# Patient Record
Sex: FEMALE | Race: WHITE | Employment: PART TIME | URBAN - METROPOLITAN AREA
[De-identification: names, ages, dates, MRNs, and addresses within clinical notes are randomized per-mention and may not be internally consistent; named-entity substitution may affect disease eponyms.]

---
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
## 2019-01-07 ENCOUNTER — APPOINTMENT (OUTPATIENT)
Dept: GENERAL RADIOLOGY | Age: 19
End: 2019-01-07
Attending: STUDENT IN AN ORGANIZED HEALTH CARE EDUCATION/TRAINING PROGRAM
Payer: MEDICAID

## 2019-01-07 ENCOUNTER — HOSPITAL ENCOUNTER (EMERGENCY)
Age: 19
Discharge: HOME | End: 2019-01-08
Attending: PEDIATRICS
Payer: MEDICAID

## 2019-01-07 VITALS
RESPIRATION RATE: 18 BRPM | TEMPERATURE: 99.7 F | SYSTOLIC BLOOD PRESSURE: 134 MMHG | DIASTOLIC BLOOD PRESSURE: 79 MMHG | OXYGEN SATURATION: 98 % | WEIGHT: 222.66 LBS | BODY MASS INDEX: 36.87 KG/M2 | HEART RATE: 104 BPM

## 2019-01-07 DIAGNOSIS — J06.9 ACUTE UPPER RESPIRATORY INFECTION: Primary | ICD-10-CM

## 2019-01-07 PROCEDURE — 94640 AIRWAY INHALATION TREATMENT: CPT

## 2019-01-07 PROCEDURE — 74011250637 HC RX REV CODE- 250/637: Performed by: PEDIATRICS

## 2019-01-07 PROCEDURE — 99283 EMERGENCY DEPT VISIT LOW MDM: CPT

## 2019-01-07 PROCEDURE — 71046 X-RAY EXAM CHEST 2 VIEWS: CPT

## 2019-01-07 PROCEDURE — 74011000250 HC RX REV CODE- 250: Performed by: STUDENT IN AN ORGANIZED HEALTH CARE EDUCATION/TRAINING PROGRAM

## 2019-01-07 RX ORDER — IBUPROFEN 400 MG/1
800 TABLET ORAL
Status: COMPLETED | OUTPATIENT
Start: 2019-01-08 | End: 2019-01-07

## 2019-01-07 RX ADMIN — ALBUTEROL SULFATE 1 DOSE: 2.5 SOLUTION RESPIRATORY (INHALATION) at 23:31

## 2019-01-07 RX ADMIN — IBUPROFEN 800 MG: 400 TABLET, FILM COATED ORAL at 23:59

## 2019-01-08 NOTE — ED PROVIDER NOTES
Gopi Doty  22 y.o. Patient presents with:  Cough    This patient is a 21-year-old female who presents with her mother for a cough. The cough has been present for multiple days, previously evaluated by the pediatrician. The pediatrician told them this is likely a virus, however dosed albuterol to help with the cough. The patient states albuterol was dosed to help prevent pneumonia. It is noted the patient has multiple episodes of pneumonia, which starts as a cough, and works its way down into the chest. The pediatrician did not start any antibiotics, as the patient did not require any at the time of the recent evaluation. Considering the patient continued to cough, they reported to the emergency department for evaluation. The cough is nonproductive, however causes some chest discomfort when she has coughing fits. NyQuil makes the cough better, and nothing seems to make it worse as reported. It has been relapsing and remitting over the past couple of days.              Past Medical History:   Diagnosis Date    Abdominal migraine     ADHD (attention deficit hyperactivity disorder)     Autism     Developmental delay     Headache     Irritable bowel syndrome with diarrhea 4/13/2017    Migraine     Obesity, morbid ([address] 12/12/2017    Psychiatric disorder     anxiety       Past Surgical History:   Procedure Laterality Date    COLONOSCOPY N/A 12/20/2016    COLONOSCOPY performed by Suni Nevarez MD at [address] HX TONSIL AND ADENOIDECTOMY      HX WISDOM TEETH EXTRACTION      UPPER GI ENDOSCOPY,BIOPSY  12/20/2016              Family History:   Problem Relation Age of Onset    Endometriosis Mother     Headache Mother         d/t accident - neck and brain injury    Delayed Awakening Mother     Post-op Nausea/Vomiting Mother     Headache Maternal Grandfather     No Known Problems Father     No Known Problems Maternal Grandmother        Social History     Socioeconomic History    Marital status: SINGLE     Spouse name: Not on file    Number of children: Not on file    Years of education: Not on file    Highest education level: Not on file   Social Needs    Financial resource strain: Not on file    Food insecurity - worry: Not on file    Food insecurity - inability: Not on file    Transportation needs - medical: Not on file   [employer] needs - non-medical: Not on file   Occupational History    Not on file   Tobacco Use    Smoking status: Never Smoker    Smokeless tobacco: Never Used    Tobacco comment: no smoke exposure in the home   Substance and Sexual Activity    Alcohol use: No    Drug use: No    Sexual activity: No   Other Topics Concern    Not on file   Social History Narrative    Not on file         ALLERGIES: Yeast, dried    Review of Systems   HENT: Positive for congestion and voice change. Respiratory: Positive for cough and chest tightness. Gastrointestinal: Negative for abdominal pain. Musculoskeletal: Negative for back pain. Skin: Negative for rash. All other systems reviewed and are negative. Vitals:    01/07/19 2218   BP: 134/79   Pulse: (!) 104   Resp: 18   Temp: 99.7 °F (37.6 °C)   SpO2: 99%   Weight: 101 kg (222 lb 10.6 oz)            Physical Exam       PE:  GEN:  WDWN female alert non toxic in NAD  SK: Acyanotic, Warm extremities. Few lesions throughout skin with slight erythema, without discharge. HEENT: H: Atraumatic/Normocephalic. E: EOMI, E: TM clear  N/T: Clear oropharynx  NECK: Supple, Full range of motion. No Masses  Chest/Pulmonary: Clear to auscultation. No rales, rhonchi, wheezes or distress at rest. Mild wheezing during coughing. No Retraction. No nasal flaring. Chest Wall: No tenderness on palpation, Equal chest rise  CV: Regular rate and rhythm. Normal S1 S2 . No murmur, gallops or thrills. ABD: Soft non tender, no Hepatosplenomegaly, no guarding, no masses,     MS: FROM all extremities, no long bone tenderness.  Normal Muscle tone. No swelling, cyanosis, no edema. NEURO: Alert. No focality. Cranial nerves 2-12 grossly intact. GCS 15          MDM         This patient presents with cough, given the history of multiple episodes of pneumonia, a chest x-ray was ordered in addition to a DuoNeb. After receiving the DuoNeb, the patient feels her cough slightly improved. Chest x-ray returned showing no acute infectious process. Respiratory therapy was called to help the patient better understand her current inhaler, with spacer.  Return precautions were established, and the patient was discharged and asked to follow-up with her pediatrician    Procedures

## 2019-01-08 NOTE — DISCHARGE INSTRUCTIONS
Patient Education      Thank you for allowing us to care for you in the Emergency Department. Please return if the condition does not improve, or gets worse. Please follow-up with your regular doctor as soon as possible. Please use your inhaler and spacer as discussed. Please call your pediatrician for follow-up. Upper Respiratory Infection (Cold): Care Instructions  Your Care Instructions    An upper respiratory infection, or URI, is an infection of the nose, sinuses, or throat. URIs are spread by coughs, sneezes, and direct contact. The common cold is the most frequent kind of URI. The flu and sinus infections are other kinds of URIs. Almost all URIs are caused by viruses. Antibiotics won't cure them. But you can treat most infections with home care. This may include drinking lots of fluids and taking over-the-counter pain medicine. You will probably feel better in 4 to 10 days. The doctor has checked you carefully, but problems can develop later. If you notice any problems or new symptoms, get medical treatment right away. Follow-up care is a key part of your treatment and safety. Be sure to make and go to all appointments, and call your doctor if you are having problems. It's also a good idea to know your test results and keep a list of the medicines you take. How can you care for yourself at home? · To prevent dehydration, drink plenty of fluids, enough so that your urine is light yellow or clear like water. Choose water and other caffeine-free clear liquids until you feel better. If you have kidney, heart, or liver disease and have to limit fluids, talk with your doctor before you increase the amount of fluids you drink. · Take an over-the-counter pain medicine, such as acetaminophen (Tylenol), ibuprofen (Advil, Motrin), or naproxen (Aleve). Read and follow all instructions on the label. · Before you use cough and cold medicines, check the label.  These medicines may not be safe for young children or for people with certain health problems. · Be careful when taking over-the-counter cold or flu medicines and Tylenol at the same time. Many of these medicines have acetaminophen, which is Tylenol. Read the labels to make sure that you are not taking more than the recommended dose. Too much acetaminophen (Tylenol) can be harmful. · Get plenty of rest.  · Do not smoke or allow others to smoke around you. If you need help quitting, talk to your doctor about stop-smoking programs and medicines. These can increase your chances of quitting for good. When should you call for help? Call 911 anytime you think you may need emergency care. For example, call if:    · You have severe trouble breathing.    Call your doctor now or seek immediate medical care if:    · You seem to be getting much sicker.     · You have new or worse trouble breathing.     · You have a new or higher fever.     · You have a new rash.    Watch closely for changes in your health, and be sure to contact your doctor if:    · You have a new symptom, such as a sore throat, an earache, or sinus pain.     · You cough more deeply or more often, especially if you notice more mucus or a change in the color of your mucus.     · You do not get better as expected. Where can you learn more? Go to http://harjeet-mina.info/. Enter R863 in the search box to learn more about \"Upper Respiratory Infection (Cold): Care Instructions. \"  Current as of: December 6, 2017  Content Version: 11.8  © 2914-6919 [employer]. Care instructions adapted under license by [employer] (which disclaims liability or warranty for this information). If you have questions about a medical condition or this instruction, always ask your healthcare professional. [place] [address] any warranty or liability for your use of this information.

## 2019-01-10 ENCOUNTER — OFFICE VISIT (OUTPATIENT)
Dept: PEDIATRIC GASTROENTEROLOGY | Age: 19
End: 2019-01-10

## 2019-01-10 VITALS
OXYGEN SATURATION: 97 % | RESPIRATION RATE: 17 BRPM | WEIGHT: 243.2 LBS | HEIGHT: 65 IN | BODY MASS INDEX: 40.52 KG/M2 | HEART RATE: 109 BPM | TEMPERATURE: 98.8 F

## 2019-01-10 DIAGNOSIS — E88.81 INSULIN RESISTANCE: ICD-10-CM

## 2019-01-10 DIAGNOSIS — E73.9 LACTOSE INTOLERANCE: ICD-10-CM

## 2019-01-10 DIAGNOSIS — F32.89 ATYPICAL DEPRESSION: ICD-10-CM

## 2019-01-10 DIAGNOSIS — E55.9 VITAMIN D DEFICIENCY: ICD-10-CM

## 2019-01-10 DIAGNOSIS — Z91.09 ALLERGY TO YEAST: ICD-10-CM

## 2019-01-10 DIAGNOSIS — F84.0 AUTISM SPECTRUM DISORDER: ICD-10-CM

## 2019-01-10 DIAGNOSIS — K58.0 IRRITABLE BOWEL SYNDROME WITH DIARRHEA: ICD-10-CM

## 2019-01-10 DIAGNOSIS — K20.0 EOSINOPHILIC ESOPHAGITIS: ICD-10-CM

## 2019-01-10 DIAGNOSIS — G44.52 NEW DAILY PERSISTENT HEADACHE: ICD-10-CM

## 2019-01-10 DIAGNOSIS — K59.04 CHRONIC IDIOPATHIC CONSTIPATION: ICD-10-CM

## 2019-01-10 DIAGNOSIS — K31.84 GASTROPARESIS: Primary | ICD-10-CM

## 2019-01-10 DIAGNOSIS — E66.01 OBESITY, MORBID (HCC): ICD-10-CM

## 2019-01-10 DIAGNOSIS — K20.90 ESOPHAGITIS DETERMINED BY BIOPSY: ICD-10-CM

## 2019-01-10 DIAGNOSIS — F41.9 ANXIETY: ICD-10-CM

## 2019-01-10 DIAGNOSIS — K21.9 GASTROESOPHAGEAL REFLUX DISEASE WITHOUT ESOPHAGITIS: ICD-10-CM

## 2019-01-10 DIAGNOSIS — R10.9 FUNCTIONAL ABDOMINAL PAIN SYNDROME: ICD-10-CM

## 2019-01-10 RX ORDER — ONDANSETRON 8 MG/1
8 TABLET, ORALLY DISINTEGRATING ORAL
Qty: 15 TAB | Refills: 3 | Status: SHIPPED | OUTPATIENT
Start: 2019-01-10 | End: 2019-12-06

## 2019-01-10 RX ORDER — DOCUSATE SODIUM 250 MG
250 CAPSULE ORAL
Qty: 30 CAP | Refills: 3 | Status: SHIPPED | OUTPATIENT
Start: 2019-01-10 | End: 2021-04-02

## 2019-01-10 RX ORDER — FLUTICASONE PROPIONATE 220 UG/1
AEROSOL, METERED RESPIRATORY (INHALATION)
Qty: 2 INHALER | Refills: 11 | Status: SHIPPED | OUTPATIENT
Start: 2019-01-10 | End: 2021-04-02

## 2019-01-10 RX ORDER — DICYCLOMINE HYDROCHLORIDE 20 MG/1
20 TABLET ORAL
Qty: 28 TAB | Refills: 2 | Status: SHIPPED | OUTPATIENT
Start: 2019-01-10 | End: 2019-08-20

## 2019-01-10 RX ORDER — OMEPRAZOLE 20 MG/1
20 CAPSULE, DELAYED RELEASE ORAL DAILY
Qty: 30 CAP | Refills: 11 | Status: SHIPPED | OUTPATIENT
Start: 2019-01-10 | End: 2019-01-12 | Stop reason: SDUPTHER

## 2019-01-10 NOTE — PATIENT INSTRUCTIONS
1.  Continue omeprazole, swallowed Flovent, stool softener as needed, Zofran as needed, Bentyl as needed  2. Immunology referral to Dr. David Murphy at [facility] for frequent viral infections  3.   Return to clinic in 1 year or sooner as needed

## 2019-01-11 ENCOUNTER — TELEPHONE (OUTPATIENT)
Dept: PEDIATRIC GASTROENTEROLOGY | Age: 19
End: 2019-01-11

## 2019-01-11 NOTE — TELEPHONE ENCOUNTER
----- Message from Andrade An sent at 1/11/2019  2:48 PM EST -----  Regarding: Dr Hernandez Cull: [phone]  Patient is calling because she was referred to a immunologist and they need her medical records. This is a different that Dr Martina Smith referred to her. Dr Tiffanie Bradley  Fax:  [phone]    Please advise.     [phone]

## 2019-01-12 DIAGNOSIS — K21.9 GASTROESOPHAGEAL REFLUX DISEASE WITHOUT ESOPHAGITIS: ICD-10-CM

## 2019-01-12 DIAGNOSIS — K59.04 CHRONIC IDIOPATHIC CONSTIPATION: ICD-10-CM

## 2019-01-12 DIAGNOSIS — R10.9 FUNCTIONAL ABDOMINAL PAIN SYNDROME: ICD-10-CM

## 2019-01-12 DIAGNOSIS — E88.81 INSULIN RESISTANCE: ICD-10-CM

## 2019-01-12 DIAGNOSIS — K58.0 IRRITABLE BOWEL SYNDROME WITH DIARRHEA: ICD-10-CM

## 2019-01-13 RX ORDER — OMEPRAZOLE 20 MG/1
CAPSULE, DELAYED RELEASE ORAL
Qty: 90 CAP | Refills: 11 | Status: SHIPPED | OUTPATIENT
Start: 2019-01-13 | End: 2019-08-20

## 2019-01-15 NOTE — PROGRESS NOTES
Date: January 10, 2019    Dear Aditi Singer MD:    Jacqueline Orourke returns to clinic today accompanied by her mother for ongoing care of GERD, constipation, and unexplained lower abdominal pain. Jacqueline Orourke is pleased to tell me that the swallowed Flovent has continued to be completely effective. There has been no further esophageal dysphagia or other GERD symptoms. Additionally, the constipation has been much easier to manage. Jacqueline Orourke is using Colace as needed only, however would like it prescribed for refill just in case. Mother tells me that there have been no complaints of abdominal pain ever since starting swallowed Flovent. I reviewed with mother that Erica's clinical presentation is consistent with eosinophilic esophagitis, however the endoscopy biopsy proof is lacking. Nonetheless, Jacqueline Orourke has not had a period of absent symptoms this long during my entire involvement with her and I am pleased she has found relief. Mother tells me that Jacqueline Orourke is afraid to go out in public or to school as she always seems to acquire intercurrent viral infections whenever she leaves the home. The family requests an immunology evaluation and I suggested Dr. Uvaldo Kuhn at [facility]. I cautioned the family that Dr. Uvaldo Kuhn may not think the clinical history concerning for an immunodeficiency but that I would trust her consultation in the matter. The ulcerated skin lesions appear as if excoriated bug bites or acne lesions, however mother claims that they were absent by the time they made it to the dermatology visit. They will return with photographic evidence of the ulcerated skin lesions for further consideration. Medications:   Current Outpatient Medications   Medication Sig    fluticasone (FLOVENT HFA) 220 mcg/actuation inhaler 2 puffs swallowed twice daily, npo x 30 min after dose    dicyclomine (BENTYL) 20 mg tablet Take 1 Tab by mouth two (2) times daily as needed for up to 14 doses.     docusate sodium (STOOL SOFTENER) 250 mg capsule Take 1 Cap by mouth two (2) times daily as needed for Constipation for up to 30 doses.  ondansetron (ZOFRAN ODT) 8 mg disintegrating tablet Take 1 Tab by mouth every eight (8) hours as needed for Nausea for up to 15 doses.  omeprazole (PRILOSEC) 20 mg capsule TAKE ONE CAPSULE BY MOUTH EVERY DAY    cetirizine (ZYRTEC) 10 mg tablet TK 1 T PO D PRF ALLERGY    STOOL SOFTENER 250 mg capsule TK ONE C PO  QD    metFORMIN ER (GLUCOPHAGE XR) 750 mg tablet Take 1 Tab by mouth two (2) times a day. Indications: PREVENTION OF TYPE 2 DIABETES MELLITUS    cholecalciferol, vitamin d3, (VITAMIN D3) 400 unit cap Take  by mouth.  loratadine (CLARITIN) 10 mg tablet Take 10 mg by mouth daily.  fluticasone propionate (FLONASE ALLERGY RELIEF NA) by Nasal route.  topiramate (TOPAMAX) 25 mg tablet 25 mg nightly.  VYVANSE 30 mg capsule TK ONE C PO  QAM    norethindrone-ethinyl estradiol (JUNEL 1/20, 21,) 1-20 mg-mcg tab Take ACTIVE PILLS ONLY    SUMAtriptan succinate (ZEMBRACE SYMTOUCH) 3 mg/0.5 mL pnij by SubCUTAneous route. Indications: MIGRAINE    hydrOXYzine HCl (ATARAX) 25 mg tablet TK 1 T PO Q 6 HOURS PRF ITCHING    mupirocin (BACTROBAN) 2 % ointment APPLY TO AFFECTED AREA OF SKIN TID UNTIL RESOLVED    triamcinolone acetonide (KENALOG) 0.1 % topical cream APPLY TO INSECT BITES BID PRN DO NOT APPLY TO FACE    sertraline (ZOLOFT) 50 mg tablet Take one tablet by mouth daily    butalbital-acetaminophen-caff (FIORICET) -40 mg per capsule Take 1-2 capsules by mouth every 8-12 hours prn    diphenhydrAMINE (BENADRYL) 25 mg capsule Take by mouth as needed (migraine).  zonisamide (ZONEGRAN) 100 mg capsule Take 100 mg by mouth daily.  ketorolac (TORADOL) 10 mg tablet Take 1 Tab by mouth every six (6) hours as needed for Pain.  Indications: SEVERE PAIN    butalbital-acetaminophen-caffeine (FIORICET, ESGIC) -40 mg per tablet Take 1 Tab by mouth every six (6) hours as needed for Pain or Headache. Max Daily Amount: 4 Tabs. Indications: MIGRAINE     No current facility-administered medications for this visit. Allergies: Allergies   Allergen Reactions    Yeast, Dried Other (comments)     Upset stomach        ROS: A 12 point review of systems was obtained and was as per HPI     OBJECTIVE:  Vitals:   Vitals:    01/10/19 1429   Pulse: (!) 109   Resp: 17   Temp: 98.8 °F (37.1 °C)   TempSrc: Oral   SpO2: 97%   Weight: 243 lb 3.2 oz (110.3 kg)   Height: 5' 5.16\" (1.655 m)       PHYSICAL EXAM:  General  no distress, well developed, well nourished, she is obese  HEENT:  Anicteric sclera, no oral lesions, moist mucous membranes  Eyes: PERRL and Conjunctivae Clear Bilaterally  Neck:  supple, no lymphadenopathy   Pulmonary:  Clear Breath Sounds Bilaterally, No Increased Effort and Good Air Movement Bilaterally  CV:  RRR and S1S2  Abd:  soft, non tender, non distended and bowel sounds present in all 4 quadrants, no hepatosplenomegaly  : deferred  Skin  Few ulcerated skin lesions over the lower leg  Musc/Skel: no swelling or tenderness  Neuro: AAO and sensation intact  Psych: appropriate affect and interactions    Studies: Previous abdominal films demonstrative of rectosigmoid stool burden consistent with constipation    Impression: Dagoberto Gonzales is a 67-year-old girl with gastroesophageal reflux disease, constipation, and chronic abdominal pain. I am struck by her improvement on swallowed Flovent, which suggests eosinophilic esophagitis. It seems harmless at this time to continue the swallowed Flovent. I will refer Dagoberto Gonzales along to Dr. Tegan [facility] of [facility] for consideration of recurrent intercurrent viral infections. Plan:   1. Continue omeprazole, swallowed Flovent, stool softener as needed, Zofran as needed, Bentyl as needed  2. Immunology referral to Dr. Tegan To at [address] [place] [address] for frequent viral infections  3.   Return to clinic in 1 year or sooner as needed

## 2019-03-19 ENCOUNTER — OFFICE VISIT (OUTPATIENT)
Dept: PEDIATRIC ENDOCRINOLOGY | Age: 19
End: 2019-03-19

## 2019-03-19 VITALS
SYSTOLIC BLOOD PRESSURE: 115 MMHG | DIASTOLIC BLOOD PRESSURE: 82 MMHG | OXYGEN SATURATION: 100 % | HEART RATE: 105 BPM | WEIGHT: 230.8 LBS | TEMPERATURE: 97.7 F | HEIGHT: 66 IN | RESPIRATION RATE: 19 BRPM | BODY MASS INDEX: 37.09 KG/M2

## 2019-03-19 DIAGNOSIS — E66.9 OBESITY (BMI 35.0-39.9 WITHOUT COMORBIDITY): Primary | ICD-10-CM

## 2019-03-19 DIAGNOSIS — R53.83 OTHER FATIGUE: ICD-10-CM

## 2019-03-19 DIAGNOSIS — R10.9 ABDOMINAL PAIN, UNSPECIFIED ABDOMINAL LOCATION: ICD-10-CM

## 2019-03-19 PROBLEM — E66.01 OBESITY, MORBID (HCC): Status: RESOLVED | Noted: 2017-12-12 | Resolved: 2019-03-19

## 2019-03-19 NOTE — LETTER
3/19/2019 2:23 PM 
 
Patient:  Reynaldo Isabel YOB: 2000 Date of Visit: 3/19/2019 Dear Atri Kimble MD 
[id number] [facility] [id number] VIA Facsimile: [phone] 
 : Thank you for referring Ms. Corina Hernandez to me for evaluation/treatment. Below are the relevant portions of my assessment and plan of care. Chief Complaint Patient presents with  Weight Management Subjective:  
 
Reason for visit: Reynaldo Isabel is a 22 y.o. female here for follow up of - Morbid obesity - Severe insulin resistance based on OGTT, not clinical.  - On Metformin 750 mg BiD - Irregular menstrual cycles - on continued OCP as menses cause debilitating migraines - Abnormal lipid profile She was last seen 4 months ago. She is here today with her mother. History of present illness: As per mother, patients history is significant for - Autism - Migraines severe on multiple medications - See below Morbid Obesity - Has struggled with obesity for many years. Lost 32 lb in 4 months with Diet and activity changes. Has lost a total of 60 lbs in 8 months. BMI has decreased from 43 to 37 kg/m2. Able to do more exercises as no more migraines. Has migraines 2-3x/month versus 2-3x/week. 7 Denies polyphagia, + polydipsia - 16 oz x 5 times/day, Nocturia once at night. Denies symptoms of hypothyroidism such as cold tolerance, dry hair, dry skin, constipation. No snoring at night except when really tired. No hip or joint pains Improved exercise intolerance, No SOB, No chest pain, palpitations Activity -  
Does yoga twice a week, Does chores around the home. Going to gym 2-3x/week - using elliptical and weights. Has seen Nutritionist in the past.  
Breakfast - None as she feels nauseous on waking up Lunch -  Smoothies - Strawberry and Mangoes, greek Yoghurt/ Skim milk Dinner - Meat and vegetables with sweet potatoes - does portion control Drinks - [employer] Insulin resistance -  OGTT done in 11/2017 revealed insulin resistance INSULIN Result Value Ref Range Insulin 2 hour  277 (H) <37 Insulin - 1 hour  286 (H) <51 Insulin Fasting  37.4 (H) <9 uIU/mL Started on Metformin  mg 12/2017. Current dose 750 mg Bid - takes both tablets together at bedtime. Improved fasting insulin Hypertension - In the previous visit - due to hypertension and striae noted, ACTH (low)and random cortisol (9.0) were drawn and they were not high, however they are not excellent screening tests to assess hypercortisolism. Patient had 24 hour urinary cortisol done with  (Ph# [phone]) at [facility] due to similar concerns of [place] which was normal - 7 (Normal 0-50). BP is normal today Abnormal lipid profile - 9/2016 - Lipids - High TG - 116, High Total cholesterol -209, ldl - 134, hdl - 52.  
 
 7/3/2018 Triglyceride 173 (H) Cholesterol 195 (H) HDL  44  (H) - Recommended OTC Fish oil. Pt concerned of GI side effects. Recommended plant sterols such as mikel seeds and flax seeds. Vitamin D deficiency - History - On Vitamin D 400 international units , Most recent Vitamin D 79 - 7/2018 Menstrual history - attained menarche at age 5 years, started on OCPs 12/2016  for severe menstrual migraines. Followed by Gynecology. She has not had a cycle for more than a year as she is continued OCP's to prevent migraine. Has baseline migraines requiring sumatriptan injections 2-3x a week. Used to be on Depo but associated with weight gain  
fu apt with Gynecology yearly. Plan is to continue continued OCP for a total of 5 years. MIgraines - Improved on Topamax - Now only once a week. Triggers - anxiety, noise , certain aura Autism and anxiety - Receives weekly counseling. Changes - Is going to come off Zoloft. Ritalin was switched to Adderall. Stopped using Adderall, planning to start Vyvanse. Seeing a chiropractor. Birth History - Weighed 6 lbs, Term, NVD Surgeries: NONE Hospitalizations: NONE Family history: No family history of thyroid disease, heart disease, hypertension or high cholesterol or DM. Fathers side unknown. He has Gout. Father is obese. Mother - Endometriosis MGF, MGM, Mother - Hypertension MGreat grandparents - Heartdisease at later age Social History: 
Home schooled for past 1 year as anxiety at school seemed to worsen headaches Finished 12th grade Doing well. ROS: 
Constitutional: decreased energy - Mother reports excess fatigue, has had Flu, Bronchitis and viral infection sin past 3 months. Recently saw Immunology at [facility] - got pneumo shot as titers were low. ENT: normal hearing, no sorethroat Eye: normal vision, denied double vision, blurred vision Respiratory system: no wheezing, no respiratory discomfort CVS: no palpitations, + lower extremity edema - improving - Followed by Cardiology - Significant improvement noted GI: normal bowel movements, abdominal pain followed by GI - Improved with Flovent Recent Upper abdominal pain Allergy: no skin rash or angioedema, significant stria on abdomen and inner thighs and arms, habit of skin pricking Neuorlogical: headache, no focal weakness. No burning Behavioural: normal behavior, normal mood. Medications - See scanned Prior to Admission medications Medication Sig Start Date End Date Taking? Authorizing Provider  
omeprazole (PRILOSEC) 20 mg capsule TAKE ONE CAPSULE BY MOUTH EVERY DAY 1/13/19  Yes Mya Soria MD  
fluticasone (FLOVENT HFA) 220 mcg/actuation inhaler 2 puffs swallowed twice daily, npo x 30 min after dose 1/10/19  Yes Mya Soria MD  
dicyclomine (BENTYL) 20 mg tablet Take 1 Tab by mouth two (2) times daily as needed for up to 14 doses.  1/10/19  Yes Mya Soria MD  
docusate sodium (STOOL SOFTENER) 250 mg capsule Take 1 Cap by mouth two (2) times daily as needed for Constipation for up to 30 doses. 1/10/19  Yes Vanda Mcnally MD  
ondansetron (ZOFRAN ODT) 8 mg disintegrating tablet Take 1 Tab by mouth every eight (8) hours as needed for Nausea for up to 15 doses. 1/10/19  Yes Vanda Mcnally MD  
cetirizine (ZYRTEC) 10 mg tablet TK 1 T PO D PRF ALLERGY 9/10/18  Yes Provider, Historical  
STOOL SOFTENER 250 mg capsule TK ONE C PO  QD 10/1/18  Yes Provider, Historical  
metFORMIN ER (GLUCOPHAGE XR) 750 mg tablet Take 1 Tab by mouth two (2) times a day. Indications: PREVENTION OF TYPE 2 DIABETES MELLITUS 9/20/18  Yes Edin Arriaza MD  
cholecalciferol, vitamin d3, (VITAMIN D3) 400 unit cap Take  by mouth. Yes Provider, Historical  
loratadine (CLARITIN) 10 mg tablet Take 10 mg by mouth daily. Yes Provider, Historical  
fluticasone propionate (FLONASE ALLERGY RELIEF NA) by Nasal route. Yes Provider, Historical  
topiramate (TOPAMAX) 25 mg tablet 25 mg nightly. 8/7/18  Yes Provider, Historical  
VYVANSE 30 mg capsule TK ONE C PO  QAM 8/9/18  Yes Provider, Historical  
norethindrone-ethinyl estradiol (JUNEL 1/20, 21,) 1-20 mg-mcg tab Take ACTIVE PILLS ONLY 8/14/18  Yes Riddhi RAMACHANDRAN MD  
SUMAtriptan succinate (ZEMBRACE SYMTOUCH) 3 mg/0.5 mL pnij by SubCUTAneous route.  Indications: MIGRAINE   Yes Provider, Historical  
hydrOXYzine HCl (ATARAX) 25 mg tablet TK 1 T PO Q 6 HOURS PRF ITCHING 6/6/17  Yes Provider, Historical  
mupirocin (BACTROBAN) 2 % ointment APPLY TO AFFECTED AREA OF SKIN TID UNTIL RESOLVED 6/6/17  Yes Provider, Historical  
triamcinolone acetonide (KENALOG) 0.1 % topical cream APPLY TO INSECT BITES BID PRN DO NOT APPLY TO FACE 6/6/17  Yes Provider, Historical  
sertraline (ZOLOFT) 50 mg tablet Take one tablet by mouth daily 4/13/17  Yes Provider, Historical  
butalbital-acetaminophen-caff (FIORICET) -40 mg per capsule Take 1-2 capsules by mouth every 8-12 hours prn 4/4/17  Yes Provider, Historical  
 diphenhydrAMINE (BENADRYL) 25 mg capsule Take by mouth as needed (migraine). Yes Provider, Historical  
zonisamide (ZONEGRAN) 100 mg capsule Take 100 mg by mouth daily. Yes Provider, Historical  
ketorolac (TORADOL) 10 mg tablet Take 1 Tab by mouth every six (6) hours as needed for Pain. Indications: SEVERE PAIN 10/15/15  Yes Tracey Mares MD  
butalbital-acetaminophen-caffeine (FIORICET, ESGIC) -40 mg per tablet Take 1 Tab by mouth every six (6) hours as needed for Pain or Headache. Max Daily Amount: 4 Tabs. Indications: MIGRAINE 9/24/15  Yes Tracey Mares MD  
 
 
Allergies: Allergies Allergen Reactions  Yeast, Dried Other (comments) Upset stomach Objective:  
 
 
Visit Vitals /82 (BP 1 Location: Left arm, BP Patient Position: Sitting) Pulse (!) 105 Temp 97.7 °F (36.5 °C) (Oral) Resp 19 Ht 5' 5.75\" (1.67 m) Wt 230 lb 12.8 oz (104.7 kg) SpO2 100% BMI 37.54 kg/m² Wt Readings from Last 3 Encounters:  
03/19/19 230 lb 12.8 oz (104.7 kg) (99 %, Z= 2.32)*  
01/10/19 243 lb 3.2 oz (110.3 kg) (>99 %, Z= 2.43)*  
01/07/19 222 lb 10.6 oz (101 kg) (99 %, Z= 2.23)* * Growth percentiles are based on CDC (Girls, 2-20 Years) data. Ht Readings from Last 3 Encounters:  
03/19/19 5' 5.75\" (1.67 m) (72 %, Z= 0.57)*  
01/10/19 5' 5.16\" (1.655 m) (63 %, Z= 0.34)*  
11/15/18 5' 5.16\" (1.655 m) (63 %, Z= 0.34)* * Growth percentiles are based on CDC (Girls, 2-20 Years) data. Height: 72 %ile (Z= 0.57) based on CDC (Girls, 2-20 Years) Stature-for-age data based on Stature recorded on 3/19/2019. Weight: 99 %ile (Z= 2.32) based on CDC (Girls, 2-20 Years) weight-for-age data using vitals from 3/19/2019. BMI: Body mass index is 37.54 kg/m². Percentile: [unfilled] Alert, Cooperative, obese - Face appears less obese including trunk. Improved lower abdomen distension and lower extremity edema - non pitting. HEENT: No thyromegaly, EOM intact, No tonsillar hypertrophy S1 S2 heard: Normal rhythm Bilateral air entry. No rhonchi or crepitation Abdomen is soft, non tender, No organomegaly MSK - Normal ROM Skin - No rashes or birth marks, striae on abdomen, upper arms and inner thighs - improved compared to previous. Few are wide, No acanthosis Laboratory data: 
Results for orders placed or performed in visit on 07/11/18 CORTISOL, URINE FREE 24 HR Result Value Ref Range Cortisol free, ug/L 14 Undefined ug/L Cortisol free, ug/24 hr 22 0 - 50 ug/24 hr  
 
9/2016 - Lipids - High TG, High Total cholesterol 4/2017 - TFT - WNL 
9/2016 - A1C - 5.5 
4/2017 - Vitamin D - 51 Radiology -  
4/2017 - Pelvic US - wnl Assessment/ Plan :  
 
 
Reynaldo Isabel is a 22 y.o. female presenting - Morbid obesity - Lost 60 lbs in 8 months - Severe insulin resistance based on OGTT, not clinical.  - On Metformin 750 mg x 2.  
- Irregular menstrual cycles - on continued OCP to prevent migraines - Abnormal lipid profile - Vitamin D deficiency - Most recent level 79. New symptom of excess fatigue, no other features of hypothyroidism Plan - As above Diagnosis, etiology, pathophysiology, risk/ benefits of rx, proposed eval, and expected follow up discussed with family and all questions answered Orders Placed This Encounter  LIPID PANEL  
 METABOLIC PANEL, COMPREHENSIVE  T4, FREE  
 TSH 3RD GENERATION  
 HEMOGLOBIN A1C WITH EAG  
 GGT  VITAMIN D, 25 HYDROXY  ANDREI BARR VIRUS AB PANEL Continue Metformin 750 mg BiD Diet and exercise recommendations provided. Labs yearly unless change in clinical symptoms or signs Follow up in 4 month Total time with patient 40 minutes Time spent counseling patient more than 50% If you have questions, please do not hesitate to call me. I look forward to following Ms. Ryan Aria along with you.  
 
 
 
Sincerely, 
 
 
 César Suarez MD

## 2019-03-19 NOTE — PROGRESS NOTES
Subjective:     Reason for visit: Nancy Mckinney is a 22 y.o. female here for follow up of   - Morbid obesity  - Severe insulin resistance based on OGTT, not clinical.  - On Metformin 750 mg BiD  - Irregular menstrual cycles - on continued OCP as menses cause debilitating migraines  - Abnormal lipid profile    She was last seen 4 months ago. She is here today with her mother. History of present illness: As per mother, patients history is significant for   - Autism  - Migraines severe on multiple medications - See below    Morbid Obesity - Has struggled with obesity for many years. Lost 32 lb in 4 months with Diet and activity changes. Has lost a total of 60 lbs in 8 months. BMI has decreased from 43 to 37 kg/m2. Able to do more exercises as no more migraines. Has migraines 2-3x/month versus 2-3x/week. 7    Denies polyphagia, + polydipsia - 16 oz x 5 times/day, Nocturia once at night. Denies symptoms of hypothyroidism such as cold tolerance, dry hair, dry skin, constipation. No snoring at night except when really tired. No hip or joint pains  Improved exercise intolerance, No SOB, No chest pain, palpitations    Activity -   Does yoga twice a week, Does chores around the home. Going to gym 2-3x/week - using elliptical and weights. Has seen Nutritionist in the past.   Breakfast - None as she feels nauseous on waking up  Lunch -  Smoothies - Strawberry and Mangoes, greek Yoghurt/ Skim milk  Dinner - Meat and vegetables with sweet potatoes - does portion control   Drinks - Water    Insulin resistance -  OGTT done in 11/2017 revealed insulin resistance  INSULIN   Result Value Ref Range    Insulin 2 hour  277 (H) <37    Insulin - 1 hour  286 (H) <51    Insulin Fasting  37.4 (H) <9 uIU/mL   Started on Metformin  mg 12/2017. Current dose 750 mg Bid - takes both tablets together at bedtime.     Improved fasting insulin     Hypertension - In the previous visit - due to hypertension and striae noted, ACTH (low)and random cortisol (9.0) were drawn and they were not high, however they are not excellent screening tests to assess hypercortisolism. Patient had 24 hour urinary cortisol done with  (Ph# [phone]) at [facility] due to similar concerns of [place] which was normal - 7 (Normal 0-50). BP is normal today       Abnormal lipid profile - 9/2016 - Lipids - High TG - 116, High Total cholesterol -209, ldl - 134, hdl - 52.      7/3/2018   Triglyceride 173 (H)   Cholesterol 195 (H)   HDL  44    (H)     - Recommended OTC Fish oil. Pt concerned of GI side effects. Recommended plant sterols such as mikel seeds and flax seeds. Vitamin D deficiency - History - On Vitamin D 400 international units , Most recent Vitamin D 79 - 7/2018    Menstrual history - attained menarche at age 5 years, started on OCPs 12/2016  for severe menstrual migraines. Followed by Gynecology. She has not had a cycle for more than a year as she is continued OCP's to prevent migraine. Has baseline migraines requiring sumatriptan injections 2-3x a week. Used to be on Depo but associated with weight gain   fu apt with Gynecology yearly. Plan is to continue continued OCP for a total of 5 years. MIgraines - Improved on Topamax - Now only once a week. Triggers - anxiety, noise , certain aura    Autism and anxiety - Receives weekly counseling. Changes - Is going to come off Zoloft. Ritalin was switched to Adderall. Stopped using Adderall, planning to start Vyvanse. Seeing a chiropractor. Birth History - Weighed 6 lbs, Term, NVD    Surgeries: NONE    Hospitalizations: NONE    Family history: No family history of thyroid disease, heart disease, hypertension or high cholesterol or DM. Fathers side unknown. He has Gout. Father is obese.    Mother - Endometriosis  MGF, MGM, Mother - Hypertension  MGreat grandparents - Heartdisease at later age     Social History:  Home schooled for past 1 year as anxiety at school seemed to worsen headaches  Finished 12th grade   Doing well. ROS:  Constitutional: decreased energy - Mother reports excess fatigue, has had Flu, Bronchitis and viral infection sin past 3 months. Recently saw Immunology at [facility] - got pneumo shot as titers were low. ENT: normal hearing, no sorethroat   Eye: normal vision, denied double vision, blurred vision  Respiratory system: no wheezing, no respiratory discomfort  CVS: no palpitations, + lower extremity edema - improving - Followed by Cardiology - Significant improvement noted  GI: normal bowel movements, abdominal pain followed by GI - Improved with Flovent Recent Upper abdominal pain   Allergy: no skin rash or angioedema, significant stria on abdomen and inner thighs and arms, habit of skin pricking  Neuorlogical: headache, no focal weakness. No burning  Behavioural: normal behavior, normal mood. Medications - See scanned   Prior to Admission medications    Medication Sig Start Date End Date Taking? Authorizing Provider   omeprazole (PRILOSEC) 20 mg capsule TAKE ONE CAPSULE BY MOUTH EVERY DAY 1/13/19  Yes Tiana Cruz MD   fluticasone (FLOVENT HFA) 220 mcg/actuation inhaler 2 puffs swallowed twice daily, npo x 30 min after dose 1/10/19  Yes Tiana Cruz MD   dicyclomine (BENTYL) 20 mg tablet Take 1 Tab by mouth two (2) times daily as needed for up to 14 doses. 1/10/19  Yes Tiana Cruz MD   docusate sodium (STOOL SOFTENER) 250 mg capsule Take 1 Cap by mouth two (2) times daily as needed for Constipation for up to 30 doses. 1/10/19  Yes Tiana Cruz MD   ondansetron (ZOFRAN ODT) 8 mg disintegrating tablet Take 1 Tab by mouth every eight (8) hours as needed for Nausea for up to 15 doses.  1/10/19  Yes Tiana Cruz MD   cetirizine (ZYRTEC) 10 mg tablet TK 1 T PO D PRF ALLERGY 9/10/18  Yes Provider, Historical   STOOL SOFTENER 250 mg capsule TK ONE C PO  QD 10/1/18  Yes Provider, Historical   metFORMIN ER (GLUCOPHAGE XR) 750 mg tablet Take 1 Tab by mouth two (2) times a day. Indications: PREVENTION OF TYPE 2 DIABETES MELLITUS 9/20/18  Yes Antwan Arriaza MD   cholecalciferol, vitamin d3, (VITAMIN D3) 400 unit cap Take  by mouth. Yes Provider, Historical   loratadine (CLARITIN) 10 mg tablet Take 10 mg by mouth daily. Yes Provider, Historical   fluticasone propionate (FLONASE ALLERGY RELIEF NA) by Nasal route. Yes Provider, Historical   topiramate (TOPAMAX) 25 mg tablet 25 mg nightly. 8/7/18  Yes Provider, Historical   VYVANSE 30 mg capsule TK ONE C PO  QAM 8/9/18  Yes Provider, Historical   norethindrone-ethinyl estradiol (JUNEL 1/20, 21,) 1-20 mg-mcg tab Take ACTIVE PILLS ONLY 8/14/18  Yes Lilliam RAMACHANDRAN MD   SUMAtriptan succinate (ZEMBRACE SYMTOUCH) 3 mg/0.5 mL pnij by SubCUTAneous route. Indications: MIGRAINE   Yes Provider, Historical   hydrOXYzine HCl (ATARAX) 25 mg tablet TK 1 T PO Q 6 HOURS PRF ITCHING 6/6/17  Yes Provider, Historical   mupirocin (BACTROBAN) 2 % ointment APPLY TO AFFECTED AREA OF SKIN TID UNTIL RESOLVED 6/6/17  Yes Provider, Historical   triamcinolone acetonide (KENALOG) 0.1 % topical cream APPLY TO INSECT BITES BID PRN DO NOT APPLY TO FACE 6/6/17  Yes Provider, Historical   sertraline (ZOLOFT) 50 mg tablet Take one tablet by mouth daily 4/13/17  Yes Provider, Historical   butalbital-acetaminophen-caff (FIORICET) -40 mg per capsule Take 1-2 capsules by mouth every 8-12 hours prn 4/4/17  Yes Provider, Historical   diphenhydrAMINE (BENADRYL) 25 mg capsule Take by mouth as needed (migraine). Yes Provider, Historical   zonisamide (ZONEGRAN) 100 mg capsule Take 100 mg by mouth daily. Yes Provider, Historical   ketorolac (TORADOL) 10 mg tablet Take 1 Tab by mouth every six (6) hours as needed for Pain.  Indications: SEVERE PAIN 10/15/15  Yes Malcolm Yan MD   butalbital-acetaminophen-caffeine (FIORICET, ESGIC) -40 mg per tablet Take 1 Tab by mouth every six (6) hours as needed for Pain or Headache. Max Daily Amount: 4 Tabs. Indications: MIGRAINE 9/24/15  Yes Tejinder Jimenez MD       Allergies: Allergies   Allergen Reactions    Yeast, Dried Other (comments)     Upset stomach            Objective:       Visit Vitals  /82 (BP 1 Location: Left arm, BP Patient Position: Sitting)   Pulse (!) 105   Temp 97.7 °F (36.5 °C) (Oral)   Resp 19   Ht 5' 5.75\" (1.67 m)   Wt 230 lb 12.8 oz (104.7 kg)   SpO2 100%   BMI 37.54 kg/m²     Wt Readings from Last 3 Encounters:   03/19/19 230 lb 12.8 oz (104.7 kg) (99 %, Z= 2.32)*   01/10/19 243 lb 3.2 oz (110.3 kg) (>99 %, Z= 2.43)*   01/07/19 222 lb 10.6 oz (101 kg) (99 %, Z= 2.23)*     * Growth percentiles are based on CDC (Girls, 2-20 Years) data. Ht Readings from Last 3 Encounters:   03/19/19 5' 5.75\" (1.67 m) (72 %, Z= 0.57)*   01/10/19 5' 5.16\" (1.655 m) (63 %, Z= 0.34)*   11/15/18 5' 5.16\" (1.655 m) (63 %, Z= 0.34)*     * Growth percentiles are based on CDC (Girls, 2-20 Years) data. Height: 72 %ile (Z= 0.57) based on CDC (Girls, 2-20 Years) Stature-for-age data based on Stature recorded on 3/19/2019. Weight: 99 %ile (Z= 2.32) based on CDC (Girls, 2-20 Years) weight-for-age data using vitals from 3/19/2019. BMI: Body mass index is 37.54 kg/m². Percentile: [unfilled]    Alert, Cooperative, obese - Face appears less obese including trunk. Improved lower abdomen distension and lower extremity edema - non pitting. HEENT: No thyromegaly, EOM intact, No tonsillar hypertrophy   S1 S2 heard: Normal rhythm  Bilateral air entry. No rhonchi or crepitation    Abdomen is soft, non tender, No organomegaly   MSK - Normal ROM  Skin - No rashes or birth marks, striae on abdomen, upper arms and inner thighs - improved compared to previous.  Few are wide, No acanthosis      Laboratory data:  Results for orders placed or performed in visit on 07/11/18   CORTISOL, URINE FREE 24 HR   Result Value Ref Range    Cortisol free, ug/L 14 Undefined ug/L    Cortisol free, ug/24 hr 22 0 - 50 ug/24 hr     9/2016 - Lipids - High TG, High Total cholesterol  4/2017 - TFT - WNL  9/2016 - A1C - 5.5  4/2017 - Vitamin D - 51    Radiology -   4/2017 - Pelvic US - wnl        Assessment/ Plan :       Cisco Mae is a 22 y.o. female presenting     - Morbid obesity - Lost 60 lbs in 8 months  - Severe insulin resistance based on OGTT, not clinical.  - On Metformin 750 mg x 2.   - Irregular menstrual cycles - on continued OCP to prevent migraines   - Abnormal lipid profile   - Vitamin D deficiency - Most recent level 70. New symptom of excess fatigue, no other features of hypothyroidism    Plan -     As above    Diagnosis, etiology, pathophysiology, risk/ benefits of rx, proposed eval, and expected follow up discussed with family and all questions answered      Orders Placed This Encounter    LIPID PANEL    METABOLIC PANEL, COMPREHENSIVE    T4, FREE    TSH 3RD GENERATION    HEMOGLOBIN A1C WITH EAG    GGT    VITAMIN D, 25 HYDROXY    ANDREI BARR VIRUS AB PANEL     Continue Metformin 750 mg BiD  Diet and exercise recommendations provided.   Labs yearly unless change in clinical symptoms or signs  Follow up in 4 month    Total time with patient 40 minutes  Time spent counseling patient more than 50%

## 2019-03-21 LAB
25(OH)D3+25(OH)D2 SERPL-MCNC: 64.2 NG/ML (ref 30–100)
ALBUMIN SERPL-MCNC: 4.5 G/DL (ref 3.5–5.5)
ALBUMIN/GLOB SERPL: 1.9 {RATIO} (ref 1.2–2.2)
ALP SERPL-CCNC: 53 IU/L (ref 39–117)
ALT SERPL-CCNC: 15 IU/L (ref 0–32)
AST SERPL-CCNC: 9 IU/L (ref 0–40)
BILIRUB SERPL-MCNC: 0.3 MG/DL (ref 0–1.2)
BUN SERPL-MCNC: 9 MG/DL (ref 6–20)
BUN/CREAT SERPL: 11 (ref 9–23)
CALCIUM SERPL-MCNC: 9.5 MG/DL (ref 8.7–10.2)
CHLORIDE SERPL-SCNC: 107 MMOL/L (ref 96–106)
CHOLEST SERPL-MCNC: 211 MG/DL (ref 100–169)
CO2 SERPL-SCNC: 19 MMOL/L (ref 20–29)
CREAT SERPL-MCNC: 0.79 MG/DL (ref 0.57–1)
EBV EA IGG SER-ACNC: <9 U/ML (ref 0–8.9)
EBV NA IGG SER IA-ACNC: >600 U/ML (ref 0–17.9)
EBV VCA IGG SER IA-ACNC: 475 U/ML (ref 0–17.9)
EBV VCA IGM SER IA-ACNC: <36 U/ML (ref 0–35.9)
EST. AVERAGE GLUCOSE BLD GHB EST-MCNC: 100 MG/DL
GGT SERPL-CCNC: 19 IU/L (ref 0–60)
GLOBULIN SER CALC-MCNC: 2.4 G/DL (ref 1.5–4.5)
GLUCOSE SERPL-MCNC: 88 MG/DL (ref 65–99)
HBA1C MFR BLD: 5.1 % (ref 4.8–5.6)
HDLC SERPL-MCNC: 41 MG/DL
INTERPRETATION, 910389: NORMAL
LDLC SERPL CALC-MCNC: 146 MG/DL (ref 0–109)
POTASSIUM SERPL-SCNC: 3.9 MMOL/L (ref 3.5–5.2)
PROT SERPL-MCNC: 6.9 G/DL (ref 6–8.5)
SERVICE CMNT-IMP: ABNORMAL
SODIUM SERPL-SCNC: 142 MMOL/L (ref 134–144)
T4 FREE SERPL-MCNC: 1.26 NG/DL (ref 0.93–1.6)
TRIGL SERPL-MCNC: 118 MG/DL (ref 0–89)
TSH SERPL DL<=0.005 MIU/L-ACNC: 1.2 UIU/ML (ref 0.45–4.5)
VLDLC SERPL CALC-MCNC: 24 MG/DL (ref 5–40)

## 2019-04-23 ENCOUNTER — OFFICE VISIT (OUTPATIENT)
Dept: OBGYN CLINIC | Age: 19
End: 2019-04-23

## 2019-04-23 VITALS
BODY MASS INDEX: 33.95 KG/M2 | HEIGHT: 68 IN | DIASTOLIC BLOOD PRESSURE: 91 MMHG | WEIGHT: 224 LBS | HEART RATE: 95 BPM | SYSTOLIC BLOOD PRESSURE: 139 MMHG

## 2019-04-23 DIAGNOSIS — Z30.41 ENCOUNTER FOR SURVEILLANCE OF CONTRACEPTIVE PILLS: Primary | ICD-10-CM

## 2019-04-23 DIAGNOSIS — Z84.2 FAMILY HISTORY OF ENDOMETRIOSIS IN FIRST DEGREE RELATIVE: ICD-10-CM

## 2019-04-23 DIAGNOSIS — R10.31 RLQ ABDOMINAL PAIN: ICD-10-CM

## 2019-04-23 RX ORDER — NORETHINDRONE ACETATE AND ETHINYL ESTRADIOL 1.5-30(21)
1 KIT ORAL DAILY
Qty: 3 PACKAGE | Refills: 2 | Status: SHIPPED | OUTPATIENT
Start: 2019-04-23 | End: 2019-08-20

## 2019-04-23 NOTE — PATIENT INSTRUCTIONS
Normal Menstrual Cycle: Care Instructions  Your Care Instructions    The menstrual cycle is the series of changes a woman's body goes through to prepare for a possible pregnancy. About once a month, the uterus grows a new, thick lining. This lining is then ready to receive a fertilized egg. The egg becomes fertilized if it joins with a man's sperm and implants in the lining of the uterus. This is how pregnancy begins. When there is no fertilized egg, the uterus sheds its lining. This is the monthly menstrual bleeding, or period. Women have periods from their early teen years until menopause, around age 48. A normal cycle lasts from 21 to 35 days. Count from the first day of one menstrual period until the first day of your next period to find the number of days in your cycle. Some women have no discomfort during their menstrual cycles. But others have mild to severe symptoms. If you have problems, ask your doctor about over-the-counter medicine. It may help relieve pain and bleeding. Follow-up care is a key part of your treatment and safety. Be sure to make and go to all appointments, and call your doctor if you are having problems. It's also a good idea to know your test results and keep a list of the medicines you take. How can you care for yourself at home? · Write down the day you start your menstrual period each month. Also note how long your period lasts. If your cycle is regular, it can help you predict when you will have your next period. · To help with symptoms, get regular exercise and eat healthy foods. Also try to limit caffeine and reduce stress. To relieve menstrual cramps  · Put a warm water bottle or a warm cloth on your belly. Or use a heating pad set on low. Heat improves blood flow and may relieve pain. · To relieve back pressure, lie down and put a pillow under your knees. Or lie on your side and bring your knees up to your chest.  · Get regular exercise.  It improves blood flow, which may decrease pain. · Use pads instead of tampons if you have pain in your vagina. · Take anti-inflammatory medicines to reduce pain. These include ibuprofen (Advil, Motrin) and naproxen (Aleve). Be safe with medicines. Read and follow all instructions on the label. Start taking the recommended dose when symptoms begin or one day before your menstrual period starts. If you are trying to become pregnant, talk to your doctor before you use any medicine. To manage menstrual bleeding  · Tampons range from small to large, for light to heavy flow. You can place a tampon in your vagina by using the slender tube packaged with the tampon. Or you can tuck it in with a finger. Change the tampon at least every 4 to 8 hours. This helps prevent leakage and infection. · Pads range from thin and light to thick and super absorbent. They protect your clothing, with or without using a tampon. · Menstrual cups are inserted in the vagina to collect menstrual flow. You remove the menstrual cup to empty it. Some are disposable and some can be washed and used again. · Whatever you use, be sure to change it regularly. Tampons are ideal for activities that pads are not practical for, such as swimming. When should you call for help? Watch closely for changes in your health, and be sure to contact your doctor if you have any problems. Where can you learn more? Go to http://harjeet-mina.info/. Enter I380 in the search box to learn more about \"Normal Menstrual Cycle: Care Instructions. \"  Current as of: May 14, 2018  Content Version: 11.9  © 7241-8226 [employer]. Care instructions adapted under license by [employer] (which disclaims liability or warranty for this information). If you have questions about a medical condition or this instruction, always ask your healthcare professional. [place] [address] any warranty or liability for your use of this information.     [employer] Help Desk: [phone]

## 2019-04-23 NOTE — PROGRESS NOTES
Abnormal bleeding F/U note      Jeanine Robertson is a 22 y.o. female who complains of vaginal bleeding problems and RLQ pain. Her current method of family planning is OCP (estrogen/progesterone). Has been taking Junel 1/20 continuously. Seen for initial visit 8/2018. Was on continuous OCPs at that time. Also reported chronic abdominal pain at that visit, primarily GI related. Has been seeing GI,. Has been dx'd with number of food allergies. Now takes inhaler after eating that has helped significantly. Has also cut out gluten. Developed RLQ/right flank pain last month. Had CT and US done. Brings in reports, reviewed:  - US wnl. Shows nl uterus, nl ovaries bilaterally with nl flow, no evidence of torsion, no signf free fluid. CT: nl, no kidney stone     Had spotting in January on continuous OCPs. Stopped taking for a week, then resumed. Has continued to have problems with spotting. Thinks RLQ pain may be related to spotting. Very concerned about possibility of endometriosis. Pt's mother had significant e'osis.           Her relevant past medical history:   Past Medical History:   Diagnosis Date    Abdominal migraine     ADHD (attention deficit hyperactivity disorder)     Autism     Developmental delay     Headache     Irritable bowel syndrome with diarrhea 4/13/2017    Migraine     Obesity, morbid ([address] 12/12/2017    Psychiatric disorder     anxiety        Past Surgical History:   Procedure Laterality Date    COLONOSCOPY N/A 12/20/2016    COLONOSCOPY performed by Phoebe Welch MD at [address] [place] [address] HX TONSIL AND ADENOIDECTOMY      HX WISDOM TEETH EXTRACTION      UPPER GI ENDOSCOPY,BIOPSY  12/20/2016          Social History     Occupational History    Not on file   Tobacco Use    Smoking status: Never Smoker    Smokeless tobacco: Never Used    Tobacco comment: no smoke exposure in the home   Substance and Sexual Activity    Alcohol use: No    Drug use: No    Sexual activity: Never     Family History   Problem Relation Age of Onset    Endometriosis Mother     Headache Mother         d/t accident - neck and brain injury    Delayed Awakening Mother     Post-op Nausea/Vomiting Mother     Headache Maternal Grandfather     No Known Problems Father     No Known Problems Maternal Grandmother        Allergies   Allergen Reactions    Yeast, Dried Other (comments)     Upset stomach      Prior to Admission medications    Medication Sig Start Date End Date Taking? Authorizing Provider   omeprazole (PRILOSEC) 20 mg capsule TAKE ONE CAPSULE BY MOUTH EVERY DAY 1/13/19   Sanna Yu MD   fluticasone (FLOVENT HFA) 220 mcg/actuation inhaler 2 puffs swallowed twice daily, npo x 30 min after dose 1/10/19   Sanna Yu MD   dicyclomine (BENTYL) 20 mg tablet Take 1 Tab by mouth two (2) times daily as needed for up to 14 doses. 1/10/19   Sanna Yu MD   docusate sodium (STOOL SOFTENER) 250 mg capsule Take 1 Cap by mouth two (2) times daily as needed for Constipation for up to 30 doses. 1/10/19   Sanna Yu MD   ondansetron (ZOFRAN ODT) 8 mg disintegrating tablet Take 1 Tab by mouth every eight (8) hours as needed for Nausea for up to 15 doses. 1/10/19   Sanna Yu MD   cetirizine (ZYRTEC) 10 mg tablet TK 1 T PO D PRF ALLERGY 9/10/18   Provider, Historical   STOOL SOFTENER 250 mg capsule TK ONE C PO  QD 10/1/18   Provider, Historical   metFORMIN ER (GLUCOPHAGE XR) 750 mg tablet Take 1 Tab by mouth two (2) times a day. Indications: PREVENTION OF TYPE 2 DIABETES MELLITUS 9/20/18   Tyron Dixon MD   cholecalciferol, vitamin d3, (VITAMIN D3) 400 unit cap Take  by mouth. Provider, Historical   loratadine (CLARITIN) 10 mg tablet Take 10 mg by mouth daily. Provider, Historical   fluticasone propionate (FLONASE ALLERGY RELIEF NA) by Nasal route. Provider, Historical   topiramate (TOPAMAX) 25 mg tablet 25 mg nightly.  8/7/18   Provider, Historical   VYVANSE 30 mg capsule TK ONE C PO  QAM 8/9/18   Provider, Historical   norethindrone-ethinyl estradiol (JUNEL 1/20, 21,) 1-20 mg-mcg tab Take ACTIVE PILLS ONLY 8/14/18   Jefferson Torres MD   SUMAtriptan succinate [facility]) 3 mg/0.5 mL pnij by SubCUTAneous route. Indications: MIGRAINE    Provider, Historical   hydrOXYzine HCl (ATARAX) 25 mg tablet TK 1 T PO Q 6 HOURS PRF ITCHING 6/6/17   Provider, Historical   mupirocin (BACTROBAN) 2 % ointment APPLY TO AFFECTED AREA OF SKIN TID UNTIL RESOLVED 6/6/17   Provider, Historical   triamcinolone acetonide (KENALOG) 0.1 % topical cream APPLY TO INSECT BITES BID PRN DO NOT APPLY TO FACE 6/6/17   Provider, Historical   sertraline (ZOLOFT) 50 mg tablet Take one tablet by mouth daily 4/13/17   Provider, Historical   butalbital-acetaminophen-caff (FIORICET) -40 mg per capsule Take 1-2 capsules by mouth every 8-12 hours prn 4/4/17   Provider, Historical   diphenhydrAMINE (BENADRYL) 25 mg capsule Take by mouth as needed (migraine). Provider, Historical   zonisamide (ZONEGRAN) 100 mg capsule Take 100 mg by mouth daily. Provider, Historical   ketorolac (TORADOL) 10 mg tablet Take 1 Tab by mouth every six (6) hours as needed for Pain. Indications: SEVERE PAIN 10/15/15   Ruiz Dunbar MD   butalbital-acetaminophen-caffeine (FIORICET, ESGIC) -40 mg per tablet Take 1 Tab by mouth every six (6) hours as needed for Pain or Headache. Max Daily Amount: 4 Tabs.  Indications: MIGRAINE 9/24/15   Ruiz Dunbar MD        Review of Systems - History obtained from the patient  Constitutional: negative for weight loss, fever, night sweats  HEENT: negative for hearing loss, earache, congestion, snoring, sorethroat  CV: negative for chest pain, palpitations, edema  Resp: negative for cough, shortness of breath, wheezing  Breast: negative for breast lumps, nipple discharge, galactorrhea  GI: negative for change in bowel habits, abdominal pain, black or bloody stools  : negative for frequency, dysuria, hematuria  MSK: negative for back pain, joint pain, muscle pain  Skin: negative for itching, rash, hives  Neuro: negative for dizziness, headache, confusion, weakness  Psych: negative for anxiety, depression, change in mood  Heme/lymph: negative for bleeding, bruising, pallor      Objective: There were no vitals taken for this visit. PHYSICAL EXAMINATION    Constitutional  · Appearance: well-nourished, well developed, alert, in no acute distress    HENT  · Head and Face: appears normal    Chest  · Respiratory Effort: breathing unlabored  · Auscultation: normal breath sounds    Cardiovascular  · Heart:  · Auscultation: regular rate and rhythm without murmur  Back  · No CVA tenderness; mild right flank tenderness    Gastrointestinal  · Abdominal Examination: abdomen soft, RLQ mildly tender to palpation, no guarding or rebound, normal bowel sounds, no masses present  · Liver and spleen: no hepatomegaly present, spleen not palpable  · Hernias: no hernias identified    Genitourinary  [deferred - not yet sexually active]    Skin  · General Inspection: no rash, no lesions identified    Neurologic/Psychiatric  · Mental Status:  · Orientation: grossly oriented to person, place and time  · Mood and Affect: mood normal, affect appropriate    Assessment:   BTB on continuous OCPs (Junel 1/20)  RLQ pain  FHx endometriosis    Plan:   Discussed options for bleeding including switching to slightly higher dose pill or Seasonique. Discussed Crystal Suero for possible endometriosis. She would like to try changing pill strength. eRX LE 1.5/30   Pain/menstrual calendar      Orders Placed This Encounter    norethindrone-ethinyl estradiol-iron (LOESTRIN FE 1.5/30, 28-DAY,) 1.5 mg-30 mcg (21)/75 mg (7) tab     Sig: Take 1 Tab by mouth daily. Continuous pills only.      Dispense:  3 Package     Refill:  2

## 2019-05-16 ENCOUNTER — OFFICE VISIT (OUTPATIENT)
Dept: PEDIATRIC GASTROENTEROLOGY | Age: 19
End: 2019-05-16

## 2019-05-16 VITALS
TEMPERATURE: 98.1 F | RESPIRATION RATE: 18 BRPM | OXYGEN SATURATION: 100 % | HEART RATE: 89 BPM | HEIGHT: 66 IN | DIASTOLIC BLOOD PRESSURE: 89 MMHG | SYSTOLIC BLOOD PRESSURE: 135 MMHG | BODY MASS INDEX: 35.65 KG/M2 | WEIGHT: 221.8 LBS

## 2019-05-16 DIAGNOSIS — K21.9 GASTROESOPHAGEAL REFLUX DISEASE WITHOUT ESOPHAGITIS: ICD-10-CM

## 2019-05-16 DIAGNOSIS — R10.9 RIGHT FLANK PAIN: ICD-10-CM

## 2019-05-16 DIAGNOSIS — K31.84 GASTROPARESIS: ICD-10-CM

## 2019-05-16 DIAGNOSIS — R53.83 OTHER FATIGUE: ICD-10-CM

## 2019-05-16 DIAGNOSIS — E66.01 SEVERE OBESITY (HCC): Primary | ICD-10-CM

## 2019-05-16 DIAGNOSIS — R53.82 CHRONIC FATIGUE: ICD-10-CM

## 2019-05-16 DIAGNOSIS — R11.0 CHRONIC NAUSEA: ICD-10-CM

## 2019-05-16 DIAGNOSIS — E66.9 OBESITY (BMI 35.0-39.9 WITHOUT COMORBIDITY): ICD-10-CM

## 2019-05-16 DIAGNOSIS — E88.81 INSULIN RESISTANCE: ICD-10-CM

## 2019-05-16 DIAGNOSIS — K20.0 EOSINOPHILIC ESOPHAGITIS: ICD-10-CM

## 2019-05-16 DIAGNOSIS — F84.0 AUTISM SPECTRUM DISORDER: ICD-10-CM

## 2019-05-16 RX ORDER — CITALOPRAM 10 MG/1
TABLET ORAL
Refills: 0 | COMMUNITY
Start: 2019-05-03 | End: 2020-10-30 | Stop reason: ALTCHOICE

## 2019-05-16 RX ORDER — LISDEXAMFETAMINE DIMESYLATE 40 MG/1
60 CAPSULE ORAL DAILY
Refills: 0 | COMMUNITY
Start: 2019-05-07 | End: 2020-10-30 | Stop reason: DRUGHIGH

## 2019-05-16 RX ORDER — KETOROLAC TROMETHAMINE 10 MG/1
TABLET, FILM COATED ORAL
COMMUNITY
End: 2020-10-30 | Stop reason: ALTCHOICE

## 2019-05-16 RX ORDER — TOPIRAMATE 100 MG/1
CAPSULE, EXTENDED RELEASE ORAL
Refills: 6 | COMMUNITY
Start: 2019-04-23 | End: 2020-10-30 | Stop reason: ALTCHOICE

## 2019-05-16 NOTE — PROGRESS NOTES
Chief Complaint   Patient presents with    Abdominal Pain     follow up, right sided abdominal pain, referred by PCP     Rona Mcallister is a 22 y.o. female that is here today for a follow up for abdominal pain, patients states that \"I have lost 6 lb's in a week and my pediatrician does not like that\".

## 2019-05-16 NOTE — H&P (VIEW-ONLY)
Date: May 16, 2019 Dear Cindy Blake MD: 
 
Dagoberto Gonzales returns to clinic today accompanied by her mother for urgent evaluation of new onset right upper quadrant and right flank pain. Dagoberto Gonzales describes that she was doing perfectly well with increased energy and no symptoms since starting swallowed Flovent therapy. Starting 8 weeks ago, she began with nausea and right upper quadrant/flank abdominal pain. The symptoms were present upon waking in the morning, and gradually progress throughout the day. It seems that her appetite is much diminished. Eating worsens the pain, however the symptoms are progressive regardless. There has been no vomiting or regurgitation. There have been no altered bowel habits or rectal bleeding. There has been no fever, however she is completely drained and exhausted. Her weight loss has been precipitous, unfortunately, as a result of the diminished food intake. There have been no more headaches on top of her usual migraine syndrome, which has been controlled well with Topamax recently. The pain does not radiate, however it does get better with heating pad. The pain is worse when lying down and prevents sleep. Ibuprofen at 400 mg dosing diminishes the pain to a great extent, however she is not taking this often. You advised her to try to avoid ibuprofen use in favor of Tylenol due to your concern for peptic ulcer disease. Due to concerns of renal stone, you sent Erica to [facility] ER to obtain urgent urology consult. A CT scan was normal and urinalysis was negative, excluding renal calculus. The CT scan also demonstrated a normal appendix and gallbladder. Lab evaluation included urinalysis, basic metabolic panel, and complete, revealing only for mildly elevated white count of 10,000. Hepatic function panel was not assessed. Ultrasound abdomen was also obtained and was normal. 
 
Medications:  
Current Outpatient Medications Medication Sig  
  VYVANSE 40 mg capsule TAKE ONE CAPSULE BY MOUTH EVERY MORNING  
 citalopram (CELEXA) 10 mg tablet TAKE 1/2 TABLET BY MOUTH EVERY DAY FOR 1ST WEEK, THEN INCREASE TO 1 TABLET DAILY  TROKENDI  mg capsule TAKE 1 CAPSULE BY MOUTH EVERY DAY  ketorolac (TORADOL) 10 mg tablet Take  by mouth.  norethindrone-ethinyl estradiol-iron (LOESTRIN FE 1.5/30, 28-DAY,) 1.5 mg-30 mcg (21)/75 mg (7) tab Take 1 Tab by mouth daily. Continuous pills only.  omeprazole (PRILOSEC) 20 mg capsule TAKE ONE CAPSULE BY MOUTH EVERY DAY  fluticasone (FLOVENT HFA) 220 mcg/actuation inhaler 2 puffs swallowed twice daily, npo x 30 min after dose  dicyclomine (BENTYL) 20 mg tablet Take 1 Tab by mouth two (2) times daily as needed for up to 14 doses.  docusate sodium (STOOL SOFTENER) 250 mg capsule Take 1 Cap by mouth two (2) times daily as needed for Constipation for up to 30 doses.  ondansetron (ZOFRAN ODT) 8 mg disintegrating tablet Take 1 Tab by mouth every eight (8) hours as needed for Nausea for up to 15 doses.  cetirizine (ZYRTEC) 10 mg tablet TK 1 T PO D PRF ALLERGY  STOOL SOFTENER 250 mg capsule TK ONE C PO  QD  
 metFORMIN ER (GLUCOPHAGE XR) 750 mg tablet Take 1 Tab by mouth two (2) times a day. Indications: PREVENTION OF TYPE 2 DIABETES MELLITUS  cholecalciferol, vitamin d3, (VITAMIN D3) 400 unit cap Take  by mouth.  fluticasone propionate (FLONASE ALLERGY RELIEF NA) by Nasal route.  SUMAtriptan succinate (ZEMBRACE SYMTOUCH) 3 mg/0.5 mL pnij by SubCUTAneous route. Indications: MIGRAINE  hydrOXYzine HCl (ATARAX) 25 mg tablet TK 1 T PO Q 6 HOURS PRF ITCHING  
 mupirocin (BACTROBAN) 2 % ointment APPLY TO AFFECTED AREA OF SKIN TID UNTIL RESOLVED  triamcinolone acetonide (KENALOG) 0.1 % topical cream APPLY TO INSECT BITES BID PRN DO NOT APPLY TO FACE  
 diphenhydrAMINE (BENADRYL) 25 mg capsule Take by mouth as needed (migraine).  zonisamide (ZONEGRAN) 100 mg capsule Take 100 mg by mouth daily. No current facility-administered medications for this visit. Allergies: Allergies Allergen Reactions  Yeast, Dried Other (comments) Upset stomach ROS: A 12 point review of systems was obtained and was as per HPI OBJECTIVE: 
Vitals:  
Vitals:  
 05/16/19 1312 BP: 135/89 Pulse: 89 Resp: 18 Temp: 98.1 °F (36.7 °C) TempSrc: Oral  
SpO2: 100% Weight: 221 lb 12.8 oz (100.6 kg) Height: 5' 6.46\" (1.688 m) PHYSICAL EXAM: 
General  no distress, well developed, well nourished, she is obese HEENT:  Anicteric sclera, no oral lesions, moist mucous membranes Eyes: PERRL and Conjunctivae Clear Bilaterally Neck:  supple, no lymphadenopathy Pulmonary:  Clear Breath Sounds Bilaterally, No Increased Effort and Good Air Movement Bilaterally CV:  RRR and S1S2 Abd:  soft, moderately tender in the right flank and RUQ, tender also over the right lower rib cage on lateral and anterior palpation however no radiation and no back pain. The tenderness is worse with RUQ palpation versus over the rib bones. Pressing on the right lower ribs agitates pain in the abdomen, non distended and bowel sounds present in all 4 quadrants, no hepatosplenomegaly : deferred Skin  Few ulcerated skin lesions over the lower leg, which she tells me are related to folliculitis. Skin lesions have been biopsied at the dermatology office, non tender Musc/Skel: no swelling or tenderness Neuro: AAO and sensation intact Psych: appropriate affect and interactions Studies: CT abdomen was normal and identified a normal appendix and gallbladder, ultrasound abdomen was normal, BMP, CBC, and urinalysis revealing only for mildly elevated white count of 10,000. These labs were done in recent weeks at [address] [place] [address].  
 
Impression: Jannie Palomo is a 21-year-old girl with gastroesophageal reflux disease, constipation, and chronic abdominal pain. I am struck by her improvement on swallowed Flovent, which suggests eosinophilic esophagitis. It seems harmless at this time to continue the swallowed Flovent. The new onset right flank/right upper quadrant pain is concerning for biliary pathology. As the renal evaluation was normal, I will advance the evaluation with HIDA scan promptly and repeat lab testing for hepatic function panel and mononucleosis testing. It is certainly interesting that ibuprofen to manage the pain and at only 400 mg dosing. I advised to trial the 800 mg dosing, as the issue could be related to muscle pull or costochondritis. If the HIDA scan is normal, a physical therapy evaluation with Jennifer Kong of [facility] could be helpful. Another possibility is neuropathic pain, however the migraine headaches have been well-controlled with Topamax recently. Plan: 1. Lab evaluation today 2. Schedule HIDA scan 3. Consider PT consult with Jennifer Kong of [facility]: 
 
[facility] 
[facility].at. [facility], [address] 
phone [phone], fax [phone] 4. Trial ibuprofen 800 mg every 6 hours as needed for rib pain

## 2019-05-16 NOTE — LETTER
5/16/2019 1:06 PM 
 
Ms. David Torres [address] [facility] [address] Dear Damien Puri MD, 
 
I had the opportunity to see your patient, David Torres, 2000, in the [facility] clinic. Please find my impression and suggestions attached. Feel free to call our office with any questions, [phone]. Sincerely, Oumou Johnson MD

## 2019-05-16 NOTE — LETTER
5/16/2019 1:06 PM 
 
Ms. Rona Mcallister [address] [facility] [address] Dear Silver Lobato MD, 
 
I had the opportunity to see your patient, Rona Mcallister, 2000, in the [facility] clinic. Please find my impression and suggestions attached. Feel free to call our office with any questions, [phone]. Sincerely, Victoria Braun MD

## 2019-05-16 NOTE — PATIENT INSTRUCTIONS
1.  Lab evaluation today    2. Schedule HIDA scan  3. Consider PT consult with Kathrine Rubio of [facility]:    [facility]  [facility]at. [facility], [address]  [address]  phone [phone], fax [phone]    4.   Trial ibuprofen 800 mg every 6 hours as needed for rib pain

## 2019-05-16 NOTE — PROGRESS NOTES
Date: May 16, 2019    Dear Prince Phillip MD:    Rach Barnett returns to clinic today accompanied by her mother for urgent evaluation of new onset right upper quadrant and right flank pain. Rach Barnett describes that she was doing perfectly well with increased energy and no symptoms since starting swallowed Flovent therapy. Starting 8 weeks ago, she began with nausea and right upper quadrant/flank abdominal pain. The symptoms were present upon waking in the morning, and gradually progress throughout the day. It seems that her appetite is much diminished. Eating worsens the pain, however the symptoms are progressive regardless. There has been no vomiting or regurgitation. There have been no altered bowel habits or rectal bleeding. There has been no fever, however she is completely drained and exhausted. Her weight loss has been precipitous, unfortunately, as a result of the diminished food intake. There have been no more headaches on top of her usual migraine syndrome, which has been controlled well with Topamax recently. The pain does not radiate, however it does get better with heating pad. The pain is worse when lying down and prevents sleep. Ibuprofen at 400 mg dosing diminishes the pain to a great extent, however she is not taking this often. You advised her to try to avoid ibuprofen use in favor of Tylenol due to your concern for peptic ulcer disease. Due to concerns of renal stone, you sent Erica to [facility] ER to obtain urgent urology consult. A CT scan was normal and urinalysis was negative, excluding renal calculus. The CT scan also demonstrated a normal appendix and gallbladder. Lab evaluation included urinalysis, basic metabolic panel, and complete, revealing only for mildly elevated white count of 10,000. Hepatic function panel was not assessed.   Ultrasound abdomen was also obtained and was normal.    Medications:   Current Outpatient Medications   Medication Sig    VYVANSE 40 mg capsule TAKE ONE CAPSULE BY MOUTH EVERY MORNING    citalopram (CELEXA) 10 mg tablet TAKE 1/2 TABLET BY MOUTH EVERY DAY FOR 1ST WEEK, THEN INCREASE TO 1 TABLET DAILY    TROKENDI  mg capsule TAKE 1 CAPSULE BY MOUTH EVERY DAY    ketorolac (TORADOL) 10 mg tablet Take  by mouth.  norethindrone-ethinyl estradiol-iron (LOESTRIN FE 1.5/30, 28-DAY,) 1.5 mg-30 mcg (21)/75 mg (7) tab Take 1 Tab by mouth daily. Continuous pills only.  omeprazole (PRILOSEC) 20 mg capsule TAKE ONE CAPSULE BY MOUTH EVERY DAY    fluticasone (FLOVENT HFA) 220 mcg/actuation inhaler 2 puffs swallowed twice daily, npo x 30 min after dose    dicyclomine (BENTYL) 20 mg tablet Take 1 Tab by mouth two (2) times daily as needed for up to 14 doses.  docusate sodium (STOOL SOFTENER) 250 mg capsule Take 1 Cap by mouth two (2) times daily as needed for Constipation for up to 30 doses.  ondansetron (ZOFRAN ODT) 8 mg disintegrating tablet Take 1 Tab by mouth every eight (8) hours as needed for Nausea for up to 15 doses.  cetirizine (ZYRTEC) 10 mg tablet TK 1 T PO D PRF ALLERGY    STOOL SOFTENER 250 mg capsule TK ONE C PO  QD    metFORMIN ER (GLUCOPHAGE XR) 750 mg tablet Take 1 Tab by mouth two (2) times a day. Indications: PREVENTION OF TYPE 2 DIABETES MELLITUS    cholecalciferol, vitamin d3, (VITAMIN D3) 400 unit cap Take  by mouth.  fluticasone propionate (FLONASE ALLERGY RELIEF NA) by Nasal route.  SUMAtriptan succinate (ZEMBRACE SYMTOUCH) 3 mg/0.5 mL pnij by SubCUTAneous route. Indications: MIGRAINE    hydrOXYzine HCl (ATARAX) 25 mg tablet TK 1 T PO Q 6 HOURS PRF ITCHING    mupirocin (BACTROBAN) 2 % ointment APPLY TO AFFECTED AREA OF SKIN TID UNTIL RESOLVED    triamcinolone acetonide (KENALOG) 0.1 % topical cream APPLY TO INSECT BITES BID PRN DO NOT APPLY TO FACE    diphenhydrAMINE (BENADRYL) 25 mg capsule Take by mouth as needed (migraine).  zonisamide (ZONEGRAN) 100 mg capsule Take 100 mg by mouth daily.      No current facility-administered medications for this visit. Allergies: Allergies   Allergen Reactions    Yeast, Dried Other (comments)     Upset stomach        ROS: A 12 point review of systems was obtained and was as per HPI     OBJECTIVE:  Vitals:   Vitals:    05/16/19 1312   BP: 135/89   Pulse: 89   Resp: 18   Temp: 98.1 °F (36.7 °C)   TempSrc: Oral   SpO2: 100%   Weight: 221 lb 12.8 oz (100.6 kg)   Height: 5' 6.46\" (1.688 m)       PHYSICAL EXAM:  General  no distress, well developed, well nourished, she is obese  HEENT:  Anicteric sclera, no oral lesions, moist mucous membranes  Eyes: PERRL and Conjunctivae Clear Bilaterally  Neck:  supple, no lymphadenopathy   Pulmonary:  Clear Breath Sounds Bilaterally, No Increased Effort and Good Air Movement Bilaterally  CV:  RRR and S1S2  Abd:  soft, moderately tender in the right flank and RUQ, tender also over the right lower rib cage on lateral and anterior palpation however no radiation and no back pain. The tenderness is worse with RUQ palpation versus over the rib bones. Pressing on the right lower ribs agitates pain in the abdomen, non distended and bowel sounds present in all 4 quadrants, no hepatosplenomegaly  : deferred  Skin  Few ulcerated skin lesions over the lower leg, which she tells me are related to folliculitis. Skin lesions have been biopsied at the dermatology office, non tender   Musc/Skel: no swelling or tenderness  Neuro: AAO and sensation intact  Psych: appropriate affect and interactions    Studies: CT abdomen was normal and identified a normal appendix and gallbladder, ultrasound abdomen was normal, BMP, CBC, and urinalysis revealing only for mildly elevated white count of 10,000. These labs were done in recent weeks at [address] [place] [address]. Impression: Srinath forte is a 27-year-old girl with gastroesophageal reflux disease, constipation, and chronic abdominal pain.   I am struck by her improvement on swallowed Flovent, which suggests eosinophilic esophagitis. It seems harmless at this time to continue the swallowed Flovent. The new onset right flank/right upper quadrant pain is concerning for biliary pathology. As the renal evaluation was normal, I will advance the evaluation with HIDA scan promptly and repeat lab testing for hepatic function panel and mononucleosis testing. It is certainly interesting that ibuprofen to manage the pain and at only 400 mg dosing. I advised to trial the 800 mg dosing, as the issue could be related to muscle pull or costochondritis. If the HIDA scan is normal, a physical therapy evaluation with [facility] could be helpful. Another possibility is neuropathic pain, however the migraine headaches have been well-controlled with Topamax recently. Plan:   1. Lab evaluation today    2. Schedule HIDA scan  3. Consider PT consult with [facility]:    [facility]  [facility].at. [employer]  [facility], [address]  [place]   phone [phone], fax [phone]    4.   Trial ibuprofen 800 mg every 6 hours as needed for rib pain

## 2019-05-17 ENCOUNTER — TELEPHONE (OUTPATIENT)
Dept: PEDIATRIC GASTROENTEROLOGY | Age: 19
End: 2019-05-17

## 2019-05-17 NOTE — TELEPHONE ENCOUNTER
----- Message from Cuco sent at 5/17/2019  4:17 PM EDT -----  Regarding: Neva Mondragon: [phone]  Imaging center didn't get the order for she is not sure what it is for but she would like a call back      Please Advise   [phone]

## 2019-05-19 LAB
ALBUMIN SERPL-MCNC: 4.6 G/DL (ref 3.5–5.5)
ALBUMIN/GLOB SERPL: 1.8 {RATIO} (ref 1.2–2.2)
ALP SERPL-CCNC: 53 IU/L (ref 39–117)
ALT SERPL-CCNC: 11 IU/L (ref 0–32)
AST SERPL-CCNC: 9 IU/L (ref 0–40)
BASOPHILS # BLD AUTO: 0.1 X10E3/UL (ref 0–0.2)
BASOPHILS NFR BLD AUTO: 1 %
BILIRUB SERPL-MCNC: 0.5 MG/DL (ref 0–1.2)
BUN SERPL-MCNC: 10 MG/DL (ref 6–20)
BUN/CREAT SERPL: 13 (ref 9–23)
CALCIUM SERPL-MCNC: 9.5 MG/DL (ref 8.7–10.2)
CHLORIDE SERPL-SCNC: 106 MMOL/L (ref 96–106)
CK SERPL-CCNC: 39 U/L (ref 24–173)
CO2 SERPL-SCNC: 20 MMOL/L (ref 20–29)
CREAT SERPL-MCNC: 0.76 MG/DL (ref 0.57–1)
CRP SERPL-MCNC: 11.6 MG/L (ref 0–4.9)
EBV EA IGG SER-ACNC: <9 U/ML (ref 0–8.9)
EBV NA IGG SER IA-ACNC: >600 U/ML (ref 0–17.9)
EBV VCA IGG SER IA-ACNC: >600 U/ML (ref 0–17.9)
EBV VCA IGM SER IA-ACNC: <36 U/ML (ref 0–35.9)
EOSINOPHIL # BLD AUTO: 0.4 X10E3/UL (ref 0–0.4)
EOSINOPHIL NFR BLD AUTO: 4 %
ERYTHROCYTE [DISTWIDTH] IN BLOOD BY AUTOMATED COUNT: 13.2 % (ref 12.3–15.4)
ERYTHROCYTE [SEDIMENTATION RATE] IN BLOOD BY WESTERGREN METHOD: 24 MM/HR (ref 0–32)
GGT SERPL-CCNC: 14 IU/L (ref 0–60)
GLOBULIN SER CALC-MCNC: 2.5 G/DL (ref 1.5–4.5)
GLUCOSE SERPL-MCNC: 81 MG/DL (ref 65–99)
HCT VFR BLD AUTO: 47.2 % (ref 34–46.6)
HETEROPH AB SER QL LA: NEGATIVE
HGB BLD-MCNC: 15.5 G/DL (ref 11.1–15.9)
IGA SERPL-MCNC: 102 MG/DL (ref 87–352)
IMM GRANULOCYTES # BLD AUTO: 0 X10E3/UL (ref 0–0.1)
IMM GRANULOCYTES NFR BLD AUTO: 0 %
LIPASE SERPL-CCNC: 36 U/L (ref 14–72)
LYMPHOCYTES # BLD AUTO: 2.8 X10E3/UL (ref 0.7–3.1)
LYMPHOCYTES NFR BLD AUTO: 27 %
MCH RBC QN AUTO: 30.8 PG (ref 26.6–33)
MCHC RBC AUTO-ENTMCNC: 32.8 G/DL (ref 31.5–35.7)
MCV RBC AUTO: 94 FL (ref 79–97)
MONOCYTES # BLD AUTO: 0.7 X10E3/UL (ref 0.1–0.9)
MONOCYTES NFR BLD AUTO: 7 %
NEUTROPHILS # BLD AUTO: 6.4 X10E3/UL (ref 1.4–7)
NEUTROPHILS NFR BLD AUTO: 61 %
PLATELET # BLD AUTO: 406 X10E3/UL (ref 150–379)
POTASSIUM SERPL-SCNC: 3.9 MMOL/L (ref 3.5–5.2)
PROT SERPL-MCNC: 7.1 G/DL (ref 6–8.5)
RBC # BLD AUTO: 5.04 X10E6/UL (ref 3.77–5.28)
SERVICE CMNT-IMP: ABNORMAL
SODIUM SERPL-SCNC: 140 MMOL/L (ref 134–144)
TTG IGA SER-ACNC: <2 U/ML (ref 0–3)
WBC # BLD AUTO: 10.4 X10E3/UL (ref 3.4–10.8)

## 2019-05-19 NOTE — PROGRESS NOTES
Nj,    Please call the family and inform them that I have reviewed the lab work and it is normal.  No evidence of Mono infection. Let's see what the HIDA gallbladder scan shows. Ask if she did better with increased dose 800 mg ibuprofen for possible costochondritis pain.         Thanks, Briseyda Black

## 2019-05-20 NOTE — PROGRESS NOTES
Mother informed of lab results. Provided mother with number for coordination of care to schedule HIDA scan, mother will call now to schedule. Mother does not believe that she is doing much better on the 800mg ibuprofen, still having pain at night.

## 2019-05-31 ENCOUNTER — TELEPHONE (OUTPATIENT)
Dept: PEDIATRIC GASTROENTEROLOGY | Age: 19
End: 2019-05-31

## 2019-05-31 NOTE — TELEPHONE ENCOUNTER
Initiated PA on cover my meds:      [place]: [facility] [address] [place] - [place] Case ID: [id number]   Need help? Call us at [phone]  Outcome: Approved today  Questionnaire submitted. PA Case [id number] Status: Approved. Authorization and Notifications Completed.   Drug: Omeprazole 20MG dr capsules  Form: [employer] Electronic PA Form

## 2019-05-31 NOTE — TELEPHONE ENCOUNTER
Called mother back, she said they cannot afford the omeprazole otc and pharmacy said a pa was needed. Called pharmacy and they will fax a form over to us.

## 2019-05-31 NOTE — TELEPHONE ENCOUNTER
----- Message from Cuco sent at 5/31/2019 10:37 AM EDT -----  Regarding: Rosenda Sleight: [phone]  Patient would like a call back in regards to prior auth on the prescription below.     omeprazole (PRILOSEC) 20 mg capsule [[id number]]    Order Details        [id number] [place], [address] [facility] [place]    Please Advise   [phone]

## 2019-06-04 ENCOUNTER — TELEPHONE (OUTPATIENT)
Dept: PEDIATRIC GASTROENTEROLOGY | Age: 19
End: 2019-06-04

## 2019-06-04 ENCOUNTER — HOSPITAL ENCOUNTER (OUTPATIENT)
Dept: NUCLEAR MEDICINE | Age: 19
Discharge: HOME | End: 2019-06-04
Attending: PEDIATRICS
Payer: MEDICAID

## 2019-06-04 DIAGNOSIS — F84.0 AUTISM SPECTRUM DISORDER: ICD-10-CM

## 2019-06-04 DIAGNOSIS — K21.9 GASTROESOPHAGEAL REFLUX DISEASE WITHOUT ESOPHAGITIS: ICD-10-CM

## 2019-06-04 DIAGNOSIS — K81.1 CHRONIC CHOLECYSTITIS: Primary | ICD-10-CM

## 2019-06-04 DIAGNOSIS — E66.9 OBESITY (BMI 35.0-39.9 WITHOUT COMORBIDITY): ICD-10-CM

## 2019-06-04 DIAGNOSIS — E66.01 SEVERE OBESITY (HCC): ICD-10-CM

## 2019-06-04 DIAGNOSIS — R53.82 CHRONIC FATIGUE: ICD-10-CM

## 2019-06-04 DIAGNOSIS — K82.8 BILIARY DYSKINESIA: ICD-10-CM

## 2019-06-04 DIAGNOSIS — E88.81 INSULIN RESISTANCE: ICD-10-CM

## 2019-06-04 DIAGNOSIS — K20.0 EOSINOPHILIC ESOPHAGITIS: ICD-10-CM

## 2019-06-04 DIAGNOSIS — R11.0 CHRONIC NAUSEA: ICD-10-CM

## 2019-06-04 DIAGNOSIS — R53.83 OTHER FATIGUE: ICD-10-CM

## 2019-06-04 DIAGNOSIS — K31.84 GASTROPARESIS: ICD-10-CM

## 2019-06-04 DIAGNOSIS — R10.9 RIGHT FLANK PAIN: ICD-10-CM

## 2019-06-04 PROCEDURE — 78226 HEPATOBILIARY SYSTEM IMAGING: CPT

## 2019-06-04 NOTE — TELEPHONE ENCOUNTER
Anna Paulson,    I spoke with mother about the abnormal HIDA scan. I spoke with Dr. Justin Allison before calling mother, and Dr. Anderson Neighbor will see Raquel Quinn in her Friday clinic. I explained that Raquel Quinn was 21 yo but has autism and I think needs a pediatric oriented surgeon. Referral placed. Could you let the surgery clinic know so they can Robley Rack in where they have space?       Thanks,  Jenise Allen

## 2019-06-05 NOTE — TELEPHONE ENCOUNTER
Dr. Luis Angel Stevens,   I let surgery  know that you discussed Dr. Betty Campbell seeing this patient on Friday. I provided office with HIDA scan results and last OV notes.

## 2019-06-11 ENCOUNTER — TELEPHONE (OUTPATIENT)
Dept: PEDIATRIC GASTROENTEROLOGY | Age: 19
End: 2019-06-11

## 2019-06-11 DIAGNOSIS — K21.9 GASTROESOPHAGEAL REFLUX DISEASE WITHOUT ESOPHAGITIS: ICD-10-CM

## 2019-06-11 DIAGNOSIS — R10.9 ABDOMINAL PAIN, UNSPECIFIED ABDOMINAL LOCATION: ICD-10-CM

## 2019-06-11 DIAGNOSIS — R11.0 CHRONIC NAUSEA: Primary | ICD-10-CM

## 2019-06-11 NOTE — PROGRESS NOTES
Nj,    I spoke with mother regarding EGD before the cholecystectomy with Dr. Maki Hahn this Friday. Mother agreed, however I don't think she would be compelled to post-pone the cholecystectomy so that we can also do EGD. Dr. Maki Hahn let me know I could go first on the case. If this is too difficult to arrange, I don't think it is absolutely necessary. Mother was more inclined to believe that anxiety was a bigger factor than any GI pathology we may have missed on the initial EGD in 2016. Let me know how this is sorting out for Friday. ..   Thanks,  Thais Jolley

## 2019-06-12 NOTE — PERIOP NOTES
Preop phone call completed. Preop instructions and preop medications reveiwed with MOTHER. MOTHER instructed to Purchase 2  4 oz bottles of CHG soap and instructed in use. MOTHER STATES SHE JUST BOUGHT DIAL SOAP FOR DAUGHTER AND WILL HAVE HER BATHE WITH THAT PRODUCT.

## 2019-06-13 ENCOUNTER — ANESTHESIA EVENT (OUTPATIENT)
Dept: SURGERY | Age: 19
End: 2019-06-13
Payer: MEDICAID

## 2019-06-14 ENCOUNTER — HOSPITAL ENCOUNTER (OUTPATIENT)
Age: 19
Setting detail: OBSERVATION
Discharge: HOME | End: 2019-06-14
Attending: SURGERY | Admitting: SURGERY
Payer: MEDICAID

## 2019-06-14 ENCOUNTER — ANESTHESIA (OUTPATIENT)
Dept: SURGERY | Age: 19
End: 2019-06-14
Payer: MEDICAID

## 2019-06-14 VITALS
OXYGEN SATURATION: 98 % | BODY MASS INDEX: 35.82 KG/M2 | RESPIRATION RATE: 20 BRPM | HEART RATE: 90 BPM | TEMPERATURE: 98.3 F | HEIGHT: 65 IN | WEIGHT: 215 LBS | SYSTOLIC BLOOD PRESSURE: 132 MMHG | DIASTOLIC BLOOD PRESSURE: 80 MMHG

## 2019-06-14 DIAGNOSIS — R10.9 ABDOMINAL PAIN, UNSPECIFIED ABDOMINAL LOCATION: ICD-10-CM

## 2019-06-14 DIAGNOSIS — K82.8 BILIARY DYSKINESIA: Primary | ICD-10-CM

## 2019-06-14 LAB
GLUCOSE BLD STRIP.AUTO-MCNC: 112 MG/DL (ref 65–100)
HCG UR QL: NEGATIVE
SERVICE CMNT-IMP: ABNORMAL

## 2019-06-14 PROCEDURE — 74011250636 HC RX REV CODE- 250/636

## 2019-06-14 PROCEDURE — 77030011640 HC PAD GRND REM COVD -A: Performed by: SURGERY

## 2019-06-14 PROCEDURE — 74011250637 HC RX REV CODE- 250/637: Performed by: SURGERY

## 2019-06-14 PROCEDURE — 74011250636 HC RX REV CODE- 250/636: Performed by: SURGERY

## 2019-06-14 PROCEDURE — 77030012022 HC APPL CLP ENDOSC COVD -C: Performed by: SURGERY

## 2019-06-14 PROCEDURE — 82962 GLUCOSE BLOOD TEST: CPT

## 2019-06-14 PROCEDURE — 76060000034 HC ANESTHESIA 1.5 TO 2 HR: Performed by: SURGERY

## 2019-06-14 PROCEDURE — 77030020747 HC TU INSUF ENDOSC TELE -A: Performed by: SURGERY

## 2019-06-14 PROCEDURE — 77030010507 HC ADH SKN DERMBND J&J -B: Performed by: SURGERY

## 2019-06-14 PROCEDURE — 76210000016 HC OR PH I REC 1 TO 1.5 HR: Performed by: SURGERY

## 2019-06-14 PROCEDURE — 99218 HC RM OBSERVATION: CPT

## 2019-06-14 PROCEDURE — 77030035051: Performed by: SURGERY

## 2019-06-14 PROCEDURE — 81025 URINE PREGNANCY TEST: CPT

## 2019-06-14 PROCEDURE — 77030009403 HC ELECTRD ENDO MEGA -B: Performed by: SURGERY

## 2019-06-14 PROCEDURE — 77030010031 HC SCIS ENDOSC MPLR J&J -C: Performed by: SURGERY

## 2019-06-14 PROCEDURE — 74011250636 HC RX REV CODE- 250/636: Performed by: ANESTHESIOLOGY

## 2019-06-14 PROCEDURE — 77030031139 HC SUT VCRL2 J&J -A: Performed by: SURGERY

## 2019-06-14 PROCEDURE — 77030008684 HC TU ET CUF COVD -B: Performed by: ANESTHESIOLOGY

## 2019-06-14 PROCEDURE — 77030018862 HC TRCR ENDOSC COVD -B: Performed by: SURGERY

## 2019-06-14 PROCEDURE — 77030003581 HC NDL INSUF VERES COVD -B: Performed by: SURGERY

## 2019-06-14 PROCEDURE — 88305 TISSUE EXAM BY PATHOLOGIST: CPT

## 2019-06-14 PROCEDURE — 74011000250 HC RX REV CODE- 250

## 2019-06-14 PROCEDURE — 74011000250 HC RX REV CODE- 250: Performed by: SURGERY

## 2019-06-14 PROCEDURE — 77030020782 HC GWN BAIR PAWS FLX 3M -B

## 2019-06-14 PROCEDURE — 88304 TISSUE EXAM BY PATHOLOGIST: CPT

## 2019-06-14 PROCEDURE — 76010000162 HC OR TIME 1.5 TO 2 HR INTENSV-TIER 1: Performed by: SURGERY

## 2019-06-14 PROCEDURE — 77030019988 HC FCPS ENDOSC DISP BSC -B: Performed by: SURGERY

## 2019-06-14 PROCEDURE — 77030002933 HC SUT MCRYL J&J -A: Performed by: SURGERY

## 2019-06-14 RX ORDER — GLYCOPYRROLATE 0.2 MG/ML
INJECTION INTRAMUSCULAR; INTRAVENOUS AS NEEDED
Status: DISCONTINUED | OUTPATIENT
Start: 2019-06-14 | End: 2019-06-14 | Stop reason: HOSPADM

## 2019-06-14 RX ORDER — OXYCODONE HYDROCHLORIDE 5 MG/1
5 TABLET ORAL
Qty: 12 TAB | Refills: 0 | Status: SHIPPED | OUTPATIENT
Start: 2019-06-14 | End: 2019-06-17

## 2019-06-14 RX ORDER — ONDANSETRON 2 MG/ML
4 INJECTION INTRAMUSCULAR; INTRAVENOUS AS NEEDED
Status: DISCONTINUED | OUTPATIENT
Start: 2019-06-14 | End: 2019-06-14 | Stop reason: HOSPADM

## 2019-06-14 RX ORDER — DEXTROSE, SODIUM CHLORIDE, SODIUM LACTATE, POTASSIUM CHLORIDE, AND CALCIUM CHLORIDE 5; .6; .31; .03; .02 G/100ML; G/100ML; G/100ML; G/100ML; G/100ML
125 INJECTION, SOLUTION INTRAVENOUS CONTINUOUS
Status: DISCONTINUED | OUTPATIENT
Start: 2019-06-14 | End: 2019-06-14 | Stop reason: HOSPADM

## 2019-06-14 RX ORDER — ROCURONIUM BROMIDE 10 MG/ML
INJECTION, SOLUTION INTRAVENOUS AS NEEDED
Status: DISCONTINUED | OUTPATIENT
Start: 2019-06-14 | End: 2019-06-14 | Stop reason: HOSPADM

## 2019-06-14 RX ORDER — DEXTROSE, SODIUM CHLORIDE, AND POTASSIUM CHLORIDE 5; .45; .15 G/100ML; G/100ML; G/100ML
125 INJECTION INTRAVENOUS CONTINUOUS
Status: DISCONTINUED | OUTPATIENT
Start: 2019-06-14 | End: 2019-06-14 | Stop reason: HOSPADM

## 2019-06-14 RX ORDER — MIDAZOLAM HYDROCHLORIDE 1 MG/ML
1 INJECTION, SOLUTION INTRAMUSCULAR; INTRAVENOUS
Status: DISCONTINUED | OUTPATIENT
Start: 2019-06-14 | End: 2019-06-14 | Stop reason: HOSPADM

## 2019-06-14 RX ORDER — SODIUM CHLORIDE 0.9 % (FLUSH) 0.9 %
5-40 SYRINGE (ML) INJECTION AS NEEDED
Status: DISCONTINUED | OUTPATIENT
Start: 2019-06-14 | End: 2019-06-14 | Stop reason: HOSPADM

## 2019-06-14 RX ORDER — DIPHENHYDRAMINE HYDROCHLORIDE 50 MG/ML
12.5 INJECTION, SOLUTION INTRAMUSCULAR; INTRAVENOUS AS NEEDED
Status: DISCONTINUED | OUTPATIENT
Start: 2019-06-14 | End: 2019-06-14 | Stop reason: HOSPADM

## 2019-06-14 RX ORDER — MIDAZOLAM HYDROCHLORIDE 1 MG/ML
1 INJECTION, SOLUTION INTRAMUSCULAR; INTRAVENOUS AS NEEDED
Status: DISCONTINUED | OUTPATIENT
Start: 2019-06-14 | End: 2019-06-14 | Stop reason: HOSPADM

## 2019-06-14 RX ORDER — ACETAMINOPHEN 325 MG/1
650 TABLET ORAL ONCE
Status: DISCONTINUED | OUTPATIENT
Start: 2019-06-14 | End: 2019-06-14 | Stop reason: HOSPADM

## 2019-06-14 RX ORDER — KETOROLAC TROMETHAMINE 30 MG/ML
INJECTION, SOLUTION INTRAMUSCULAR; INTRAVENOUS AS NEEDED
Status: DISCONTINUED | OUTPATIENT
Start: 2019-06-14 | End: 2019-06-14 | Stop reason: HOSPADM

## 2019-06-14 RX ORDER — SODIUM CHLORIDE 0.9 % (FLUSH) 0.9 %
5-40 SYRINGE (ML) INJECTION EVERY 8 HOURS
Status: DISCONTINUED | OUTPATIENT
Start: 2019-06-14 | End: 2019-06-14 | Stop reason: HOSPADM

## 2019-06-14 RX ORDER — LIDOCAINE HYDROCHLORIDE 20 MG/ML
INJECTION, SOLUTION EPIDURAL; INFILTRATION; INTRACAUDAL; PERINEURAL AS NEEDED
Status: DISCONTINUED | OUTPATIENT
Start: 2019-06-14 | End: 2019-06-14 | Stop reason: HOSPADM

## 2019-06-14 RX ORDER — CEFAZOLIN SODIUM 1 G/3ML
INJECTION, POWDER, FOR SOLUTION INTRAMUSCULAR; INTRAVENOUS AS NEEDED
Status: DISCONTINUED | OUTPATIENT
Start: 2019-06-14 | End: 2019-06-14 | Stop reason: HOSPADM

## 2019-06-14 RX ORDER — SODIUM CHLORIDE, SODIUM LACTATE, POTASSIUM CHLORIDE, CALCIUM CHLORIDE 600; 310; 30; 20 MG/100ML; MG/100ML; MG/100ML; MG/100ML
125 INJECTION, SOLUTION INTRAVENOUS CONTINUOUS
Status: DISCONTINUED | OUTPATIENT
Start: 2019-06-14 | End: 2019-06-14 | Stop reason: HOSPADM

## 2019-06-14 RX ORDER — FENTANYL CITRATE 50 UG/ML
INJECTION, SOLUTION INTRAMUSCULAR; INTRAVENOUS AS NEEDED
Status: DISCONTINUED | OUTPATIENT
Start: 2019-06-14 | End: 2019-06-14 | Stop reason: HOSPADM

## 2019-06-14 RX ORDER — NEOSTIGMINE METHYLSULFATE 1 MG/ML
INJECTION INTRAVENOUS AS NEEDED
Status: DISCONTINUED | OUTPATIENT
Start: 2019-06-14 | End: 2019-06-14 | Stop reason: HOSPADM

## 2019-06-14 RX ORDER — MIDAZOLAM HYDROCHLORIDE 1 MG/ML
INJECTION, SOLUTION INTRAMUSCULAR; INTRAVENOUS AS NEEDED
Status: DISCONTINUED | OUTPATIENT
Start: 2019-06-14 | End: 2019-06-14 | Stop reason: HOSPADM

## 2019-06-14 RX ORDER — ONDANSETRON 2 MG/ML
4 INJECTION INTRAMUSCULAR; INTRAVENOUS
Status: DISCONTINUED | OUTPATIENT
Start: 2019-06-14 | End: 2019-06-14 | Stop reason: HOSPADM

## 2019-06-14 RX ORDER — ACETAMINOPHEN 325 MG/1
650 TABLET ORAL
Status: DISCONTINUED | OUTPATIENT
Start: 2019-06-14 | End: 2019-06-14 | Stop reason: HOSPADM

## 2019-06-14 RX ORDER — OXYCODONE AND ACETAMINOPHEN 5; 325 MG/1; MG/1
1 TABLET ORAL
Status: DISCONTINUED | OUTPATIENT
Start: 2019-06-14 | End: 2019-06-14 | Stop reason: HOSPADM

## 2019-06-14 RX ORDER — KETOROLAC TROMETHAMINE 30 MG/ML
30 INJECTION, SOLUTION INTRAMUSCULAR; INTRAVENOUS
Status: DISCONTINUED | OUTPATIENT
Start: 2019-06-14 | End: 2019-06-14 | Stop reason: HOSPADM

## 2019-06-14 RX ORDER — MORPHINE SULFATE 10 MG/ML
2 INJECTION, SOLUTION INTRAMUSCULAR; INTRAVENOUS
Status: DISCONTINUED | OUTPATIENT
Start: 2019-06-14 | End: 2019-06-14 | Stop reason: HOSPADM

## 2019-06-14 RX ORDER — OXYCODONE HYDROCHLORIDE 5 MG/1
5 TABLET ORAL AS NEEDED
Status: DISCONTINUED | OUTPATIENT
Start: 2019-06-14 | End: 2019-06-14 | Stop reason: HOSPADM

## 2019-06-14 RX ORDER — SODIUM CHLORIDE, SODIUM LACTATE, POTASSIUM CHLORIDE, CALCIUM CHLORIDE 600; 310; 30; 20 MG/100ML; MG/100ML; MG/100ML; MG/100ML
INJECTION, SOLUTION INTRAVENOUS
Status: DISCONTINUED | OUTPATIENT
Start: 2019-06-14 | End: 2019-06-14 | Stop reason: HOSPADM

## 2019-06-14 RX ORDER — ONDANSETRON 2 MG/ML
INJECTION INTRAMUSCULAR; INTRAVENOUS AS NEEDED
Status: DISCONTINUED | OUTPATIENT
Start: 2019-06-14 | End: 2019-06-14 | Stop reason: HOSPADM

## 2019-06-14 RX ORDER — DEXAMETHASONE SODIUM PHOSPHATE 4 MG/ML
INJECTION, SOLUTION INTRA-ARTICULAR; INTRALESIONAL; INTRAMUSCULAR; INTRAVENOUS; SOFT TISSUE AS NEEDED
Status: DISCONTINUED | OUTPATIENT
Start: 2019-06-14 | End: 2019-06-14 | Stop reason: HOSPADM

## 2019-06-14 RX ORDER — FENTANYL CITRATE 50 UG/ML
25 INJECTION, SOLUTION INTRAMUSCULAR; INTRAVENOUS
Status: DISCONTINUED | OUTPATIENT
Start: 2019-06-14 | End: 2019-06-14 | Stop reason: HOSPADM

## 2019-06-14 RX ORDER — BUPIVACAINE HYDROCHLORIDE 2.5 MG/ML
INJECTION, SOLUTION EPIDURAL; INFILTRATION; INTRACAUDAL AS NEEDED
Status: DISCONTINUED | OUTPATIENT
Start: 2019-06-14 | End: 2019-06-14 | Stop reason: HOSPADM

## 2019-06-14 RX ORDER — PROPOFOL 10 MG/ML
INJECTION, EMULSION INTRAVENOUS AS NEEDED
Status: DISCONTINUED | OUTPATIENT
Start: 2019-06-14 | End: 2019-06-14 | Stop reason: HOSPADM

## 2019-06-14 RX ORDER — LIDOCAINE HYDROCHLORIDE 10 MG/ML
0.5 INJECTION, SOLUTION EPIDURAL; INFILTRATION; INTRACAUDAL; PERINEURAL AS NEEDED
Status: DISCONTINUED | OUTPATIENT
Start: 2019-06-14 | End: 2019-06-14 | Stop reason: HOSPADM

## 2019-06-14 RX ORDER — FENTANYL CITRATE 50 UG/ML
50 INJECTION, SOLUTION INTRAMUSCULAR; INTRAVENOUS AS NEEDED
Status: DISCONTINUED | OUTPATIENT
Start: 2019-06-14 | End: 2019-06-14 | Stop reason: HOSPADM

## 2019-06-14 RX ADMIN — CEFAZOLIN SODIUM 2 G: 1 INJECTION, POWDER, FOR SOLUTION INTRAMUSCULAR; INTRAVENOUS at 08:07

## 2019-06-14 RX ADMIN — KETOROLAC TROMETHAMINE 30 MG: 30 INJECTION, SOLUTION INTRAMUSCULAR at 16:44

## 2019-06-14 RX ADMIN — ROCURONIUM BROMIDE 10 MG: 10 INJECTION, SOLUTION INTRAVENOUS at 08:35

## 2019-06-14 RX ADMIN — NEOSTIGMINE METHYLSULFATE 3 MG: 1 INJECTION INTRAVENOUS at 09:28

## 2019-06-14 RX ADMIN — PROPOFOL 200 MG: 10 INJECTION, EMULSION INTRAVENOUS at 08:04

## 2019-06-14 RX ADMIN — ONDANSETRON 4 MG: 2 INJECTION INTRAMUSCULAR; INTRAVENOUS at 08:12

## 2019-06-14 RX ADMIN — MIDAZOLAM HYDROCHLORIDE 2 MG: 1 INJECTION, SOLUTION INTRAMUSCULAR; INTRAVENOUS at 07:53

## 2019-06-14 RX ADMIN — FENTANYL CITRATE 25 MCG: 50 INJECTION INTRAMUSCULAR; INTRAVENOUS at 10:22

## 2019-06-14 RX ADMIN — OXYCODONE HYDROCHLORIDE AND ACETAMINOPHEN 1 TABLET: 5; 325 TABLET ORAL at 14:19

## 2019-06-14 RX ADMIN — FENTANYL CITRATE 50 MCG: 50 INJECTION, SOLUTION INTRAMUSCULAR; INTRAVENOUS at 09:49

## 2019-06-14 RX ADMIN — FENTANYL CITRATE 50 MCG: 50 INJECTION, SOLUTION INTRAMUSCULAR; INTRAVENOUS at 08:04

## 2019-06-14 RX ADMIN — ROCURONIUM BROMIDE 40 MG: 10 INJECTION, SOLUTION INTRAVENOUS at 08:04

## 2019-06-14 RX ADMIN — FENTANYL CITRATE 25 MCG: 50 INJECTION, SOLUTION INTRAMUSCULAR; INTRAVENOUS at 09:38

## 2019-06-14 RX ADMIN — FENTANYL CITRATE 50 MCG: 50 INJECTION, SOLUTION INTRAMUSCULAR; INTRAVENOUS at 08:35

## 2019-06-14 RX ADMIN — SODIUM CHLORIDE, SODIUM LACTATE, POTASSIUM CHLORIDE, CALCIUM CHLORIDE: 600; 310; 30; 20 INJECTION, SOLUTION INTRAVENOUS at 07:58

## 2019-06-14 RX ADMIN — FENTANYL CITRATE 25 MCG: 50 INJECTION, SOLUTION INTRAMUSCULAR; INTRAVENOUS at 09:35

## 2019-06-14 RX ADMIN — KETOROLAC TROMETHAMINE 30 MG: 30 INJECTION, SOLUTION INTRAMUSCULAR; INTRAVENOUS at 09:30

## 2019-06-14 RX ADMIN — LIDOCAINE HYDROCHLORIDE 80 MG: 20 INJECTION, SOLUTION EPIDURAL; INFILTRATION; INTRACAUDAL; PERINEURAL at 08:04

## 2019-06-14 RX ADMIN — GLYCOPYRROLATE 0.4 MG: 0.2 INJECTION INTRAMUSCULAR; INTRAVENOUS at 09:28

## 2019-06-14 RX ADMIN — DEXAMETHASONE SODIUM PHOSPHATE 4 MG: 4 INJECTION, SOLUTION INTRA-ARTICULAR; INTRALESIONAL; INTRAMUSCULAR; INTRAVENOUS; SOFT TISSUE at 08:08

## 2019-06-14 RX ADMIN — DEXTROSE MONOHYDRATE, SODIUM CHLORIDE, AND POTASSIUM CHLORIDE 125 ML/HR: 50; 4.5; 1.49 INJECTION, SOLUTION INTRAVENOUS at 10:49

## 2019-06-14 RX ADMIN — FENTANYL CITRATE 25 MCG: 50 INJECTION INTRAMUSCULAR; INTRAVENOUS at 10:12

## 2019-06-14 NOTE — PROCEDURES
[address] [facility].  [address] [facility] [address] [facility], [address]  [phone]      Endoscopic Esophagogastroduodenoscopy Procedure Note      Procedure: Endoscopic Gastroduodenoscopy with biopsy    Pre-operative Diagnosis: chronic abdominal pain, chronic nausea    Post-operative Diagnosis: hayesEGDdx: Gastritis    : Cari Hdz MD    Referring Provider:  Ileana Glynn MD    Anesthesia/Sedation: Sedation provided by the Anesthesia team.     Pre-Procedural Exam:  Heart: RRR, well-perfused  Lungs: clear bilaterally without wheezes, crackles, or rhonchi  Abdomen: soft, nontender, nondistended, bowel sounds present  Mental Status: awake, alert      Procedure Details   After satisfactory titration of sedation, an endoscope was inserted through the oropharynx into the upper esophagus. The endoscope was then passed through the lower esophagus and then into the stomach to the level of the pylorus and then retroflexed and the gastroesophageal junction was inspected. Endoscope was advanced through the pylorus into the second to third portion of the duodenum and then retracted back into the gastric lumen. The stomach was decompressed and the endoscope was retracted into the distal esophagus. The endoscope was retracted to the mid and upper esophagus. The stomach was decompressed and the endoscope was retracted fully. Findings:   Esophagus: normal  Stomach: gastritis characterized by nodular gastropathy  Duodenum: normal                  Therapies:  Biopsies obtained with cold forceps for histology in the esophagus, stomach, and duodenum    Specimens:   · Antrum/Body - 4  · Duodenum - 2  · Duodenal bulb - 4  · Distal esophagus - 2  · Mid esophagus - 2           Estimated Blood Loss:  minimal    Complications:   None; patient tolerated the procedure well. Impression:  Gastritis      Recommendations:  -Await pathology. Cari Hdz MD

## 2019-06-14 NOTE — DISCHARGE SUMMARY
Physician Discharge Summary     Patient ID:  Diamante Taylor  [id number]  30 y.o.  2000    Allergies: Yeast, dried    Admit Date: 6/14/2019    Discharge Date: 6/14/2019    * Admission Diagnoses: Biliary dyskinesia [K82.8]    * Discharge Diagnoses:    Hospital Problems as of 6/14/2019 Date Reviewed: 5/16/2019          Codes Class Noted - Resolved POA    Biliary dyskinesia ICD-10-CM: K82.8  ICD-9-CM: 575.8  6/4/2019 - Present Unknown               Admission Condition: Fair    * Discharge Condition: good and improved    * Procedures: Procedure(s):  LAPAROSCOPIC CHOLECYSTECTOMY  ESOPHAGOGASTRODUODENOSCOPY (EGD) with    * Hospital Course:   Kate Rice is a 22year old autistic female brought to the hospital for EGD and cholecystectomy today. Following surgery, she was tolerating a diet, pain was well controlled and she was ambulating. Consults: None    Discharge Medications:   Current Discharge Medication List      START taking these medications    Details   oxyCODONE IR (ROXICODONE) 5 mg immediate release tablet Take 1 Tab by mouth every four (4) hours as needed for Pain for up to 3 days. Max Daily Amount: 30 mg.  Qty: 12 Tab, Refills: 0    Associated Diagnoses: Biliary dyskinesia         CONTINUE these medications which have NOT CHANGED    Details   VYVANSE 40 mg capsule TAKE ONE CAPSULE BY MOUTH EVERY MORNING  Refills: 0      citalopram (CELEXA) 10 mg tablet TAKE 1/2 TABLET BY MOUTH EVERY DAY FOR 1ST WEEK, THEN INCREASE TO 1 TABLET EVERY NIGHT  Refills: 0      TROKENDI  mg capsule TAKE 1 CAPSULE BY MOUTH EVERY NIGHT  Refills: 6      ketorolac (TORADOL) 10 mg tablet Take  by mouth. norethindrone-ethinyl estradiol-iron (LOESTRIN FE 1.5/30, 28-DAY,) 1.5 mg-30 mcg (21)/75 mg (7) tab Take 1 Tab by mouth daily. Continuous pills only.   Qty: 3 Package, Refills: 2      omeprazole (PRILOSEC) 20 mg capsule TAKE ONE CAPSULE BY MOUTH EVERY DAY  Qty: 90 Cap, Refills: 11    Comments: **Patient requests 90 days supply**  Associated Diagnoses: Gastroesophageal reflux disease without esophagitis; Irritable bowel syndrome with diarrhea; Functional abdominal pain syndrome; Insulin resistance; Chronic idiopathic constipation      fluticasone (FLOVENT HFA) 220 mcg/actuation inhaler 2 puffs swallowed twice daily, npo x 30 min after dose  Qty: 2 Inhaler, Refills: 11    Associated Diagnoses: Esophagitis determined by biopsy      dicyclomine (BENTYL) 20 mg tablet Take 1 Tab by mouth two (2) times daily as needed for up to 14 doses. Qty: 28 Tab, Refills: 2    Associated Diagnoses: Gastroesophageal reflux disease without esophagitis; Irritable bowel syndrome with diarrhea; Functional abdominal pain syndrome; Insulin resistance; Chronic idiopathic constipation      ondansetron (ZOFRAN ODT) 8 mg disintegrating tablet Take 1 Tab by mouth every eight (8) hours as needed for Nausea for up to 15 doses. Qty: 15 Tab, Refills: 3    Associated Diagnoses: New daily persistent headache      cetirizine (ZYRTEC) 10 mg tablet TK 1 T PO D PRF ALLERGY  Refills: 3      metFORMIN ER (GLUCOPHAGE XR) 750 mg tablet Take 1 Tab by mouth two (2) times a day. Indications: PREVENTION OF TYPE 2 DIABETES MELLITUS  Qty: 180 Tab, Refills: 3    Comments: **Patient requests 90 days supply**  Associated Diagnoses: Insulin resistance      cholecalciferol, vitamin d3, (VITAMIN D3) 400 unit cap Take  by mouth. fluticasone propionate (FLONASE ALLERGY RELIEF NA) by Nasal route. hydrOXYzine HCl (ATARAX) 25 mg tablet TK 1 T PO Q 6 HOURS PRF ITCHING  Refills: 2      triamcinolone acetonide (KENALOG) 0.1 % topical cream APPLY TO INSECT BITES BID PRN DO NOT APPLY TO FACE  Refills: 2      docusate sodium (STOOL SOFTENER) 250 mg capsule Take 1 Cap by mouth two (2) times daily as needed for Constipation for up to 30 doses. Qty: 30 Cap, Refills: 3    Associated Diagnoses: Obesity, morbid ([address]  Gastroesophageal reflux disease without esophagitis; Vitamin D deficiency; Allergy to yeast; Irritable bowel syndrome with diarrhea; Atypical depression; Lactose intolerance; Gastroparesis; Autism spectrum disorder      SUMAtriptan succinate (ZEMBRACE SYMTOUCH) 3 mg/0.5 mL pnij by SubCUTAneous route. Indications: MIGRAINE      diphenhydrAMINE (BENADRYL) 25 mg capsule Take by mouth as needed (migraine). * Follow-up Care/Patient Instructions:   Activity: Activity as tolerated and no heavy lifting or vigorous exercise for 2 weeks  Diet: Regular Diet  Wound Care: Yuri Kirk to shower tomorrow; remove dressing to umbilicus tomorrow    Follow-up Information     Follow up With Specialties Details Why [address] Rashi Disla MD Pediatrics   [address] [place] [address] [address]  [phone]      Ernie Reeder MD Pediatric Surgery Schedule an appointment as soon as possible for a visit in 3 weeks  [address] [facility] [address] [phone]            Signed:  Corey French MD  6/14/2019  4:37 PM

## 2019-06-14 NOTE — ROUTINE PROCESS
Dear Parents and Families,      Welcome to the [facility] Pediatric Unit. During your stay here, our goal is to provide excellent care to your child. We would like to take this opportunity to review the unit. [address] uses electronic medical records. During your stay, the nurses and physicians will document on the work station on [facility]) located in your childs room. These computers are reserved for the medical team only.  Nurses will deliver change of shift report at the bedside. This is a time where the nurses will update each other regarding the care of your child and introduce the oncoming nurse. As a part of the family centered care model we encourage you to participate in this handoff.  To promote privacy when you or a family member calls to check on your child an information code is needed.   o Your childs patient information code: [id number]  o Pediatric nurses station phone number: [phone]  o Your room phone number: [phone] In order to ensure the safety of your child the pediatric unit has several security measures in place. o The pediatric unit is a locked unit; all visitors must identify themselves prior to entering.    o Security tags are placed on all patients under the age of 10 years. Please do not attempt to loosen or remove the tag.   o All staff members should wear proper identification. This includes an \" bear Logo\" in the top corner of their pink hospital badge.   o If you are leaving your child, please notify a member of the care team before you leave.  Tips for Preventing Pediatric Falls:  o Ensure at least 2 side rails are raised in cribs and beds. Beds should always be in the lowest position. o Raise crib side rails completely when leaving your child in their crib, even if stepping away for just a moment.   o Always make sure crib rails are securely locked in place.  o Keep the area on both sides of the bed free of clutter.  o Your child should wear shoes or non-skid slippers when walking. Ask your nurse for a pair non-skid socks.   o Your child is not permitted to sleep with you in the sleeper chair. If you feel sleepy, place your child in the crib/bed.  o Your child is not permitted to stand or climb on furniture, window amalia, the wagon, or IV poles. o Before allowing the child out of bed for the first time, call your nurse to the room. o Use caution with cords, wires, and IV lines. Call your nurse before allowing your child to get out of bed.  o Ask your nurse about any medication side effects that could make your child dizzy or unsteady on their feet.  o If you must leave your child, ensure side rails are raised and inform a staff member about your departure.  Infection control is an important part of your childs hospitalization. We are asking for your cooperation in keeping your child, other patients, and the community safe from the spread of illness by doing the following.  o The soap and hand  in patient rooms are for everyone - wash (for at least 15 seconds) or sanitize your hands when entering and leaving the room of your child to avoid bringing in and carrying out germs. Ask that healthcare providers do the same before caring for your child. Clean your hands after sneezing, coughing, touching your eyes, nose, or mouth, after using the restroom and before and after eating and drinking. o If your child is placed on isolation precautions upon admission or at any time during their hospitalization, we may ask that you and or any visitors wear any protective clothing, gloves and or masks that maybe needed. o We welcome healthy family and friends to visit.      Overview of the unit:   Patient ID band   Staff ID samreen   TV   Call bell   Emergency call Karen Mondragon Parent communication note   Equipment alarms   Kitchen   Rapid Response Team   Child Life   Bed controls   Movies   Phone  [employer] program   Saving diapers/urine   Semi-private rooms   Quiet time  [employer] hours 6:30a-7:00p   Guest tray    Patients cannot leave the floor    We appreciate your cooperation in helping us provide excellent and family centered care. If you have any questions or concerns please contact your nurse or ask to speak to the nurse manager at [phone].      Thank you,   Pediatric Team    I have reviewed the above information with the caregiver and provided a printed copy

## 2019-06-14 NOTE — OP NOTES
Operative Report    Patient: Wiliam Sandhoff MRN: [id number]  SSN: [id number]    YOB: 2000  Age: 22 y.o. Sex: female       Indications: The patient is a 22year old autistic child with abdominal pain and nausea that has lasted for 3 months. A HIDA scan revealed low ejection fraction. Date of Surgery: 6/14/2019     Preoperative Diagnosis: BILIARY DYSKINESIA    Postoperative Diagnosis:  BILIARY DYSKINESIA    Surgeon(s) and Role:  Panel 1:     * Anny Rutledge MD - Primary  Panel 2:     * Lillian Carolina MD - Primary     Anesthesia:  General    Procedure:  Procedure(s):  LAPAROSCOPIC CHOLECYSTECTOMY  ESOPHAGOGASTRODUODENOSCOPY (EGD) with       Procedure in Detail:  After informed consent was obtained, the patient was brought to the operating room and placed supine. After induction of anesthesia, the abdomen was prepped and draped in standard fashion. The umbilicus was inverted and incised vertically. The fascial opening was entered. A 5 mm Step port was placed. The abdomen was insufflated. General laparoscopy was carried out. Three other trocars were then placed under direct vision. These were positioned four fingerbreadths below the right costal margin in the anterior axillary line and mid clavicular line and just to the right of the falciform ligament in the epigastrium. All ports were Step ports placed after infiltration with 0.25% Marcaine. The gallbladder was grasped. Omental adhesions were taken down. A large cystic vein was coursing over the anterior side of the gallbladder. This was hemoclipped and divided. The cystic duct was dissected free and then doubly hemoclipped and divided. The cystic artery was small and controlled with electrocautery. The gallbladder was elevated from below and removed from its bed with the electrocautery achieving hemostasis as we went.  When hemostasis was secured the gallbladder was then extracted through the umbilical trocar after being placed in an endocatch bag. The abdomen was then desufflated. The umbilical fascia was closed with ) Vicryl in a figure of eight fashion. The trocar sites were closed with the subcuticular Monocryl dermabond. The patient was taken to the recovery room in good condition, having tolerated the procedure well. Instrument, sponge, and needle counts were correct prior to abdominal closure and at the conclusion of the case.      Findings: gallbladder non thickened, no inflammation    Estimated Blood Loss:  <5 ml      Specimens:   ID Type Source Tests Collected by Time Destination   1 : DUODENUM Fresh Duodenum  Nidhi Perez MD 6/14/2019 0041 Pathology   2 : STOMACH Fresh Stomach  Nidhi Perez MD 6/14/2019 4198 Pathology   3 : DISTAL ESOPHAGUS Fresh Esophagus, Distal  Nidhi Perez MD 6/14/2019 3401 Pathology   4 : MID ESOPHAGUS Fresh Esophagus, Mid  Nidhi Perez MD 6/14/2019 0818 Pathology   5 : GALLBLADDER Fresh Gallbladder  Nidhi Perez MD 6/14/2019 8462 Pathology               Implants: * No implants in log *

## 2019-06-14 NOTE — INTERVAL H&P NOTE
H&P Update:  Rona Mcallister was seen and examined. History and physical has been reviewed. The patient has been examined.  There have been no significant clinical changes since the completion of the originally dated History and Physical.

## 2019-06-14 NOTE — BRIEF OP NOTE
BRIEF OPERATIVE NOTE    Date of Procedure: 6/14/2019   Preoperative Diagnosis: BILIARY DYSKINESIA  Postoperative Diagnosis: BILIARY DYSKINESIA    Procedure(s):  LAPAROSCOPIC CHOLECYSTECTOMY  ESOPHAGOGASTRODUODENOSCOPY (EGD) with  Surgeon(s) and Role:  Panel 1:     * Fernando Mary MD - Primary  Panel 2:     * Cabrera Vazquez MD - Primary         Surgical Assistant: Surg Asst-1: Terra Wesley    Surgical Staff:  Circ-1: Kavita Kuhn RN  Scrub RN-1: Tej Hooks    Event Time In Time Out   Incision Start 0813    Incision Close       Anesthesia: General   Estimated Blood Loss: minimal  Specimens:   ID Type Source Tests Collected by Time Destination   1 : DOUDENUM Fresh Duodenum  Catarina Perez MD 6/14/2019 2007 Pathology   2 : STOMACH Fresh Stomach  Catarina Perez MD 6/14/2019 3657 Pathology   3 : DISTAL ESOPHAGUS Fresh Esophagus, Distal  Catarina Perez MD 6/14/2019 1587 Pathology   4 : MID ESOPHAGUS Fresh Esophagus, Mid  Catarina Perez MD 6/14/2019 0818 Pathology   5 : GALLBLADDER Fresh Gallbladder  Catarina Perez MD 6/14/2019 8555 Pathology      Findings: gastritis per EGD; gallbladder noninflammed  Complications: none  Implants: * No implants in log *

## 2019-06-14 NOTE — PERIOP NOTES
TRANSFER - OUT REPORT:    Verbal report given to Tawny(name) on Cheryl Means  being transferred to [phone] (unit) for routine post - op       Report consisted of patients Situation, Background, Assessment and   Recommendations(SBAR). Time Pre op antibiotic given:0807  Anesthesia Stop time: 4329  Rahman Present on Transfer to floor:n  Order for Rahman on Chart:n  Discharge Prescriptions with Chart:n    Information from the following report(s) SBAR, OR Summary, Intake/Output, MAR and Accordion was reviewed with the receiving nurse. Opportunity for questions and clarification was provided. Is the patient on 02? NO       L/Min        Other     Is the patient on a monitor? NO    Is the nurse transporting with the patient? YES    Surgical Waiting Area notified of patient's transfer from PACU?  YES      The following personal items collected during your admission accompanied patient upon transfer:   Dental Appliance: Dental Appliances: None  Vision: Visual Aid: Glasses  Hearing Aid:    Jewelry:    Clothing:    Other Valuables:    Valuables sent to safe:      Clothes and glasses to floor with pt

## 2019-06-14 NOTE — ANESTHESIA POSTPROCEDURE EVALUATION
Procedure(s):  LAPAROSCOPIC CHOLECYSTECTOMY  ESOPHAGOGASTRODUODENOSCOPY (EGD) with. MAC    Anesthesia Post Evaluation        Patient location during evaluation: PACU  Patient participation: complete - patient participated  Level of consciousness: awake  Pain management: adequate  Airway patency: patent  Anesthetic complications: no  Cardiovascular status: hemodynamically stable  Respiratory status: acceptable  Hydration status: acceptable  Comments: I have seen and evaluated the patient. The patient is ready for PACU discharge. [address], DO                         Vitals Value Taken Time   /61 6/14/2019 10:30 AM   Temp 36.5 °C (97.7 °F) 6/14/2019 10:20 AM   Pulse 95 6/14/2019 10:40 AM   Resp 21 6/14/2019 10:40 AM   SpO2 98 % 6/14/2019 10:40 AM   Vitals shown include unvalidated device data.

## 2019-06-14 NOTE — DISCHARGE INSTRUCTIONS
Instructions Following Ambulatory Surgery    Activity  · As tolerated and directed by your doctor - avoid heavy lifting or vigorous exercise for 2 weeks  · OK to shower tomorrow  · Do not submerge for 4-5 days    Diet  · Advance diet as tolerated to pre-operative diet  · If nausea and vomiting continues, call your doctor    Pain  · Take pain medication as directed by your doctor  ·  Call your doctor if pain is NOT relieved by medication  · You may take the oxycodone as needed for severe pain  · Take tylenol or motrin alternating every 3 hours (for example Tylenol at 12, Motrin at 3, Tylenol at 6, Motrin at 9)    Dressing Care: may remove umbilical dressing tomorrow    Follow-Up Phone Calls  · If you have any problems, call your doctor as needed    Call your doctor if  · Excessive bleeding that does not stop after holding mild pressure over the area  · Temperature of 101 degrees F or above  · Redness,excessive swelling or bruising, and/or green or yellow, smelly discharge from incision    After Anesthesia  · Be aware of dizziness following anesthesia and while taking pain medication    Medication changes have been made by your doctor; see Universal Medication form  Continue home medications as previously prescribed with the addition of:     Other Instructions:     Appointment date/time: Call for appointment in 2-4 weeks    Your doctor's phone number: [phone]    [address] [place] [address] Ave.  [address] [place] [address]  [id number]  2000    EGD DISCHARGE INSTRUCTIONS  Discomfort:  Sore throat- throat lozenges or warm salt water gargle  Redness at IV site- apply warm compress to area; if redness or soreness persist- contact your physician  Gaseous discomfort- walking, belching will help relieve any discomfort    DIET Resume regular diet    MEDICATIONS:  Resume home medications    ACTIVITY   Spend the remainder of the day resting -  avoid any strenuous activity. May resume normal activities tomorrow. CALL [facility] ANY SIGN of:  Increasing pain, nausea, vomiting  Abdominal distension (swelling)  Fever or chills  Pain in chest area      Follow-up Instructions:  Call [facility] for any questions or problems.  Telephone # [phone]

## 2019-06-14 NOTE — ROUTINE PROCESS
Patient: Bonifacio Platt MRN: [id number]  SSN: [id number]   YOB: 2000  Age: 22 y.o. Sex: female     Patient is status post Procedure(s):  LAPAROSCOPIC CHOLECYSTECTOMY  ESOPHAGOGASTRODUODENOSCOPY (EGD) with.     Surgeon(s) and Role:  Panel 1:     * Rigo Perez MD - Primary  Panel 2:     * Nancy Dey MD - Primary    Local/Dose/Irrigation:  Marcaine 0.25% plain x  7 ml                          Airway - Endotracheal Tube 06/14/19 Oral (Active)                   Dressing/Packing:       Splint/Cast:  ]    Other:

## 2019-06-14 NOTE — ROUTINE PROCESS
TRANSFER - IN REPORT:    Verbal report received from Anderson Osborn [facility] (name) on Negrita Mendoza  being received from PACU (unit) for routine post - op      Report consisted of patients Situation, Background, Assessment and   Recommendations(SBAR). Information from the following report(s) SBAR, Procedure Summary and MAR was reviewed with the receiving nurse. Opportunity for questions and clarification was provided. Assessment completed upon patients arrival to unit and care assumed.

## 2019-06-14 NOTE — ANESTHESIA PREPROCEDURE EVALUATION
Anesthetic History   No history of anesthetic complications            Review of Systems / Medical History  Patient summary reviewed, nursing notes reviewed and pertinent labs reviewed    Pulmonary  Within defined limits                 Neuro/Psych   Within defined limits      Psychiatric history     Cardiovascular  Within defined limits                     GI/Hepatic/Renal  Within defined limits              Endo/Other  Within defined limits      Morbid obesity     Other Findings              Physical Exam    Airway  Mallampati: II  TM Distance: > 6 cm  Neck ROM: normal range of motion   Mouth opening: Normal     Cardiovascular  Regular rate and rhythm,  S1 and S2 normal,  no murmur, click, rub, or gallop             Dental  No notable dental hx       Pulmonary  Breath sounds clear to auscultation               Abdominal  GI exam deferred       Other Findings            Anesthetic Plan    ASA: 2  Anesthesia type: MAC          Induction: Intravenous  Anesthetic plan and risks discussed with:  Mother

## 2019-06-18 ENCOUNTER — TELEPHONE (OUTPATIENT)
Dept: PEDIATRIC GASTROENTEROLOGY | Age: 19
End: 2019-06-18

## 2019-06-18 NOTE — TELEPHONE ENCOUNTER
Called pharmacy and let them know pa was previously approved for omeprazole on 5/31/19 and we had informed them. They said it was an automated request from the mother and it was a refill too soon sice it was picked up on 5/31/19. No action needed.

## 2019-06-18 NOTE — TELEPHONE ENCOUNTER
----- Message from [facility] sent at 6/18/2019 10:20 AM EDT -----  Regarding: Bijan Bucio: [phone]  [address] called to advise that the prescription below needs prior auth       omeprazole (PRILOSEC) 20 mg capsule [[id number]]    Order Details        Please Advise   [phone]

## 2019-06-20 ENCOUNTER — TELEPHONE (OUTPATIENT)
Dept: PEDIATRIC GASTROENTEROLOGY | Age: 19
End: 2019-06-20

## 2019-06-20 DIAGNOSIS — K31.84 GASTROPARESIS: ICD-10-CM

## 2019-06-20 DIAGNOSIS — K82.8 BILIARY DYSKINESIA: Primary | ICD-10-CM

## 2019-06-20 DIAGNOSIS — K21.9 GASTROESOPHAGEAL REFLUX DISEASE WITHOUT ESOPHAGITIS: ICD-10-CM

## 2019-06-20 DIAGNOSIS — K29.50 CHRONIC GASTRITIS WITHOUT BLEEDING, UNSPECIFIED GASTRITIS TYPE: ICD-10-CM

## 2019-06-20 NOTE — TELEPHONE ENCOUNTER
I spoke with mother on the biopsy results of mild chronic gastritis. Probably normal range, however possibly partially treated H. pylori as can often occur after chronic PPI use. We will see Srinath forte back in 4 to 6 weeks.

## 2019-07-30 DIAGNOSIS — E88.81 INSULIN RESISTANCE: ICD-10-CM

## 2019-07-30 RX ORDER — METFORMIN HYDROCHLORIDE 750 MG/1
TABLET, EXTENDED RELEASE ORAL
Qty: 60 TAB | Refills: 7 | Status: SHIPPED | OUTPATIENT
Start: 2019-07-30 | End: 2020-03-26

## 2019-08-02 ENCOUNTER — OFFICE VISIT (OUTPATIENT)
Dept: PEDIATRIC GASTROENTEROLOGY | Age: 19
End: 2019-08-02

## 2019-08-02 ENCOUNTER — TELEPHONE (OUTPATIENT)
Dept: PEDIATRIC GASTROENTEROLOGY | Age: 19
End: 2019-08-02

## 2019-08-02 ENCOUNTER — OFFICE VISIT (OUTPATIENT)
Dept: PEDIATRIC ENDOCRINOLOGY | Age: 19
End: 2019-08-02

## 2019-08-02 VITALS
HEIGHT: 66 IN | DIASTOLIC BLOOD PRESSURE: 72 MMHG | SYSTOLIC BLOOD PRESSURE: 109 MMHG | RESPIRATION RATE: 24 BRPM | TEMPERATURE: 97.5 F | HEART RATE: 95 BPM | WEIGHT: 223.4 LBS | BODY MASS INDEX: 35.9 KG/M2 | OXYGEN SATURATION: 100 %

## 2019-08-02 VITALS
RESPIRATION RATE: 16 BRPM | SYSTOLIC BLOOD PRESSURE: 111 MMHG | OXYGEN SATURATION: 98 % | WEIGHT: 223 LBS | HEART RATE: 90 BPM | HEIGHT: 67 IN | TEMPERATURE: 97.6 F | BODY MASS INDEX: 35 KG/M2 | DIASTOLIC BLOOD PRESSURE: 78 MMHG

## 2019-08-02 DIAGNOSIS — A04.8 H. PYLORI INFECTION: Primary | ICD-10-CM

## 2019-08-02 DIAGNOSIS — E88.81 INSULIN RESISTANCE: ICD-10-CM

## 2019-08-02 DIAGNOSIS — R60.9 EDEMA, PERIPHERAL: ICD-10-CM

## 2019-08-02 DIAGNOSIS — R53.83 OTHER FATIGUE: ICD-10-CM

## 2019-08-02 DIAGNOSIS — E78.89 LIPIDS ABNORMAL: ICD-10-CM

## 2019-08-02 DIAGNOSIS — K29.50 CHRONIC GASTRITIS WITHOUT BLEEDING, UNSPECIFIED GASTRITIS TYPE: Primary | ICD-10-CM

## 2019-08-02 DIAGNOSIS — E66.01 SEVERE OBESITY (HCC): ICD-10-CM

## 2019-08-02 DIAGNOSIS — E66.9 OBESITY (BMI 35.0-39.9 WITHOUT COMORBIDITY): Primary | ICD-10-CM

## 2019-08-02 PROBLEM — R10.9 RIGHT FLANK PAIN: Status: RESOLVED | Noted: 2019-05-16 | Resolved: 2019-08-02

## 2019-08-02 PROBLEM — R53.82 CHRONIC FATIGUE: Status: RESOLVED | Noted: 2019-05-16 | Resolved: 2019-08-02

## 2019-08-02 PROBLEM — E55.9 VITAMIN D DEFICIENCY: Status: RESOLVED | Noted: 2018-02-20 | Resolved: 2019-08-02

## 2019-08-02 RX ORDER — AMOXICILLIN 500 MG/1
1000 CAPSULE ORAL 2 TIMES DAILY
Qty: 40 CAP | Refills: 0 | Status: SHIPPED | OUTPATIENT
Start: 2019-08-02 | End: 2019-08-02 | Stop reason: SDUPTHER

## 2019-08-02 RX ORDER — AMOXICILLIN 500 MG/1
1000 CAPSULE ORAL 2 TIMES DAILY
Qty: 56 CAP | Refills: 0 | Status: SHIPPED | OUTPATIENT
Start: 2019-08-02 | End: 2019-08-16

## 2019-08-02 RX ORDER — OMEPRAZOLE 40 MG/1
40 CAPSULE, DELAYED RELEASE ORAL DAILY
Qty: 30 CAP | Refills: 2 | Status: SHIPPED | OUTPATIENT
Start: 2019-08-02 | End: 2019-09-01

## 2019-08-02 RX ORDER — CLARITHROMYCIN 500 MG/1
500 TABLET, FILM COATED ORAL 2 TIMES DAILY
Qty: 20 TAB | Refills: 0 | Status: SHIPPED | OUTPATIENT
Start: 2019-08-02 | End: 2019-08-02

## 2019-08-02 RX ORDER — NORETHINDRONE ACETATE AND ETHINYL ESTRADIOL 1MG-20(21)
KIT ORAL
COMMUNITY
Start: 2019-03-25 | End: 2019-08-20

## 2019-08-02 NOTE — PROGRESS NOTES
Visit Vitals  /78 (BP 1 Location: Left arm, BP Patient Position: Sitting)   Pulse 90   Temp 97.6 °F (36.4 °C) (Oral)   Resp 16   Ht 5' 6.89\" (1.699 m)   Wt 223 lb (101.2 kg)   SpO2 98%   BMI 35.04 kg/m²       Auther Ankit is a 22 y.o. female      Chief Complaint   Patient presents with    Obesity     Pt is here for a f/u for obesity. Health Maintenance Due   Topic Date Due    DTaP/Tdap/Td series (1 - Tdap) 09/21/2006    HPV Age 9Y-34Y (3 - Female 3-dose series) 09/21/2014    Influenza Age 5 to Adult  08/01/2019       1. Have you been to the ER, urgent care clinic since your last visit? Hospitalized since your last visit? NO    2. Have you seen or consulted any other health care providers outside of the [address] [place] [address] since your last visit? Include any pap smears or colon screening.   NO

## 2019-08-02 NOTE — PROGRESS NOTES
Date: 8/2/2019    Dear Ada Tracey MD:    Ashley Nava is 22 y.o. young lady with recent findings of chronic gastritis despite chronic PPI use. I suspect that her facial rash and pustular-like lesions represent rosacea. Her gastrointestinal symptoms along with her dermatologic and migraine history could be consistent with H. pylori infection. We decided to treat for this and follow closely in the coming months. Plan:   1. Start amoxicillin and clarithromycin 10-day antibiotic course for H. Pylori infection of the stomach (gastritis), prescribed to your pharmacy    2. Omeprazole 40 mg daily, sent to your pharmacy. Start along with antibiotics and continue for an additional month after antibiotic course is done  3. Call after 2-3 weeks and notify Dr. Cecy Baker of progress  4. Return to clinic in 2-3 months            HPI: Ashley Nava presents today with recent resurgence of upper abdominal pain and dyspepsia. Ashley Nava experienced complete symptom relief after cholecystectomy up until 2 weeks ago. Ashley Nava describes upper abdominal pain after eating that has occurred on a few instances since the cholecystectomy. Cholecystectomy nearly 2 months ago largely resolved Erica's chronic nausea and postprandial abdominal pain. She has noted curiously that she has instances of pain occurring about 1 hour after eating her fruit smoothies, which were always well-tolerated before the cholecystectomy. They had attributed her symptoms to postoperative and anatomy changes. We discussed other aspects of Erica's care. She has been followed by Dr. Felix Ulloa for metabolic syndrome. Ashley Nava has also struggled with what seems to me to be pustular rosacea and more classic rosacea over her face. She has been to the dermatologist and was told that this was a possibility. I was surprised to discover nodular gastritis just prior to the cholecystectomy.   I had presumed that Erica's improvement with swallowed Flovent was related to treatment of esophagitis, namely eosinophilic esophagitis given her dramatic response. Curiously, the biopsies revealed chronic gastritis despite now years of PPI use. On the initial endoscopy over 2 years ago had normal stomach biopsies. Medications:   Current Outpatient Medications   Medication Sig    norethindrone-ethinyl estradiol (JUNEL FE 1/20, 28,) 1 mg-20 mcg (21)/75 mg (7) tab Take  by mouth.  omeprazole (PRILOSEC) 40 mg capsule Take 1 Cap by mouth daily for 30 days.  amoxicillin (AMOXIL) 500 mg capsule Take 2 Caps by mouth two (2) times a day for 10 days.  clarithromycin (BIAXIN) 500 mg tablet Take 1 Tab by mouth two (2) times a day for 10 days.  VYVANSE 40 mg capsule TAKE ONE CAPSULE BY MOUTH EVERY MORNING    citalopram (CELEXA) 10 mg tablet TAKE 1/2 TABLET BY MOUTH EVERY DAY FOR 1ST WEEK, THEN INCREASE TO 1 TABLET EVERY NIGHT    TROKENDI  mg capsule TAKE 1 CAPSULE BY MOUTH EVERY NIGHT    omeprazole (PRILOSEC) 20 mg capsule TAKE ONE CAPSULE BY MOUTH EVERY DAY    fluticasone (FLOVENT HFA) 220 mcg/actuation inhaler 2 puffs swallowed twice daily, npo x 30 min after dose    dicyclomine (BENTYL) 20 mg tablet Take 1 Tab by mouth two (2) times daily as needed for up to 14 doses.  docusate sodium (STOOL SOFTENER) 250 mg capsule Take 1 Cap by mouth two (2) times daily as needed for Constipation for up to 30 doses.  cetirizine (ZYRTEC) 10 mg tablet TK 1 T PO D PRF ALLERGY    cholecalciferol, vitamin d3, (VITAMIN D3) 400 unit cap Take  by mouth.  hydrOXYzine HCl (ATARAX) 25 mg tablet TK 1 T PO Q 6 HOURS PRF ITCHING    metFORMIN ER (GLUCOPHAGE XR) 750 mg tablet TAKE 1 TABLET BY MOUTH TWICE A DAY    ketorolac (TORADOL) 10 mg tablet Take  by mouth.  norethindrone-ethinyl estradiol-iron (LOESTRIN FE 1.5/30, 28-DAY,) 1.5 mg-30 mcg (21)/75 mg (7) tab Take 1 Tab by mouth daily. Continuous pills only. (Patient taking differently: Take 1 Tab by mouth nightly.  Continuous pills only.)    ondansetron (ZOFRAN ODT) 8 mg disintegrating tablet Take 1 Tab by mouth every eight (8) hours as needed for Nausea for up to 15 doses.  fluticasone propionate (FLONASE ALLERGY RELIEF NA) by Nasal route.  SUMAtriptan succinate (ZEMBRACE SYMTOUCH) 3 mg/0.5 mL pnij by SubCUTAneous route. Indications: MIGRAINE    triamcinolone acetonide (KENALOG) 0.1 % topical cream APPLY TO INSECT BITES BID PRN DO NOT APPLY TO FACE    diphenhydrAMINE (BENADRYL) 25 mg capsule Take by mouth as needed (migraine). No current facility-administered medications for this visit. Allergies: Allergies   Allergen Reactions    Yeast, Dried Other (comments)     Upset stomach        ROS: A 12 point review of systems was obtained and was as per HPI, otherwise negative.     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    Obesity (BMI 35.0-39.9 without comorbidity) C91.3    Eosinophilic esophagitis W83.6    Other fatigue R53.83    Severe obesity (HCC) E66.01    Right flank pain R10.9    Chronic nausea R11.0    Chronic fatigue R53.82    Chronic cholecystitis K81.1    Biliary dyskinesia K82.8    Chronic gastritis without bleeding K29.50       PMHx:   Past Medical History:   Diagnosis Date    Abdominal migraine     ADHD (attention deficit hyperactivity disorder)     Autism     Developmental delay     GERD (gastroesophageal reflux disease)     Headache     Irritable bowel syndrome with diarrhea 4/13/2017    Migraine     Obesity, morbid ([facility] [address] 12/12/2017    Psychiatric disorder     anxiety, ADHD, OCD, HIGH FUNTIONING AUTISM    Rosacea and pustular rosacea    Family History:   Family History   Problem Relation Age of Onset    Endometriosis Mother     Headache Mother         d/t accident - neck and brain injury    Delayed Awakening Mother     Post-op Nausea/Vomiting Mother     Headache Maternal Grandfather     No Known Problems Father     No Known Problems Maternal Grandmother         Social History:   Social History     Tobacco Use    Smoking status: Never Smoker    Smokeless tobacco: Never Used    Tobacco comment: no smoke exposure in the home   Substance Use Topics    Alcohol use: No    Drug use: No    Presents today with mother, she goes to [facility] to exercise and is getting back into the swing of things since her surgery    OBJECTIVE:  Vitals:  height is 5' 6.89\" (1.699 m) and weight is 223 lb (101.2 kg). Her oral temperature is 97.6 °F (36.4 °C). Her blood pressure is 111/78 and her pulse is 90. Her respiration is 16 and oxygen saturation is 98%.      Last 3 Recorded Weights in this Encounter    08/02/19 1420   Weight: 223 lb (101.2 kg)       PHYSICAL EXAM:    General: healthy, alert, well developed, well nourished and erythematous rash over the cheeks and sun exposed arms  ENT: anicteric sclera, moist oral mucosa, no oral lesions  Abdomen: soft, mildly tender in the upper abdomen, nondistended  Perianal/Rectal exam: deferred      Cardiovascular: RRR, well-perfused, no murmur  Skin:  exanthem noted: Erythematous rash over the cheeks as well as sun exposed arms, scabbed over nodular lesions over her lower legs consistent with pustular rosacea     Neuro: alert, reactive, normal muscle tone  Psych: appropriate affect and interactions  Pulmonary:  Clear Breath Sounds Bilaterally, No Increased Effort   Musc/Skel: no swelling or tenderness    Studies: Chronic gastritis discovered on recent upper endoscopy with nodular visual appearance      Admission on 06/14/2019, Discharged on 06/14/2019   Component Date Value Ref Range Status    Pregnancy test,urine (POC) 06/14/2019 NEGATIVE   NEG   Final    Glucose (POC) 06/14/2019 112* 65 - 100 mg/dL Final    Comment: (NOTE)  The Accu-Chek Inform II glucometer is not FDA cleared for critically   ill patient use. A study was performed validating the equivalence of   glucometer and clinical laboratory results on this patient   population. Despite the study, use of glucometers with capillary   specimens from critically ill patients, regardless of their location,   makes the test high complexity and requires the performing individual   to comply with CLIA requirements more stringent than those for waived   testing in the hospital setting. Critical thinking skills are   necessary to determine a potentially critically ill patients status   prior to using a glucometer.       Performed by 06/14/2019 [place]   Final   Office Visit on 05/16/2019   Component Date Value Ref Range Status    EBV Ab VCA, IgM 05/16/2019 <36.0  0.0 - 35.9 U/mL Final    Comment:                                  Negative        <36.0                                   Equivocal 36.0 - 43.9                                   Positive        >43.9      EBV Early Antigen Ab, IgG 05/16/2019 <9.0  0.0 - 8.9 U/mL Final    Comment:                                  Negative        < 9.0                                   Equivocal  9.0 - 10.9                                   Positive        >10.9      EBV Ab VCA, IgG 05/16/2019 >600.0* 0.0 - 17.9 U/mL Final    Comment:                                  Negative        <18.0                                   Equivocal 18.0 - 21.9                                   Positive        >21.9      EBV Nuclear Antigen Ab, IgG 05/16/2019 >600.0* 0.0 - 17.9 U/mL Final    Comment:                                  Negative        <18.0 Equivocal 18.0 - 21.9                                   Positive        >21.9      Interpretation 05/16/2019 Comment   Final    Comment:                EBV Interpretation Chart  Interpretation   EBV-IgM  EA(D)-IgG  VCA-IgG  EBNA-IgG  EBV Seronegative    -        -         -          -  Early Phase         +        -         -          -  Acute Primary       +       +or-       +          -  Infection  Convalescence/Past  -       +or-       +          +  Infection  Reactivated        +or-     +or-       +          +  Infection         + Antibody Present      - Antibody Absent      Mononucleosis Test, Ql. 05/16/2019 Negative  Negative Final    Comment: The sensitivity of Heterophile antibody testing is 80-90%. Breann Karuna IgM testing offers higher sensitivity.  WBC 05/16/2019 10.4  3.4 - 10.8 x10E3/uL Final    RBC 05/16/2019 5.04  3.77 - 5.28 x10E6/uL Final    HGB 05/16/2019 15.5  11.1 - 15.9 g/dL Final    HCT 05/16/2019 47.2* 34.0 - 46.6 % Final    MCV 05/16/2019 94  79 - 97 fL Final    MCH 05/16/2019 30.8  26.6 - 33.0 pg Final    MCHC 05/16/2019 32.8  31.5 - 35.7 g/dL Final    RDW 05/16/2019 13.2  12.3 - 15.4 % Final    PLATELET 38/86/7403 698* 150 - 379 x10E3/uL Final    NEUTROPHILS 05/16/2019 61  Not Estab. % Final    Lymphocytes 05/16/2019 27  Not Estab. % Final    MONOCYTES 05/16/2019 7  Not Estab. % Final    EOSINOPHILS 05/16/2019 4  Not Estab. % Final    BASOPHILS 05/16/2019 1  Not Estab. % Final    ABS. NEUTROPHILS 05/16/2019 6.4  1.4 - 7.0 x10E3/uL Final    Abs Lymphocytes 05/16/2019 2.8  0.7 - 3.1 x10E3/uL Final    ABS. MONOCYTES 05/16/2019 0.7  0.1 - 0.9 x10E3/uL Final    ABS. EOSINOPHILS 05/16/2019 0.4  0.0 - 0.4 x10E3/uL Final    ABS. BASOPHILS 05/16/2019 0.1  0.0 - 0.2 x10E3/uL Final    IMMATURE GRANULOCYTES 05/16/2019 0  Not Estab. % Final    ABS. IMM.  GRANS. 05/16/2019 0.0  0.0 - 0.1 x10E3/uL Final    Creatine Kinase,Total 05/16/2019 39  24 - 173 U/L Final    Glucose 05/16/2019 81  65 - 99 mg/dL Final    BUN 05/16/2019 10  6 - 20 mg/dL Final    Creatinine 05/16/2019 0.76  0.57 - 1.00 mg/dL Final    GFR est non-AA 05/16/2019 114  >59 mL/min/1.73 Final    GFR est AA 05/16/2019 132  >59 mL/min/1.73 Final    BUN/Creatinine ratio 05/16/2019 13  9 - 23 Final    Sodium 05/16/2019 140  134 - 144 mmol/L Final    Potassium 05/16/2019 3.9  3.5 - 5.2 mmol/L Final    Chloride 05/16/2019 106  96 - 106 mmol/L Final    CO2 05/16/2019 20  20 - 29 mmol/L Final    Calcium 05/16/2019 9.5  8.7 - 10.2 mg/dL Final    Protein, total 05/16/2019 7.1  6.0 - 8.5 g/dL Final    Albumin 05/16/2019 4.6  3.5 - 5.5 g/dL Final    GLOBULIN, TOTAL 05/16/2019 2.5  1.5 - 4.5 g/dL Final    A-G Ratio 05/16/2019 1.8  1.2 - 2.2 Final    Bilirubin, total 05/16/2019 0.5  0.0 - 1.2 mg/dL Final    Alk. phosphatase 05/16/2019 53  39 - 117 IU/L Final    AST (SGOT) 05/16/2019 9  0 - 40 IU/L Final    ALT (SGPT) 05/16/2019 11  0 - 32 IU/L Final    C-Reactive Protein, Qt 05/16/2019 11.6* 0.0 - 4.9 mg/L Final    GGT 05/16/2019 14  0 - 60 IU/L Final    Immunoglobulin A, Qt. 05/16/2019 102  87 - 352 mg/dL Final    Sed rate (ESR) 05/16/2019 24  0 - 32 mm/hr Final    t-Transglutaminase, IgA 05/16/2019 <2  0 - 3 U/mL Final    Comment:                               Negative        0 -  3                                Weak Positive   4 - 10                                Positive           >10   Tissue Transglutaminase (tTG) has been identified   as the endomysial antigen. Studies have demonstr-   ated that endomysial IgA antibodies have over 99%   specificity for gluten sensitive enteropathy.  Lipase 05/16/2019 36  14 - 72 U/L Final           Thank you for referring [address]  to our clinic, we appreciate participating in their care. All patient and caregiver questions and concerns were addressed during the visit. Major risks, benefits, and side-effects of therapy were discussed.

## 2019-08-02 NOTE — TELEPHONE ENCOUNTER
----- Message from Cuco sent at 8/2/2019  3:20 PM EDT -----  Regarding: Yovanny Sees: [phone]  Mom called and stated that the omeprazole (PRILOSEC) 40 mg capsule [[id number]]    Order Details   Is not being covered by insurance and wanted to know if something similar could be called in or if an prior auth could  done if possible       Please Advise   [phone]

## 2019-08-02 NOTE — PATIENT INSTRUCTIONS
1.  Start amoxicillin and clarithromycin 10-day antibiotic course for H. Pylori infection of the stomach (gastritis), prescribed to your pharmacy    2. Omeprazole 40 mg daily, sent to your pharmacy. Start along with antibiotics and continue for an additional month after antibiotic course is done  3. Call after 2-3 weeks and notify Dr. Melani Phelps of progress  4.   Return to clinic in 2-3 months

## 2019-08-02 NOTE — TELEPHONE ENCOUNTER
Maranda Mendes discussed with Garrett Wong and sent to the pharmacy the following regimen: amox and bismuth x 14 day course. This should be enough.       Thanks,  Donell Simpson

## 2019-08-02 NOTE — PROGRESS NOTES
Subjective:     Reason for visit: Laura Montes De Oca is a 22 y.o. female here for follow up of   - Morbid obesity  - Severe insulin resistance based on OGTT, not clinical.  - On Metformin 750 mg BiD  - Irregular menstrual cycles - on continued OCP as menses cause debilitating migraines  - Abnormal lipid profile    She was last seen 5 months ago. She is here today with her mother. History of present illness: As per mother, patients history is significant for   - Autism  - Migraines severe on multiple medications - See below    Morbid Obesity - Has struggled with obesity for many years. Lost 7 lb in 5 months with Diet and activity changes. Has lost a total of 66 lbs in 1 year  BMI has decreased from 47 to 36 kg/m2 in 1 year. Able to do more exercises as no more migraines. Has migraines 2-3x/month versus 2-3x/week. Denies polyphagia, + polydipsia - 16 oz x 5 times/day, Nocturia once at night. Denies symptoms of hypothyroidism such as cold tolerance, dry hair, dry skin, constipation. No snoring at night except when really tired. No hip or joint pains  Improved exercise intolerance, No SOB, No chest pain, palpitations    Activity - Decreased due to recent GB surgery   Does yoga twice a week, Does chores around the home. Going to gym 2-3x/week - using elliptical and weights. Has seen Nutritionist in the past.   Eats healthy always. Occasional treats    Insulin resistance -  OGTT done in 11/2017 revealed insulin resistance  INSULIN   Result Value Ref Range    Insulin 2 hour  277 (H) <37    Insulin - 1 hour  286 (H) <51    Insulin Fasting  37.4 (H) <9 uIU/mL   Started on Metformin  mg 12/2017. Current dose 750 mg Bid - takes both tablets together at bedtime.     Improved fasting insulin     History of Hypertension - In the previous visit - due to hypertension and striae noted, ACTH (low)and random cortisol (9.0) were drawn and they were not high, however they are not excellent screening tests to assess hypercortisolism. Patient had 24 hour urinary cortisol done with  (Ph# [phone]) at [facility] due to similar concerns of [place] which was normal - 7 (Normal 0-50). BP is normal today       Abnormal lipid profile -   Last lipids drawn 3/2019 - WNL except for elevated LDL   oN OTC Fish oil. Pt concerned of GI side effects. Recommended plant sterols such as mikel seeds and flax seeds. Vitamin D deficiency - History - On Vitamin D 400 international units , Most recent Vitamin D 79 - 3/2019 - wnl     Menstrual history - attained menarche at age 5 years, started on OCPs 12/2016  for severe menstrual migraines. Followed by Gynecology. She has not had a cycle for more than a year as she is continued OCP's to prevent migraine. Has baseline migraines requiring sumatriptan injections 2-3x a week. Used to be on Depo but associated with weight gain   fu apt with Gynecology yearly. Plan is to continue continued OCP for a total of 5 years. Started on higher dose Estrogen Junel Fe 1.5 - 30 mcg Estradiol  As she was spotting- taking continuously    MIgraines - Improved on Topamax - Now only once a week. Triggers - anxiety, noise , certain aura    Autism and anxiety - Receives weekly counseling. Changes - Is going to come off Zoloft. Ritalin was switched to Adderall. Stopped using Adderall, planning to start Vyvanse. Seeing a chiropractor. Significant abdominal pain 6 weeks ago. Abnormal gallbladder emptying study with an EF of only 21%. S/p Cholecystectomy. No gall stones.  Pts symptoms have resolved     Birth History - Weighed 6 lbs, Term, NVD    Past Surgical History:   Procedure Laterality Date    COLONOSCOPY N/A 12/20/2016    COLONOSCOPY performed by Nicky Buitrago MD at [facility] ENDOSCOPY    HX HEENT      WISDOM TEETH    HX TONSIL AND ADENOIDECTOMY      HX TONSILLECTOMY      AGE 1    HX WISDOM TEETH EXTRACTION      TN EGD TRANSORAL BIOPSY SINGLE/MULTIPLE  6/14/2019         UPPER GI ENDOSCOPY,BIOPSY  12/20/2016            Family history: No family history of thyroid disease, heart disease, hypertension or high cholesterol or DM. Fathers side unknown. He has Gout. Father is obese. Mother - Endometriosis  MGF, MGM, Mother - Hypertension  MGreat grandparents - Heartdisease at later age     Social History:  Home schooled for past 1 year as anxiety at school seemed to worsen headaches  Finished 12th grade   Doing well. ROS:  Constitutional: decreased energy - Mother reports excess fatigue. saw Immunology at [facility] - got pneumo shot as titers were low. ENT: normal hearing, no sorethroat   Eye: normal vision, denied double vision, blurred vision  Respiratory system: no wheezing, no respiratory discomfort  CVS: no palpitations, + lower extremity edema - improving - Followed by Cardiology - Significant improvement noted  GI: normal bowel movements, followed by GI   Allergy: no skin rash or angioedema, significant stria on abdomen and inner thighs and arms, habit of skin picking due to anxiety  Neuorlogical: headache, no focal weakness. No burning  Behavioural: normal behavior, normal mood. Medications - See scanned   Prior to Admission medications    Medication Sig Start Date End Date Taking? Authorizing Provider   metFORMIN ER (GLUCOPHAGE XR) 750 mg tablet TAKE 1 TABLET BY MOUTH TWICE A DAY 7/30/19  Yes Mary Jo Walker MD   VYVANSE 40 mg capsule TAKE ONE CAPSULE BY MOUTH EVERY MORNING 5/7/19  Yes Provider, Historical   citalopram (CELEXA) 10 mg tablet TAKE 1/2 TABLET BY MOUTH EVERY DAY FOR 1ST WEEK, THEN INCREASE TO 1 TABLET EVERY NIGHT 5/3/19  Yes Provider, Historical   TROKENDI  mg capsule TAKE 1 CAPSULE BY MOUTH EVERY NIGHT 4/23/19  Yes Provider, Historical   ketorolac (TORADOL) 10 mg tablet Take  by mouth. Yes Provider, Historical   norethindrone-ethinyl estradiol-iron (LOESTRIN FE 1.5/30, 28-DAY,) 1.5 mg-30 mcg (21)/75 mg (7) tab Take 1 Tab by mouth daily.  Continuous pills only.  Patient taking differently: Take 1 Tab by mouth nightly. Continuous pills only. 4/23/19  Yes Heidy Gunter MD   omeprazole (PRILOSEC) 20 mg capsule TAKE ONE CAPSULE BY MOUTH EVERY DAY 1/13/19  Yes Saskia Nguyen MD   fluticasone (FLOVENT HFA) 220 mcg/actuation inhaler 2 puffs swallowed twice daily, npo x 30 min after dose 1/10/19  Yes Saskia Nguyen MD   dicyclomine (BENTYL) 20 mg tablet Take 1 Tab by mouth two (2) times daily as needed for up to 14 doses. 1/10/19  Yes Saskia Nguyen MD   docusate sodium (STOOL SOFTENER) 250 mg capsule Take 1 Cap by mouth two (2) times daily as needed for Constipation for up to 30 doses. 1/10/19  Yes Saskia Nguyen MD   ondansetron (ZOFRAN ODT) 8 mg disintegrating tablet Take 1 Tab by mouth every eight (8) hours as needed for Nausea for up to 15 doses. 1/10/19  Yes Saskia Nguyen MD   cetirizine (ZYRTEC) 10 mg tablet TK 1 T PO D PRF ALLERGY 9/10/18  Yes Provider, Historical   cholecalciferol, vitamin d3, (VITAMIN D3) 400 unit cap Take  by mouth. Yes Provider, Historical   fluticasone propionate (FLONASE ALLERGY RELIEF NA) by Nasal route. Yes Provider, Historical   SUMAtriptan succinate (ZEMBRACE SYMTOUCH) 3 mg/0.5 mL pnij by SubCUTAneous route. Indications: MIGRAINE   Yes Provider, Historical   hydrOXYzine HCl (ATARAX) 25 mg tablet TK 1 T PO Q 6 HOURS PRF ITCHING 6/6/17  Yes Provider, Historical   triamcinolone acetonide (KENALOG) 0.1 % topical cream APPLY TO INSECT BITES BID PRN DO NOT APPLY TO FACE 6/6/17  Yes Provider, Historical   diphenhydrAMINE (BENADRYL) 25 mg capsule Take by mouth as needed (migraine). Yes Provider, Historical       Allergies:   Allergies   Allergen Reactions    Yeast, Dried Other (comments)     Upset stomach            Objective:       Visit Vitals  /72 (BP 1 Location: Left arm, BP Patient Position: Sitting)   Pulse 95   Temp 97.5 °F (36.4 °C) (Oral)   Resp 24   Ht 5' 5.95\" (1.675 m)   Wt 223 lb 6.4 oz (101.3 kg)   SpO2 100%   BMI 36.12 kg/m²     Wt Readings from Last 3 Encounters:   08/02/19 223 lb 6.4 oz (101.3 kg) (99 %, Z= 2.25)*   06/14/19 215 lb (97.5 kg) (98 %, Z= 2.15)*   05/16/19 221 lb 12.8 oz (100.6 kg) (99 %, Z= 2.23)*     * Growth percentiles are based on CDC (Girls, 2-20 Years) data. Ht Readings from Last 3 Encounters:   08/02/19 5' 5.95\" (1.675 m) (74 %, Z= 0.65)*   06/14/19 5' 5\" (1.651 m) (61 %, Z= 0.28)*   05/16/19 5' 6.46\" (1.688 m) (80 %, Z= 0.85)*     * Growth percentiles are based on CDC (Girls, 2-20 Years) data. Height: 74 %ile (Z= 0.65) based on CDC (Girls, 2-20 Years) Stature-for-age data based on Stature recorded on 8/2/2019. Weight: 99 %ile (Z= 2.25) based on CDC (Girls, 2-20 Years) weight-for-age data using vitals from 8/2/2019. BMI: Body mass index is 36.12 kg/m². Percentile: [unfilled]    Alert, Cooperative, obese - Face appears less obese including trunk. Improved lower abdomen distension and lower extremity edema - non pitting. HEENT: No thyromegaly, EOM intact, No tonsillar hypertrophy   S1 S2 heard: Normal rhythm  Bilateral air entry. No rhonchi or crepitation    Abdomen is soft, non tender, No organomegaly   MSK - Normal ROM  Skin - No rashes or birth marks, striae on abdomen, upper arms and inner thighs - improved compared to previous. Few are wide, No acanthosis      Laboratory data:  Results for orders placed or performed during the hospital encounter of 06/14/19   HCG URINE, QL. - POC   Result Value Ref Range    Pregnancy test,urine (POC) NEGATIVE  NEG     GLUCOSE, POC   Result Value Ref Range    Glucose (POC) 112 (H) 65 - 100 mg/dL    Performed by Shawanda Cherry      9/2016 - Lipids - High TG, High Total cholesterol  4/2017 - TFT - WNL  9/2016 - A1C - 5.5  4/2017 - Vitamin D - 51    Radiology -   4/2017 - Pelvic US - wnl        Assessment/ Plan :       León Johnston is a 22 y.o. female presenting     - Obesity - Lost 66 lbs in past 1 year.  BMI is <40 kg/m2  - Insulin resistance based on OGTT, not clinical.  - On Metformin 750 mg x 2.   - Irregular menstrual cycles - on continued OCP to prevent migraines   - Abnormal lipid profile     Plan -     As above    Diagnosis, etiology, pathophysiology, risk/ benefits of rx, proposed eval, and expected follow up discussed with family and all questions answered      No orders of the defined types were placed in this encounter. Continue Metformin 750 mg BiD - Discussed with mother that I would like to slowly decrease Metformin. May consider repeating OGTT in the future  Exercise as tolerated  Diet and exercise recommendations provided.   Labs yearly unless change in clinical symptoms or signs  Follow up in 4 month    Total time with patient 40 minutes  Time spent counseling patient more than 50%

## 2019-08-02 NOTE — LETTER
8/2/19 Patient: Fadumo Valerio YOB: 2000 Date of Visit: 8/2/2019 Maria M Alston MD 
[address] [place] [id number] VIA Facsimile: [phone] Dear Maria M Alston MD, Thank you for referring Ms. Rob Montemayor to [facility] for evaluation. My notes for this consultation are attached. Chief Complaint Patient presents with  
 Other  
  weight f/u Subjective:  
 
Reason for visit: Fadumo Valerio is a 22 y.o. female here for follow up of - Morbid obesity - Severe insulin resistance based on OGTT, not clinical.  - On Metformin 750 mg BiD - Irregular menstrual cycles - on continued OCP as menses cause debilitating migraines - Abnormal lipid profile She was last seen 5 months ago. She is here today with her mother. History of present illness: As per mother, patients history is significant for - Autism - Migraines severe on multiple medications - See below Morbid Obesity - Has struggled with obesity for many years. Lost 7 lb in 5 months with Diet and activity changes. Has lost a total of 66 lbs in 1 year BMI has decreased from 47 to 36 kg/m2 in 1 year. Able to do more exercises as no more migraines. Has migraines 2-3x/month versus 2-3x/week. Denies polyphagia, + polydipsia - 16 oz x 5 times/day, Nocturia once at night. Denies symptoms of hypothyroidism such as cold tolerance, dry hair, dry skin, constipation. No snoring at night except when really tired. No hip or joint pains Improved exercise intolerance, No SOB, No chest pain, palpitations Activity - Decreased due to recent GB surgery Does yoga twice a week, Does chores around the home. Going to gym 2-3x/week - using elliptical and weights. Has seen Nutritionist in the past.  
Eats healthy always. Occasional treats Insulin resistance -  OGTT done in 11/2017 revealed insulin resistance INSULIN  
 Result Value Ref Range Insulin 2 hour  277 (H) <37 Insulin - 1 hour  286 (H) <51 Insulin Fasting  37.4 (H) <9 uIU/mL Started on Metformin  mg 12/2017. Current dose 750 mg Bid - takes both tablets together at bedtime. Improved fasting insulin History of Hypertension - In the previous visit - due to hypertension and striae noted, ACTH (low)and random cortisol (9.0) were drawn and they were not high, however they are not excellent screening tests to assess hypercortisolism. Patient had 24 hour urinary cortisol done with  (Ph# [phone]) at [facility] due to similar concerns of [place] which was normal - 7 (Normal 0-50). BP is normal today Abnormal lipid profile - Last lipids drawn 3/2019 - WNL except for elevated LDL  
oN OTC Fish oil. Pt concerned of GI side effects. Recommended plant sterols such as mikel seeds and flax seeds. Vitamin D deficiency - History - On Vitamin D 400 international units , Most recent Vitamin D 79 - 3/2019 - wnl Menstrual history - attained menarche at age 5 years, started on OCPs 12/2016  for severe menstrual migraines. Followed by Gynecology. She has not had a cycle for more than a year as she is continued OCP's to prevent migraine. Has baseline migraines requiring sumatriptan injections 2-3x a week. Used to be on Depo but associated with weight gain  
fu apt with Gynecology yearly. Plan is to continue continued OCP for a total of 5 years. Started on higher dose Estrogen Junel Fe 1.5 - 30 mcg Estradiol  As she was spotting- taking continuously MIgraines - Improved on Topamax - Now only once a week. Triggers - anxiety, noise , certain aura Autism and anxiety - Receives weekly counseling. Changes - Is going to come off Zoloft. Ritalin was switched to Adderall. Stopped using Adderall, planning to start Vyvanse. Seeing a chiropractor. Significant abdominal pain 6 weeks ago.  Abnormal gallbladder emptying study with an EF of only 21%. S/p Cholecystectomy. No gall stones. Pts symptoms have resolved Birth History - Weighed 6 lbs, Term, NVD Past Surgical History:  
Procedure Laterality Date  COLONOSCOPY N/A 12/20/2016 COLONOSCOPY performed by Moni Chance MD at [address] HX HEENT    
 WISDOM TEETH  
 HX TONSIL AND ADENOIDECTOMY  HX TONSILLECTOMY    
 AGE 1  
 HX WISDOM TEETH EXTRACTION    
 MD EGD TRANSORAL BIOPSY SINGLE/MULTIPLE  6/14/2019  UPPER GI ENDOSCOPY,BIOPSY  12/20/2016 Family history: No family history of thyroid disease, heart disease, hypertension or high cholesterol or DM. Fathers side unknown. He has Gout. Father is obese. Mother - Endometriosis MGF, MGM, Mother - Hypertension MGreat grandparents - Heartdisease at later age Social History: 
Home schooled for past 1 year as anxiety at school seemed to worsen headaches Finished 12th grade Doing well. ROS: 
Constitutional: decreased energy - Mother reports excess fatigue. saw Immunology at [facility] - got pneumo shot as titers were low. ENT: normal hearing, no sorethroat Eye: normal vision, denied double vision, blurred vision Respiratory system: no wheezing, no respiratory discomfort CVS: no palpitations, + lower extremity edema - improving - Followed by Cardiology - Significant improvement noted GI: normal bowel movements, followed by GI Allergy: no skin rash or angioedema, significant stria on abdomen and inner thighs and arms, habit of skin picking due to anxiety Neuorlogical: headache, no focal weakness. No burning Behavioural: normal behavior, normal mood. Medications - See scanned Prior to Admission medications Medication Sig Start Date End Date Taking?  Authorizing Provider  
metFORMIN ER (GLUCOPHAGE XR) 750 mg tablet TAKE 1 TABLET BY MOUTH TWICE A DAY 7/30/19  Yes Mary Jo Walker MD  
VYVANSE 40 mg capsule TAKE ONE CAPSULE BY MOUTH EVERY MORNING 5/7/19  Yes Provider, Historical  
citalopram (CELEXA) 10 mg tablet TAKE 1/2 TABLET BY MOUTH EVERY DAY FOR 1ST WEEK, THEN INCREASE TO 1 TABLET EVERY NIGHT 5/3/19  Yes Provider, Historical  
TROKENDI  mg capsule TAKE 1 CAPSULE BY MOUTH EVERY NIGHT 4/23/19  Yes Provider, Historical  
ketorolac (TORADOL) 10 mg tablet Take  by mouth. Yes Provider, Historical  
norethindrone-ethinyl estradiol-iron (LOESTRIN FE 1.5/30, 28-DAY,) 1.5 mg-30 mcg (21)/75 mg (7) tab Take 1 Tab by mouth daily. Continuous pills only. Patient taking differently: Take 1 Tab by mouth nightly. Continuous pills only. 4/23/19  Yes Chon Suarez MD  
omeprazole (PRILOSEC) 20 mg capsule TAKE ONE CAPSULE BY MOUTH EVERY DAY 1/13/19  Yes Padmini Yin MD  
fluticasone (FLOVENT HFA) 220 mcg/actuation inhaler 2 puffs swallowed twice daily, npo x 30 min after dose 1/10/19  Yes Padmini Yin MD  
dicyclomine (BENTYL) 20 mg tablet Take 1 Tab by mouth two (2) times daily as needed for up to 14 doses. 1/10/19  Yes Padmini Yin MD  
docusate sodium (STOOL SOFTENER) 250 mg capsule Take 1 Cap by mouth two (2) times daily as needed for Constipation for up to 30 doses. 1/10/19  Yes Padmini Yin MD  
ondansetron (ZOFRAN ODT) 8 mg disintegrating tablet Take 1 Tab by mouth every eight (8) hours as needed for Nausea for up to 15 doses. 1/10/19  Yes Padmini Yin MD  
cetirizine (ZYRTEC) 10 mg tablet TK 1 T PO D PRF ALLERGY 9/10/18  Yes Provider, Historical  
cholecalciferol, vitamin d3, (VITAMIN D3) 400 unit cap Take  by mouth. Yes Provider, Historical  
fluticasone propionate (FLONASE ALLERGY RELIEF NA) by Nasal route. Yes Provider, Historical  
SUMAtriptan succinate (ZEMBRACE SYMTOUCH) 3 mg/0.5 mL pnij by SubCUTAneous route.  Indications: MIGRAINE   Yes Provider, Historical  
hydrOXYzine HCl (ATARAX) 25 mg tablet TK 1 T PO Q 6 HOURS PRF ITCHING 6/6/17  Yes Provider, Historical  
 triamcinolone acetonide (KENALOG) 0.1 % topical cream APPLY TO INSECT BITES BID PRN DO NOT APPLY TO FACE 6/6/17  Yes Provider, Historical  
diphenhydrAMINE (BENADRYL) 25 mg capsule Take by mouth as needed (migraine). Yes Provider, Historical  
 
 
Allergies: Allergies Allergen Reactions  Yeast, Dried Other (comments) Upset stomach Objective:  
 
 
Visit Vitals /72 (BP 1 Location: Left arm, BP Patient Position: Sitting) Pulse 95 Temp 97.5 °F (36.4 °C) (Oral) Resp 24 Ht 5' 5.95\" (1.675 m) Wt 223 lb 6.4 oz (101.3 kg) SpO2 100% BMI 36.12 kg/m² Wt Readings from Last 3 Encounters:  
08/02/19 223 lb 6.4 oz (101.3 kg) (99 %, Z= 2.25)*  
06/14/19 215 lb (97.5 kg) (98 %, Z= 2.15)*  
05/16/19 221 lb 12.8 oz (100.6 kg) (99 %, Z= 2.23)* * Growth percentiles are based on CDC (Girls, 2-20 Years) data. Ht Readings from Last 3 Encounters:  
08/02/19 5' 5.95\" (1.675 m) (74 %, Z= 0.65)*  
06/14/19 5' 5\" (1.651 m) (61 %, Z= 0.28)*  
05/16/19 5' 6.46\" (1.688 m) (80 %, Z= 0.85)* * Growth percentiles are based on CDC (Girls, 2-20 Years) data. Height: 74 %ile (Z= 0.65) based on CDC (Girls, 2-20 Years) Stature-for-age data based on Stature recorded on 8/2/2019. Weight: 99 %ile (Z= 2.25) based on CDC (Girls, 2-20 Years) weight-for-age data using vitals from 8/2/2019. BMI: Body mass index is 36.12 kg/m². Percentile: [unfilled] Alert, Cooperative, obese - Face appears less obese including trunk. Improved lower abdomen distension and lower extremity edema - non pitting. HEENT: No thyromegaly, EOM intact, No tonsillar hypertrophy S1 S2 heard: Normal rhythm Bilateral air entry. No rhonchi or crepitation Abdomen is soft, non tender, No organomegaly MSK - Normal ROM Skin - No rashes or birth marks, striae on abdomen, upper arms and inner thighs - improved compared to previous. Few are wide, No acanthosis Laboratory data: Results for orders placed or performed during the hospital encounter of 06/14/19 HCG URINE, QL. - POC Result Value Ref Range Pregnancy test,urine (POC) NEGATIVE  NEG    
GLUCOSE, POC Result Value Ref Range Glucose (POC) 112 (H) 65 - 100 mg/dL Performed by Randi Rod 9/2016 - Lipids - High TG, High Total cholesterol 4/2017 - TFT - WNL 
9/2016 - A1C - 5.5 
4/2017 - Vitamin D - 51 Radiology -  
4/2017 - Pelvic US - wnl Assessment/ Plan :  
 
 
Mynor Pham is a 22 y.o. female presenting - Obesity - Lost 66 lbs in past 1 year. BMI is <40 kg/m2 
- Insulin resistance based on OGTT, not clinical.  - On Metformin 750 mg x 2.  
- Irregular menstrual cycles - on continued OCP to prevent migraines - Abnormal lipid profile Plan - As above Diagnosis, etiology, pathophysiology, risk/ benefits of rx, proposed eval, and expected follow up discussed with family and all questions answered No orders of the defined types were placed in this encounter. Continue Metformin 750 mg BiD - Discussed with mother that I would like to slowly decrease Metformin. May consider repeating OGTT in the future Exercise as tolerated Diet and exercise recommendations provided. Labs yearly unless change in clinical symptoms or signs Follow up in 4 month Total time with patient 40 minutes Time spent counseling patient more than 50% If you have questions, please do not hesitate to call me. I look forward to following your patient along with you. Sincerely, Terra Zafar MD

## 2019-08-05 ENCOUNTER — TELEPHONE (OUTPATIENT)
Dept: PEDIATRIC GASTROENTEROLOGY | Age: 19
End: 2019-08-05

## 2019-08-05 NOTE — TELEPHONE ENCOUNTER
Called mother back, she said they picked up three medications from the pharmacy and she thought there was only supposed to be two. They picked up amoxicillin, pepto bismol, and clarithromycin. Told mother the note from Dr. Betty Miner only mentioned the pepto and amox, but I would confirm with him    Please advise [phone].

## 2019-08-05 NOTE — TELEPHONE ENCOUNTER
----- Message from Mono Levine sent at 8/5/2019  9:11 AM EDT -----  Regarding: Vera Ramos: [phone]  Mom called she and patient need clarification on how to administer medication. Please advise [phone].

## 2019-08-05 NOTE — TELEPHONE ENCOUNTER
Akhil Green [facility]   Phone Number: [phone]             Patient called and would like a call back .        Please Advise   [phone]

## 2019-08-06 ENCOUNTER — TELEPHONE (OUTPATIENT)
Dept: PEDIATRIC GASTROENTEROLOGY | Age: 19
End: 2019-08-06

## 2019-08-06 NOTE — TELEPHONE ENCOUNTER
Pharmacy states that patient is allowed to fill for the full 30 day supply as of 8/19/19 and if she were to fill today she would only get 26 as that is the allotted amounted for the time period from when she last filled the RX, mother confirmed her understanding and will call pharmacy.

## 2019-08-06 NOTE — TELEPHONE ENCOUNTER
----- Message from Jordan Aldana sent at 8/6/2019 11:20 AM EDT -----  Regarding: Jeanene Dakins: [phone]  Patient called says another PA is needed for omeprazole it needs to be for 30 days supply she says that is what Dr. Elver Wesley requested, the other PA is only for 26 days.  Please advise [phone]  Best time to call is at 1PM

## 2019-08-08 ENCOUNTER — TELEPHONE (OUTPATIENT)
Dept: PEDIATRIC GASTROENTEROLOGY | Age: 19
End: 2019-08-08

## 2019-08-08 NOTE — TELEPHONE ENCOUNTER
Mother states that since patient has started taking amoxicillin she has been c/o nausea, diarrhea, and vomiting, mother wants to know if patient should continue medication or what next step will be, please advise.

## 2019-08-08 NOTE — TELEPHONE ENCOUNTER
----- Message from Jamaal Co sent at 8/8/2019 10:27 AM EDT -----  Regarding: Dr Marylen Lesches: [phone]  Patient has a new medication, antibiotic, and since patient is on it she has been pretty sick.  Please advise    [phone]

## 2019-08-15 NOTE — PROGRESS NOTES
Annual exam ages 20-44    Ctra. Rafa Gerard is a ,  22 y.o. female [facility] No LMP recorded. (Menstrual status: Chemically Induced). , who presents for her annual checkup. LV=18 for RLQ pain, BTB on continuous OCPs, FHx e'osis. Opted to change OCPs from Junel 1/20 to LE 1.. Also discussed Orilissa. Subsequently dx'd with gall bladder problems and H.pylori. Had L/S michelle in  for H.pylori -- sx resolved. Taking LE . continuously, no BTB. This is patient's first GYN Exam.   Not yet sexually active. ADDITIONAL CONCERNS:  She is having no significant problems. With regard to the Gardasil vaccine, she has not received it yet. Menstrual status:    Her periods are absent in flow. She is using essentially none pads or tampons per day, very light to nonexistent on contraceptive hormones. She denies dysmenorrhea. She denies premenstrual symptoms. Contraception:    The current method of family planning is abstinence. Sexual history:     She  reports that she does not engage in sexual activity. .        Pap and Mammogram History:    Has never had a pap smear.      The patient has never had a mammogram.    Breast Cancer History/Substance Abuse: Negative     Past Medical History:   Diagnosis Date    Abdominal migraine     ADHD (attention deficit hyperactivity disorder)     Autism     Developmental delay     GERD (gastroesophageal reflux disease)     Headache     Irritable bowel syndrome with diarrhea 2017    Migraine     Obesity, morbid ([address] 2017    Psychiatric disorder     anxiety, ADHD, OCD, HIGH FUNTIONING AUTISM     Past Surgical History:   Procedure Laterality Date    COLONOSCOPY N/A 2016    COLONOSCOPY performed by Ed Laguerre MD at [address] HX CHOLECYSTECTOMY  2019    HX HEENT      WISDOM TEETH    HX TONSIL AND ADENOIDECTOMY      HX TONSILLECTOMY      AGE 1    HX WISDOM TEETH EXTRACTION      MA EGD TRANSORAL BIOPSY SINGLE/MULTIPLE  2019         UPPER GI ENDOSCOPY,BIOPSY  2016          OB History    Para Term  AB Living   0 0 0 0 0 0   SAB TAB Ectopic Molar Multiple Live Births   0 0 0   0     Obstetric Comments   Menarche at age 5 - on 2010       Current Outpatient Medications   Medication Sig Dispense Refill    norethindrone-ethinyl estradiol-iron (LOESTRIN FE 1.5/30, 28-DAY,) 1.5 mg-30 mcg (21)/75 mg (7) tab Take 1 Tab by mouth daily. Continuous pills only. 3 Package 5    omeprazole (PRILOSEC) 40 mg capsule Take 1 Cap by mouth daily for 30 days. 30 Cap 2    metFORMIN ER (GLUCOPHAGE XR) 750 mg tablet TAKE 1 TABLET BY MOUTH TWICE A DAY 60 Tab 7    VYVANSE 40 mg capsule TAKE ONE CAPSULE BY MOUTH EVERY MORNING  0    citalopram (CELEXA) 10 mg tablet TAKE 1/2 TABLET BY MOUTH EVERY DAY FOR 1ST WEEK, THEN INCREASE TO 1 TABLET EVERY NIGHT  0    TROKENDI  mg capsule TAKE 1 CAPSULE BY MOUTH EVERY NIGHT  6    ketorolac (TORADOL) 10 mg tablet Take  by mouth.  fluticasone (FLOVENT HFA) 220 mcg/actuation inhaler 2 puffs swallowed twice daily, npo x 30 min after dose 2 Inhaler 11    docusate sodium (STOOL SOFTENER) 250 mg capsule Take 1 Cap by mouth two (2) times daily as needed for Constipation for up to 30 doses. 30 Cap 3    ondansetron (ZOFRAN ODT) 8 mg disintegrating tablet Take 1 Tab by mouth every eight (8) hours as needed for Nausea for up to 15 doses. 15 Tab 3    cetirizine (ZYRTEC) 10 mg tablet TK 1 T PO D PRF ALLERGY  3    cholecalciferol, vitamin d3, (VITAMIN D3) 400 unit cap Take  by mouth.  SUMAtriptan succinate (ZEMBRACE SYMTOUCH) 3 mg/0.5 mL pnij by SubCUTAneous route.  Indications: MIGRAINE      hydrOXYzine HCl (ATARAX) 25 mg tablet TK 1 T PO Q 6 HOURS PRF ITCHING  2    triamcinolone acetonide (KENALOG) 0.1 % topical cream APPLY TO INSECT BITES BID PRN DO NOT APPLY TO FACE  2    diphenhydrAMINE (BENADRYL) 25 mg capsule Take by mouth as needed (migraine). Allergies: Yeast, dried   Social History     Socioeconomic History    Marital status: SINGLE     Spouse name: Not on file    Number of children: Not on file    Years of education: Not on file    Highest education level: Not on file   Occupational History    Not on file   Social Needs    Financial resource strain: Not on file    Food insecurity:     Worry: Not on file     Inability: Not on file    Transportation needs:     Medical: Not on file     Non-medical: Not on file   Tobacco Use    Smoking status: Never Smoker    Smokeless tobacco: Never Used    Tobacco comment: no smoke exposure in the home   Substance and Sexual Activity    Alcohol use: No    Drug use: No    Sexual activity: Never   Lifestyle    Physical activity:     Days per week: Not on file     Minutes per session: Not on file    Stress: Not on file   Relationships    Social connections:     Talks on phone: Not on file     Gets together: Not on file     Attends Jew service: Not on file     Active member of club or organization: Not on file     Attends meetings of clubs or organizations: Not on file     Relationship status: Not on file    Intimate partner violence:     Fear of current or ex partner: Not on file     Emotionally abused: Not on file     Physically abused: Not on file     Forced sexual activity: Not on file   Other Topics Concern    Not on file   Social History Narrative    Not on file     Tobacco History:  reports that she has never smoked. She has never used smokeless tobacco.  Alcohol Abuse:  reports that she does not drink alcohol. Drug Abuse:  reports that she does not use drugs.     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 pylori infection A04.8     Family History   Problem Relation Age of Onset    Endometriosis Mother     Headache Mother         d/t accident - neck and brain injury    Delayed Awakening Mother     Post-op Nausea/Vomiting Mother     Headache Maternal Grandfather     No Known Problems Father     No Known Problems Maternal Grandmother        Review of Systems - History obtained from the patient  Constitutional: negative for weight loss, fever, night sweats  HEENT: negative for hearing loss, earache, congestion, snoring, sorethroat  CV: negative for chest pain, palpitations, edema  Resp: negative for cough, shortness of breath, wheezing  GI: negative for change in bowel habits, abdominal pain, black or bloody stools  : negative for frequency, dysuria, hematuria, vaginal discharge  MSK: negative for back pain, joint pain, muscle pain  Breast: negative for breast lumps, nipple discharge, galactorrhea  Skin :negative for itching, rash, hives  Neuro: negative for dizziness, headache, confusion, weakness  Psych: negative for anxiety, depression, change in mood  Heme/lymph: negative for bleeding, bruising, pallor    Physical Exam    Visit Vitals  /89 (BP 1 Location: Right arm, BP Patient Position: Sitting)   Pulse (!) 108   Ht 5' 4\" (1.626 m)   Wt 219 lb (99.3 kg)   BMI 37.59 kg/m²       Constitutional  · Appearance: well-nourished, well developed, alert, in no acute distress    HENT  · Head and Face: appears normal    Neck  · Inspection/Palpation: normal appearance, no masses or tenderness  · Lymph Nodes: no lymphadenopathy present  · Thyroid: gland size normal, nontender, no nodules or masses present on palpation    Chest  · Respiratory Effort: breathing unlabored  · Auscultation: normal breath sounds    Cardiovascular  · Heart:  · Auscultation: regular rate and rhythm without murmur    Breasts  · Inspection of Breasts: breasts symmetrical, no skin changes, no discharge present, nipple appearance normal, no skin retraction present  · Palpation of Breasts and Axillae: no masses present on palpation, no breast tenderness  · Axillary Lymph Nodes: no lymphadenopathy present    Gastrointestinal  · Abdominal Examination: abdomen non-tender to palpation, normal bowel sounds, no masses present  · Liver and spleen: no hepatomegaly present, spleen not palpable  · Hernias: no hernias identified    Genitourinary  · External Genitalia: normal appearance for age, no discharge present, no tenderness present, no inflammatory lesions present, no masses present, no atrophy present  [deferred - not yet sexually active]    Skin  · General Inspection: no rash, no lesions identified    Neurologic/Psychiatric  · Mental Status:  · Orientation: grossly oriented to person, place and time  · Mood and Affect: mood normal, affect appropriate            Assessment & Plan:  · Routine gynecologic examination. Pap at 19yo. · NuSwab screening <24yo - deferred, not yet sexually active. · H/o abdominal pain, FHx e'osis. Taking OCPs continuously. Switched from LE 1/20 to LE 1.5/30 in March for BTB. Doing well, wishes to continue. eRX LE 1.5/30 #3 RFx5  · Counseled re: diet, exercise, healthy lifestyle  · Return for yearly wellness visits  · Patient verbalized understanding      Orders Placed This Encounter    norethindrone-ethinyl estradiol-iron (LOESTRIN FE 1.5/30, 28-DAY,) 1.5 mg-30 mcg (21)/75 mg (7) tab     Sig: Take 1 Tab by mouth daily. Continuous pills only.      Dispense:  3 Package     Refill:  5

## 2019-08-20 ENCOUNTER — OFFICE VISIT (OUTPATIENT)
Dept: OBGYN CLINIC | Age: 19
End: 2019-08-20

## 2019-08-20 VITALS
BODY MASS INDEX: 37.39 KG/M2 | SYSTOLIC BLOOD PRESSURE: 134 MMHG | HEART RATE: 108 BPM | DIASTOLIC BLOOD PRESSURE: 89 MMHG | HEIGHT: 64 IN | WEIGHT: 219 LBS

## 2019-08-20 DIAGNOSIS — Z01.419 ENCOUNTER FOR GYNECOLOGICAL EXAMINATION: Primary | ICD-10-CM

## 2019-08-20 DIAGNOSIS — Z30.41 ENCOUNTER FOR SURVEILLANCE OF CONTRACEPTIVE PILLS: ICD-10-CM

## 2019-08-20 DIAGNOSIS — N94.89 MENSTRUAL SUPPRESSION: ICD-10-CM

## 2019-08-20 DIAGNOSIS — Z84.2 FAMILY HISTORY OF ENDOMETRIOSIS IN FIRST DEGREE RELATIVE: ICD-10-CM

## 2019-08-20 RX ORDER — NORETHINDRONE ACETATE AND ETHINYL ESTRADIOL 1.5-30(21)
1 KIT ORAL DAILY
Qty: 3 PACKAGE | Refills: 5 | Status: SHIPPED | OUTPATIENT
Start: 2019-08-20 | End: 2020-11-06

## 2019-08-20 NOTE — PATIENT INSTRUCTIONS
Well Care - Tips for Teens: Care Instructions  Your Care Instructions  Being a teen can be exciting and tough. You are finding your place in the world. And you may have a lot on your mind these days too--school, friends, sports, parents, and maybe even how you look. Some teens begin to feel the effects of stress, such as headaches, neck or back pain, or an upset stomach. To feel your best, it is important to start good health habits now. Follow-up care is a key part of your treatment and safety. Be sure to make and go to all appointments, and call your doctor if you are having problems. It's also a good idea to know your test results and keep a list of the medicines you take. How can you care for yourself at home? Staying healthy can help you cope with stress or depression. Here are some tips to keep you healthy. · Get at least 30 minutes of exercise on most days of the week. Walking is a good choice. You also may want to do other activities, such as running, swimming, cycling, or playing tennis or team sports. · Try cutting back on time spent on TV or video games each day. · Munch at least 5 helpings of fruits and veggies. A helping is a piece of fruit or ½ cup of vegetables. · Cut back to 1 can or small cup of soda or juice drink a day. Try water and milk instead. · Cheese, yogurt, milk--have at least 3 cups a day to get the calcium you need. · The decision to have sex is a serious one that only you can make. Not having sex is the best way to prevent HIV, STIs (sexually transmitted infections), and pregnancy. · If you do choose to have sex, condoms and birth control can increase your chances of protection against STIs and pregnancy. · Talk to an adult you feel comfortable with. Confide in this person and ask for his or her advice. This can be a parent, a teacher, a , or someone else you trust.  Healthy ways to deal with stress  · Get 9 to 10 hours of sleep every night.   · Eat healthy meals.  · Go for a long walk. · Dance. Shoot hoops. Go for a bike ride. Get some exercise. · Talk with someone you trust.  · Laugh, cry, sing, or write in a journal.  When should you call for help? Call 911 anytime you think you may need emergency care. For example, call if:    · You feel life is meaningless or think about killing yourself.   Shashank Goodell to a counselor or doctor if any of the following problems lasts for 2 or more weeks.    · You feel sad a lot or cry all the time.     · You have trouble sleeping or sleep too much.     · You find it hard to concentrate, make decisions, or remember things.     · You change how you normally eat.     · You feel guilty for no reason. Where can you learn more? Go to http://harjeet-mina.info/. Enter L954 in the search box to learn more about \"Well Care - Tips for Teens: Care Instructions. \"  Current as of: December 12, 2018  Content Version: 12.1  © 8203-2154 Healthwise, Incorporated. Care instructions adapted under license by [employer] (which disclaims liability or warranty for this information). If you have questions about a medical condition or this instruction, always ask your healthcare professional. [address] any warranty or liability for your use of this information.   [employer] Desk: [phone]

## 2019-08-22 ENCOUNTER — TELEPHONE (OUTPATIENT)
Dept: PEDIATRIC GASTROENTEROLOGY | Age: 19
End: 2019-08-22

## 2019-08-22 NOTE — TELEPHONE ENCOUNTER
----- Message from Abdiaziz Ramirez sent at 8/22/2019  4:25 PM EDT -----  Regarding: DR Nathan Leonard: [phone]  Patient is calling to request the doctor to write a letter for the school explaining her medical condition. Please advise.     [phone]

## 2019-08-22 NOTE — TELEPHONE ENCOUNTER
Mother states that patient is leaving for [employer] in 2 weeks and needs a note for use of a heating pad in the dorm room for her abdominal cramping/pain, will have Dr. Freddie Renee sign and mother requested to mail to home address   [address] [place] [id number]

## 2019-09-17 ENCOUNTER — TELEPHONE (OUTPATIENT)
Dept: PEDIATRIC GASTROENTEROLOGY | Age: 19
End: 2019-09-17

## 2019-09-17 NOTE — TELEPHONE ENCOUNTER
Mother reports that patient completed antibiotic course 2 weeks ago for h pylori, no c/o stomach aches over the last 2 weeks, patient still taking flovent and doing well, mother would like to know next step in care, please advise. Verbally filled RX for flovent at [facility] on [address] per med rec, flovent 220 mcg, 2 puffs twice daily, 2 inhalers with 5 refills.

## 2019-09-17 NOTE — TELEPHONE ENCOUNTER
----- Message from Ciara Toscano, RN sent at 9/16/2019  5:14 PM EDT -----  Regarding: Tommy LaurentGanesh Blue: [phone]      ----- Message -----  From: Danilo Dominguez  Sent: 9/16/2019   4:54 PM EDT  To: [facility]  Subject: Sherin aMrley                                            Patient called says a PA is needed for fluticasone (FLOVENT HFA) 220 mcg/actuation inhaler [[id number]] going [id number] [id number] [facility], [address] [facility]    Per Dr. Sherin Marley they are also calling in to provide an update after finishing the antibiotics.  Please advise [phone]

## 2019-10-07 DIAGNOSIS — K20.90 ESOPHAGITIS DETERMINED BY BIOPSY: ICD-10-CM

## 2019-10-07 RX ORDER — FLUTICASONE PROPIONATE 220 UG/1
AEROSOL, METERED RESPIRATORY (INHALATION)
Qty: 2 INHALER | Refills: 11 | OUTPATIENT
Start: 2019-10-07

## 2019-10-21 ENCOUNTER — TELEPHONE (OUTPATIENT)
Dept: PEDIATRIC GASTROENTEROLOGY | Age: 19
End: 2019-10-21

## 2019-10-21 NOTE — TELEPHONE ENCOUNTER
Called Erica back, she states she finished the amoxicillin and then she tried the smoothies again. She said she could not tolerate the smoothies, she tried it both with and without the protein powder. She said she got a terrible stomach ache and felt awful. However, she said she is tolerating the tylenol as needed. She will take it after the gym if her stomach muscles cramp up. She is just calling with an update and to see if she should do anything differently.     Please Advise, [phone]

## 2019-10-21 NOTE — TELEPHONE ENCOUNTER
----- Message from Clinton Holt sent at 10/21/2019 12:43 PM EDT -----  Patient Calling to let provider know that still cant drink smoothies but can take tylenol.          Please Advise   [phone]

## 2019-11-01 RX ORDER — OMEPRAZOLE 40 MG/1
40 CAPSULE, DELAYED RELEASE ORAL DAILY
Qty: 30 CAP | Refills: 5 | Status: SHIPPED | OUTPATIENT
Start: 2019-11-01 | End: 2019-12-01

## 2019-12-06 ENCOUNTER — OFFICE VISIT (OUTPATIENT)
Dept: PEDIATRIC GASTROENTEROLOGY | Age: 19
End: 2019-12-06

## 2019-12-06 ENCOUNTER — OFFICE VISIT (OUTPATIENT)
Dept: PEDIATRIC ENDOCRINOLOGY | Age: 19
End: 2019-12-06

## 2019-12-06 VITALS
TEMPERATURE: 98.3 F | OXYGEN SATURATION: 100 % | SYSTOLIC BLOOD PRESSURE: 122 MMHG | RESPIRATION RATE: 18 BRPM | DIASTOLIC BLOOD PRESSURE: 83 MMHG | BODY MASS INDEX: 36.42 KG/M2 | HEIGHT: 65 IN | WEIGHT: 218.6 LBS | HEART RATE: 100 BPM

## 2019-12-06 VITALS
WEIGHT: 218.7 LBS | HEART RATE: 100 BPM | HEIGHT: 65 IN | SYSTOLIC BLOOD PRESSURE: 122 MMHG | RESPIRATION RATE: 19 BRPM | BODY MASS INDEX: 36.44 KG/M2 | TEMPERATURE: 98.3 F | OXYGEN SATURATION: 100 % | DIASTOLIC BLOOD PRESSURE: 83 MMHG

## 2019-12-06 DIAGNOSIS — A04.8 H. PYLORI INFECTION: ICD-10-CM

## 2019-12-06 DIAGNOSIS — E66.9 OBESITY (BMI 35.0-39.9 WITHOUT COMORBIDITY): Primary | ICD-10-CM

## 2019-12-06 DIAGNOSIS — E88.81 INSULIN RESISTANCE: Primary | ICD-10-CM

## 2019-12-06 DIAGNOSIS — E66.9 OBESITY (BMI 35.0-39.9 WITHOUT COMORBIDITY): ICD-10-CM

## 2019-12-06 DIAGNOSIS — E78.89 LIPIDS ABNORMAL: ICD-10-CM

## 2019-12-06 DIAGNOSIS — K20.0 EOSINOPHILIC ESOPHAGITIS: ICD-10-CM

## 2019-12-06 DIAGNOSIS — S39.011A STRAIN OF ABDOMINAL WALL, INITIAL ENCOUNTER: ICD-10-CM

## 2019-12-06 DIAGNOSIS — K29.50 CHRONIC GASTRITIS WITHOUT BLEEDING, UNSPECIFIED GASTRITIS TYPE: ICD-10-CM

## 2019-12-06 DIAGNOSIS — R11.0 CHRONIC NAUSEA: ICD-10-CM

## 2019-12-06 PROBLEM — R53.83 OTHER FATIGUE: Status: RESOLVED | Noted: 2019-03-19 | Resolved: 2019-12-06

## 2019-12-06 PROBLEM — E66.01 SEVERE OBESITY (HCC): Status: RESOLVED | Noted: 2019-08-02 | Resolved: 2019-12-06

## 2019-12-06 LAB — HBA1C MFR BLD HPLC: 4.9 %

## 2019-12-06 RX ORDER — FAMOTIDINE 20 MG/1
20 TABLET, FILM COATED ORAL 2 TIMES DAILY
Qty: 60 TAB | Refills: 11 | Status: SHIPPED | OUTPATIENT
Start: 2019-12-06 | End: 2020-01-05

## 2019-12-06 NOTE — PROGRESS NOTES
Room 3    Identified pt with two pt identifiers(name and ). Reviewed record in preparation for visit and have obtained necessary documentation. All patient medications has been reviewed. Chief Complaint   Patient presents with    Pre-diabetes     4 month f/u    Weight Management       Health Maintenance Due   Topic    DTaP/Tdap/Td series (1 - Tdap)    HPV Age 9Y-34Y (1 - Female 2-dose series)    Influenza Age 5 to Adult     MEDICARE YEARLY EXAM        Vitals:    19 1357   BP: 122/83   Pulse: 100   Resp: 18   Temp: 98.3 °F (36.8 °C)   TempSrc: Oral   SpO2: 100%   Weight: 218 lb 9.6 oz (99.2 kg)   Height: 5' 5.35\" (1.66 m)   PainSc:   0 - No pain       Wt Readings from Last 3 Encounters:   19 218 lb 9.6 oz (99.2 kg)   19 219 lb (99.3 kg) (99 %, Z= 2.20)*   19 223 lb (101.2 kg) (99 %, Z= 2.25)*     * Growth percentiles are based on CDC (Girls, 2-20 Years) data. Temp Readings from Last 3 Encounters:   19 98.3 °F (36.8 °C) (Oral)   19 97.6 °F (36.4 °C) (Oral)   19 97.5 °F (36.4 °C) (Oral)     BP Readings from Last 3 Encounters:   19 122/83   19 134/89   19 111/78     Pulse Readings from Last 3 Encounters:   19 100   19 (!) 108   19 90       Lab Results   Component Value Date/Time    Hemoglobin A1c 5.1 2019 02:21 PM       Coordination of Care Questionnaire:   1) Have you been to an emergency room, urgent care, or hospitalized since your last visit? YES     11/15/19 Urgent Care  10/28/19 [facility]  2. Have seen or consulted any other health care provider since your last visit? YES  19 OBGYN  19 Peds Gastro  3) Do you have an Advanced Directive/ Living Will in place? NO  If yes, do we have a copy on file NO  If no, would you like information NO    Patient is accompanied by mother I have received verbal consent from Sudhakar Handler to discuss any/all medical information while they are present in the room.

## 2019-12-06 NOTE — PROGRESS NOTES
Chief Complaint   Patient presents with    Follow-up    Gastroparesis    Irritable Bowel Syndrome     No new concerns this visit.

## 2019-12-06 NOTE — PROGRESS NOTES
Date: 12/6/2019    Dear No primary care provider on file.:    Mauri Huff is 20 y.o. young lady with chronic GERD requiring high dose omeprazole. We will trial Pepcid in place of omeprazole, as insurance is not covering it reliably. She is feeling better after antibiotic course for gastritis, however continues to have difficulties tolerating smoothies. She can tolerate the individual component foods, however not together in smoothie mix or at smoothie bars. I wonder about increased air mixed in, exacerbating the reflux. We do know there are no food allergies or lactose intolerance. Smooth Wang regarding the umbilicus site soreness. I referred Erica to [facility] for rehabilitation of her lower abdominal muscles. This will allow Erica to continue her exercise and weight loss efforts. Mauri Huff has lost over 80 pounds since I first encountered her, and is a very determined and healthy girl. She feels much better and no longer has daily abdominal pain. Plan:   1. Referral to Physical Therapy  [facility]    [facility]  [facility].at. [facility], [address]  [address]  phone [phone], fax [phone]    2. Will discuss with Dr. Joaquina Wang umbilicus, surgical site pain  3. Start famotidine/Pepcid 20 mg 2 times daily and take for 3 days before trying to stop omeprazole  4. Return to clinic in 6 months                HPI: Mauri Huff presents today for management of chronic GERD and abdominal pain. Mauri Huff is without gastrointestinal distress as long as she takes her omeprazole. Unfortunately, her insurance is giving her difficulties with her chronic PPI prescription. She asks if Pepcid could be tried as an alternative. There is some soreness at the umbilicus laparoscopy site, making her workout routine difficult.   There is also lower abdominal tenderness after workouts that seems excessive and is limiting Erica's exercise routine. She asks for referral to physical therapy. [facility] mentions that she has an \"innie\" to the degree that Dr. Fatoumata Miller had difficulty introducing the laparoscope through that site. She had a mild post-op wound infection that Dr. Fatoumata Miller treated. Mother describes Alfreda Gold picking at the site. Medications:   Current Outpatient Medications   Medication Sig    famotidine (PEPCID) 20 mg tablet Take 1 Tab by mouth two (2) times a day for 30 days.  norethindrone-ethinyl estradiol-iron (LOESTRIN FE 1.5/30, 28-DAY,) 1.5 mg-30 mcg (21)/75 mg (7) tab Take 1 Tab by mouth daily. Continuous pills only.  metFORMIN ER (GLUCOPHAGE XR) 750 mg tablet TAKE 1 TABLET BY MOUTH TWICE A DAY    VYVANSE 40 mg capsule TAKE ONE CAPSULE BY MOUTH EVERY MORNING    citalopram (CELEXA) 10 mg tablet TAKE 1/2 TABLET BY MOUTH EVERY DAY FOR 1ST WEEK, THEN INCREASE TO 1 TABLET EVERY NIGHT    TROKENDI  mg capsule TAKE 1 CAPSULE BY MOUTH EVERY NIGHT    ketorolac (TORADOL) 10 mg tablet Take  by mouth.  fluticasone (FLOVENT HFA) 220 mcg/actuation inhaler 2 puffs swallowed twice daily, npo x 30 min after dose    docusate sodium (STOOL SOFTENER) 250 mg capsule Take 1 Cap by mouth two (2) times daily as needed for Constipation for up to 30 doses.  cetirizine (ZYRTEC) 10 mg tablet TK 1 T PO D PRF ALLERGY    cholecalciferol, vitamin d3, (VITAMIN D3) 400 unit cap Take  by mouth.  SUMAtriptan succinate (ZEMBRACE SYMTOUCH) 3 mg/0.5 mL pnij by SubCUTAneous route. Indications: MIGRAINE    hydrOXYzine HCl (ATARAX) 25 mg tablet TK 1 T PO Q 6 HOURS PRF ITCHING    triamcinolone acetonide (KENALOG) 0.1 % topical cream APPLY TO INSECT BITES BID PRN DO NOT APPLY TO FACE    diphenhydrAMINE (BENADRYL) 25 mg capsule Take by mouth as needed (migraine). No current facility-administered medications for this visit. Allergies:    Allergies   Allergen Reactions    Yeast, Dried Other (comments)     Upset stomach        ROS: A 12 point review of systems was obtained and was as per HPI, otherwise negative.     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 pylori infection A04.8       PMHx:   Past Medical History:   Diagnosis Date    Abdominal migraine     ADHD (attention deficit hyperactivity disorder)     Autism     Developmental delay     GERD (gastroesophageal reflux disease)     Headache     Irritable bowel syndrome with diarrhea 4/13/2017    Migraine     Obesity, morbid ([address] 12/12/2017    Psychiatric disorder     anxiety, ADHD, OCD, HIGH FUNTIONING AUTISM    Rosacea and pustular rosacea    Family History:   Family History   Problem Relation Age of Onset    Endometriosis Mother     Headache Mother         d/t accident - neck and brain injury    Delayed Awakening Mother     Post-op Nausea/Vomiting Mother     Headache Maternal Grandfather     No Known Problems Father     No Known Problems Maternal Grandmother         Social History:   Social History     Tobacco Use    Smoking status: Never Smoker    Smokeless tobacco: Never Used    Tobacco comment: no smoke exposure in the home   Substance Use Topics    Alcohol use: No    Drug use: No    Presents today with mother, she goes to [employer] to exercise and is getting back into the swing of things since her surgery    OBJECTIVE:  Vitals:  height is 5' 5.35\" (1.66 m) and weight is 218 lb 11.1 oz (99.2 kg). Her oral temperature is 98.3 °F (36.8 °C). Her blood pressure is 122/83 and her pulse is 100. Her respiration is 19 and oxygen saturation is 100%. Last 3 Recorded Weights in this Encounter    12/06/19 1456   Weight: 218 lb 11.1 oz (99.2 kg)       PHYSICAL EXAM:    General: healthy, alert, well developed, well nourished and erythematous rash over upper chest  ENT: anicteric sclera, moist oral mucosa, no oral lesions  Abdomen: soft, mildly tender at the umbilicus, non distended, has an \"innie\" that I could not visualize the base  Perianal/Rectal exam: deferred      Cardiovascular: RRR, well-perfused, no murmur  Skin:  erythema over the chest and cheeks     Neuro: alert, reactive, normal muscle tone  Psych: appropriate affect and interactions  Pulmonary:  Clear Breath Sounds Bilaterally, No Increased Effort   Musc/Skel: no swelling or tenderness    Studies: Chronic gastritis discovered on recent upper endoscopy with nodular visual appearance              Thank you for referring Maryjo Handley to our clinic, we appreciate participating in their care. All patient and caregiver questions and concerns were addressed during the visit. Major risks, benefits, and side-effects of therapy were discussed.

## 2019-12-06 NOTE — PATIENT INSTRUCTIONS
1.  Referral to Physical Therapy  [facility]    [facility]  [email or web address]  [facility], [address]  [place] [address]  phone [phone], fax [phone]    2. Will discuss with Dr. Dewain Lesches umbilicus, surgical site pain  3. Start famotidine/Pepcid 20 mg 2 times daily and take for 3 days before trying to stop omeprazole  4.   Return to clinic in 6 months

## 2019-12-06 NOTE — LETTER
12/6/2019 3:31 PM 
 
Ms. Nikita Baires [address] [place] [id number] Dear Giovanna Fowler MD, 
 
I had the opportunity to see your patient, Nikita Baires, 2000, in the [facility] clinic. Please find my impression and suggestions attached. Feel free to call our office with any questions, [phone]. Sincerely, Wellington Patten MD

## 2019-12-06 NOTE — LETTER
19 Patient: Garret Herrera YOB: 2000 Date of Visit: 2019 Frances Isabel MD 
[address] [place] [address] [phone] VIA Facsimile: [phone] Dear Frances Isabel MD, Thank you for referring Ms. Nohelia Cristina to PEDIATRIC ENDOCRINOLOGY AND DIAB[facility] for evaluation. My notes for this consultation are attached. Room 3 Identified pt with two pt identifiers(name and ). Reviewed record in preparation for visit and have obtained necessary documentation. All patient medications has been reviewed. Chief Complaint Patient presents with  Pre-diabetes 4 month f/u  Weight Management Health Maintenance Due Topic  DTaP/Tdap/Td series (1 - Tdap)  HPV Age 9Y-34Y (1 - Female 2-dose series)  Influenza Age 5 to Adult  MEDICARE YEARLY EXAM   
 
 
Vitals:  
 19 1357 BP: 122/83 Pulse: 100 Resp: 18 Temp: 98.3 °F (36.8 °C) TempSrc: Oral  
SpO2: 100% Weight: 218 lb 9.6 oz (99.2 kg) Height: 5' 5.35\" (1.66 m) PainSc:   0 - No pain Wt Readings from Last 3 Encounters:  
19 218 lb 9.6 oz (99.2 kg) 19 219 lb (99.3 kg) (99 %, Z= 2.20)*  
19 223 lb (101.2 kg) (99 %, Z= 2.25)* * Growth percentiles are based on CDC (Girls, 2-20 Years) data. Temp Readings from Last 3 Encounters:  
19 98.3 °F (36.8 °C) (Oral) 19 97.6 °F (36.4 °C) (Oral) 19 97.5 °F (36.4 °C) (Oral) BP Readings from Last 3 Encounters:  
19 122/83  
19 134/89  
19 111/78 Pulse Readings from Last 3 Encounters:  
19 100  
19 (!) 108  
19 90 Lab Results Component Value Date/Time Hemoglobin A1c 5.1 2019 02:21 PM  
 
 
Coordination of Care Questionnaire:  
1) Have you been to an emergency room, urgent care, or hospitalized since your last visit?    YES    
11/15/19 Urgent Care 
10/28/19 [facility] 
 2. Have seen or consulted any other health care provider since your last visit? YES 
8/20/19 OBGYN 
08/02/19 Peds Gastro 3) Do you have an Advanced Directive/ Living Will in place? NO If yes, do we have a copy on file NO If no, would you like information NO Patient is accompanied by mother I have received verbal consent from Isaias Gomez to discuss any/all medical information while they are present in the room. YET TO FIND A ADULT PROVIDER AS PMD 
 
Subjective:  
 
Reason for visit: Isaias Gomez is a 20 y.o. female here for follow up of - Obesity - Severe insulin resistance based on OGTT, not clinical.  - On Metformin 750 mg BiD - Irregular menstrual cycles - on continued OCP as menses cause debilitating migraines - Abnormal lipid profile She was last seen 4 months ago. She is here today with her mother. History of present illness: As per mother, patients history is significant for Obesity - Has struggled with obesity for many years. Lost 5 lb in 4 months with Diet and activity changes. Has lost a total of 44 lbs in 1 year since 12/2018 BMI has decreased from 43.4 to 35 kg/m2 in 1 year. Able to do more exercises as no more migraines. Has migraines once in 3 months versus 2-3x/month. Denies polyphagia, + polydipsia - 16 oz x 5 times/day, Nocturia once at night. Denies symptoms of hypothyroidism such as cold tolerance, dry hair, dry skin, constipation. No snoring at night except when really tired. No hip or joint pains Improved exercise intolerance, No SOB, No chest pain, palpitations Activity - Decreased since GB surgery. Unable to have smoothies like she used to. Does yoga twice a week, Does chores around the home. Activity - Elliptical 3 days a week, Lifting weight - 3 days a week. Has seen Nutritionist in the past.  
Eats healthy always. Occasional treats Darrol Needs with water L - Banana Strawberry yoghurt, Water D - Meat and Brocolli Cut back on carbs Insulin resistance -  OGTT done in 11/2017 revealed insulin resistance INSULIN Result Value Ref Range Insulin 2 hour  277 (H) <37 Insulin - 1 hour  286 (H) <51 Insulin Fasting  37.4 (H) <9 uIU/mL Started on Metformin  mg 12/2017. Current dose 750 mg Bid - takes both tablets together at bedtime. Improved fasting insulin A1C is 4.9 today (last assessed 3/2019 - 5.1) History of Hypertension - In the previous visit - due to hypertension and striae noted, ACTH (low)and random cortisol (9.0) were drawn and they were not high, however they are not excellent screening tests to assess hypercortisolism. Patient had 24 hour urinary cortisol done with  (Ph# [phone]) at [facility] due to similar concerns of [place] which was normal - 7 (Normal 0-50). BP is normal today Abnormal lipid profile - Last lipids drawn 3/2019 - WNL except for elevated LDL  
oN OTC Fish oil. Vitamin D deficiency - History - On Vitamin D 400 international units , Most recent Vitamin D 79 - 3/2019 - wnl Menstrual history - attained menarche at age 5 years, started on OCPs 12/2016  for severe menstrual migraines. Followed by Gynecology. She has not had a cycle for more than a year as she is continued OCP's to prevent migraine. Has baseline migraines requiring sumatriptan injections 2-3x a week. Used to be on Depo but associated with weight gain  
fu apt with Gynecology yearly. Plan is to continue continued OCP for a total of 5 years. Started on higher dose Estrogen Junel Fe 1.5 - 30 mcg Estradiol  As she was spotting- taking continuously. Recommended to discuss if Gyne would like to decrease Estradiol dose to 20 mcg. MIgraines - Improved on Topamax - Now only once a week. Triggers - anxiety, noise , certain aura Autism and anxiety - Receives counseling. Abnormal gallbladder emptying study with an EF of only 21%.  S/p Cholecystectomy. No gall stones. Pts symptoms have resolved. Rxed for H.pyloriI 8/2019 Birth History - Weighed 6 lbs, Term, NVD Past Surgical History:  
Procedure Laterality Date  COLONOSCOPY N/A 12/20/2016 COLONOSCOPY performed by Regan Salazar MD at [address]  06/2019  HX HEENT    
 WISDOM TEETH  
 HX TONSIL AND ADENOIDECTOMY  HX TONSILLECTOMY    
 AGE 1  
 HX WISDOM TEETH EXTRACTION    
 SD EGD TRANSORAL BIOPSY SINGLE/MULTIPLE  6/14/2019  UPPER GI ENDOSCOPY,BIOPSY  12/20/2016 Family history: No family history of thyroid disease, heart disease, hypertension or high cholesterol or DM. Fathers side unknown. He has Gout. Father is obese. Mother - Endometriosis MGF, MGM, Mother - Hypertension MGreat grandparents - Heartdisease at later age Social History: Will start [employer] next year Doing well. ROS: 
Constitutional: decreased energy - now resolved ENT: normal hearing, no sorethroat Eye: normal vision, denied double vision, blurred vision Respiratory system: no wheezing, no respiratory discomfort CVS: no palpitations, + lower extremity edema - improving - Followed by Cardiology - Significant improvement noted GI: normal bowel movements, followed by GI Allergy: no skin rash or angioedema, significant stria on abdomen and inner thighs and arms, habit of skin picking due to anxiety Neuorlogical: no focal weakness. No burning Behavioural: normal behavior, normal mood. Medications - See scanned Prior to Admission medications Medication Sig Start Date End Date Taking? Authorizing Provider  
norethindrone-ethinyl estradiol-iron (LOESTRIN FE 1.5/30, 28-DAY,) 1.5 mg-30 mcg (21)/75 mg (7) tab Take 1 Tab by mouth daily. Continuous pills only.  8/20/19  Yes Alaina Tao MD  
metFORMIN ER (GLUCOPHAGE XR) 750 mg tablet TAKE 1 TABLET BY MOUTH TWICE A DAY 7/30/19  Yes Erin Reyna MD  
 VYVANSE 40 mg capsule TAKE ONE CAPSULE BY MOUTH EVERY MORNING 5/7/19  Yes Provider, Historical  
citalopram (CELEXA) 10 mg tablet TAKE 1/2 TABLET BY MOUTH EVERY DAY FOR 1ST WEEK, THEN INCREASE TO 1 TABLET EVERY NIGHT 5/3/19  Yes Provider, Historical  
TROKENDI  mg capsule TAKE 1 CAPSULE BY MOUTH EVERY NIGHT 4/23/19  Yes Provider, Historical  
ketorolac (TORADOL) 10 mg tablet Take  by mouth. Yes Provider, Historical  
fluticasone (FLOVENT HFA) 220 mcg/actuation inhaler 2 puffs swallowed twice daily, npo x 30 min after dose 1/10/19  Yes Vilma Mooney MD  
docusate sodium (STOOL SOFTENER) 250 mg capsule Take 1 Cap by mouth two (2) times daily as needed for Constipation for up to 30 doses. 1/10/19  Yes Vilma Mooney MD  
ondansetron (ZOFRAN ODT) 8 mg disintegrating tablet Take 1 Tab by mouth every eight (8) hours as needed for Nausea for up to 15 doses. 1/10/19  Yes Vilma Mooney MD  
cetirizine (ZYRTEC) 10 mg tablet TK 1 T PO D PRF ALLERGY 9/10/18  Yes Provider, Historical  
cholecalciferol, vitamin d3, (VITAMIN D3) 400 unit cap Take  by mouth. Yes Provider, Historical  
SUMAtriptan succinate (ZEMBRACE SYMTOUCH) 3 mg/0.5 mL pnij by SubCUTAneous route. Indications: MIGRAINE   Yes Provider, Historical  
hydrOXYzine HCl (ATARAX) 25 mg tablet TK 1 T PO Q 6 HOURS PRF ITCHING 6/6/17  Yes Provider, Historical  
triamcinolone acetonide (KENALOG) 0.1 % topical cream APPLY TO INSECT BITES BID PRN DO NOT APPLY TO FACE 6/6/17  Yes Provider, Historical  
diphenhydrAMINE (BENADRYL) 25 mg capsule Take by mouth as needed (migraine). Yes Provider, Historical  
 
 
Allergies: Allergies Allergen Reactions  Yeast, Dried Other (comments) Upset stomach Objective:  
 
 
Visit Vitals /83 (BP 1 Location: Left arm, BP Patient Position: Sitting) Pulse 100 Temp 98.3 °F (36.8 °C) (Oral) Resp 18 Ht 5' 5.35\" (1.66 m) Wt 218 lb 9.6 oz (99.2 kg) SpO2 100% BMI 35.98 kg/m² Wt Readings from Last 3 Encounters:  
12/06/19 218 lb 11.1 oz (99.2 kg) 12/06/19 218 lb 9.6 oz (99.2 kg) 08/20/19 219 lb (99.3 kg) (99 %, Z= 2.20)* * Growth percentiles are based on CDC (Girls, 2-20 Years) data. Ht Readings from Last 3 Encounters:  
12/06/19 5' 5.35\" (1.66 m)  
12/06/19 5' 5.35\" (1.66 m)  
08/20/19 5' 4\" (1.626 m) (45 %, Z= -0.12)* * Growth percentiles are based on CDC (Girls, 2-20 Years) data. Height: Facility age limit for growth percentiles is 20 years. Weight: Facility age limit for growth percentiles is 20 years. BMI: Body mass index is 35.98 kg/m². Percentile: [unfilled] Alert, Cooperative HEENT: No thyromegaly, EOM intact, No tonsillar hypertrophy S1 S2 heard: Normal rhythm Bilateral air entry. No rhonchi or crepitation Abdomen is soft, non tender, No organomegaly MSK - Normal ROM Skin - No rashes or birth marks, striae on abdomen, upper arms and inner thighs - improved compared to previous. Few are wide, No acanthosis Improved lower extremity edema - non pitting. Laboratory data: 
See above Radiology -  
4/2017 - Pelvic US - wnl Assessment/ Plan :  
 
 
Tejal Chawla is a 20 y.o. female presenting - Obesity - Significant improvement - Insulin resistance based on OGTT, not clinical.  - On Metformin 750 mg x 2.  
- Irregular menstrual cycles - on continued OCP to prevent migraines - Abnormal lipid profile Plan -  
Diagnosis, etiology, pathophysiology, risk/ benefits of rx, proposed eval, and expected follow up discussed with family and all questions answered Orders Placed This Encounter  GLUCOSE/INSULIN RESP (3 SPEC) Please do Fasting glucose and insulin. Give 75 grams of Glucola and recheck glucose and insulin levels at 1 hour and 2 hour after Glucola. Total of 6 specimens.  LIPID PANEL  
 AMB POC HEMOGLOBIN A1C If improved insulin levels - will cut back on Metformin to OD. Diet and exercise recommendations provided. Labs yearly unless change in clinical symptoms or signs Follow up in 4 month Total time with patient 40 minutes Time spent counseling patient more than 50% If you have questions, please do not hesitate to call me. I look forward to following your patient along with you. Sincerely, Joanna Arrington MD

## 2019-12-06 NOTE — PROGRESS NOTES
YET TO FIND A ADULT PROVIDER AS PMD    Subjective:     Reason for visit: Alfredo Yang is a 20 y.o. female here for follow up of   - Obesity  - Severe insulin resistance based on OGTT, not clinical.  - On Metformin 750 mg BiD  - Irregular menstrual cycles - on continued OCP as menses cause debilitating migraines  - Abnormal lipid profile    She was last seen 4 months ago. She is here today with her mother. History of present illness: As per mother, patients history is significant for     Obesity - Has struggled with obesity for many years. Lost 5 lb in 4 months with Diet and activity changes. Has lost a total of 44 lbs in 1 year since 12/2018  BMI has decreased from 43.4 to 35 kg/m2 in 1 year. Able to do more exercises as no more migraines. Has migraines once in 3 months versus 2-3x/month. Denies polyphagia, + polydipsia - 16 oz x 5 times/day, Nocturia once at night. Denies symptoms of hypothyroidism such as cold tolerance, dry hair, dry skin, constipation. No snoring at night except when really tired. No hip or joint pains  Improved exercise intolerance, No SOB, No chest pain, palpitations    Activity - Decreased since GB surgery. Unable to have smoothies like she used to. Does yoga twice a week, Does chores around the home. Activity - Elliptical 3 days a week, Lifting weight - 3 days a week. Has seen Nutritionist in the past.   Eats healthy always. Occasional treats  B - Belvita with water   L - Banana Strawberry yoghurt, Water  D - Meat and Brocolli  Cut back on carbs    Insulin resistance -  OGTT done in 11/2017 revealed insulin resistance  INSULIN   Result Value Ref Range    Insulin 2 hour  277 (H) <37    Insulin - 1 hour  286 (H) <51    Insulin Fasting  37.4 (H) <9 uIU/mL   Started on Metformin  mg 12/2017. Current dose 750 mg Bid - takes both tablets together at bedtime.     Improved fasting insulin     A1C is 4.9 today (last assessed 3/2019 - 5.1)    History of Hypertension - In the previous visit - due to hypertension and striae noted, ACTH (low)and random cortisol (9.0) were drawn and they were not high, however they are not excellent screening tests to assess hypercortisolism. Patient had 24 hour urinary cortisol done with  (Ph# [phone]) at [facility] due to similar concerns of [place] which was normal - 7 (Normal 0-50). BP is normal today       Abnormal lipid profile -   Last lipids drawn 3/2019 - WNL except for elevated LDL   oN OTC Fish oil. Vitamin D deficiency - History - On Vitamin D 400 international units , Most recent Vitamin D 79 - 3/2019 - wnl     Menstrual history - attained menarche at age 5 years, started on OCPs 12/2016  for severe menstrual migraines. Followed by Gynecology. She has not had a cycle for more than a year as she is continued OCP's to prevent migraine. Has baseline migraines requiring sumatriptan injections 2-3x a week. Used to be on Depo but associated with weight gain   fu apt with Gynecology yearly. Plan is to continue continued OCP for a total of 5 years. Started on higher dose Estrogen Junel Fe 1.5 - 30 mcg Estradiol  As she was spotting- taking continuously. Recommended to discuss if Gyne would like to decrease Estradiol dose to 20 mcg. MIgraines - Improved on Topamax - Now only once a week. Triggers - anxiety, noise , certain aura    Autism and anxiety - Receives counseling. Abnormal gallbladder emptying study with an EF of only 21%. S/p Cholecystectomy. No gall stones. Pts symptoms have resolved.  Rxed for H.pyloriI 8/2019     Birth History - Weighed 6 lbs, Term, NVD    Past Surgical History:   Procedure Laterality Date    COLONOSCOPY N/A 12/20/2016    COLONOSCOPY performed by Gilford Merino, MD at [address] HX CHOLECYSTECTOMY  06/2019    HX HEENT      WISDOM TEETH    HX TONSIL AND ADENOIDECTOMY      HX TONSILLECTOMY      AGE 1    HX WISDOM TEETH EXTRACTION      SD EGD TRANSORAL BIOPSY SINGLE/MULTIPLE  6/14/2019         UPPER GI ENDOSCOPY,BIOPSY  12/20/2016            Family history: No family history of thyroid disease, heart disease, hypertension or high cholesterol or DM. Fathers side unknown. He has Gout. Father is obese. Mother - Endometriosis  MGF, MGM, Mother - Hypertension  MGreat grandparents - Heartdisease at later age     Social History: Will start [employer] next year   Doing well. ROS:  Constitutional: decreased energy - now resolved    ENT: normal hearing, no sorethroat   Eye: normal vision, denied double vision, blurred vision  Respiratory system: no wheezing, no respiratory discomfort  CVS: no palpitations, + lower extremity edema - improving - Followed by Cardiology - Significant improvement noted  GI: normal bowel movements, followed by GI   Allergy: no skin rash or angioedema, significant stria on abdomen and inner thighs and arms, habit of skin picking due to anxiety  Neuorlogical: no focal weakness. No burning  Behavioural: normal behavior, normal mood. Medications - See scanned   Prior to Admission medications    Medication Sig Start Date End Date Taking? Authorizing Provider   norethindrone-ethinyl estradiol-iron (LOESTRIN FE 1.5/30, 28-DAY,) 1.5 mg-30 mcg (21)/75 mg (7) tab Take 1 Tab by mouth daily. Continuous pills only. 8/20/19  Yes Corrine Hodge MD   metFORMIN ER (GLUCOPHAGE XR) 750 mg tablet TAKE 1 TABLET BY MOUTH TWICE A DAY 7/30/19  Yes Mary Jo Walker MD   VYVANSE 40 mg capsule TAKE ONE CAPSULE BY MOUTH EVERY MORNING 5/7/19  Yes Provider, Historical   citalopram (CELEXA) 10 mg tablet TAKE 1/2 TABLET BY MOUTH EVERY DAY FOR 1ST WEEK, THEN INCREASE TO 1 TABLET EVERY NIGHT 5/3/19  Yes Provider, Historical   TROKENDI  mg capsule TAKE 1 CAPSULE BY MOUTH EVERY NIGHT 4/23/19  Yes Provider, Historical   ketorolac (TORADOL) 10 mg tablet Take  by mouth.    Yes Provider, Historical   fluticasone (FLOVENT HFA) 220 mcg/actuation inhaler 2 puffs swallowed twice daily, npo x 30 min after dose 1/10/19  Yes Emiliano Alatorre MD   docusate sodium (STOOL SOFTENER) 250 mg capsule Take 1 Cap by mouth two (2) times daily as needed for Constipation for up to 30 doses. 1/10/19  Yes Emiliano Alatorre MD   ondansetron (ZOFRAN ODT) 8 mg disintegrating tablet Take 1 Tab by mouth every eight (8) hours as needed for Nausea for up to 15 doses. 1/10/19  Yes Emiliano Alatorre MD   cetirizine (ZYRTEC) 10 mg tablet TK 1 T PO D PRF ALLERGY 9/10/18  Yes Provider, Historical   cholecalciferol, vitamin d3, (VITAMIN D3) 400 unit cap Take  by mouth. Yes Provider, Historical   SUMAtriptan succinate (ZEMBRACE SYMTOUCH) 3 mg/0.5 mL pnij by SubCUTAneous route. Indications: MIGRAINE   Yes Provider, Historical   hydrOXYzine HCl (ATARAX) 25 mg tablet TK 1 T PO Q 6 HOURS PRF ITCHING 6/6/17  Yes Provider, Historical   triamcinolone acetonide (KENALOG) 0.1 % topical cream APPLY TO INSECT BITES BID PRN DO NOT APPLY TO FACE 6/6/17  Yes Provider, Historical   diphenhydrAMINE (BENADRYL) 25 mg capsule Take by mouth as needed (migraine). Yes Provider, Historical       Allergies: Allergies   Allergen Reactions    Yeast, Dried Other (comments)     Upset stomach            Objective:       Visit Vitals  /83 (BP 1 Location: Left arm, BP Patient Position: Sitting)   Pulse 100   Temp 98.3 °F (36.8 °C) (Oral)   Resp 18   Ht 5' 5.35\" (1.66 m)   Wt 218 lb 9.6 oz (99.2 kg)   SpO2 100%   BMI 35.98 kg/m²     Wt Readings from Last 3 Encounters:   12/06/19 218 lb 11.1 oz (99.2 kg)   12/06/19 218 lb 9.6 oz (99.2 kg)   08/20/19 219 lb (99.3 kg) (99 %, Z= 2.20)*     * Growth percentiles are based on CDC (Girls, 2-20 Years) data. Ht Readings from Last 3 Encounters:   12/06/19 5' 5.35\" (1.66 m)   12/06/19 5' 5.35\" (1.66 m)   08/20/19 5' 4\" (1.626 m) (45 %, Z= -0.12)*     * Growth percentiles are based on CDC (Girls, 2-20 Years) data.      Height: Facility age limit for growth percentiles is 20 years.  Weight: Facility age limit for growth percentiles is 20 years. BMI: Body mass index is 35.98 kg/m². Percentile: [unfilled]    Alert, Cooperative     HEENT: No thyromegaly, EOM intact, No tonsillar hypertrophy   S1 S2 heard: Normal rhythm  Bilateral air entry. No rhonchi or crepitation    Abdomen is soft, non tender, No organomegaly   MSK - Normal ROM  Skin - No rashes or birth marks, striae on abdomen, upper arms and inner thighs - improved compared to previous. Few are wide, No acanthosis  Improved lower extremity edema - non pitting. Laboratory data:  See above    Radiology -   4/2017 - Pelvic US - wnl        Assessment/ Plan :       Enma Ornelas is a 20 y.o. female presenting     - Obesity - Significant improvement  - Insulin resistance based on OGTT, not clinical.  - On Metformin 750 mg x 2.   - Irregular menstrual cycles - on continued OCP to prevent migraines   - Abnormal lipid profile     Plan -   Diagnosis, etiology, pathophysiology, risk/ benefits of rx, proposed eval, and expected follow up discussed with family and all questions answered      Orders Placed This Encounter    GLUCOSE/INSULIN RESP (3 SPEC)     Please do Fasting glucose and insulin. Give 75 grams of Glucola and recheck glucose and insulin levels at 1 hour and 2 hour after Glucola. Total of 6 specimens.  LIPID PANEL    AMB POC HEMOGLOBIN A1C     If improved insulin levels - will cut back on Metformin to OD. Diet and exercise recommendations provided.   Labs yearly unless change in clinical symptoms or signs  Follow up in 4 month    Total time with patient 40 minutes  Time spent counseling patient more than 50%

## 2020-01-08 LAB
CHOLEST SERPL-MCNC: 208 MG/DL (ref 100–199)
GLUCOSE 1H P 75 G GLC PO SERPL-MCNC: 107 MG/DL (ref 65–199)
GLUCOSE 2H P 75 G GLC PO SERPL-MCNC: 115 MG/DL (ref 65–139)
GLUCOSE P FAST SERPL-MCNC: 80 MG/DL (ref 65–99)
HDLC SERPL-MCNC: 44 MG/DL
INSULIN 1H P 75 G GLC PO SERPL-ACNC: 71.1 UIU/ML (ref 0–163.5)
INSULIN 2H P 75 G GLC PO SERPL-ACNC: 76.7 UIU/ML (ref 0–145.4)
INSULIN SERPL-ACNC: 13.1 UIU/ML (ref 2.6–24.9)
INTERPRETATION, 910389: NORMAL
LDLC SERPL CALC-MCNC: 145 MG/DL (ref 0–99)
TRIGL SERPL-MCNC: 96 MG/DL (ref 0–149)
VLDLC SERPL CALC-MCNC: 19 MG/DL (ref 5–40)

## 2020-03-11 ENCOUNTER — TELEPHONE (OUTPATIENT)
Dept: PEDIATRIC GASTROENTEROLOGY | Age: 20
End: 2020-03-11

## 2020-03-11 DIAGNOSIS — K21.9 GASTROESOPHAGEAL REFLUX DISEASE WITHOUT ESOPHAGITIS: Primary | ICD-10-CM

## 2020-03-11 RX ORDER — PANTOPRAZOLE SODIUM 40 MG/1
40 TABLET, DELAYED RELEASE ORAL DAILY
Qty: 30 TAB | Refills: 11 | Status: SHIPPED | OUTPATIENT
Start: 2020-03-11 | End: 2020-04-10

## 2020-03-11 NOTE — TELEPHONE ENCOUNTER
Jelena Reyna,    I spoke with the family on getting a sucrose tolerance breath test accomplished. She already had a lactose breath test that was normal.    Could you put aside a kit and let the family know to come in for instructions? Pepcid was not effective.   As omeprazole 40 mg daily was only somewhat effective, I will switch her to pantoprazole and the intractable reflux is the reason for the sucrose breath test .  Thanks,  Ruth Beckwith

## 2020-03-12 NOTE — TELEPHONE ENCOUNTER
Mother informed of kit being left at , referred to [facility] video for instructions.

## 2020-03-20 ENCOUNTER — DOCUMENTATION ONLY (OUTPATIENT)
Dept: PEDIATRIC GASTROENTEROLOGY | Age: 20
End: 2020-03-20

## 2020-03-20 NOTE — PROGRESS NOTES
Chaz Chung MD 47 minutes ago (10:48 AM)         Nj,  The sucrose breath test was normal/negative. Could you let [facility] know? Thanks,  Parul Washington with mother, she confirmed her understanding.

## 2020-03-20 NOTE — PROGRESS NOTES
Nj,  The sucrose breath test was normal/negative. Could you let [address] know?   Thanks,  Alexander Frost

## 2020-03-26 DIAGNOSIS — E88.81 INSULIN RESISTANCE: ICD-10-CM

## 2020-03-26 RX ORDER — METFORMIN HYDROCHLORIDE 750 MG/1
TABLET, EXTENDED RELEASE ORAL
Qty: 180 TAB | Refills: 2 | Status: SHIPPED | OUTPATIENT
Start: 2020-03-26 | End: 2021-02-25

## 2020-04-19 NOTE — TELEPHONE ENCOUNTER
Notified mother of results per Dr. Garret Macario and she verbalized her understanding. Patient seen and examined with house-staff during bedside rounds  Resident note read, including vitals, physical findings, laboratory data, and radiological reports.   Revisions included below.  Case discussed with House staff  Direct personal management at bedside  and extensive interpretation of data. Decision making of high complexity.

## 2020-06-08 ENCOUNTER — OFFICE VISIT (OUTPATIENT)
Dept: PEDIATRIC ENDOCRINOLOGY | Age: 20
End: 2020-06-08

## 2020-06-08 VITALS
RESPIRATION RATE: 19 BRPM | BODY MASS INDEX: 38.41 KG/M2 | WEIGHT: 239 LBS | OXYGEN SATURATION: 100 % | SYSTOLIC BLOOD PRESSURE: 160 MMHG | TEMPERATURE: 98.3 F | HEART RATE: 90 BPM | DIASTOLIC BLOOD PRESSURE: 74 MMHG | HEIGHT: 66 IN

## 2020-06-08 DIAGNOSIS — E78.89 LIPIDS ABNORMAL: ICD-10-CM

## 2020-06-08 DIAGNOSIS — E88.81 INSULIN RESISTANCE: ICD-10-CM

## 2020-06-08 DIAGNOSIS — I10 HYPERTENSION, UNSPECIFIED TYPE: Primary | ICD-10-CM

## 2020-06-08 DIAGNOSIS — E66.9 OBESITY (BMI 35.0-39.9 WITHOUT COMORBIDITY): ICD-10-CM

## 2020-06-08 PROBLEM — E66.01 SEVERE OBESITY (HCC): Status: ACTIVE | Noted: 2020-06-08

## 2020-06-08 NOTE — PROGRESS NOTES
Chief Complaint   Patient presents with    Follow-up     insulin resistence    Weight Management     No new concerns this visit.

## 2020-06-08 NOTE — PROGRESS NOTES
YET TO FIND A ADULT PROVIDER AS PMD    Subjective:     Reason for visit: Helena Cuenca is a 20 y.o. female here for follow up of   - Obesity  - Severe insulin resistance based on OGTT, not clinical.  - On Metformin 750 mg OD  - Irregular menstrual cycles - on continued OCP as menses cause debilitating migraines  - High LDL     She was last seen 6 months ago. She is here on her own. Mother was on speaker phone    History of present illness: As per mother, patients history is significant for     Obesity - Has struggled with obesity for many years. Gained 20 lbs in 6 months  Previously had lost a total of 44 lbs in 1 year since 12/2018 with Diet and activity changes  BMI has increased from 36 to 38 kg/m2  Able to do more exercises as no more migraines. Has migraines once in 3 months versus 2-3x/month.     + polyphagia, + polydipsia - 16 oz x 5 times/day, Nocturia once at night. Denies symptoms of hypothyroidism such as cold tolerance, dry hair, dry skin, constipation. No snoring at night except when really tired. No hip or joint pains  Improved exercise intolerance, No SOB, No chest pain, palpitations    Activity - Decreased since GB surgery. Unable to have smoothies like she used to. Does yoga twice a week, Does chores around the home. Activity - Elliptical 3 days a week, Lifting weight - 3 days a week. Has seen Nutritionist in the past.   Eats healthy always. Occasional treats  B - Belvita with water   L - Banana Strawberry yoghurt, Water  D - Meat and Brocolli  Cut back on carbs    Insulin resistance -  OGTT done in 11/2017 revealed insulin resistance  INSULIN   Result Value Ref Range    Insulin 2 hour  277 (H) <37    Insulin - 1 hour  286 (H) <51    Insulin Fasting  37.4 (H) <9 uIU/mL   Started on Metformin  mg 12/2017. Dose decreased to OD 12/2019 as OGTT 1/20 - wnl. taken at bedtime.     Improved fasting insulin     A1C is 4.9 12/2019 - (last assessed 3/2019 - 5.1)    1/7/20 -   Component Value Ref Range    Glucose 80  65 - 99 mg/dL    Glucose, 1 hour 107  65 - 199 mg/dL    Glucose, 2 hour 115  65 - 139 mg/dL    INSULIN, FASTING 13.1  2.6 - 24.9 uIU/mL    INSULIN, 60 MINUTES 71.1  0.0 - 163.5 uIU/mL    INSULIN, 120 MINUTES 76.7  0.0 - 145.4 uIU/mL       Cholesterol, total 208* 100 - 199 mg/dL    Triglyceride 96  0 - 149 mg/dL    HDL Cholesterol 44  >39 mg/dL    VLDL, calculated 19  5 - 40 mg/dL    LDL, calculated 145* 0 - 99 mg/dL    INTERPRETATION Note           Symptoms of low blood sugars intermittently     History of Hypertension - In a previous visit - due to hypertension and striae noted, ACTH (low)and random cortisol (9.0) were drawn and they were not high, however they are not excellent screening tests to assess hypercortisolism. Patient had 24 hour urinary cortisol done with  (Ph# [phone]) at [facility] due to similar concerns of [place] which was normal - 7 (Normal 0-50). Abnormal lipid profile -   Last lipids drawn 1/20 - WNL except for elevated LDL   oN OTC Fish oil. History of Vitamin D deficiency - History - On Vitamin D 400 international units , Most recent Vitamin D 79 - 3/2019 - wnl     Menstrual history - attained menarche at age 5 years, started on OCPs 12/2016  for severe menstrual migraines. Followed by Gynecology. She has not had a cycle for more than a year as she is continued OCP's to prevent migraine. Has baseline migraines requiring sumatriptan injections 2-3x a week. Used to be on Depo but associated with weight gain   fu apt with Gynecology yearly. Plan is to continue continued OCP for a total of 5 years. Started on higher dose Estrogen Junel Fe 1.5 - 30 mcg Estradiol  As she was spotting- taking continuously. Recommended to discuss if Gyne would like to decrease Estradiol dose to 20 mcg. Migraines - Improved on Topamax - Now only once a week. Triggers - anxiety, noise , certain aura    Autism and anxiety - Receives counseling. Abnormal gallbladder emptying study with an EF of only 21%. S/p Cholecystectomy. No gall stones. Pts symptoms have resolved. Rxed for H.pyloriI 8/2019     Birth History - Weighed 6 lbs, Term, NVD    Past Surgical History:   Procedure Laterality Date    COLONOSCOPY N/A 12/20/2016    COLONOSCOPY performed by Ria Dakins, MD at [address] HX CHOLECYSTECTOMY  06/2019    HX HEENT      WISDOM TEETH    HX TONSIL AND ADENOIDECTOMY      HX TONSILLECTOMY      AGE 1    HX WISDOM TEETH EXTRACTION      ME EGD TRANSORAL BIOPSY SINGLE/MULTIPLE  6/14/2019         UPPER GI ENDOSCOPY,BIOPSY  12/20/2016            Family history: No family history of thyroid disease, heart disease, hypertension or high cholesterol or DM. Fathers side unknown. He has Gout. Father is obese. Mother - Endometriosis  MGF, MGM, Mother - Hypertension  MGreat grandparents - Heartdisease at later age     Social History: Will start [employer] next year   Doing well. ROS:  Constitutional: decreased energy - now resolved    ENT: normal hearing, no sorethroat   Eye: normal vision, denied double vision, blurred vision  Respiratory system: no wheezing, no respiratory discomfort  CVS: no palpitations, + lower extremity edema - improving - Followed by Cardiology - Significant improvement noted  GI: normal bowel movements, followed by GI   Allergy: no skin rash or angioedema, significant stria on abdomen and inner thighs and arms, habit of skin picking due to anxiety  Neuorlogical: no focal weakness. No burning  Behavioural: normal behavior, normal mood. Medications - See scanned   Prior to Admission medications    Medication Sig Start Date End Date Taking? Authorizing Provider   metFORMIN ER (GLUCOPHAGE XR) 750 mg tablet TAKE 1 TABLET BY MOUTH TWICE A DAY 3/26/20  Yes Fadumo Valverde MD   norethindrone-ethinyl estradiol-iron (LOESTRIN FE 1.5/30, 28-DAY,) 1.5 mg-30 mcg (21)/75 mg (7) tab Take 1 Tab by mouth daily.  Continuous pills only. 8/20/19  Yes Erin Hernandez MD   VYVANSE 40 mg capsule TAKE ONE CAPSULE BY MOUTH EVERY MORNING 5/7/19  Yes Provider, Historical   citalopram (CELEXA) 10 mg tablet TAKE 1/2 TABLET BY MOUTH EVERY DAY FOR 1ST WEEK, THEN INCREASE TO 1 TABLET EVERY NIGHT 5/3/19  Yes Provider, Historical   TROKENDI  mg capsule TAKE 1 CAPSULE BY MOUTH EVERY NIGHT 4/23/19  Yes Provider, Historical   ketorolac (TORADOL) 10 mg tablet Take  by mouth. Yes Provider, Historical   fluticasone (FLOVENT HFA) 220 mcg/actuation inhaler 2 puffs swallowed twice daily, npo x 30 min after dose 1/10/19  Yes Hilaria Turner MD   docusate sodium (STOOL SOFTENER) 250 mg capsule Take 1 Cap by mouth two (2) times daily as needed for Constipation for up to 30 doses. 1/10/19  Yes Hilaria Turner MD   cetirizine (ZYRTEC) 10 mg tablet TK 1 T PO D PRF ALLERGY 9/10/18  Yes Provider, Historical   cholecalciferol, vitamin d3, (VITAMIN D3) 400 unit cap Take  by mouth. Yes Provider, Historical   SUMAtriptan succinate (ZEMBRACE SYMTOUCH) 3 mg/0.5 mL pnij by SubCUTAneous route. Indications: MIGRAINE   Yes Provider, Historical   hydrOXYzine HCl (ATARAX) 25 mg tablet TK 1 T PO Q 6 HOURS PRF ITCHING 6/6/17  Yes Provider, Historical   triamcinolone acetonide (KENALOG) 0.1 % topical cream APPLY TO INSECT BITES BID PRN DO NOT APPLY TO FACE 6/6/17  Yes Provider, Historical   diphenhydrAMINE (BENADRYL) 25 mg capsule Take by mouth as needed (migraine). Yes Provider, Historical       Allergies:   Allergies   Allergen Reactions    Yeast, Dried Other (comments)     Upset stomach            Objective:       Visit Vitals  /74 (BP 1 Location: Left arm, BP Patient Position: Sitting)   Pulse 90   Temp 98.3 °F (36.8 °C) (Oral)   Resp 19   Ht 5' 5.95\" (1.675 m)   Wt 239 lb (108.4 kg)   SpO2 100%   BMI 38.64 kg/m²     Wt Readings from Last 3 Encounters:   06/08/20 239 lb (108.4 kg)   12/06/19 218 lb 11.1 oz (99.2 kg)   12/06/19 218 lb 9.6 oz (99.2 kg) Ht Readings from Last 3 Encounters:   06/08/20 5' 5.95\" (1.675 m)   12/06/19 5' 5.35\" (1.66 m)   12/06/19 5' 5.35\" (1.66 m)     Height: Facility age limit for growth percentiles is 20 years. Weight: Facility age limit for growth percentiles is 20 years. BMI: Body mass index is 38.64 kg/m². Percentile: [unfilled]    Alert, Cooperative     HEENT: No thyromegaly, EOM intact, No tonsillar hypertrophy   S1 S2 heard: Normal rhythm  Bilateral air entry. No rhonchi or crepitation    Abdomen is soft, non tender, No organomegaly   MSK - Normal ROM  Skin - No rashes or birth marks, striae on abdomen, upper arms and inner thighs - improved compared to previous. Few are wide, No acanthosis  Improved lower extremity edema - non pitting. Laboratory data:  See above    Radiology -   4/2017 - Pelvic US - wnl        Assessment/ Plan :       Nga Luu is a 20 y.o. female presenting     - Obesity   - Insulin resistance - On Metformin . Increased polyphagia  - Symptoms of low blood sugars intermittently   - Irregular menstrual cycles - on continued OCP to prevent migraines   - High LDL   - Hypertension      Plan -   Diagnosis, etiology, pathophysiology, risk/ benefits of rx, proposed eval, and expected follow up discussed with family and all questions answered    No orders of the defined types were placed in this encounter. Will check blood sugars at time of symptoms of hypoglycemia. Advised to call office with numbers in 1 week   Glucometer sample provided today   Advised to check blood pressures at home  Increase Metformin back to Bid  Diet and exercise recommendations provided.   Labs yearly unless change in clinical symptoms or signs - Next due 1/2021  Follow up in 4 month    Total time with patient 40 minutes  Time spent counseling patient more than 50%

## 2020-06-09 DIAGNOSIS — E88.81 INSULIN RESISTANCE: Primary | ICD-10-CM

## 2020-06-09 RX ORDER — BLOOD-GLUCOSE METER
EACH MISCELLANEOUS
Qty: 1 EACH | Refills: 0 | Status: SHIPPED | COMMUNITY
Start: 2020-06-09 | End: 2021-10-31

## 2020-06-17 ENCOUNTER — TELEPHONE (OUTPATIENT)
Dept: PEDIATRIC ENDOCRINOLOGY | Age: 20
End: 2020-06-17

## 2020-06-17 NOTE — TELEPHONE ENCOUNTER
MD Dino Danielson LPN   Caller: Unspecified (Today, 10:39 AM)             I just called mom - she does not know which Metformin is available in the pharmacy. Can you find out which ones available in the XR /ER version? [facility] reported the manufacture who made the Metfromin that was dispensed to [facility] [place] was NOT recalled. Erica aware of being able to continue current medication. Forwarding to Christine Cervantes for her knowledged.

## 2020-06-17 NOTE — TELEPHONE ENCOUNTER
----- Message from Greg Aguilar sent at 6/17/2020 10:14 AM EDT -----  Regarding: DR Teresita Ngo: [phone]  Patient received a letter from the pharmacy saying her metFORMIN has a recall and needed to contact her doctor, when calling back if mom answers the office can talk to her. Please advise.

## 2020-06-29 ENCOUNTER — HOSPITAL ENCOUNTER (OUTPATIENT)
Dept: MRI IMAGING | Age: 20
Discharge: HOME | End: 2020-06-29
Payer: MEDICARE

## 2020-06-29 DIAGNOSIS — G43.909 MIGRAINE WITHOUT STATUS MIGRAINOSUS, NOT INTRACTABLE, UNSPECIFIED MIGRAINE TYPE: ICD-10-CM

## 2020-06-29 PROCEDURE — 74011250636 HC RX REV CODE- 250/636

## 2020-06-29 PROCEDURE — 70553 MRI BRAIN STEM W/O & W/DYE: CPT

## 2020-06-29 PROCEDURE — A9575 INJ GADOTERATE MEGLUMI 0.1ML: HCPCS

## 2020-06-29 RX ORDER — GADOTERATE MEGLUMINE 376.9 MG/ML
20 INJECTION INTRAVENOUS
Status: COMPLETED | OUTPATIENT
Start: 2020-06-29 | End: 2020-06-29

## 2020-06-29 RX ADMIN — GADOTERATE MEGLUMINE 20 ML: 376.9 INJECTION INTRAVENOUS at 12:09

## 2020-07-29 DIAGNOSIS — E88.81 INSULIN RESISTANCE: ICD-10-CM

## 2020-07-29 RX ORDER — BLOOD SUGAR DIAGNOSTIC
STRIP MISCELLANEOUS
Qty: 60 STRIP | Refills: 4 | Status: SHIPPED | OUTPATIENT
Start: 2020-07-29 | End: 2021-03-31

## 2020-07-29 RX ORDER — LANCETS 33 GAUGE
EACH MISCELLANEOUS
Qty: 60 LANCET | Refills: 4 | Status: SHIPPED | OUTPATIENT
Start: 2020-07-29 | End: 2021-10-31

## 2020-08-17 ENCOUNTER — OFFICE VISIT (OUTPATIENT)
Dept: PEDIATRIC ENDOCRINOLOGY | Age: 20
End: 2020-08-17
Payer: MEDICARE

## 2020-08-17 VITALS
BODY MASS INDEX: 38.73 KG/M2 | RESPIRATION RATE: 19 BRPM | OXYGEN SATURATION: 99 % | WEIGHT: 241 LBS | TEMPERATURE: 95.2 F | DIASTOLIC BLOOD PRESSURE: 93 MMHG | HEART RATE: 117 BPM | SYSTOLIC BLOOD PRESSURE: 145 MMHG | HEIGHT: 66 IN

## 2020-08-17 DIAGNOSIS — E66.01 SEVERE OBESITY (HCC): ICD-10-CM

## 2020-08-17 DIAGNOSIS — I15.9 SECONDARY HYPERTENSION: Primary | ICD-10-CM

## 2020-08-17 DIAGNOSIS — E88.81 INSULIN RESISTANCE: ICD-10-CM

## 2020-08-17 DIAGNOSIS — E66.9 OBESITY (BMI 35.0-39.9 WITHOUT COMORBIDITY): ICD-10-CM

## 2020-08-17 PROCEDURE — G8755 DIAS BP > OR = 90: HCPCS | Performed by: PEDIATRICS

## 2020-08-17 PROCEDURE — G9717 DOC PT DX DEP/BP F/U NT REQ: HCPCS | Performed by: PEDIATRICS

## 2020-08-17 PROCEDURE — G8753 SYS BP > OR = 140: HCPCS | Performed by: PEDIATRICS

## 2020-08-17 PROCEDURE — G8417 CALC BMI ABV UP PARAM F/U: HCPCS | Performed by: PEDIATRICS

## 2020-08-17 PROCEDURE — G8427 DOCREV CUR MEDS BY ELIG CLIN: HCPCS | Performed by: PEDIATRICS

## 2020-08-17 PROCEDURE — 99215 OFFICE O/P EST HI 40 MIN: CPT | Performed by: PEDIATRICS

## 2020-08-17 NOTE — PROGRESS NOTES
YET TO FIND A ADULT PROVIDER AS PMD    Subjective:     Reason for visit: Dalila Moran is a 20 y.o. female here for follow up of   - Obesity  - Severe insulin resistance based on OGTT, not clinical.  - On Metformin 750 mg twice daily-increased 2 months ago  -Concerns of hypoglycemia  - Irregular menstrual cycles - on continued OCP as menses cause debilitating migraines  - High LDL     She was last seen 2 months ago. She is here with her mother today    History of present illness:     Obesity - Has struggled with obesity for many years. Gained 2 lbs in 2 months - previously gained 20 lbs in 6 months. Metformin dose was increased to twice daily 2 months ago    Previously had lost a total of 44 lbs in 1 year since 12/2018 with Diet and activity changes  Exercises used to make migraines worse. Improved exercise intolerance, No SOB, No chest pain, palpitations    + polyphagia, + polydipsia, Nocturia once at night. Denies symptoms of hypothyroidism such as cold tolerance, dry hair, dry skin, constipation. No snoring at night except when really tired. No hip or joint pains    Activity - . Does yoga daily, Does chores around the home. Not going to the gym as she is concerned about COVID transmission. She was doing the elliptical 3 days a week and lifting weights 3 days a weekk. Has seen Nutritionist in the past.   Eats healthy always. Occasional treats  B - Belvita with water   L - Banana Strawberry yoghurt, Water  D - Meat and Brocolli    They have been doing a carb free diet for the last 6 weeks    Insulin resistance -  OGTT done in 11/2017 revealed insulin resistance  INSULIN   Result Value Ref Range    Insulin 2 hour  277 (H) <37    Insulin - 1 hour  286 (H) <51    Insulin Fasting  37.4 (H) <9 uIU/mL   Started on Metformin  mg 12/2017. Dose decreased to OD 12/2019 as OGTT 1/20 - wnl. taken at bedtime.     Improved fasting insulin     A1C is 4.9 12/2019 - (last assessed 3/2019 - 5.1)    1/7/20 -   Component Value Ref Range    Glucose 80  65 - 99 mg/dL    Glucose, 1 hour 107  65 - 199 mg/dL    Glucose, 2 hour 115  65 - 139 mg/dL    INSULIN, FASTING 13.1  2.6 - 24.9 uIU/mL    INSULIN, 60 MINUTES 71.1  0.0 - 163.5 uIU/mL    INSULIN, 120 MINUTES 76.7  0.0 - 145.4 uIU/mL         Increased to Metformin 750 mg Bid 6/2020  Symptoms of low blood sugars intermittently -blood sugars have been assessed by patient when patient reports shakiness. Patient reports that these episodes usually occur 1 hour after dinner. Blood sugars are usually between 77-85. Patient feels shaky when the blood sugars are between 70 and 75. Patient does eat a lot of protein in her diet. Is not a lot of activity prior to dinnertime. Reviewed blood sugars in the office today. Patient had one episode of shakiness in July and 2 episodes in August, both occurred after dinner. Patient felt better after drinking Sprite. History of Hypertension - In a previous visit - due to hypertension and striae noted, ACTH (low)and random cortisol (9.0) were drawn and they were not high, however they are not excellent screening tests to assess hypercortisolism. Patient had 24 hour urinary cortisol done with  (Ph# [phone]) at [facility] due to similar concerns of [place] which was normal - 7 (Normal 0-50). Abnormal lipid profile -   Last lipids drawn 1/20 - WNL except for elevated LDL - 145   On OTC Fish oil. History of Vitamin D deficiency - History - On Vitamin D 400 international units , Most recent Vitamin D 79 - 3/2019 - wnl     Menstrual history - attained menarche at age 5 years, started on OCPs 12/2016  for severe menstrual migraines. Followed by Gynecology. She has not had a cycle for more than a year as she is continued OCP's to prevent migraine. Has baseline migraines requiring sumatriptan injections 2-3x a week. Used to be on Depo but associated with weight gain   fu apt with Gynecology yearly.  Plan is to continue continued OCP for a total of 5 years. Started on higher dose Estrogen Junel Fe 1.5 - 30 mcg Estradiol  As she was spotting- taking continuously. Recommended to discuss if Gyne would like to decrease Estradiol dose to 20 mcg. Migraines - Improved on Topamax - Now only once a week. Triggers - anxiety, noise , certain aura    Autism and anxiety - Receives counseling. Abnormal gallbladder emptying study with an EF of only 21%. S/p Cholecystectomy. No gall stones. Pts symptoms have resolved. Rxed for H.pyloriI 8/2019     Birth History - Weighed 6 lbs, Term, NVD    Past Surgical History:   Procedure Laterality Date    COLONOSCOPY N/A 12/20/2016    COLONOSCOPY performed by Melinda Zazueta MD at [address] HX CHOLECYSTECTOMY  06/2019    HX HEENT      WISDOM TEETH    HX TONSIL AND ADENOIDECTOMY      HX TONSILLECTOMY      AGE 1    HX WISDOM TEETH EXTRACTION      LA EGD TRANSORAL BIOPSY SINGLE/MULTIPLE  6/14/2019         UPPER GI ENDOSCOPY,BIOPSY  12/20/2016            Family history: No family history of thyroid disease, heart disease, hypertension or high cholesterol or DM. Fathers side unknown. He has Gout. Father is obese. Mother - Endometriosis  MGF, MGM, Mother - Hypertension  MGreat grandparents - Heartdisease at later age     Social History:  Kiana Barakat   Doing well.      ROS:  Constitutional: decreased energy - now resolved    ENT: normal hearing, no sorethroat   Eye: normal vision, denied double vision, blurred vision, she has been having a lot of ocular migraines and is followed by headache specialist  Respiratory system: no wheezing, no respiratory discomfort  CVS: no palpitations, + lower extremity edema - improving - Followed by Cardiology - Significant improvement noted  GI: normal bowel movements, followed by GI   Allergy: no skin rash or angioedema, significant stria on abdomen and inner thighs and arms, habit of skin picking due to anxiety  Neuorlogical: no focal weakness. No burning  Behavioural: normal behavior, normal mood. She had a recent spider bite that required double antibiotics    Medications - See scanned   Prior to Admission medications    Medication Sig Start Date End Date Taking? Authorizing Provider   glucose blood VI test strips (OneTouch Verio test strips) strip Use to test blood sugar up to 2 times daily. 7/29/20  Yes Chong Fish MD   lancets (One Touch Delica) 33 gauge misc Use to test blood sugar 2 times daily. 7/29/20  Yes Chong Fish MD   Blood-Glucose Meter (OneTouch Verio Flex meter) misc Use as directed 6/9/20  Yes Chong Fish MD   metFORMIN ER (GLUCOPHAGE XR) 750 mg tablet TAKE 1 TABLET BY MOUTH TWICE A DAY 3/26/20  Yes Chong Fish MD   norethindrone-ethinyl estradiol-iron (LOESTRIN FE 1.5/30, 28-DAY,) 1.5 mg-30 mcg (21)/75 mg (7) tab Take 1 Tab by mouth daily. Continuous pills only. 8/20/19  Yes Chelsie Walls MD   VYVANSE 40 mg capsule TAKE ONE CAPSULE BY MOUTH EVERY MORNING 5/7/19  Yes Provider, Historical   citalopram (CELEXA) 10 mg tablet TAKE 1/2 TABLET BY MOUTH EVERY DAY FOR 1ST WEEK, THEN INCREASE TO 1 TABLET EVERY NIGHT 5/3/19  Yes Provider, Historical   TROKENDI  mg capsule TAKE 1 CAPSULE BY MOUTH EVERY NIGHT 4/23/19  Yes Provider, Historical   ketorolac (TORADOL) 10 mg tablet Take  by mouth. Yes Provider, Historical   fluticasone (FLOVENT HFA) 220 mcg/actuation inhaler 2 puffs swallowed twice daily, npo x 30 min after dose 1/10/19  Yes Britt Marquez MD   docusate sodium (STOOL SOFTENER) 250 mg capsule Take 1 Cap by mouth two (2) times daily as needed for Constipation for up to 30 doses. 1/10/19  Yes Britt Marquez MD   cetirizine (ZYRTEC) 10 mg tablet TK 1 T PO D PRF ALLERGY 9/10/18  Yes Provider, Historical   cholecalciferol, vitamin d3, (VITAMIN D3) 400 unit cap Take  by mouth.    Yes Provider, Historical   SUMAtriptan succinate (ZEMBRACE SYMTOUCH) 3 mg/0.5 mL pnij by SubCUTAneous route. Indications: MIGRAINE   Yes Provider, Historical   hydrOXYzine HCl (ATARAX) 25 mg tablet TK 1 T PO Q 6 HOURS PRF ITCHING 6/6/17  Yes Provider, Historical   triamcinolone acetonide (KENALOG) 0.1 % topical cream APPLY TO INSECT BITES BID PRN DO NOT APPLY TO FACE 6/6/17  Yes Provider, Historical   diphenhydrAMINE (BENADRYL) 25 mg capsule Take by mouth as needed (migraine). Yes Provider, Historical     Allergies: Allergies   Allergen Reactions    Yeast, Dried Other (comments)     Upset stomach      Objective:       Visit Vitals  BP (!) 145/93 (BP 1 Location: Left arm, BP Patient Position: Sitting)   Pulse (!) 117   Temp (!) 95.2 °F (35.1 °C) (Temporal)   Resp 19   Ht 5' 6.14\" (1.68 m)   Wt 241 lb (109.3 kg)   SpO2 99%   BMI 38.73 kg/m²   Blood pressure was started repeated twice-patient was very anxious. Blood pressure was assessed 2 weeks ago-as per mother it was within normal limits. Wt Readings from Last 3 Encounters:   08/17/20 241 lb (109.3 kg)   06/08/20 239 lb (108.4 kg)   12/06/19 218 lb 11.1 oz (99.2 kg)     Ht Readings from Last 3 Encounters:   08/17/20 5' 6.14\" (1.68 m)   06/08/20 5' 5.95\" (1.675 m)   12/06/19 5' 5.35\" (1.66 m)     Height: Facility age limit for growth percentiles is 20 years. Weight: Facility age limit for growth percentiles is 20 years. BMI: Body mass index is 38.73 kg/m². Percentil    Alert, Cooperative     HEENT: No thyromegaly, EOM intact, No tonsillar hypertrophy   S1 S2 heard: Normal rhythm  Bilateral air entry. No rhonchi or crepitation    Abdomen is soft, non tender, No organomegaly   MSK - Normal ROM  Skin - No rashes or birth marks, striae on abdomen, upper arms and inner thighs - improved compared to previous. Few are wide, No acanthosis  Improved lower extremity edema - non pitting.     Laboratory data:  See above    Radiology -   4/2017 - Pelvic US - wnl        Assessment/ Plan :       Bakari Vu is a 20 y.o. female presenting     - Obesity -patient reports frustration in not being able to lose weight despite doing keto diet  - Insulin resistance - On Metformin . Increased polyphagia  - Symptoms of low blood sugars intermittently   - Irregular menstrual cycles - on continued OCP to prevent migraines   - High LDL   - Hypertension      Plan -   Diagnosis, etiology, pathophysiology, risk/ benefits of rx, proposed eval, and expected follow up discussed with family and all questions answered    -The family has a blood pressure machine at home, advised to check blood pressure at home. If blood pressure is high advised to call us back, may need referral to cardiology.    -Continue morning with monitoring blood sugars when symptomatic. Will check blood sugars at time of symptoms of hypoglycemia. Advised to increase protein in the diet    Fasting blood sugars before next visit  Orders Placed This Encounter    INSULIN    LIPID PANEL    VITAMIN D, 25 HYDROXY    TSH 3RD GENERATION    METABOLIC PANEL, COMPREHENSIVE     Continue Metformin 750 mg bid  Diet and exercise recommendations provided.   Follow up in 4 month    Total time with patient 40 minutes  Time spent counseling patient more than 50%

## 2020-10-30 ENCOUNTER — OFFICE VISIT (OUTPATIENT)
Dept: PEDIATRIC GASTROENTEROLOGY | Age: 20
End: 2020-10-30
Payer: MEDICARE

## 2020-10-30 VITALS
HEIGHT: 66 IN | BODY MASS INDEX: 40.66 KG/M2 | OXYGEN SATURATION: 98 % | DIASTOLIC BLOOD PRESSURE: 109 MMHG | TEMPERATURE: 97.2 F | WEIGHT: 253 LBS | RESPIRATION RATE: 28 BRPM | SYSTOLIC BLOOD PRESSURE: 155 MMHG | HEART RATE: 103 BPM

## 2020-10-30 DIAGNOSIS — K58.0 IRRITABLE BOWEL SYNDROME WITH DIARRHEA: ICD-10-CM

## 2020-10-30 DIAGNOSIS — E73.9 LACTOSE INTOLERANCE: ICD-10-CM

## 2020-10-30 DIAGNOSIS — Z91.09 ALLERGY TO YEAST: ICD-10-CM

## 2020-10-30 DIAGNOSIS — K31.84 GASTROPARESIS: ICD-10-CM

## 2020-10-30 DIAGNOSIS — K21.9 GASTROESOPHAGEAL REFLUX DISEASE WITHOUT ESOPHAGITIS: Primary | ICD-10-CM

## 2020-10-30 DIAGNOSIS — E88.81 INSULIN RESISTANCE: ICD-10-CM

## 2020-10-30 DIAGNOSIS — Z91.018 ALLERGY TO CHOCOLATE: ICD-10-CM

## 2020-10-30 DIAGNOSIS — R11.0 CHRONIC NAUSEA: ICD-10-CM

## 2020-10-30 PROCEDURE — G8755 DIAS BP > OR = 90: HCPCS | Performed by: PEDIATRICS

## 2020-10-30 PROCEDURE — G8753 SYS BP > OR = 140: HCPCS | Performed by: PEDIATRICS

## 2020-10-30 PROCEDURE — G8427 DOCREV CUR MEDS BY ELIG CLIN: HCPCS | Performed by: PEDIATRICS

## 2020-10-30 PROCEDURE — G9717 DOC PT DX DEP/BP F/U NT REQ: HCPCS | Performed by: PEDIATRICS

## 2020-10-30 PROCEDURE — G8417 CALC BMI ABV UP PARAM F/U: HCPCS | Performed by: PEDIATRICS

## 2020-10-30 PROCEDURE — 99214 OFFICE O/P EST MOD 30 MIN: CPT | Performed by: PEDIATRICS

## 2020-10-30 RX ORDER — ONDANSETRON 8 MG/1
TABLET, ORALLY DISINTEGRATING ORAL
COMMUNITY
Start: 2020-08-30

## 2020-10-30 RX ORDER — PANTOPRAZOLE SODIUM 40 MG/1
40 TABLET, DELAYED RELEASE ORAL 2 TIMES DAILY
Qty: 60 TAB | Refills: 11 | Status: SHIPPED | OUTPATIENT
Start: 2020-10-30 | End: 2021-10-30

## 2020-10-30 RX ORDER — MIDAZOLAM HCL 2 MG/ML
20 SYRUP ORAL
Qty: 10 ML | Refills: 0 | Status: SHIPPED | OUTPATIENT
Start: 2020-10-30 | End: 2021-04-02

## 2020-10-30 RX ORDER — ERENUMAB-AOOE 140 MG/ML
INJECTION, SOLUTION SUBCUTANEOUS
COMMUNITY
Start: 2020-09-29 | End: 2022-06-02 | Stop reason: SDUPTHER

## 2020-10-30 RX ORDER — PANTOPRAZOLE SODIUM 40 MG/1
TABLET, DELAYED RELEASE ORAL
COMMUNITY
Start: 2020-09-23 | End: 2020-10-30 | Stop reason: SDUPTHER

## 2020-10-30 NOTE — Clinical Note
Laura Velasquez,    Could you please add Bravo clip reflux study to the upper endoscopy booking? Isidra Bishop left in a hurry, so couldn't schedule the EGD. Could you reach out and schedule the EGD with Bravo clip? I wanted to better characterize the reflux severity to help Erica. If she wishes to discuss further with me, that is fine. I would also like to place the allergy referral for repeat food and environmental allergy testing. I would like her to see Dr. Hi Salazar of [address] [place] [address] [facility]. He is quite adept at understanding the chronic GI consequences of allergy.       Thanks,  Chana Azevedo

## 2020-10-30 NOTE — LETTER
11/1/2020 10:04 AM 
 
Ms. Jelly Interiano [address]andy[address] [address] Dear Soy Myles DO, 
 
I had the opportunity to see your patient, Jelly Interiano, 2000, in the [facility] clinic. Please find my impression and suggestions attached. Feel free to call our office with any questions, [phone]. Sincerely, Sandi Treadwell MD

## 2020-10-30 NOTE — PATIENT INSTRUCTIONS
1.  Start pantoprazole 40 mg 2 times daily, prescribed to your pharmacy    2. Schedule Upper Endoscopy with biopsy and disaccharidase levels, add Bravo reflux study to this booking  3. Pre-procedure anxiolysis with oral liquid Versed, prescribed to your pharmacy. Please take during car ride to hospital or prior to car ride to hospital  4. Repeat allergy evaluation for food sensitivity/allergy testing with Dr. Hi Salazar. Referral order placed  5.   Return to clinic in 3 months

## 2020-11-01 ENCOUNTER — DOCUMENTATION ONLY (OUTPATIENT)
Dept: PEDIATRIC GASTROENTEROLOGY | Age: 20
End: 2020-11-01

## 2020-11-01 NOTE — PROGRESS NOTES
Date: 11/1/2020    Dear Lizy May, DO:    Dagoberto Gonzales is 23 y.o. young lady with chronic GERD only partially treated with high dose PPI therapy. It is disappointing that the cholecystectomy for biliary dyskinesia was not completely palliative. Dagoberto Gonzales wishes to follow up on the chronic gastritis seen on upper endoscopy, and I support this course. Any remaining gastropathy and reflux activity could be related to ingested food or aeroallergen, and it would be wise to repeat the food and environmental allergy testing. She has allergic rhinitis and eczema due to environmental allergies. I seen in Dr. 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 The persistent uncharacterized erythematous rash may represent an environmental allergen that could be the key to alleviating her chronic gastrointestinal syndrome. I will also send duodenal biopsies for disaccharidase levels. Plan:   1. Start pantoprazole 40 mg 2 times daily, prescribed to your pharmacy    2. Schedule Upper Endoscopy with biopsy and disaccharidase levels  3. Pre-procedure anxiolysis with oral liquid Versed, prescribed to your pharmacy. Please take during car ride to hospital or prior to car ride to hospital  4. Consider repeat allergy evaluation for food sensitivity/allergy testing   5. Return to clinic in 3 months      Addendum: I would like to obtain Bravo clip reflux study with the upcoming endoscopy. We will add this to the schedule booking. Would also refer Dagoberto Gonzales to Dr. Violetta Thompson of [facility] for repeat food and environmental allergy evaluation. HPI: Dagoberto Gonzales presents today for management of chronic GERD and abdominal pain. Control of Erica's reflux and abdominal pain on various PPI medication regimens had been transient despite excellent adherence.   The last upper endoscopy revealed mild chronic gastritis, with no convincing response to empiric H. Pylori therapy. Laparoscopic cholecystectomy did relieve some of the postprandial distress, however overall Olga Reed continues with intractable GERD, nausea, feeding intolerance, and left upper quadrant abdominal pain. This seems consistent with the GERD activity and mild chronic gastritis seen on endoscopy, despite interval acid suppression therapy. Olga Reed asks if a follow up evaluation of the gastritis is indicated. She is not able to tolerate exercise and appropriate eating strategies to facilitate weight loss due to her feeding intolerance from GERD and gastritis. In particular, she cannot tolerate smoothies, which she has tried to use to as a meal substitute. Olga Reed is practicing yoga at home, however her mother doesn't want Olga Reed to start going back to the gym to lift weights due to [place]. Medications:   Current Outpatient Medications   Medication Sig    ondansetron (ZOFRAN ODT) 8 mg disintegrating tablet PLACE 1 TABLET ON TONGUE & ALLOW TO DISSOLVE EVERY 8 HOURS AS NEEDED FOR NAUSEA WITH HEADACHE    Aimovig Autoinjector 140 mg/mL injection INJECT 1 SYRINGE VIA SUBCUTANEOUS ROUTE ONCE A MONTH, AS DIRECTED    pantoprazole (PROTONIX) 40 mg tablet Take 1 Tab by mouth two (2) times a day.  midazolam (VERSED) 2 mg/mL syrup Take 10 mL by mouth daily as needed (pre-procedure anxiolysis) for up to 1 dose.  glucose blood VI test strips (OneTouch Verio test strips) strip Use to test blood sugar up to 2 times daily.  lancets (One Touch Delica) 33 gauge misc Use to test blood sugar 2 times daily.  Blood-Glucose Meter (OneTouch Verio Flex meter) misc Use as directed    metFORMIN ER (GLUCOPHAGE XR) 750 mg tablet TAKE 1 TABLET BY MOUTH TWICE A DAY    norethindrone-ethinyl estradiol-iron (LOESTRIN FE 1.5/30, 28-DAY,) 1.5 mg-30 mcg (21)/75 mg (7) tab Take 1 Tab by mouth daily. Continuous pills only.     fluticasone (FLOVENT HFA) 220 mcg/actuation inhaler 2 puffs swallowed twice daily, npo x 30 min after dose    docusate sodium (STOOL SOFTENER) 250 mg capsule Take 1 Cap by mouth two (2) times daily as needed for Constipation for up to 30 doses.  cholecalciferol, vitamin d3, (VITAMIN D3) 400 unit cap Take  by mouth.  SUMAtriptan succinate (ZEMBRACE SYMTOUCH) 3 mg/0.5 mL pnij by SubCUTAneous route. Indications: MIGRAINE    diphenhydrAMINE (BENADRYL) 25 mg capsule Take by mouth as needed (migraine).  cetirizine (ZYRTEC) 10 mg tablet TK 1 T PO D PRF ALLERGY     No current facility-administered medications for this visit. Allergies: Allergies   Allergen Reactions    Yeast, Dried Other (comments)     Upset stomach        ROS: A 12 point review of systems was obtained and was as per HPI, otherwise negative. 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    Obesity (BMI 35.0-39.9 without comorbidity) E66.9    Chronic nausea R11.0    Chronic cholecystitis K81.1    Biliary dyskinesia K82.8    Chronic gastritis without bleeding K29.50       PMHx:   Past Medical History:   Diagnosis Date    Abdominal migraine     ADHD (attention deficit hyperactivity disorder)     Autism     Developmental delay     GERD (gastroesophageal reflux disease)     Headache     Irritable bowel syndrome with diarrhea 4/13/2017    Migraine     Obesity, morbid ([facility] [address] 12/12/2017    Psychiatric disorder     anxiety, ADHD, OCD, HIGH FUNTIONING AUTISM    Rosacea and pustular rosacea. Lactose tolerance test in clinic in 2018 was normal.  Sucrose breath test in 2020 was normal.      Family History:   Family History   Problem Relation Age of Onset    Endometriosis Mother     Headache Mother         d/t accident - neck and brain injury    Delayed Awakening Mother     Post-op Nausea/Vomiting Mother     Headache Maternal Grandfather     No Known Problems Father     No Known Problems Maternal Grandmother         Social History:   Social History     Tobacco Use    Smoking status: Never Smoker    Smokeless tobacco: Never Used    Tobacco comment: no smoke exposure in the home   Substance Use Topics    Alcohol use: No    Drug use: No    Presents today by herself. Does yoga at home    OBJECTIVE:  Vitals:  height is 5' 6.14\" (1.68 m) and weight is 253 lb (114.8 kg). Her oral temperature is 97.2 °F (36.2 °C). Her blood pressure is 155/109 (abnormal) and her pulse is 103 (abnormal). Her respiration is 28 and oxygen saturation is 98%. Last 3 Recorded Weights in this Encounter    10/30/20 1119   Weight: 253 lb (114.8 kg)       PHYSICAL EXAM:    General: healthy, alert, well developed, well nourished and erythematous rash over upper chest at prior visit is noted today over the upper arms  ENT: anicteric sclera, moist oral mucosa, no oral lesions  Abdomen: soft, mildly tender in the LUQ, mild gaseous distention  Perianal/Rectal exam: deferred      Cardiovascular: RRR, well-perfused, no murmur  Skin:  erythema over the cheeks and upper arms    Neuro: alert, reactive, normal muscle tone  Psych: appropriate affect and interactions  Pulmonary:  Clear Breath Sounds Bilaterally, No Increased Effort   Musc/Skel: no swelling or tenderness    Studies: Chronic gastritis discovered on recent upper endoscopy with nodular visual appearance              Thank you for referring Mary Pandya to our clinic, we appreciate participating in their care.         All patient and caregiver questions and concerns were addressed during the visit. Major risks, benefits, and side-effects of therapy were discussed.

## 2020-11-01 NOTE — PROGRESS NOTES
Solitario Hill,    Could you please add Bravo clip reflux study to the upper endoscopy booking? Kayelen Rivero left in a hurry, so couldn't schedule the EGD. Could you reach out and schedule the EGD with Bravo clip? I wanted to better characterize the reflux severity to help Erica. If she wishes to discuss further with me, that is fine. I would also like to place the allergy referral for repeat food and environmental allergy testing. I would like her to see Dr. Amanuel García of [facility]. He is quite adept at understanding the chronic GI consequences of allergy.       Thanks,  Samantha Dick

## 2020-11-02 NOTE — PROGRESS NOTES
Scheduled 11/30/20, reviewed prep and COVID testing requirements, mailed COVID testing form to home address.

## 2020-11-06 ENCOUNTER — TELEPHONE (OUTPATIENT)
Dept: PEDIATRIC GASTROENTEROLOGY | Age: 20
End: 2020-11-06

## 2020-11-06 ENCOUNTER — TELEPHONE (OUTPATIENT)
Dept: OBGYN CLINIC | Age: 20
End: 2020-11-06

## 2020-11-06 RX ORDER — NORETHINDRONE ACETATE AND ETHINYL ESTRADIOL 1.5-30(21)
1 KIT ORAL DAILY
Qty: 1 PACKAGE | Refills: 1 | Status: SHIPPED | OUTPATIENT
Start: 2020-11-06 | End: 2020-11-27

## 2020-11-06 NOTE — TELEPHONE ENCOUNTER
Patient of DM    Calling to say that she is low on her OCP and has no refills.     Last AE 8/20/19    Set appt for 12/14/20 1 pm    Loestrin FE  Takes continuous      [address]

## 2020-11-06 NOTE — TELEPHONE ENCOUNTER
[address] [place] [address] said the versed is not covered by her insurance. Told her they could give it to her prior to the procedure her at the hospital if it was necessary.

## 2020-11-06 NOTE — TELEPHONE ENCOUNTER
rx has been sent to get her through until next AE-  Advised patient no additional refills until AE done.

## 2020-11-06 NOTE — TELEPHONE ENCOUNTER
----- Message from Tulio Trinidad sent at 11/6/2020  9:51 AM EST -----  Regarding: Dr Deepti Pandey: [phone]  Patient is calling to check on the PA because the pharmacy told her about it and mom is not sure if the office is aware about it. Please advise.

## 2020-11-20 ENCOUNTER — TELEPHONE (OUTPATIENT)
Dept: PEDIATRIC GASTROENTEROLOGY | Age: 20
End: 2020-11-20

## 2020-11-20 NOTE — TELEPHONE ENCOUNTER
Raymon Bruce, she said Dr Zahida Samuel told her that her procedure could be first thing in the morning since she gets headaches and jittery. Told Vee Raymond that the procedures are organized according to age and that a 2year old couldn't go as long without eating as a 23year old. She said \"Dr Zahida Samuel specifically told me it would be first thing in the morning. \"    Told her I would send a message to Dr Zahida Samuel and check if he wanted to do the scope on a different day where he doesn't have cases and she could be first or he wanted to adjust her prep.

## 2020-11-20 NOTE — TELEPHONE ENCOUNTER
----- Message from Rosa Penny sent at 11/20/2020 11:58 AM EST -----  Regarding: Lavelle Peers: [phone]  Pt called to discuss procedure scheduling time.  Please advise [phone]

## 2020-11-23 NOTE — TELEPHONE ENCOUNTER
Aniceto Bundy called back to clinic, informed her of new time for procedure. She confirmed understanding and thanked me.

## 2020-11-25 ENCOUNTER — HOSPITAL ENCOUNTER (OUTPATIENT)
Dept: PREADMISSION TESTING | Age: 20
Discharge: HOME | End: 2020-11-25
Payer: MEDICARE

## 2020-11-25 ENCOUNTER — TRANSCRIBE ORDER (OUTPATIENT)
Dept: REGISTRATION | Age: 20
End: 2020-11-25

## 2020-11-25 DIAGNOSIS — Z01.812 PRE-PROCEDURE LAB EXAM: Primary | ICD-10-CM

## 2020-11-25 DIAGNOSIS — Z01.812 PRE-PROCEDURE LAB EXAM: ICD-10-CM

## 2020-11-25 PROCEDURE — 87635 SARS-COV-2 COVID-19 AMP PRB: CPT

## 2020-11-27 LAB — SARS-COV-2, COV2NT: NOT DETECTED

## 2020-11-27 RX ORDER — NORETHINDRONE ACETATE AND ETHINYL ESTRADIOL, AND FERROUS FUMARATE 1.5-30(21)
KIT ORAL
Qty: 28 TAB | Refills: 0 | Status: SHIPPED | OUTPATIENT
Start: 2020-11-27 | End: 2020-12-14 | Stop reason: SDUPTHER

## 2020-11-27 NOTE — TELEPHONE ENCOUNTER
23year old patient last seen in the office on 8/20/2019 and has next appointment on 12/14/2020    Prescription sent as per MD order to get patient to her scheduled appointment

## 2020-11-30 ENCOUNTER — ANESTHESIA (OUTPATIENT)
Dept: ENDOSCOPY | Age: 20
End: 2020-11-30
Payer: MEDICARE

## 2020-11-30 ENCOUNTER — HOSPITAL ENCOUNTER (OUTPATIENT)
Age: 20
Setting detail: OUTPATIENT SURGERY
Discharge: HOME | End: 2020-11-30
Attending: PEDIATRICS | Admitting: PEDIATRICS
Payer: MEDICARE

## 2020-11-30 ENCOUNTER — ANESTHESIA EVENT (OUTPATIENT)
Dept: ENDOSCOPY | Age: 20
End: 2020-11-30
Payer: MEDICARE

## 2020-11-30 ENCOUNTER — TELEPHONE (OUTPATIENT)
Dept: PEDIATRIC GASTROENTEROLOGY | Age: 20
End: 2020-11-30

## 2020-11-30 VITALS
DIASTOLIC BLOOD PRESSURE: 82 MMHG | TEMPERATURE: 97.5 F | SYSTOLIC BLOOD PRESSURE: 133 MMHG | BODY MASS INDEX: 41.1 KG/M2 | OXYGEN SATURATION: 98 % | HEART RATE: 96 BPM | RESPIRATION RATE: 19 BRPM | WEIGHT: 255.73 LBS

## 2020-11-30 DIAGNOSIS — K21.9 GASTROESOPHAGEAL REFLUX DISEASE WITHOUT ESOPHAGITIS: ICD-10-CM

## 2020-11-30 LAB — HCG UR QL: NEGATIVE

## 2020-11-30 PROCEDURE — 74011000250 HC RX REV CODE- 250: Performed by: NURSE ANESTHETIST, CERTIFIED REGISTERED

## 2020-11-30 PROCEDURE — 2709999900 HC NON-CHARGEABLE SUPPLY: Performed by: PEDIATRICS

## 2020-11-30 PROCEDURE — 91035 G-ESOPH REFLX TST W/ELECTROD: CPT | Performed by: PEDIATRICS

## 2020-11-30 PROCEDURE — 77030034675 HC CAP BRAVO PH W DEL SYS GVIM -C: Performed by: PEDIATRICS

## 2020-11-30 PROCEDURE — 82657 ENZYME CELL ACTIVITY: CPT

## 2020-11-30 PROCEDURE — 77030021593 HC FCPS BIOP ENDOSC BSC -A: Performed by: PEDIATRICS

## 2020-11-30 PROCEDURE — 88342 IMHCHEM/IMCYTCHM 1ST ANTB: CPT

## 2020-11-30 PROCEDURE — 74011250637 HC RX REV CODE- 250/637: Performed by: PEDIATRICS

## 2020-11-30 PROCEDURE — 76040000007: Performed by: PEDIATRICS

## 2020-11-30 PROCEDURE — 43239 EGD BIOPSY SINGLE/MULTIPLE: CPT | Performed by: PEDIATRICS

## 2020-11-30 PROCEDURE — 74011250636 HC RX REV CODE- 250/636: Performed by: NURSE ANESTHETIST, CERTIFIED REGISTERED

## 2020-11-30 PROCEDURE — 76060000032 HC ANESTHESIA 0.5 TO 1 HR: Performed by: PEDIATRICS

## 2020-11-30 PROCEDURE — 81025 URINE PREGNANCY TEST: CPT

## 2020-11-30 PROCEDURE — 88305 TISSUE EXAM BY PATHOLOGIST: CPT

## 2020-11-30 RX ORDER — SODIUM CHLORIDE 9 MG/ML
INJECTION, SOLUTION INTRAVENOUS
Status: DISCONTINUED | OUTPATIENT
Start: 2020-11-30 | End: 2020-11-30 | Stop reason: HOSPADM

## 2020-11-30 RX ORDER — LIDOCAINE HYDROCHLORIDE 20 MG/ML
INJECTION, SOLUTION EPIDURAL; INFILTRATION; INTRACAUDAL; PERINEURAL AS NEEDED
Status: DISCONTINUED | OUTPATIENT
Start: 2020-11-30 | End: 2020-11-30 | Stop reason: HOSPADM

## 2020-11-30 RX ORDER — PROPOFOL 10 MG/ML
INJECTION, EMULSION INTRAVENOUS AS NEEDED
Status: DISCONTINUED | OUTPATIENT
Start: 2020-11-30 | End: 2020-11-30 | Stop reason: HOSPADM

## 2020-11-30 RX ORDER — MIDAZOLAM HCL 2 MG/ML
20 SYRUP ORAL
Status: COMPLETED | OUTPATIENT
Start: 2020-11-30 | End: 2020-11-30

## 2020-11-30 RX ADMIN — SODIUM CHLORIDE: 900 INJECTION, SOLUTION INTRAVENOUS at 07:48

## 2020-11-30 RX ADMIN — PROPOFOL 100 MG: 10 INJECTION, EMULSION INTRAVENOUS at 07:53

## 2020-11-30 RX ADMIN — PROPOFOL 50 MG: 10 INJECTION, EMULSION INTRAVENOUS at 08:05

## 2020-11-30 RX ADMIN — PROPOFOL 100 MG: 10 INJECTION, EMULSION INTRAVENOUS at 07:55

## 2020-11-30 RX ADMIN — PROPOFOL 50 MG: 10 INJECTION, EMULSION INTRAVENOUS at 08:00

## 2020-11-30 RX ADMIN — PROPOFOL 100 MG: 10 INJECTION, EMULSION INTRAVENOUS at 07:51

## 2020-11-30 RX ADMIN — PROPOFOL 50 MG: 10 INJECTION, EMULSION INTRAVENOUS at 08:03

## 2020-11-30 RX ADMIN — PROPOFOL 50 MG: 10 INJECTION, EMULSION INTRAVENOUS at 07:58

## 2020-11-30 RX ADMIN — LIDOCAINE HYDROCHLORIDE 60 MG: 20 INJECTION, SOLUTION EPIDURAL; INFILTRATION; INTRACAUDAL; PERINEURAL at 07:51

## 2020-11-30 RX ADMIN — MIDAZOLAM HYDROCHLORIDE 20 MG: 2 SYRUP ORAL at 07:24

## 2020-11-30 NOTE — DISCHARGE INSTRUCTIONS
118 [facility].  [address] [facility], [address]  [address]   [id number]  2000    EGD DISCHARGE INSTRUCTIONS  Discomfort:  Sore throat- throat lozenges or warm salt water gargle  Redness at IV site- apply warm compress to area; if redness or soreness persist- contact your physician  Gaseous discomfort- walking, belching will help relieve any discomfort    DIET Resume regular diet    MEDICATIONS:  Resume home medications    ACTIVITY   Spend the remainder of the day resting -  avoid any strenuous activity. May resume normal activities tomorrow. CALL M.D. ANY SIGN of:  Increasing pain, nausea, vomiting  Abdominal distension (swelling)  Fever or chills  Pain in chest area      Follow-up Instructions:    **Call to make a telephone follow up visit for Tuesday afternoon at least one week from today. We will review the endoscopy results and plan of care in detail at that time. Call [facility] for any questions or problems.  Telephone # [phone]

## 2020-11-30 NOTE — ROUTINE PROCESS
Caroline Carolina  2000  [id number]    Situation:  Verbal report received from: Vonnie  Procedure: Procedure(s):  . EGD WITH BRAVO  BRAVO CLIP PLACEMENT    :-  ESOPHAGOGASTRODUODENAL (EGD) BIOPSY    Background:    Preoperative diagnosis: GERD  GASTROPARESIS  Postoperative diagnosis: esophagitis gastritis    :  Dr. Zahida Samuel  Assistant(s): Endoscopy Technician-1: Tejas Benjamin  Endoscopy RN-1: Chloe Salinas RN    Specimens:   ID Type Source Tests Collected by Time Destination   1 : duodenum bx Preservative   Dakota Soto MD 11/30/2020 8924 Pathology   2 : stomach bx Preservative   Dakota Soto MD 11/30/2020 0800 Pathology   3 : distal esophagus bx Preservative   Dakota Soto MD 11/30/2020 0805 Pathology   4 : proximal esophagus bx Preservative   Dakota Soto MD 11/30/2020 0815 Pathology     H. Pylori     Assessment:  Intra-procedure medications     Anesthesia gave intra-procedure sedation and medications, see anesthesia flow sheet yes    Intravenous fluids: NS@ KVO     Vital signs stable     Abdominal assessment: round and soft     Recommendation:  Discharge patient per MD order  Return to floor  Family or Friend   Permission to share finding with family or friend yes

## 2020-11-30 NOTE — ANESTHESIA POSTPROCEDURE EVALUATION
Post-Anesthesia Evaluation and Assessment    Patient: Lennox Acevedo MRN: [id number]  SSN: [id number]    YOB: 2000  Age: 23 y.o. Sex: female      I have evaluated the patient and they are stable and ready for discharge from the PACU. Cardiovascular Function/Vital Signs  Visit Vitals  /80   Pulse 81   Temp 36.4 °C (97.5 °F)   Resp 20   Wt 116 kg (255 lb 11.7 oz)   SpO2 98%   BMI 41.10 kg/m²       Patient is status post General anesthesia for Procedure(s):  . EGD WITH BRAVO  BRAVO CLIP PLACEMENT    :-  ESOPHAGOGASTRODUODENAL (EGD) BIOPSY. Nausea/Vomiting: None    Postoperative hydration reviewed and adequate. Pain:  Pain Scale 1: Visual (11/30/20 0830)  Pain Intensity 1: 0 (11/30/20 0830)   Managed    Neurological Status: At baseline    Mental Status, Level of Consciousness: Alert and  oriented to person, place, and time    Pulmonary Status:   O2 Device: Room air (11/30/20 0830)   Adequate oxygenation and airway patent    Complications related to anesthesia: None    Post-anesthesia assessment completed. No concerns    Signed By: Jersey John MD     November 30, 2020              Procedure(s):  . EGD WITH BRAVO  BRAVO CLIP PLACEMENT    :-  ESOPHAGOGASTRODUODENAL (EGD) BIOPSY. MAC    <BSHSIANPOST>    INITIAL Post-op Vital signs:   Vitals Value Taken Time   /95 11/30/2020  8:35 AM   Temp 36.4 °C (97.5 °F) 11/30/2020  8:15 AM   Pulse 135 11/30/2020  8:36 AM   Resp 21 11/30/2020  8:36 AM   SpO2 98 % 11/30/2020  8:36 AM   Vitals shown include unvalidated device data.

## 2020-11-30 NOTE — PROCEDURES
118 [facility].  [address] [facility] [address] [facility], [address]  [phone]      Endoscopic Esophagogastroduodenoscopy Procedure Note      Procedure: Endoscopic Gastroduodenoscopy with biopsy    Pre-operative Diagnosis: Chronic GERD abdominal pain    Post-operative Diagnosis: Esophagitis, gastritis    : Taylor Temple MD    Surgical Assistant: None    Endoscopy Technician: Lizette Ferrell    Endoscopy Nurse: Rose Marie Sands    Referring Provider:  Samuel Poole DO    Anesthesia/Sedation: General anesthesia provided by the Anesthesia team.     Implants: None    Pre-Procedural Exam:  Heart: RRR, well-perfused  Lungs: clear bilaterally without wheezes, crackles, or rhonchi  Abdomen: soft, nontender, nondistended, bowel sounds present  Mental Status: awake, alert      Procedure Details   After satisfactory titration of sedation, an endoscope was inserted through the oropharynx into the upper esophagus. The endoscope was then passed through the lower esophagus and then into the stomach to the level of the pylorus and then retroflexed and the gastroesophageal junction was inspected. Endoscope was advanced through the pylorus into the second to third portion of the duodenum and then retracted back into the gastric lumen. The stomach was decompressed and the endoscope was retracted into the distal esophagus. The endoscope was retracted to the mid and upper esophagus. The stomach was decompressed and the endoscope was retracted fully.     Findings:   Esophagus: Linear furrowing and tissue congestion  GE junction at 38 cm from the mouth, Fernandez placed at 32 cm from the mouth  Stomach: Granular mucosa, erosion and adherent mucus  Duodenum: Granular mucosa in the duodenal bulb      Therapies:  Biopsies obtained with cold forceps for histology in the esophagus, stomach, and duodenum    Specimens:   · Antrum/Body - 4  · Duodenum - 6 (2 specimens send-out to [facility] for disaccharidase levels)  · Duodenal bulb - 2  · Distal esophagus - 2  · Proximal esophagus - 2           Estimated Blood Loss:  minimal    Complications:   None; patient tolerated the procedure well. Impression:  Esophagitis, gastritis. Bravo placed. Recommendations:  -Await pathology. Jett Ewing.  [facility], MD

## 2020-11-30 NOTE — ANESTHESIA PREPROCEDURE EVALUATION
Anesthetic History   No history of anesthetic complications            Review of Systems / Medical History  Patient summary reviewed, nursing notes reviewed and pertinent labs reviewed    Pulmonary  Within defined limits                 Neuro/Psych   Within defined limits      Psychiatric history     Cardiovascular  Within defined limits                Exercise tolerance: >4 METS     GI/Hepatic/Renal     GERD           Endo/Other  Within defined limits      Morbid obesity     Other Findings              Physical Exam    Airway  Mallampati: II  TM Distance: > 6 cm  Neck ROM: normal range of motion   Mouth opening: Normal     Cardiovascular  Regular rate and rhythm,  S1 and S2 normal,  no murmur, click, rub, or gallop             Dental  No notable dental hx       Pulmonary  Breath sounds clear to auscultation               Abdominal  GI exam deferred       Other Findings            Anesthetic Plan    ASA: 3  Anesthesia type: MAC          Induction: Intravenous  Anesthetic plan and risks discussed with:  Mother

## 2020-11-30 NOTE — H&P
Date: 11/1/2020    Dear Latrice Officer, DO:  Virgie Tai is 23 y.o. young lady with chronic GERD only partially treated with high dose PPI therapy. It is disappointing that the cholecystectomy for biliary dyskinesia was not completely palliative. Vee Raymond wishes to follow up on the chronic gastritis seen on upper endoscopy, and I support this course. Any remaining gastropathy and reflux activity could be related to ingested food or aeroallergen, and it would be wise to repeat the food and environmental allergy testing. She has allergic rhinitis and eczema due to environmental allergies. I seen in Dr. 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      The persistent uncharacterized erythematous rash may represent an environmental allergen that could be the key to alleviating her chronic gastrointestinal syndrome.     I will also send duodenal biopsies for disaccharidase levels. Plan:   1. Start pantoprazole 40 mg 2 times daily, prescribed to your pharmacy    2. Schedule Upper Endoscopy with biopsy and disaccharidase levels  3. Pre-procedure anxiolysis with oral liquid Versed, prescribed to your pharmacy. Please take during car ride to hospital or prior to car ride to hospital  4. Consider repeat allergy evaluation for food sensitivity/allergy testing   5. Return to clinic in 3 months        Addendum: I would like to obtain Bravo clip reflux study with the upcoming endoscopy. We will add this to the schedule booking. Would also refer Vee Raymond to Dr. Stephanie Benedict of [facility] for repeat food and environmental allergy evaluation.          HPI: Vee Raymond presents today for management of chronic GERD and abdominal pain. Control of Erica's reflux and abdominal pain on various PPI medication regimens had been transient despite excellent adherence.   The last upper endoscopy revealed mild chronic gastritis, with no convincing response to empiric H. Pylori therapy. Laparoscopic cholecystectomy did relieve some of the postprandial distress, however overall Srinath forte continues with intractable GERD, nausea, feeding intolerance, and left upper quadrant abdominal pain. This seems consistent with the GERD activity and mild chronic gastritis seen on endoscopy, despite interval acid suppression therapy.      Erica asks if a follow up evaluation of the gastritis is indicated. She is not able to tolerate exercise and appropriate eating strategies to facilitate weight loss due to her feeding intolerance from GERD and gastritis. In particular, she cannot tolerate smoothies, which she has tried to use to as a meal substitute. Srinath forte is practicing yoga at home, however her mother doesn't want [place] to start going back to the gym to lift weights due to COVID.      Medications:        Current Outpatient Medications   Medication Sig    ondansetron (ZOFRAN ODT) 8 mg disintegrating tablet PLACE 1 TABLET ON TONGUE & ALLOW TO DISSOLVE EVERY 8 HOURS AS NEEDED FOR NAUSEA WITH HEADACHE    Aimovig Autoinjector 140 mg/mL injection INJECT 1 SYRINGE VIA SUBCUTANEOUS ROUTE ONCE A MONTH, AS DIRECTED    pantoprazole (PROTONIX) 40 mg tablet Take 1 Tab by mouth two (2) times a day.  midazolam (VERSED) 2 mg/mL syrup Take 10 mL by mouth daily as needed (pre-procedure anxiolysis) for up to 1 dose.  glucose blood VI test strips (OneTouch Verio test strips) strip Use to test blood sugar up to 2 times daily.  lancets (One Touch Delica) 33 gauge misc Use to test blood sugar 2 times daily.  Blood-Glucose Meter (OneTouch Verio Flex meter) misc Use as directed    metFORMIN ER (GLUCOPHAGE XR) 750 mg tablet TAKE 1 TABLET BY MOUTH TWICE A DAY    norethindrone-ethinyl estradiol-iron (LOESTRIN FE 1.5/30, 28-DAY,) 1.5 mg-30 mcg (21)/75 mg (7) tab Take 1 Tab by mouth daily. Continuous pills only.     fluticasone (FLOVENT HFA) 220 mcg/actuation inhaler 2 puffs swallowed twice daily, npo x 30 min after dose    docusate sodium (STOOL SOFTENER) 250 mg capsule Take 1 Cap by mouth two (2) times daily as needed for Constipation for up to 30 doses.  cholecalciferol, vitamin d3, (VITAMIN D3) 400 unit cap Take  by mouth.  SUMAtriptan succinate (ZEMBRACE SYMTOUCH) 3 mg/0.5 mL pnij by SubCUTAneous route. Indications: MIGRAINE    diphenhydrAMINE (BENADRYL) 25 mg capsule Take by mouth as needed (migraine).  cetirizine (ZYRTEC) 10 mg tablet TK 1 T PO D PRF ALLERGY      No current facility-administered medications for this visit.         Allergies:          Allergies   Allergen Reactions    Yeast, Dried Other (comments)       Upset stomach         ROS: A 12 point review of systems was obtained and was as per HPI, otherwise negative.     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    Obesity (BMI 35.0-39.9 without comorbidity) E66.9    Chronic nausea R11.0    Chronic cholecystitis K81.1    Biliary dyskinesia K82.8    Chronic gastritis without bleeding K29.50        PMHx:        Past Medical History:   Diagnosis Date    Abdominal migraine      ADHD (attention deficit hyperactivity disorder)      Autism      Developmental delay      GERD (gastroesophageal reflux disease)      Headache      Irritable bowel syndrome with diarrhea 4/13/2017    Migraine      Obesity, morbid ([facility] [address] 12/12/2017    Psychiatric disorder       anxiety, ADHD, OCD, HIGH FUNTIONING AUTISM    Rosacea and pustular rosacea. Lactose tolerance test in clinic in 2018 was normal.  Sucrose breath test in 2020 was normal.       Family History:         Family History   Problem Relation Age of Onset    Endometriosis Mother      Headache Mother           d/t accident - neck and brain injury    Delayed Awakening Mother      Post-op Nausea/Vomiting Mother      Headache Maternal Grandfather      No Known Problems Father      No Known Problems Maternal Grandmother           Social History:   Social History           Tobacco Use    Smoking status: Never Smoker    Smokeless tobacco: Never Used    Tobacco comment: no smoke exposure in the home   Substance Use Topics    Alcohol use: No    Drug use: No    Presents today by herself. Does yoga at home     OBJECTIVE:  Vitals:  height is 5' 6.14\" (1.68 m) and weight is 253 lb (114.8 kg). Her oral temperature is 97.2 °F (36.2 °C). Her blood pressure is 155/109 (abnormal) and her pulse is 103 (abnormal).  Her respiration is 28 and oxygen saturation is 98%.          Last 3 Recorded Weights in this Encounter     10/30/20 1119   Weight: 253 lb (114.8 kg)        PHYSICAL EXAM:     General: healthy, alert, well developed, well nourished and erythematous rash over upper chest at prior visit is noted today over the upper arms  ENT: anicteric sclera, moist oral mucosa, no oral lesions  Abdomen: soft, mildly tender in the LUQ, mild gaseous distention  Perianal/Rectal exam: deferred       Cardiovascular: RRR, well-perfused, no murmur  Skin:  erythema over the cheeks and upper arms    Neuro: alert, reactive, normal muscle tone  Psych: appropriate affect and interactions  Pulmonary:  Clear Breath Sounds Bilaterally, No Increased Effort   Musc/Skel: no swelling or tenderness    Studies: Chronic gastritis discovered on recent upper endoscopy with nodular visual appearance                  Thank you for referring Kayleen Rivero to our clinic, we appreciate participating in their care.           All patient and caregiver questions and concerns were addressed during the visit. Major risks, benefits, and side-effects of therapy were discussed.        Electronically signed by Liam Merritt MD at 11/01/20 1024   Note Details     Author  Liam Merritt MD  File Time  11/01/20 1024    Author Type  Physician  Status  Signed    Last   Liam eMrritt MD  Specialty  Pediatric [facility]ology        Office Visit on 10/30/2020          Detailed Report         Note shared with patient

## 2020-11-30 NOTE — TELEPHONE ENCOUNTER
----- Message from Raphael Gil sent at 11/30/2020  3:40 PM EST -----  Regarding: Angelo Chano: [phone]  Patient had a procedure this morning and Mom called to advise that the patient is miserable. Mom would like a call back from Dr. Lisy Olson. Please return call to Mom at [phone].

## 2020-11-30 NOTE — TELEPHONE ENCOUNTER
Mother reports that patient does not want to eat or drink anything after procedure this morning, she said her chest hurts, not constant pain, no shortness of breath or difficulty breathing, advised mother to give dose of tylenol/ibuprofen and increase fluids to prevent dehydration, advised to call if pain worsens, she confirmed her understanding.

## 2020-11-30 NOTE — INTERVAL H&P NOTE
Update History & Physical    The Patient's History and Physical of November 1, 2020 was reviewed with the patient and I examined the patient. There was no change. The surgical site was confirmed by the patient and me. Plan:  The risk, benefits, expected outcome, and alternative to the recommended procedure have been discussed with the patient. Patient understands and wants to proceed with the procedure.     Electronically signed by Jose Smith MD on 11/30/2020 at 7:39 AM

## 2020-12-01 ENCOUNTER — TELEPHONE (OUTPATIENT)
Dept: PEDIATRIC GASTROENTEROLOGY | Age: 20
End: 2020-12-01

## 2020-12-01 DIAGNOSIS — K29.50 CHRONIC GASTRITIS WITHOUT BLEEDING, UNSPECIFIED GASTRITIS TYPE: Primary | ICD-10-CM

## 2020-12-01 DIAGNOSIS — K21.9 GASTROESOPHAGEAL REFLUX DISEASE WITHOUT ESOPHAGITIS: ICD-10-CM

## 2020-12-01 RX ORDER — SUCRALFATE 1 G/10ML
1 SUSPENSION ORAL 4 TIMES DAILY
Qty: 120 ML | Refills: 0 | Status: SHIPPED | OUTPATIENT
Start: 2020-12-01 | End: 2020-12-04

## 2020-12-01 NOTE — PROGRESS NOTES
Nj,    I spoke with mother around 5 pm.  Having tightness in upper esophagus, trouble swallowing but is going to try milk shake at mother's suggestion. I advised this was within range of normal on day of endoscopy and to give it until tomorrow. The Bravo recorder is beeping every 10 minutes. I spoke with Pearl Blevins at endoscopy, who called the company representative and was told that the recorder does this when it is too far from the bean clip. Maico Yuen communicated this to mother via phone and let me know.       Thanks,  Yue Mustafa

## 2020-12-01 NOTE — PROGRESS NOTES
Richard Marley MD 23 minutes ago (2:58 PM)          Nj,     Could you please let mother know I have called in Carafate suspension to their pharmacy for a 3-day course? This should help soothe her esophageal pain.     Thanks, Romayne Precise        Reviewed with patient, she confirmed her understanding.

## 2020-12-01 NOTE — PROGRESS NOTES
Nj,    Could you please let mother know I have called in Carafate suspension to their pharmacy for a 3-day course? This should help soothe her esophageal pain.     Thanks, Ender Abarca

## 2020-12-01 NOTE — TELEPHONE ENCOUNTER
----- Message from Paul Zendejas sent at 12/1/2020 10:46 AM EST -----  Regarding: Magda [place]: [phone]  Patient called to speak to Dr. Magen Hernández because she is still not feeling well today. She states she is miserable and not able to eat anything. Please return call to patient at [phone].

## 2020-12-02 ENCOUNTER — TELEPHONE (OUTPATIENT)
Dept: PEDIATRIC GASTROENTEROLOGY | Age: 20
End: 2020-12-02

## 2020-12-02 NOTE — TELEPHONE ENCOUNTER
Spoke with mom, she states carafate did not help at all and now she cant even swallow her medications

## 2020-12-02 NOTE — PROGRESS NOTES
Nj,    I spoke with mother about the biopsy results showing reflux and H. Pylori negative gastritis. She had not gone back on her reflux medicines since the procedure, and I advised that she should do so. The sensation of something caught in her chest would resolve, as it is likely due to reflux. The Bravo study showed that the reflux was normal range. I advised that I would be in touch once the digestive enzyme studies came back.     Thanks,  Little Bodily

## 2020-12-02 NOTE — TELEPHONE ENCOUNTER
----- Message from Mehrdad Ham sent at 12/2/2020 12:37 PM EST -----  Regarding: Dr Lynch Gain: [phone]  Patient is complaining that when she swallows it hurst in her chest area. Please advise.

## 2020-12-03 NOTE — PROCEDURES
Name of procedure: Bravo ambulatory pH monitoring    Indications: Gastroesophageal reflux disease    Description of procedure: A bravo ambulatory pH monitoring sensor was placed during today's upper endoscopy. As the GE junction was measured at 38 cm from the mouth, the Bravo was placed at 32 cm. Negative pressure was applied for a period of 30 seconds prior to engaging the clip and withdrawing the forcep. The position of the Bravo device was confirmed at 32 cm from the mouth via endoscopic view. Aniceto Bundy was discharged home with teaching on use of the external recorder. During the 48 hour study period, Erica utilized the recorder to assist in monitoring reflux activity with respect to body position, mealtime, and symptom. Findings: A total of 27 minutes were spent in reflux. 18 reflux events were detected, with the longest reflux event lasting 6.6 minutes. While upright, 27 minutes were spent in reflux with 16 regurgitation events noted. While supine, 1 minute was spent in reflux with 2 regurgitation events noted. The DeMeester score was 4.6. The symptom analysis revealed: 3 instances of heartburn (Symptom Association Probability/SAP of 0), 2 instances of chest pain (Symptom Association Probability/SAP of 0), and 3 instances of regurgitation (Symptom Association Probability/SAP of 91.5). No significant relationship between symptoms and reflux activity. Impression:  Negative study. Normal reflux activity. *Criteria for abnormal reflux: DeMeester score greater than 14.72; pH less than 4.0 more than 5.5% of the total time, more than 8.3% of the time upright or more than 3% of the time in the supine position; and pH less than 4.0 for more than 1.6% of the total time. Comments:  Aniceto Bundy seems quite sensitive to the reflux activity detected. I advised her to go back on her acid suppression medications as we await the disaccharidase enzyme activity results.     Osito Marroquin MD

## 2020-12-04 LAB — DIGESTIVE ENZYMES, XDEAT: NORMAL

## 2020-12-07 NOTE — PROGRESS NOTES
Lauren Alanis spoke with the family. I think this explains everything. Could you prescribe sucraid?    Thank you, karyna

## 2020-12-08 ENCOUNTER — DOCUMENTATION ONLY (OUTPATIENT)
Dept: PEDIATRIC GASTROENTEROLOGY | Age: 20
End: 2020-12-08

## 2020-12-08 NOTE — PROGRESS NOTES
Sucraid prescription completed, documentation compiled and faxed to [address] [place]; confirmation received. Letter send to home address along with additional resources.

## 2020-12-08 NOTE — LETTER
12/8/2020 To the parents of Petra Bucio: 
 
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 Sucrase is an enzyme that is responsible for the digestion of sucrose and starch. The primary treatment for CSID is to stop eating all foods that contain sugar in any form and limiting starchy foods. Following a sugar-restricted diet in not always easy. Fortunately, there is a prescription medication that helps to replace the missing sucrase enzyme. This enzyme replacement therapy is SUCRAID, and currently is the only available enzyme replacement therapy for CSID. SUCRAID cannot be purchased over the counter, and it is not available at your local retail pharmacy. Ken Forada is only available from one specialty mail-order pharmacy; [facility]. We have completed the prescription for SUCRAID and have faxed it to [facility]. [facility] will determine if your insurance will cover the medication, and once they receive the insurance determination, they will call you with the appropriate information. Please allow 2-3 weeks for the insurance investigation process. [facility] contact information is [phone]. If your insurance does cover the Annie Grayey will call to initiate the shipment of your childs first refill. [facility] will only ship the Ken Leaf once they have spoken with you directly to verify address, so please be mindful to answer any unknown numbers to ensure you do not miss their call. If your insurance does not cover Annie Vasquez will discuss financial assistance options that may be available.   
 
Enclosed is a packet of information regarding CSID and SUCRAID, including handouts explaining CSID symptoms, how to take SUCRAID, and sample menus for a low-sucrose, low-starch diet to follow while awaiting SUCRAID. Please refer to the following resources for additional information regarding CSID and diet;  
 
[email or web address] 
[email or web address] Once you receive the SUCRAID, please call our office to set up a phone consultation with the registered dietitian to ensure that you are storing and administrating the [address] appropriately and to further discuss any diet related questions you may have.   
 
Sincerely, 
 
Benigno Messer, [address]

## 2020-12-11 NOTE — PROGRESS NOTES
Annual exam ages 21-44    Ctra. Rafa Gerard is a [de-identified] P5,  23 y.o. female [facility] No LMP recorded. (Menstrual status: Chemically Induced). , who presents for her annual checkup. LV=8/20/19    Since her last annual GYN exam about one year ago, she has had the following changes in her health history:   - had upper endoscopy, dx'd with sucrose intolerance    H/o RLQ pain, BTB on continuous OCPs, FHx e'osis. Opted to change OCPs from Junel 1/20 to LE 1.5/30. Also discussed Orilissa. Subsequently dx'd with gall bladder problems and H.pylori. Had L/S michelle, tx for H.pylori -- sx resolved. Had recurrence of abd pain. Had upper endoscopy, dx'd with sucrose intolerance. Taking LE 1.5/30 continuously, no BTB. Wishes to contionue. ADDITIONAL CONCERNS:  She is having no significant problems. With regard to the Gardasil vaccine, she has received all 3 injections. Menstrual status:    Her periods are nonexistent. Contraception:    The current method of family planning is abstinence. Sexual history:     She  reports never being sexually active. .        Pap and Mammogram History:    She has never had a pap smear.     The patient has never had a mammogram.    Breast Cancer History/Substance Abuse:    Past Medical History:   Diagnosis Date    Abdominal migraine     ADHD (attention deficit hyperactivity disorder)     Autism     Developmental delay     GERD (gastroesophageal reflux disease)     Headache     Irritable bowel syndrome with diarrhea 4/13/2017    Migraine     Obesity, morbid ([address] 12/12/2017    Psychiatric disorder     anxiety, ADHD, OCD, HIGH FUNTIONING AUTISM    Sucrose intolerance      Past Surgical History:   Procedure Laterality Date    COLONOSCOPY N/A 12/20/2016    COLONOSCOPY performed by Cyn Mace MD at [address] GERD TST W/ MUCOS PH ELECTROD 50 HR (BRAVO)  12/3/2020         HC CLP BRAVO  11/30/2020    HX CHOLECYSTECTOMY  06/2019    HX ENDOSCOPY 2020    dx'd with sucrose intolerance    HX HEENT      WISDOM TEETH    HX TONSIL AND ADENOIDECTOMY      HX TONSILLECTOMY      AGE 1    HX WISDOM TEETH EXTRACTION      CT EGD TRANSORAL BIOPSY SINGLE/MULTIPLE  2019         CT EGD TRANSORAL BIOPSY SINGLE/MULTIPLE  2020    UPPER GI ENDOSCOPY,BIOPSY  2016          OB History    Para Term  AB Living   0 0 0 0 0 0   SAB TAB Ectopic Molar Multiple Live Births   0 0 0   0     Obstetric Comments   Menarche at age 5 - on 2010       Current Outpatient Medications   Medication Sig Dispense Refill    norethindrone-ethinyl estradiol-iron (Junel FE 1.5/30, ,) 1.5 mg-30 mcg (21)/75 mg (7) tab TAKE 1 TAB BY MOUTH DAILY. CONTINUOUS PILLS ONLY. 3 Package 5    ondansetron (ZOFRAN ODT) 8 mg disintegrating tablet PLACE 1 TABLET ON TONGUE & ALLOW TO DISSOLVE EVERY 8 HOURS AS NEEDED FOR NAUSEA WITH HEADACHE      Aimovig Autoinjector 140 mg/mL injection INJECT 1 SYRINGE VIA SUBCUTANEOUS ROUTE ONCE A MONTH, AS DIRECTED      pantoprazole (PROTONIX) 40 mg tablet Take 1 Tab by mouth two (2) times a day. 60 Tab 11    midazolam (VERSED) 2 mg/mL syrup Take 10 mL by mouth daily as needed (pre-procedure anxiolysis) for up to 1 dose. 10 mL 0    glucose blood VI test strips (OneTouch Verio test strips) strip Use to test blood sugar up to 2 times daily. 60 Strip 4    lancets (One Touch Delica) 33 gauge misc Use to test blood sugar 2 times daily. 60 Lancet 4    Blood-Glucose Meter (OneTouch Verio Flex meter) misc Use as directed 1 Each 0    metFORMIN ER (GLUCOPHAGE XR) 750 mg tablet TAKE 1 TABLET BY MOUTH TWICE A  Tab 2    fluticasone (FLOVENT HFA) 220 mcg/actuation inhaler 2 puffs swallowed twice daily, npo x 30 min after dose 2 Inhaler 11    docusate sodium (STOOL SOFTENER) 250 mg capsule Take 1 Cap by mouth two (2) times daily as needed for Constipation for up to 30 doses.  30 Cap 3    cetirizine (ZYRTEC) 10 mg tablet TK 1 T PO D PRF ALLERGY  3    cholecalciferol, vitamin d3, (VITAMIN D3) 400 unit cap Take  by mouth.  SUMAtriptan succinate (ZEMBRACE SYMTOUCH) 3 mg/0.5 mL pnij by SubCUTAneous route. Indications: MIGRAINE      diphenhydrAMINE (BENADRYL) 25 mg capsule Take by mouth as needed (migraine). Allergies: Yeast, dried   Social History     Socioeconomic History    Marital status: SINGLE     Spouse name: Not on file    Number of children: Not on file    Years of education: Not on file    Highest education level: Not on file   Occupational History    Not on file   Social Needs    Financial resource strain: Not on file    Food insecurity     Worry: Not on file     Inability: Not on file    Transportation needs     Medical: Not on file     Non-medical: Not on file   Tobacco Use    Smoking status: Never Smoker    Smokeless tobacco: Never Used    Tobacco comment: no smoke exposure in the home   Substance and Sexual Activity    Alcohol use: No    Drug use: No    Sexual activity: Never   Lifestyle    Physical activity     Days per week: Not on file     Minutes per session: Not on file    Stress: Not on file   Relationships    Social connections     Talks on phone: Not on file     Gets together: Not on file     Attends Hoahaoism service: Not on file     Active member of club or organization: Not on file     Attends meetings of clubs or organizations: Not on file     Relationship status: Not on file    Intimate partner violence     Fear of current or ex partner: Not on file     Emotionally abused: Not on file     Physically abused: Not on file     Forced sexual activity: Not on file   Other Topics Concern    Not on file   Social History Narrative    Not on file     Tobacco History:  reports that she has never smoked. She has never used smokeless tobacco.  Alcohol Abuse:  reports no history of alcohol use. Drug Abuse:  reports no history of drug use.     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    Obesity (BMI 35.0-39.9 without comorbidity) E66.9    Chronic nausea R11.0    Chronic cholecystitis K81.1    Biliary dyskinesia K82.8    Chronic gastritis without bleeding K29.50    Obesity, morbid (HCC) E66.01     Family History   Problem Relation Age of Onset    Endometriosis Mother     Headache Mother         d/t accident - neck and brain injury    Delayed Awakening Mother     Post-op Nausea/Vomiting Mother     Headache Maternal Grandfather     No Known Problems Father     No Known Problems Maternal Grandmother        Review of Systems - History obtained from the patient  Constitutional: negative for weight loss, fever, night sweats  HEENT: negative for hearing loss, earache, congestion, snoring, sorethroat  CV: negative for chest pain, palpitations, edema  Resp: negative for cough, shortness of breath, wheezing  GI: negative for change in bowel habits, abdominal pain, black or bloody stools  : negative for frequency, dysuria, hematuria, vaginal discharge  MSK: negative for back pain, joint pain, muscle pain  Breast: negative for breast lumps, nipple discharge, galactorrhea  Skin :negative for itching, rash, hives  Neuro: negative for dizziness, headache, confusion, weakness  Psych: negative for anxiety, depression, change in mood  Heme/lymph: negative for bleeding, bruising, pallor    Physical Exam    Visit Vitals  /86   Wt 253 lb (114.8 kg)   BMI 40.66 kg/m²     Per pt, runs low at her PCP    Constitutional  · Appearance: well-nourished, well developed, alert, in no acute distress    HENT  · Head and Face: appears normal    Neck  · Inspection/Palpation: normal appearance, no masses or tenderness  · Lymph Nodes: no lymphadenopathy present  · Thyroid: gland size normal, nontender, no nodules or masses present on palpation    Chest  · Respiratory Effort: breathing unlabored  · Auscultation: normal breath sounds    Cardiovascular  · Heart:  · Auscultation: regular rate and rhythm without murmur    Breasts  · Inspection of Breasts: breasts symmetrical, no skin changes, no discharge present, nipple appearance normal, no skin retraction present  · Palpation of Breasts and Axillae: no masses present on palpation, no breast tenderness  · Axillary Lymph Nodes: no lymphadenopathy present    Gastrointestinal  · Abdominal Examination: abdomen non-tender to palpation, normal bowel sounds, no masses present  · Liver and spleen: no hepatomegaly present, spleen not palpable  · Hernias: no hernias identified    Genitourinary  -    Skin  · General Inspection: no rash, no lesions identified    Neurologic/Psychiatric  · Mental Status:  · Orientation: grossly oriented to person, place and time  · Mood and Affect: mood normal, affect appropriate            Assessment & Plan:  · Routine gynecologic examination. Reviewed pap smear screening guidelines (baseline pap at 20yo even if not sexually active). Declines pelvic exam/pap today. May consider for next year. ACOG handouts given. · STI screening <26yo - deferred, not yet sexually active  · H/o abdominal pain, FHx e'osis. Taking OCPs continuously. Switched from LE 1/20 to LE 1.5/30 previously for BTB. Doing well, wishes to continue.   eRX LE 1.5/30 #3 RFx5  · Counseled re: diet, exercise, healthy lifestyle  · Return for yearly wellness visits  · Gardisil completed  · Patient verbalized understanding      Orders Placed This Encounter    norethindrone-ethinyl estradiol-iron (Junel FE 1.5/30, 28,) 1.5 mg-30 mcg (21)/75 mg (7) tab Sig: TAKE 1 TAB BY MOUTH DAILY. CONTINUOUS PILLS ONLY.      Dispense:  3 Package     Refill:  5

## 2020-12-12 LAB
25(OH)D3+25(OH)D2 SERPL-MCNC: 78.8 NG/ML (ref 30–100)
ALBUMIN SERPL-MCNC: 4.1 G/DL (ref 3.9–5)
ALBUMIN/GLOB SERPL: 1.4 {RATIO} (ref 1.2–2.2)
ALP SERPL-CCNC: 62 IU/L (ref 39–117)
ALT SERPL-CCNC: 15 IU/L (ref 0–32)
AST SERPL-CCNC: 12 IU/L (ref 0–40)
BILIRUB SERPL-MCNC: 0.4 MG/DL (ref 0–1.2)
BUN SERPL-MCNC: 8 MG/DL (ref 6–20)
BUN/CREAT SERPL: 11 (ref 9–23)
CALCIUM SERPL-MCNC: 9.3 MG/DL (ref 8.7–10.2)
CHLORIDE SERPL-SCNC: 102 MMOL/L (ref 96–106)
CHOLEST SERPL-MCNC: 194 MG/DL (ref 100–199)
CO2 SERPL-SCNC: 23 MMOL/L (ref 20–29)
CREAT SERPL-MCNC: 0.74 MG/DL (ref 0.57–1)
GLOBULIN SER CALC-MCNC: 3 G/DL (ref 1.5–4.5)
GLUCOSE SERPL-MCNC: 83 MG/DL (ref 65–99)
HDLC SERPL-MCNC: 49 MG/DL
INSULIN SERPL-ACNC: 13.7 UIU/ML (ref 2.6–24.9)
INTERPRETATION, 910389: NORMAL
LDLC SERPL CALC-MCNC: 131 MG/DL (ref 0–99)
POTASSIUM SERPL-SCNC: 4 MMOL/L (ref 3.5–5.2)
PROT SERPL-MCNC: 7.1 G/DL (ref 6–8.5)
SODIUM SERPL-SCNC: 139 MMOL/L (ref 134–144)
TRIGL SERPL-MCNC: 78 MG/DL (ref 0–149)
TSH SERPL DL<=0.005 MIU/L-ACNC: 0.89 UIU/ML (ref 0.45–4.5)
VLDLC SERPL CALC-MCNC: 14 MG/DL (ref 5–40)

## 2020-12-14 ENCOUNTER — OFFICE VISIT (OUTPATIENT)
Dept: OBGYN CLINIC | Age: 20
End: 2020-12-14
Payer: MEDICARE

## 2020-12-14 VITALS — SYSTOLIC BLOOD PRESSURE: 132 MMHG | BODY MASS INDEX: 40.66 KG/M2 | DIASTOLIC BLOOD PRESSURE: 86 MMHG | WEIGHT: 253 LBS

## 2020-12-14 DIAGNOSIS — Z11.3 SCREEN FOR STD (SEXUALLY TRANSMITTED DISEASE): ICD-10-CM

## 2020-12-14 DIAGNOSIS — Z12.4 PAP SMEAR FOR CERVICAL CANCER SCREENING: ICD-10-CM

## 2020-12-14 DIAGNOSIS — Z00.00 WELL FEMALE EXAM WITHOUT GYNECOLOGICAL EXAM: Primary | ICD-10-CM

## 2020-12-14 PROBLEM — E66.01 OBESITY, MORBID (HCC): Status: ACTIVE | Noted: 2020-12-14

## 2020-12-14 PROCEDURE — G9717 DOC PT DX DEP/BP F/U NT REQ: HCPCS | Performed by: OBSTETRICS & GYNECOLOGY

## 2020-12-14 PROCEDURE — G8417 CALC BMI ABV UP PARAM F/U: HCPCS | Performed by: OBSTETRICS & GYNECOLOGY

## 2020-12-14 PROCEDURE — G8754 DIAS BP LESS 90: HCPCS | Performed by: OBSTETRICS & GYNECOLOGY

## 2020-12-14 PROCEDURE — G8752 SYS BP LESS 140: HCPCS | Performed by: OBSTETRICS & GYNECOLOGY

## 2020-12-14 PROCEDURE — G0101 CA SCREEN;PELVIC/BREAST EXAM: HCPCS | Performed by: OBSTETRICS & GYNECOLOGY

## 2020-12-14 RX ORDER — NORETHINDRONE ACETATE AND ETHINYL ESTRADIOL 1.5-30(21)
KIT ORAL
Qty: 3 PACKAGE | Refills: 5 | Status: SHIPPED | OUTPATIENT
Start: 2020-12-14 | End: 2022-08-05 | Stop reason: SDUPTHER

## 2020-12-18 ENCOUNTER — OFFICE VISIT (OUTPATIENT)
Dept: PEDIATRIC ENDOCRINOLOGY | Age: 20
End: 2020-12-18
Payer: MEDICARE

## 2020-12-18 VITALS
BODY MASS INDEX: 40.85 KG/M2 | HEIGHT: 66 IN | RESPIRATION RATE: 24 BRPM | HEART RATE: 122 BPM | OXYGEN SATURATION: 100 % | WEIGHT: 254.2 LBS | TEMPERATURE: 97.2 F

## 2020-12-18 DIAGNOSIS — E88.81 INSULIN RESISTANCE: Primary | ICD-10-CM

## 2020-12-18 DIAGNOSIS — E78.89 LIPIDS ABNORMAL: ICD-10-CM

## 2020-12-18 DIAGNOSIS — E66.9 OBESITY (BMI 35.0-39.9 WITHOUT COMORBIDITY): ICD-10-CM

## 2020-12-18 PROCEDURE — G8427 DOCREV CUR MEDS BY ELIG CLIN: HCPCS | Performed by: PEDIATRICS

## 2020-12-18 PROCEDURE — G9717 DOC PT DX DEP/BP F/U NT REQ: HCPCS | Performed by: PEDIATRICS

## 2020-12-18 PROCEDURE — G8417 CALC BMI ABV UP PARAM F/U: HCPCS | Performed by: PEDIATRICS

## 2020-12-18 PROCEDURE — 99215 OFFICE O/P EST HI 40 MIN: CPT | Performed by: PEDIATRICS

## 2020-12-18 PROCEDURE — G8756 NO BP MEASURE DOC: HCPCS | Performed by: PEDIATRICS

## 2020-12-18 NOTE — PROGRESS NOTES
Subjective:     Reason for visit: Sean Morfin is a 23 y.o. female here for follow up of   - Obesity  - Severe insulin resistance based on OGTT, not clinical.  - On Metformin 750 mg twice daily  - Irregular menstrual cycles - on continued OCP as menses cause debilitating migraines  - High LDL     She was last seen 4 months ago. She is here on her own    History of present illness:     Obesity -   + 4 lbs in 4 months  Exercises used to make migraines worse. Improved exercise intolerance, No SOB, No chest pain, palpitations    No polyphagia,   + polydipsia - not a new concern, Nocturia once at night. Denies symptoms of hypothyroidism such as cold tolerance, dry hair, dry skin, constipation. No snoring at night except when really tired. No hip or joint pains    Activity - . Was doing yoga - stopped after dog passed away  Does chores around the home. Not going to the gym as she is concerned about COVID transmission. She was doing the elliptical 3 days a week and lifting weights 3 days a week. Has seen Nutritionist in the past.   Eats healthy always. Occasional treats    Insulin resistance -  OGTT done in 11/2017 revealed insulin resistance  INSULIN   Result Value Ref Range    Insulin 2 hour  277 (H) <37    Insulin - 1 hour  286 (H) <51    Insulin Fasting  37.4 (H) <9 uIU/mL   Started on Metformin  mg 12/2017. Dose decreased to OD 12/2019 as OGTT 1/20 - wnl. taken at bedtime. Improved fasting insulin     A1C is 4.9 12/2019 - (last assessed 3/2019 - 5.1)    1/7/20 -   Component Value Ref Range    Glucose 80  65 - 99 mg/dL    Glucose, 1 hour 107  65 - 199 mg/dL    Glucose, 2 hour 115  65 - 139 mg/dL    INSULIN, FASTING 13.1  2.6 - 24.9 uIU/mL    INSULIN, 60 MINUTES 71.1  0.0 - 163.5 uIU/mL    INSULIN, 120 MINUTES 76.7  0.0 - 145.4 uIU/mL         Increased to Metformin 750 mg Bid 6/2020  No more hypoglycemic symptoms.   Insulin levels are now normal - see below    History of Hypertension - Patient had 24 hour urinary cortisol done with  (Ph# [phone]) at [facility] due to similar concerns of [place] which was normal - 7 (Normal 0-50). Abnormal lipid profile -   On OTC Fish oil. History of Vitamin D deficiency - History - On Vitamin D 400 international units    Menstrual history - attained menarche at age 5 years, started on OCPs 12/2016  for severe menstrual migraines. Followed by Gynecology. She has not had a cycle for more than a year as she is continued OCP's to prevent migraine. Has baseline migraines requiring sumatriptan injections 2-3x a week. Used to be on Depo but associated with weight gain   fu apt with Gynecology yearly - seen 12/14/20. Plan is to continue continued OCP for a total of 5 years. Started on higher dose Estrogen Junel Fe 1.5 - 30 mcg Estradiol     12/11/20 -   CMP - WNL   Component Value Ref Range    Insulin 13.7  2.6 - 24.9 uIU/mL    Cholesterol, total 194  100 - 199 mg/dL    Triglyceride 78  0 - 149 mg/dL    HDL Cholesterol 49  >39 mg/dL    VLDL Cholesterol Sebastian 14  5 - 40 mg/dL    LDL Chol Calc ([facility]) 131* 0 - 99 mg/dL    VITAMIN D, 25-HYDROXY 78.8  30.0 - 100.0 ng/mL    TSH 0.891  0.450 - 4.500 uIU/mL     Past Medical History:   Diagnosis Date    Abdominal migraine     ADHD (attention deficit hyperactivity disorder)     Autism     Developmental delay     GERD (gastroesophageal reflux disease)     Headache     Irritable bowel syndrome with diarrhea 4/13/2017    Migraine     Obesity, morbid ([address] 12/12/2017    Psychiatric disorder     anxiety, ADHD, OCD, HIGH FUNTIONING AUTISM    Sucrose intolerance    Migraines - Improved on Topamax - Now only once a week. Triggers - anxiety, noise , certain aura    Autism and anxiety - Receives counseling. Abnormal gallbladder emptying study with an EF of only 21%. S/p Cholecystectomy. No gall stones. Pts symptoms have resolved.  Rxed for H.pyloriI 8/2019    Sucrose intolerance - recently started Sucrose - Followed by     Past Surgical History:   Procedure Laterality Date    COLONOSCOPY N/A 12/20/2016    [facility]cdonald [facility]COPY    GERD TST W/ MUCOS [address] ELECTROD 50 HR (BRAVO)  12/3/2020         HC CLP BRAVO  11/30/2020    HX CHOLECYSTECTOMY  06/2019    HX ENDOSCOPY  11/2020    dx'd with sucrose intolerance    HX HEENT      WISDOM TEETH    HX TONSIL AND ADENOIDECTOMY      HX TONSILLECTOMY      AGE 1    HX WISDOM TEETH EXTRACTION      VT EGD TRANSORAL BIOPSY SINGLE/MULTIPLE  6/14/2019         VT EGD TRANSORAL BIOPSY SINGLE/MULTIPLE  11/30/2020    UPPER GI ENDOSCOPY,BIOPSY  12/20/2016            Family history: No family history of thyroid disease, heart disease, hypertension or high cholesterol or DM. Fathers side unknown. He has Gout. Father is obese. Mother - Endometriosis  MGF, MGM, Mother - Hypertension  MGreat grandparents - Heartdisease at later age     Social History:  [employer] - online   Doing well. Lost puppy few months ago     ROS:  Constitutional: decreased energy   ENT: normal hearing, no sorethroat   Eye: normal vision, denied double vision, blurred vision  Respiratory system: no wheezing, no respiratory discomfort  CVS: no palpitations, + lower extremity edema - improving - Followed by Cardiology - Significant improvement noted  GI: normal bowel movements, followed by GI   Allergy: no skin rash or angioedema, stria on abdomen and inner thighs and arms, habit of skin picking due to anxiety  Neuorlogical: no focal weakness. No burning  Behavioural: normal behavior, normal mood. Medications - See scanned   Prior to Admission medications    Medication Sig Start Date End Date Taking? Authorizing Provider   norethindrone-ethinyl estradiol-iron (Junel FE 1.5/30, 28,) 1.5 mg-30 mcg (21)/75 mg (7) tab TAKE 1 TAB BY MOUTH DAILY. CONTINUOUS PILLS ONLY.  12/14/20  Yes Tashi Katz MD   ondansetron (ZOFRAN ODT) 8 mg disintegrating tablet PLACE 1 TABLET ON TONGUE & ALLOW TO DISSOLVE EVERY 8 HOURS AS NEEDED FOR NAUSEA WITH HEADACHE 8/30/20  Yes Provider, Historical   Aimovig Autoinjector 140 mg/mL injection INJECT 1 SYRINGE VIA SUBCUTANEOUS ROUTE ONCE A MONTH, AS DIRECTED 9/29/20  Yes Provider, Historical   pantoprazole (PROTONIX) 40 mg tablet Take 1 Tab by mouth two (2) times a day. 10/30/20 10/30/21 Yes Deirdre Murry MD   midazolam (VERSED) 2 mg/mL syrup Take 10 mL by mouth daily as needed (pre-procedure anxiolysis) for up to 1 dose. 10/30/20  Yes Deirdre Murry MD   glucose blood VI test strips (OneTouch Verio test strips) strip Use to test blood sugar up to 2 times daily. 7/29/20  Yes Sergey Momin MD   lancets (One Touch Delica) 33 gauge misc Use to test blood sugar 2 times daily. 7/29/20  Yes Sergey Momin MD   Blood-Glucose Meter (OneTouch Verio Flex meter) misc Use as directed 6/9/20  Yes Sergey Momin MD   metFORMIN ER (GLUCOPHAGE XR) 750 mg tablet TAKE 1 TABLET BY MOUTH TWICE A DAY 3/26/20  Yes Sergey Momin MD   fluticasone (FLOVENT HFA) 220 mcg/actuation inhaler 2 puffs swallowed twice daily, npo x 30 min after dose 1/10/19  Yes Deirdre Murry MD   docusate sodium (STOOL SOFTENER) 250 mg capsule Take 1 Cap by mouth two (2) times daily as needed for Constipation for up to 30 doses. 1/10/19  Yes Deirdre Murry MD   cetirizine (ZYRTEC) 10 mg tablet TK 1 T PO D PRF ALLERGY 9/10/18  Yes Provider, Historical   cholecalciferol, vitamin d3, (VITAMIN D3) 400 unit cap Take  by mouth. Yes Provider, Historical   SUMAtriptan succinate (ZEMBRACE SYMTOUCH) 3 mg/0.5 mL pnij by SubCUTAneous route. Indications: MIGRAINE   Yes Provider, Historical   diphenhydrAMINE (BENADRYL) 25 mg capsule Take by mouth as needed (migraine). Yes Provider, Historical     Allergies:   Allergies   Allergen Reactions    Yeast, Dried Other (comments)     Upset stomach      Objective:       Visit Vitals  Pulse (!) 122   Temp 97.2 °F (36.2 °C) (Temporal)   Resp 24   Ht 5' 6.14\" (1.68 m)   Wt 254 lb 3.2 oz (115.3 kg)   SpO2 100%   BMI 40.85 kg/m²       Wt Readings from Last 3 Encounters:   12/18/20 254 lb 3.2 oz (115.3 kg)   12/14/20 253 lb (114.8 kg)   11/30/20 255 lb 11.7 oz (116 kg)     Ht Readings from Last 3 Encounters:   12/18/20 5' 6.14\" (1.68 m)   10/30/20 5' 6.14\" (1.68 m)   08/17/20 5' 6.14\" (1.68 m)     Height: Facility age limit for growth percentiles is 20 years. Weight: Facility age limit for growth percentiles is 20 years. BMI: Body mass index is 40.85 kg/m². Percentil    Alert, Cooperative     HEENT: No thyromegaly, EOM intact, No tonsillar hypertrophy   S1 S2 heard: Normal rhythm  Bilateral air entry. No rhonchi or crepitation    Abdomen is soft, non tender, No organomegaly   MSK - Normal ROM  Skin - No rashes or birth marks, striae on abdomen, upper arms and inner thighs - improved compared to previous. Few are wide, No acanthosis  Improved lower extremity edema - non pitting.     Laboratory data:  See above    Radiology -   4/2017 - Pelvic US - wnl        Assessment/ Plan :       Patricia Perez is a 23 y.o. female presenting     - Obesity   - Insulin resistance - On Metformin   - High LDL     Plan -   Diagnosis, etiology, pathophysiology, risk/ benefits of rx, proposed eval, and expected follow up discussed with family and all questions answered    Fasting labs prior to next visit   Orders Placed This Encounter    INSULIN     Standing Status:   Future     Standing Expiration Date:   6/18/2021    C-PEPTIDE     Standing Status:   Future     Standing Expiration Date:   12/18/2021    LIPID PANEL     Standing Status:   Future     Standing Expiration Date:   12/18/2021     Continue Metformin 750 mg bid  Follow up in 5 month - Will need to switch to Adult Endocrine    Total time with patient 40 minutes  Time spent counseling patient more than 50%

## 2020-12-18 NOTE — LETTER
12/18/2020 Patient: Mukul Mendez YOB: 2000 Date of Visit: 12/18/2020 Lexa Trevino DO 
[address] [address] Via Fax: [phone] Dear Lexa Trevino DO, Thank you for referring Ms. Mare Bettencourt to [facility] for evaluation. My notes for this consultation are attached. Chief Complaint Patient presents with  Follow-up  
  insulin levels Subjective:  
 
Reason for visit: Mukul Mendez is a 23 y.o. female here for follow up of - Obesity - Severe insulin resistance based on OGTT, not clinical.  - On Metformin 750 mg twice daily - Irregular menstrual cycles - on continued OCP as menses cause debilitating migraines 
- High LDL She was last seen 4 months ago. She is here on her own History of present illness:  
 
Obesity -  
+ 4 lbs in 4 months Exercises used to make migraines worse. Improved exercise intolerance, No SOB, No chest pain, palpitations No polyphagia,  
+ polydipsia - not a new concern, Nocturia once at night. Denies symptoms of hypothyroidism such as cold tolerance, dry hair, dry skin, constipation. No snoring at night except when really tired. No hip or joint pains Activity - . Was doing yoga - stopped after dog passed away Does chores around the home. Not going to the gym as she is concerned about COVID transmission. She was doing the elliptical 3 days a week and lifting weights 3 days a week. Has seen Nutritionist in the past.  
Eats healthy always. Occasional treats Insulin resistance -  OGTT done in 11/2017 revealed insulin resistance INSULIN Result Value Ref Range Insulin 2 hour  277 (H) <37 Insulin - 1 hour  286 (H) <51 Insulin Fasting  37.4 (H) <9 uIU/mL Started on Metformin  mg 12/2017. Dose decreased to OD 12/2019 as OGTT 1/20 - wnl. taken at bedtime. Improved fasting insulin A1C is 4.9 12/2019 - (last assessed 3/2019 - 5.1) 
 
1/7/20 - Component Value Ref Range  Glucose 80  65 - 99 mg/dL  Glucose, 1 hour 107  65 - 199 mg/dL  Glucose, 2 hour 115  65 - 139 mg/dL  INSULIN, FASTING 13.1  2.6 - 24.9 uIU/mL  INSULIN, 60 MINUTES 71.1  0.0 - 163.5 uIU/mL  INSULIN, 120 MINUTES 76.7  0.0 - 145.4 uIU/mL Increased to Metformin 750 mg Bid 6/2020 No more hypoglycemic symptoms. Insulin levels are now normal - see below History of Hypertension - Patient had 24 hour urinary cortisol done with  (Ph# [phone]) at [facility] due to similar concerns of [place] which was normal - 7 (Normal 0-50). Abnormal lipid profile - On OTC Fish oil. History of Vitamin D deficiency - History - On Vitamin D 400 international units Menstrual history - attained menarche at age 5 years, started on OCPs 12/2016  for severe menstrual migraines. Followed by Gynecology. She has not had a cycle for more than a year as she is continued OCP's to prevent migraine. Has baseline migraines requiring sumatriptan injections 2-3x a week. Used to be on Depo but associated with weight gain  
fu apt with Gynecology yearly - seen 12/14/20. Plan is to continue continued OCP for a total of 5 years. Started on higher dose Estrogen Junel Fe 1.5 - 30 mcg Estradiol 12/11/20 -  
CMP - WNL Component Value Ref Range  Insulin 13.7  2.6 - 24.9 uIU/mL  Cholesterol, total 194  100 - 199 mg/dL  Triglyceride 78  0 - 149 mg/dL  HDL Cholesterol 49  >39 mg/dL  VLDL Cholesterol Sebastian 14  5 - 40 mg/dL  LDL Chol Calc (NIH) 131* 0 - 99 mg/dL  VITAMIN D, 25-HYDROXY 78.8  30.0 - 100.0 ng/mL  TSH 0.891  0.450 - 4.500 uIU/mL Past Medical History:  
Diagnosis Date  Abdominal migraine  ADHD (attention deficit hyperactivity disorder)  Autism  Developmental delay  GERD (gastroesophageal reflux disease)  Headache  Irritable bowel syndrome with diarrhea 4/13/2017  Migraine  Obesity, morbid ([address] 12/12/2017  Psychiatric disorder   
 anxiety, ADHD, OCD, HIGH FUNTIONING AUTISM  Sucrose intolerance Migraines - Improved on Topamax - Now only once a week. Triggers - anxiety, noise , certain aura Autism and anxiety - Receives counseling. Abnormal gallbladder emptying study with an EF of only 21%. S/p Cholecystectomy. No gall stones. Pts symptoms have resolved. Rxed for H.pyloriI 8/2019 Sucrose intolerance - recently started Sucrose - Followed by Juliane Whitley Past Surgical History:  
Procedure Laterality Date  COLONOSCOPY N/A 12/20/2016 COLONOSCOPY performed by Jose Marvin MD at [facility] ENDOSCOPY  GERD TST W/ MUCOS PH ELECTROD 48 HR (BRAVO)  12/3/2020  HC CLP BRAVO  11/30/2020  HX CHOLECYSTECTOMY  06/2019  HX ENDOSCOPY  11/2020  
 dx'd with sucrose intolerance  HX HEENT    
 WISDOM TEETH  
 HX TONSIL AND ADENOIDECTOMY  HX TONSILLECTOMY    
 AGE 1  
 HX WISDOM TEETH EXTRACTION    
 TN EGD TRANSORAL BIOPSY SINGLE/MULTIPLE  6/14/2019  TN EGD TRANSORAL BIOPSY SINGLE/MULTIPLE  11/30/2020  UPPER GI ENDOSCOPY,BIOPSY  12/20/2016 Family history: No family history of thyroid disease, heart disease, hypertension or high cholesterol or DM. Fathers side unknown. He has Gout. Father is obese. Mother - Endometriosis MGF, MGM, Mother - Hypertension MGreat grandparents - Heartdisease at later age Social History: [employer] - online Doing well. Lost puppy few months ago ROS: 
Constitutional: decreased energy ENT: normal hearing, no sorethroat Eye: normal vision, denied double vision, blurred vision Respiratory system: no wheezing, no respiratory discomfort CVS: no palpitations, + lower extremity edema - improving - Followed by Cardiology - Significant improvement noted GI: normal bowel movements, followed by GI  
 Allergy: no skin rash or angioedema, stria on abdomen and inner thighs and arms, habit of skin picking due to anxiety Neuorlogical: no focal weakness. No burning Behavioural: normal behavior, normal mood. Medications - See scanned Prior to Admission medications Medication Sig Start Date End Date Taking? Authorizing Provider  
norethindrone-ethinyl estradiol-iron (Junel FE 1.5/30, 28,) 1.5 mg-30 mcg (21)/75 mg (7) tab TAKE 1 TAB BY MOUTH DAILY. CONTINUOUS PILLS ONLY. 12/14/20  Yes Alexandra Alamo MD  
ondansetron (ZOFRAN ODT) 8 mg disintegrating tablet PLACE 1 TABLET ON TONGUE & ALLOW TO DISSOLVE EVERY 8 HOURS AS NEEDED FOR NAUSEA WITH HEADACHE 8/30/20  Yes Provider, Historical  
Aimovig Autoinjector 140 mg/mL injection INJECT 1 SYRINGE VIA SUBCUTANEOUS ROUTE ONCE A MONTH, AS DIRECTED 9/29/20  Yes Provider, Historical  
pantoprazole (PROTONIX) 40 mg tablet Take 1 Tab by mouth two (2) times a day. 10/30/20 10/30/21 Yes Judy Ochoa MD  
midazolam (VERSED) 2 mg/mL syrup Take 10 mL by mouth daily as needed (pre-procedure anxiolysis) for up to 1 dose. 10/30/20  Yes Judy Ochoa MD  
glucose blood VI test strips (OneTouch Verio test strips) strip Use to test blood sugar up to 2 times daily. 7/29/20  Yes Otoniel Soto MD  
lancets (One Touch Delica) 33 gauge misc Use to test blood sugar 2 times daily. 7/29/20  Yes Otoniel Soto MD  
Blood-Glucose Meter (OneTouch Verio Flex meter) misc Use as directed 6/9/20  Yes Otoniel Soto MD  
metFORMIN ER (GLUCOPHAGE XR) 750 mg tablet TAKE 1 TABLET BY MOUTH TWICE A DAY 3/26/20  Yes Otoniel Soto MD  
fluticasone (FLOVENT HFA) 220 mcg/actuation inhaler 2 puffs swallowed twice daily, npo x 30 min after dose 1/10/19  Yes Judy Ochoa MD  
docusate sodium (STOOL SOFTENER) 250 mg capsule Take 1 Cap by mouth two (2) times daily as needed for Constipation for up to 30 doses.  1/10/19  Yes Judy Ochoa MD  
 cetirizine (ZYRTEC) 10 mg tablet TK 1 T PO D PRF ALLERGY 9/10/18  Yes Provider, Historical  
cholecalciferol, vitamin d3, (VITAMIN D3) 400 unit cap Take  by mouth. Yes Provider, Historical  
SUMAtriptan succinate (ZEMBRACE SYMTOUCH) 3 mg/0.5 mL pnij by SubCUTAneous route. Indications: MIGRAINE   Yes Provider, Historical  
diphenhydrAMINE (BENADRYL) 25 mg capsule Take by mouth as needed (migraine). Yes Provider, Historical  
 
Allergies: Allergies Allergen Reactions  Yeast, Dried Other (comments) Upset stomach Objective:  
 
 
Visit Vitals Pulse (!) 122 Temp 97.2 °F (36.2 °C) (Temporal) Resp 24 Ht 5' 6.14\" (1.68 m) Wt 254 lb 3.2 oz (115.3 kg) SpO2 100% BMI 40.85 kg/m² Wt Readings from Last 3 Encounters:  
12/18/20 254 lb 3.2 oz (115.3 kg) 12/14/20 253 lb (114.8 kg)  
11/30/20 255 lb 11.7 oz (116 kg) Ht Readings from Last 3 Encounters:  
12/18/20 5' 6.14\" (1.68 m) 10/30/20 5' 6.14\" (1.68 m) 08/17/20 5' 6.14\" (1.68 m) Height: Facility age limit for growth percentiles is 20 years. Weight: Facility age limit for growth percentiles is 20 years. BMI: Body mass index is 40.85 kg/m². Percentil Alert, Cooperative HEENT: No thyromegaly, EOM intact, No tonsillar hypertrophy S1 S2 heard: Normal rhythm Bilateral air entry. No rhonchi or crepitation Abdomen is soft, non tender, No organomegaly MSK - Normal ROM Skin - No rashes or birth marks, striae on abdomen, upper arms and inner thighs - improved compared to previous. Few are wide, No acanthosis Improved lower extremity edema - non pitting. Laboratory data: 
See above Radiology -  
4/2017 - Pelvic US - wnl Assessment/ Plan :  
 
 
Natalya Jarrell is a 23 y.o. female presenting - Obesity  
- Insulin resistance - On Metformin  
- High LDL Plan -  
 Diagnosis, etiology, pathophysiology, risk/ benefits of rx, proposed eval, and expected follow up discussed with family and all questions answered Fasting labs prior to next visit Orders Placed This Encounter  INSULIN Standing Status:   Future Standing Expiration Date:   6/18/2021  C-PEPTIDE Standing Status:   Future Standing Expiration Date:   12/18/2021  LIPID PANEL Standing Status:   Future Standing Expiration Date:   12/18/2021 Continue Metformin 750 mg bid Follow up in 5 month - Will need to switch to Adult Endocrine Total time with patient 40 minutes Time spent counseling patient more than 50% If you have questions, please do not hesitate to call me. I look forward to following your patient along with you. Sincerely, Debbie Abbott MD

## 2020-12-30 ENCOUNTER — TELEPHONE (OUTPATIENT)
Dept: PEDIATRIC GASTROENTEROLOGY | Age: 20
End: 2020-12-30

## 2020-12-30 DIAGNOSIS — K58.0 IRRITABLE BOWEL SYNDROME WITH DIARRHEA: Primary | ICD-10-CM

## 2020-12-30 DIAGNOSIS — K21.9 GASTROESOPHAGEAL REFLUX DISEASE WITHOUT ESOPHAGITIS: ICD-10-CM

## 2020-12-30 DIAGNOSIS — E74.31 SUCRASE-ISOMALTASE DEFICIENCY: ICD-10-CM

## 2020-12-30 PROBLEM — E73.9 LACTOSE INTOLERANCE: Status: RESOLVED | Noted: 2017-05-15 | Resolved: 2020-12-30

## 2020-12-30 NOTE — TELEPHONE ENCOUNTER
Beltran Campbell and Chrissy,    I was able to reach Bonita and we briefly discussed that nosebleeds are not a known side effect of Sucraid. I agree that the nosebleeds are coincidental, and could be from the cold, dry air. I advised barrier ointment to the nasal mucosa. Bonita is experiencing some benefit from Sucraid use, however is still tracking symptoms. We have an appointment in 2 weeks, where we will discuss further.     Thank you, Christopher Zafar

## 2021-01-13 ENCOUNTER — OFFICE VISIT (OUTPATIENT)
Dept: PEDIATRIC GASTROENTEROLOGY | Age: 21
End: 2021-01-13
Payer: MEDICARE

## 2021-01-13 VITALS
RESPIRATION RATE: 18 BRPM | BODY MASS INDEX: 42.01 KG/M2 | TEMPERATURE: 99.3 F | DIASTOLIC BLOOD PRESSURE: 88 MMHG | HEIGHT: 66 IN | SYSTOLIC BLOOD PRESSURE: 139 MMHG | WEIGHT: 261.4 LBS | HEART RATE: 112 BPM | OXYGEN SATURATION: 99 %

## 2021-01-13 DIAGNOSIS — K31.84 GASTROPARESIS: ICD-10-CM

## 2021-01-13 DIAGNOSIS — E74.31 SUCRASE-ISOMALTASE DEFICIENCY: ICD-10-CM

## 2021-01-13 DIAGNOSIS — K58.0 IRRITABLE BOWEL SYNDROME WITH DIARRHEA: ICD-10-CM

## 2021-01-13 DIAGNOSIS — K21.9 GASTROESOPHAGEAL REFLUX DISEASE WITHOUT ESOPHAGITIS: Primary | ICD-10-CM

## 2021-01-13 DIAGNOSIS — R11.0 CHRONIC NAUSEA: ICD-10-CM

## 2021-01-13 PROCEDURE — G8752 SYS BP LESS 140: HCPCS | Performed by: PEDIATRICS

## 2021-01-13 PROCEDURE — G8417 CALC BMI ABV UP PARAM F/U: HCPCS | Performed by: PEDIATRICS

## 2021-01-13 PROCEDURE — G9717 DOC PT DX DEP/BP F/U NT REQ: HCPCS | Performed by: PEDIATRICS

## 2021-01-13 PROCEDURE — G8427 DOCREV CUR MEDS BY ELIG CLIN: HCPCS | Performed by: PEDIATRICS

## 2021-01-13 PROCEDURE — 99214 OFFICE O/P EST MOD 30 MIN: CPT | Performed by: PEDIATRICS

## 2021-01-13 PROCEDURE — G8754 DIAS BP LESS 90: HCPCS | Performed by: PEDIATRICS

## 2021-01-13 RX ORDER — CITALOPRAM 40 MG/1
10 TABLET, FILM COATED ORAL
COMMUNITY
Start: 2020-11-02 | End: 2021-12-10

## 2021-01-13 RX ORDER — HYDROXYZINE 25 MG/1
TABLET, FILM COATED ORAL
COMMUNITY
Start: 2020-12-03 | End: 2021-09-08

## 2021-01-13 RX ORDER — HYDROXYZINE 25 MG/1
TABLET, FILM COATED ORAL
COMMUNITY
Start: 2020-12-03

## 2021-01-13 RX ORDER — DOXYCYCLINE 100 MG/1
CAPSULE ORAL
COMMUNITY
Start: 2020-12-16 | End: 2021-04-02

## 2021-01-13 NOTE — PATIENT INSTRUCTIONS
1.  Continue omeprazole for now    2. Re-introduce smoothies gradually  3. Continue Sucraid, no dietary restriction necessary! 4.   Return to clinic in 6 months

## 2021-01-13 NOTE — LETTER
1/16/2021 1:27 PM 
 
Ms. Kuldip Beaulieu [id number] [facility] [id number] [phone] Dear Glenn Jean DO, 
 
I had the opportunity to see your patient, Kuldip Beaulieu, 2000, in the [facility] clinic. Please find my impression and suggestions attached. Feel free to call our office with any questions, [phone]. Sincerely, Steven Mccord MD

## 2021-01-16 NOTE — PROGRESS NOTES
Date: 1/16/2021    Dear Heidi Norwood, DO:    Tasha Kamara is 23 y.o. young lady with chronic GERD and gaseous abdominal pain related to sucrose intolerance. She has done very well with Sucraid use and will likely in time be able to wean PPI therapy. I encouraged Erica to restart smoothie consumption in order to control appetite. I am hopeful in time that normalization of eating pattern and improved exercise tolerance will again put Erica back on track with treating her pre-diabetes. Plan:   1. Continue omeprazole for now    2. Re-introduce smoothies gradually  3. Continue Sucraid, no dietary restriction necessary! 4. Return to clinic in 6 months          HPI: Tasha Kamara returns to clinic today accompanied by her mother following the recent repeat upper endoscopy. The biopsies revealed reflux and sucrase deficiency. Tasha Kamara is pleased to report that all nausea and abdominal pain has resolved with Sucraid use. She is perfectly adherent to PPI therapy of GERD, and denies breakthrough reflux symptoms. Tasha Kamara notes improved exercise tolerance. It seems any valsalva pressure and calisthenic exercise would lead to reflux symptoms and pressure-like abdominal pain, and this has also completely resolved on Sucraid. Tasha Kamara notes her increasing weight, however is anxious to get back to the gym once she recovers from a fall and subsequent muscular neck injury. She has not yet restarted smoothie consumption, which had been successful at keeping her satiated and reducing other consumption of food. I encouraged Tasha Kamara that with Sucraid use, she should do well with smoothies.       Medications:   Current Outpatient Medications   Medication Sig    hydrOXYzine HCL (ATARAX) 25 mg tablet TAKE 1 TO 2 TABLETS BY MOUTH TWICE A DAY AS NEEDED FOR ANXIETY    doxycycline (MONODOX) 100 mg capsule TAKE 1 CAPSULE BY MOUTH EVERYDAY AT BEDTIME    hydrOXYzine HCL (ATARAX) 25 mg tablet TAKE 1 TO 2 TABLETS BY MOUTH TWICE A DAY AS NEEDED FOR ANXIETY    citalopram (CELEXA) 40 mg tablet TAKE 1 TABLET BY MOUTH EVERY DAY    norethindrone-ethinyl estradiol-iron (Junel FE 1.5/30, 28,) 1.5 mg-30 mcg (21)/75 mg (7) tab TAKE 1 TAB BY MOUTH DAILY. CONTINUOUS PILLS ONLY.  ondansetron (ZOFRAN ODT) 8 mg disintegrating tablet PLACE 1 TABLET ON TONGUE & ALLOW TO DISSOLVE EVERY 8 HOURS AS NEEDED FOR NAUSEA WITH HEADACHE    Aimovig Autoinjector 140 mg/mL injection INJECT 1 SYRINGE VIA SUBCUTANEOUS ROUTE ONCE A MONTH, AS DIRECTED    Blood-Glucose Meter (OneTouch Verio Flex meter) misc Use as directed    docusate sodium (STOOL SOFTENER) 250 mg capsule Take 1 Cap by mouth two (2) times daily as needed for Constipation for up to 30 doses.  cholecalciferol, vitamin d3, (VITAMIN D3) 400 unit cap Take  by mouth.  SUMAtriptan succinate (ZEMBRACE SYMTOUCH) 3 mg/0.5 mL pnij by SubCUTAneous route. Indications: MIGRAINE    pantoprazole (PROTONIX) 40 mg tablet Take 1 Tab by mouth two (2) times a day. (Patient taking differently: Take 40 mg by mouth daily. 1 Tab at night)    midazolam (VERSED) 2 mg/mL syrup Take 10 mL by mouth daily as needed (pre-procedure anxiolysis) for up to 1 dose.  glucose blood VI test strips (OneTouch Verio test strips) strip Use to test blood sugar up to 2 times daily.  lancets (One Touch Delica) 33 gauge misc Use to test blood sugar 2 times daily.  metFORMIN ER (GLUCOPHAGE XR) 750 mg tablet TAKE 1 TABLET BY MOUTH TWICE A DAY (Patient taking differently: daily. 2 tab at night)    fluticasone (FLOVENT HFA) 220 mcg/actuation inhaler 2 puffs swallowed twice daily, npo x 30 min after dose    cetirizine (ZYRTEC) 10 mg tablet TK 1 T PO D PRF ALLERGY    diphenhydrAMINE (BENADRYL) 25 mg capsule 2 tabs PO q8h PRN     No current facility-administered medications for this visit. Allergies:    Allergies   Allergen Reactions    Yeast, Dried Other (comments)     Upset stomach        ROS: A 12 point review of systems was obtained and was as per HPI, otherwise negative. 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 Her oral temperature is 99.3 °F (37.4 °C). Her blood pressure is 139/88 and her pulse is 112 (abnormal). Her respiration is 18 and oxygen saturation is 99%. Last 3 Recorded Weights in this Encounter    01/13/21 1106   Weight: 261 lb 6.4 oz (118.6 kg)       PHYSICAL EXAM:    General: healthy, alert, well developed, well nourished  ENT: anicteric sclera, moist oral mucosa, no oral lesions  Abdomen: soft, non tender, mild gaseous distention  Perianal/Rectal exam: deferred      Cardiovascular: RRR, well-perfused, no murmur  Skin:  erythema over the cheeks and upper arms    Neuro: alert, reactive, normal muscle tone  Psych: appropriate affect and interactions  Pulmonary:  Clear Breath Sounds Bilaterally, No Increased Effort   Musc/Skel: no swelling or tenderness    Studies: EGD revealing for mild chronic gastritis, H. Pylori negative. Bravo clip showed normal reflux activity.       Disaccharidase enzyme activity testing revealing for sucrase deficiency:                      Thank you for referring Ana Dorman to our clinic, we appreciate participating in their care. All patient and caregiver questions and concerns were addressed during the visit. Major risks, benefits, and side-effects of therapy were discussed.

## 2021-02-24 DIAGNOSIS — E88.81 INSULIN RESISTANCE: ICD-10-CM

## 2021-02-25 LAB
C PEPTIDE SERPL-MCNC: 4.1 NG/ML (ref 1.1–4.4)
CHOLEST SERPL-MCNC: 205 MG/DL (ref 100–199)
HDLC SERPL-MCNC: 53 MG/DL
INSULIN SERPL-ACNC: 18 UIU/ML (ref 2.6–24.9)
INTERPRETATION, 910389: NORMAL
LDLC SERPL CALC-MCNC: 128 MG/DL (ref 0–99)
TRIGL SERPL-MCNC: 133 MG/DL (ref 0–149)
VLDLC SERPL CALC-MCNC: 24 MG/DL (ref 5–40)

## 2021-02-25 RX ORDER — METFORMIN HYDROCHLORIDE 750 MG/1
TABLET, EXTENDED RELEASE ORAL
Qty: 180 TAB | Refills: 2 | Status: SHIPPED | OUTPATIENT
Start: 2021-02-25 | End: 2021-07-20 | Stop reason: SDUPTHER

## 2021-02-26 NOTE — PROGRESS NOTES
Reviewed results with patient. Patient will be seen in 3 weeks. Patient reports that she is gaining weight.

## 2021-03-08 ENCOUNTER — OFFICE VISIT (OUTPATIENT)
Dept: RHEUMATOLOGY | Age: 21
End: 2021-03-08
Payer: MEDICARE

## 2021-03-08 VITALS
OXYGEN SATURATION: 98 % | DIASTOLIC BLOOD PRESSURE: 95 MMHG | HEART RATE: 126 BPM | BODY MASS INDEX: 44.85 KG/M2 | TEMPERATURE: 97.1 F | WEIGHT: 269.2 LBS | HEIGHT: 65 IN | RESPIRATION RATE: 20 BRPM | SYSTOLIC BLOOD PRESSURE: 130 MMHG

## 2021-03-08 DIAGNOSIS — R21 RASH AND NONSPECIFIC SKIN ERUPTION: Primary | ICD-10-CM

## 2021-03-08 DIAGNOSIS — R06.09 DYSPNEA ON EXERTION: ICD-10-CM

## 2021-03-08 DIAGNOSIS — R76.8 ANA POSITIVE: ICD-10-CM

## 2021-03-08 PROCEDURE — 99205 OFFICE O/P NEW HI 60 MIN: CPT | Performed by: INTERNAL MEDICINE

## 2021-03-08 PROCEDURE — G8752 SYS BP LESS 140: HCPCS | Performed by: INTERNAL MEDICINE

## 2021-03-08 PROCEDURE — G9717 DOC PT DX DEP/BP F/U NT REQ: HCPCS | Performed by: INTERNAL MEDICINE

## 2021-03-08 PROCEDURE — G8417 CALC BMI ABV UP PARAM F/U: HCPCS | Performed by: INTERNAL MEDICINE

## 2021-03-08 PROCEDURE — G8427 DOCREV CUR MEDS BY ELIG CLIN: HCPCS | Performed by: INTERNAL MEDICINE

## 2021-03-08 PROCEDURE — G8755 DIAS BP > OR = 90: HCPCS | Performed by: INTERNAL MEDICINE

## 2021-03-08 RX ORDER — LISDEXAMFETAMINE DIMESYLATE 50 MG/1
CAPSULE ORAL
COMMUNITY
Start: 2021-01-07 | End: 2021-09-08 | Stop reason: ALTCHOICE

## 2021-03-08 NOTE — LETTER
3/15/2021 Patient: Radha Saini YOB: 2000 Date of Visit: 3/8/2021 Gabbie Mejia DO 
[address] [place] [id number] [address] Fax: [phone] Dear Gabbie Mejia DO, Thank you for referring Ms. Susanna Chopra to [address] [place] [address] for evaluation. My notes for this consultation are attached. If you have questions, please do not hesitate to call me. I look forward to following your patient along with you.  
 
 
Sincerely, 
 
Deb Lorenzana MD

## 2021-03-08 NOTE — PATIENT INSTRUCTIONS
1. You do have high-titer antinuclear antibodies, which can associate with autoimmune disease, but so far your workup for organ function has been reassuring against full-blown systemic autoimmune disease. I'd like to check more lab tests and a chest xray to better understand how active your immune system is. The preferred medication which may help things like the skin lesions and joint pains would be Plaquenil (hydroxychloroquine). 2. Please reach out to your prior dermatologists to get the results of any biopsies done. This would be very helpful in helping understand the causes of your skin rash. 3. Xrays, any [facility] location [facility], [facility], etc). 4. Labs from [facility]    5. Try Xylimelts for dry mouth/thirst prevention in the evenings so you're not up so much    6. Return in 4 weeks to review the results and determine next steps.

## 2021-03-08 NOTE — PROGRESS NOTES
REASON FOR VISIT:   Cristiane Deal is a 23 y.o. female with history of migraines and ADHD who is being referred to [facility] Rheumatology at the request of Dr. Samantha Whitley, regarding a diagnosis of +WENDY. HISTORY OF PRESENT ILLNESS    Since 4 or 4yo, has had recurrent nodules on the skin diffusely--face, arms, legs, abdomen. Can drain pus. Has been treated over the years with oral antibiotics and topical antibiotics which help; swabs have been MRSA-positive in the past. Has tried Hibiclens baths in the past repeatedly without improvement. Pruritic, seem to respond to topical Mupirocin. Has had shave biopsy with Derm at [facility] in the past, no results discussed in available notes; last seen in November when diagnosed with keratosis pilaris and rosacea. In February, PCP ordered labs including an WENDY screen which returned +1:320 speckled and 1:640 nuclear dot pattern, with mild elevations of ESR (26) and CRP (17mg/L). Turns \"red as a lobster\" in the sun even with sunscreen use, has always been the case. No chronic cough or progressive dyspnea on exertion. Nonrefreshing sleep and always fatigued in the evening. Runs to the bathroom repeatedly through the evening, doesn't feel she can reduce her fluid intake in the afternoon/evenings 2/2 chronic thirst and dry mouth. Sleep study ~3 years ago she says was normal.    Has more subjective eye dryness, not currently using AT's consistently or following with ophthalmology. Cold intolerant, feels hands and feet always feel like ice, feels worse over the last 2 years. Gaining weight again after losing nearly 100 pounds with metformin; has gained 15 pounds in the last 3 months. Feels digestive issues (possible gastroparesis in the past with postprandial pain, biliary dyskinesis s/p cholecystectomy) have resolved since starting a sucrose intolerance diet.  Stool softeners haven't helped in the past. On current diet she is having daily bowel movements without abdominal pain.    Knees ache. Denies joint swelling. Has sprained ankles in past and now feels wrists get more sore with use. REVIEW OF SYSTEMS  A comprehensive review of systems was negative except for that written in the HPI. A 10-point review of systems is per the new patient questionnaire, which has been reviewed extensively and scanned into the electronic medical record for future reference. Review of systems is as above and is otherwise negative. ALLERGIES  Yeast, dried    MEDICATIONS  Current Outpatient Medications   Medication Sig    Vyvanse 50 mg cap TAKE 1 CAPSULE BY MOUTH EVERY DAY IN THE MORNING    metFORMIN ER (GLUCOPHAGE XR) 750 mg tablet TAKE 1 TABLET BY MOUTH TWICE A DAY    hydrOXYzine HCL (ATARAX) 25 mg tablet TAKE 1 TO 2 TABLETS BY MOUTH TWICE A DAY AS NEEDED FOR ANXIETY    doxycycline (MONODOX) 100 mg capsule TAKE 1 CAPSULE BY MOUTH EVERYDAY AT BEDTIME    norethindrone-ethinyl estradiol-iron (Junel FE 1.5/30, 28,) 1.5 mg-30 mcg (21)/75 mg (7) tab TAKE 1 TAB BY MOUTH DAILY. CONTINUOUS PILLS ONLY.  ondansetron (ZOFRAN ODT) 8 mg disintegrating tablet PLACE 1 TABLET ON TONGUE & ALLOW TO DISSOLVE EVERY 8 HOURS AS NEEDED FOR NAUSEA WITH HEADACHE    Aimovig Autoinjector 140 mg/mL injection INJECT 1 SYRINGE VIA SUBCUTANEOUS ROUTE ONCE A MONTH, AS DIRECTED    pantoprazole (PROTONIX) 40 mg tablet Take 1 Tab by mouth two (2) times a day. (Patient taking differently: Take 40 mg by mouth daily. 1 Tab at night)    glucose blood VI test strips (OneTouch Verio test strips) strip Use to test blood sugar up to 2 times daily.  lancets (One Touch Delica) 33 gauge misc Use to test blood sugar 2 times daily.  docusate sodium (STOOL SOFTENER) 250 mg capsule Take 1 Cap by mouth two (2) times daily as needed for Constipation for up to 30 doses.  cholecalciferol, vitamin d3, (VITAMIN D3) 400 unit cap Take  by mouth.  SUMAtriptan succinate (ZEMBRACE SYMTOUCH) 3 mg/0.5 mL pnij by SubCUTAneous route. Indications: MIGRAINE    hydrOXYzine HCL (ATARAX) 25 mg tablet TAKE 1 TO 2 TABLETS BY MOUTH TWICE A DAY AS NEEDED FOR ANXIETY    citalopram (CELEXA) 40 mg tablet TAKE 1 TABLET BY MOUTH EVERY DAY    midazolam (VERSED) 2 mg/mL syrup Take 10 mL by mouth daily as needed (pre-procedure anxiolysis) for up to 1 dose.  Blood-Glucose Meter (OneTouch Verio Flex meter) misc Use as directed    fluticasone (FLOVENT HFA) 220 mcg/actuation inhaler 2 puffs swallowed twice daily, npo x 30 min after dose    cetirizine (ZYRTEC) 10 mg tablet TK 1 T PO D PRF ALLERGY    diphenhydrAMINE (BENADRYL) 25 mg capsule 2 tabs PO q8h PRN     No current facility-administered medications for this visit. PAST MEDICAL HISTORY  Past Medical History:   Diagnosis Date    Abdominal migraine     ADHD (attention deficit hyperactivity disorder)     Autism     Developmental delay     GERD (gastroesophageal reflux disease)     Headache     Irritable bowel syndrome with diarrhea 4/13/2017    Migraine     Obesity, morbid ([address] 12/12/2017    Psychiatric disorder     anxiety, ADHD, OCD, HIGH FUNTIONING AUTISM    Sucrose intolerance        FAMILY HISTORY  family history includes Delayed Awakening in her mother; Endometriosis in her mother; Headache in her maternal grandfather and mother; MS in her maternal aunt; No Known Problems in her father and maternal grandmother; Post-op Nausea/Vomiting in her mother. Mother diagnosed with sarcoidosis. Father has gout. Maternal aunt has MS.    SOCIAL HISTORY  She  reports that she has never smoked. She has never used smokeless tobacco. She reports that she does not drink alcohol or use drugs.   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 No flowsheet data found. General:  The patient is pleasant, obese, alert, and in no apparent distress. Eyes: Sclera are anicteric. No conjunctival injection. HEENT:  Oropharynx is clear. No oral ulcers. Adequate salivary pooling. No cervical or supraclavicular lymphadenopathy. Lungs:  Clear to auscultation bilaterally, without wheeze or stridor. Normal respiratory effort. Cor:  Regular rate and rhythm. No murmur rub or gallop. Abdomen: Soft, non-tender, without hepatomegaly or masses. Extremities: No calf tenderness or edema. Warm and well perfused. Skin:  Maxillary erythema and telangiectasias without edema. No heliotrope. Striae proximal to antecubital fossae. Violaceous discoloration over bilateral upper arms. Grossly normal nailfold capillaries. No sclerodacytly. Neuro: Nonfocal  Musculoskeletal:    A comprehensive musculoskeletal exam was performed for all joints of each upper and lower extremity and assessed for swelling, tenderness and range of motion. Results are documented as below:  No evidence of synovitis in the small joints of the hands, wrists, shoulders, elbows, hips, knees or ankles. Labs:  21: WBC 8.9, Hgb 15.6, Plt 472; ANCAs negative, PR3 and MPO negative; Cr 0.7, CMP WNL;  WENDY 1:320 speckled, 1:640 nuclear dot; C3 high, C4 WNL; negative dsDNA, Scl70, SSA, SSB, RNP, Sm, ribosomal P, chromatin, centromere B antibodies. RF <10, CCP negative; ESR 26, CRP 17mg/L. Hep B cAb neg, Hep B sAg neg, Hep C Ab neg      Imagin19 CXR (report only): Normal chest views. No change. ASSESSMENT AND PLAN  Ms. Sherin Gutierrez is a 23 y.o. female who presents for evaluation of resting tachycardia, pruritic nodules, livedo on exam, and +WENDY 1:320 speckled and 1:640 nuclear dot patterns.  Chronic GI discomfort seems to have resolved with sucrose-intolerance diet which is reassuring against gastroparesis which could associate with lupus or systemic sclerosis. Screening with myositis panel and other markers of lupus activity, though with normal organ function the primary concern would be undifferentiated connective tissue disease (UCTD). 1. Rash and nonspecific skin eruption  - MYOMARKER 3 PLUS PROFILE (RDL); Future  - CK; Future  - DIRECT NEO; Future  - C REACTIVE PROTEIN, QT; Future  - PROT+CREATU (RANDOM); Future  - URINALYSIS W/ REFLEX CULTURE; Future  - ANTIPHOSPHOLIPID SYNDROME PANEL; Future  - SED RATE (ESR); Future  - MYOMARKER 3 PLUS PROFILE (RDL)  - CK  - DIRECT NEO  - C REACTIVE PROTEIN, QT  - PROT+CREATU (RANDOM)  - URINALYSIS W/ REFLEX CULTURE  - ANTIPHOSPHOLIPID SYNDROME PANEL  - SED RATE (ESR)    2. WENDY positive  - MYOMARKER 3 PLUS PROFILE (RDL); Future  - CK; Future  - DIRECT NEO; Future  - C REACTIVE PROTEIN, QT; Future  - PROT+CREATU (RANDOM); Future  - URINALYSIS W/ REFLEX CULTURE; Future  - ANTIPHOSPHOLIPID SYNDROME PANEL; Future  - SED RATE (ESR); Future  - MYOMARKER 3 PLUS PROFILE (RDL)  - CK  - DIRECT NEO  - C REACTIVE PROTEIN, QT  - PROT+CREATU (RANDOM)  - URINALYSIS W/ REFLEX CULTURE  - ANTIPHOSPHOLIPID SYNDROME PANEL  - SED RATE (ESR)    3. Dyspnea on exertion  - XR CHEST PA LAT; Future    Patient Instructions   1. You do have high-titer antinuclear antibodies, which can associate with autoimmune disease, but so far your workup for organ function has been reassuring against full-blown systemic autoimmune disease. I'd like to check more lab tests and a chest xray to better understand how active your immune system is. The preferred medication which may help things like the skin lesions and joint pains would be Plaquenil (hydroxychloroquine). 2. Please reach out to your prior dermatologists to get the results of any biopsies done. This would be very helpful in helping understand the causes of your skin rash.     3. Xrays, any [employer] location [facility], [facility], etc). 4. Labs from [facility]    5. Try Xylimelts for dry mouth/thirst prevention in the evenings so you're not up so much    6. Return in 4 weeks to review the results and determine next steps. Orders Placed This Encounter    XR CHEST PA LAT    MYOMARKER 3 PLUS PROFILE (RDL)    CK    C REACTIVE PROTEIN, QT    PROT+CREATU (RANDOM)    URINALYSIS W/ REFLEX CULTURE    ANTIPHOSPHOLIPID SYNDROME PANEL    SED RATE (ESR)    DIRECT NEO    Vyvanse 50 mg cap       Medications: I am having Charity Schwarz maintain her diphenhydrAMINE, SUMAtriptan succinate, cholecalciferol (vitamin d3), cetirizine, fluticasone propionate, docusate sodium, Blood-Glucose Meter, OneTouch Verio test strips, lancets, ondansetron, Aimovig Autoinjector, pantoprazole, midazolam, norethindrone-ethinyl estradiol-iron, hydrOXYzine HCL, doxycycline, hydrOXYzine HCL, citalopram, metFORMIN ER, and Vyvanse. Follow up: Return in about 4 weeks (around 4/5/2021).      Face to face time: 60 minutes  Note preparation and records review day of service: 10 minutes  Total provider time day of service: 70 minutes      Chelsy Morales MD    Adult Rheumatology   [address]   Phone [phone]  Fax [phone]

## 2021-03-10 ENCOUNTER — HOSPITAL ENCOUNTER (OUTPATIENT)
Dept: GENERAL RADIOLOGY | Age: 21
Discharge: HOME | End: 2021-03-10
Attending: INTERNAL MEDICINE
Payer: MEDICARE

## 2021-03-10 DIAGNOSIS — R06.09 DYSPNEA ON EXERTION: ICD-10-CM

## 2021-03-10 PROCEDURE — 71046 X-RAY EXAM CHEST 2 VIEWS: CPT

## 2021-03-18 ENCOUNTER — OFFICE VISIT (OUTPATIENT)
Dept: PEDIATRIC ENDOCRINOLOGY | Age: 21
End: 2021-03-18
Payer: MEDICARE

## 2021-03-18 VITALS
BODY MASS INDEX: 43.71 KG/M2 | RESPIRATION RATE: 18 BRPM | HEART RATE: 120 BPM | WEIGHT: 272 LBS | HEIGHT: 66 IN | SYSTOLIC BLOOD PRESSURE: 142 MMHG | DIASTOLIC BLOOD PRESSURE: 79 MMHG | OXYGEN SATURATION: 100 %

## 2021-03-18 DIAGNOSIS — I15.9 SECONDARY HYPERTENSION: ICD-10-CM

## 2021-03-18 DIAGNOSIS — E88.81 INSULIN RESISTANCE: ICD-10-CM

## 2021-03-18 DIAGNOSIS — E66.01 OBESITY, MORBID (HCC): Primary | ICD-10-CM

## 2021-03-18 PROBLEM — E66.9 OBESITY (BMI 35.0-39.9 WITHOUT COMORBIDITY): Status: RESOLVED | Noted: 2018-07-11 | Resolved: 2021-03-18

## 2021-03-18 PROCEDURE — G8427 DOCREV CUR MEDS BY ELIG CLIN: HCPCS | Performed by: PEDIATRICS

## 2021-03-18 PROCEDURE — G8754 DIAS BP LESS 90: HCPCS | Performed by: PEDIATRICS

## 2021-03-18 PROCEDURE — G9717 DOC PT DX DEP/BP F/U NT REQ: HCPCS | Performed by: PEDIATRICS

## 2021-03-18 PROCEDURE — G8753 SYS BP > OR = 140: HCPCS | Performed by: PEDIATRICS

## 2021-03-18 PROCEDURE — 99215 OFFICE O/P EST HI 40 MIN: CPT | Performed by: PEDIATRICS

## 2021-03-18 PROCEDURE — G8417 CALC BMI ABV UP PARAM F/U: HCPCS | Performed by: PEDIATRICS

## 2021-03-18 NOTE — PROGRESS NOTES
Room 3    Identified pt with two pt identifiers(name and ). Reviewed record in preparation for visit and have obtained necessary documentation. All patient medications has been reviewed. No chief complaint on file. 3 most recent PHQ Screens 2018   Little interest or pleasure in doing things Not at all   Feeling down, depressed, irritable, or hopeless Not at all   Total Score PHQ 2 0     No flowsheet data found. Health Maintenance Due   Topic    Hepatitis C Screening     HPV Age 9Y-34Y (1 - 2-dose series)    COVID-19 Vaccine (1)    Medicare Yearly Exam     Flu Vaccine (1)    DTaP/Tdap/Td series (1 - Tdap)    PAP AKA CERVICAL CYTOLOGY      Health Maintenance Review: Patient reminded of \"due or due soon\" health maintenance. I have asked the patient to contact his/her primary care provider (PCP) for follow-up on his/her health maintenance. There were no vitals filed for this visit. Wt Readings from Last 3 Encounters:   21 269 lb 3.2 oz (122.1 kg)   21 261 lb 6.4 oz (118.6 kg)   20 254 lb 3.2 oz (115.3 kg)     Temp Readings from Last 3 Encounters:   21 97.1 °F (36.2 °C) (Oral)   21 99.3 °F (37.4 °C) (Oral)   20 97.2 °F (36.2 °C) (Temporal)     BP Readings from Last 3 Encounters:   21 (!) 130/95   21 139/88   20 132/86     Pulse Readings from Last 3 Encounters:   21 (!) 126   21 (!) 112   20 (!) 122       Coordination of Care Questionnaire:   1) Have you been to an emergency room, urgent care, or hospitalized since your last visit? No      2. Have seen or consulted any other health care provider since your last visit?  No

## 2021-03-18 NOTE — PROGRESS NOTES
Subjective:     Reason for visit: Pepe Harrell is a 23 y.o. female here for follow up of   - Obesity  - Severe insulin resistance based on OGTT, not clinical.  - On Metformin 750 mg twice daily  - Irregular menstrual cycles - on continued OCP as menses cause debilitating migraines  - High LDL     She was last seen 3 months ago. She is here with her mother today    History of present illness:     Obesity -   Patient has gained 18 pounds in 3 months. Her BMI has increased from 40.85 to 44.4 kg/m²  Previously she had + 4 lbs in 4 months-drinks 2 gallons of water daily    No polyphagia,   + polydipsia - not a new concern, Nocturia once at night. Denies symptoms of hypothyroidism such as cold tolerance, dry hair, dry skin, constipation. No snoring at night except when really tired. No hip or joint pains    Activity - . Does yoga daily  Does chores around the home. Not going to the gym as she is concerned about COVID transmission. She was doing the elliptical 3 days a week and lifting weights 3 days a week 1 year ago. Has seen Nutritionist in the past.   Eats healthy always. Occasional treats  Followed by GI-sucrose intolerance-on supplementation. Not yet retried smoothies again as she is concerned about significant abdominal pain    12/11/20 -   CMP - WNL   Component Value Ref Range    Insulin 13.7  2.6 - 24.9 uIU/mL    TSH 0.891  0.450 - 4.500 uIU/mL       Insulin resistance -  OGTT done in 11/2017 revealed insulin resistance  INSULIN   Result Value Ref Range    Insulin 2 hour  277 (H) <37    Insulin - 1 hour  286 (H) <51    Insulin Fasting  37.4 (H) <9 uIU/mL   Started on Metformin  mg 12/2017. Dose decreased to OD 12/2019 as OGTT 1/20 - wnl. taken at bedtime.     Improved fasting insulin     A1C is 4.9 12/2019 - (last assessed 3/2019 - 5.1)    1/7/20 -   Component Value Ref Range    Glucose 80  65 - 99 mg/dL    Glucose, 1 hour 107  65 - 199 mg/dL    Glucose, 2 hour 115  65 - 139 mg/dL    INSULIN, FASTING 13.1  2.6 - 24.9 uIU/mL    INSULIN, 60 MINUTES 71.1  0.0 - 163.5 uIU/mL    INSULIN, 120 MINUTES 76.7  0.0 - 145.4 uIU/mL         Increased to Metformin 750 mg Bid 6/2020  No more hypoglycemic symptoms. Insulin levels are now normal  2/21 - Insulin - 18.0, c peptide - 4.1     Cholesterol, total 02/24/2021 205* 100 - 199 mg/dL    Triglyceride 02/24/2021 133  0 - 149 mg/dL    HDL Cholesterol 02/24/2021 53  >39 mg/dL    VLDL, calculated 02/24/2021 24  5 - 40 mg/dL    LDL, calculated 02/24/2021 128* 0 - 99 mg/dL          History of Hypertension - Patient had 24 hour urinary cortisol done with  (Ph# [phone]) at [facility] due to similar concerns of [place] which was normal - 7 (Normal 0-50). Abnormal lipid profile -   On OTC Fish oil.    12/20 -    Cholesterol, total 194  100 - 199 mg/dL    Triglyceride 78  0 - 149 mg/dL    HDL Cholesterol 49  >39 mg/dL    VLDL Cholesterol Sebastian 14  5 - 40 mg/dL    LDL Chol Calc (NIH) 131* 0 - 99 mg/dL       History of Vitamin D deficiency - History - On Vitamin D 400 international units  12/20 -   VITAMIN D, 25-HYDROXY 78.8  30.0 - 100.0 ng/mL       Menstrual history - attained menarche at age 5 years, started on OCPs 12/2016  for severe menstrual migraines. Followed by Gynecology. She has not had a cycle for more than a year as she is continued OCP's to prevent migraine. Has baseline migraines requiring sumatriptan injections 2-3x a week. Used to be on Depo but associated with weight gain   fu apt with Gynecology yearly - seen 12/14/20. Plan is to continue continued OCP for a total of 5 years.  Started on higher dose Estrogen Junel Fe 1.5 - 30 mcg Estradiol   4/2017 - Pelvic US - wnl     Past Medical History:   Diagnosis Date    Abdominal migraine     ADHD (attention deficit hyperactivity disorder)     Autism     Developmental delay     GERD (gastroesophageal reflux disease)     Headache     Irritable bowel syndrome with diarrhea 4/13/2017    Migraine     Obesity, morbid ([facility] [address] 12/12/2017    Psychiatric disorder     anxiety, ADHD, OCD, HIGH FUNTIONING AUTISM    Sucrose intolerance    Migraines - Improved on Topamax - Now only once a week. Triggers - anxiety, noise , certain aura    Autism and anxiety - Receives counseling. Abnormal gallbladder emptying study with an EF of only 21%. S/p Cholecystectomy. No gall stones. Pts symptoms have resolved. Rxed for H.pyloriI 8/2019    Sucrose intolerance - On Sucrose - Followed by     She was seen by rheumatology recently-undergoing work-up for undifferentiated connective tissue disease-blood work results are still pending. Plan is to start Plaquenil. She has a follow-up April 9, 2021    Past Surgical History:   Procedure Laterality Date    COLONOSCOPY N/A 12/20/2016    COLONOSCOPY performed by Ria Dakins, MD at [facility] ENDOSCOPY    GERD TST W/ [facility] ELECTROD 50 HR (BRAVO)  12/3/2020         HC CLP BRAVO  11/30/2020    HX CHOLECYSTECTOMY  06/2019    HX ENDOSCOPY  11/2020    dx'd with sucrose intolerance    HX HEENT      WISDOM TEETH    HX TONSIL AND ADENOIDECTOMY      HX TONSILLECTOMY      AGE 1    HX WISDOM TEETH EXTRACTION      PA EGD TRANSORAL BIOPSY SINGLE/MULTIPLE  6/14/2019         PA EGD TRANSORAL BIOPSY SINGLE/MULTIPLE  11/30/2020    UPPER GI ENDOSCOPY,BIOPSY  12/20/2016          Family history: No family history of thyroid disease, heart disease, hypertension or high cholesterol or DM. Fathers side unknown. He has Gout. Father is obese. Mother - Endometriosis  MGF, MGM, Mother - Hypertension  MGreat grandparents - Heartdisease at later age     Social History:  [employer] - online   Doing well.      ROS:  Constitutional: decreased energy   ENT: normal hearing, no sorethroat   Eye: normal vision, denied double vision, blurred vision  Respiratory system: no wheezing, no respiratory discomfort  CVS: no palpitations, + lower extremity edema -increasing-seen by Cardiology in the past.  Her extremity edema had improved significantly with weight loss in the past.  GI: normal bowel movements, followed by GI   Allergy: no skin rash or angioedema, stria on abdomen and inner thighs and arms  Neuorlogical: no focal weakness. No burning  Behavioural: normal behavior, normal mood. Medications - See scanned   Prior to Admission medications    Medication Sig Start Date End Date Taking? Authorizing Provider   Vyvanse 50 mg cap TAKE 1 CAPSULE BY MOUTH EVERY DAY IN THE MORNING 1/7/21  Yes Provider, Historical   metFORMIN ER (GLUCOPHAGE XR) 750 mg tablet TAKE 1 TABLET BY MOUTH TWICE A DAY 2/25/21  Yes Fuentes Stuart MD   citalopram (CELEXA) 40 mg tablet TAKE 1 TABLET BY MOUTH EVERY DAY 11/2/20  Yes Provider, Historical   norethindrone-ethinyl estradiol-iron (Junel FE 1.5/30, 28,) 1.5 mg-30 mcg (21)/75 mg (7) tab TAKE 1 TAB BY MOUTH DAILY. CONTINUOUS PILLS ONLY. 12/14/20  Yes Maggi Murphy MD   Aimovig Autoinjector 140 mg/mL injection INJECT 1 SYRINGE VIA SUBCUTANEOUS ROUTE ONCE A MONTH, AS DIRECTED 9/29/20  Yes Provider, Historical   pantoprazole (PROTONIX) 40 mg tablet Take 1 Tab by mouth two (2) times a day. Patient taking differently: Take 40 mg by mouth daily. 1 Tab at night 10/30/20 10/30/21 Yes Jenny Butler MD   glucose blood VI test strips (OneTouch Verio test strips) strip Use to test blood sugar up to 2 times daily. 7/29/20  Yes Fuentes Stuart MD   lancets (One Touch Delica) 33 gauge misc Use to test blood sugar 2 times daily. 7/29/20  Yes uFentes Stuart MD   Blood-Glucose Meter (OneTouch Verio Flex meter) misc Use as directed 6/9/20  Yes Fuentes Stuart MD   cetirizine (ZYRTEC) 10 mg tablet TK 1 T PO D PRF ALLERGY 9/10/18  Yes Provider, Historical   cholecalciferol, vitamin d3, (VITAMIN D3) 400 unit cap Take  by mouth.    Yes Provider, Historical   hydrOXYzine HCL (ATARAX) 25 mg tablet TAKE 1 TO 2 TABLETS BY MOUTH TWICE A DAY AS NEEDED FOR ANXIETY 12/3/20   Provider, Historical   doxycycline (MONODOX) 100 mg capsule TAKE 1 CAPSULE BY MOUTH EVERYDAY AT BEDTIME 12/16/20   Provider, Historical   hydrOXYzine HCL (ATARAX) 25 mg tablet TAKE 1 TO 2 TABLETS BY MOUTH TWICE A DAY AS NEEDED FOR ANXIETY 12/3/20   Provider, Historical   ondansetron (ZOFRAN ODT) 8 mg disintegrating tablet PLACE 1 TABLET ON TONGUE & ALLOW TO DISSOLVE EVERY 8 HOURS AS NEEDED FOR NAUSEA WITH HEADACHE 8/30/20   Provider, Historical   midazolam (VERSED) 2 mg/mL syrup Take 10 mL by mouth daily as needed (pre-procedure anxiolysis) for up to 1 dose. 10/30/20   Lee Blevins MD   fluticasone (FLOVENT HFA) 220 mcg/actuation inhaler 2 puffs swallowed twice daily, npo x 30 min after dose 1/10/19   Lee Blevins MD   docusate sodium (STOOL SOFTENER) 250 mg capsule Take 1 Cap by mouth two (2) times daily as needed for Constipation for up to 30 doses. 1/10/19   Lee Blevins MD   SUMAtriptan succinate [facility]) 3 mg/0.5 mL pnij by SubCUTAneous route. Indications: MIGRAINE    Provider, Historical   diphenhydrAMINE (BENADRYL) 25 mg capsule 2 tabs PO q8h PRN    Provider, Historical     Allergies: Allergies   Allergen Reactions    Yeast, Dried Other (comments)     Upset stomach      Objective:       Visit Vitals  BP (!) 142/79 (BP 1 Location: Left upper arm, BP Patient Position: Sitting, BP Cuff Size: Large adult)   Pulse (!) 120   Resp 18   Ht 5' 5.63\" (1.667 m)   Wt 272 lb (123.4 kg)   SpO2 100%   BMI 44.40 kg/m²       Wt Readings from Last 3 Encounters:   03/18/21 272 lb (123.4 kg)   03/08/21 269 lb 3.2 oz (122.1 kg)   01/13/21 261 lb 6.4 oz (118.6 kg)     Ht Readings from Last 3 Encounters:   03/18/21 5' 5.63\" (1.667 m)   03/08/21 5' 5\" (1.651 m)   01/13/21 5' 5.75\" (1.67 m)     Height: Facility age limit for growth percentiles is 20 years. Weight: Facility age limit for growth percentiles is 20 years. BMI: Body mass index is 44.4 kg/m².  Percentil    Alert, Cooperative     HEENT: No thyromegaly, EOM intact, No tonsillar hypertrophy    Abdomen is soft, non tender, No organomegaly   MSK - Normal ROM  Skin - No rashes or birth marks, striae on abdomen, upper arms and inner thighs. Few are wide,   No acanthosis   lower extremity edema - non pitting. Laboratory data:  See above       Assessment/ Plan :       Rosa Lopez is a 23 y.o. female presenting     - Obesity   - Insulin resistance - On Metformin     On continuous hormonal pill for catamenial migraines. We will send a message to the gynecologist to consider decreasing the dose of estrogen as it may be contributing to the hypertension and fluid retention. Undergoing work-up for undifferentiated connective tissue disease by rheumatology    Plan -   Diagnosis, etiology, pathophysiology, risk/ benefits of rx, proposed eval, and expected follow up discussed with family and all questions answered    No orders of the defined types were placed in this encounter.     Continue Metformin 750 mg bid-long-term goal is to start decreasing Metformin dosing  discussed importance of increased activity-10,000 steps a day  Follow up in 4 month - Will need to switch to Adult Endocrine  Message sent to Russell Farley    Total time with patient 40 minutes  Time spent counseling patient more than 50%

## 2021-03-18 NOTE — LETTER
3/18/2021 Patient: Sarah Cohen YOB: 2000 Date of Visit: 3/18/2021 Jeanine Hayward DO 
[address] [facility] [address] Via Fax: [phone] Dear Jeanine Hayward DO, Thank you for referring Ms. Kerry Stauffer to [facility] (formerly [facility]) for evaluation. My notes for this consultation are attached. Subjective:  
 
Reason for visit: Sarah Cohen is a 23 y.o. female here for follow up of - Obesity - Severe insulin resistance based on OGTT, not clinical.  - On Metformin 750 mg twice daily - Irregular menstrual cycles - on continued OCP as menses cause debilitating migraines 
- High LDL She was last seen 3 months ago. She is here with her mother today History of present illness:  
 
Obesity - Patient has gained 18 pounds in 3 months. Her BMI has increased from 40.85 to 44.4 kg/m² Previously she had + 4 lbs in 4 months-drinks 2 gallons of water daily No polyphagia,  
+ polydipsia - not a new concern, Nocturia once at night. Denies symptoms of hypothyroidism such as cold tolerance, dry hair, dry skin, constipation. No snoring at night except when really tired. No hip or joint pains Activity - . Does yoga daily Does chores around the home. Not going to the gym as she is concerned about COVID transmission. She was doing the elliptical 3 days a week and lifting weights 3 days a week 1 year ago. Has seen Nutritionist in the past.  
Eats healthy always. Occasional treats Followed by GI-sucrose intolerance-on supplementation. Not yet retried smoothies again as she is concerned about significant abdominal pain 12/11/20 -  
CMP - WNL Component Value Ref Range  Insulin 13.7  2.6 - 24.9 uIU/mL  TSH 0.891  0.450 - 4.500 uIU/mL Insulin resistance -  OGTT done in 11/2017 revealed insulin resistance INSULIN Result Value Ref Range Insulin 2 hour  277 (H) <37  Insulin - 1 hour  286 (H) <51  
 Insulin Fasting  37.4 (H) <9 uIU/mL Started on Metformin  mg 12/2017. Dose decreased to OD 12/2019 as OGTT 1/20 - wnl. taken at bedtime. Improved fasting insulin A1C is 4.9 12/2019 - (last assessed 3/2019 - 5.1) 
 
1/7/20 - Component Value Ref Range  Glucose 80  65 - 99 mg/dL  Glucose, 1 hour 107  65 - 199 mg/dL  Glucose, 2 hour 115  65 - 139 mg/dL  INSULIN, FASTING 13.1  2.6 - 24.9 uIU/mL  INSULIN, 60 MINUTES 71.1  0.0 - 163.5 uIU/mL  INSULIN, 120 MINUTES 76.7  0.0 - 145.4 uIU/mL Increased to Metformin 750 mg Bid 6/2020 No more hypoglycemic symptoms. Insulin levels are now normal 
2/21 - Insulin - 18.0, c peptide - 4.1  Cholesterol, total 02/24/2021 205* 100 - 199 mg/dL  Triglyceride 02/24/2021 133  0 - 149 mg/dL  HDL Cholesterol 02/24/2021 53  >39 mg/dL  VLDL, calculated 02/24/2021 24  5 - 40 mg/dL  LDL, calculated 02/24/2021 128* 0 - 99 mg/dL History of Hypertension - Patient had 24 hour urinary cortisol done with  (Ph# [phone]) at [facility] due to similar concerns of [place] which was normal - 7 (Normal 0-50). Abnormal lipid profile - On OTC Fish oil.   
12/20 -  
 Cholesterol, total 194  100 - 199 mg/dL  Triglyceride 78  0 - 149 mg/dL  HDL Cholesterol 49  >39 mg/dL  VLDL Cholesterol Sebastian 14  5 - 40 mg/dL  LDL Chol Calc (NIH) 131* 0 - 99 mg/dL History of Vitamin D deficiency - History - On Vitamin D 400 international units 12/20 -  
VITAMIN D, 25-HYDROXY 78.8  30.0 - 100.0 ng/mL Menstrual history - attained menarche at age 5 years, started on OCPs 12/2016  for severe menstrual migraines. Followed by Gynecology. She has not had a cycle for more than a year as she is continued OCP's to prevent migraine. Has baseline migraines requiring sumatriptan injections 2-3x a week. Used to be on Depo but associated with weight gain  
fu apt with Gynecology yearly - seen 12/14/20.  Plan is to continue continued OCP for a total of 5 years. Started on higher dose Estrogen Junel Fe 1.5 - 30 mcg Estradiol 4/2017 - Pelvic US - wnl Past Medical History:  
Diagnosis Date  Abdominal migraine  ADHD (attention deficit hyperactivity disorder)  Autism  Developmental delay  GERD (gastroesophageal reflux disease)  Headache  Irritable bowel syndrome with diarrhea 4/13/2017  Migraine  Obesity, morbid ([address] 12/12/2017  Psychiatric disorder   
 anxiety, ADHD, OCD, HIGH FUNTIONING AUTISM  Sucrose intolerance Migraines - Improved on Topamax - Now only once a week. Triggers - anxiety, noise , certain aura Autism and anxiety - Receives counseling. Abnormal gallbladder emptying study with an EF of only 21%. S/p Cholecystectomy. No gall stones. Pts symptoms have resolved. Rxed for H.pyloriI 8/2019 Sucrose intolerance - On Sucrose - Followed by  She was seen by rheumatology recently-undergoing work-up for undifferentiated connective tissue disease-blood work results are still pending. Plan is to start Plaquenil. She has a follow-up April 9, 2021 Past Surgical History:  
Procedure Laterality Date  COLONOSCOPY N/A 12/20/2016 COLONOSCOPY performed by Racheal Mcc[facility] at [facility] ENDOSCOPY  GERD TST W/ MUCOS PH ELECTROD 48 HR (BRAVO)  12/3/2020  HC CLP BRAVO  11/30/2020  HX CHOLECYSTECTOMY  06/2019  HX ENDOSCOPY  11/2020  
 dx'd with sucrose intolerance  HX HEENT    
 WISDOM TEETH  
 HX TONSIL AND ADENOIDECTOMY  HX TONSILLECTOMY    
 AGE 1  
 HX WISDOM TEETH EXTRACTION    
 MT EGD TRANSORAL BIOPSY SINGLE/MULTIPLE  6/14/2019  MT EGD TRANSORAL BIOPSY SINGLE/MULTIPLE  11/30/2020  UPPER GI ENDOSCOPY,BIOPSY  12/20/2016 Family history: No family history of thyroid disease, heart disease, hypertension or high cholesterol or DM. Fathers side unknown. He has Gout. Father is obese. Mother - Endometriosis MGF, MGM, Mother - Hypertension MGreat grandparents - Heartdisease at later age Social History: [employer] - online Doing well. ROS: 
Constitutional: decreased energy ENT: normal hearing, no sorethroat Eye: normal vision, denied double vision, blurred vision Respiratory system: no wheezing, no respiratory discomfort CVS: no palpitations, + lower extremity edema -increasing-seen by Cardiology in the past.  Her extremity edema had improved significantly with weight loss in the past. 
GI: normal bowel movements, followed by GI Allergy: no skin rash or angioedema, stria on abdomen and inner thighs and arms Neuorlogical: no focal weakness. No burning Behavioural: normal behavior, normal mood. Medications - See scanned Prior to Admission medications Medication Sig Start Date End Date Taking? Authorizing Provider Vyvanse 50 mg cap TAKE 1 CAPSULE BY MOUTH EVERY DAY IN THE MORNING 1/7/21  Yes Provider, Historical  
metFORMIN ER (GLUCOPHAGE XR) 750 mg tablet TAKE 1 TABLET BY MOUTH TWICE A DAY 2/25/21  Yes Olga Recio MD  
citalopram (CELEXA) 40 mg tablet TAKE 1 TABLET BY MOUTH EVERY DAY 11/2/20  Yes Provider, Historical  
norethindrone-ethinyl estradiol-iron (Junel FE 1.5/30, 28,) 1.5 mg-30 mcg (21)/75 mg (7) tab TAKE 1 TAB BY MOUTH DAILY. CONTINUOUS PILLS ONLY. 12/14/20  Yes Mathew Moreno MD  
Aimovig Autoinjector 140 mg/mL injection INJECT 1 SYRINGE VIA SUBCUTANEOUS ROUTE ONCE A MONTH, AS DIRECTED 9/29/20  Yes Provider, Historical  
pantoprazole (PROTONIX) 40 mg tablet Take 1 Tab by mouth two (2) times a day. Patient taking differently: Take 40 mg by mouth daily. 1 Tab at night 10/30/20 10/30/21 Yes Gonzalo Aguilar MD  
glucose blood VI test strips (OneTouch Verio test strips) strip Use to test blood sugar up to 2 times daily. 7/29/20  Yes Olga Recio MD  
lancets (One Touch Delica) 33 gauge misc Use to test blood sugar 2 times daily.  7/29/20  Yes Olga Recio MD  
Blood-Glucose Meter (OneTouch Verio Flex meter) misc Use as directed 6/9/20  Yes Ree Cloud MD  
cetirizine (ZYRTEC) 10 mg tablet TK 1 T PO D PRF ALLERGY 9/10/18  Yes Provider, Historical  
cholecalciferol, vitamin d3, (VITAMIN D3) 400 unit cap Take  by mouth. Yes Provider, Historical  
hydrOXYzine HCL (ATARAX) 25 mg tablet TAKE 1 TO 2 TABLETS BY MOUTH TWICE A DAY AS NEEDED FOR ANXIETY 12/3/20   Provider, Historical  
doxycycline (MONODOX) 100 mg capsule TAKE 1 CAPSULE BY MOUTH EVERYDAY AT BEDTIME 12/16/20   Provider, Historical  
hydrOXYzine HCL (ATARAX) 25 mg tablet TAKE 1 TO 2 TABLETS BY MOUTH TWICE A DAY AS NEEDED FOR ANXIETY 12/3/20   Provider, Historical  
ondansetron (ZOFRAN ODT) 8 mg disintegrating tablet PLACE 1 TABLET ON TONGUE & ALLOW TO DISSOLVE EVERY 8 HOURS AS NEEDED FOR NAUSEA WITH HEADACHE 8/30/20   Provider, Historical  
midazolam (VERSED) 2 mg/mL syrup Take 10 mL by mouth daily as needed (pre-procedure anxiolysis) for up to 1 dose. 10/30/20   Zoraida Deras MD  
fluticasone (FLOVENT HFA) 220 mcg/actuation inhaler 2 puffs swallowed twice daily, npo x 30 min after dose 1/10/19   Zoraida Deras MD  
docusate sodium (STOOL SOFTENER) 250 mg capsule Take 1 Cap by mouth two (2) times daily as needed for Constipation for up to 30 doses. 1/10/19   Zoraida Deras MD  
SUMAtriptan succinate [facility]) 3 mg/0.5 mL pnij by SubCUTAneous route. Indications: MIGRAINE    Provider, Historical  
diphenhydrAMINE (BENADRYL) 25 mg capsule 2 tabs PO q8h PRN    Provider, Historical  
 
Allergies: Allergies Allergen Reactions  Yeast, Dried Other (comments) Upset stomach Objective:  
 
 
Visit Vitals BP (!) 142/79 (BP 1 Location: Left upper arm, BP Patient Position: Sitting, BP Cuff Size: Large adult) Pulse (!) 120 Resp 18 Ht 5' 5.63\" (1.667 m) Wt 272 lb (123.4 kg) SpO2 100% BMI 44.40 kg/m² Wt Readings from Last 3 Encounters:  
03/18/21 272 lb (123.4 kg) 03/08/21 269 lb 3.2 oz (122.1 kg) 21 261 lb 6.4 oz (118.6 kg) Ht Readings from Last 3 Encounters:  
21 5' 5.63\" (1.667 m)  
21 5' 5\" (1.651 m)  
21 5' 5.75\" (1.67 m) Height: Facility age limit for growth percentiles is 20 years. Weight: Facility age limit for growth percentiles is 20 years. BMI: Body mass index is 44.4 kg/m². Percentil Alert, Cooperative HEENT: No thyromegaly, EOM intact, No tonsillar hypertrophy Abdomen is soft, non tender, No organomegaly MSK - Normal ROM Skin - No rashes or birth marks, striae on abdomen, upper arms and inner thighs. Few are wide, No acanthosis 
 lower extremity edema - non pitting. Laboratory data: 
See above Assessment/ Plan :  
 
 
Salina Graham is a 23 y.o. female presenting - Obesity  
- Insulin resistance - On Metformin On continuous hormonal pill for catamenial migraines. We will send a message to the gynecologist to consider decreasing the dose of estrogen as it may be contributing to the hypertension and fluid retention. Undergoing work-up for undifferentiated connective tissue disease by rheumatology Plan -  
Diagnosis, etiology, pathophysiology, risk/ benefits of rx, proposed eval, and expected follow up discussed with family and all questions answered No orders of the defined types were placed in this encounter. Continue Metformin 750 mg bid-long-term goal is to start decreasing Metformin dosing 
discussed importance of increased activity-10,000 steps a day Follow up in 4 month - Will need to switch to Adult Endocrine Message sent to Seymour Flowers Total time with patient 40 minutes Time spent counseling patient more than 50% Room 3 Identified pt with two pt identifiers(name and ). Reviewed record in preparation for visit and have obtained necessary documentation. All patient medications has been reviewed. No chief complaint on file.  
 
 
3 most recent PHQ Screens 2/19/2018 Little interest or pleasure in doing things Not at all Feeling down, depressed, irritable, or hopeless Not at all Total Score PHQ 2 0 No flowsheet data found. Health Maintenance Due Topic  Hepatitis C Screening  HPV Age 9Y-34Y (1 - 2-dose series)  COVID-19 Vaccine (1)  Medicare Yearly Exam   
 Flu Vaccine (1)  DTaP/Tdap/Td series (1 - Tdap)  PAP AKA CERVICAL CYTOLOGY Health Maintenance Review: Patient reminded of \"due or due soon\" health maintenance. I have asked the patient to contact his/her primary care provider (PCP) for follow-up on his/her health maintenance. There were no vitals filed for this visit. Wt Readings from Last 3 Encounters:  
03/08/21 269 lb 3.2 oz (122.1 kg) 01/13/21 261 lb 6.4 oz (118.6 kg) 12/18/20 254 lb 3.2 oz (115.3 kg) Temp Readings from Last 3 Encounters:  
03/08/21 97.1 °F (36.2 °C) (Oral) 01/13/21 99.3 °F (37.4 °C) (Oral) 12/18/20 97.2 °F (36.2 °C) (Temporal) BP Readings from Last 3 Encounters:  
03/08/21 (!) 130/95  
01/13/21 139/88  
12/14/20 132/86 Pulse Readings from Last 3 Encounters:  
03/08/21 (!) 126  
01/13/21 (!) 112  
12/18/20 (!) 122 Coordination of Care Questionnaire:  
1) Have you been to an emergency room, urgent care, or hospitalized since your last visit? No   
 
2. Have seen or consulted any other health care provider since your last visit? No 
 
 
 
 
 
If you have questions, please do not hesitate to call me. I look forward to following your patient along with you. Sincerely, Nilsa Cummings MD

## 2021-03-19 NOTE — PROGRESS NOTES
Iris Joaquin is a 23 y.o. female who was evaluated by an audio-video encounter for concerns as above. Patient identification was verified prior to start of the visit. A caregiver was present when appropriate. Due to this being a TeleHealth encounter (During KRM-09 public health emergency), evaluation of the following organ systems was limited: Vitals/Constitutional/EENT/Resp/CV/GI//MS/Neuro/Skin/Heme-Lymph-Imm. Pursuant to the emergency declaration under the [facility], [facility] waiver authority and the [employer] Act, this Virtual Visit was conducted, with patient's (and/or legal guardian's) consent, to reduce the patient's risk of exposure to COVID-19 and provide necessary medical care. Services were provided through a synchronous discussion virtually to substitute for in-person clinic visit. I was in the office. The patient was at home. Consent: Iris Joaquin, who was seen by synchronous (real-time) audio-video technology, and/or her healthcare decision maker, is aware that this patient-initiated, Telehealth encounter on 3/22/2021 is a billable service, with coverage as determined by her insurance carrier. She is aware that she may receive a bill and has provided verbal consent to proceed: Yes. Chief Complaint   Follow-up      HPI  Iris Joaquin is a 23 y.o. female who presents for the evaluation of birth controls options. No LMP recorded. (Menstrual status: Chemically Induced). The patient complains of weight gain with water retention. she is not having periods. She complains that when she does have periods, she gets bad migraines. Her endocrinologist suggested she call and discuss change in birth control method. Pt seen 12/14/20 for AE. Taking OCPs continuously for menstrual HA and possible endometriosis (has FHx, has prior h/o abd pain, but also with GI issues).   Was on LE 1/20 but BTB  Switched to LE 1.5/30, taking continuously. Reports good control of her sx. However endocrinologist concerned because she has had wt gain/fluid retention as well as elevated BPs. Pt also states endo is concerned because she is not having period, concerned about bone protection, has encouraged Vitamin D. Review of CC shows Vit D=78.8 in December. Is also seeing rheumatology. +WENDY. Being evaluated for Lupus. Due to repeat labs (review of CC shows including antiphospholipid antibodies). Has f/u scheduled for 4/9. She reports nl BPs at recent visit to her PCP who allows her to sit for several minutes and take off her mask before checking BP.         Past Medical History:   Diagnosis Date    Abdominal migraine     ADHD (attention deficit hyperactivity disorder)     Autism     Developmental delay     GERD (gastroesophageal reflux disease)     Headache     Irritable bowel syndrome with diarrhea 4/13/2017    Migraine     Obesity, morbid ([address] 12/12/2017    Psychiatric disorder     anxiety, ADHD, OCD, HIGH FUNTIONING AUTISM    Sucrose intolerance      Past Surgical History:   Procedure Laterality Date    COLONOSCOPY N/A 12/20/2016    COLONOSCOPY performed by Angely Monahan MD at [address] GERD TST W/ MUCOS PH ELECTROD 50 HR (BRAVO)  12/3/2020         HC CLP BRAVO  11/30/2020    HX CHOLECYSTECTOMY  06/2019    HX ENDOSCOPY  11/2020    dx'd with sucrose intolerance    HX HEENT      WISDOM TEETH    HX TONSIL AND ADENOIDECTOMY      HX TONSILLECTOMY      AGE 1    HX WISDOM TEETH EXTRACTION      SC EGD TRANSORAL BIOPSY SINGLE/MULTIPLE  6/14/2019         SC EGD TRANSORAL BIOPSY SINGLE/MULTIPLE  11/30/2020    UPPER GI ENDOSCOPY,BIOPSY  12/20/2016          Social History     Occupational History    Not on file   Tobacco Use    Smoking status: Never Smoker    Smokeless tobacco: Never Used    Tobacco comment: no smoke exposure in the home   Substance and Sexual Activity    Alcohol use: No    Drug use: No    Sexual activity: Never     Family History   Problem Relation Age of Onset    Endometriosis Mother     Headache Mother         d/t accident - neck and brain injury    Delayed Awakening Mother     Post-op Nausea/Vomiting Mother     Headache Maternal Grandfather     No Known Problems Father     No Known Problems Maternal Grandmother     MS Maternal Aunt        Allergies   Allergen Reactions    Yeast, Dried Other (comments)     Upset stomach      Prior to Admission medications    Medication Sig Start Date End Date Taking? Authorizing Provider   Vyvanse 50 mg cap TAKE 1 CAPSULE BY MOUTH EVERY DAY IN THE MORNING 1/7/21  Yes Provider, Historical   metFORMIN ER (GLUCOPHAGE XR) 750 mg tablet TAKE 1 TABLET BY MOUTH TWICE A DAY 2/25/21  Yes Verónica Walker MD   hydrOXYzine HCL (ATARAX) 25 mg tablet TAKE 1 TO 2 TABLETS BY MOUTH TWICE A DAY AS NEEDED FOR ANXIETY 12/3/20  Yes Provider, Historical   hydrOXYzine HCL (ATARAX) 25 mg tablet TAKE 1 TO 2 TABLETS BY MOUTH TWICE A DAY AS NEEDED FOR ANXIETY 12/3/20  Yes Provider, Historical   citalopram (CELEXA) 40 mg tablet TAKE 1 TABLET BY MOUTH EVERY DAY 11/2/20  Yes Provider, Historical   norethindrone-ethinyl estradiol-iron (Junel FE 1.5/30, 28,) 1.5 mg-30 mcg (21)/75 mg (7) tab TAKE 1 TAB BY MOUTH DAILY. CONTINUOUS PILLS ONLY. 12/14/20  Yes Erin Hernandez MD   ondansetron (ZOFRAN ODT) 8 mg disintegrating tablet PLACE 1 TABLET ON TONGUE & ALLOW TO DISSOLVE EVERY 8 HOURS AS NEEDED FOR NAUSEA WITH HEADACHE 8/30/20  Yes Provider, Historical   pantoprazole (PROTONIX) 40 mg tablet Take 1 Tab by mouth two (2) times a day. Patient taking differently: Take 40 mg by mouth daily. 1 Tab at night 10/30/20 10/30/21 Yes Hilaria Turner MD   glucose blood VI test strips (OneTouch Verio test strips) strip Use to test blood sugar up to 2 times daily.  7/29/20  Yes Lindsay Hernandez MD   lancets (One Touch Delica) 33 gauge misc Use to test blood sugar 2 times daily. 7/29/20  Yes Jeremy Rodriguez MD   Blood-Glucose Meter (OneTouch Verio Flex meter) misc Use as directed 6/9/20  Yes Jeremy Rodriguez MD   cetirizine (ZYRTEC) 10 mg tablet TK 1 T PO D PRF ALLERGY 9/10/18  Yes Provider, Historical   cholecalciferol, vitamin d3, (VITAMIN D3) 400 unit cap Take  by mouth. Yes Provider, Historical   SUMAtriptan succinate (ZEMBRACE SYMTOUCH) 3 mg/0.5 mL pnij by SubCUTAneous route. Indications: MIGRAINE   Yes Provider, Historical   diphenhydrAMINE (BENADRYL) 25 mg capsule 2 tabs PO q8h PRN   Yes Provider, Historical   doxycycline (MONODOX) 100 mg capsule TAKE 1 CAPSULE BY MOUTH EVERYDAY AT BEDTIME 12/16/20   Provider, Historical   Aimovig Autoinjector 140 mg/mL injection INJECT 1 SYRINGE VIA SUBCUTANEOUS ROUTE ONCE A MONTH, AS DIRECTED 9/29/20   Provider, Historical   midazolam (VERSED) 2 mg/mL syrup Take 10 mL by mouth daily as needed (pre-procedure anxiolysis) for up to 1 dose. 10/30/20   Chirag Gomez MD   fluticasone (FLOVENT HFA) 220 mcg/actuation inhaler 2 puffs swallowed twice daily, npo x 30 min after dose 1/10/19   Chirag Gomez MD   docusate sodium (STOOL SOFTENER) 250 mg capsule Take 1 Cap by mouth two (2) times daily as needed for Constipation for up to 30 doses.  1/10/19   Chirag Gomez MD        Review of Systems: History obtained from the patient  Constitutional: negative for weight loss, fever, night sweats  HEENT: negative for hearing loss, earache, congestion, snoring, sorethroat  CV: negative for chest pain, palpitations, edema  Resp: negative for cough, shortness of breath, wheezing  Breast: negative for breast lumps, nipple discharge, galactorrhea  GI: negative for change in bowel habits, abdominal pain, black or bloody stools  : negative for frequency, dysuria, hematuria, vaginal discharge  MSK: negative for back pain, joint pain, muscle pain  Skin: negative for itching, rash, hives  Neuro: negative for dizziness, headache, confusion, weakness  Psych: negative for anxiety, depression, change in mood  Heme/lymph: negative for bleeding, bruising, pallor    Objective:  There were no vitals taken for this visit.    Physical Exam:   PHYSICAL EXAMINATION        Objective:     General: alert, cooperative, no distress   Mental  status: mental status: alert, oriented to person, place, and time, normal mood, behavior, speech, dress, motor activity, and thought processes   Resp: resp: normal effort and no respiratory distress   Neuro: neuro: no gross deficits   Skin: skin: no discoloration or lesions of concern on visible areas   Due to this being a TeleHealth evaluation, many elements of the physical examination are unable to be assessed.                   Assessment:   H/o menstrual migraines, well controlled on continuous OCPs  FHx endometriosis  Pt with h/o abd pain, but also with GI issues, not clear if she could have e'osis as well  +WENDY, being evaluated for Lupus per rheumatology  Elevated BP at recent visit with maurice, reports nl BP with her PCP    Plan:   Discussed possible concerns of remaining on combination options (OCPs, patch, ring).  Estrogen can cause HTN.  If she is dx'd with lupus, also possible that estrogen could be contraindicated.  Discussed possible need to switch to progestin-only option.  Slynd would probably be her best option for consistent cycle suppression to manage menstrual migraine as well as possible endometriosis.  However, coverage for this may be an issue.  Micronor will give less consistent results, and BTB is frequently an issue.    Will continue current OCPs for now  She will get BP readings from her PCP sent to me to review.  Keep appt with rheumatology as scheduled.  She will discuss at that visit and I will send message through [facility] as well.  Reassured her that there is no risk for bone health due to menstrual suppression on combination OCPs as she is currently taking.    [>50%  of this 23-minute visit spent face-to-face counseling, and/or coordination of care]

## 2021-03-22 ENCOUNTER — VIRTUAL VISIT (OUTPATIENT)
Dept: OBGYN CLINIC | Age: 21
End: 2021-03-22
Payer: MEDICARE

## 2021-03-22 DIAGNOSIS — G43.829 MENSTRUAL MIGRAINE WITHOUT STATUS MIGRAINOSUS, NOT INTRACTABLE: ICD-10-CM

## 2021-03-22 DIAGNOSIS — R03.0 ELEVATED BP WITHOUT DIAGNOSIS OF HYPERTENSION: ICD-10-CM

## 2021-03-22 DIAGNOSIS — Z30.41 ENCOUNTER FOR SURVEILLANCE OF CONTRACEPTIVE PILLS: Primary | ICD-10-CM

## 2021-03-22 DIAGNOSIS — R76.0 ABNORMAL ANTINUCLEAR ANTIBODY TITER: ICD-10-CM

## 2021-03-22 PROCEDURE — G8427 DOCREV CUR MEDS BY ELIG CLIN: HCPCS | Performed by: OBSTETRICS & GYNECOLOGY

## 2021-03-22 PROCEDURE — G8756 NO BP MEASURE DOC: HCPCS | Performed by: OBSTETRICS & GYNECOLOGY

## 2021-03-22 PROCEDURE — 99213 OFFICE O/P EST LOW 20 MIN: CPT | Performed by: OBSTETRICS & GYNECOLOGY

## 2021-03-22 PROCEDURE — G8417 CALC BMI ABV UP PARAM F/U: HCPCS | Performed by: OBSTETRICS & GYNECOLOGY

## 2021-03-22 PROCEDURE — G9717 DOC PT DX DEP/BP F/U NT REQ: HCPCS | Performed by: OBSTETRICS & GYNECOLOGY

## 2021-03-23 ENCOUNTER — DOCUMENTATION ONLY (OUTPATIENT)
Dept: OBGYN CLINIC | Age: 21
End: 2021-03-23

## 2021-03-23 NOTE — PROGRESS NOTES
Records reviewed and rec'd from PCP.   Has been normotensive on recent visits:  (2/10/21) 116/84  (2/23/21) 126/78  (3/15/21) 116/80

## 2021-03-29 ENCOUNTER — TELEPHONE (OUTPATIENT)
Dept: RHEUMATOLOGY | Age: 21
End: 2021-03-29

## 2021-03-29 NOTE — TELEPHONE ENCOUNTER
----- Message from Tasha Lozano RN sent at 3/29/2021 11:31 AM EDT -----  Regarding: FW: Dr. Gisell Vick    ----- Message -----  From: Jacque Denise: 3/29/2021  11:22 AM EDT  To: [facility] Nurse Pool  Subject: Dr. Aracely Castro Message/Vendor Calls    Caller's first and last name:  Patient      Reason for call:  Lab results      Callback required yes/no and why: Yes.  To advise      Best contact number(s):  [phone]      Details to clarify the request:Patient is calling tot confirm receipt of labs results  before 4/9/21  appointment      Maria Isabel Cruz

## 2021-03-31 ENCOUNTER — TELEPHONE (OUTPATIENT)
Dept: RHEUMATOLOGY | Age: 21
End: 2021-03-31

## 2021-03-31 ENCOUNTER — PATIENT MESSAGE (OUTPATIENT)
Dept: RHEUMATOLOGY | Age: 21
End: 2021-03-31

## 2021-03-31 DIAGNOSIS — E88.81 INSULIN RESISTANCE: ICD-10-CM

## 2021-03-31 RX ORDER — BLOOD SUGAR DIAGNOSTIC
STRIP MISCELLANEOUS
Qty: 100 STRIP | Refills: 4 | Status: SHIPPED | OUTPATIENT
Start: 2021-03-31 | End: 2021-10-31

## 2021-03-31 NOTE — TELEPHONE ENCOUNTER
----- Message from Reji Avalos RN sent at 3/31/2021  8:57 AM EDT -----  Regarding: FW: Dr. Arlet Bravo    ----- Message -----  From: Anny Hamilton: 3/31/2021   8:33 AM EDT  To: [facility] Nurse Pool  Subject: Dr. Leonel Luna Message/Vendor Calls    Caller's first and last name: Patient      Reason for call: Lab Results      Callback required yes/no and why: Yes.  To Provide if available      Best contact number(s): [phone]      Details to clarify the request:       Maria Isabel Cruz

## 2021-04-01 NOTE — TELEPHONE ENCOUNTER
From: Fuentes Tellez  To: Jim Lawler MD  Sent: 3/31/2021 4:25 PM EDT  Subject: Visit Follow-Up Question    Good evening! I was wondering if you got the blood work results yet? I did the blood work on March 10th two days after I saw you. I have an upcoming appointment to see you on Friday and wanted to make sure you got those results. I got everything from the dermatologist they would not send them to your office so I have it all and will bring it with me on Friday.   [place]

## 2021-04-02 ENCOUNTER — VIRTUAL VISIT (OUTPATIENT)
Dept: RHEUMATOLOGY | Age: 21
End: 2021-04-02
Payer: MEDICARE

## 2021-04-02 DIAGNOSIS — R21 RASH AND NONSPECIFIC SKIN ERUPTION: ICD-10-CM

## 2021-04-02 DIAGNOSIS — M35.9 UNDIFFERENTIATED CONNECTIVE TISSUE DISEASE (HCC): Primary | ICD-10-CM

## 2021-04-02 DIAGNOSIS — J01.90 ACUTE SINUSITIS, RECURRENCE NOT SPECIFIED, UNSPECIFIED LOCATION: ICD-10-CM

## 2021-04-02 PROCEDURE — 99215 OFFICE O/P EST HI 40 MIN: CPT | Performed by: INTERNAL MEDICINE

## 2021-04-02 PROCEDURE — G8427 DOCREV CUR MEDS BY ELIG CLIN: HCPCS | Performed by: INTERNAL MEDICINE

## 2021-04-02 PROCEDURE — G8756 NO BP MEASURE DOC: HCPCS | Performed by: INTERNAL MEDICINE

## 2021-04-02 PROCEDURE — G9717 DOC PT DX DEP/BP F/U NT REQ: HCPCS | Performed by: INTERNAL MEDICINE

## 2021-04-02 PROCEDURE — G8417 CALC BMI ABV UP PARAM F/U: HCPCS | Performed by: INTERNAL MEDICINE

## 2021-04-02 RX ORDER — HYDROXYCHLOROQUINE SULFATE 200 MG/1
200 TABLET, FILM COATED ORAL 2 TIMES DAILY
Qty: 60 TAB | Refills: 5 | Status: SHIPPED | OUTPATIENT
Start: 2021-04-02 | End: 2021-09-10

## 2021-04-02 RX ORDER — HYDROXYCHLOROQUINE SULFATE 200 MG/1
200 TABLET, FILM COATED ORAL DAILY
Qty: 60 TAB | Refills: 5 | Status: SHIPPED | OUTPATIENT
Start: 2021-04-02 | End: 2021-04-02 | Stop reason: SDUPTHER

## 2021-04-02 NOTE — PATIENT INSTRUCTIONS
1. Start Plaquenil (hydroxychloroquine) one pill twice a day with food. If no stomach upset, OK to consolidate to 2 pills once daily. 2. Reach out to your ophthalmologist to let them know you need a baseline Plaquenil (hydroxychloroquine) eye exam. [id number] Ar[address] Arias to start your Plaquenil (hydroxychloroquine) before you get that done--the rare eye toxicity associated with Plaquenil (hydroxychloroquine) doesn't happen quickly. 3. Labs again in 3 months from [facility] or [facility] [employer] [facility]. We'll send you lab orders in the mail (this typically takes a few weeks to get to you). 4. Take pictures of any rashes in the meantime. 5. Return in 3-4 months, preferably in-person to track progress. We'll call you to schedule.

## 2021-04-02 NOTE — PROGRESS NOTES
REASON FOR VISIT:   Corina Augustin is a 23 y.o. female with history of migraines and ADHD who is returning for followup of rash and +WNEDY    HISTORY OF PRESENT ILLNESS      Just finished a course of amoxicillin for sinus infection last Friday. . hasn't had pain or epistaxis. No current painful or draining nodules. Has seen 2 dermatologists who have suggested MRSA management; various diagnoses for multiple rashes in the past including keratosis pilaris, rosacea, and prurigo nodularis. No breathing problems. No change in degenerative-character knee and ankle pains. Since 4 or 6yo, has had recurrent nodules on the skin diffusely--face, arms, legs, abdomen. Can drain pus. Has been treated over the years with oral antibiotics and topical antibiotics which help; swabs have been MRSA-positive in the past. Has tried Hibiclens baths in the past repeatedly without improvement. Pruritic, seem to respond to topical Mupirocin. Has had shave biopsy with Derm at [facility] in the past, no results discussed in available notes; last seen in November when diagnosed with keratosis pilaris and rosacea. In February, PCP ordered labs including an WENDY screen which returned +1:320 speckled and 1:640 nuclear dot pattern, with mild elevations of ESR (26) and CRP (17mg/L). Turns \"red as a lobster\" in the sun even with sunscreen use, has always been the case. No chronic cough or progressive dyspnea on exertion. Nonrefreshing sleep and always fatigued in the evening. Runs to the bathroom repeatedly through the evening, doesn't feel she can reduce her fluid intake in the afternoon/evenings 2/2 chronic thirst and dry mouth. Sleep study ~3 years ago she says was normal.    Has more subjective eye dryness, not currently using AT's consistently or following with ophthalmology. Cold intolerant, feels hands and feet always feel like ice, feels worse over the last 2 years.  Gaining weight again after losing nearly 100 pounds with metformin; has gained 15 pounds in the last 3 months. Feels digestive issues (possible gastroparesis in the past with postprandial pain, biliary dyskinesis s/p cholecystectomy) have resolved since starting a sucrose intolerance diet. Stool softeners haven't helped in the past. On current diet she is having daily bowel movements without abdominal pain. Knees ache. Denies joint swelling. Has sprained ankles in past and now feels wrists get more sore with use. REVIEW OF SYSTEMS  A comprehensive review of systems was negative except for that written in the HPI. A 10-point review of systems is per the new patient questionnaire, which has been reviewed extensively and scanned into the electronic medical record for future reference. Review of systems is as above and is otherwise negative. ALLERGIES  Yeast, dried    MEDICATIONS  Current Outpatient Medications   Medication Sig    OneTouch Verio test strips strip USE TO TEST BLOOD SUGAR UP TO 2 TIMES DAILY.  Vyvanse 50 mg cap TAKE 1 CAPSULE BY MOUTH EVERY DAY IN THE MORNING    metFORMIN ER (GLUCOPHAGE XR) 750 mg tablet TAKE 1 TABLET BY MOUTH TWICE A DAY    hydrOXYzine HCL (ATARAX) 25 mg tablet TAKE 1 TO 2 TABLETS BY MOUTH TWICE A DAY AS NEEDED FOR ANXIETY    doxycycline (MONODOX) 100 mg capsule TAKE 1 CAPSULE BY MOUTH EVERYDAY AT BEDTIME    hydrOXYzine HCL (ATARAX) 25 mg tablet TAKE 1 TO 2 TABLETS BY MOUTH TWICE A DAY AS NEEDED FOR ANXIETY    citalopram (CELEXA) 40 mg tablet TAKE 1 TABLET BY MOUTH EVERY DAY    norethindrone-ethinyl estradiol-iron (Junel FE 1.5/30, 28,) 1.5 mg-30 mcg (21)/75 mg (7) tab TAKE 1 TAB BY MOUTH DAILY. CONTINUOUS PILLS ONLY.     ondansetron (ZOFRAN ODT) 8 mg disintegrating tablet PLACE 1 TABLET ON TONGUE & ALLOW TO DISSOLVE EVERY 8 HOURS AS NEEDED FOR NAUSEA WITH HEADACHE    Aimovig Autoinjector 140 mg/mL injection INJECT 1 SYRINGE VIA SUBCUTANEOUS ROUTE ONCE A MONTH, AS DIRECTED    pantoprazole (PROTONIX) 40 mg tablet Take 1 Tab by mouth two (2) times a day. (Patient taking differently: Take 40 mg by mouth daily. 1 Tab at night)    midazolam (VERSED) 2 mg/mL syrup Take 10 mL by mouth daily as needed (pre-procedure anxiolysis) for up to 1 dose.  lancets (One Touch Delica) 33 gauge misc Use to test blood sugar 2 times daily.  Blood-Glucose Meter (OneTouch Verio Flex meter) misc Use as directed    fluticasone (FLOVENT HFA) 220 mcg/actuation inhaler 2 puffs swallowed twice daily, npo x 30 min after dose    docusate sodium (STOOL SOFTENER) 250 mg capsule Take 1 Cap by mouth two (2) times daily as needed for Constipation for up to 30 doses.  cetirizine (ZYRTEC) 10 mg tablet TK 1 T PO D PRF ALLERGY    cholecalciferol, vitamin d3, (VITAMIN D3) 400 unit cap Take  by mouth.  SUMAtriptan succinate (ZEMBRACE SYMTOUCH) 3 mg/0.5 mL pnij by SubCUTAneous route. Indications: MIGRAINE    diphenhydrAMINE (BENADRYL) 25 mg capsule 2 tabs PO q8h PRN     No current facility-administered medications for this visit. PAST MEDICAL HISTORY  Past Medical History:   Diagnosis Date    Abdominal migraine     ADHD (attention deficit hyperactivity disorder)     Autism     Developmental delay     GERD (gastroesophageal reflux disease)     Headache     Irritable bowel syndrome with diarrhea 4/13/2017    Migraine     Obesity, morbid ([address] 12/12/2017    Psychiatric disorder     anxiety, ADHD, OCD, HIGH FUNTIONING AUTISM    Sucrose intolerance        FAMILY HISTORY  family history includes Delayed Awakening in her mother; Endometriosis in her mother; Headache in her maternal grandfather and mother; MS in her maternal aunt; No Known Problems in her father and maternal grandmother; Post-op Nausea/Vomiting in her mother. Mother diagnosed with sarcoidosis. Father has gout. Maternal aunt has MS.    SOCIAL HISTORY  She  reports that she has never smoked.  She has never used smokeless tobacco. She reports that she does not drink alcohol or use drugs. 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 There is no height or weight on file to calculate BMI. No flowsheet data found. General:  The patient is pleasant, obese, alert, and in no apparent distress. Eyes: Sclera are anicteric. No conjunctival injection. HEENT:  Oropharynx is clear. No oral ulcers. Adequate salivary pooling. No cervical or supraclavicular lymphadenopathy. Lungs:  Clear to auscultation bilaterally, without wheeze or stridor. Normal respiratory effort. Cor:  Regular rate and rhythm. No murmur rub or gallop. Abdomen: Soft, non-tender, without hepatomegaly or masses. Extremities: No calf tenderness or edema. Warm and well perfused. Skin:  Maxillary erythema and telangiectasias without edema. No heliotrope. Striae proximal to antecubital fossae. Violaceous discoloration over bilateral upper arms. Grossly normal nailfold capillaries. No sclerodacytly. Neuro: Nonfocal  Musculoskeletal:    A comprehensive musculoskeletal exam was performed for all joints of each upper and lower extremity and assessed for swelling, tenderness and range of motion. Results are documented as below:  No evidence of synovitis in the small joints of the hands, wrists, shoulders, elbows, hips, knees or ankles. Labs:  21: WBC 8.9, Hgb 15.6, Plt 472; ANCAs negative, PR3 and MPO negative; Cr 0.7, CMP WNL;  WENDY 1:320 speckled, 1:640 nuclear dot; C3 high, C4 WNL; negative dsDNA, Scl70, SSA, SSB, RNP, Sm, ribosomal P, chromatin, centromere B antibodies. RF <10, CCP negative; ESR 26, CRP 17mg/L. Hep B cAb neg, Hep B sAg neg, Hep C Ab neg      Imagin19 CXR (report only): Normal chest views. No change.     ASSESSMENT AND PLAN  Ms. Tanner Khan is a 23 y.o. female who presents for evaluation of resting tachycardia, pruritic nodules, livedo on exam, and +WENDY 1:320 speckled and 1:640 nuclear dot patterns. Reasonable to trial empiric Plaquenil (hydroxychloroquine) for undifferentiated connective tissue disease, though emphasized importance of avoiding picking at skin as wlell. If no subjective improvement within 6-12 months, would stop Plaquenil (hydroxychloroquine). 1. Undifferentiated connective tissue disease ([facility] [address]  - hydrOXYchloroQUINE (Plaquenil) 200 mg tablet; Take 1 Tab by mouth two (2) times a day. Dispense: 60 Tab; Refill: 5  - ANTI-NEUTROPHIL CYTOPLASMIC AB; Future  - SERINE PROTEASE 3, IGG; Future  - MYELOPEROXIDASE, AB; Future  - IMMUNOGLOBULINS, G/A/M, QT.; Future  - SED RATE (ESR); Future  - C REACTIVE PROTEIN, QT; Future  - COMPLEMENT, C3 & C4; Future  - METABOLIC PANEL, COMPREHENSIVE; Future  - CBC WITH AUTOMATED DIFF; Future  - URINALYSIS W/ REFLEX CULTURE; Future  - PROT+CREATU (RANDOM); Future  - ANTI-NEUTROPHIL CYTOPLASMIC AB  - SERINE PROTEASE 3, IGG  - MYELOPEROXIDASE, AB  - IMMUNOGLOBULINS, G/A/M, QT.  - SED RATE (ESR)  - C REACTIVE PROTEIN, QT  - COMPLEMENT, C3 & C4  - METABOLIC PANEL, COMPREHENSIVE  - CBC WITH AUTOMATED DIFF  - URINALYSIS W/ REFLEX CULTURE  - PROT+CREATU (RANDOM)    2. Rash and nonspecific skin eruption  - ANTI-NEUTROPHIL CYTOPLASMIC AB; Future  - SERINE PROTEASE 3, IGG; Future  - MYELOPEROXIDASE, AB; Future  - IMMUNOGLOBULINS, G/A/M, QT.; Future  - SED RATE (ESR); Future  - C REACTIVE PROTEIN, QT; Future  - COMPLEMENT, C3 & C4; Future  - METABOLIC PANEL, COMPREHENSIVE; Future  - CBC WITH AUTOMATED DIFF; Future  - URINALYSIS W/ REFLEX CULTURE; Future  - PROT+CREATU (RANDOM); Future  - ANTI-NEUTROPHIL CYTOPLASMIC AB    3. Acute sinusitis, recurrence not specified, unspecified location  - ANTI-NEUTROPHIL CYTOPLASMIC AB; Future    Patient Instructions   1. Start Plaquenil (hydroxychloroquine) one pill twice a day with food. If no stomach upset, OK to consolidate to 2 pills once daily.     2. Reach out to your ophthalmologist to let them know you need a baseline Plaquenil (hydroxychloroquine) eye exam. Sharon Monson to start your Plaquenil (hydroxychloroquine) before you get that done--the rare eye toxicity associated with Plaquenil (hydroxychloroquine) doesn't happen quickly. 3. Labs again in 3 months from [facility]. We'll send you lab orders in the mail (this typically takes a few weeks to get to you). 4. Take pictures of any rashes in the meantime. 5. Return in 3-4 months, preferably in-person to track progress. We'll call you to schedule. Orders Placed This Encounter    ANTI-NEUTROPHIL CYTOPLASMIC AB    SERINE PROTEASE 3, IGG    MYELOPEROXIDASE, AB    IMMUNOGLOBULINS, G/A/M, QT.    SED RATE (ESR)    C REACTIVE PROTEIN, QT    COMPLEMENT, C3 & C4    METABOLIC PANEL, COMPREHENSIVE    CBC WITH AUTOMATED DIFF    URINALYSIS W/ REFLEX CULTURE    PROT+CREATU (RANDOM)    DISCONTD: hydrOXYchloroQUINE (Plaquenil) 200 mg tablet    hydrOXYchloroQUINE (Plaquenil) 200 mg tablet       Medications: I have discontinued Erica Garcia's fluticasone propionate, docusate sodium, midazolam, and doxycycline. I have also changed her hydrOXYchloroQUINE. Additionally, I am having her maintain her diphenhydrAMINE, SUMAtriptan succinate, cholecalciferol (vitamin d3), cetirizine, Blood-Glucose Meter, lancets, ondansetron, Aimovig Autoinjector, pantoprazole, norethindrone-ethinyl estradiol-iron, hydrOXYzine HCL, hydrOXYzine HCL, citalopram, metFORMIN ER, Vyvanse, and OneTouch Verio test strips. Follow up: Return in about 3 months (around 7/2/2021).      Face to face time: 34 minutes  Note preparation and records review day of service: 10 minutes  Total provider time day of service: 40 minutes      Gege Tirado MD    Adult Rheumatology   [address], [address] [place] [address] Road   Phone [phone]  Fax [phone]

## 2021-04-05 LAB
ANTI-MDA-5 AB (CADM-140)(RDL): <20 UNITS
ANTI-NXP-2 (P140) AB (RDL): <20 UNITS
ANTI-SAE1 AB, IGG (RDL): <20 UNITS
ANTI-TIF-1GAMMA AB (RDL): <20 UNITS
APPEARANCE UR: CLEAR
APTT HEX PL PPP: 0 SEC
APTT IMM NP PPP: NORMAL SEC
APTT PPP 1:1 SALINE: NORMAL SEC
APTT PPP: 27 SEC
B2 GLYCOPROT1 IGA SER-ACNC: <10 SAU
B2 GLYCOPROT1 IGG SER-ACNC: <10 SGU
B2 GLYCOPROT1 IGM SER-ACNC: <10 SMU
BACTERIA #/AREA URNS HPF: ABNORMAL /[HPF]
BACTERIA UR CULT: NORMAL
BILIRUB UR QL STRIP: NEGATIVE
CARDIOLIPIN IGA SER IA-ACNC: <10 APL
CARDIOLIPIN IGG SER IA-ACNC: <10 GPL
CARDIOLIPIN IGM SER IA-ACNC: <10 MPL
CASTS URNS QL MICRO: ABNORMAL /LPF
CK SERPL-CCNC: 52 U/L (ref 32–182)
COLOR UR: YELLOW
CONFIRM DRVVT: NORMAL SEC
CREAT UR-MCNC: 72.2 MG/DL
CRP SERPL-MCNC: 21 MG/L (ref 0–10)
DAT POLY-SP REAG RBC QL: NEGATIVE
DRVVT SCREEN TO CONFIRM RATIO: NORMAL RATIO
EJ AB SER QL: NEGATIVE
ENA JO1 AB SER IA-ACNC: <20 UNITS
ENA PM/SCL AB SER-ACNC: <20 UNITS
ENA SS-A 52KD IGG SER IA-ACNC: <20 UNITS
EPI CELLS #/AREA URNS HPF: ABNORMAL /HPF (ref 0–10)
ERYTHROCYTE [SEDIMENTATION RATE] IN BLOOD BY WESTERGREN METHOD: 41 MM/HR (ref 0–32)
FIBRILLARIN AB SER QL: NEGATIVE
GLUCOSE UR QL: NEGATIVE
HGB UR QL STRIP: NEGATIVE
KETONES UR QL STRIP: NEGATIVE
KU AB SER QL: NEGATIVE
LAC INTERPRETATION, 502038: NORMAL
LEUKOCYTE ESTERASE UR QL STRIP: ABNORMAL
MI2 AB SER QL: NEGATIVE
MICRO URNS: ABNORMAL
NITRITE UR QL STRIP: NEGATIVE
OJ AB SER QL: NEGATIVE
PH UR STRIP: 6.5 [PH] (ref 5–7.5)
PL12 AB SER QL: NEGATIVE
PL7 AB SER QL: NEGATIVE
PLATELET NEUTRALIZATION 500049: 2.8 SEC
PROT UR QL STRIP: NEGATIVE
PROT UR-MCNC: 13.9 MG/DL
PROT/CREAT UR: 193 MG/G CREAT (ref 0–200)
PROTHROM IGG SERPL-ACNC: 6 G UNITS
PS IGG SER IA-ACNC: 3 GPS
PS IGM SER IA-ACNC: 0 MPS
RBC #/AREA URNS HPF: ABNORMAL /HPF (ref 0–2)
SCREEN DRVVT: 34.1 SEC
SP GR UR: 1.01 (ref 1–1.03)
SRP AB SERPL QL: NEGATIVE
U1 SNRNP AB SER IA-ACNC: <20 UNITS
U2 SNRNP AB SER QL: NEGATIVE
URINALYSIS REFLEX, 377202: ABNORMAL
UROBILINOGEN UR STRIP-MCNC: 0.2 MG/DL (ref 0.2–1)
WBC #/AREA URNS HPF: ABNORMAL /HPF (ref 0–5)

## 2021-04-15 ENCOUNTER — TELEPHONE (OUTPATIENT)
Dept: RHEUMATOLOGY | Age: 21
End: 2021-04-15

## 2021-04-15 NOTE — TELEPHONE ENCOUNTER
----- Message from Milena Qureshi RN sent at 4/15/2021  9:44 AM EDT -----  Regarding: FW: Dr. Blanco Child    ----- Message -----  From: Omar Lake  Sent: 4/15/2021   9:32 AM EDT  To: [facility] Nurse Pool  Subject: Dr. Kavitha Dewitt Message/Vendor Calls    Caller's first and last name:Lyubov([facility])      Reason for call:doctor call back regarding test ordered      Callback required yes/no and why:yes      Best contact number(s):[phone]      Details to clarify the request:Lyubov requested a call to advise what type of testing the doctor ordered pt to have from visit on 03/08/21, or at least what was recommended.       Siri Nolasco

## 2021-04-15 NOTE — TELEPHONE ENCOUNTER
Call [facility] to speak with Rusty Lucio. Was told that she is with a patient and they will have her to call office back.

## 2021-04-18 DIAGNOSIS — E88.81 INSULIN RESISTANCE: ICD-10-CM

## 2021-04-18 DIAGNOSIS — E66.9 OBESITY (BMI 35.0-39.9 WITHOUT COMORBIDITY): ICD-10-CM

## 2021-04-21 ENCOUNTER — TELEPHONE (OUTPATIENT)
Dept: RHEUMATOLOGY | Age: 21
End: 2021-04-21

## 2021-04-21 NOTE — TELEPHONE ENCOUNTER
Dr. Ekaterina Hernandez office is requesting this patient's last office note to be faxed to [phone]. Office phone: [phone].

## 2021-05-10 ENCOUNTER — TELEPHONE (OUTPATIENT)
Dept: RHEUMATOLOGY | Age: 21
End: 2021-05-10

## 2021-05-10 NOTE — TELEPHONE ENCOUNTER
----- Message from Ellen Manrique sent at 5/10/2021  3:02 PM EDT -----  Regarding: Dr. Monroe Rubio Message/Vendor Calls    Caller's first and last name: Pt      Reason for call: Requesting a call concerning if she need to fast for labs.        Callback required yes/no and why: Yes      Best contact number(s): [phone]      Details to clarify the request:      Ellen Manrique

## 2021-05-20 ENCOUNTER — OFFICE VISIT (OUTPATIENT)
Dept: RHEUMATOLOGY | Age: 21
End: 2021-05-20
Payer: MEDICARE

## 2021-05-20 ENCOUNTER — TELEPHONE (OUTPATIENT)
Dept: RHEUMATOLOGY | Age: 21
End: 2021-05-20

## 2021-05-20 VITALS
OXYGEN SATURATION: 97 % | SYSTOLIC BLOOD PRESSURE: 140 MMHG | WEIGHT: 269 LBS | DIASTOLIC BLOOD PRESSURE: 95 MMHG | HEIGHT: 60 IN | BODY MASS INDEX: 52.81 KG/M2 | TEMPERATURE: 98.1 F | HEART RATE: 120 BPM | RESPIRATION RATE: 20 BRPM

## 2021-05-20 DIAGNOSIS — I89.0 LYMPHEDEMA OF BOTH LOWER EXTREMITIES: Primary | ICD-10-CM

## 2021-05-20 DIAGNOSIS — R21 RASH AND NONSPECIFIC SKIN ERUPTION: ICD-10-CM

## 2021-05-20 DIAGNOSIS — I30.0 ACUTE IDIOPATHIC PERICARDITIS: ICD-10-CM

## 2021-05-20 DIAGNOSIS — R00.0 TACHYCARDIA: ICD-10-CM

## 2021-05-20 DIAGNOSIS — R00.2 PALPITATIONS: ICD-10-CM

## 2021-05-20 PROCEDURE — G8417 CALC BMI ABV UP PARAM F/U: HCPCS | Performed by: INTERNAL MEDICINE

## 2021-05-20 PROCEDURE — G8753 SYS BP > OR = 140: HCPCS | Performed by: INTERNAL MEDICINE

## 2021-05-20 PROCEDURE — 99215 OFFICE O/P EST HI 40 MIN: CPT | Performed by: INTERNAL MEDICINE

## 2021-05-20 PROCEDURE — G9717 DOC PT DX DEP/BP F/U NT REQ: HCPCS | Performed by: INTERNAL MEDICINE

## 2021-05-20 PROCEDURE — G8755 DIAS BP > OR = 90: HCPCS | Performed by: INTERNAL MEDICINE

## 2021-05-20 PROCEDURE — G8427 DOCREV CUR MEDS BY ELIG CLIN: HCPCS | Performed by: INTERNAL MEDICINE

## 2021-05-20 RX ORDER — COLCHICINE 0.6 MG/1
0.6 TABLET ORAL 2 TIMES DAILY
Qty: 60 TABLET | Refills: 2 | Status: SHIPPED | OUTPATIENT
Start: 2021-05-20 | End: 2021-07-06

## 2021-05-20 NOTE — PROGRESS NOTES
Chief Complaint   Patient presents with    Other     connective tissue disease     1. Have you been to the ER, urgent care clinic since your last visit? Hospitalized since your last visit? Yes Where: [address] [place] [address] ED for side pain    2. Have you seen or consulted any other health care providers outside of the [address] [place] [address] since your last visit? Include any pap smears or colon screening.  Yes Where: PCP

## 2021-05-20 NOTE — PATIENT INSTRUCTIONS
1. Let's start you on colchicine for the skin rash. Start with one pill daily with breakfast. If you tolerate this well, increase to one pill twice a day with food. Stop and call if you experience diarrhea. 2. For now, continue Plaquenil 2 pills daily. 3. I'm going to send you to a Dermatologist [facility]) for a 3rd opinion, they may at least be able to offer you something more effective for treatment. Call to schedule if your insurance is accepted. 4. I'm referring you to cardiology for possible echocardiogram and to weigh in on your episodes of lightedheadedness and sweatings (sometimes a Holter monitor is needed). 5. Call lymphedema clinic at [facility] to schedule an evaluation and possible treatment. In the meantime, try compression stockings up to the knees, [employer], pharmacies, and [employer] all have these. 6. Return as previously scheduled.

## 2021-05-20 NOTE — LETTER
5/31/2021 Patient: Alec Harrison YOB: 2000 Date of Visit: 5/20/2021 Alia Redmond DO 
[address] [facility] [address] Via Fax: [phone] [address] [address] Via Fax: [phone] Dear DO Shanita Sanchez MD, Thank you for referring Ms. Jasmine Armenta to [address] [place] [address] for evaluation. My notes for this consultation are attached. I am CC'ing Dr. Dev Cherry in Dermatology in the hopes her group will be able to provide some additional insight into the management of her mixed rash with livedoid, nodular, and eczematous features--the patient may be a good candidate for dupilumab. In the meantime we are treating her with Plaquenil (hydroxychloroquine) and colchicine for the rash and undifferentiated connective tissue disease based on her medium-high titer WENDY and photosensitivity. She is aware of the need to lose weight, and I took the liberty of referring her to cardiology in case she warrants beta blockade to prevent long-term cardiomyopathies. If you have questions, please do not hesitate to call or text me. I look forward to following your patient along with you. Sincerely, 
Matthew Kumari MD 
Cell: [phone]

## 2021-05-20 NOTE — PROGRESS NOTES
REASON FOR VISIT:   Alexsandra Arriaza is a 23 y.o. female with history of migraines and ADHD who is returning for followup of rash and +WENDY    HISTORY OF PRESENT ILLNESS    Feeling chronically exhausted now. Easily fatigued with light activity, eg folding clothes for 10 minutes. Had right flank pain worse with activity, April urine culture grew klebsiella, tried course of bactrim but rash worsened and developed abdominal pain so stopped, and flank pain gradually resolved    Now clammy. Not scheduled to follow up with another dermatologist now after 2 opinions. Disease History:  Since 4 or 6yo, has had recurrent nodules on the skin diffusely--face, arms, legs, abdomen. Can drain pus. Has been treated over the years with oral antibiotics and topical antibiotics which help; swabs have been MRSA-positive in the past. Has tried Hibiclens baths in the past repeatedly without improvement. Pruritic, seem to respond to topical Mupirocin. Has had shave biopsy with Derm at [facility] in the past,punch biopsy in May 2019 was interpreted as mixed dermal inflammation with features of an excoriated hypersensitivity reaction; last seen in November 2020 when diagnosed with keratosis pilaris, rosacea, and neurodermatitis. In February, PCP ordered labs including an WENDY screen which returned +1:320 speckled and 1:640 nuclear dot pattern, with mild elevations of ESR (26) and CRP (17mg/L). Turns \"red as a lobster\" in the sun even with sunscreen use, has always been the case. No chronic cough or progressive dyspnea on exertion. Nonrefreshing sleep and always fatigued in the evening. Runs to the bathroom repeatedly through the evening, doesn't feel she can reduce her fluid intake in the afternoon/evenings 2/2 chronic thirst and dry mouth. Sleep study ~3 years ago she says was normal.    Has more subjective eye dryness, not currently using AT's consistently or following with ophthalmology.     Cold intolerant, feels hands and feet always feel like ice, feels worse over the last 2 years. Gaining weight again after losing nearly 100 pounds with metformin; has gained 15 pounds in the last 3 months. Feels digestive issues (possible gastroparesis in the past with postprandial pain, biliary dyskinesis s/p cholecystectomy) have resolved since starting a sucrose intolerance diet. Stool softeners haven't helped in the past. On current diet she is having daily bowel movements without abdominal pain. Knees ache. Denies joint swelling. Has sprained ankles in past and now feels wrists get more sore with use. REVIEW OF SYSTEMS  A comprehensive review of systems was negative except for that written in the HPI. A 10-point review of systems is per the new patient questionnaire, which has been reviewed extensively and scanned into the electronic medical record for future reference. Review of systems is as above and is otherwise negative. ALLERGIES  Yeast, dried    MEDICATIONS  Current Outpatient Medications   Medication Sig    hydrOXYchloroQUINE (Plaquenil) 200 mg tablet Take 1 Tab by mouth two (2) times a day.  OneTouch Verio test strips strip USE TO TEST BLOOD SUGAR UP TO 2 TIMES DAILY.  metFORMIN ER (GLUCOPHAGE XR) 750 mg tablet TAKE 1 TABLET BY MOUTH TWICE A DAY    hydrOXYzine HCL (ATARAX) 25 mg tablet TAKE 1 TO 2 TABLETS BY MOUTH TWICE A DAY AS NEEDED FOR ANXIETY    hydrOXYzine HCL (ATARAX) 25 mg tablet TAKE 1 TO 2 TABLETS BY MOUTH TWICE A DAY AS NEEDED FOR ANXIETY    citalopram (CELEXA) 40 mg tablet TAKE 1 TABLET BY MOUTH EVERY DAY    norethindrone-ethinyl estradiol-iron (Junel FE 1.5/30, 28,) 1.5 mg-30 mcg (21)/75 mg (7) tab TAKE 1 TAB BY MOUTH DAILY. CONTINUOUS PILLS ONLY.  ondansetron (ZOFRAN ODT) 8 mg disintegrating tablet PLACE 1 TABLET ON TONGUE & ALLOW TO DISSOLVE EVERY 8 HOURS AS NEEDED FOR NAUSEA WITH HEADACHE    pantoprazole (PROTONIX) 40 mg tablet Take 1 Tab by mouth two (2) times a day.  (Patient taking differently: Take 40 mg by mouth daily. 1 Tab at night)    lancets (One Touch Delica) 33 gauge misc Use to test blood sugar 2 times daily.  Blood-Glucose Meter (OneTouch Verio Flex meter) misc Use as directed    cetirizine (ZYRTEC) 10 mg tablet TK 1 T PO D PRF ALLERGY    cholecalciferol, vitamin d3, (VITAMIN D3) 400 unit cap Take  by mouth.  diphenhydrAMINE (BENADRYL) 25 mg capsule 2 tabs PO q8h PRN    Vyvanse 50 mg cap TAKE 1 CAPSULE BY MOUTH EVERY DAY IN THE MORNING (Patient not taking: Reported on 5/20/2021)    Aimovig Autoinjector 140 mg/mL injection INJECT 1 SYRINGE VIA SUBCUTANEOUS ROUTE ONCE A MONTH, AS DIRECTED    SUMAtriptan succinate (ZEMBRACE SYMTOUCH) 3 mg/0.5 mL pnij by SubCUTAneous route. Indications: MIGRAINE (Patient not taking: Reported on 5/20/2021)     No current facility-administered medications for this visit. PAST MEDICAL HISTORY  Past Medical History:   Diagnosis Date    Abdominal migraine     ADHD (attention deficit hyperactivity disorder)     Autism     Developmental delay     GERD (gastroesophageal reflux disease)     Headache     Irritable bowel syndrome with diarrhea 4/13/2017    Migraine     Obesity, morbid ([address] 12/12/2017    Psychiatric disorder     anxiety, ADHD, OCD, HIGH FUNTIONING AUTISM    Sucrose intolerance        FAMILY HISTORY  family history includes Delayed Awakening in her mother; Endometriosis in her mother; Headache in her maternal grandfather and mother; MS in her maternal aunt; No Known Problems in her father and maternal grandmother; Post-op Nausea/Vomiting in her mother. Mother diagnosed with sarcoidosis. Father has gout. Maternal aunt has MS.    SOCIAL HISTORY  She  reports that she has never smoked. She has never used smokeless tobacco. She reports that she does not drink alcohol and does not use drugs.   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 No flowsheet data found. General:  The patient is pleasant, obese, alert, and in no apparent distress. Eyes: Sclera are anicteric. No conjunctival injection. HEENT:  Oropharynx is clear. No oral ulcers. Adequate salivary pooling. No cervical or supraclavicular lymphadenopathy. Lungs:  Clear to auscultation bilaterally, without wheeze or stridor. Normal respiratory effort. Cor:  Regular rate and rhythm. No murmur rub or gallop. Abdomen: Soft, non-tender, without hepatomegaly or masses. Extremities: No calf tenderness or edema. Warm and well perfused. Skin:  Maxillary erythema and telangiectasias without edema. No heliotrope. Striae proximal to antecubital fossae. Violaceous discoloration over bilateral upper arms, few excoriations on extensor forearms. Grossly normal nailfold capillaries. No sclerodacytly. Neuro: Nonfocal  Musculoskeletal:    A comprehensive musculoskeletal exam was performed for all joints of each upper and lower extremity and assessed for swelling, tenderness and range of motion. Results are documented as below:  No evidence of synovitis in the small joints of the hands, wrists, shoulders, elbows, hips, knees or ankles. Labs:  3/10/21: CRP 21mg/L, CPK 57, Marissa neg, RDL myositis panel neg,   2/24/21: WBC 8.9, Hgb 15.6, Plt 472; ANCAs negative, PR3 and MPO negative; Cr 0.7, CMP WNL;  WENDY 1:320 speckled, 1:640 nuclear dot; C3 high, C4 WNL; negative dsDNA, Scl70, SSA, SSB, RNP, Sm, ribosomal P, chromatin, centromere B antibodies. RF <10, CCP negative; ESR 26, CRP 17mg/L.  Hep B cAb neg, Hep B sAg neg, Hep C Ab neg    Pathology:  5/8/19: [facility] final report, Duane Maria MD:  Skin, right lower leg, punch biopsy:  -Ulcer and mixed dermal inflammation (see comment). Sections reveal a punch biopsy to the depth of the mid dermis. There is a zone of ulceration with fibrinous crust containing neutrophils. In the superficial to mid dermis, there is a mildly dense perivascular lymphohistiocytic infiltrate with scattered neutrophils and eosinophils. PAS stain is negative for fungal organisms. Taken together, the findings are those of ulceration and mixed dermal inflammation. The features are suggestive of an excoriated hypersensitivity reaction, such as that seen in arthropod bites. Diagnostic features of folliculitis are not seen. Imagin19 CXR (report only): Normal chest views. No change. ASSESSMENT AND PLAN  Ms. Akila Allison is a 23 y.o. female with high-functioning autism who presents for evaluation of resting tachycardia, pruritic nodules, polymorphic light eruption, livedo on exam, and +WENDY 1:320 speckled and 1:640 nuclear dot patterns. She is tolerating recent start of Plaquenil (hydroxychloroquine) well for undifferentiated connective tissue disease. She is frustrated with poor response to chronic antihistamines, emollients, and her best efforts to avoid traumatizing the skin. It seems likely her rashes are multifactorial, and if there is a significant eczematous component she may respond well to dupilumab. With significant preexisting sicca symptoms am avoiding TCAs and doxepin; with h/o MRSA infection would avoid methotrexate which can be used for refractory prurigo nodularis. Instead adding colchicine for a limited trial, as may be a relatively low risk/high reward way to assess component of underlying neutrophilic dermatosis. Do not have a record of her having tried gabapentin for her pruritis; will keep a low threshold to add this in the meantime for pruritis.     Tachycardia may be primarily deconditioning-related, but warrants cardiac workup and consideration for beta blockers for cardiomyopathy prevention if chronically 120's. 1. Lymphedema of both lower extremities  - REFERRAL TO LYMPHEDEMA CLINIC  - REFERRAL TO DERMATOLOGY    2. Rash and nonspecific skin eruption  - REFERRAL TO DERMATOLOGY  - Trial colchicine increased to BID if tolerated    3. Palpitations  - REFERRAL TO CARDIOLOGY    4. Tachycardia  - REFERRAL TO CARDIOLOGY    5. Undifferentiated connective tissue disease, possible pericarditis  - Cont Plaquenil (hydroxychloroquine)  - colchicine 0.6 mg tablet; Take 1 Tablet by mouth two (2) times a day. Start with 1 pill daily with breakfast, increase after 7 days if well-tolerated. Dispense: 60 Tablet; Refill: 2      Patient Instructions   1. Let's start you on colchicine for the skin rash. Start with one pill daily with breakfast. If you tolerate this well, increase to one pill twice a day with food. Stop and call if you experience diarrhea. 2. For now, continue Plaquenil 2 pills daily. 3. I'm going to send you to a Dermatologist [facility]) for a 3rd opinion, they may at least be able to offer you something more effective for treatment. Call to schedule if your insurance is accepted. 4. I'm referring you to cardiology for possible echocardiogram and to weigh in on your episodes of lightedheadedness and sweatings (sometimes a Holter monitor is needed). 5. Call lymphedema clinic at [facility] to schedule an evaluation and possible treatment. In the meantime, try compression stockings up to the knees, [employer], pharmacies, and [employer] all have these. 6. Return as previously scheduled. Orders Placed This Encounter    REFERRAL TO LYMPHEDEMA CLINIC    REFERRAL TO CARDIOLOGY    REFERRAL TO DERMATOLOGY    colchicine 0.6 mg tablet       Medications: I am having Leonie Mills start on colchicine.  I am also having her maintain her diphenhydrAMINE, SUMAtriptan succinate, cholecalciferol (vitamin d3), cetirizine, Blood-Glucose Meter, lancets, ondansetron, Aimovig Autoinjector, pantoprazole, norethindrone-ethinyl estradiol-iron, hydrOXYzine HCL, hydrOXYzine HCL, citalopram, metFORMIN ER, Vyvanse, OneTouch Verio test strips, and hydrOXYchloroQUINE.     Follow up: 1 month, as previously scheduled    Face to face time: 30 minutes  Note preparation and records review day of service: 20 minutes  Total provider time day of service: 50 minutes      Sree Terrell MD    Adult Rheumatology   [address] [place] [address] [place], [address] [place] [address]   Phone [phone]  Fax [phone]

## 2021-05-20 NOTE — TELEPHONE ENCOUNTER
----- Message from Keith Moon RN sent at 5/19/2021 10:39 AM EDT -----  Regarding: FW: Dr. Nathalie Nielsen    ----- Message -----  From: Maranda Deleon  Sent: 5/19/2021  10:34 AM EDT  To: [facility] Nurse Pool  Subject: Dr. Cyril Rod Message/Vendor Calls    Caller's first and last name:Marie([facility])      Reason for call:office note from 04/02/21      Callback required yes/no and why:no      Best contact number(s):[phone](fax)[phone]      Details to clarify the request:Marie called regarding status of office note from 04/02/21 requested on 04/20/21 and have not received it, and would like it faxed today, if poss.       Barrington Faith

## 2021-06-02 ENCOUNTER — HOSPITAL ENCOUNTER (OUTPATIENT)
Dept: PHYSICAL THERAPY | Age: 21
Discharge: HOME | End: 2021-06-02
Payer: MEDICARE

## 2021-06-02 ENCOUNTER — TELEPHONE (OUTPATIENT)
Dept: RHEUMATOLOGY | Age: 21
End: 2021-06-02

## 2021-06-02 VITALS — DIASTOLIC BLOOD PRESSURE: 96 MMHG | SYSTOLIC BLOOD PRESSURE: 139 MMHG | OXYGEN SATURATION: 98 % | HEART RATE: 109 BPM

## 2021-06-02 PROCEDURE — 97162 PT EVAL MOD COMPLEX 30 MIN: CPT

## 2021-06-02 PROCEDURE — 97535 SELF CARE MNGMENT TRAINING: CPT

## 2021-06-02 NOTE — TELEPHONE ENCOUNTER
----- Message from Alfred Pacheco RN sent at 6/2/2021 10:28 AM EDT -----  Regarding: FW: Dr. Brenda Paredes    ----- Message -----  From: Katelynn Herrera  Sent: 6/2/2021  10:02 AM EDT  To: [facility] Nurse Pool  Subject: Dr. Brianda Casey Message/Vendor Calls    Caller's first and last name:Edelmira([facility])      Reason for call:office notes      Callback required yes/no and why:yes, if any questions      Best contact number(s): [phone](fax)[phone]      Details to clarify the request:Edelmira called regarding status of office notes requested on 05/27/21 and have not received them. Pt has an appt for today at 2:00 p.m.       Vi Benz

## 2021-06-02 NOTE — PROGRESS NOTES
[address] [facility]  [address] [facility]  [facility]      INITIAL EVALUATION    NAME: Talita De Jesus AGE: 23 y.o. GENDER: female  DATE: 6/2/2021  REFERRING PHYSICIAN: Driss Becerra MD  HISTORY AND BACKGROUND: Patient referred to clinic for evaluation and treatment of LE lymphedema. Patient reports long history of LE nodules, itchy, resulting in open wounds, history of cellulitis. Further medical history as noted below:   Primary Diagnosis:  · BLE lymphedema, lipedema component (I89.0)  · Primary lymphedema (Q82.0)  Other Treatment Diagnoses:   Swelling not relieved by elevation (R60.9 edema)   Open wound RLE S81.801S   Cellulitis (R leg L03.116; L leg L02.116)  Morbid Obesity (E66.01)  Date of Onset: 6 years ago  Present Symptoms and Functional Limitations: Patient c/o LE pain, severe heaviness, increase in size, limited LE movement/strength. Patient reports moderate negative impact on body image, need to rely on others to manage lymphedema, lack of knowledge in management of lymphedema. Reports lymphedema limiting her ability to perform self care/leisure/ and return home activities, with difficulty finding shoes/clothing that fit appropriately. Does report lymphedema limits her sleep. Lymphedema Life Impact Scale: 37/68; 54% impairment.   Past Medical History:   Past Medical History:   Diagnosis Date    Abdominal migraine     ADHD (attention deficit hyperactivity disorder)     Autism     Developmental delay     GERD (gastroesophageal reflux disease)     Headache     Irritable bowel syndrome with diarrhea 4/13/2017    Migraine     Obesity, morbid ([address] 12/12/2017    Psychiatric disorder     anxiety, ADHD, OCD, HIGH FUNTIONING AUTISM    Sucrose intolerance      Past Surgical History:   Procedure Laterality Date    COLONOSCOPY N/A 12/20/2016    COLONOSCOPY performed by Celeste Koroma Lang Encarnacion MD at [facility] ENDOSCOPY    GERD TST W/ MUCOS PH ELECTROD 48 HR (BRAVO)  12/3/2020         HC CLP BRAVO  11/30/2020    HX CHOLECYSTECTOMY  06/2019    HX ENDOSCOPY  11/2020    dx'd with sucrose intolerance    HX HEENT      WISDOM TEETH    HX TONSIL AND ADENOIDECTOMY      HX TONSILLECTOMY      AGE 1    HX WISDOM TEETH EXTRACTION      IA EGD TRANSORAL BIOPSY SINGLE/MULTIPLE  6/14/2019         IA EGD TRANSORAL BIOPSY SINGLE/MULTIPLE  11/30/2020    UPPER GI ENDOSCOPY,BIOPSY  12/20/2016          Current Medications:    Current Outpatient Medications   Medication Sig    colchicine 0.6 mg tablet Take 1 Tablet by mouth two (2) times a day. Start with 1 pill daily with breakfast, increase after 7 days if well-tolerated.  hydrOXYchloroQUINE (Plaquenil) 200 mg tablet Take 1 Tab by mouth two (2) times a day.  OneTouch Verio test strips strip USE TO TEST BLOOD SUGAR UP TO 2 TIMES DAILY.  Vyvanse 50 mg cap TAKE 1 CAPSULE BY MOUTH EVERY DAY IN THE MORNING (Patient not taking: Reported on 5/20/2021)    metFORMIN ER (GLUCOPHAGE XR) 750 mg tablet TAKE 1 TABLET BY MOUTH TWICE A DAY    hydrOXYzine HCL (ATARAX) 25 mg tablet TAKE 1 TO 2 TABLETS BY MOUTH TWICE A DAY AS NEEDED FOR ANXIETY    hydrOXYzine HCL (ATARAX) 25 mg tablet TAKE 1 TO 2 TABLETS BY MOUTH TWICE A DAY AS NEEDED FOR ANXIETY    citalopram (CELEXA) 40 mg tablet TAKE 1 TABLET BY MOUTH EVERY DAY    norethindrone-ethinyl estradiol-iron (Junel FE 1.5/30, 28,) 1.5 mg-30 mcg (21)/75 mg (7) tab TAKE 1 TAB BY MOUTH DAILY. CONTINUOUS PILLS ONLY.  ondansetron (ZOFRAN ODT) 8 mg disintegrating tablet PLACE 1 TABLET ON TONGUE & ALLOW TO DISSOLVE EVERY 8 HOURS AS NEEDED FOR NAUSEA WITH HEADACHE    Aimovig Autoinjector 140 mg/mL injection INJECT 1 SYRINGE VIA SUBCUTANEOUS ROUTE ONCE A MONTH, AS DIRECTED    pantoprazole (PROTONIX) 40 mg tablet Take 1 Tab by mouth two (2) times a day. (Patient taking differently: Take 40 mg by mouth daily.  1 Tab at night)  lancets (One Touch Delica) 33 gauge misc Use to test blood sugar 2 times daily.  Blood-Glucose Meter (OneTouch Verio Flex meter) misc Use as directed    cetirizine (ZYRTEC) 10 mg tablet TK 1 T PO D PRF ALLERGY    cholecalciferol, vitamin d3, (VITAMIN D3) 400 unit cap Take  by mouth.  SUMAtriptan succinate (ZEMBRACE SYMTOUCH) 3 mg/0.5 mL pnij by SubCUTAneous route. Indications: MIGRAINE (Patient not taking: Reported on 5/20/2021)    diphenhydrAMINE (BENADRYL) 25 mg capsule 2 tabs PO q8h PRN     No current facility-administered medications for this encounter. Allergies: Allergies   Allergen Reactions    Yeast, Dried Other (comments)     Upset stomach       Prior Level of Function/Social/Work History/Personal factors and/or comorbidities impacting plan of care: Patient reports increase in LE size/swelling noted over the past 2 years, with foot edema worsening significantly. Live with mother, 2 dogs. Patient in school at [employer]. Patient presents with morbid obesity, reporting frequent cellulitis infections bilateral LE. Living Situation: private residence with mother and 2 dogs. Trainable Caregiver?: as needed. Self-care/ADLs: indep     Mobility: indep   Sleeping Arrangement:  bed   Adaptive Equipment Owned: na  Previous Therapy:  Treatment of cellulitis with oral antibiotic. Attempted wear of OTC compression stockings, poorly tolerated by patient, currently not wearing. Compression/Lymphedema Equipment: none    SUBJECTIVE:   \"My legs have been swelling over the past two years. \"  Patient reports chronic cellulitis infections bilateral LE, with \"bumps\" developing under the skin, itchy. Patient reports applying topical antibiotic to areas with some symptom relief. States she has not tolerating wearing compression stockings in the past.  Reports being a student, with classes currently online.   States recent diagnosis of undifferentiated connective tissue disease, being followed by Dr. Anneliese Valenzuela. Patients goals for therapy: Reduce LE swelling and learn how to manage legs. OBJECTIVE DATA SUMMARY:   EXAMINATION/PRESENTATION/DECISION MAKING:   Pain:  Pain Scale 1: Numeric (0 - 10)  Pain Intensity 1: 6  Pain Location 1: Leg    Skin and Tissue Assessment:  Dermal Status:  [x]   Intact: scabbed area noted R lower leg, anterior aspect, with remaining skin intact. [x]  Dry   []  Tenuous:  [] Flaky   []  Wound/lesion present [x]  Scars: multiple small scars noted bilateral lower legs, previously open area initiating as \"bumps\", itchy, with patient reporting scratching areas resulting in opening areas. []  Dermatitis Calf/thigh skin peau d'orange in appearance. Texture/Consistency:  [x]  Boggy: foot to groin bilateral LE []  Pitting Edema   []  Brawny []  Combination   [x]  Fibrotic/Woody: bilateral posterior distal legs, R>L. Calf/thigh tissue rubbery texture. Pigmentation/Color Change:  []  Normal []  Hemosiderin   []  Red []  Erythematous   [x]  Hyperpigmented: lower legs, R>L. []  Hyperlipodermatosclerosis   Anomalies:  []  Lymphorrhea []  Vesicles   []  Petechiae []  Warty Vercusis   []  Bullae []  Papilloma   Circulatory:  []  TEZ []  Varicosities:   [x]  Pulses: dorsal pedal pulses palpable bilateral  []  Vascular studies ruled out DVT in past   []  DVT History    Nails:  [x]   Normal  []   Fungus  Stemmers Sign: positive L, negative R  Photos:     Height:   5'4\"  Weight:   271 lbs   BMI:   46.5  (36 or greater: adversely affecting lymphedema)  Volumetric Measurements:   Right:  19,020.3 mL Left:  18,447. 37 mL   % Difference: 3.11%    (See scanned graph)  Range of Motion: bilateral LE grossly WNL  Strength: bilateral LE grossly 5/5 with MMT  Sensation:  LE sensation intact to touch  Mobility:  Bed/Chair Mobility:  Independent  Transfers:  Independent    Sitting Balance:  good Standing Balance:  good   Gait:  Independent  Wheelchair Mobility:  (Other) na   Endurance:  Intact for gait community distances. Stairs:  (Other) not assessed. Safety:  Patient is alert and oriented:  X 4   Safety awareness:  intact   Fall Risk?:  low   Patient given written fall prevention handout: Yes   Precautions:  Standard lymphedema precautions to include avoiding blood pressure readings, injections and IVs or other procedures/acts that could lead to broken skin on affected area, and avoiding excessive heat, resistive activity or altitude without compression garment    Functional Measure:   LLIS      Physical Therapy Evaluation Charge Determination   History Examination Presentation Decision-Making   MEDIUM  Complexity : 1-2 comorbidities / personal factors will impact the outcome/ POC  MEDIUM Complexity : 3 Standardized tests and measures addressing body structure, function, activity limitation and / or participation in recreation  MEDIUM Complexity : Evolving with changing characteristics  Other outcome measures LLIS  MEDIUM      Based on the above components, the patient evaluation is determined to be of the following complexity level: MEDIUM    Evaluation Time: 12:40-1:10 pm        30 minutes    TREATMENT PROVIDED:   1. Treatment description: The patient was educated regarding the role and function of the lymphatic system, pathophysiology of lymphedema/lipedema, and instructed in the lymphedema management protocol of complete decongestive therapy (CDT). This includes skin care to prevent breakdown or infection, lymphedema exercises, custom compression therapy options (bandaging, compression garments, compression pump, Francisco Aspen, [employer], [employer], etc. as needed), and decongestion with manual lymphatic drainage as indicated. We discussed the need for consistent compression system for lymphedema management. Skin care education was performed,applying low pH lotion to extremity using upward strokes to stimulate lymphatic vessels.   Pt was provided with treatment options, including initiating phase I CDT including compression bandaging bilateral LE vs fit of custom flat knit compression knee high stockings, paired with custom flat kit franklin/OTC compression franklin. Patient states she would like to discuss treatment options with her mom, notifying clinic of her decision. Patient to return to physician with s/s of cellulitis infection, with patient seeking medical attention in the past for treatment of cellulitis. Treatment time:  1:10-1:50 pm  Minutes: 36             Self care 3    ASSESSMENT:   Anahy Todd is a 23 y.o. female who presents with stage II lymphedema with lipedema component. Note mild positive Stemmer's sign L, negative R. Note fibrotic tissue changes distal lower leg, R>L, with calf/thigh tissue rubbery in texture with skin appearance peau d'orange in nature. Patient advised of management of lymphedema/lipedema as follows: Complete decongestive therapy (CDT) including manual lymphatic drainage (MLD) technique, short-stretch textile bandages/compression system to decongest limb, and kinesiotaping as appropriate. Patient will receive instruction in proper skin care to recognize signs/symptoms of and prevent infection, therapeutic exercise, and self-MLD for independent home program and restorative lymphatic performance. Patient advised fitting custom flat knit compression stockings, knee high, paired with custom or OTC franklin an option at this point if she prefers to defer bandaging phase of treatment. Patient to discuss treatment options with her mom and notify clinic of her decision. This care is medically necessary due to the infection risk with lymphedema, and to improve functional activities. CDT is necessary to resolve swelling to allow patient to return to wearing normal clothes/footwear, and prevent worsening of symptoms, such as venous stasis ulcerations, infections, or hospitalizations.   Patient will be independent with home program strategies to allow improved ADL ability and mobility and to allow patient to return to greatest functional independence. Rehabilitation potential is considered to be Good. Factors which may influence rehabilitation potential include morbid obesity. Patient will benefit from 12-16 physical therapy visits over 12 weeks to optimize improvement in these areas. PLAN OF CARE:   Recommendations and Planned Interventions:  Manual lymph drainage/complete decongestive therapy  Multi-layer compression bandaging (short-stretch)  Compression garment fitting/provision  Lymphedema therapeutic exercise  AROM/PROM/Strength/Coordination  Self-care training  Education in skin care and lymphedema precautions  Self-MLD education per home program  Self-bandaging education per home program  Caregiver education as needed  Wound care as needed     GOALS  Short term goals  Time frame: 6 weeks  1. Instruct the patient to be independent with proper skin care to prevent future skin breakdown and decrease the potential risk for infections that are associated with Lymphedema. 2. Patient will be independent with a personal lymphedema exercise program to assist with the lymphatic flow and reduce limb volume by 200-300 mL R/L LE .  3. Patient will understand the signs and symptoms of acute infection. Long term goals  Time frame:6-12 weeks  1. Patient will have knowledge of the compression options and acquire a safe and  appropriate daytime and night time compression system to prevent  re-accumulation of fluid. 2.  Patient or family will be able to don/doff garments independently. Garment  system effectiveness will be evaluated prior to discharge for better long-term  management and outcomes. 3.  Improvement in functional outcomes measure by 10% to allow for overall improvement in mobility with reduced pain. 4.   To transition patient to independent restorative phase of CDT.           Patient has participated in goal setting and agrees to work toward plan of care. Patient was instructed to call if questions or concerns arise. Thank you for this referral.  Renie Saint, PT, [facility]NABELLA[facility]IS[facility]   Time Calculation: 70 mins    TREATMENT PLAN EFFECTIVE DATES:   6/2/2021 TO 8/30/2021  I have read the above plan of care for Natalya Rahman. I certify the above prescribed services are required by this patient and are medically necessary.   The above plan of care has been developed in conjunction with the lymphedema/physical therapist.       Physician Signature: ____________________________________Date:______________

## 2021-06-03 NOTE — PROGRESS NOTES
Statement of Medical Necessity  Date: 6/3/2021 ADDISON: [employer]   ME:  TBD Refills: 2           Diagnosis  []   I97.2 Post-Mastectomy Lymphedema []   I87.2 Venous Insufficiency   [x]   I89.0 Lymphedema, other secondary  []   I83.019 Venous Stasis Ulcer LE, Right   []   I89.9 Unspecified Lymphatic Disorder []   I83.029 Venous Stasis Ulcer LE, Left   [x]   R60.9 Swelling not relieved by elevation [x]   Q82.0 Hereditary/ Congenital Lymphedema   []   C50.211 Malignant neoplasm of breast, Right []   G89.3  Cancer associated pain   []   C50.212 Malignant neoplasm of breast, Left [x]   L03.115 LE Cellulitis, Right   []    [x]   L03.116 LE Cellulitis, Left     Recommended Product for Diagnosis as noted above:  Units Upper Extremity Rt Lt Units Lower Extremity Rt Lt    Compression Multi-Layered Bandages   2 Compression Multi-Layered Bandages x x    Circ-Aid, Ready Wrap, Sigvaris Arm   2 Inelastic binders (Circ-Aid, Farrow)  [x]Foot   [x]Below Knee   [x]Knee   [x]Thigh x x    Circ-Aid Ready Wrap, Sigvaris hand    Juan C Rodney, Leg, night use      Tribute Arm, night use   2 Tribute Leg, night use x x    Juan C Rodney Arm, night use    Cale Sleeve Leg/ Foot, night use      Vasopneumatic Compression Pump  []Arm []Arm w/Shoulder  []Arm w/Vest    Vasopneumatic Compression Pump  [x]Leg         [x]Trunk       []Pant      Gradiant Compression Sleeves & Gloves  Custom/ RM Arm:    []CCL1 []CCL2 []CCL3  Custom/ RM Glove: []CCL1 []CCL2 []CCL3      Gradient Compression Stockings  Custom/ RM Lower Extremity:   [x]CCL1       [x]CCL2         []CCL3   [x]Knee      [x]Thigh        [x]Full Length      Cale sleeve arm w/ hand, night use    Tribute Wrap, night use      Compression Bra    Compression Vest          Compression Multi-Layered Bandages     The above patient was referred for treatment of Lymphedema due to the diagnosis indicated above. Lymphedema is a progressive and chronic condition.   It may cause acute infections, muscle atrophy, discomfort, and connective tissue fibrosis with irreversible tissue damage, decreased ROM in the affected limb and diminished functional mobility possibly interfering with independence and ability to work. Recurrent infections and wound complications that commonly occur with Lymphedema often require hospitalization and extensive wound care, thus increasing medical costs. The patient has received complete decongestive therapy which includes manual lymphatic drainage, lymphedema specific exercises, compression bandaging, and hygiene/skin care. Goals of therapy are to reduce the edema and prevent re-accumulation of fluid with its complications. It is medically necessary for this patient to have daytime garments to control this condition. They will need 2 sets (one set to wear and one set to wash for adequate skin care and wearing time). Garments must be replaced every 4-6 months for effectiveness. There are no substitutes available that offer acceptable compression treatment for this Lymphedema patient. If further information is requested, please contact our certified lymphedema therapists at [phone].     [] Lashell Mendoza, PT, DPT, DARIUS  [x] Justin Ferrari PT, [facility]  [] Beatrice No, PT, DPT, [facility]      Printed MD Name  MD Signature  Date

## 2021-06-20 LAB
ALBUMIN SERPL-MCNC: 4.5 G/DL (ref 3.9–5)
ALBUMIN/GLOB SERPL: 1.7 {RATIO} (ref 1.2–2.2)
ALP SERPL-CCNC: 54 IU/L (ref 48–121)
ALT SERPL-CCNC: 16 IU/L (ref 0–32)
APPEARANCE UR: CLEAR
AST SERPL-CCNC: 15 IU/L (ref 0–40)
BACTERIA #/AREA URNS HPF: NORMAL /[HPF]
BASOPHILS # BLD AUTO: 0.1 X10E3/UL (ref 0–0.2)
BASOPHILS NFR BLD AUTO: 1 %
BILIRUB SERPL-MCNC: 0.5 MG/DL (ref 0–1.2)
BILIRUB UR QL STRIP: NEGATIVE
BUN SERPL-MCNC: 10 MG/DL (ref 6–20)
BUN/CREAT SERPL: 12 (ref 9–23)
C-ANCA TITR SER IF: NORMAL TITER
C3 SERPL-MCNC: 167 MG/DL (ref 82–167)
C4 SERPL-MCNC: 25 MG/DL (ref 12–38)
CALCIUM SERPL-MCNC: 9.8 MG/DL (ref 8.7–10.2)
CASTS URNS QL MICRO: NORMAL /LPF
CHLORIDE SERPL-SCNC: 99 MMOL/L (ref 96–106)
CO2 SERPL-SCNC: 25 MMOL/L (ref 20–29)
COLOR UR: YELLOW
CREAT SERPL-MCNC: 0.82 MG/DL (ref 0.57–1)
CREAT UR-MCNC: 111.8 MG/DL
CRP SERPL-MCNC: 5 MG/L (ref 0–10)
EOSINOPHIL # BLD AUTO: 0.1 X10E3/UL (ref 0–0.4)
EOSINOPHIL NFR BLD AUTO: 2 %
EPI CELLS #/AREA URNS HPF: NORMAL /HPF (ref 0–10)
ERYTHROCYTE [DISTWIDTH] IN BLOOD BY AUTOMATED COUNT: 12.6 % (ref 11.7–15.4)
ERYTHROCYTE [SEDIMENTATION RATE] IN BLOOD BY WESTERGREN METHOD: 5 MM/HR (ref 0–32)
GLOBULIN SER CALC-MCNC: 2.7 G/DL (ref 1.5–4.5)
GLUCOSE SERPL-MCNC: 85 MG/DL (ref 65–99)
GLUCOSE UR QL: NEGATIVE
HCT VFR BLD AUTO: 49.5 % (ref 34–46.6)
HGB BLD-MCNC: 16.8 G/DL (ref 11.1–15.9)
HGB UR QL STRIP: NEGATIVE
IGA SERPL-MCNC: 127 MG/DL (ref 87–352)
IGG SERPL-MCNC: 1008 MG/DL (ref 586–1602)
IGM SERPL-MCNC: 115 MG/DL (ref 26–217)
IMM GRANULOCYTES # BLD AUTO: 0 X10E3/UL (ref 0–0.1)
IMM GRANULOCYTES NFR BLD AUTO: 0 %
KETONES UR QL STRIP: NEGATIVE
LEUKOCYTE ESTERASE UR QL STRIP: NEGATIVE
LYMPHOCYTES # BLD AUTO: 2.1 X10E3/UL (ref 0.7–3.1)
LYMPHOCYTES NFR BLD AUTO: 31 %
MCH RBC QN AUTO: 31.1 PG (ref 26.6–33)
MCHC RBC AUTO-ENTMCNC: 33.9 G/DL (ref 31.5–35.7)
MCV RBC AUTO: 92 FL (ref 79–97)
MICRO URNS: NORMAL
MICRO URNS: NORMAL
MONOCYTES # BLD AUTO: 0.6 X10E3/UL (ref 0.1–0.9)
MONOCYTES NFR BLD AUTO: 9 %
MYELOPEROXIDASE AB SER IA-ACNC: <9 U/ML (ref 0–9)
NEUTROPHILS # BLD AUTO: 3.9 X10E3/UL (ref 1.4–7)
NEUTROPHILS NFR BLD AUTO: 57 %
NITRITE UR QL STRIP: NEGATIVE
P-ANCA ATYPICAL TITR SER IF: NORMAL TITER
P-ANCA TITR SER IF: NORMAL TITER
PH UR STRIP: 7 [PH] (ref 5–7.5)
PLATELET # BLD AUTO: 451 X10E3/UL (ref 150–450)
POTASSIUM SERPL-SCNC: 4.6 MMOL/L (ref 3.5–5.2)
PROT SERPL-MCNC: 7.2 G/DL (ref 6–8.5)
PROT UR QL STRIP: NEGATIVE
PROT UR-MCNC: 17 MG/DL
PROT/CREAT UR: 152 MG/G CREAT (ref 0–200)
PROTEINASE3 AB SER IA-ACNC: <3.5 U/ML (ref 0–3.5)
RBC # BLD AUTO: 5.41 X10E6/UL (ref 3.77–5.28)
RBC #/AREA URNS HPF: NORMAL /HPF (ref 0–2)
SODIUM SERPL-SCNC: 140 MMOL/L (ref 134–144)
SP GR UR: 1.02 (ref 1–1.03)
URINALYSIS REFLEX, 377202: NORMAL
UROBILINOGEN UR STRIP-MCNC: 0.2 MG/DL (ref 0.2–1)
WBC # BLD AUTO: 6.9 X10E3/UL (ref 3.4–10.8)
WBC #/AREA URNS HPF: NORMAL /HPF (ref 0–5)

## 2021-06-29 ENCOUNTER — OFFICE VISIT (OUTPATIENT)
Dept: RHEUMATOLOGY | Age: 21
End: 2021-06-29
Payer: MEDICARE

## 2021-06-29 VITALS
HEART RATE: 127 BPM | WEIGHT: 275 LBS | RESPIRATION RATE: 16 BRPM | OXYGEN SATURATION: 98 % | SYSTOLIC BLOOD PRESSURE: 121 MMHG | TEMPERATURE: 98.4 F | DIASTOLIC BLOOD PRESSURE: 90 MMHG | BODY MASS INDEX: 53.71 KG/M2

## 2021-06-29 DIAGNOSIS — M35.9 UNDIFFERENTIATED CONNECTIVE TISSUE DISEASE (HCC): ICD-10-CM

## 2021-06-29 DIAGNOSIS — M19.042 PRIMARY OSTEOARTHRITIS OF BOTH HANDS: ICD-10-CM

## 2021-06-29 DIAGNOSIS — M19.041 PRIMARY OSTEOARTHRITIS OF BOTH HANDS: ICD-10-CM

## 2021-06-29 DIAGNOSIS — R60.9 LIPEDEMA: Primary | ICD-10-CM

## 2021-06-29 PROCEDURE — G8755 DIAS BP > OR = 90: HCPCS | Performed by: INTERNAL MEDICINE

## 2021-06-29 PROCEDURE — G9717 DOC PT DX DEP/BP F/U NT REQ: HCPCS | Performed by: INTERNAL MEDICINE

## 2021-06-29 PROCEDURE — G8427 DOCREV CUR MEDS BY ELIG CLIN: HCPCS | Performed by: INTERNAL MEDICINE

## 2021-06-29 PROCEDURE — G8752 SYS BP LESS 140: HCPCS | Performed by: INTERNAL MEDICINE

## 2021-06-29 PROCEDURE — 99215 OFFICE O/P EST HI 40 MIN: CPT | Performed by: INTERNAL MEDICINE

## 2021-06-29 PROCEDURE — G8417 CALC BMI ABV UP PARAM F/U: HCPCS | Performed by: INTERNAL MEDICINE

## 2021-06-29 RX ORDER — GALCANEZUMAB 120 MG/ML
INJECTION, SOLUTION SUBCUTANEOUS
COMMUNITY
Start: 2021-06-02 | End: 2021-09-08 | Stop reason: ALTCHOICE

## 2021-06-29 RX ORDER — ATOMOXETINE 40 MG/1
1 CAPSULE ORAL
COMMUNITY
Start: 2021-06-02 | End: 2021-09-10

## 2021-06-29 RX ORDER — MULTIVIT WITH MINERALS/HERBS
TABLET ORAL
COMMUNITY
Start: 2021-06-02

## 2021-06-29 RX ORDER — MELOXICAM 15 MG/1
15 TABLET ORAL DAILY
Qty: 30 TABLET | Refills: 6 | Status: SHIPPED | OUTPATIENT
Start: 2021-06-29 | End: 2021-07-29

## 2021-06-29 RX ORDER — ASCORBIC ACID 500 MG
TABLET ORAL
COMMUNITY
Start: 2021-06-02

## 2021-06-29 RX ORDER — PANTOPRAZOLE SODIUM 40 MG/1
1 TABLET, DELAYED RELEASE ORAL
COMMUNITY
Start: 2021-06-02 | End: 2021-09-08

## 2021-06-29 RX ORDER — LANOLIN ALCOHOL/MO/W.PET/CERES
CREAM (GRAM) TOPICAL
COMMUNITY
End: 2021-12-10

## 2021-06-29 RX ORDER — SACROSIDASE 8500 [IU]/ML
1 SOLUTION ORAL
COMMUNITY

## 2021-06-29 NOTE — LETTER
7/6/2021    Patient: Fe Morin   YOB: 2000   Date of Visit: 6/29/2021     Milena Qureshi DO  [address] [facility] [address]  Via Fax: [phone]     Alice Rubio MD  [address] [facility] [address] [address]  [address] [place]  [address] [address]  Via Fax: [phone]    Dear Aristides Villalobos MD Isabel Mould, NP,      Thank you for referring Ms. Narciso Fried to [address] [place] [address] for evaluation. My notes for this consultation are attached. If you have questions, please do not hesitate to call me. I look forward to following your patient along with you.       Sincerely,    Ramses Kincaid MD

## 2021-06-29 NOTE — PATIENT INSTRUCTIONS
1. Try meloxicam daily with breakfast for joint pain. You can also use diclofenac gel while you're on this, but only to the fingers. 2. PT and occupational therapy    3. Labs again before next visit    4. Keep working on another [employer]    5.  Return in 3 months

## 2021-06-29 NOTE — PROGRESS NOTES
REASON FOR VISIT:   Adore Begum is a 23 y.o. female with history of migraines and ADHD who is returning for followup of rash and +WENDY    HISTORY OF PRESENT ILLNESS    Scheduled with PT. Had seen lymphedema clinic, advised to use compression stockings but feels intolerant 2/2 associated discomfort. Has found another lymphedema clinic she would like to try in the hopes of solutions that don't involve too much compression. Mom has been using a roller for edema which she suspects is leading to some bruising. Tolerating Plaquenil (hydroxychloroquine) well, some shakiness between meals     More joint pain. Hard to write. Brain fog persists. Losing track of what she is doing in the middle of errands. Nonrefreshing sleep. Had sleep apnea workup in the past which was normal.    Saw cardiologist, advised to increase exercise. Ibuprofen, naproxen, ibuprofen upset stomach and haven't helped. Tylenol doesn't help. PCP is working to get insurance approval for an out-of-network Derm additional opinion. Disease History:  Since 4 or 6yo, has had recurrent nodules on the skin diffusely--face, arms, legs, abdomen. Can drain pus. Has been treated over the years with oral antibiotics and topical antibiotics which help; swabs have been MRSA-positive in the past. Has tried Hibiclens baths in the past repeatedly without improvement. Pruritic, seem to respond to topical Mupirocin. Has had shave biopsy with Derm at [facility] in the past,punch biopsy in May 2019 was interpreted as mixed dermal inflammation with features of an excoriated hypersensitivity reaction; last seen in November 2020 when diagnosed with keratosis pilaris, rosacea, and neurodermatitis. In February, PCP ordered labs including an WENDY screen which returned +1:320 speckled and 1:640 nuclear dot pattern, with mild elevations of ESR (26) and CRP (17mg/L). Turns \"red as a lobster\" in the sun even with sunscreen use, has always been the case.     No chronic cough or progressive dyspnea on exertion. Nonrefreshing sleep and always fatigued in the evening. Runs to the bathroom repeatedly through the evening, doesn't feel she can reduce her fluid intake in the afternoon/evenings 2/2 chronic thirst and dry mouth. Sleep study ~3 years ago she says was normal.    Has more subjective eye dryness, not currently using AT's consistently or following with ophthalmology. Cold intolerant, feels hands and feet always feel like ice, feels worse over the last 2 years. Gaining weight again after losing nearly 100 pounds with metformin; has gained 15 pounds in the last 3 months. Feels digestive issues (possible gastroparesis in the past with postprandial pain, biliary dyskinesis s/p cholecystectomy) have resolved since starting a sucrose intolerance diet. Stool softeners haven't helped in the past. On current diet she is having daily bowel movements without abdominal pain. Knees ache. Denies joint swelling. Has sprained ankles in past and now feels wrists get more sore with use. REVIEW OF SYSTEMS  A comprehensive review of systems was negative except for that written in the HPI. A 10-point review of systems is per the new patient questionnaire, which has been reviewed extensively and scanned into the electronic medical record for future reference. Review of systems is as above and is otherwise negative. ALLERGIES  Yeast, dried    MEDICATIONS  Current Outpatient Medications   Medication Sig    atomoxetine (Strattera) 40 mg capsule Take 1 Capsule by mouth.  ascorbic acid, vitamin C, (VITAMIN C) 500 mg tablet Take  by mouth.  galcanezumab-gnlm (Emgality Syringe) 120 mg/mL syrg by SubCUTAneous route.  sacrosidase (Sucraid) 8,500 unit/mL soln Take 1 mL by mouth.  thiamine HCL (Vitamin B-1) 100 mg tablet     ubrogepant (Ubrelvy) 100 mg tablet Take 1 Tablet by mouth.     b complex vitamins tablet ONE TABLE DAILY    zinc 50 mg tab tablet one capsule orally once daily    pantoprazole (PROTONIX) 40 mg tablet Take 1 Tablet by mouth.  hydrOXYchloroQUINE (Plaquenil) 200 mg tablet Take 1 Tab by mouth two (2) times a day.  OneTouch Verio test strips strip USE TO TEST BLOOD SUGAR UP TO 2 TIMES DAILY.  metFORMIN ER (GLUCOPHAGE XR) 750 mg tablet TAKE 1 TABLET BY MOUTH TWICE A DAY    hydrOXYzine HCL (ATARAX) 25 mg tablet TAKE 1 TO 2 TABLETS BY MOUTH TWICE A DAY AS NEEDED FOR ANXIETY    hydrOXYzine HCL (ATARAX) 25 mg tablet TAKE 1 TO 2 TABLETS BY MOUTH TWICE A DAY AS NEEDED FOR ANXIETY    citalopram (CELEXA) 40 mg tablet TAKE 1 TABLET BY MOUTH EVERY DAY    norethindrone-ethinyl estradiol-iron (Junel FE 1.5/30, 28,) 1.5 mg-30 mcg (21)/75 mg (7) tab TAKE 1 TAB BY MOUTH DAILY. CONTINUOUS PILLS ONLY.  ondansetron (ZOFRAN ODT) 8 mg disintegrating tablet PLACE 1 TABLET ON TONGUE & ALLOW TO DISSOLVE EVERY 8 HOURS AS NEEDED FOR NAUSEA WITH HEADACHE    pantoprazole (PROTONIX) 40 mg tablet Take 1 Tab by mouth two (2) times a day. (Patient taking differently: Take 40 mg by mouth daily. 1 Tab at night)    lancets (One Touch Delica) 33 gauge misc Use to test blood sugar 2 times daily.  Blood-Glucose Meter (OneTouch Verio Flex meter) misc Use as directed    cetirizine (ZYRTEC) 10 mg tablet TK 1 T PO D PRF ALLERGY    cholecalciferol, vitamin d3, (VITAMIN D3) 400 unit cap Take  by mouth.  diphenhydrAMINE (BENADRYL) 25 mg capsule 2 tabs PO q8h PRN    colchicine 0.6 mg tablet Take 1 Tablet by mouth two (2) times a day. Start with 1 pill daily with breakfast, increase after 7 days if well-tolerated. (Patient not taking: Reported on 6/29/2021)    Vyvanse 50 mg cap TAKE 1 CAPSULE BY MOUTH EVERY DAY IN THE MORNING (Patient not taking: Reported on 5/20/2021)    Aimovig Autoinjector 140 mg/mL injection INJECT 1 SYRINGE VIA SUBCUTANEOUS ROUTE ONCE A MONTH, AS DIRECTED    SUMAtriptan succinate (ZEMBRACE SYMTOUCH) 3 mg/0.5 mL pnij by SubCUTAneous route.  Indications: MIGRAINE (Patient not taking: Reported on 5/20/2021)     No current facility-administered medications for this visit. PAST MEDICAL HISTORY  Past Medical History:   Diagnosis Date    Abdominal migraine     ADHD (attention deficit hyperactivity disorder)     Autism     Developmental delay     GERD (gastroesophageal reflux disease)     Headache     Irritable bowel syndrome with diarrhea 4/13/2017    Migraine     Obesity, morbid ([address] 12/12/2017    Psychiatric disorder     anxiety, ADHD, OCD, HIGH FUNTIONING AUTISM    Sucrose intolerance        FAMILY HISTORY  family history includes Cancer in her maternal uncle; Delayed Awakening in her mother; Endometriosis in her mother; Headache in her maternal grandfather and mother; MS in her maternal aunt; No Known Problems in her father and maternal grandmother; Post-op Nausea/Vomiting in her mother. Mother diagnosed with sarcoidosis. Father has gout. Maternal aunt has MS.    SOCIAL HISTORY  She  reports that she has never smoked. She has never used smokeless tobacco. She reports that she does not drink alcohol and does not use drugs. 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 No flowsheet data found. General:  The patient is pleasant, obese, alert, and in no apparent distress. Eyes: Sclera are anicteric. No conjunctival injection. HEENT:  Oropharynx is clear. No oral ulcers. Adequate salivary pooling. No cervical or supraclavicular lymphadenopathy. Lungs:  Clear to auscultation bilaterally, without wheeze or stridor. Normal respiratory effort. Cor:  Regular rate and rhythm.   No murmur rub or gallop. Abdomen: Soft, non-tender, without hepatomegaly or masses. Extremities: No calf tenderness or edema. Warm and well perfused. Skin:  Maxillary erythema and telangiectasias without edema. No heliotrope. Striae proximal to antecubital fossae. Largely unchanged violaceous discoloration over bilateral upper arms, few excoriations on extensor forearms. Grossly normal nailfold capillaries. No sclerodacytly. Neuro: Nonfocal  Musculoskeletal:    A comprehensive musculoskeletal exam was performed for all joints of each upper and lower extremity and assessed for swelling, tenderness and range of motion. Results are documented as below:  No evidence of synovitis in the small joints of the hands, wrists, shoulders, elbows, hips, knees or ankles. Labs:  6/17/21: WBC 6.9, Hgb 16.8, Plt 451, ESR 5, CRP 5mg/L  3/10/21: CRP 21mg/L, CPK 57, Marissa neg, RDL myositis panel neg,   2/24/21: WBC 8.9, Hgb 15.6, Plt 472; ANCAs negative, PR3 and MPO negative; Cr 0.7, CMP WNL;  WENDY 1:320 speckled, 1:640 nuclear dot; C3 high, C4 WNL; negative dsDNA, Scl70, SSA, SSB, RNP, Sm, ribosomal P, chromatin, centromere B antibodies. RF <10, CCP negative; ESR 26, CRP 17mg/L. Hep B cAb neg, Hep B sAg neg, Hep C Ab neg    Pathology:  5/8/19: [facility] Surgical pathology final report, Karen Gonzales MD:  Skin, right lower leg, punch biopsy:  -Ulcer and mixed dermal inflammation (see comment). Sections reveal a punch biopsy to the depth of the mid dermis. There is a zone of ulceration with fibrinous crust containing neutrophils. In the superficial to mid dermis, there is a mildly dense perivascular lymphohistiocytic infiltrate with scattered neutrophils and eosinophils. PAS stain is negative for fungal organisms. Taken together, the findings are those of ulceration and mixed dermal inflammation. The features are suggestive of an excoriated hypersensitivity reaction, such as that seen in arthropod bites.  Diagnostic features of folliculitis are not seen. Imagin19 CXR (report only): Normal chest views. No change. ASSESSMENT AND PLAN  Ms. Darrian Reyna is a 23 y.o. female with high-functioning autism who presents for evaluation of resting tachycardia, pruritic nodules, polymorphic light eruption, livedo on exam, and +WENDY 1:320 speckled and 1:640 nuclear dot patterns. She is tolerating recent start of Plaquenil (hydroxychloroquine) well for undifferentiated connective tissue disease. Acute phase reactants have continued to downtrend though clinically she has noticed no significant improvements. Stopping colchicine, will trial meloxicam. Treatment-refractory pain and brain fog likely more fibromyalgia-related. 1. Lipedema  - REFERRAL TO LYMPHEDEMA CLINIC    2. Primary osteoarthritis of both hands  - REFERRAL TO OCCUPATIONAL THERAPY  - meloxicam (MOBIC) 15 mg tablet; Take 1 Tablet by mouth daily for 30 days. Dispense: 30 Tablet; Refill: 6    3. Undifferentiated connective tissue disease (HCC)  - meloxicam (MOBIC) 15 mg tablet; Take 1 Tablet by mouth daily for 30 days. Dispense: 30 Tablet; Refill: 6  - C REACTIVE PROTEIN, QT; Future  - CBC WITH AUTOMATED DIFF; Future  - METABOLIC PANEL, COMPREHENSIVE; Future  - SED RATE (ESR); Future        Patient Instructions   1. Try meloxicam daily with breakfast for joint pain. You can also use diclofenac gel while you're on this, but only to the fingers. 2. PT and occupational therapy    3. Labs again before next visit    4. Keep working on another [employer] [facility]    5.  Return in 3 months      Orders Placed This Encounter    C REACTIVE PROTEIN, QT    CBC WITH AUTOMATED DIFF    METABOLIC PANEL, COMPREHENSIVE    SED RATE (ESR)    REFERRAL TO OCCUPATIONAL THERAPY    REFERRAL TO LYMPHEDEMA CLINIC    atomoxetine (Strattera) 40 mg capsule    ascorbic acid, vitamin C, (VITAMIN C) 500 mg tablet    galcanezumab-gnlm (Emgality Syringe) 120 mg/mL syrg    sacrosidase (Sucraid) 8,500 unit/mL soln    thiamine HCL (Vitamin B-1) 100 mg tablet    ubrogepant (Ubrelvy) 100 mg tablet    b complex vitamins tablet    zinc 50 mg tab tablet    pantoprazole (PROTONIX) 40 mg tablet    meloxicam (MOBIC) 15 mg tablet       Medications: I am having Angelo Almaraz start on meloxicam. I am also having her maintain her diphenhydrAMINE, SUMAtriptan succinate, cholecalciferol (vitamin d3), cetirizine, Blood-Glucose Meter, lancets, ondansetron, Aimovig Autoinjector, pantoprazole, norethindrone-ethinyl estradiol-iron, hydrOXYzine HCL, hydrOXYzine HCL, citalopram, metFORMIN ER, Vyvanse, OneTouch Verio test strips, hydrOXYchloroQUINE, colchicine, atomoxetine, ascorbic acid (vitamin C), Emgality Syringe, Sucraid, thiamine HCL, ubrogepant, b complex vitamins, zinc, and pantoprazole.     Follow up: 3 months    Face to face time: 30 minutes  Note preparation and records review day of service: 15 minutes  Total provider time day of service: 45 minutes      Marisol Henriquez MD    Adult Rheumatology   [address]  [facility], [address] [place] [address]   Phone [phone]  Fax [phone]

## 2021-06-29 NOTE — PROGRESS NOTES
Chief Complaint   Patient presents with    Joint Pain     1. Have you been to the ER, urgent care clinic since your last visit? Hospitalized since your last visit? No    2. Have you seen or consulted any other health care providers outside of the [address] [place] [address] since your last visit? Include any pap smears or colon screening.  No

## 2021-07-12 ENCOUNTER — PATIENT MESSAGE (OUTPATIENT)
Dept: RHEUMATOLOGY | Age: 21
End: 2021-07-12

## 2021-07-12 DIAGNOSIS — G89.29 CHRONIC PAIN OF BOTH KNEES: ICD-10-CM

## 2021-07-12 DIAGNOSIS — E66.01 MORBID OBESITY (HCC): Primary | ICD-10-CM

## 2021-07-12 DIAGNOSIS — M25.562 CHRONIC PAIN OF BOTH KNEES: ICD-10-CM

## 2021-07-12 DIAGNOSIS — M25.561 CHRONIC PAIN OF BOTH KNEES: ICD-10-CM

## 2021-07-12 DIAGNOSIS — M79.7 FIBROMYALGIA: ICD-10-CM

## 2021-07-20 ENCOUNTER — VIRTUAL VISIT (OUTPATIENT)
Dept: PEDIATRIC ENDOCRINOLOGY | Age: 21
End: 2021-07-20
Payer: MEDICARE

## 2021-07-20 DIAGNOSIS — E78.89 LIPIDS ABNORMAL: ICD-10-CM

## 2021-07-20 DIAGNOSIS — R60.9 EDEMA, PERIPHERAL: Primary | ICD-10-CM

## 2021-07-20 DIAGNOSIS — E66.01 OBESITY, MORBID (HCC): ICD-10-CM

## 2021-07-20 DIAGNOSIS — Z87.898 HISTORY OF PREDIABETES: ICD-10-CM

## 2021-07-20 DIAGNOSIS — E88.81 INSULIN RESISTANCE: ICD-10-CM

## 2021-07-20 DIAGNOSIS — E74.819 DISORDERS OF GLUCOSE TRANSPORT, UNSPECIFIED (HCC): ICD-10-CM

## 2021-07-20 PROCEDURE — G8756 NO BP MEASURE DOC: HCPCS | Performed by: PEDIATRICS

## 2021-07-20 PROCEDURE — 99215 OFFICE O/P EST HI 40 MIN: CPT | Performed by: PEDIATRICS

## 2021-07-20 PROCEDURE — G8417 CALC BMI ABV UP PARAM F/U: HCPCS | Performed by: PEDIATRICS

## 2021-07-20 PROCEDURE — G8427 DOCREV CUR MEDS BY ELIG CLIN: HCPCS | Performed by: PEDIATRICS

## 2021-07-20 PROCEDURE — G9717 DOC PT DX DEP/BP F/U NT REQ: HCPCS | Performed by: PEDIATRICS

## 2021-07-20 RX ORDER — METFORMIN HYDROCHLORIDE 750 MG/1
TABLET, EXTENDED RELEASE ORAL
Qty: 180 TABLET | Refills: 2 | Status: SHIPPED | OUTPATIENT
Start: 2021-07-20 | End: 2021-09-08 | Stop reason: SDUPTHER

## 2021-07-20 NOTE — LETTER
7/20/2021 4:23 PM    Patient:  Sergio Hawkins   YOB: 2000  Date of Visit: 7/20/2021      Dear Bing Officer, [place] [id number]  Via Fax: [phone]: Thank you for referring Ms. Jv Bryson to me for evaluation/treatment. Below are the relevant portions of my assessment and plan of care. Chief Complaint   Patient presents with    Follow-up     insulin resistance          Sergio Hawkins, was evaluated through a synchronous (real-time) audio-video encounter. The patient (or guardian if applicable) is aware that this is a billable service. Verbal consent to proceed has been obtained within the past 12 months. The visit was conducted pursuant to the emergency declaration under the [address] [place] [address], [address] [place] [address] authority and the [employer] and [email or web address] General Act. Patient identification was verified, and a caregiver was present when appropriate. The patient was located in a state where the provider was credentialed to provide care. --Milvia Thornton MD on 7/20/2021 at 4:11 PM    An electronic signature was used to authenticate this note. Subjective:     Reason for visit: Sergio Hawkins is a 23 y.o. female here for follow up of   - Obesity  - insulin resistance based on OGTT, not clinical.  - On Metformin   - Irregular menstrual cycles - on continued OCP as menses cause debilitating migraines  - High LDL     She was last seen 4 months ago. Diagnosed with Lupus / Undetermined Connective Tissue disease - April 2021 - On Plaquenil - Hydroxychloroquine and Celexa - Followed by Rheum at [facility] - Notes reviewed today     History of present illness:     Weight gain -   Pt has increasing lymphedema which may be contributing to her weight gain    Her BMI is at 53.71 kg/m²  No polyphagia, + polydipsia - not a new concern, Nocturia once at night.     Denies symptoms of hypothyroidism such as cold tolerance, dry hair, dry skin, constipation. No snoring at night except when really tired. No hip or joint pains    Activity - Decreased due to excruciating pain in her lower limbs   She was doing yoga daily. Does chores around the home. Has seen Nutritionist in the past.   Eats healthy always. Occasional treats  Followed by GI-sucrose intolerance-on supplementation. Seen by adult GI now.     12/11/20 -   CMP - WNL   Component Value Ref Range    Insulin 13.7  2.6 - 24.9 uIU/mL    TSH 0.891  0.450 - 4.500 uIU/mL     Insulin resistance -  OGTT done in 11/2017 revealed insulin resistance  INSULIN   Result Value Ref Range    Insulin 2 hour  277 (H) <37    Insulin - 1 hour  286 (H) <51    Insulin Fasting  37.4 (H) <9 uIU/mL   Started on Metformin  mg 12/2017. Dose decreased to OD 12/2019 as OGTT 1/20 - wnl. taken at bedtime. Improved fasting insulin     A1C is 4.9 12/2019 - (last assessed 3/2019 - 5.1)    1/7/20 -   Component Value Ref Range    Glucose 80  65 - 99 mg/dL    Glucose, 1 hour 107  65 - 199 mg/dL    Glucose, 2 hour 115  65 - 139 mg/dL    INSULIN, FASTING 13.1  2.6 - 24.9 uIU/mL    INSULIN, 60 MINUTES 71.1  0.0 - 163.5 uIU/mL    INSULIN, 120 MINUTES 76.7  0.0 - 145.4 uIU/mL         Increased to Metformin 750 mg Bid 6/2020  No more hypoglycemic symptoms. Insulin levels are now normal  2/21 - Insulin - 18.0, c peptide - 4.1    Decreased Metformin 750 mg - 2/2021 once daily    Improved BP - Increased HR - Followed by Cardiology    Abnormal lipid profile -   On OTC Fish oil.        Cholesterol, total 02/24/2021 205* 100 - 199 mg/dL    Triglyceride 02/24/2021 133  0 - 149 mg/dL    HDL Cholesterol 02/24/2021 53  >39 mg/dL    VLDL, calculated 02/24/2021 24  5 - 40 mg/dL    LDL, calculated 02/24/2021 128* 0 - 99 mg/dL        History of Vitamin D deficiency - History - On Vitamin D 400 international units  12/20 -   VITAMIN D, 25-HYDROXY 78.8  30.0 - 100.0 ng/mL     Menstrual history - attained menarche at age 5 years, started on OCPs 12/2016  for severe menstrual migraines. Followed by Gynecology. She has not had a cycle for more than a year as she is continued OCP's to prevent migraine. Has baseline migraines requiring sumatriptan injections 2-3x a week. Used to be on Depo but associated with weight gain   fu apt with Gynecology yearly - seen 12/14/20. Plan is to continue continued OCP for a total of 5 years. Started on higher dose Estrogen Junel Fe 1.5 - 30 mcg Estradiol   4/2017 - Pelvic US - wnl     Past Medical History:   Diagnosis Date    Abdominal migraine     ADHD (attention deficit hyperactivity disorder)     Autism     Developmental delay     GERD (gastroesophageal reflux disease)     Headache     Irritable bowel syndrome with diarrhea 4/13/2017    Migraine     Obesity, morbid ([address] 12/12/2017    Psychiatric disorder     anxiety, ADHD, OCD, HIGH FUNTIONING AUTISM    Sucrose intolerance    Migraines - Improved on Topamax - Now only once a week. Triggers - anxiety, noise , certain aura    Autism and anxiety - Receives counseling. Abnormal gallbladder emptying study with an EF of only 21%. S/p Cholecystectomy. No gall stones. Pts symptoms have resolved.  Rxed for H.pyloriI 8/2019    Sucrose intolerance - On Sucrose - Followed by     Past Surgical History:   Procedure Laterality Date    COLONOSCOPY N/A 12/20/2016    [facility] Carl [facility] ENDOSCOPY    GERD TST W/ MUCOS [address] ELECTROD 48 HR (BRAVO)  12/3/2020         HC CLP BRAVO  11/30/2020    HX CHOLECYSTECTOMY  06/2019    HX ENDOSCOPY  11/2020    dx'd with sucrose intolerance    HX HEENT      WISDOM TEETH    HX TONSIL AND ADENOIDECTOMY      HX TONSILLECTOMY      AGE 1    HX WISDOM TEETH EXTRACTION      [place] EGD TRANSORAL BIOPSY SINGLE/MULTIPLE  6/14/2019         [place] EGD TRANSORAL BIOPSY SINGLE/MULTIPLE  11/30/2020    UPPER GI ENDOSCOPY,BIOPSY  12/20/2016 Family history: No family history of thyroid disease, heart disease, hypertension or high cholesterol or DM. Fathers side unknown. He has Gout. Father is obese. Mother - Endometriosis  MGF, MGM, Mother - Hypertension  MGreat grandparents - Heartdisease at later age     Social History:  [employer] - online   Doing well. ROS:  Constitutional: decreased energy   ENT: normal hearing, no sorethroat   Eye: normal vision, denied double vision, blurred vision  Respiratory system: no wheezing, no respiratory discomfort  CVS: no palpitations, + lower extremity edema -increasing-seen by Cardiolog. GI: normal bowel movements, followed by GI   Allergy: no skin rash or angioedema, stria on abdomen and inner thighs and arms  Neuorlogical: no focal weakness. No burning  Behavioural: normal behavior, normal mood. Medications - See scanned   Prior to Admission medications    Medication Sig Start Date End Date Taking? Authorizing Provider   metFORMIN ER (GLUCOPHAGE XR) 750 mg tablet TAKE 1 TABLET BY MOUTH TWICE A DAY 7/20/21  Yes Alexander Walker MD   atomoxetine (Strattera) 40 mg capsule Take 1 Capsule by mouth. 6/2/21  Yes Provider, Historical   ascorbic acid, vitamin C, (VITAMIN C) 500 mg tablet Take  by mouth. 6/2/21  Yes Provider, Historical   galcanezumab-gnlm (Emgality Syringe) 120 mg/mL syrg by SubCUTAneous route. 6/2/21  Yes Provider, Historical   sacrosidase (Sucraid) 8,500 unit/mL soln Take 1 mL by mouth. Yes Provider, Historical   thiamine HCL (Vitamin B-1) 100 mg tablet    Yes Provider, Historical   ubrogepant (Ubrelvy) 100 mg tablet Take 1 Tablet by mouth. Yes Provider, Historical   b complex vitamins tablet ONE TABLE DAILY 6/2/21  Yes Provider, Historical   zinc 50 mg tab tablet one capsule orally once daily 6/2/21  Yes Provider, Historical   pantoprazole (PROTONIX) 40 mg tablet Take 1 Tablet by mouth.  6/2/21  Yes Provider, Historical   meloxicam (MOBIC) 15 mg tablet Take 1 Tablet by mouth daily for 30 days. 6/29/21 7/29/21 Yes Willie Fontana MD   hydrOXYchloroQUINE (Plaquenil) 200 mg tablet Take 1 Tab by mouth two (2) times a day. 4/2/21  Yes MD Arley Flynnuch Verio test strips strip USE TO TEST BLOOD SUGAR UP TO 2 TIMES DAILY. 3/31/21  Yes Mandie Cantrell MD   Vyvanse 50 mg cap TAKE 1 CAPSULE BY MOUTH EVERY DAY IN THE MORNING 1/7/21  Yes Provider, Historical   hydrOXYzine HCL (ATARAX) 25 mg tablet TAKE 1 TO 2 TABLETS BY MOUTH TWICE A DAY AS NEEDED FOR ANXIETY 12/3/20  Yes Provider, Historical   hydrOXYzine HCL (ATARAX) 25 mg tablet TAKE 1 TO 2 TABLETS BY MOUTH TWICE A DAY AS NEEDED FOR ANXIETY 12/3/20  Yes Provider, Historical   citalopram (CELEXA) 40 mg tablet TAKE 1 TABLET BY MOUTH EVERY DAY 11/2/20  Yes Provider, Historical   norethindrone-ethinyl estradiol-iron (Junel FE 1.5/30, 28,) 1.5 mg-30 mcg (21)/75 mg (7) tab TAKE 1 TAB BY MOUTH DAILY. CONTINUOUS PILLS ONLY. 12/14/20  Yes Chip Chua MD   ondansetron (ZOFRAN ODT) 8 mg disintegrating tablet PLACE 1 TABLET ON TONGUE & ALLOW TO DISSOLVE EVERY 8 HOURS AS NEEDED FOR NAUSEA WITH HEADACHE 8/30/20  Yes Provider, Historical   Aimovig Autoinjector 140 mg/mL injection INJECT 1 SYRINGE VIA SUBCUTANEOUS ROUTE ONCE A MONTH, AS DIRECTED 9/29/20  Yes Provider, Historical   pantoprazole (PROTONIX) 40 mg tablet Take 1 Tab by mouth two (2) times a day. Patient taking differently: Take 40 mg by mouth daily. 1 Tab at night 10/30/20 10/30/21 Yes Keara May MD   lancets (One Touch Delica) 33 gauge misc Use to test blood sugar 2 times daily. 7/29/20  Yes Mandie Cantrell MD   Blood-Glucose Meter (OneTouch Verio Flex meter) misc Use as directed 6/9/20  Yes Mandie Cantrell MD   cetirizine (ZYRTEC) 10 mg tablet TK 1 T PO D PRF ALLERGY 9/10/18  Yes Provider, Historical   cholecalciferol, vitamin d3, (VITAMIN D3) 400 unit cap Take  by mouth.    Yes Provider, Historical   SUMAtriptan succinate (ZEMBRACE SYMTOUCH) 3 mg/0.5 mL pnij by SubCUTAneous route. Indications: a migraine headache   Yes Provider, Historical   diphenhydrAMINE (BENADRYL) 25 mg capsule 2 tabs PO q8h PRN   Yes Provider, Historical     Allergies: Allergies   Allergen Reactions    Yeast, Dried Other (comments)     Upset stomach      Objective:     Exam was not detailed as it is a VV   Reviewed vitals from 6/29/21    Visit Vitals  LMP  (LMP Unknown)       Wt Readings from Last 3 Encounters:   06/29/21 275 lb (124.7 kg)   05/20/21 269 lb (122 kg)   03/18/21 272 lb (123.4 kg)     Ht Readings from Last 3 Encounters:   05/20/21 5' (1.524 m)   03/18/21 5' 5.63\" (1.667 m)   03/08/21 5' 5\" (1.651 m)     Height: Facility age limit for growth percentiles is 20 years. Weight: Facility age limit for growth percentiles is 20 years. BMI: There is no height or weight on file to calculate BMI. Percentil    Alert, Cooperative       Previous exam -   HEENT: No thyromegaly  Abdomen is soft, non tender, No organomegaly   MSK - Normal ROM  Skin - No rashes or birth marks, striae on abdomen, upper arms and inner thighs. Few are wide,   No acanthosis   lower extremity edema - non pitting. Laboratory data:  See above       Assessment/ Plan :       Annette Conley is a 23 y.o. female with recent diagnosis of SLE followed by Endocrine for  - Obesity   - Insulin resistance - On Metformin once daily - will increase to twice daily     On continuous hormonal pill for catamenial migraines. Will need to FU with Gyne as recent diagnosis of SLE.      Plan -   Diagnosis, etiology, pathophysiology, risk/ benefits of rx, proposed eval, and expected follow up discussed with family and all questions answered    Fasting LABS PRIOR TO NEXT VISIT   Orders Placed This Encounter    LIPID PANEL     Standing Status:   Future     Number of Occurrences:   1     Standing Expiration Date:   7/20/2022    INSULIN     Standing Status:   Future     Number of Occurrences:   1     Standing Expiration Date:   7/20/2022    HEMOGLOBIN A1C WITH EAG     Standing Status:   Future     Number of Occurrences:   1     Standing Expiration Date:   7/20/2022    metFORMIN ER (GLUCOPHAGE XR) 750 mg tablet     Sig: TAKE 1 TABLET BY MOUTH TWICE A DAY     Dispense:  180 Tablet     Refill:  2     NEW REFIL REQ NEEDED FOR PT     Continue Metformin 750 mg bid  Follow up in 2 month - Will need to switch to Adult Endocrine - Information provided  Fu in 2 months prior to age 24 years - it will be last visit with me    Total time with patient 25 minutes  Time spent counseling patient more than 50%                         If you have questions, please do not hesitate to call me. I look forward to following MsBennett Sean Rivas along with you.         Sincerely,      Syeda Bolanos MD

## 2021-07-20 NOTE — PROGRESS NOTES
Moshe Rangel, was evaluated through a synchronous (real-time) audio-video encounter. The patient (or guardian if applicable) is aware that this is a billable service. Verbal consent to proceed has been obtained within the past 12 months. The visit was conducted pursuant to the emergency declaration under the [facility], [address] [place] [address] authority and the [employer] and [employer] General Act. Patient identification was verified, and a caregiver was present when appropriate. The patient was located in a state where the provider was credentialed to provide care. --Cristopher Jaimes MD on 7/20/2021 at 4:11 PM    An electronic signature was used to authenticate this note. Subjective:     Reason for visit: Moshe Rangel is a 23 y.o. female here for follow up of   - Obesity  - insulin resistance based on OGTT, not clinical.  - On Metformin   - Irregular menstrual cycles - on continued OCP as menses cause debilitating migraines  - High LDL     She was last seen 4 months ago. Diagnosed with Lupus / Undetermined Connective Tissue disease - April 2021 - On Plaquenil - Hydroxychloroquine and Celexa - Followed by Rheum at [facility] - Notes reviewed today     History of present illness:     Weight gain -   Pt has increasing lymphedema which may be contributing to her weight gain    Her BMI is at 53.71 kg/m²  No polyphagia, + polydipsia - not a new concern, Nocturia once at night. Denies symptoms of hypothyroidism such as cold tolerance, dry hair, dry skin, constipation. No snoring at night except when really tired. No hip or joint pains    Activity - Decreased due to excruciating pain in her lower limbs   She was doing yoga daily. Does chores around the home. Has seen Nutritionist in the past.   Eats healthy always. Occasional treats  Followed by GI-sucrose intolerance-on supplementation.  Seen by adult GI now.     12/11/20 -   CMP - WNL Component Value Ref Range    Insulin 13.7  2.6 - 24.9 uIU/mL    TSH 0.891  0.450 - 4.500 uIU/mL     Insulin resistance -  OGTT done in 11/2017 revealed insulin resistance  INSULIN   Result Value Ref Range    Insulin 2 hour  277 (H) <37    Insulin - 1 hour  286 (H) <51    Insulin Fasting  37.4 (H) <9 uIU/mL   Started on Metformin  mg 12/2017. Dose decreased to OD 12/2019 as OGTT 1/20 - wnl. taken at bedtime. Improved fasting insulin     A1C is 4.9 12/2019 - (last assessed 3/2019 - 5.1)    1/7/20 -   Component Value Ref Range    Glucose 80  65 - 99 mg/dL    Glucose, 1 hour 107  65 - 199 mg/dL    Glucose, 2 hour 115  65 - 139 mg/dL    INSULIN, FASTING 13.1  2.6 - 24.9 uIU/mL    INSULIN, 60 MINUTES 71.1  0.0 - 163.5 uIU/mL    INSULIN, 120 MINUTES 76.7  0.0 - 145.4 uIU/mL         Increased to Metformin 750 mg Bid 6/2020  No more hypoglycemic symptoms. Insulin levels are now normal  2/21 - Insulin - 18.0, c peptide - 4.1    Decreased Metformin 750 mg - 2/2021 once daily    Improved BP - Increased HR - Followed by Cardiology    Abnormal lipid profile -   On OTC Fish oil.  Cholesterol, total 02/24/2021 205* 100 - 199 mg/dL    Triglyceride 02/24/2021 133  0 - 149 mg/dL    HDL Cholesterol 02/24/2021 53  >39 mg/dL    VLDL, calculated 02/24/2021 24  5 - 40 mg/dL    LDL, calculated 02/24/2021 128* 0 - 99 mg/dL        History of Vitamin D deficiency - History - On Vitamin D 400 international units  12/20 -   VITAMIN D, 25-HYDROXY 78.8  30.0 - 100.0 ng/mL     Menstrual history - attained menarche at age 5 years, started on OCPs 12/2016  for severe menstrual migraines. Followed by Gynecology. She has not had a cycle for more than a year as she is continued OCP's to prevent migraine. Has baseline migraines requiring sumatriptan injections 2-3x a week. Used to be on Depo but associated with weight gain   fu apt with Gynecology yearly - seen 12/14/20.  Plan is to continue continued OCP for a total of 5 years. Started on higher dose Estrogen Junel Fe 1.5 - 30 mcg Estradiol   4/2017 - Pelvic US - wnl     Past Medical History:   Diagnosis Date    Abdominal migraine     ADHD (attention deficit hyperactivity disorder)     Autism     Developmental delay     GERD (gastroesophageal reflux disease)     Headache     Irritable bowel syndrome with diarrhea 4/13/2017    Migraine     Obesity, morbid ([address] 12/12/2017    Psychiatric disorder     anxiety, ADHD, OCD, HIGH FUNTIONING AUTISM    Sucrose intolerance    Migraines - Improved on Topamax - Now only once a week. Triggers - anxiety, noise , certain aura    Autism and anxiety - Receives counseling. Abnormal gallbladder emptying study with an EF of only 21%. S/p Cholecystectomy. No gall stones. Pts symptoms have resolved. Rxed for H.pyloriI 8/2019    Sucrose intolerance - On Sucrose - Followed by     Past Surgical History:   Procedure Laterality Date    COLONOSCOPY N/A 12/20/2016    [facility]an [facility] END[facility]COPY    GERD TST W/ MUCOS [address] 48 HR (BRAVO)  12/3/2020         HC CLP BRAVO  11/30/2020    HX CHOLECYSTECTOMY  06/2019    HX ENDOSCOPY  11/2020    dx'd with sucrose intolerance    HX HEENT      WISDOM TEETH    HX TONSIL AND ADENOIDECTOMY      HX TONSILLECTOMY      AGE 2    HX WISDOM TEETH EXTRACTION      MD EGD TRANSORAL BIOPSY SINGLE/MULTIPLE  6/14/2019         MD EGD TRANSORAL BIOPSY SINGLE/MULTIPLE  11/30/2020    UPPER GI ENDOSCOPY,BIOPSY  12/20/2016          Family history: No family history of thyroid disease, heart disease, hypertension or high cholesterol or DM. Fathers side unknown. He has Gout. Father is obese. Mother - Endometriosis  MGF, MGM, Mother - Hypertension  MGreat grandparents - Heartdisease at later age     Social History:  [employer] - online   Doing well.      ROS:  Constitutional: decreased energy   ENT: normal hearing, no sorethroat   Eye: normal vision, denied double vision, blurred vision  Respiratory system: no wheezing, no respiratory discomfort  CVS: no palpitations, + lower extremity edema -increasing-seen by Cardiolog. GI: normal bowel movements, followed by GI   Allergy: no skin rash or angioedema, stria on abdomen and inner thighs and arms  Neuorlogical: no focal weakness. No burning  Behavioural: normal behavior, normal mood. Medications - See scanned   Prior to Admission medications    Medication Sig Start Date End Date Taking? Authorizing Provider   metFORMIN ER (GLUCOPHAGE XR) 750 mg tablet TAKE 1 TABLET BY MOUTH TWICE A DAY 7/20/21  Yes Donell Walker MD   atomoxetine (Strattera) 40 mg capsule Take 1 Capsule by mouth. 6/2/21  Yes Provider, Historical   ascorbic acid, vitamin C, (VITAMIN C) 500 mg tablet Take  by mouth. 6/2/21  Yes Provider, Historical   galcanezumab-gnlm (Emgality Syringe) 120 mg/mL syrg by SubCUTAneous route. 6/2/21  Yes Provider, Historical   sacrosidase (Sucraid) 8,500 unit/mL soln Take 1 mL by mouth. Yes Provider, Historical   thiamine HCL (Vitamin B-1) 100 mg tablet    Yes Provider, Historical   ubrogepant (Ubrelvy) 100 mg tablet Take 1 Tablet by mouth. Yes Provider, Historical   b complex vitamins tablet ONE TABLE DAILY 6/2/21  Yes Provider, Historical   zinc 50 mg tab tablet one capsule orally once daily 6/2/21  Yes Provider, Historical   pantoprazole (PROTONIX) 40 mg tablet Take 1 Tablet by mouth. 6/2/21  Yes Provider, Historical   meloxicam (MOBIC) 15 mg tablet Take 1 Tablet by mouth daily for 30 days. 6/29/21 7/29/21 Yes Sherman Marks MD   hydrOXYchloroQUINE (Plaquenil) 200 mg tablet Take 1 Tab by mouth two (2) times a day. 4/2/21  Yes Sherman Marks MD   OneTouch Verio test strips strip USE TO TEST BLOOD SUGAR UP TO 2 TIMES DAILY.  3/31/21  Yes Nyla Martinez MD   Vyvanse 50 mg cap TAKE 1 CAPSULE BY MOUTH EVERY DAY IN THE MORNING 1/7/21  Yes Provider, Historical   hydrOXYzine HCL (ATARAX) 25 mg tablet TAKE 1 TO 2 TABLETS BY MOUTH TWICE A DAY AS NEEDED FOR ANXIETY 12/3/20  Yes Provider, Historical   hydrOXYzine HCL (ATARAX) 25 mg tablet TAKE 1 TO 2 TABLETS BY MOUTH TWICE A DAY AS NEEDED FOR ANXIETY 12/3/20  Yes Provider, Historical   citalopram (CELEXA) 40 mg tablet TAKE 1 TABLET BY MOUTH EVERY DAY 11/2/20  Yes Provider, Historical   norethindrone-ethinyl estradiol-iron (Junel FE 1.5/30, 28,) 1.5 mg-30 mcg (21)/75 mg (7) tab TAKE 1 TAB BY MOUTH DAILY. CONTINUOUS PILLS ONLY. 12/14/20  Yes Izabella Ramos MD   ondansetron (ZOFRAN ODT) 8 mg disintegrating tablet PLACE 1 TABLET ON TONGUE & ALLOW TO DISSOLVE EVERY 8 HOURS AS NEEDED FOR NAUSEA WITH HEADACHE 8/30/20  Yes Provider, Historical   Aimovig Autoinjector 140 mg/mL injection INJECT 1 SYRINGE VIA SUBCUTANEOUS ROUTE ONCE A MONTH, AS DIRECTED 9/29/20  Yes Provider, Historical   pantoprazole (PROTONIX) 40 mg tablet Take 1 Tab by mouth two (2) times a day. Patient taking differently: Take 40 mg by mouth daily. 1 Tab at night 10/30/20 10/30/21 Yes Sonia Tucker MD   lancets (One Touch Delica) 33 gauge misc Use to test blood sugar 2 times daily. 7/29/20  Yes Nery Zhang MD   Blood-Glucose Meter (OneTouch Verio Flex meter) misc Use as directed 6/9/20  Yes Nery Zhang MD   cetirizine (ZYRTEC) 10 mg tablet TK 1 T PO D PRF ALLERGY 9/10/18  Yes Provider, Historical   cholecalciferol, vitamin d3, (VITAMIN D3) 400 unit cap Take  by mouth. Yes Provider, Historical   SUMAtriptan succinate (ZEMBRACE SYMTOUCH) 3 mg/0.5 mL pnij by SubCUTAneous route. Indications: a migraine headache   Yes Provider, Historical   diphenhydrAMINE (BENADRYL) 25 mg capsule 2 tabs PO q8h PRN   Yes Provider, Historical     Allergies:   Allergies   Allergen Reactions    Yeast, Dried Other (comments)     Upset stomach      Objective:     Exam was not detailed as it is a VV   Reviewed vitals from 6/29/21    Visit Vitals  LMP  (LMP Unknown)       Wt Readings from Last 3 Encounters:   06/29/21 275 lb (124.7 kg)   05/20/21 269 lb (122 kg)   03/18/21 272 lb (123.4 kg)     Ht Readings from Last 3 Encounters:   05/20/21 5' (1.524 m)   03/18/21 5' 5.63\" (1.667 m)   03/08/21 5' 5\" (1.651 m)     Height: Facility age limit for growth percentiles is 20 years. Weight: Facility age limit for growth percentiles is 20 years. BMI: There is no height or weight on file to calculate BMI. Percentil    Alert, Cooperative       Previous exam -   HEENT: No thyromegaly  Abdomen is soft, non tender, No organomegaly   MSK - Normal ROM  Skin - No rashes or birth marks, striae on abdomen, upper arms and inner thighs. Few are wide,   No acanthosis   lower extremity edema - non pitting. Laboratory data:  See above       Assessment/ Plan :       Adriana Mariano is a 23 y.o. female with recent diagnosis of SLE followed by Endocrine for  - Obesity   - Insulin resistance - On Metformin once daily - will increase to twice daily     On continuous hormonal pill for catamenial migraines. Will need to FU with Gyne as recent diagnosis of SLE.      Plan -   Diagnosis, etiology, pathophysiology, risk/ benefits of rx, proposed eval, and expected follow up discussed with family and all questions answered    Fasting LABS PRIOR TO NEXT VISIT   Orders Placed This Encounter    LIPID PANEL     Standing Status:   Future     Number of Occurrences:   1     Standing Expiration Date:   7/20/2022    INSULIN     Standing Status:   Future     Number of Occurrences:   1     Standing Expiration Date:   7/20/2022    HEMOGLOBIN A1C WITH EAG     Standing Status:   Future     Number of Occurrences:   1     Standing Expiration Date:   7/20/2022    metFORMIN ER (GLUCOPHAGE XR) 750 mg tablet     Sig: TAKE 1 TABLET BY MOUTH TWICE A DAY     Dispense:  180 Tablet     Refill:  2     NEW REFIL REQ NEEDED FOR PT     Continue Metformin 750 mg bid  Follow up in 2 month - Will need to switch to Adult Endocrine - Information provided  Fu in 2 months prior to age 24 years - it will be last visit with me    Total time with patient 25 minutes  Time spent counseling patient more than 50%

## 2021-08-12 ENCOUNTER — TELEPHONE (OUTPATIENT)
Dept: PEDIATRIC ENDOCRINOLOGY | Age: 21
End: 2021-08-12

## 2021-08-26 ENCOUNTER — TRANSCRIBE ORDER (OUTPATIENT)
Dept: SCHEDULING | Age: 21
End: 2021-08-26

## 2021-08-26 DIAGNOSIS — R20.2 PARESTHESIA: ICD-10-CM

## 2021-08-26 DIAGNOSIS — G43.909 MIGRAINE: Primary | ICD-10-CM

## 2021-09-03 ENCOUNTER — TELEPHONE (OUTPATIENT)
Dept: RHEUMATOLOGY | Age: 21
End: 2021-09-03

## 2021-09-03 NOTE — TELEPHONE ENCOUNTER
----- Message from Jasmin Martinez sent at 9/2/2021  4:05 PM EDT -----  Regarding: /telephone  General Message/Vendor Calls    Caller's first and last name: self      Reason for call: letter      Callback required yes/no and why: yes      Best contact number(s): [phone]      Details to clarify the request: pt needs letter from Dr. Zeyad Mcknight to get vaccinated on 9/13/2021      Jasmin Martinez

## 2021-09-04 LAB
ALBUMIN SERPL-MCNC: 4.1 G/DL (ref 3.9–5)
ALBUMIN/GLOB SERPL: 1.4 {RATIO} (ref 1.2–2.2)
ALP SERPL-CCNC: 66 IU/L (ref 48–121)
ALT SERPL-CCNC: 13 IU/L (ref 0–32)
AST SERPL-CCNC: 13 IU/L (ref 0–40)
BASOPHILS # BLD AUTO: 0.1 X10E3/UL (ref 0–0.2)
BASOPHILS NFR BLD AUTO: 1 %
BILIRUB SERPL-MCNC: 0.3 MG/DL (ref 0–1.2)
BUN SERPL-MCNC: 8 MG/DL (ref 6–20)
BUN/CREAT SERPL: 11 (ref 9–23)
CALCIUM SERPL-MCNC: 9.4 MG/DL (ref 8.7–10.2)
CHLORIDE SERPL-SCNC: 104 MMOL/L (ref 96–106)
CO2 SERPL-SCNC: 26 MMOL/L (ref 20–29)
CREAT SERPL-MCNC: 0.71 MG/DL (ref 0.57–1)
CRP SERPL-MCNC: 17 MG/L (ref 0–10)
EOSINOPHIL # BLD AUTO: 0.2 X10E3/UL (ref 0–0.4)
EOSINOPHIL NFR BLD AUTO: 2 %
ERYTHROCYTE [DISTWIDTH] IN BLOOD BY AUTOMATED COUNT: 12.1 % (ref 11.7–15.4)
ERYTHROCYTE [SEDIMENTATION RATE] IN BLOOD BY WESTERGREN METHOD: 7 MM/HR (ref 0–32)
GLOBULIN SER CALC-MCNC: 2.9 G/DL (ref 1.5–4.5)
GLUCOSE SERPL-MCNC: 83 MG/DL (ref 65–99)
HCT VFR BLD AUTO: 44 % (ref 34–46.6)
HGB BLD-MCNC: 15 G/DL (ref 11.1–15.9)
IMM GRANULOCYTES # BLD AUTO: 0 X10E3/UL (ref 0–0.1)
IMM GRANULOCYTES NFR BLD AUTO: 0 %
LYMPHOCYTES # BLD AUTO: 2 X10E3/UL (ref 0.7–3.1)
LYMPHOCYTES NFR BLD AUTO: 22 %
MCH RBC QN AUTO: 30.7 PG (ref 26.6–33)
MCHC RBC AUTO-ENTMCNC: 34.1 G/DL (ref 31.5–35.7)
MCV RBC AUTO: 90 FL (ref 79–97)
MONOCYTES # BLD AUTO: 0.7 X10E3/UL (ref 0.1–0.9)
MONOCYTES NFR BLD AUTO: 8 %
NEUTROPHILS # BLD AUTO: 5.9 X10E3/UL (ref 1.4–7)
NEUTROPHILS NFR BLD AUTO: 67 %
PLATELET # BLD AUTO: 483 X10E3/UL (ref 150–450)
POTASSIUM SERPL-SCNC: 5 MMOL/L (ref 3.5–5.2)
PROT SERPL-MCNC: 7 G/DL (ref 6–8.5)
RBC # BLD AUTO: 4.89 X10E6/UL (ref 3.77–5.28)
SODIUM SERPL-SCNC: 142 MMOL/L (ref 134–144)
WBC # BLD AUTO: 8.9 X10E3/UL (ref 3.4–10.8)

## 2021-09-08 ENCOUNTER — OFFICE VISIT (OUTPATIENT)
Dept: PEDIATRIC ENDOCRINOLOGY | Age: 21
End: 2021-09-08
Payer: MEDICARE

## 2021-09-08 ENCOUNTER — TELEPHONE (OUTPATIENT)
Dept: RHEUMATOLOGY | Age: 21
End: 2021-09-08

## 2021-09-08 VITALS
RESPIRATION RATE: 20 BRPM | SYSTOLIC BLOOD PRESSURE: 131 MMHG | DIASTOLIC BLOOD PRESSURE: 94 MMHG | BODY MASS INDEX: 48.32 KG/M2 | HEART RATE: 113 BPM | TEMPERATURE: 97.4 F | WEIGHT: 290 LBS | OXYGEN SATURATION: 98 % | HEIGHT: 65 IN

## 2021-09-08 DIAGNOSIS — E66.01 OBESITY, MORBID (HCC): Primary | ICD-10-CM

## 2021-09-08 DIAGNOSIS — E88.81 INSULIN RESISTANCE: ICD-10-CM

## 2021-09-08 DIAGNOSIS — E78.89 LIPIDS ABNORMAL: ICD-10-CM

## 2021-09-08 PROBLEM — E74.31 SUCRASE-ISOMALTASE DEFICIENCY: Status: RESOLVED | Noted: 2020-12-30 | Resolved: 2021-09-08

## 2021-09-08 PROCEDURE — G8417 CALC BMI ABV UP PARAM F/U: HCPCS | Performed by: PEDIATRICS

## 2021-09-08 PROCEDURE — 99214 OFFICE O/P EST MOD 30 MIN: CPT | Performed by: PEDIATRICS

## 2021-09-08 PROCEDURE — G8755 DIAS BP > OR = 90: HCPCS | Performed by: PEDIATRICS

## 2021-09-08 PROCEDURE — G8752 SYS BP LESS 140: HCPCS | Performed by: PEDIATRICS

## 2021-09-08 PROCEDURE — G8427 DOCREV CUR MEDS BY ELIG CLIN: HCPCS | Performed by: PEDIATRICS

## 2021-09-08 PROCEDURE — G9717 DOC PT DX DEP/BP F/U NT REQ: HCPCS | Performed by: PEDIATRICS

## 2021-09-08 RX ORDER — METFORMIN HYDROCHLORIDE 750 MG/1
TABLET, EXTENDED RELEASE ORAL
Qty: 180 TABLET | Refills: 5 | Status: SHIPPED | OUTPATIENT
Start: 2021-09-08 | End: 2022-08-05

## 2021-09-08 RX ORDER — PREGABALIN 75 MG/1
75 CAPSULE ORAL 2 TIMES DAILY
COMMUNITY
Start: 2021-08-30 | End: 2021-12-10

## 2021-09-08 RX ORDER — CITALOPRAM 40 MG/1
40 TABLET, FILM COATED ORAL DAILY
COMMUNITY
Start: 2021-06-17 | End: 2021-09-08

## 2021-09-08 RX ORDER — MELOXICAM 15 MG/1
15 TABLET ORAL DAILY
COMMUNITY
Start: 2021-08-11 | End: 2021-09-08 | Stop reason: ALTCHOICE

## 2021-09-08 RX ORDER — LISDEXAMFETAMINE DIMESYLATE 20 MG/1
30 CAPSULE ORAL
COMMUNITY
Start: 2021-08-17 | End: 2022-09-26 | Stop reason: ALTCHOICE

## 2021-09-08 NOTE — TELEPHONE ENCOUNTER
----- Message from Miladys Humphrey RN sent at 9/7/2021  3:11 PM EDT -----  Regarding: FW: /Telephone    ----- Message -----  From: Renetta Mcgregor  Sent: 9/7/2021   2:40 PM EDT  To: [facility] Nurse Pool  Subject: /Telephone                                  Caller's first and last name:Pt  Reason for call:Pt wants to know if she can get a letter written stating that she is able to the  Covid vaccination. Callback required yes/no and why:Yes  Best contact number(s):[phone]  Details to clarify the request:Pt also has an appointment on Fri and says she can just get the letter that day.

## 2021-09-08 NOTE — PROGRESS NOTES
Subjective:     Reason for visit: Madison Srinivasan is a 23 y.o. female with Lupus here for follow up of   - Obesity  - insulin resistance based on OGTT, not clinical.  - On Metformin   - Irregular menstrual cycles - on continued OCP as menses cause debilitating migraines  - High LDL     She was last seen 6 weeks ago via VV . Diagnosis of lymphedema-undergoing physical therapy    Recent diagnosis of lymphedema. Due to see a vascular specialist    History of present illness:     Weight gain -   Pt has increasing lymphedema which is contributing to her weight gain    Her BMI is at 47.68 kg/m²  No polyphagia, + polydipsia - not a new concern, Nocturia once at night. Denies symptoms of hypothyroidism. Activity - Decreased due to excruciating pain in her lower limbs   She was doing yoga daily. Does chores around the home. Has seen Nutritionist in the past.   Eats healthy always. Occasional treats    12/11/20 -   CMP - WNL   Component Value Ref Range    Insulin 13.7  2.6 - 24.9 uIU/mL    TSH 0.891  0.450 - 4.500 uIU/mL     Insulin resistance -  OGTT done in 11/2017 revealed insulin resistance  INSULIN   Result Value Ref Range    Insulin 2 hour  277 (H) <37    Insulin - 1 hour  286 (H) <51    Insulin Fasting  37.4 (H) <9 uIU/mL   Started on Metformin  mg 12/2017. Dose decreased to OD 12/2019 as OGTT 1/20 - wnl. taken at bedtime.     Improved fasting insulin     1/7/20 -   Component Value Ref Range    Glucose 80  65 - 99 mg/dL    Glucose, 1 hour 107  65 - 199 mg/dL    Glucose, 2 hour 115  65 - 139 mg/dL    INSULIN, FASTING 13.1  2.6 - 24.9 uIU/mL    INSULIN, 60 MINUTES 71.1  0.0 - 163.5 uIU/mL    INSULIN, 120 MINUTES 76.7  0.0 - 145.4 uIU/mL         Increased to Metformin 750 mg Bid 6/2020  Insulin levels are now normal  2/21 - Insulin - 18.0, c peptide - 4.1    Decreased Metformin 750 mg - 2/2021 once daily  Increased back to Bid 3/2021     9/2021   Hemoglobin A1c 4.8  4.8 - 5.6 %    Estimated average glucose 91  mg/dL    Insulin 17.1  2.6 - 24.9 uIU/mL       Abnormal lipid profile -   On OTC Fish oil.      9/2021   Cholesterol, total 217* 100 - 199 mg/dL    Triglyceride 106  0 - 149 mg/dL    HDL Cholesterol 54  >39 mg/dL    VLDL, calculated 19  5 - 40 mg/dL    LDL, calculated 144* 0 - 99 mg/dL     Menstrual history - attained menarche at age 5 years, started on OCPs 12/2016  for severe menstrual migraines. Followed by Gynecology. She has not had a cycle for more than a year as she is continued OCP's to prevent migraine. Has baseline migraines requiring sumatriptan injections 2-3x a week. Used to be on Depo but associated with weight gain   fu apt with Gynecology yearly - seen 12/14/20. Plan is to continue continued OCP for a total of 5 years. Started on higher dose Estrogen Junel Fe 1.5 - 30 mcg Estradiol   4/2017 - Pelvic US - wnl     9/3/2021 -   CBC, CMP, ESR - WNL    C-Reactive Protein, Qt 17* 0 - 10 mg/L       Past Medical History:   Diagnosis Date    Abdominal migraine     ADHD (attention deficit hyperactivity disorder)     Autism     Developmental delay     GERD (gastroesophageal reflux disease)     Headache     Irritable bowel syndrome with diarrhea 4/13/2017    Migraine     Obesity, morbid ([address] 12/12/2017    Psychiatric disorder     anxiety, ADHD, OCD, HIGH FUNTIONING AUTISM    Sucrose intolerance    Migraines     Abnormal gallbladder emptying study with an EF of only 21%. S/p Cholecystectomy. No gall stones. Diagnosed with Lupus / Undetermined Connective Tissue disease - April 2021 - On Plaquenil - Hydroxychloroquine and Celexa - Followed by Rheum at [address] diagnosis of lymphedema    Patient scheduled to get a brain MRI done next week.     Patient is already scheduled to get the Covid vaccine    Past Surgical History:   Procedure Laterality Date    COLONOSCOPY N/A 12/20/2016    COLONOSCOPY performed by Rylee Wesley MD at [facility] ENDOSCOPY  GERD TST W/ MUCOS PH ELECTROD 48 HR (BRAVO)  12/3/2020         HC CLP BRAVO  11/30/2020    HX CHOLECYSTECTOMY  06/2019    HX ENDOSCOPY  11/2020    dx'd with sucrose intolerance    HX HEENT      WISDOM TEETH    HX TONSIL AND ADENOIDECTOMY      HX TONSILLECTOMY      AGE 2    HX WISDOM TEETH EXTRACTION      [place] EGD TRANSORAL BIOPSY SINGLE/MULTIPLE  6/14/2019         [place] EGD TRANSORAL BIOPSY SINGLE/MULTIPLE  11/30/2020    UPPER GI ENDOSCOPY,BIOPSY  12/20/2016          Family history: No family history of thyroid disease, heart disease, hypertension or high cholesterol or DM. Fathers side unknown. He has Gout. Father is obese. Mother - Endometriosis  MGF, MGM, Mother - Hypertension  MGreat grandparents - Heartdisease at later age  Maternal aunt - Diagnosed with MS at age 40 years     Social History:  [employer] - online-very organized  Doing well. ROS:  Constitutional: decreased energy   ENT: normal hearing, no sorethroat   Eye: normal vision, denied double vision, blurred vision  Respiratory system: no wheezing, no respiratory discomfort  CVS: no palpitations, + lower extremity edema -increasing   GI: normal bowel movements, followed by GI   Allergy: no skin rash or angioedema, striae on abdomen and inner thighs and arms-no new striae noted  Neuorlogical: no focal weakness. No burning  Behavioural: normal behavior, normal mood. Medications - See scanned   Prior to Admission medications    Medication Sig Start Date End Date Taking? Authorizing Provider   pregabalin (LYRICA) 75 mg capsule Take 75 mg by mouth two (2) times a day. 8/30/21  Yes Provider, Historical   Vyvanse 20 mg capsule TAKE 1 CAPSULE BY MOUTH EVERY MORNING 8/17/21  Yes Provider, Historical   metFORMIN ER (GLUCOPHAGE XR) 750 mg tablet TAKE 1 TABLET BY MOUTH TWICE A DAY 7/20/21  Yes Laurle Walker MD   atomoxetine (Strattera) 40 mg capsule Take 1 Capsule by mouth.  6/2/21  Yes Provider, Historical   ascorbic acid, vitamin C, (VITAMIN C) 500 mg tablet Take  by mouth. 6/2/21  Yes Provider, Historical   sacrosidase (Sucraid) 8,500 unit/mL soln Take 1 mL by mouth. Yes Provider, Historical   thiamine HCL (Vitamin B-1) 100 mg tablet    Yes Provider, Historical   ubrogepant (Ubrelvy) 100 mg tablet Take 1 Tablet by mouth. Yes Provider, Historical   b complex vitamins tablet ONE TABLE DAILY 6/2/21  Yes Provider, Historical   hydrOXYchloroQUINE (Plaquenil) 200 mg tablet Take 1 Tab by mouth two (2) times a day. 4/2/21  Yes Ernesto Colindres MD   OneTouch Verio test strips strip USE TO TEST BLOOD SUGAR UP TO 2 TIMES DAILY. 3/31/21  Yes Aline Daugherty MD   hydrOXYzine HCL (ATARAX) 25 mg tablet TAKE 1 TO 2 TABLETS BY MOUTH TWICE A DAY AS NEEDED FOR ANXIETY 12/3/20  Yes Provider, Historical   citalopram (CELEXA) 40 mg tablet TAKE 1 TABLET BY MOUTH EVERY DAY 11/2/20  Yes Provider, Historical   norethindrone-ethinyl estradiol-iron (Junel FE 1.5/30, 28,) 1.5 mg-30 mcg (21)/75 mg (7) tab TAKE 1 TAB BY MOUTH DAILY. CONTINUOUS PILLS ONLY. 12/14/20  Yes Leesa Mendez MD   ondansetron (ZOFRAN ODT) 8 mg disintegrating tablet PLACE 1 TABLET ON TONGUE & ALLOW TO DISSOLVE EVERY 8 HOURS AS NEEDED FOR NAUSEA WITH HEADACHE 8/30/20  Yes Provider, Historical   Aimovig Autoinjector 140 mg/mL injection INJECT 1 SYRINGE VIA SUBCUTANEOUS ROUTE ONCE A MONTH, AS DIRECTED 9/29/20  Yes Provider, Historical   pantoprazole (PROTONIX) 40 mg tablet Take 1 Tab by mouth two (2) times a day. Patient taking differently: Take 40 mg by mouth daily. 1 Tab at night 10/30/20 10/30/21 Yes Osmar Valdivia MD   lancets (One Touch Delica) 33 gauge misc Use to test blood sugar 2 times daily. 7/29/20  Yes Aline Daugherty MD   Blood-Glucose Meter (OneTouch Verio Flex meter) misc Use as directed 6/9/20  Yes Aline Daugherty MD   cholecalciferol, vitamin d3, (VITAMIN D3) 400 unit cap Take  by mouth.    Yes Provider, Historical   diphenhydrAMINE (BENADRYL) 25 mg capsule 2 tabs PO q8h PRN Provider, Historical     Allergies: Allergies   Allergen Reactions    Yeast, Dried Other (comments)     Upset stomach      Objective:       Visit Vitals  BP (!) 131/94 (BP 1 Location: Right arm, BP Patient Position: Sitting)   Pulse (!) 113   Temp 97.4 °F (36.3 °C) (Temporal)   Resp 20   Ht 5' 5.39\" (1.661 m)   Wt 290 lb (131.5 kg)   SpO2 98%   BMI 47.68 kg/m²       Wt Readings from Last 3 Encounters:   09/08/21 290 lb (131.5 kg)   06/29/21 275 lb (124.7 kg)   05/20/21 269 lb (122 kg)     Ht Readings from Last 3 Encounters:   09/08/21 5' 5.39\" (1.661 m)   05/20/21 5' (1.524 m)   03/18/21 5' 5.63\" (1.667 m)     Height: Facility age limit for growth percentiles is 20 years. Weight: Facility age limit for growth percentiles is 20 years. BMI: Body mass index is 47.68 kg/m². Percentil    Alert, Cooperative     HEENT: No thyromegaly  Abdomen is soft, non tender, No organomegaly   MSK - Normal ROM  Skin - No rashes or birth marks, striae on abdomen, upper arms and inner thighs. Few are wide,   No acanthosis   lower extremity and upper extremity edema - non pitting. Laboratory data:  See above     Assessment/ Plan :       Jackie Piper is a 23 y.o. female with SLE and lymphedema followed by Endocrine for  - Obesity   - Insulin resistance - On Metformin     On continuous hormonal pill for catamenial migraines. Plan -   Diagnosis, etiology, pathophysiology, risk/ benefits of rx, proposed eval, and expected follow up discussed with family and all questions answered    Orders Placed This Encounter    DISCONTD: meloxicam (MOBIC) 15 mg tablet     Sig: Take 15 mg by mouth daily.  DISCONTD: citalopram (CELEXA) 40 mg tablet     Sig: Take 40 mg by mouth daily.  pregabalin (LYRICA) 75 mg capsule     Sig: Take 75 mg by mouth two (2) times a day.     Vyvanse 20 mg capsule     Sig: TAKE 1 CAPSULE BY MOUTH EVERY MORNING     Continue Metformin 750 mg bid  Will follow up with primary care provider with respect to Metformin.   No further follow-up with pediatric endocrine    Total time with patient 25 minutes  Time spent counseling patient more than 50%

## 2021-09-08 NOTE — TELEPHONE ENCOUNTER
Spoke with patient, states she needs a letter, stating that it is ok for her to get the COVID vaccine. States she will  at her appt on Friday.

## 2021-09-08 NOTE — LETTER
9/8/2021    Patient: Eduardo Hawkins   YOB: 2000   Date of Visit: 9/8/2021     Sky Paula DO  [address] [facility]  [place] [address]  Via Fax: [phone]    Dear Sky Paula DO,      Thank you for referring Ms. Sade Loera to P[facility]ATR[facility]OCR[facility] for evaluation. My notes for this consultation are attached. Chief Complaint   Patient presents with    Follow-up     weight and lipids             Subjective:     Reason for visit: Eduardo Hawkins is a 23 y.o. female with Lupus here for follow up of   - Obesity  - insulin resistance based on OGTT, not clinical.  - On Metformin   - Irregular menstrual cycles - on continued OCP as menses cause debilitating migraines  - High LDL     She was last seen 6 weeks ago via VV . Diagnosis of lymphedema-undergoing physical therapy    Recent diagnosis of lymphedema. Due to see a vascular specialist    History of present illness:     Weight gain -   Pt has increasing lymphedema which is contributing to her weight gain    Her BMI is at 47.68 kg/m²  No polyphagia, + polydipsia - not a new concern, Nocturia once at night. Denies symptoms of hypothyroidism. Activity - Decreased due to excruciating pain in her lower limbs   She was doing yoga daily. Does chores around the home. Has seen Nutritionist in the past.   Eats healthy always. Occasional treats    12/11/20 -   CMP - WNL   Component Value Ref Range    Insulin 13.7  2.6 - 24.9 uIU/mL    TSH 0.891  0.450 - 4.500 uIU/mL     Insulin resistance -  OGTT done in 11/2017 revealed insulin resistance  INSULIN   Result Value Ref Range    Insulin 2 hour  277 (H) <37    Insulin - 1 hour  286 (H) <51    Insulin Fasting  37.4 (H) <9 uIU/mL   Started on Metformin  mg 12/2017. Dose decreased to OD 12/2019 as OGTT 1/20 - wnl. taken at bedtime.     Improved fasting insulin     1/7/20 -   Component Value Ref Range    Glucose 80  65 - 99 mg/dL    Glucose, 1 hour 107  65 - 199 mg/dL    Glucose, 2 hour 115  65 - 139 mg/dL    INSULIN, FASTING 13.1  2.6 - 24.9 uIU/mL    INSULIN, 60 MINUTES 71.1  0.0 - 163.5 uIU/mL    INSULIN, 120 MINUTES 76.7  0.0 - 145.4 uIU/mL         Increased to Metformin 750 mg Bid 6/2020  Insulin levels are now normal  2/21 - Insulin - 18.0, c peptide - 4.1    Decreased Metformin 750 mg - 2/2021 once daily  Increased back to Bid 3/2021     9/2021   Hemoglobin A1c 4.8  4.8 - 5.6 %    Estimated average glucose 91  mg/dL    Insulin 17.1  2.6 - 24.9 uIU/mL       Abnormal lipid profile -   On OTC Fish oil.      9/2021   Cholesterol, total 217* 100 - 199 mg/dL    Triglyceride 106  0 - 149 mg/dL    HDL Cholesterol 54  >39 mg/dL    VLDL, calculated 19  5 - 40 mg/dL    LDL, calculated 144* 0 - 99 mg/dL     Menstrual history - attained menarche at age 5 years, started on OCPs 12/2016  for severe menstrual migraines. Followed by Gynecology. She has not had a cycle for more than a year as she is continued OCP's to prevent migraine. Has baseline migraines requiring sumatriptan injections 2-3x a week. Used to be on Depo but associated with weight gain   fu apt with Gynecology yearly - seen 12/14/20. Plan is to continue continued OCP for a total of 5 years. Started on higher dose Estrogen Junel Fe 1.5 - 30 mcg Estradiol   4/2017 - Pelvic US - wnl     9/3/2021 -   CBC, CMP, ESR - WNL    C-Reactive Protein, Qt 17* 0 - 10 mg/L       Past Medical History:   Diagnosis Date    Abdominal migraine     ADHD (attention deficit hyperactivity disorder)     Autism     Developmental delay     GERD (gastroesophageal reflux disease)     Headache     Irritable bowel syndrome with diarrhea 4/13/2017    Migraine     Obesity, morbid ([address] 12/12/2017    Psychiatric disorder     anxiety, ADHD, OCD, HIGH FUNTIONING AUTISM    Sucrose intolerance    Migraines     Abnormal gallbladder emptying study with an EF of only 21%. S/p Cholecystectomy. No gall stones. Diagnosed with Lupus / Undetermined Connective Tissue disease - April 2021 - On Plaquenil - Hydroxychloroquine and Celexa - Followed by Rheum at [address] diagnosis of lymphedema    Patient scheduled to get a brain MRI done next week. Patient is already scheduled to get the Covid vaccine    Past Surgical History:   Procedure Laterality Date    COLONOSCOPY N/A 12/20/2016    COLONOSCOPY performed by Sajan Angeles MD at [facility]    [facility] (BRAVO)  12/3/2020         HC CLP BRAVO  11/30/2020    HX CHOLECYSTECTOMY  06/2019    HX ENDOSCOPY  11/2020    dx'd with sucrose intolerance    HX HEENT      WISDOM TEETH    HX TONSIL AND ADENOIDECTOMY      HX TONSILLECTOMY      AGE 1    HX WISDOM TEETH EXTRACTION      RI EGD TRANSORAL BIOPSY SINGLE/MULTIPLE  6/14/2019         RI EGD TRANSORAL BIOPSY SINGLE/MULTIPLE  11/30/2020    UPPER GI ENDOSCOPY,BIOPSY  12/20/2016          Family history: No family history of thyroid disease, heart disease, hypertension or high cholesterol or DM. Fathers side unknown. He has Gout. Father is obese. Mother - Endometriosis  MGF, MGM, Mother - Hypertension  MGreat grandparents - Heartdisease at later age  Maternal aunt - Diagnosed with MS at age 40 years     Social History:  [employer] - online-very organized  Doing well. ROS:  Constitutional: decreased energy   ENT: normal hearing, no sorethroat   Eye: normal vision, denied double vision, blurred vision  Respiratory system: no wheezing, no respiratory discomfort  CVS: no palpitations, + lower extremity edema -increasing   GI: normal bowel movements, followed by GI   Allergy: no skin rash or angioedema, striae on abdomen and inner thighs and arms-no new striae noted  Neuorlogical: no focal weakness. No burning  Behavioural: normal behavior, normal mood. Medications - See scanned   Prior to Admission medications    Medication Sig Start Date End Date Taking?  Authorizing Provider   pregabalin (LYRICA) 75 mg capsule Take 75 mg by mouth two (2) times a day. 8/30/21  Yes Provider, Historical   Vyvanse 20 mg capsule TAKE 1 CAPSULE BY MOUTH EVERY MORNING 8/17/21  Yes Provider, Historical   metFORMIN ER (GLUCOPHAGE XR) 750 mg tablet TAKE 1 TABLET BY MOUTH TWICE A DAY 7/20/21  Yes Karrie Walker MD   atomoxetine (Strattera) 40 mg capsule Take 1 Capsule by mouth. 6/2/21  Yes Provider, Historical   ascorbic acid, vitamin C, (VITAMIN C) 500 mg tablet Take  by mouth. 6/2/21  Yes Provider, Historical   sacrosidase (Sucraid) 8,500 unit/mL soln Take 1 mL by mouth. Yes Provider, Historical   thiamine HCL (Vitamin B-1) 100 mg tablet    Yes Provider, Historical   ubrogepant (Ubrelvy) 100 mg tablet Take 1 Tablet by mouth. Yes Provider, Historical   b complex vitamins tablet ONE TABLE DAILY 6/2/21  Yes Provider, Historical   hydrOXYchloroQUINE (Plaquenil) 200 mg tablet Take 1 Tab by mouth two (2) times a day. 4/2/21  Yes Collin Jimenez MD   OneTouch Verio test strips strip USE TO TEST BLOOD SUGAR UP TO 2 TIMES DAILY. 3/31/21  Yes Lydia Louie MD   hydrOXYzine HCL (ATARAX) 25 mg tablet TAKE 1 TO 2 TABLETS BY MOUTH TWICE A DAY AS NEEDED FOR ANXIETY 12/3/20  Yes Provider, Historical   citalopram (CELEXA) 40 mg tablet TAKE 1 TABLET BY MOUTH EVERY DAY 11/2/20  Yes Provider, Historical   norethindrone-ethinyl estradiol-iron (Junel FE 1.5/30, 28,) 1.5 mg-30 mcg (21)/75 mg (7) tab TAKE 1 TAB BY MOUTH DAILY. CONTINUOUS PILLS ONLY. 12/14/20  Yes Clifford Ulloa MD   ondansetron (ZOFRAN ODT) 8 mg disintegrating tablet PLACE 1 TABLET ON TONGUE & ALLOW TO DISSOLVE EVERY 8 HOURS AS NEEDED FOR NAUSEA WITH HEADACHE 8/30/20  Yes Provider, Historical   Aimovig Autoinjector 140 mg/mL injection INJECT 1 SYRINGE VIA SUBCUTANEOUS ROUTE ONCE A MONTH, AS DIRECTED 9/29/20  Yes Provider, Historical   pantoprazole (PROTONIX) 40 mg tablet Take 1 Tab by mouth two (2) times a day.   Patient taking differently: Take 40 mg by mouth daily. 1 Tab at night 10/30/20 10/30/21 Yes Aniceto Wright MD   lancets (One Touch Delica) 33 gauge misc Use to test blood sugar 2 times daily. 7/29/20  Yes Malia Ervin MD   Blood-Glucose Meter (OneTouch Verio Flex meter) misc Use as directed 6/9/20  Yes Malia Ervin MD   cholecalciferol, vitamin d3, (VITAMIN D3) 400 unit cap Take  by mouth. Yes Provider, Historical   diphenhydrAMINE (BENADRYL) 25 mg capsule 2 tabs PO q8h PRN    Provider, Historical     Allergies: Allergies   Allergen Reactions    Yeast, Dried Other (comments)     Upset stomach      Objective:       Visit Vitals  BP (!) 131/94 (BP 1 Location: Right arm, BP Patient Position: Sitting)   Pulse (!) 113   Temp 97.4 °F (36.3 °C) (Temporal)   Resp 20   Ht 5' 5.39\" (1.661 m)   Wt 290 lb (131.5 kg)   SpO2 98%   BMI 47.68 kg/m²       Wt Readings from Last 3 Encounters:   09/08/21 290 lb (131.5 kg)   06/29/21 275 lb (124.7 kg)   05/20/21 269 lb (122 kg)     Ht Readings from Last 3 Encounters:   09/08/21 5' 5.39\" (1.661 m)   05/20/21 5' (1.524 m)   03/18/21 5' 5.63\" (1.667 m)     Height: Facility age limit for growth percentiles is 20 years. Weight: Facility age limit for growth percentiles is 20 years. BMI: Body mass index is 47.68 kg/m². Percentil    Alert, Cooperative     HEENT: No thyromegaly  Abdomen is soft, non tender, No organomegaly   MSK - Normal ROM  Skin - No rashes or birth marks, striae on abdomen, upper arms and inner thighs. Few are wide,   No acanthosis   lower extremity and upper extremity edema - non pitting. Laboratory data:  See above     Assessment/ Plan :       Felice Snyder is a 23 y.o. female with SLE and lymphedema followed by Endocrine for  - Obesity   - Insulin resistance - On Metformin     On continuous hormonal pill for catamenial migraines.      Plan -   Diagnosis, etiology, pathophysiology, risk/ benefits of rx, proposed eval, and expected follow up discussed with family and all questions answered    Orders Placed This Encounter    DISCONTD: meloxicam (MOBIC) 15 mg tablet     Sig: Take 15 mg by mouth daily.  DISCONTD: citalopram (CELEXA) 40 mg tablet     Sig: Take 40 mg by mouth daily.  pregabalin (LYRICA) 75 mg capsule     Sig: Take 75 mg by mouth two (2) times a day.  Vyvanse 20 mg capsule     Sig: TAKE 1 CAPSULE BY MOUTH EVERY MORNING     Continue Metformin 750 mg bid  Will follow up with primary care provider with respect to Metformin. No further follow-up with pediatric endocrine    Total time with patient 25 minutes  Time spent counseling patient more than 50%                     If you have questions, please do not hesitate to call me. I look forward to following your patient along with you.       Sincerely,    Mani Ferrell MD

## 2021-09-08 NOTE — TELEPHONE ENCOUNTER
Spoke with patient , states she need a letter, stating it is ok for her to take the COVID vaccine.  States she will pick it up at her appt on Friday

## 2021-09-08 NOTE — TELEPHONE ENCOUNTER
----- Message from Maury Monge RN sent at 9/7/2021  3:11 PM EDT -----  Regarding: FW: /Telephone    ----- Message -----  From: Torsten Milligan  Sent: 9/7/2021   2:40 PM EDT  To: [facility] Nurse Pool  Subject: /Telephone                                  Caller's first and last name:Pt  Reason for call:Pt wants to know if she can get a letter written stating that she is able to the  Covid vaccination. Callback required yes/no and why:Yes  Best contact number(s):[phone]  Details to clarify the request:Pt also has an appointment on Fri and says she can just get the letter that day.

## 2021-09-10 ENCOUNTER — OFFICE VISIT (OUTPATIENT)
Dept: RHEUMATOLOGY | Age: 21
End: 2021-09-10
Payer: MEDICARE

## 2021-09-10 VITALS
WEIGHT: 288 LBS | BODY MASS INDEX: 56.54 KG/M2 | TEMPERATURE: 99.1 F | RESPIRATION RATE: 20 BRPM | DIASTOLIC BLOOD PRESSURE: 103 MMHG | OXYGEN SATURATION: 96 % | SYSTOLIC BLOOD PRESSURE: 146 MMHG | HEART RATE: 104 BPM | HEIGHT: 60 IN

## 2021-09-10 DIAGNOSIS — M35.9 UNDIFFERENTIATED CONNECTIVE TISSUE DISEASE (HCC): ICD-10-CM

## 2021-09-10 DIAGNOSIS — B36.0 TINEA VERSICOLOR: Primary | ICD-10-CM

## 2021-09-10 DIAGNOSIS — R60.9 LIPEDEMA: ICD-10-CM

## 2021-09-10 PROCEDURE — G9717 DOC PT DX DEP/BP F/U NT REQ: HCPCS | Performed by: INTERNAL MEDICINE

## 2021-09-10 PROCEDURE — G8417 CALC BMI ABV UP PARAM F/U: HCPCS | Performed by: INTERNAL MEDICINE

## 2021-09-10 PROCEDURE — G8753 SYS BP > OR = 140: HCPCS | Performed by: INTERNAL MEDICINE

## 2021-09-10 PROCEDURE — 99215 OFFICE O/P EST HI 40 MIN: CPT | Performed by: INTERNAL MEDICINE

## 2021-09-10 PROCEDURE — G8755 DIAS BP > OR = 90: HCPCS | Performed by: INTERNAL MEDICINE

## 2021-09-10 PROCEDURE — G8427 DOCREV CUR MEDS BY ELIG CLIN: HCPCS | Performed by: INTERNAL MEDICINE

## 2021-09-10 RX ORDER — CHLORPHENIRAMINE MALEATE 4 MG
TABLET ORAL 2 TIMES DAILY
Qty: 45 G | Refills: 1 | Status: SHIPPED | OUTPATIENT
Start: 2021-09-10 | End: 2022-06-02

## 2021-09-10 RX ORDER — HYDROXYCHLOROQUINE SULFATE 200 MG/1
TABLET, FILM COATED ORAL
Qty: 60 TABLET | Refills: 5 | Status: SHIPPED | OUTPATIENT
Start: 2021-09-10 | End: 2022-02-24 | Stop reason: SDUPTHER

## 2021-09-10 NOTE — PATIENT INSTRUCTIONS
1. Try applying clotrimazole to the light spots on the left leg for at least 10 days, then let me know if you're noticing a difference between the legs. 2. Finish antibiotics. Let me and your PCP know if the wound doesn't improve after these are completed, as we could consider a course of steroids. 3. OT for the arm as well as the leg (once the latter heals). 4. Labs in 1 week    5. Return in 1 month.

## 2021-09-10 NOTE — PROGRESS NOTES
REASON FOR VISIT:   Adore Begum is a 23 y.o. female with history of migraines and ADHD who is returning for followup of undifferentiated connective tissue disease c/b tachycardia, prurigo nodularis, polymorphic light eruption, livedo, and +WENDY 1:320 speckled and 1:640 nuclear dot patterns. HISTORY OF PRESENT ILLNESS    Sat or Sunday noticed red nodule on distal right calf, became ulcerated, large erythematous border. Saw PCP, checked WBC which was elevated to 11. Went to [email or web address] ER where wound was swabbed, labs done and she was told WBC was normal.    Prescribed Keflex and doxycycline on 9/8. Concerned that this doesn't seem to be helping the ulcer over the last 2 days, growing deeper and expressing thick white discharge. Lyrica has been helpful for lesions in upper body, not pruritic. Noticing hypopigmented patches over LE's. Following with [address] [place] [address] Dermatology though hasn't been back. Disease History:  Since 4 or 6yo, has had recurrent nodules on the skin diffusely--face, arms, legs, abdomen. Can drain pus. Has been treated over the years with oral antibiotics and topical antibiotics which help; swabs have been MRSA-positive in the past. Has tried Hibiclens baths in the past repeatedly without improvement. Pruritic, seem to respond to topical Mupirocin. Has had shave biopsy with Derm at [address] [place] [address] in the past,punch biopsy in May 2019 was interpreted as mixed dermal inflammation with features of an excoriated hypersensitivity reaction; last seen in November 2020 when diagnosed with keratosis pilaris, rosacea, and neurodermatitis. In February, PCP ordered labs including an WENDY screen which returned +1:320 speckled and 1:640 nuclear dot pattern, with mild elevations of ESR (26) and CRP (17mg/L). Turns \"red as a lobster\" in the sun even with sunscreen use, has always been the case. No chronic cough or progressive dyspnea on exertion. Nonrefreshing sleep and always fatigued in the evening.  Runs to the bathroom repeatedly through the evening, doesn't feel she can reduce her fluid intake in the afternoon/evenings 2/2 chronic thirst and dry mouth. Sleep study ~3 years ago she says was normal.  Has more subjective eye dryness, not currently using AT's consistently or following with ophthalmology. Cold intolerant, feels hands and feet always feel like ice, feels worse over the last 2 years. Gaining weight again after losing nearly 100 pounds with metformin; has gained 15 pounds in the last 3 months. Feels digestive issues (possible gastroparesis in the past with postprandial pain, biliary dyskinesis s/p cholecystectomy) have resolved since starting a sucrose intolerance diet. Stool softeners haven't helped in the past. On current diet she is having daily bowel movements without abdominal pain. Knees ache. Denies joint swelling. Has sprained ankles in past and now feels wrists get more sore with use. REVIEW OF SYSTEMS  A comprehensive review of systems was negative except for that written in the HPI. A 10-point review of systems is per the new patient questionnaire, which has been reviewed extensively and scanned into the electronic medical record for future reference. Review of systems is as above and is otherwise negative. ALLERGIES  Yeast, dried    MEDICATIONS  Current Outpatient Medications   Medication Sig    pregabalin (LYRICA) 75 mg capsule Take 75 mg by mouth two (2) times a day.  Vyvanse 20 mg capsule TAKE 1 CAPSULE BY MOUTH EVERY MORNING    ascorbic acid, vitamin C, (VITAMIN C) 500 mg tablet Take  by mouth.  sacrosidase (Sucraid) 8,500 unit/mL soln Take 1 mL by mouth.  thiamine HCL (Vitamin B-1) 100 mg tablet     ubrogepant (Ubrelvy) 100 mg tablet Take 1 Tablet by mouth.  b complex vitamins tablet ONE TABLE DAILY    hydrOXYchloroQUINE (Plaquenil) 200 mg tablet Take 1 Tab by mouth two (2) times a day.     hydrOXYzine HCL (ATARAX) 25 mg tablet TAKE 1 TO 2 TABLETS BY MOUTH TWICE A DAY AS NEEDED FOR ANXIETY    citalopram (CELEXA) 40 mg tablet TAKE 1 TABLET BY MOUTH EVERY DAY    norethindrone-ethinyl estradiol-iron (Junel FE 1.5/30, 28,) 1.5 mg-30 mcg (21)/75 mg (7) tab TAKE 1 TAB BY MOUTH DAILY. CONTINUOUS PILLS ONLY.  ondansetron (ZOFRAN ODT) 8 mg disintegrating tablet PLACE 1 TABLET ON TONGUE & ALLOW TO DISSOLVE EVERY 8 HOURS AS NEEDED FOR NAUSEA WITH HEADACHE    pantoprazole (PROTONIX) 40 mg tablet Take 1 Tab by mouth two (2) times a day. (Patient taking differently: Take 40 mg by mouth daily. 1 Tab at night)    cholecalciferol, vitamin d3, (VITAMIN D3) 400 unit cap Take  by mouth.  diphenhydrAMINE (BENADRYL) 25 mg capsule 2 tabs PO q8h PRN    metFORMIN ER (GLUCOPHAGE XR) 750 mg tablet TAKE 1 TABLET BY MOUTH TWICE A DAY  Indications: polycystic ovarian syndrome, a disease with cysts in the ovaries, prevention of type 2 diabetes mellitus (Patient not taking: Reported on 9/10/2021)    atomoxetine (Strattera) 40 mg capsule Take 1 Capsule by mouth.  OneTouch Verio test strips strip USE TO TEST BLOOD SUGAR UP TO 2 TIMES DAILY. (Patient not taking: Reported on 9/10/2021)    Aimovig Autoinjector 140 mg/mL injection INJECT 1 SYRINGE VIA SUBCUTANEOUS ROUTE ONCE A MONTH, AS DIRECTED (Patient not taking: Reported on 9/10/2021)    lancets (One Touch Delica) 33 gauge misc Use to test blood sugar 2 times daily. (Patient not taking: Reported on 9/10/2021)    Blood-Glucose Meter (OneTouch Verio Flex meter) misc Use as directed (Patient not taking: Reported on 9/10/2021)     No current facility-administered medications for this visit.        PAST MEDICAL HISTORY  Past Medical History:   Diagnosis Date    Abdominal migraine     ADHD (attention deficit hyperactivity disorder)     Autism     Developmental delay     GERD (gastroesophageal reflux disease)     Headache     Irritable bowel syndrome with diarrhea 4/13/2017    Migraine     Obesity, morbid ([address] 12/12/2017    Psychiatric disorder     anxiety, ADHD, OCD, HIGH FUNTIONING AUTISM    Sucrose intolerance        FAMILY HISTORY  family history includes Cancer in her maternal uncle; Delayed Awakening in her mother; Endometriosis in her mother; Headache in her maternal grandfather and mother; MS in her maternal aunt; No Known Problems in her father and maternal grandmother; Post-op Nausea/Vomiting in her mother. Mother diagnosed with sarcoidosis. Father has gout. Maternal aunt has MS.    SOCIAL HISTORY  She  reports that she has never smoked. She has never used smokeless tobacco. She reports that she does not drink alcohol and does not use drugs. 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 No flowsheet data found. General:  The patient is pleasant, obese, alert, and in no apparent distress. Eyes: Sclera are anicteric. No conjunctival injection. HEENT:  Oropharynx is clear. No oral ulcers. Adequate salivary pooling. No cervical or supraclavicular lymphadenopathy. Lungs:  Clear to auscultation bilaterally, without wheeze or stridor. Normal respiratory effort. Cor:  Regular rate and rhythm. No murmur rub or gallop. Abdomen: Soft, non-tender, without hepatomegaly or masses. Extremities: No calf tenderness or edema. Warm and well perfused. Skin:  Interval development of~1cm diameter right calf ulcer with scant fibrinous exudate and erythematous margins, no surrounding induration (picture). Maxillary erythema and telangiectasias without edema. No heliotrope. Striae proximal to antecubital fossae.  Largely unchanged violaceous discoloration over bilateral upper arms, fewer excoriations now limited to 2 on upper arms. Grossly normal nailfold capillaries. No sclerodacytly. Neuro: Nonfocal, no foot or wrist drop. Normal unassisted gait. Musculoskeletal:    A comprehensive musculoskeletal exam was performed for all joints of each upper and lower extremity and assessed for swelling, tenderness and range of motion. Results are documented as below:  No evidence of synovitis in the small joints of the hands, wrists, shoulders, elbows, hips, knees or ankles. Labs:  21: WBC 11.4, HJgb 16.2, Hct 49.6, Plt 534; aerobic wound culture with scant growth of mixed skin babar. 9/3/21: WBC 8.9, Hgb 15.0, Plt 483, ESR 7, Cr 0.71, LFTs WNL, CRP 17mg/L;   21: WBC 6.9, Hgb 16.8, Plt 451, ESR 5, CRP 5mg/L  3/10/21: CRP 21mg/L, CPK 57, Marissa neg, RDL myositis panel neg,   21: WBC 8.9, Hgb 15.6, Plt 472; ANCAs negative, PR3 and MPO negative; Cr 0.7, CMP WNL;  WENDY 1:320 speckled, 1:640 nuclear dot; C3 high, C4 WNL; negative dsDNA, Scl70, SSA, SSB, RNP, Sm, ribosomal P, chromatin, centromere B antibodies. RF <10, CCP negative; ESR 26, CRP 17mg/L. Hep B cAb neg, Hep B sAg neg, Hep C Ab neg    Pathology:  19: [facility] pathology final report, Etta Coy MD:  Skin, right lower leg, punch biopsy:  -Ulcer and mixed dermal inflammation (see comment). Sections reveal a punch biopsy to the depth of the mid dermis. There is a zone of ulceration with fibrinous crust containing neutrophils. In the superficial to mid dermis, there is a mildly dense perivascular lymphohistiocytic infiltrate with scattered neutrophils and eosinophils. PAS stain is negative for fungal organisms. Taken together, the findings are those of ulceration and mixed dermal inflammation. The features are suggestive of an excoriated hypersensitivity reaction, such as that seen in arthropod bites. Diagnostic features of folliculitis are not seen. Imagin19 CXR (report only): Normal chest views.  No change. ASSESSMENT AND PLAN  Ms. Chau Villanueva is a 23 y.o. female with high-functioning autism and lipedema who presents for followup of undifferentiated connective tissue disease with resting tachycardia, pruritic nodules, polymorphic light eruption, livedo, and +WENDY 1:320 speckled and 1:640 nuclear dot patterns. She has a new RLE ulcer without clear predisposing trauma; cratering and heaped up margins raises suspicion for pyoderma, but from description of earlier erythema she does seem to have some early improvement in wound extent with antibiotics. Encouraged more patience and avoidance of any wound trauma to facilitate healing. Given extensive lipedema she is at risk for poor wound healing though distal large vessel circulation seems intact; reinforced leg elevation. She has more prominent hypopigmented LE lesions, possibly postinflammatory scars though recent enough in onset that trialing a course of topical clotrimazole for dermatophyte infection. Lyrica seems to be helping with both pain and neurodermatitis, appreciate neurology's assistance. 1. Tinea versicolor  - clotrimazole (LOTRIMIN) 1 % topical cream; Apply  to affected area two (2) times a day. Dispense: 45 g; Refill: 1    2. Lipedema  - REFERRAL TO LYMPHEDEMA CLINIC  - SED RATE (ESR); Future  - C REACTIVE PROTEIN, QT; Future  - COMPLEMENT, C3 & C4; Future  - CBC WITH AUTOMATED DIFF; Future  - URINALYSIS W/ REFLEX CULTURE; Future  - PROT+CREATU (RANDOM); Future  - LYME AB/WESTERN BLOT REFLEX; Future  - SED RATE (ESR)  - C REACTIVE PROTEIN, QT  - COMPLEMENT, C3 & C4  - CBC WITH AUTOMATED DIFF  - URINALYSIS W/ REFLEX CULTURE  - PROT+CREATU (RANDOM)  - LYME AB/WESTERN BLOT REFLEX    3. Undifferentiated connective tissue disease (HCC)  - Cont Plaquenil (hydroxychloroquine)  - SED RATE (ESR); Future  - C REACTIVE PROTEIN, QT; Future  - COMPLEMENT, C3 & C4; Future  - CBC WITH AUTOMATED DIFF;  Future  - URINALYSIS W/ REFLEX CULTURE; Future  - PROT+CREATU (RANDOM); Future  - LYME AB/WESTERN BLOT REFLEX; Future      Patient Instructions   1. Try applying clotrimazole to the light spots on the left leg for at least 10 days, then let me know if you're noticing a difference between the legs. 2. Finish antibiotics. Let me and your PCP know if the wound doesn't improve after these are completed, as we could consider a course of steroids. 3. OT for the arm as well as the leg (once the latter heals). 4. Labs in 1 week    5. Return in 1 month. Orders Placed This Encounter    SED RATE (ESR)    C REACTIVE PROTEIN, QT    COMPLEMENT, C3 & C4    CBC WITH AUTOMATED DIFF    URINALYSIS W/ REFLEX CULTURE    PROT+CREATU (RANDOM)    LYME AB/WESTERN BLOT REFLEX    REFERRAL TO LYMPHEDEMA CLINIC    clotrimazole (LOTRIMIN) 1 % topical cream       Medications: I am having Mary Tam start on clotrimazole. I am also having her maintain her diphenhydrAMINE, cholecalciferol (vitamin d3), Blood-Glucose Meter, lancets, ondansetron, Aimovig Autoinjector, pantoprazole, norethindrone-ethinyl estradiol-iron, hydrOXYzine HCL, citalopram, OneTouch Verio test strips, hydrOXYchloroQUINE, atomoxetine, ascorbic acid (vitamin C), Sucraid, thiamine HCL, ubrogepant, b complex vitamins, pregabalin, Vyvanse, and metFORMIN ER.     Follow up: 2 months    Face to face time: 30 minutes  Note preparation and records review day of service: 15 minutes  Total provider time day of service: 45 minutes      Ramses Kincaid MD    Adult Rheumatology   [address]  [facility], [address]   Phone [phone]  Fax [phone]

## 2021-09-10 NOTE — LETTER
9/10/2021    Patient: Felice Snyder   YOB: 2000   Date of Visit: 9/10/2021     Griffin Peres DO  [address] [facility] [address]  Via Fax: [address] [facility] [address], [place]  [address] [facility] [address] [address]  Via Fax: [address], [address] [facility] [address]  Via Fax: [address]   [address] [place] [address]  Via Fax: [phone]    Dear MD Portia Morrison PA Jonda Sink, NP,      We recently saw Ms. Florian Gilmore in the [facility] for evaluation. My notes for this consultation are attached. If you have questions, please do not hesitate to call me. I look forward to following your patient along with you.       Sincerely,    Mary Beth Boss MD [facility]  Cell: [phone]

## 2021-09-17 LAB
APPEARANCE UR: CLEAR
B BURGDOR IGG+IGM SER-ACNC: <0.91 ISR (ref 0–0.9)
B BURGDOR IGM SER IA-ACNC: <0.8 INDEX (ref 0–0.79)
BACTERIA #/AREA URNS HPF: ABNORMAL /[HPF]
BACTERIA UR CULT: NO GROWTH
BASOPHILS # BLD AUTO: 0.1 X10E3/UL (ref 0–0.2)
BASOPHILS NFR BLD AUTO: 1 %
BILIRUB UR QL STRIP: NEGATIVE
C3 SERPL-MCNC: 179 MG/DL (ref 82–167)
C4 SERPL-MCNC: 30 MG/DL (ref 12–38)
CASTS URNS QL MICRO: ABNORMAL /LPF
COLOR UR: YELLOW
CREAT UR-MCNC: 172.2 MG/DL
CRP SERPL-MCNC: 13 MG/L (ref 0–10)
CRYSTALS URNS MICRO: ABNORMAL
EOSINOPHIL # BLD AUTO: 0.1 X10E3/UL (ref 0–0.4)
EOSINOPHIL NFR BLD AUTO: 1 %
EPI CELLS #/AREA URNS HPF: ABNORMAL /HPF (ref 0–10)
ERYTHROCYTE [DISTWIDTH] IN BLOOD BY AUTOMATED COUNT: 11.9 % (ref 11.7–15.4)
ERYTHROCYTE [SEDIMENTATION RATE] IN BLOOD BY WESTERGREN METHOD: 6 MM/HR (ref 0–32)
GLUCOSE UR QL: NEGATIVE
HCT VFR BLD AUTO: 45.6 % (ref 34–46.6)
HGB BLD-MCNC: 14.7 G/DL (ref 11.1–15.9)
HGB UR QL STRIP: NEGATIVE
IMM GRANULOCYTES # BLD AUTO: 0 X10E3/UL (ref 0–0.1)
IMM GRANULOCYTES NFR BLD AUTO: 0 %
KETONES UR QL STRIP: NEGATIVE
LEUKOCYTE ESTERASE UR QL STRIP: ABNORMAL
LYMPHOCYTES # BLD AUTO: 2.6 X10E3/UL (ref 0.7–3.1)
LYMPHOCYTES NFR BLD AUTO: 25 %
MCH RBC QN AUTO: 29.3 PG (ref 26.6–33)
MCHC RBC AUTO-ENTMCNC: 32.2 G/DL (ref 31.5–35.7)
MCV RBC AUTO: 91 FL (ref 79–97)
MICRO URNS: ABNORMAL
MONOCYTES # BLD AUTO: 0.9 X10E3/UL (ref 0.1–0.9)
MONOCYTES NFR BLD AUTO: 8 %
NEUTROPHILS # BLD AUTO: 6.7 X10E3/UL (ref 1.4–7)
NEUTROPHILS NFR BLD AUTO: 65 %
NITRITE UR QL STRIP: NEGATIVE
PH UR STRIP: 6.5 [PH] (ref 5–7.5)
PLATELET # BLD AUTO: 460 X10E3/UL (ref 150–450)
PROT UR QL STRIP: ABNORMAL
PROT UR-MCNC: 25.8 MG/DL
PROT/CREAT UR: 150 MG/G CREAT (ref 0–200)
RBC # BLD AUTO: 5.01 X10E6/UL (ref 3.77–5.28)
RBC #/AREA URNS HPF: ABNORMAL /HPF (ref 0–2)
SP GR UR: 1.02 (ref 1–1.03)
UNIDENT CRYS URNS QL MICRO: PRESENT
URINALYSIS REFLEX, 377202: ABNORMAL
UROBILINOGEN UR STRIP-MCNC: 0.2 MG/DL (ref 0.2–1)
WBC # BLD AUTO: 10.4 X10E3/UL (ref 3.4–10.8)
WBC #/AREA URNS HPF: ABNORMAL /HPF (ref 0–5)

## 2021-09-28 ENCOUNTER — TELEPHONE (OUTPATIENT)
Dept: RHEUMATOLOGY | Age: 21
End: 2021-09-28

## 2021-09-28 NOTE — TELEPHONE ENCOUNTER
Spoke with patient, states her WBC has been elevated. States she has spoken with her PCP and her PCP is not giving her any answers.  Pt want to know if Dr. Ashley Anthony could tell her what her next steps or options are?

## 2021-09-28 NOTE — TELEPHONE ENCOUNTER
----- Message from Tiesha Rock sent at 9/28/2021  2:32 PM EDT -----  Regarding: /telephone  General Message/Vendor Calls    Caller's first and last name: sefl      Reason for call: Pt called to speak with Jose Braga required yes/no and why: yes to speak with Dr. Makenzie Copeland contact number(s): [phone]      Details to clarify the request: Pt called to speak with Dr. Jacob Ceja, regarding on informing Dr. Jacob Ceja on the pt's white blood cell results.       Tiesha Rock

## 2021-09-29 ENCOUNTER — HOSPITAL ENCOUNTER (OUTPATIENT)
Dept: MRI IMAGING | Age: 21
Discharge: HOME | End: 2021-09-29
Payer: MEDICARE

## 2021-09-29 DIAGNOSIS — G43.909 MIGRAINE: ICD-10-CM

## 2021-09-29 DIAGNOSIS — R20.2 PARESTHESIA: ICD-10-CM

## 2021-09-29 PROCEDURE — 70553 MRI BRAIN STEM W/O & W/DYE: CPT

## 2021-09-29 PROCEDURE — 74011250636 HC RX REV CODE- 250/636

## 2021-09-29 PROCEDURE — A9575 INJ GADOTERATE MEGLUMI 0.1ML: HCPCS

## 2021-09-29 RX ORDER — GADOTERATE MEGLUMINE 376.9 MG/ML
20 INJECTION INTRAVENOUS
Status: COMPLETED | OUTPATIENT
Start: 2021-09-29 | End: 2021-09-29

## 2021-09-29 RX ADMIN — GADOTERATE MEGLUMINE 20 ML: 376.9 INJECTION INTRAVENOUS at 16:21

## 2021-09-29 NOTE — TELEPHONE ENCOUNTER
Called, discussed with patient. Repeat CBC yesterday normalized to 8.5. Suspect was mild inflammatory reaction to localized ulcer/cellulitis on leg. Latter doing better with mupirocin  She's noticing more brain fog. Sleep worse since starting Lyrica, wakes at 2am, back to sleep at 5am, tired during day. Encouraged her to try changing timing of evening Lyrica dose to see if changes nocturnal awakenings.

## 2021-10-15 ENCOUNTER — OFFICE VISIT (OUTPATIENT)
Dept: RHEUMATOLOGY | Age: 21
End: 2021-10-15
Payer: MEDICARE

## 2021-10-15 VITALS
TEMPERATURE: 98.8 F | WEIGHT: 293 LBS | OXYGEN SATURATION: 98 % | RESPIRATION RATE: 16 BRPM | BODY MASS INDEX: 57.93 KG/M2 | SYSTOLIC BLOOD PRESSURE: 162 MMHG | DIASTOLIC BLOOD PRESSURE: 96 MMHG | HEART RATE: 124 BPM

## 2021-10-15 DIAGNOSIS — M35.9 UNDIFFERENTIATED CONNECTIVE TISSUE DISEASE (HCC): ICD-10-CM

## 2021-10-15 DIAGNOSIS — L28.1 PRURIGO NODULARIS: Primary | ICD-10-CM

## 2021-10-15 DIAGNOSIS — R60.9 LIPEDEMA: ICD-10-CM

## 2021-10-15 PROCEDURE — G9717 DOC PT DX DEP/BP F/U NT REQ: HCPCS | Performed by: INTERNAL MEDICINE

## 2021-10-15 PROCEDURE — 99215 OFFICE O/P EST HI 40 MIN: CPT | Performed by: INTERNAL MEDICINE

## 2021-10-15 PROCEDURE — G8417 CALC BMI ABV UP PARAM F/U: HCPCS | Performed by: INTERNAL MEDICINE

## 2021-10-15 PROCEDURE — G8753 SYS BP > OR = 140: HCPCS | Performed by: INTERNAL MEDICINE

## 2021-10-15 PROCEDURE — G8427 DOCREV CUR MEDS BY ELIG CLIN: HCPCS | Performed by: INTERNAL MEDICINE

## 2021-10-15 PROCEDURE — G8755 DIAS BP > OR = 90: HCPCS | Performed by: INTERNAL MEDICINE

## 2021-10-15 RX ORDER — ALBUTEROL SULFATE 0.83 MG/ML
SOLUTION RESPIRATORY (INHALATION)
COMMUNITY
Start: 2021-10-12 | End: 2021-12-10

## 2021-10-15 RX ORDER — ALBUTEROL SULFATE 90 UG/1
AEROSOL, METERED RESPIRATORY (INHALATION)
COMMUNITY
Start: 2021-10-11 | End: 2021-12-10

## 2021-10-15 RX ORDER — TRIAMCINOLONE ACETONIDE 1 MG/G
OINTMENT TOPICAL 2 TIMES DAILY
Qty: 30 G | Refills: 0 | Status: SHIPPED | OUTPATIENT
Start: 2021-10-15 | End: 2022-06-02

## 2021-10-15 NOTE — PROGRESS NOTES
REASON FOR VISIT:   Nydia Grewal is a 24 y.o. female with history of migraines and ADHD who is returning for followup of undifferentiated connective tissue disease c/b tachycardia, prurigo nodularis, polymorphic light eruption, livedo, and +WENDY 1:320 speckled and 1:640 nuclear dot patterns. HISTORY OF PRESENT ILLNESS    Getting winded more easily, finishing course of prednisone for bronchitis (2 more days). Feels improving, was able to get her flu shot. Usually a problem this time of year. Ears stay stuffy. Seing a 2nd ENT for ongoing congestion, Allergist had told her no allergies driving current symptoms. Biggest joint concern is pain in the lateral right wrist. Feels weak and difficulty holding pencil. Disease History:  Since 4 or 4yo, has had recurrent nodules on the skin diffusely--face, arms, legs, abdomen. Can drain pus. Has been treated over the years with oral antibiotics and topical antibiotics which help; swabs have been MRSA-positive in the past. Has tried Hibiclens baths in the past repeatedly without improvement. Pruritic, seem to respond to topical Mupirocin. Has had shave biopsy with Derm at [facility] in the past,punch biopsy in May 2019 was interpreted as mixed dermal inflammation with features of an excoriated hypersensitivity reaction; last seen in November 2020 when diagnosed with keratosis pilaris, rosacea, and neurodermatitis. In February, PCP ordered labs including an WENDY screen which returned +1:320 speckled and 1:640 nuclear dot pattern, with mild elevations of ESR (26) and CRP (17mg/L). Turns \"red as a lobster\" in the sun even with sunscreen use, has always been the case. No chronic cough or progressive dyspnea on exertion. Nonrefreshing sleep and always fatigued in the evening. Runs to the bathroom repeatedly through the evening, doesn't feel she can reduce her fluid intake in the afternoon/evenings 2/2 chronic thirst and dry mouth.  Sleep study ~3 years ago she says was normal.  Has more subjective eye dryness, not currently using AT's consistently or following with ophthalmology. Cold intolerant, feels hands and feet always feel like ice, feels worse over the last 2 years. Gaining weight again after losing nearly 100 pounds with metformin; has gained 15 pounds in the last 3 months. Feels digestive issues (possible gastroparesis in the past with postprandial pain, biliary dyskinesis s/p cholecystectomy) have resolved since starting a sucrose intolerance diet. Stool softeners haven't helped in the past. On current diet she is having daily bowel movements without abdominal pain. Knees ache. Denies joint swelling. Has sprained ankles in past and now feels wrists get more sore with use. REVIEW OF SYSTEMS  A comprehensive review of systems was negative except for that written in the HPI. A 10-point review of systems is per the new patient questionnaire, which has been reviewed extensively and scanned into the electronic medical record for future reference. Review of systems is as above and is otherwise negative. ALLERGIES  Yeast, dried    MEDICATIONS  Current Outpatient Medications   Medication Sig    clotrimazole (LOTRIMIN) 1 % topical cream Apply  to affected area two (2) times a day.  hydrOXYchloroQUINE (PLAQUENIL) 200 mg tablet TAKE 1 TABLET BY MOUTH TWICE A DAY    pregabalin (LYRICA) 75 mg capsule Take 75 mg by mouth two (2) times a day.  Vyvanse 20 mg capsule TAKE 1 CAPSULE BY MOUTH EVERY MORNING    metFORMIN ER (GLUCOPHAGE XR) 750 mg tablet TAKE 1 TABLET BY MOUTH TWICE A DAY  Indications: polycystic ovarian syndrome, a disease with cysts in the ovaries, prevention of type 2 diabetes mellitus (Patient not taking: Reported on 9/10/2021)    ascorbic acid, vitamin C, (VITAMIN C) 500 mg tablet Take  by mouth.  sacrosidase (Sucraid) 8,500 unit/mL soln Take 1 mL by mouth.     thiamine HCL (Vitamin B-1) 100 mg tablet     ubrogepant (Ubrelvy) 100 mg tablet Take 1 Tablet by mouth.  b complex vitamins tablet ONE TABLE DAILY    OneTouch Verio test strips strip USE TO TEST BLOOD SUGAR UP TO 2 TIMES DAILY. (Patient not taking: Reported on 9/10/2021)    hydrOXYzine HCL (ATARAX) 25 mg tablet TAKE 1 TO 2 TABLETS BY MOUTH TWICE A DAY AS NEEDED FOR ANXIETY    citalopram (CELEXA) 40 mg tablet TAKE 1 TABLET BY MOUTH EVERY DAY    norethindrone-ethinyl estradiol-iron (Junel FE 1.5/30, 28,) 1.5 mg-30 mcg (21)/75 mg (7) tab TAKE 1 TAB BY MOUTH DAILY. CONTINUOUS PILLS ONLY.  ondansetron (ZOFRAN ODT) 8 mg disintegrating tablet PLACE 1 TABLET ON TONGUE & ALLOW TO DISSOLVE EVERY 8 HOURS AS NEEDED FOR NAUSEA WITH HEADACHE    Aimovig Autoinjector 140 mg/mL injection INJECT 1 SYRINGE VIA SUBCUTANEOUS ROUTE ONCE A MONTH, AS DIRECTED (Patient not taking: Reported on 9/10/2021)    pantoprazole (PROTONIX) 40 mg tablet Take 1 Tab by mouth two (2) times a day. (Patient taking differently: Take 40 mg by mouth daily. 1 Tab at night)    lancets (One Touch Delica) 33 gauge misc Use to test blood sugar 2 times daily. (Patient not taking: Reported on 9/10/2021)    Blood-Glucose Meter (OneTouch Verio Flex meter) misc Use as directed (Patient not taking: Reported on 9/10/2021)    cholecalciferol, vitamin d3, (VITAMIN D3) 400 unit cap Take  by mouth.  diphenhydrAMINE (BENADRYL) 25 mg capsule 2 tabs PO q8h PRN     No current facility-administered medications for this visit.        PAST MEDICAL HISTORY  Past Medical History:   Diagnosis Date    Abdominal migraine     ADHD (attention deficit hyperactivity disorder)     Autism     Developmental delay     GERD (gastroesophageal reflux disease)     Headache     Irritable bowel syndrome with diarrhea 4/13/2017    Migraine     Obesity, morbid ([address] 12/12/2017    Psychiatric disorder     anxiety, ADHD, OCD, HIGH FUNTIONING AUTISM    Sucrose intolerance        FAMILY HISTORY  family history includes Cancer in her maternal uncle; Delayed Awakening in her mother; Endometriosis in her mother; Headache in her maternal grandfather and mother; MS in her maternal aunt; No Known Problems in her father and maternal grandmother; Post-op Nausea/Vomiting in her mother. Mother diagnosed with sarcoidosis. Father has gout. Maternal aunt has MS.    SOCIAL HISTORY  She  reports that she has never smoked. She has never used smokeless tobacco. She reports that she does not drink alcohol and does not use drugs. 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 No flowsheet data found. General:  The patient is pleasant, obese, alert, and in no apparent distress. Eyes: Sclera are anicteric. No conjunctival injection. HEENT:  Oropharynx is clear. No oral ulcers. Adequate salivary pooling. No cervical or supraclavicular lymphadenopathy. Lungs:  Clear to auscultation bilaterally, without wheeze or stridor. Normal respiratory effort. Cor:  Regular rate and rhythm. No murmur rub or gallop. Abdomen: Soft, non-tender, without hepatomegaly or masses. Extremities: No calf tenderness or edema. Warm and well perfused. Skin:  Interval mild improvement of ~1cm diameter right calf ulcer. Stable axillary erythema and telangiectasias without edema. No heliotrope. Striae proximal to antecubital fossae. Largely unchanged violaceous discoloration over bilateral upper arms, fewer excoriations now limited to 2 on upper arms. Grossly normal nailfold capillaries. No sclerodacytly. Neuro: Nonfocal, no foot or wrist drop. Normal unassisted gait.  Variable hand strength on confrontation reassuring against organic nerve damage  Musculoskeletal:    A comprehensive musculoskeletal exam was performed for all joints of each upper and lower extremity and assessed for swelling, tenderness and range of motion. Results are documented as below:  No evidence of synovitis in the small joints of the hands, wrists, shoulders, elbows, hips, knees or ankles. Labs:  9/15/21: CRP 13mg/L, C3 179 (high), C4 30; UA neg for blood, +trace protein. 21: WBC 11.4, HJgb 16.2, Hct 49.6, Plt 534; aerobic wound culture with scant growth of mixed skin babar. 9/3/21: WBC 8.9, Hgb 15.0, Plt 483, ESR 7, Cr 0.71, LFTs WNL, CRP 17mg/L;   21: WBC 6.9, Hgb 16.8, Plt 451, ESR 5, CRP 5mg/L  3/10/21: CRP 21mg/L, CPK 57, Marissa neg, RDL myositis panel neg,   21: WBC 8.9, Hgb 15.6, Plt 472; ANCAs negative, PR3 and MPO negative; Cr 0.7, CMP WNL;  WENDY 1:320 speckled, 1:640 nuclear dot; C3 high, C4 WNL; negative dsDNA, Scl70, SSA, SSB, RNP, Sm, ribosomal P, chromatin, centromere B antibodies. RF <10, CCP negative; ESR 26, CRP 17mg/L. Hep B cAb neg, Hep B sAg neg, Hep C Ab neg    Pathology:  19: U Surgical pathology final report, Ashu Mejia MD:  Skin, right lower leg, punch biopsy:  -Ulcer and mixed dermal inflammation (see comment). Sections reveal a punch biopsy to the depth of the mid dermis. There is a zone of ulceration with fibrinous crust containing neutrophils. In the superficial to mid dermis, there is a mildly dense perivascular lymphohistiocytic infiltrate with scattered neutrophils and eosinophils. PAS stain is negative for fungal organisms. Taken together, the findings are those of ulceration and mixed dermal inflammation. The features are suggestive of an excoriated hypersensitivity reaction, such as that seen in arthropod bites. Diagnostic features of folliculitis are not seen. Imagin21 MR brain:  Normal MRI of the brain. No intracranial mass, hemorrhage or evidence of acute infarction.     19 CXR (report only): Normal chest views. No change. ASSESSMENT AND PLAN  Ms. Suzie Mabry is a 24 y.o. female with high-functioning autism and lipedema who presents for followup of undifferentiated connective tissue disease with resting tachycardia, pruritic nodules, polymorphic light eruption, livedo, and +WENDY 1:320 speckled and 1:640 nuclear dot patterns. Neurodermatitis overall improved though her right calf ulcer has been slow to heal. Have suspected a possible contribution of prurigo. Trialing a course of topical triamcinolone with occlusion to further prevent trauma. 1. Prurigo nodularis  - triamcinolone acetonide (KENALOG) 0.1 % ointment; Apply  to affected area two (2) times a day. use thin layer  Dispense: 30 g; Refill: 0    2. Lipedema  - Cont with lipedema clinic, currently on hold until ulcers improve    3. Undifferentiated connective tissue disease (HCC)  - Cont Plaquenil (hydroxychloroquine)  - triamcinolone acetonide (KENALOG) 0.1 % ointment; Apply  to affected area two (2) times a day. use thin layer  Dispense: 30 g; Refill: 0      Patient Instructions   1. Try applying trimacinolone to the ulcers twice a day. If they are worsening rather than improving, then stop. 2. Continue Plaquenil (hydroxychloroquine) and your antihistamine regimen. 3. Return in 1 month to assess interval change.     Orders Placed This Encounter    albuterol (PROVENTIL HFA, VENTOLIN HFA, PROAIR HFA) 90 mcg/actuation inhaler    albuterol (PROVENTIL VENTOLIN) 2.5 mg /3 mL (0.083 %) nebu    triamcinolone acetonide (KENALOG) 0.1 % ointment       Medications: I am having Sd Ordonez maintain her diphenhydrAMINE, cholecalciferol (vitamin d3), Blood-Glucose Meter, lancets, ondansetron, Aimovig Autoinjector, pantoprazole, norethindrone-ethinyl estradiol-iron, hydrOXYzine HCL, citalopram, OneTouch Verio test strips, ascorbic acid (vitamin C), Sucraid, thiamine HCL, ubrogepant, b complex vitamins, pregabalin, Vyvanse, metFORMIN ER, clotrimazole, and hydrOXYchloroQUINE.     Follow up: 1 months    Face to face time: 30 minutes  Note preparation and records review day of service: 15 minutes  Total provider time day of service: 45 minutes      Rashawn Zendejas MD    Adult Rheumatology   [id number] [facility] [address] [address] [address]   Phone [phone]  Fax [phone]

## 2021-10-15 NOTE — PROGRESS NOTES
Chief Complaint   Patient presents with    Joint Pain     1. Have you been to the ER, urgent care clinic since your last visit? Hospitalized since your last visit? Yes seen in Urgent Care for covid testing and a breathing treatment     2. Have you seen or consulted any other health care providers outside of the [address] [place] [address] since your last visit? Include any pap smears or colon screening.  no

## 2021-10-15 NOTE — PATIENT INSTRUCTIONS
1. Try applying trimacinolone to the ulcers twice a day. If they are worsening rather than improving, then stop. 2. Continue Plaquenil (hydroxychloroquine) and your antihistamine regimen. 3. Return in 1 month to assess interval change.

## 2021-10-31 ENCOUNTER — APPOINTMENT (OUTPATIENT)
Dept: GENERAL RADIOLOGY | Age: 21
End: 2021-10-31
Attending: PEDIATRICS
Payer: MEDICARE

## 2021-10-31 ENCOUNTER — HOSPITAL ENCOUNTER (EMERGENCY)
Age: 21
Discharge: HOME | End: 2021-10-31
Attending: PEDIATRICS
Payer: MEDICARE

## 2021-10-31 VITALS
RESPIRATION RATE: 18 BRPM | HEART RATE: 96 BPM | OXYGEN SATURATION: 97 % | DIASTOLIC BLOOD PRESSURE: 83 MMHG | WEIGHT: 293 LBS | BODY MASS INDEX: 59.89 KG/M2 | TEMPERATURE: 98 F | SYSTOLIC BLOOD PRESSURE: 118 MMHG

## 2021-10-31 DIAGNOSIS — J06.9 ACUTE UPPER RESPIRATORY INFECTION: ICD-10-CM

## 2021-10-31 DIAGNOSIS — M94.0 COSTOCHONDRITIS: Primary | ICD-10-CM

## 2021-10-31 LAB
ATRIAL RATE: 92 BPM
CALCULATED P AXIS, ECG09: 29 DEGREES
CALCULATED R AXIS, ECG10: 27 DEGREES
CALCULATED T AXIS, ECG11: 14 DEGREES
DIAGNOSIS, 93000: NORMAL
P-R INTERVAL, ECG05: 126 MS
Q-T INTERVAL, ECG07: 356 MS
QRS DURATION, ECG06: 84 MS
QTC CALCULATION (BEZET), ECG08: 440 MS
VENTRICULAR RATE, ECG03: 92 BPM

## 2021-10-31 PROCEDURE — 93005 ELECTROCARDIOGRAM TRACING: CPT

## 2021-10-31 PROCEDURE — 74011250636 HC RX REV CODE- 250/636: Performed by: PEDIATRICS

## 2021-10-31 PROCEDURE — 71046 X-RAY EXAM CHEST 2 VIEWS: CPT

## 2021-10-31 PROCEDURE — 96372 THER/PROPH/DIAG INJ SC/IM: CPT

## 2021-10-31 PROCEDURE — 99283 EMERGENCY DEPT VISIT LOW MDM: CPT

## 2021-10-31 RX ORDER — DULOXETIN HYDROCHLORIDE 60 MG/1
60 CAPSULE, DELAYED RELEASE ORAL DAILY
COMMUNITY
End: 2022-08-05

## 2021-10-31 RX ORDER — KETOROLAC TROMETHAMINE 30 MG/ML
60 INJECTION, SOLUTION INTRAMUSCULAR; INTRAVENOUS
Status: COMPLETED | OUTPATIENT
Start: 2021-10-31 | End: 2021-10-31

## 2021-10-31 RX ORDER — IBUPROFEN 800 MG/1
800 TABLET ORAL
Qty: 30 TABLET | Refills: 0 | Status: SHIPPED | OUTPATIENT
Start: 2021-10-31 | End: 2021-11-10

## 2021-10-31 RX ORDER — LANSOPRAZOLE 30 MG/1
30 CAPSULE, DELAYED RELEASE ORAL
COMMUNITY

## 2021-10-31 RX ORDER — PANTOPRAZOLE SODIUM 20 MG/1
20 TABLET, DELAYED RELEASE ORAL DAILY
COMMUNITY
End: 2021-12-10

## 2021-10-31 RX ADMIN — KETOROLAC TROMETHAMINE 60 MG: 30 INJECTION, SOLUTION INTRAMUSCULAR; INTRAVENOUS at 04:30

## 2021-10-31 NOTE — ED TRIAGE NOTES
Pt has been sick with a cough for the past three weeks. Covid- two weeks ago. Pt hasn't had fever for a week.

## 2021-10-31 NOTE — ED PROVIDER NOTES
HPI 2year-old woman with a history of lupus and autism presents with several weeks of cough with chest pain that is worse with taking a deep breath. She initially had congestion and rhinorrhea that have resolved. She said no vomiting, no diarrhea, no dysuria, no genitourinary symptoms. She is amenorrheic secondary to her birth control pills.     Past Medical History:   Diagnosis Date    Abdominal migraine     ADHD (attention deficit hyperactivity disorder)     Autism     Developmental delay     GERD (gastroesophageal reflux disease)     Headache     Irritable bowel syndrome with diarrhea 4/13/2017    Migraine     Obesity, morbid ([address] 12/12/2017    Psychiatric disorder     anxiety, ADHD, OCD, HIGH FUNTIONING AUTISM    Sucrose intolerance        Past Surgical History:   Procedure Laterality Date    COLONOSCOPY N/A 12/20/2016    COLONOSCOPY performed by Prasanna Moreno MD at [address] GERD TST W/ MUCOS PH ELECTROD 50 HR (BRAVO)  12/3/2020         HC CLP BRAVO  11/30/2020    HX CHOLECYSTECTOMY  06/2019    HX ENDOSCOPY  11/2020    dx'd with sucrose intolerance    HX HEENT      WISDOM TEETH    HX TONSIL AND ADENOIDECTOMY      HX TONSILLECTOMY      AGE 1    HX WISDOM TEETH EXTRACTION      MA EGD TRANSORAL BIOPSY SINGLE/MULTIPLE  6/14/2019         MA EGD TRANSORAL BIOPSY SINGLE/MULTIPLE  11/30/2020    UPPER GI ENDOSCOPY,BIOPSY  12/20/2016              Family History:   Problem Relation Age of Onset    Endometriosis Mother     Headache Mother         d/t accident - neck and brain injury    Delayed Awakening Mother     Post-op Nausea/Vomiting Mother     Headache Maternal Grandfather     No Known Problems Father     No Known Problems Maternal Grandmother     MS Maternal Aunt     Cancer Maternal Uncle        Social History     Socioeconomic History    Marital status: SINGLE     Spouse name: Not on file    Number of children: Not on file    Years of education: Not on file   Jeanie Stokes Highest education level: Not on file   Occupational History    Not on file   Tobacco Use    Smoking status: Never Smoker    Smokeless tobacco: Never Used    Tobacco comment: no smoke exposure in the home   Substance and Sexual Activity    Alcohol use: No    Drug use: No    Sexual activity: Never   Other Topics Concern     Service Not Asked    Blood Transfusions Not Asked    Caffeine Concern Not Asked    Occupational Exposure Not Asked    Hobby Hazards Not Asked    Sleep Concern Not Asked    Stress Concern Not Asked    Weight Concern Not Asked    Special Diet Not Asked    Back Care Not Asked    Exercise Not Asked    Bike Helmet Not Asked   [address] Not Asked    Self-Exams Not Asked   Social History Narrative    Not on file     Social Determinants of Health     Financial Resource Strain:     Difficulty of Paying Living Expenses:    Food Insecurity:     Worried About Running Out of Food in the Last Year:     Ran Out of Food in the Last Year:    Transportation Needs:     Lack of Transportation (Medical):      Lack of Transportation (Non-Medical):    Physical Activity:     Days of Exercise per Week:     Minutes of Exercise per Session:    Stress:     Feeling of Stress :    Social Connections:     Frequency of Communication with Friends and Family:     Frequency of Social Gatherings with Friends and Family:     Attends Christianity Services:     Active Member of Clubs or Organizations:     Attends Club or Organization Meetings:     Marital Status:    Intimate Partner Violence:     Fear of Current or Ex-Partner:     Emotionally Abused:     Physically Abused:     Sexually Abused:    Medications: Lansoprazole, Vyvanse, Lyrica, Plaquenil, omega-3 fish oil, vitamin B, vitamin C, vitamin D, zinc, citalopram, Cymbalta, Oralia, Metformin, Aimovig, Atarax as needed, Zofran as needed, Benadryl as needed,Ubrivly prn migraines  Immunizations: Up-to-date including Covid vaccine  Social history: Patient does not smoke, drink or use drugs    ALLERGIES: Yeast, dried    Review of Systems   Unable to perform ROS: Age   Constitutional: Negative for fever. HENT: Negative for congestion and rhinorrhea. Upper respiratory infection symptoms resolved 2 weeks ago   Respiratory: Positive for cough. Cardiovascular: Positive for chest pain. Gastrointestinal: Negative for diarrhea and vomiting. Vitals:    10/31/21 0351 10/31/21 0358   BP:  118/83   Pulse:  96   Resp:  18   Temp:  98 °F (36.7 °C)   SpO2:  97%   Weight: 139.1 kg (306 lb 10.6 oz)             Physical Exam  Vitals and nursing note reviewed. Constitutional:       General: She is not in acute distress. Appearance: Normal appearance. She is not ill-appearing, toxic-appearing or diaphoretic. HENT:      Head: Normocephalic and atraumatic. Right Ear: External ear normal.      Left Ear: External ear normal.      Nose: Nose normal.      Mouth/Throat:      Mouth: Mucous membranes are moist.   Eyes:      Conjunctiva/sclera: Conjunctivae normal.   Cardiovascular:      Rate and Rhythm: Normal rate and regular rhythm. Heart sounds: Normal heart sounds. No murmur heard. No friction rub. No gallop. Pulmonary:      Effort: Pulmonary effort is normal. No respiratory distress. Breath sounds: Normal breath sounds. No stridor. No wheezing, rhonchi or rales. Chest:      Chest wall: Tenderness present. Abdominal:      General: There is no distension. Palpations: Abdomen is soft. Tenderness: There is no abdominal tenderness. Musculoskeletal:         General: Normal range of motion. Cervical back: Neck supple. Skin:     General: Skin is warm. Neurological:      General: No focal deficit present. Mental Status: She is alert.    Psychiatric:         Mood and Affect: Mood normal.          MDM  Number of Diagnoses or Management Options  Diagnosis management comments: Well-appearing 18-year-old female with history of lupus and autism who presents with reproducible chest tenderness to palpation at the costochondral junction in the face of an upper respiratory infection. Most consistent with costochondritis. Obtain EKG, chest x-ray, treat with Toradol, reassess. 4:52 AM  EKG is normal sinus rhythm with a rate of 92, no ST changes. Normal QTC. XR CHEST PA LAT   Final Result   Clear lungs. 5:50 AM  Reevaluation the patient is comfortable says her pain is improved but still has a slight cough. Will discharge with scheduled ibuprofen as well as I commended over-the-counter cold medication. To follow-up with primary care physician in 2 to 3 days.        Procedures

## 2021-10-31 NOTE — DISCHARGE INSTRUCTIONS
You were seen in the emergency department with costochondritis, this is musculoskeletal chest pain. You improved with Toradol and you had a reassuring x-ray and a reassuring EKG. You had a negative COVID-19 test during this upper respiratory infection and was not repeated in the emergency department. You may use ibuprofen to 3 times a day as needed for pain and follow-up with your primary care physician in 2 to 3 days. Return to the emergency department for increased work of breathing or any concerns. Thank you for allowing us to provide you with medical care today. We realize that you have many choices for your emergency care needs. We thank you for choosing [facility].  Please choose us in the future for any continued health care needs. We hope we addressed all of your medical concerns. We strive to provide excellent quality care in the Emergency Department. Anything less than excellent does not meet our expectations. The exam and treatment you received in the Emergency Department were for an emergent problem and are not intended as complete care. It is important that you follow up with a doctor, nurse practitioner, or [phone] assistant for ongoing care. If your symptoms worsen or you do not improve as expected and you are unable to reach your usual health care provider, you should return to the Emergency Department. We are available 24 hours a day. Take this sheet with you when you go to your follow-up visit. If you have any problem arranging the follow-up visit, contact the Emergency Department immediately. Make an appointment your family doctor for follow up of this visit. Return to the ER if you are unable to be seen in a timely manner.

## 2021-11-17 DIAGNOSIS — Z76.89 ENCOUNTER FOR WEIGHT MANAGEMENT: Primary | ICD-10-CM

## 2021-11-17 DIAGNOSIS — E66.01 MORBID OBESITY (HCC): ICD-10-CM

## 2021-11-19 ENCOUNTER — OFFICE VISIT (OUTPATIENT)
Dept: RHEUMATOLOGY | Age: 21
End: 2021-11-19
Payer: MEDICARE

## 2021-11-19 VITALS
HEART RATE: 119 BPM | SYSTOLIC BLOOD PRESSURE: 138 MMHG | BODY MASS INDEX: 57.52 KG/M2 | WEIGHT: 293 LBS | TEMPERATURE: 98.2 F | RESPIRATION RATE: 20 BRPM | HEIGHT: 60 IN | OXYGEN SATURATION: 98 % | DIASTOLIC BLOOD PRESSURE: 103 MMHG

## 2021-11-19 DIAGNOSIS — R60.9 LIPEDEMA: ICD-10-CM

## 2021-11-19 DIAGNOSIS — M35.9 UNDIFFERENTIATED CONNECTIVE TISSUE DISEASE (HCC): ICD-10-CM

## 2021-11-19 DIAGNOSIS — L28.1 PRURIGO NODULARIS: Primary | ICD-10-CM

## 2021-11-19 PROCEDURE — G9717 DOC PT DX DEP/BP F/U NT REQ: HCPCS | Performed by: INTERNAL MEDICINE

## 2021-11-19 PROCEDURE — 99215 OFFICE O/P EST HI 40 MIN: CPT | Performed by: INTERNAL MEDICINE

## 2021-11-19 PROCEDURE — G8752 SYS BP LESS 140: HCPCS | Performed by: INTERNAL MEDICINE

## 2021-11-19 PROCEDURE — G8417 CALC BMI ABV UP PARAM F/U: HCPCS | Performed by: INTERNAL MEDICINE

## 2021-11-19 PROCEDURE — G8755 DIAS BP > OR = 90: HCPCS | Performed by: INTERNAL MEDICINE

## 2021-11-19 PROCEDURE — G8427 DOCREV CUR MEDS BY ELIG CLIN: HCPCS | Performed by: INTERNAL MEDICINE

## 2021-11-19 NOTE — LETTER
12/5/2021    Patient: Elie Arriaga   YOB: 2000   Date of Visit: 11/19/2021     Brandt Kirkland DO  [address] [facility]  [facility] [address]  Via Fax: [address] [facility] [address]  [address] [facility]  [address] [place] [address] [facility] [address]  Via Fax: [phone]    Dear Brandt Kirkland, Zenon Shoemaker MD,    We recently saw Ms. Elijah Jimenez in the [facility] for evaluation. My notes for this consultation are attached. If you have questions, please do not hesitate to call me. I look forward to following your patient along with you.       Sincerely,    Katelynn Robles MD [facility]  Cell: [phone]

## 2021-11-19 NOTE — PATIENT INSTRUCTIONS
1. Talk to your PCP about the timing of a Sleep and/or Pulmonary referral.    2. Talk to your pain physician about a trial of amitriptyline to help with sleep and nighttime pain, though you would likely need to stop the Cymbalta before this. 3. Continue Plaquenil (hydroxychloroquine) for now with yearly eye doctor followup. 4. Let me know if you get fevers, new rashes, etc.    5. Return in 3 months.

## 2021-11-19 NOTE — PROGRESS NOTES
REASON FOR VISIT:   George Cisneros is a 24 y.o. female with history of migraines and ADHD who is returning for followup of undifferentiated connective tissue disease c/b tachycardia, prurigo nodularis, polymorphic light eruption, livedo, and +WENDY 1:320 speckled and 1:640 nuclear dot patterns. HISTORY OF PRESENT ILLNESS    Has been coughing now for over 6 weeks. Slowly improving. Had gone to the ER with cough and anterior chest pain, which improved markedly with toradol. Lyrica seemed to be causing too much forgetfulness, eg difficulty ordering sandwich from [employer]. Has cleared up with reducing dose to 75mg nightly. Pain specialist has referred her to PT, and she is now seeing a hand specialist.    Two new spots on the face, two new ones on right arm, doesn't feel the lesion on the leg is any better. No fluid discharge. Still working on getting pumps for legs. Has restarted treatments with once a week massage currently. Currently taking Plaquenil. Last eye visit was with start, less than a year ago. Plans to start with the [facility] dietician. Mom doesn't think she is sleeping well. Tosses and turns through the night she attributes to pain. Had a sleep study done about 2 years ago she believes was normal.     Takes Cymbalta for pain. Mom notices she is more irritable. Always feels cold. Not 3 phase Raynaud's. Disease History:  Since 4 or 6yo, has had recurrent nodules on the skin diffusely--face, arms, legs, abdomen. Can drain pus. Has been treated over the years with oral antibiotics and topical antibiotics which help; swabs have been MRSA-positive in the past. Has tried Hibiclens baths in the past repeatedly without improvement. Pruritic, seem to respond to topical Mupirocin.  Has had shave biopsy with Derm at [facility] in the past,punch biopsy in May 2019 was interpreted as mixed dermal inflammation with features of an excoriated hypersensitivity reaction; last seen in November 2020 when diagnosed with keratosis pilaris, rosacea, and neurodermatitis. In February, PCP ordered labs including an WENDY screen which returned +1:320 speckled and 1:640 nuclear dot pattern, with mild elevations of ESR (26) and CRP (17mg/L). Turns \"red as a lobster\" in the sun even with sunscreen use, has always been the case. No chronic cough or progressive dyspnea on exertion. Nonrefreshing sleep and always fatigued in the evening. Runs to the bathroom repeatedly through the evening, doesn't feel she can reduce her fluid intake in the afternoon/evenings 2/2 chronic thirst and dry mouth. Sleep study ~3 years ago she says was normal.  Has more subjective eye dryness, not currently using AT's consistently or following with ophthalmology. Cold intolerant, feels hands and feet always feel like ice, feels worse over the last 2 years. Gaining weight again after losing nearly 100 pounds with metformin; has gained 15 pounds in the last 3 months. Feels digestive issues (possible gastroparesis in the past with postprandial pain, biliary dyskinesis s/p cholecystectomy) have resolved since starting a sucrose intolerance diet. Stool softeners haven't helped in the past. On current diet she is having daily bowel movements without abdominal pain. Knees ache. Denies joint swelling. Has sprained ankles in past and now feels wrists get more sore with use. REVIEW OF SYSTEMS  A comprehensive review of systems was negative except for that written in the HPI. A 10-point review of systems is per the new patient questionnaire, which has been reviewed extensively and scanned into the electronic medical record for future reference. Review of systems is as above and is otherwise negative. ALLERGIES  Yeast, dried    MEDICATIONS  Current Outpatient Medications   Medication Sig    lansoprazole (PREVACID) 30 mg capsule Take 30 mg by mouth Daily (before breakfast).  DULoxetine (Cymbalta) 60 mg capsule Take 60 mg by mouth daily.     pantoprazole (PROTONIX) 20 mg tablet Take 20 mg by mouth daily.  triamcinolone acetonide (KENALOG) 0.1 % ointment Apply  to affected area two (2) times a day. use thin layer    clotrimazole (LOTRIMIN) 1 % topical cream Apply  to affected area two (2) times a day.  hydrOXYchloroQUINE (PLAQUENIL) 200 mg tablet TAKE 1 TABLET BY MOUTH TWICE A DAY (Patient taking differently: 200 mg two (2) times a day.)    pregabalin (LYRICA) 75 mg capsule Take 75 mg by mouth two (2) times a day.  Vyvanse 20 mg capsule TAKE 1 CAPSULE BY MOUTH EVERY MORNING    metFORMIN ER (GLUCOPHAGE XR) 750 mg tablet TAKE 1 TABLET BY MOUTH TWICE A DAY  Indications: polycystic ovarian syndrome, a disease with cysts in the ovaries, prevention of type 2 diabetes mellitus    ascorbic acid, vitamin C, (VITAMIN C) 500 mg tablet Take  by mouth.  sacrosidase (Sucraid) 8,500 unit/mL soln Take 1 mL by mouth.  thiamine HCL (Vitamin B-1) 100 mg tablet     ubrogepant (Ubrelvy) 100 mg tablet Take 1 Tablet by mouth.  b complex vitamins tablet ONE TABLE DAILY    hydrOXYzine HCL (ATARAX) 25 mg tablet TAKE 1 TO 2 TABLETS BY MOUTH TWICE A DAY AS NEEDED FOR ANXIETY    citalopram (CELEXA) 40 mg tablet 10 mg.    norethindrone-ethinyl estradiol-iron (Junel FE 1.5/30, 28,) 1.5 mg-30 mcg (21)/75 mg (7) tab TAKE 1 TAB BY MOUTH DAILY. CONTINUOUS PILLS ONLY.  ondansetron (ZOFRAN ODT) 8 mg disintegrating tablet PLACE 1 TABLET ON TONGUE & ALLOW TO DISSOLVE EVERY 8 HOURS AS NEEDED FOR NAUSEA WITH HEADACHE    Aimovig Autoinjector 140 mg/mL injection INJECT 1 SYRINGE VIA SUBCUTANEOUS ROUTE ONCE A MONTH, AS DIRECTED    cholecalciferol, vitamin d3, (VITAMIN D3) 400 unit cap Take  by mouth.     diphenhydrAMINE (BENADRYL) 25 mg capsule 2 tabs PO q8h PRN    albuterol (PROVENTIL HFA, VENTOLIN HFA, PROAIR HFA) 90 mcg/actuation inhaler  (Patient not taking: Reported on 11/19/2021)    albuterol (PROVENTIL VENTOLIN) 2.5 mg /3 mL (0.083 %) nebu  (Patient not taking: Reported on 11/19/2021)     No current facility-administered medications for this visit. PAST MEDICAL HISTORY  Past Medical History:   Diagnosis Date    Abdominal migraine     ADHD (attention deficit hyperactivity disorder)     Autism     Developmental delay     GERD (gastroesophageal reflux disease)     Headache     Irritable bowel syndrome with diarrhea 4/13/2017    Migraine     Obesity, morbid ([address] 12/12/2017    Psychiatric disorder     anxiety, ADHD, OCD, HIGH FUNTIONING AUTISM    Sucrose intolerance        FAMILY HISTORY  family history includes Cancer in her maternal uncle; Delayed Awakening in her mother; Endometriosis in her mother; Headache in her maternal grandfather and mother; Mult Sclerosis in her maternal aunt; No Known Problems in her father and maternal grandmother; Post-op Nausea/Vomiting in her mother. Mother diagnosed with sarcoidosis. Father has gout. Maternal aunt has MS.    SOCIAL HISTORY  She  reports that she has never smoked. She has never used smokeless tobacco. She reports that she does not drink alcohol and does not use drugs. 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 No flowsheet data found. General:  The patient is pleasant, obese, alert, and in no apparent distress. Eyes: Sclera are anicteric. No conjunctival injection. HEENT:  Oropharynx is clear. No oral ulcers. Adequate salivary pooling. No cervical or supraclavicular lymphadenopathy. Lungs:  Clear to auscultation bilaterally, without wheeze or stridor. Normal respiratory effort. Cor:  Regular rate and rhythm. No murmur rub or gallop. Abdomen: Soft, non-tender, without hepatomegaly or masses. Extremities: No calf tenderness or edema. Warm and well perfused. Skin:  Interval mild improvement of ~1cm diameter right calf ulcer, less deep and now dried without induration or discharge. Stable axillary erythema and telangiectasias without edema. No heliotrope. Striae proximal to antecubital fossae. Still largely unchanged violaceous discoloration over bilateral upper arms. Grossly normal nailfold capillaries. No sclerodacytly. Neuro: Nonfocal, no foot or wrist drop. Normal unassisted gait. Variable hand strength on confrontation reassuring against organic nerve damage  Musculoskeletal:    A comprehensive musculoskeletal exam was performed for all joints of each upper and lower extremity and assessed for swelling, tenderness and range of motion. Results are documented as below:  No evidence of synovitis in the small joints of the hands, wrists, shoulders, elbows, hips, knees or ankles. Labs:  9/15/21: CRP 13mg/L, C3 179 (high), C4 30; UA neg for blood, +trace protein. 9/7/21: WBC 11.4, HJgb 16.2, Hct 49.6, Plt 534; aerobic wound culture with scant growth of mixed skin babar. 9/3/21: WBC 8.9, Hgb 15.0, Plt 483, ESR 7, Cr 0.71, LFTs WNL, CRP 17mg/L;   6/17/21: WBC 6.9, Hgb 16.8, Plt 451, ESR 5, CRP 5mg/L  3/10/21: CRP 21mg/L, CPK 57, Marissa neg, RDL myositis panel neg,   2/24/21: WBC 8.9, Hgb 15.6, Plt 472; ANCAs negative, PR3 and MPO negative; Cr 0.7, CMP WNL;  WENDY 1:320 speckled, 1:640 nuclear dot; C3 high, C4 WNL; negative dsDNA, Scl70, SSA, SSB, RNP, Sm, ribosomal P, chromatin, centromere B antibodies. RF <10, CCP negative; ESR 26, CRP 17mg/L. Hep B cAb neg, Hep B sAg neg, Hep C Ab neg    Pathology:  5/8/19: [facility] final report, Trixie Cody MD:  Skin, right lower leg, punch biopsy:  -Ulcer and mixed dermal inflammation (see comment). Sections reveal a punch biopsy to the depth of the mid dermis.  There is a zone of ulceration with fibrinous crust containing neutrophils. In the superficial to mid dermis, there is a mildly dense perivascular lymphohistiocytic infiltrate with scattered neutrophils and eosinophils. PAS stain is negative for fungal organisms. Taken together, the findings are those of ulceration and mixed dermal inflammation. The features are suggestive of an excoriated hypersensitivity reaction, such as that seen in arthropod bites. Diagnostic features of folliculitis are not seen. Imagin21 MR brain:  Normal MRI of the brain. No intracranial mass, hemorrhage or evidence of acute infarction. 19 CXR (report only): Normal chest views. No change. ASSESSMENT AND PLAN  Ms. Reed Wisdom is a 24 y.o. female with high-functioning autism and lipedema who presents for followup of undifferentiated connective tissue disease with resting tachycardia, pruritic nodules, polymorphic light eruption, livedo, and +WENDY 1:320 speckled and 1:640 nuclear dot patterns. Neurodermatitis similar to slightly improved. No progressive inflammatory joint or deep organ disease. Encouraged continued lymphedema management for LE healing. 1. Undifferentiated connective tissue disease ([address]  - Cont Plaquenil (hydroxychloroquine) for now, with annual ophthalmologic exams    2. Lipedema  -Cont with lymphedema clinic for massage and wrapping    3. Prurigo nodularis  - Lyrica as per pain management      Patient Instructions   1. Talk to your PCP about the timing of a Sleep and/or Pulmonary referral.    2. Talk to your pain physician about a trial of amitriptyline to help with sleep and nighttime pain, though you would likely need to stop the Cymbalta before this. 3. Continue Plaquenil (hydroxychloroquine) for now with yearly eye doctor followup. 4. Let me know if you get fevers, new rashes, etc.    5. Return in 3 months. No orders of the defined types were placed in this encounter.       Medications: I am having Daisy Baeza maintain her diphenhydrAMINE, cholecalciferol (vitamin d3), ondansetron, Aimovig Autoinjector, norethindrone-ethinyl estradiol-iron, hydrOXYzine HCL, citalopram, ascorbic acid (vitamin C), Sucraid, thiamine HCL, ubrogepant, b complex vitamins, pregabalin, Vyvanse, metFORMIN ER, clotrimazole, hydrOXYchloroQUINE, albuterol, albuterol, triamcinolone acetonide, lansoprazole, DULoxetine, and pantoprazole.     Follow up: 3 months    Face to face time: 30 minutes  Note preparation and records review day of service: 15 minutes  Total provider time day of service: 45 minutes      Jasper Bazzi MD    Adult Rheumatology   [id number] [facility], [address] [place] [address]   Phone [phone]  Fax [phone]

## 2021-11-19 NOTE — PROGRESS NOTES
Chief Complaint   Patient presents with    Other     connective tissue disease   1. Have you been to the ER, urgent care clinic since your last visit? Hospitalized since your last visit? Yes Where: [facility] ED    2. Have you seen or consulted any other health care providers outside of the [address] [place] [address] since your last visit? Include any pap smears or colon screening.  Yes Where: PCP

## 2021-12-10 ENCOUNTER — OFFICE VISIT (OUTPATIENT)
Dept: SURGERY | Age: 21
End: 2021-12-10
Payer: MEDICARE

## 2021-12-10 VITALS
TEMPERATURE: 98.8 F | HEART RATE: 98 BPM | OXYGEN SATURATION: 98 % | DIASTOLIC BLOOD PRESSURE: 88 MMHG | HEIGHT: 65 IN | BODY MASS INDEX: 48.82 KG/M2 | WEIGHT: 293 LBS | SYSTOLIC BLOOD PRESSURE: 122 MMHG | RESPIRATION RATE: 22 BRPM

## 2021-12-10 DIAGNOSIS — E78.00 HIGH CHOLESTEROL: ICD-10-CM

## 2021-12-10 DIAGNOSIS — M79.7 FIBROMYALGIA: ICD-10-CM

## 2021-12-10 DIAGNOSIS — F42.9 OBSESSIVE-COMPULSIVE DISORDER, UNSPECIFIED TYPE: ICD-10-CM

## 2021-12-10 DIAGNOSIS — E66.01 CLASS 3 SEVERE OBESITY DUE TO EXCESS CALORIES WITH SERIOUS COMORBIDITY AND BODY MASS INDEX (BMI) OF 50.0 TO 59.9 IN ADULT (HCC): Primary | ICD-10-CM

## 2021-12-10 DIAGNOSIS — E55.9 VITAMIN D DEFICIENCY: ICD-10-CM

## 2021-12-10 DIAGNOSIS — I10 PRIMARY HYPERTENSION: ICD-10-CM

## 2021-12-10 DIAGNOSIS — E88.81 INSULIN RESISTANCE: ICD-10-CM

## 2021-12-10 DIAGNOSIS — R00.0 TACHYCARDIA: ICD-10-CM

## 2021-12-10 DIAGNOSIS — D75.839 THROMBOCYTOSIS: ICD-10-CM

## 2021-12-10 DIAGNOSIS — R53.82 CHRONIC FATIGUE: ICD-10-CM

## 2021-12-10 DIAGNOSIS — R63.5 ABNORMAL WEIGHT GAIN: ICD-10-CM

## 2021-12-10 DIAGNOSIS — G43.009 MIGRAINE WITHOUT AURA AND WITHOUT STATUS MIGRAINOSUS, NOT INTRACTABLE: ICD-10-CM

## 2021-12-10 DIAGNOSIS — F84.0 AUTISM: ICD-10-CM

## 2021-12-10 DIAGNOSIS — F41.1 GAD (GENERALIZED ANXIETY DISORDER): ICD-10-CM

## 2021-12-10 PROBLEM — R60.9 LIPEDEMA: Status: ACTIVE | Noted: 2021-01-01

## 2021-12-10 PROBLEM — M65.9 TENOSYNOVITIS OF RIGHT HAND: Status: ACTIVE | Noted: 2021-01-01

## 2021-12-10 PROCEDURE — G8754 DIAS BP LESS 90: HCPCS | Performed by: FAMILY MEDICINE

## 2021-12-10 PROCEDURE — G8427 DOCREV CUR MEDS BY ELIG CLIN: HCPCS | Performed by: FAMILY MEDICINE

## 2021-12-10 PROCEDURE — G9717 DOC PT DX DEP/BP F/U NT REQ: HCPCS | Performed by: FAMILY MEDICINE

## 2021-12-10 PROCEDURE — G8752 SYS BP LESS 140: HCPCS | Performed by: FAMILY MEDICINE

## 2021-12-10 PROCEDURE — G8417 CALC BMI ABV UP PARAM F/U: HCPCS | Performed by: FAMILY MEDICINE

## 2021-12-10 PROCEDURE — 99203 OFFICE O/P NEW LOW 30 MIN: CPT | Performed by: FAMILY MEDICINE

## 2021-12-10 RX ORDER — PREGABALIN 75 MG/1
75 CAPSULE ORAL DAILY
COMMUNITY
End: 2022-06-09 | Stop reason: SDUPTHER

## 2021-12-10 RX ORDER — LASMIDITAN 100 MG/1
TABLET ORAL
COMMUNITY
Start: 2021-10-26 | End: 2022-06-02

## 2021-12-10 RX ORDER — TRAZODONE HYDROCHLORIDE 50 MG/1
TABLET ORAL
COMMUNITY
Start: 2021-12-06

## 2021-12-10 NOTE — PROGRESS NOTES
200 [facility] 49, [address] [place] [address] 22.  [phone] o  525 [facility] EXAMINATION    Patient:  Yovanny Rosa, 2000  PCP:   Emiliano Moody DO  Patient Status: new (Pt has high functioning Autism - requests mom's presence during exam)    Referred by:  Rheumatologist, Dr. Gary Alcantara Reason referred: To help lose weight    How did patient hear about this weight management program: Dr. Gary Alcantara  Patient has attended the [facility] Orientation: yes      Patient's weight management approach preference today:  LCD plus Medication Management (Take over Metformin 750 mg 2 o q hs rxs since pt has aged out of care from pediatric endocrinologist and needs help w her weight.)    Yovanny Rosa is a 24 y.o. female who is a patient with Obesity Class 3 Body mass index is 51.64 kg/m². and presents today for evaluation and treatment. When did patient begin gaining excess weight:  Taking some medications, having some health issues, no activity  How much excess weight has been gained:   For a while  Is patient ready to start participating in this weight loss journey and reason(s) why: yes, \"I want to feel better\"  Any potential unsupportive people in patient's life: no    Patient goals for participation in weight management program:   Lose 100+ lbs (weight of 210 lbs)     WEIGHT LOSS HISTORY  Start weight today 12/10/2021:  310 lbs 4.8 oz     Weight Metrics 12/10/2021 12/10/2021 11/19/2021 10/31/2021 10/15/2021 9/10/2021 9/8/2021   Weight - 310 lb 4.8 oz 308 lb 306 lb 10.6 oz 296 lb 9.6 oz 288 lb 290 lb   Neck Circ (inches) 14 - - - - - -   Waist Measure Inches 53.5 - - - - - -   Body Fat % 48.8 - - - - - -   BMI - 51.64 kg/m2 60.15 kg/m2 59.89 kg/m2 57.93 kg/m2 56.25 kg/m2 47.68 kg/m2     Neck Circumference:  Acceptable range for M >16 inches, F>15 inches  Waist Circumference: Acceptable range for M> 40 inches/102 cm, F > 35 inches, 88 cm  Body Fat: Acceptable range for M 18-24%, F 25-31%    Lowest weight in adulthood: 220 lbs  Highest weight in lifetime:   310 lbs  Previous weight loss programs: registered dietician and per procedure note after Gastric Bypass Correction:  No GBP  Previous OTC weight loss0 medications, herbal remedies/supplements: 0  Previous prescription weight loss medication:  Rx Metformin increased to 750 mg q hs for high insulin 03/2021 by pediatric endocrinologist (need our office to take over these rxs due to patient aging out of that practice)  Negative side effects: 0  Pounds lost with use:  ? lbs  Factors contributing to weight re-gain:  When taking Depo Provera for irregular menstrual cycles and Endometriosis after BCP. She also gained weight during her GI work up w Dr. Sita Mendes. Dxd w Sucrose Intolerance. Rxd Sucraid w relief. No Chronic GERD symptoms are better controlled. Dr. Charity Tao has resigned and relocated.     Previous Weight Loss Surgery:  0      Past Surgical History:   Procedure Laterality Date    COLONOSCOPY N/A 12/20/2016    COLON[facility] Roselia Head [facility] ENDOSCOPY    GERD TST W/ MUCOS PH ELECTROD 50 HR (BRAVO)  12/3/2020         HC CLP BRAVO  11/30/2020    HX CHOLECYSTECTOMY  06/2019    HX ENDOSCOPY  11/2020    dx'd with sucrose intolerance    HX TONSIL AND ADENOIDECTOMY      HX TONSILLECTOMY      AGE 1    HX WISDOM TEETH EXTRACTION      AK EGD TRANSORAL BIOPSY SINGLE/MULTIPLE  6/14/2019         AK EGD TRANSORAL BIOPSY SINGLE/MULTIPLE  11/30/2020    UPPER GI ENDOSCOPY,BIOPSY  12/20/2016            FAMILY HISTORY  Family who had weight loss surgery/type of surgery: 0  Family history of:  Obesity- yes, Kidney stones- 0, Gallstones- 0, Diabetes- 0, Heart Disease- yes, Sleep Apnea-  0, Gout- 0  Family History   Problem Relation Age of Onset    Endometriosis Mother     Delayed Awakening Mother    [facility] Post-op Nausea/Vomiting Mother    Mary Powell Migraines Mother         d/t accident - neck and brain injury    Atrial Fibrillation Mother     Hypertension Mother     Obesity Mother     Hypertension Maternal Grandfather     Other Maternal Grandfather         diverticulitis    No Known Problems Father     Hypertension Maternal Grandmother     No Known Problems Paternal Grandmother     No Known Problems Paternal Grandfather     Mult Sclerosis Maternal Aunt     Cancer Maternal [address]  Patient denies any contraindications to participation in LCD or VLCD including: history of MI in the last 3 months, Type 1 DM, Type 2 DM, Liver or Kidney disease requiring protein restriction, Recent treatment for Cancer, History of Uric-acid Kidney Stone, Gout, Gall stones, Recent onset of Inflammatory Bowel Disease, severe Food Allergies or Lactose Intolerance. Past Medical History:   Diagnosis Date    Abdominal migraine     Dr. Joseph Burnett. vs Sucrose Intolerance.  Acid reflux     ADHD (attention deficit hyperactivity disorder)     Autism     High Functioning. Dr. Karyle Pee.  Developmental delay     Fibromyalgia     Dr. Jenniffer Vail MIGUEL A (generalized anxiety disorder)     Dr. Tanya Navarro bladder pain     due to dysfunction.  Irritable bowel syndrome with diarrhea 4/13/2017    Lipedema 2021    BL legs. Dr. Corey Yuan. 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 Dr. Dequan Palomino.     Sucrose intolerance     Dr. Joseph Burnett, GI.    Tachycardia     [facility]       BEHAVIORAL HEALTH HISTORY  DEPRESSION SCREENING:    3 most recent [facility] 36 Screens 2/19/2018   Little interest or pleasure in doing things Not at all   Feeling down, depressed, irritable, or hopeless Not at all   Total Score PHQ 2 0       History of drug abuse or dependence:  0  History of mental health conditions (including Depression, Anxiety, Anorexia, Bulimia or Binge Eating disorder): MIGUEL A, ADD, OCD  Treating provider and medication(s): Sherman Barrow - rxd Cymbalta 60 mg daily, Vyvanse 20 mg daily  Current major lifestyle changes or stressors:   ?   Is the mental health condition controlled: yes    Outpatient Medications Marked as Taking for the 12/10/21 encounter (Office Visit) with German Patrick, DO   Medication Sig Dispense Refill    pregabalin (LYRICA) 75 mg capsule Take 75 mg by mouth daily.  lansoprazole (PREVACID) 30 mg capsule Take 30 mg by mouth Daily (before breakfast).  DULoxetine (Cymbalta) 60 mg capsule Take 60 mg by mouth daily.  triamcinolone acetonide (KENALOG) 0.1 % ointment Apply  to affected area two (2) times a day. use thin layer 30 g 0    clotrimazole (LOTRIMIN) 1 % topical cream Apply  to affected area two (2) times a day. 45 g 1    hydrOXYchloroQUINE (PLAQUENIL) 200 mg tablet TAKE 1 TABLET BY MOUTH TWICE A DAY (Patient taking differently: 200 mg two (2) times a day.) 60 Tablet 5    Vyvanse 20 mg capsule TAKE 1 CAPSULE BY MOUTH EVERY MORNING      metFORMIN ER (GLUCOPHAGE XR) 750 mg tablet TAKE 1 TABLET BY MOUTH TWICE A DAY  Indications: polycystic ovarian syndrome, a disease with cysts in the ovaries, prevention of type 2 diabetes mellitus 180 Tablet 5    ascorbic acid, vitamin C, (VITAMIN C) 500 mg tablet Take  by mouth.  sacrosidase (Sucraid) 8,500 unit/mL soln Take 1 mL by mouth.  b complex vitamins tablet ONE TABLE DAILY      hydrOXYzine HCL (ATARAX) 25 mg tablet TAKE 1 TO 2 TABLETS BY MOUTH TWICE A DAY AS NEEDED FOR ANXIETY      norethindrone-ethinyl estradiol-iron (Junel FE 1.5/30, 28,) 1.5 mg-30 mcg (21)/75 mg (7) tab TAKE 1 TAB BY MOUTH DAILY. CONTINUOUS PILLS ONLY.  3 Package 5    ondansetron (ZOFRAN ODT) 8 mg disintegrating tablet PLACE 1 TABLET ON TONGUE & ALLOW TO DISSOLVE EVERY 8 HOURS AS NEEDED FOR NAUSEA WITH HEADACHE      Aimovig Autoinjector 140 mg/mL injection INJECT 1 SYRINGE VIA SUBCUTANEOUS ROUTE ONCE A MONTH, AS DIRECTED      cholecalciferol, vitamin d3, (VITAMIN D3) 400 unit cap Take  by mouth.  diphenhydrAMINE (BENADRYL) 25 mg capsule 2 tabs PO q8h PRN       Medications that cause weight gain:  Lyrica, Benadryl, Prevacid, Cymbalta, AtaraxJunel Fe  Medications that cause weight loss:  Metformin, Vyvanse  Any changes to medications since last office visit with me:  0  Allergies   Allergen Reactions    Yeast, Dried Other (comments)     Upset stomach     Sucrose Nausea Only     SEVERE ABDOMINAL PAIN       EATING HABITS  Total calories consumed per day:  ? kcal    Number of meals consumed per day on average: 3  Number of times per week fast food or meals at/from restaurants is consumed: 5  Typical Meals:       Breakfast: velveta cheese and coffee w creamer      Lunch:  Cheese stick w yogurt      Dinner: Protein, vegetable and carb      Snacks: oranges, pretzels, goldfish, chocolate, ice cream  Barriers to eating healthy meals:  I don't like to cook. Too tired.     DRINKING HABITS  How much water do you consume daily: 84 oz/day    How much caffeine do you drink daily: 10 oz coffee/day, occ unsweet tea  Alcohol use:  0   Any other sugar-sweetened beverages daily (sodas, teas, juices, etc.): 0  Barriers to consuming at least 2 L water daily:  0     Social History     Tobacco Use    Smoking status: Never Smoker    Smokeless tobacco: Never Used    Tobacco comment: no smoke exposure in the home   Vaping Use    Vaping Use: Never used   Substance Use Topics    Alcohol use: No    Drug use: No         SLEEP HISTORY  Hours of sleep nightly: 8-10 hours, broken  Rx or OTC Sleep aids used:  Rx Trazodone 50 mg 1/2 po q hs this week  Any eating while asleep:  0  Any snorin Daytime naps:  yes  Daytime Sleepiness:  yes  Sleep History:   0   Any CPAP machine use: 0    Sleep Medicine Specialist:  0  Occupation:  disability  Barriers to getting proper rest:  Arthritis pain worse in BL legs and R hand - Dr. Jordan Altamirano rxd PT    PHYSICAL ACTIVITY HISTORY  Type of physical activity:  0 except PT for R hand pain x 1 week  Have you ever been told by a physician or anyone else not to exercise: 0  Barriers limiting physical activity:  time    Mobile Apps used for meals, water consumption, sleep, physical activity: 0    OB/GYN HISTORY (For Female Patients)  Social History     Substance and Sexual Activity   Sexual Activity Never    Partners: Male    Birth control/protection: Pill     OB History        0    Para   0    Term   0       0    AB   0    Living   0       SAB   0    IAB   0    Ectopic   0    Molar        Multiple   0    Live Births              Obstetric Comments   Menarche at age 5 - on 2010            Menopause:  na   LMP:  No LMP recorded. (Menstrual status: Chemically Induced). Are you pregnant or planning on becoming pregnant within 6 months:  0      Other Medical Care and Concerns  Pt sees rheumatologist, Dr. Mike Puente for fatigue due to UCTD vs ?Lupus and BL Leg Lipidema. Pt has a hx of debilitating Migraine HAs.  txd w BCP since 2020 by GYN. It also helps endometriosis. Pt has not had a menses in 2-3 yrs. She previously took Depo Provera for irregular menses but stopped due to weight gain. Do you have upcoming travel in the next 6 weeks:  0. If so, contact the dietician for meal plan modification and recommendations. Immunization History   Administered Date(s) Administered    COVID-19, PFIZER, MRNA, LNP-S, PF, 30MCG/0.3ML DOSE 2021, 10/13/2021       HEALTH MAINTENANCE  A1C:  SEE RESULTS BELOW. Lipid panel:  SEE RESULTS BELOW.   Colonoscopy:  16, if over 47 yo   Depression screening:  Performed today  Mammogram:  na, if female over 35 yo  Annual Wellness Visit:  To be performed by pcp annually, when appropriate. ROS:  Review of Systems negative except as noted above in HPI. PHYSICAL EXAMINATION    Visit Vitals  /88 (BP 1 Location: Left arm, BP Patient Position: Sitting, BP Cuff Size: Thigh)   Pulse 98   Temp 98.8 °F (37.1 °C)   Resp 22   Ht 5' 5\" (1.651 m)   Wt 310 lb 4.8 oz (140.8 kg)   SpO2 98%   BMI 51.64 kg/m²         Weight Metrics 12/10/2021 12/10/2021 11/19/2021 10/31/2021 10/15/2021 9/10/2021 9/8/2021   Weight - 310 lb 4.8 oz 308 lb 306 lb 10.6 oz 296 lb 9.6 oz 288 lb 290 lb   Neck Circ (inches) 14 - - - - - -   Waist Measure Inches 53.5 - - - - - -   Body Fat % 48.8 - - - - - -   BMI - 51.64 kg/m2 60.15 kg/m2 59.89 kg/m2 57.93 kg/m2 56.25 kg/m2 47.68 kg/m2        General appearance - Well nourished. Well appearing wearing head phones. Well developed. No acute distress. Obese. Autistic. Present w mom. Head - Normocephalic. Atraumatic. Eyes -  Extraocular eye movements intact. Sclera anicteric. Ears - Hearing is grossly normal bilaterally. Nose - normal and patent. Neck - supple. Midline trachea. No carotid bruits noted bilaterally. No thyromegaly noted. Chest - clear to auscultation bilaterally anteriorly and posteriorly. No wheezes. No rales or rhonchi. Breath sounds are symmetrical bilaterally. Unlabored respirations. Heart - normal rate. Regular rhythm. Normal S1, S2. No murmur noted. No rubs, clicks or gallops noted. Abdomen - soft and distended. No masses or organomegaly. No rebound, rigidity or guarding. Bowel sounds normal x 4 quadrants. No tenderness noted. Neurological - awake, alert and oriented to person, place, and time and event. Cranial nerves II through XII intact. Clear speech. Muscle strength is +5/5 x 4 extremities. Steady gait. Heme/Lymph - peripheral pulses normal x 4 extremities. BL non pitting, non tender, non erythematous BL leg edema is noted. Musculoskeletal - Intact x 4 extremities. Full ROM x 4 extremities.   No pain with movement. Back exam - normal range of motion. No CVA tenderness. Negative Straight Leg Test bilaterally. No buffalo hump noted. Skin - no rashes, erythema, ecchymosis, lacerations, abrasions, suspicious moles noted. No skin tags or moles. No acanthosis nigricans noted in the axilla or neck. Psychological -   normal behavior, dress and thought processes. Good insight. Good eye contact. Normal affect. Appropriate mood. Normal speech. DATA REVIEWED:    Lab Results   Component Value Date/Time    WBC 10.4 09/15/2021 03:56 PM    HGB 14.7 09/15/2021 03:56 PM    HCT 45.6 09/15/2021 03:56 PM    PLATELET 163 (H) 48/96/9294 03:56 PM    MCV 91 09/15/2021 03:56 PM     Lab Results   Component Value Date/Time    Sodium 142 09/03/2021 10:02 AM    Potassium 5.0 09/03/2021 10:02 AM    Chloride 104 09/03/2021 10:02 AM    CO2 26 09/03/2021 10:02 AM    Anion gap 8 04/29/2017 03:53 PM    Glucose 83 09/03/2021 10:02 AM    Glucose 86 12/18/2017 08:35 AM    BUN 8 09/03/2021 10:02 AM    Creatinine 0.71 09/03/2021 10:02 AM    BUN/Creatinine ratio 11 09/03/2021 10:02 AM    GFR est  09/03/2021 10:02 AM    GFR est non- 09/03/2021 10:02 AM    Calcium 9.4 09/03/2021 10:02 AM    Bilirubin, total 0.3 09/03/2021 10:02 AM    Alk.  phosphatase 66 09/03/2021 10:02 AM    Protein, total 7.0 09/03/2021 10:02 AM    Albumin 4.1 09/03/2021 10:02 AM    Globulin 4.2 (H) 04/29/2017 03:53 PM    A-G Ratio 1.4 09/03/2021 10:02 AM    ALT (SGPT) 13 09/03/2021 10:02 AM    AST (SGOT) 13 09/03/2021 10:02 AM     Lab Results   Component Value Date/Time    Hemoglobin A1c 4.8 09/03/2021 10:03 AM    Hemoglobin A1c (POC) 4.9 12/06/2019 02:03 PM      Lab Results   Component Value Date/Time    TSH 0.891 12/11/2020 09:46 AM     No results found for: URICA, UAU1  Magnesium   Date Value Ref Range Status   04/13/2017 2.0 1.7 - 2.3 mg/dL Final     No results found for: B12LT, FOL, RBCF   Lab Results   Component Value Date/Time    VITAMIN D, 25-HYDROXY 78.8 12/11/2020 09:46 AM        No results found for: IRON, FE, TIBC, IBCT, PSAT, FERR  Lab Results   Component Value Date/Time    Cholesterol, total 217 (H) 09/03/2021 10:03 AM    HDL Cholesterol 54 09/03/2021 10:03 AM    LDL, calculated 144 (H) 09/03/2021 10:03 AM    LDL, calculated 145 (H) 01/07/2020 10:06 AM    VLDL, calculated 19 09/03/2021 10:03 AM    VLDL, calculated 19 01/07/2020 10:06 AM    Triglyceride 106 09/03/2021 10:03 AM      No results found for this or any previous visit. Most recent labwork from 12/09/21:    Blood Glucose: 102   HbA1C: 5.1         Sodium: 139 Potassium: 3.9             Chloride: 106        CO2: 24.0     BUN: 9   Cr: 0.70   BUN/Cr ratio: 13      Total BR: 0.4 ALT: 24  AST: 11     Alk P: 75    WBC: 10.39       Hemoglobin: 14.4 Hematocrit: 43.4    MCV: 88.9   Platelets: 363    TSH: 1.230 Free T4: 1.10    LDL calculated: 143   HDL: 49           Triglycerides: 115    Rheumatoid Arthritis Profile <10, ESR 26, CRP 17, Hep A,B,C negative. ANCA <1:20  SEE SCANNED DOCUMENT FOR COMPLETE PANEL RESULTS.    EKG:  10/31/21 NSR  at 91 bpm. QTc 440 msec. No prolonged QTc noted. (Upper QTc limit is 440 msec for males, 460 msec for females)    ASSESSMENT     ICD-10-CM ICD-9-CM    1. Class 3 severe obesity due to excess calories with serious comorbidity and body mass index (BMI) of 50.0 to 59.9 in adult ([facility]  E66.01 278.01     Z68.43 V85.43    2. High cholesterol  E78.00 272.0 NMR LIPOPROFILE WITH LIPIDS (WITHOUT GRAPH)      METABOLIC PANEL, COMPREHENSIVE   3. Autism  F84.0 299.00     high functioning, stable   4. Fibromyalgia  M79.7 729.1    5. MIGUEL A (generalized anxiety disorder)  F41.1 300.02    6. Vitamin D deficiency  E55.9 268.9 VITAMIN D, 25 HYDROXY   7. Primary hypertension  I27 192.7 METABOLIC PANEL, COMPREHENSIVE      MAGNESIUM      URIC ACID   8. Tachycardia  R00.0 785.0 EKG, 12 LEAD, INITIAL      MAGNESIUM    intermittent, due to Anxiety vs caffeine vs other   9. Thrombocytosis  D75.839 238.71    10. Abnormal weight gain  R63.5 783.1 EKG, 12 LEAD, INITIAL      HCG QL SERUM      THYROID PEROXIDASE (TPO) AB      INSULIN    due to hx of Depo Provera vs Cymbalta vs Lyrica vs Sucrose intolerance vs other   11. Obsessive-compulsive disorder, unspecified type  F42.9 300.3    12. Chronic fatigue  R53.82 780.79 EKG, 12 LEAD, INITIAL    due to vit D vs Fibromyalgia vs other   13. Migraine without aura and without status migrainosus, not intractable  G43.009 346.10    14. Insulin resistance  E88.81 277.7    15. BMI 50.0-59.9, adult [facility])  Z68.43 V85.43        WEIGHT MANAGEMENT PLAN    Welcomed patient to our [facility] Migrate Management Program - Medical Services. Agree with nurse documentation. Chart was reviewed and updated during the office visit today. Reviewed and discussed notes from other providers. Encouraged patient to follow their recommendations and follow up plans. Get documentation from peds endo or whomever has performed labs     Emphasized the importance of patient continuing care from current primary care provider and other specialists while participating in this weight management program.  Patient has full access to all our office notes, orders, lab results on Genticel and can provide them copies, if desired. Advised patient to follow up with pcp for any acute symptoms and Health Maintenance. Informed patient about Obesity medicine treatment options available at the [facility] including nutritional counseling, weight loss medication management and behavioral counseling.     Based on patient history, diagnostic tests and physical exam performed today in our office, Maribel Davis is a good candidate for the [employer] using the Meli New Direction meal replacements for Low Calorie Dietary approach (8805-8190 kcal daily.)       During this visit, patient met with Tu Ang Coordinator today to review and sign [facility], Attendance Policy, and [address] [place] [address] Agreement and Consent, further discuss program design, assist patient with ordering New Direction meal replacements and schedule initial 1:1 visit with dietician, mandatory weekly (VLCD 800 kcal/day)or bi-monthly (LCD/8230-3837 kcal/day) nurse visits. SEE SCANNED DOCUMENTS. Referred patient to [address] [place] [address] Patient Manual to become familiar with potential side effects of this dietary approach and what to do if such symptoms occur. Encouraged patient to notify our office if any new symptoms develop and persist.      Discussed the patient's medications, BMI, anthropometrics and goals. Informed patient that weight loss goal of 5-10% in 6-12 months has shown significant improvement in obesity and its related health conditions including DM, heart disease, asthma. A key study published in Arthritis & Rheumatism of Overweight and Obese Adults with OA found that losing 1 pound of weight resulted in 4 pounds of pressure being removed from the knees. Nutritional Prescription:    Start LCD (low calorie diet) with 7820-8511 calories consumed daily using 1-2 Robard New Direction meal replacements. Referred patient to Registered Dietitian for initial 30 minute 1:1 nutritional counseling, include discussions about how to count daily calories and stay within the recommended amount per day as agreed upon today, simple vs complex carbohydrates. Advised patient to avoid skipping meals. Consume meals every 3-4 hours to prevent low blood sugar events.   Discussed the option of starting slowly during first couple of weeks of participation by weaning down caffeine first then simple carbohydrates the following weeks (like sweet foods/simple carbohydrates, baked goods) and white foods (like potatoes, rice, pasta, bread.)    Permission granted to utilize LCD when patient is attending important family, business or social events involving \"regular\" foods. If utilizing LCD approach, consume green leafy vegetables and protein (lean meats and legumes) with each meal first then add minimal complex carbohydrates. Keep calories between 2921-3669 kcal/day. Avoid fried foods and limit saturated fats. If blood pressure is elevated, limit processed foods, caffeine and sodium intake including hot sauce and soy sauce. Stressed importance of patient drinking a minimum of 2 L (67 oz) of water daily up to half patient's body size in ounces of water, while utilizing this dietary approach unless instructed otherwise by a provider. This will help increase satiety and prevent dehydration. Avoid sugar-sweetened beverages including diet or regular soda, juice, alcohol, use of water enhancers. Try on-line recipes for water infusion, if desired. Limit caffeine to prevent bladder issues and dehydration. Ok to drink 1 8 oz cup of black coffee or green tea (to stimulate release of Adiponectin and promote fat burning.)     Physical Activity Prescription: Minimal and as tolerated while initiating this dietary approach. Discussed approaches to increasing physical activity as part of daily routine. Encouraged strength training, resistance or toning exercises daily to prevent muscle mass loss. Count steps daily with a goal of 5,000-10,000 steps or 2.5-5.0 miles daily. Behavior and Lifestyle Prescription:  Counseled patient on stressors, stress management and self-care approaches. Encouraged patient to seek counseling to better address stressors identified today. If already receiving formal counseling please continue these sessions routinely. Go to ER if any thoughts or attempts of suicide are experienced. Sleep Prescription: A goal of 7-9 hours of uninterrupted sleep is recommended to turn off the Grehlin hormone to be released from the stomach and triggers appetite while promoting weight gain.  Proper rest turns on Leptin hormone to be released from white adipose tissue and promotes weight loss. Discussed snoring, symptoms of Sleep Apnea and improvements with weight loss. If patient currently has MELISSA and uses CPAP or BiPAP, encouraged patient to continue its use whenever resting. Consume any intake of caffeine 6-12 hours before bedtime to avoid sleep disturbances. Limit screen time 1-2 hours before bedtime. Avoid exercising 2-3 hours before bedtime. Ok to try drinking OTC Sleepy Time Tea, Chamomile tea or Magnesium 400 mg at bedtime for sleep, if receiving less than 7-9 hours nightly. Continue current medications as directed by prescribers. Identified and discussed weight positive and weight negative medications on patient's medication list.  Advised patient not to stop any use without discussing with the prescribing provider. Will monitor patient's weight and health with use. Weight loss medication prescribed today and sent electronically to preferred pharmacy on file after discussing benefits, contraindications and potential side effects:  0 - will take over Metformin rx when it is time. Supplement recommendations:  Start OTC Magnesium 400 mg po at night prn sleep, muscle cramping, constipation. Start OTC vit B12 1000 mcg daily orally if taking Metformin or experiencing numbness and tingling. Start OTC Fish Oil with EPA and DHA 1000 mg daily if experiencing dry skin, hair loss or low HDL. Use with caution if history of heartburn exists. Increase fiber supplements or try Fiber products if experiencing constipation. Take OTC Lactaid and/or Gas-X as needed with meal replacements, if lactose intolerance history exists or symptoms begin. Reviewed and discussed most recent lab and EKG results.    If not available today, advised patient to sign a release to provide our program a copy of each from pcp or specialist.    An order form for initial lab panel was given to patient today to be drawn one week prior to next appointment with me. An order form was given to patient today to be performed after the office visit today to be done before starting the program.  Otherwise, will recheck pertinent labs monthly while participating in VLCD or every 3 months while participating in LCD, if using New Direction meal replacements, as discussed to identify electrolyte abnormalities and guide therapeutic approach. Advised patient to sign up for MyChart and view labs directly. Notify me by [facility] if any questions or concerns arise. Labs ordered today will be reviewed in detail at the next office visit and time allowed for any questions regarding the results. Counseled patient on health topics:  Obesity, Insulin Resistance, VLCD and LCD dietary approaches, benefits, contraindications, possible side effects to these diets and how to prevent poor outcomes (including staying hydrated, limit physical exercise while transitioning into this program, avoid skipping meals, etc), Weight loss goals, Sleep hygiene, Barriers to losing weight and keeping weight off, stressors. Immunizations noted. Influenza and Covid-19 vaccines recommended, if patient has not had it. Informed patient that Obesity may increase risk for severe illness and death from Covid-19 disease. Offered empathy, encouragement, legitimation, prayers, partnership to patient. Praised patient for successes. Patient was offered a choice/choices in the treatment plan today. Patient expresses understanding of the plan and agrees with recommendations. Return in about 1 month (around 1/10/2022) for ND LCD, results.      Total time:  35 mins spent with patient in counseling, coordinating care, reviewing and discussing results, reviewing and completing relevant documentation, and discussing treatment plans in reference to The primary encounter diagnosis was Class 3 severe obesity due to excess calories with serious comorbidity and body mass index (BMI) of 50.0 to 59.9 in adult [facility]). Diagnoses of High cholesterol, Autism, Fibromyalgia, MIGUEL A (generalized anxiety disorder), Vitamin D deficiency, Primary hypertension, Tachycardia, Thrombocytosis, Abnormal weight gain, Obsessive-compulsive disorder, unspecified type, Chronic fatigue, Migraine without aura and without status migrainosus, not intractable, Insulin resistance, and BMI 50.0-59.9, adult (HCC) were also pertinent to this visit. Amina Cota, DO FOR ALLOWING ME THE PRIVILEGE TO PARTICIPATE IN THE CARE OF OUR MUTUAL PATIENT, Ms. Alyssia Clark. Courtney Pitt DO, Diplomate CHARMAINE CALDWELL    There are no Patient Instructions on file for this visit.

## 2021-12-10 NOTE — PROGRESS NOTES
1. Have you been to the ER, urgent care clinic since your last visit? Hospitalized since your last visit? Yes When: [id number] pt. 1st UTI    2. Have you seen or consulted any other health care providers outside of the [address] [place] [address] since your last visit? Include any pap smears or colon screening.  Yes When: [id number] PCP for UTI follow up     Dr. Tahmina Blanco made aware of pt. pulse

## 2021-12-15 ENCOUNTER — OFFICE VISIT (OUTPATIENT)
Dept: SURGERY | Age: 21
End: 2021-12-15

## 2021-12-15 DIAGNOSIS — E66.01 CLASS 3 SEVERE OBESITY DUE TO EXCESS CALORIES WITH SERIOUS COMORBIDITY AND BODY MASS INDEX (BMI) OF 50.0 TO 59.9 IN ADULT (HCC): Primary | ICD-10-CM

## 2021-12-16 ENCOUNTER — CLINICAL SUPPORT (OUTPATIENT)
Dept: SURGERY | Age: 21
End: 2021-12-16

## 2021-12-16 DIAGNOSIS — E66.01 CLASS 3 SEVERE OBESITY DUE TO EXCESS CALORIES WITH SERIOUS COMORBIDITY AND BODY MASS INDEX (BMI) OF 50.0 TO 59.9 IN ADULT (HCC): Primary | ICD-10-CM

## 2021-12-17 NOTE — PROGRESS NOTES
[facility]  Metabolic Program Initial Nutrition Consult    Date: 2021   Physician: Corrine Blanc DO  Name: Nayana Hsieh  :  2000    Type of Plan: LCD  Weeks on Plan: hasn't started yet  Virtual visit was completed through [employer]. Patient's mother, Dixon Elkins, was present during our visit today. ASSESSMENT:      Medications/Supplements:   Prior to Admission medications    Medication Sig Start Date End Date Taking? Authorizing Provider   pregabalin (LYRICA) 75 mg capsule Take 75 mg by mouth daily. Provider, Historical   Reyvow 100 mg tab  10/26/21   Provider, Historical   traZODone (DESYREL) 50 mg tablet  21   Provider, Historical   lansoprazole (PREVACID) 30 mg capsule Take 30 mg by mouth Daily (before breakfast). Other, MD Chandu   DULoxetine (Cymbalta) 60 mg capsule Take 60 mg by mouth daily. Other, MD Chandu   triamcinolone acetonide (KENALOG) 0.1 % ointment Apply  to affected area two (2) times a day. use thin layer 10/15/21   Marce Chavarria MD   clotrimazole (LOTRIMIN) 1 % topical cream Apply  to affected area two (2) times a day. 9/10/21   Marce Chavarria MD   hydrOXYchloroQUINE (PLAQUENIL) 200 mg tablet TAKE 1 TABLET BY MOUTH TWICE A DAY  Patient taking differently: 200 mg two (2) times a day. 9/10/21   Marce Chavarria MD   Vyvanse 20 mg capsule TAKE 1 CAPSULE BY MOUTH EVERY MORNING 21   Provider, Historical   metFORMIN ER (GLUCOPHAGE XR) 750 mg tablet TAKE 1 TABLET BY MOUTH TWICE A DAY  Indications: polycystic ovarian syndrome, a disease with cysts in the ovaries, prevention of type 2 diabetes mellitus 21   Charmaine Walker MD   ascorbic acid, vitamin C, (VITAMIN C) 500 mg tablet Take  by mouth. 21   Provider, Historical   sacrosidase (Sucraid) 8,500 unit/mL soln Take 1 mL by mouth.     Provider, Historical   b complex vitamins tablet ONE TABLE DAILY 21   Provider, Historical   hydrOXYzine HCL (ATARAX) 25 mg tablet TAKE 1 TO 2 TABLETS BY MOUTH TWICE A DAY AS NEEDED FOR ANXIETY 12/3/20   Provider, Historical   norethindrone-ethinyl estradiol-iron (Junel FE 1.5/30, 28,) 1.5 mg-30 mcg (21)/75 mg (7) tab TAKE 1 TAB BY MOUTH DAILY. CONTINUOUS PILLS ONLY. 12/14/20   Savanah Lara MD   ondansetron (ZOFRAN ODT) 8 mg disintegrating tablet PLACE 1 TABLET ON TONGUE & ALLOW TO DISSOLVE EVERY 8 HOURS AS NEEDED FOR NAUSEA WITH HEADACHE 8/30/20   Provider, Historical   Aimovig Autoinjector 140 mg/mL injection INJECT 1 SYRINGE VIA SUBCUTANEOUS ROUTE ONCE A MONTH, AS DIRECTED 9/29/20   Provider, Historical   cholecalciferol, vitamin d3, (VITAMIN D3) 400 unit cap Take  by mouth. Provider, Historical   diphenhydrAMINE (BENADRYL) 25 mg capsule 2 tabs PO q8h PRN    Provider, Historical     Allergies/intolerances: newly gluten free and sucrose free. Refrains from dairy based smoothies and lunch meat. Has found these nutritional changes helps with stomach upset and reduces migraines. Not a reported allergy at this time. Anthropometrics:    Ht: 65\"   Wt: 310#    IBW: 125#   %IBW: 248%    BMI: 51   Category: obesity class 3    Pt presents today for initial nutrition consult for the [facility].      Reported weight history:gained significantly during COVID-19 pandemic. Exercise/Physical Activity: walking, not daily, but likes to walk    Started meal replacements: not yet, waiting until 12/27/2021  If yes, how many per day:n/a    Aversions/side effects to meal replacements: n/a    Reported Diet History: Eats three meals per day, portions are difficult. May go a few days without vegetables, but does like them. High intake of snacking throughout the day - yogurt, cheese, chocolate, fruit. Coffee with flavored sweetened creamers. High intake of water, since before provider visit, totaling 100+ oz every day. May add lemon to water. Environment/Psychosocial/Support: mom also on plan.  Family supportive    NUTRITION INTERVENTION:  Pt educated on nutrition recommendations for LCD, specifically 2-3 meal replacements every day plus a meal and snack. Grocery meal:  Use the balanced plate method to plan meals, include 3-6 oz of lean source of protein, unlimited non-starchy vegetables, 1/2 cup whole grains/beans OR 1/2 cup fruit OR 1 serving of low fat dairy. Utilize handouts listing healthy snack and meal ideas. Read all nutrition labels. Demonstrated and emphasized identifying serving size, total fat, sugar and protein content. Defined low fat as </= 3 g per serving. Discussed lean and extra lean sources of protein. Provided list of low fat cooking methods. Avoid foods with sugar listed in the first 3 ingredients and >/10 g sugar per serving. Practice mindful eating habits; take small bites, chew thoroughly, avoid distractions, utilize hunger/fullness scale. Attend Metabolic Weight Loss Class and Support Group and increase physical activity (approved per MD) for long term weight maintenance. NUTRITION MONITORING AND EVALUATION: Started with basic goals, such as focus on grazing throughout the day and late night snacking. Newly interested in gluten-free and sucrose free. Provided education on foods containing these, and provided handout that details foods ok to consume/avoid. Discussed that rather than spending a lot of money on gluten-free tailored products, to start with adding basic, easy naturally gluten free items in day (only as 1/4 of the plate with meals). Items recommended: beans, lentils, brown rice, quinoa, or fruit - but being mindful of serving sizes listed on LCD handout. Discussed food label, and we reviewed items currently in the home if they are within nutrition guidelines or not. pt motivated and amicable to goals.       The following goals were established with patient;  1) approximate meal pattern, have ND shake with two hours of rising, then have another for lunch about 3-4 hours later. Can have first shake with black coffee instead of water as base, if desired, to assist with reducing sugar intake in creamer  2) increase walking as tolerated working up to 150 minutes per week as a first goal  3) ok to have premier protein shake PRN for lunch instead of ND shake  4) use LCD handout for nutrition guidance on pre-packaged foods. Meals 400-600 calories and 30 grams carbs. Snacks 100-200 calories and 10-20 grams carbs. Try to keep pre-packaged foods to less than 10 grams total sugars. 5) try Marco Vasco pal for database on restaurant foods      Specific tips and techniques to facilitate compliance with above recommendations were provided and discussed. If further details are desired please contact me at [phone]. This phone number was also provided to the patient for any further questions or concerns.           Malika Galaviz, MS, RD, LDN

## 2021-12-17 NOTE — PROGRESS NOTES
[facility]  Metabolic Weight Loss Program        Patient's Name: Chaitanya Bauman  : 2000    This patient is enrolled in [address] [place] [address] Weight Loss Program and attended the required weekly virtual nutrition class hosted via [employer] today.       Maria E Damon, MS, RD, LDN

## 2021-12-29 ENCOUNTER — OFFICE VISIT (OUTPATIENT)
Dept: SURGERY | Age: 21
End: 2021-12-29

## 2021-12-29 DIAGNOSIS — E66.01 CLASS 3 SEVERE OBESITY DUE TO EXCESS CALORIES WITH SERIOUS COMORBIDITY AND BODY MASS INDEX (BMI) OF 50.0 TO 59.9 IN ADULT (HCC): Primary | ICD-10-CM

## 2021-12-30 NOTE — PROGRESS NOTES
[facility]  Metabolic Weight Loss Program        Patient's Name: Elie Arriaga  : 2000    This patient is enrolled in [address] [place] [address] Weight Loss Program and attended the required weekly virtual nutrition class hosted via [address] [place] [address] today.       Hayden Carias, MS, RD, LDN

## 2022-01-05 ENCOUNTER — OFFICE VISIT (OUTPATIENT)
Dept: SURGERY | Age: 22
End: 2022-01-05

## 2022-01-05 ENCOUNTER — CLINICAL SUPPORT (OUTPATIENT)
Dept: SURGERY | Age: 22
End: 2022-01-05

## 2022-01-05 DIAGNOSIS — E66.01 CLASS 3 SEVERE OBESITY DUE TO EXCESS CALORIES WITH SERIOUS COMORBIDITY AND BODY MASS INDEX (BMI) OF 50.0 TO 59.9 IN ADULT (HCC): Primary | ICD-10-CM

## 2022-01-07 NOTE — PERIOP NOTES
0720:   Patient has been evaluated by anesthesia   Patient alert and oriented. Pt's mother present for assessment and signed the consent with her daughter's permission. 2435: Oral versed given. Vital signs will not be charted by the Endoscopy nurse. All vitals, airway, and loc are monitored by anesthesia staff throughout procedure. 0732: Dr David Teresa at bedside. Bravo clip discussed. Jim  [id number]  01/15/2022    . Endoscope was pre-cleaned at bedside immediately following procedure Lea
15yo with gastritis relieved with famotidine and Maalox. Tolerated po well after Zofran. Gave hydration instructions.

## 2022-01-10 ENCOUNTER — CLINICAL SUPPORT (OUTPATIENT)
Dept: SURGERY | Age: 22
End: 2022-01-10

## 2022-01-10 VITALS
HEART RATE: 91 BPM | RESPIRATION RATE: 20 BRPM | BODY MASS INDEX: 48.82 KG/M2 | HEIGHT: 65 IN | DIASTOLIC BLOOD PRESSURE: 84 MMHG | SYSTOLIC BLOOD PRESSURE: 120 MMHG | WEIGHT: 293 LBS | OXYGEN SATURATION: 99 % | TEMPERATURE: 98.1 F

## 2022-01-10 DIAGNOSIS — E66.01 CLASS 3 SEVERE OBESITY DUE TO EXCESS CALORIES WITH SERIOUS COMORBIDITY AND BODY MASS INDEX (BMI) OF 50.0 TO 59.9 IN ADULT (HCC): Primary | ICD-10-CM

## 2022-01-10 NOTE — PROGRESS NOTES
1. Have you been to the ER, urgent care clinic since your last visit? Hospitalized since your last visit? Yes When: 01/08/2022 [facility] for fall right arm sprang    2. Have you seen or consulted any other health care providers outside of the [address] [place] [address] since your last visit? Include any pap smears or colon screening. PCP office for follow up of arm injury        Progress Note: Weekly Education Class in the [facility] Weight Loss Program         Patient is on Very Low Calorie Diet [] (4 meal replacements per day, 800 kcal/day)      Low Calorie Diet [x] (2-3 meal replacements per day, 7873-5158 kcal/day)    1) Did patient have any new symptoms or physical problems? Yes [x]    No []    If yes, check & comment: weakness [], fatigue [], lightheadedness [], headache [], cramps [], cold intolerance [], hair loss [], diarrhea [], constipation [],  NA [] other:  Fall on ice and sprang right wrist                                2) Has patient had any medical attention from other providers, urgent care or the emergency room this week? Yes [x]  No []       NA [], If yes, why: 11/08/2022- [facility] for fall and right wrist sprang                                     3) Any other sugar sweetened beverages consumed this week? Yes []  No [x]    4) Did patient have any problems adhering to the diet? Yes []  No [] NA [x]    If yes, Vacation [], Celebrations [], Conferences [], Family Reunions [] other:                                               5) How many hours of sleep this week?8    (range)  NA []    Number of meal replacements consumed daily? 1.5 (range)  NA []    Average ounces of water patient consumed daily this week (not including shakes)?  96    (divide the weekly total by 7)    Did you eat any food outside of the program? Yes [] No []= no answer    Physical Activity Over the Past Week:    Cardio exercise: n/a  min  Strength exercise: n/a workouts / week  Number of steps walked per day: n/a    How has patient mood overall been this week? Sad [], Happy [], Stressed [], Tired [x], Content [], NA [], other sore           Medications reconciled by nurse Yes [x]  No[]    Patient was given therapeutic recommendations for any noted side effects of their dietary approach based upon [facility] patient manual per providers recommendation. Progress Note: Weekly Education Class in the [facility] Weight Loss Program         Patient is on Very Low Calorie Diet [] (4 meal replacements per day, 800 kcal/day)      Low Calorie Diet [x] (2-3 meal replacements per day, 5164-2964 kcal/day)    1) Did patient have any new symptoms or physical problems? Yes []    No [x]    If yes, check & comment: weakness [], fatigue [], lightheadedness [], headache [], cramps [], cold intolerance [], hair loss [], diarrhea [], constipation [],  NA [] other:                                 2) Has patient had any medical attention from other providers, urgent care or the emergency room this week? Yes []  No [x]       NA [], If yes, why:                                       3) Any other sugar sweetened beverages consumed this week? Yes []  No [x]    4) Did patient have any problems adhering to the diet? Yes []  No [] NA [x]    If yes, Vacation [], Celebrations [], Conferences [], Family Reunions [] other:                                               5) How many hours of sleep this week? 8    (range)  NA []    Number of meal replacements consumed daily? 0-1 (range)  NA []    Average ounces of water patient consumed daily this week (not including shakes)? 96   (divide the weekly total by 7)    Did you eat any food outside of the program? Yes [] No []= no answer    Physical Activity Over the Past Week:    Cardio exercise: n/a min  Strength exercise: n/a  workouts / week  Number of steps walked per day: n/a    How has patient mood overall been this week?  Sad [], Happy [], Stressed [], Tired [], Content [], NA [x], other           Medications reconciled by nurse Yes [x]  No[]    Patient was given therapeutic recommendations for any noted side effects of their dietary approach based upon [employer] patient manual per providers recommendation.

## 2022-01-12 LAB
25(OH)D3+25(OH)D2 SERPL-MCNC: 100 NG/ML (ref 30–100)
ALBUMIN SERPL-MCNC: 3.8 G/DL (ref 3.9–5)
ALBUMIN/GLOB SERPL: 1.2 {RATIO} (ref 1.2–2.2)
ALP SERPL-CCNC: 69 IU/L (ref 44–121)
ALT SERPL-CCNC: 13 IU/L (ref 0–32)
AST SERPL-CCNC: 10 IU/L (ref 0–40)
B-HCG SERPL QL: NEGATIVE MIU/ML
BILIRUB SERPL-MCNC: 0.5 MG/DL (ref 0–1.2)
BUN SERPL-MCNC: 12 MG/DL (ref 6–20)
BUN/CREAT SERPL: 13 (ref 9–23)
CALCIUM SERPL-MCNC: 9.4 MG/DL (ref 8.7–10.2)
CHLORIDE SERPL-SCNC: 104 MMOL/L (ref 96–106)
CHOLEST SERPL-MCNC: 210 MG/DL (ref 100–199)
CO2 SERPL-SCNC: 20 MMOL/L (ref 20–29)
CREAT SERPL-MCNC: 0.91 MG/DL (ref 0.57–1)
GLOBULIN SER CALC-MCNC: 3.2 G/DL (ref 1.5–4.5)
GLUCOSE SERPL-MCNC: 88 MG/DL (ref 65–99)
HDL SERPL-SCNC: 33.4 UMOL/L
HDLC SERPL-MCNC: 48 MG/DL
INSULIN SERPL-ACNC: 77.1 UIU/ML (ref 2.6–24.9)
LDL SERPL QN: 20.9 NM
LDL SERPL-SCNC: 1828 NMOL/L
LDL SMALL SERPL-SCNC: 1039 NMOL/L
LDLC SERPL CALC-MCNC: 142 MG/DL (ref 0–99)
LP-IR SCORE SERPL: 64
MAGNESIUM SERPL-MCNC: 2 MG/DL (ref 1.6–2.3)
POTASSIUM SERPL-SCNC: 4.2 MMOL/L (ref 3.5–5.2)
PROT SERPL-MCNC: 7 G/DL (ref 6–8.5)
SODIUM SERPL-SCNC: 141 MMOL/L (ref 134–144)
THYROPEROXIDASE AB SERPL-ACNC: 14 IU/ML (ref 0–34)
TRIGL SERPL-MCNC: 114 MG/DL (ref 0–149)
URATE SERPL-MCNC: 7.1 MG/DL (ref 2.6–6.2)

## 2022-01-13 ENCOUNTER — CLINICAL SUPPORT (OUTPATIENT)
Dept: SURGERY | Age: 22
End: 2022-01-13

## 2022-01-13 DIAGNOSIS — E66.01 CLASS 3 SEVERE OBESITY DUE TO EXCESS CALORIES WITH SERIOUS COMORBIDITY AND BODY MASS INDEX (BMI) OF 50.0 TO 59.9 IN ADULT (HCC): Primary | ICD-10-CM

## 2022-01-14 NOTE — PROGRESS NOTES
[facility]  Metabolic Program Follow-up Nutrition Consult    Date: 2022  Physician: Alfredo Pickens DO  Name: Sebastien Cantu  :  2000    Type of Plan: LCD  Weeks on Plan: 2 weeks  Virtual visit was completed through [employer]. ASSESSMENT:    Medications/Supplements:   Prior to Admission medications    Medication Sig Start Date End Date Taking? Authorizing Provider   pregabalin (LYRICA) 75 mg capsule Take 75 mg by mouth daily. Provider, Historical   Reyvow 100 mg tab  10/26/21   Provider, Historical   traZODone (DESYREL) 50 mg tablet  21   Provider, Historical   lansoprazole (PREVACID) 30 mg capsule Take 30 mg by mouth Daily (before breakfast). Other, MD Chandu   DULoxetine (Cymbalta) 60 mg capsule Take 60 mg by mouth daily. Other, MD Chandu   triamcinolone acetonide (KENALOG) 0.1 % ointment Apply  to affected area two (2) times a day. use thin layer 10/15/21   Ree Hernandez MD   clotrimazole (LOTRIMIN) 1 % topical cream Apply  to affected area two (2) times a day. 9/10/21   Ree Hernandez MD   hydrOXYchloroQUINE (PLAQUENIL) 200 mg tablet TAKE 1 TABLET BY MOUTH TWICE A DAY  Patient taking differently: 200 mg two (2) times a day. 9/10/21   Ree Hernandez MD   Vyvanse 20 mg capsule TAKE 1 CAPSULE BY MOUTH EVERY MORNING 21   Provider, Historical   metFORMIN ER (GLUCOPHAGE XR) 750 mg tablet TAKE 1 TABLET BY MOUTH TWICE A DAY  Indications: polycystic ovarian syndrome, a disease with cysts in the ovaries, prevention of type 2 diabetes mellitus 21   Mansoor Walker MD   ascorbic acid, vitamin C, (VITAMIN C) 500 mg tablet Take  by mouth. 21   Provider, Historical   sacrosidase (Sucraid) 8,500 unit/mL soln Take 1 mL by mouth.     Provider, Historical   b complex vitamins tablet ONE TABLE DAILY 21   Provider, Historical   hydrOXYzine HCL (ATARAX) 25 mg tablet TAKE 1 TO 2 TABLETS BY MOUTH TWICE A DAY AS NEEDED FOR ANXIETY 12/3/20   Provider, Historical norethindrone-ethinyl estradiol-iron (Junel FE 1.5/30, 28,) 1.5 mg-30 mcg (21)/75 mg (7) tab TAKE 1 TAB BY MOUTH DAILY. CONTINUOUS PILLS ONLY. 12/14/20   Karla Bell MD   ondansetron (ZOFRAN ODT) 8 mg disintegrating tablet PLACE 1 TABLET ON TONGUE & ALLOW TO DISSOLVE EVERY 8 HOURS AS NEEDED FOR NAUSEA WITH HEADACHE 8/30/20   Provider, Historical   Aimovig Autoinjector 140 mg/mL injection INJECT 1 SYRINGE VIA SUBCUTANEOUS ROUTE ONCE A MONTH, AS DIRECTED 9/29/20   Provider, Historical   cholecalciferol, vitamin d3, (VITAMIN D3) 400 unit cap Take  by mouth. Provider, Historical   diphenhydrAMINE (BENADRYL) 25 mg capsule 2 tabs PO q8h PRN    Provider, Historical              Starting Weight: 310#  Current Weight: 310#  Overall Pounds Lost: 0 Overall Pounds Gained: 0    Exercise/Physical Activity: not reported, injured arm last week, in a brace    Is patient using New Directions products: yes  If yes, how many per day:1 -2 bars    Aversions/side effects of product/program: not reported    Fluids used to mix with products: n/a    Reported Diet History: high intake of condiments, will go through bottle of ketchup in a few days. Water intake adequate 80+ oz every day. Wiling to try new foods. Since last visit, tried cauliflower rice stir metcalf, green snap peas, water fco, onions, broccoli all mixed together. Still has a sweet at end of the day, but reducing from whole batch of cookies to 4 in one sitting. Likes yogurt, has a strawberry applesauce after dinner most nights. Prefers the bars, doesn't like the ND shakes and hasn't been using them. Barriers/concerns preventing patient from achieving goal(s) since last visit: arm injured, having difficulty eating certain foods.   Limited exercise    NUTRITION INTERVENTION:  Pt educated on nutrition recommendations for the [employer] Low Calorie Plan (LCD), with the specific meal pattern of  2 ND products every day plus a meal and a snack totaling approximately 1200 calories, 60 grams carbs, 80+ grams protein. Grocery meal:  Use the balanced plate method to plan meals, include 3-6 oz of lean source of protein, unlimited non-starchy vegetables, 1/2 cup whole grains/beans OR 1/2 cup fruit OR 1 serving of low fat dairy. Utilize handouts listing healthy snack and meal ideas. Read all nutrition labels. Demonstrated and emphasized identifying serving size, total fat, sugar and protein content. Defined low fat as </= 3 g per serving. Discussed lean and extra lean sources of protein. Avoid foods with sugar listed in the first 3 ingredients and >/10 g sugar per serving. Consume meal replacements every three to four hours or pattern as discussed with provider. Mix with water. May add herbs/spices for taste. Practice mindful eating habits; take small bites, chew thoroughly, avoid distractions, utilize hunger/fullness scale. Reviewed attendance policy of attending weekly nutrition classes. Metabolic support group recommended, and increase physical activity (approved per MD) for long term weight maintenance. NUTRITION MONITORING AND EVALUATION: No weight loss since program start two weeks ago. Doing the plan with her mother. They each want to make changes and are making some great lifestyle changes, but they can easily talk one another into sabotaging behaviors, such as having a dessert at the end of the day. It was revealed during this visit, pt has a very high intake of ketchup, among other add-ons. We looked at labels today of three of her routine go-tos, ketchup, motts strawberry applesauce, and chobani flavored yogurt, and they added up to greater than 50 teaspoons of sugar. This was eye-opening to her and her mother. Strongly encouraged her to half the amount to start, and continue to ween until not needed. Stick only to snacks provided on the LCD handout. Great job on water.     The following goals were established with patient;  1) measure condiments used throughout the day and report this to RD for calculations  2) try to half the serving of condiments used  3) unsweetened applesauce with cinnamon OR one small apple in place of sweetened applesauce  4) pick from list provided on LCD for yogurt. Less than 10 grams sugar per serving only  5) increase movement to a first goal of 150 minutes per week  6) continue trying new foods: cauliflower crust, turkey chili, plain greek yogurt in place of sour cream, Croatian metcalf alternative such as: eggplant sticks, zucchini sticks, or okra in the air fryer. 7) one a day multivitamin, take with meal  7) followup PRN      Specific tips and techniques to facilitate compliance with above recommendations were provided and discussed. If further details are desired please contact me at [phone]. This phone number was also provided to the patient for any further questions or concerns.           Pierce Sanchez MS, RD, LDN

## 2022-01-15 NOTE — PROGRESS NOTES
[facility]  Metabolic Weight Loss Program        Patient's Name: Laura Deiv  : 2000    This patient is enrolled in [address] [place] [address] Weight Loss Program and attended the required weekly virtual nutrition class hosted via [employer] today.       Woodrow Crockett, MS, RD, LDN

## 2022-01-20 NOTE — PROGRESS NOTES
[facility]  Metabolic Program Follow-up Nutrition Consult    Date: 2022   Physician: Chan Dailey DO  Name: Deyvi Manriquez  :  2000    Type of Plan: LCD  Weeks on Plan: 1 week  Virtual visit was completed through [employer]. ASSESSMENT:    Medications/Supplements:   Prior to Admission medications    Medication Sig Start Date End Date Taking? Authorizing Provider   pregabalin (LYRICA) 75 mg capsule Take 75 mg by mouth daily. Provider, Historical   Reyvow 100 mg tab  10/26/21   Provider, Historical   traZODone (DESYREL) 50 mg tablet  21   Provider, Historical   lansoprazole (PREVACID) 30 mg capsule Take 30 mg by mouth Daily (before breakfast). Other, MD Chandu   DULoxetine (Cymbalta) 60 mg capsule Take 60 mg by mouth daily. Other, MD Chandu   triamcinolone acetonide (KENALOG) 0.1 % ointment Apply  to affected area two (2) times a day. use thin layer 10/15/21   Alhaji Yee MD   clotrimazole (LOTRIMIN) 1 % topical cream Apply  to affected area two (2) times a day. 9/10/21   Alhaji Yee MD   hydrOXYchloroQUINE (PLAQUENIL) 200 mg tablet TAKE 1 TABLET BY MOUTH TWICE A DAY  Patient taking differently: 200 mg two (2) times a day. 9/10/21   Alhaji Yee MD   Vyvanse 20 mg capsule TAKE 1 CAPSULE BY MOUTH EVERY MORNING 21   Provider, Historical   metFORMIN ER (GLUCOPHAGE XR) 750 mg tablet TAKE 1 TABLET BY MOUTH TWICE A DAY  Indications: polycystic ovarian syndrome, a disease with cysts in the ovaries, prevention of type 2 diabetes mellitus 21   Harish Walker MD   ascorbic acid, vitamin C, (VITAMIN C) 500 mg tablet Take  by mouth. 21   Provider, Historical   sacrosidase (Sucraid) 8,500 unit/mL soln Take 1 mL by mouth.     Provider, Historical   b complex vitamins tablet ONE TABLE DAILY 21   Provider, Historical   hydrOXYzine HCL (ATARAX) 25 mg tablet TAKE 1 TO 2 TABLETS BY MOUTH TWICE A DAY AS NEEDED FOR ANXIETY 12/3/20   Provider, Historical norethindrone-ethinyl estradiol-iron (Junel FE 1.5/30, 28,) 1.5 mg-30 mcg (21)/75 mg (7) tab TAKE 1 TAB BY MOUTH DAILY. CONTINUOUS PILLS ONLY. 12/14/20   Starr Klinefelter, MD   ondansetron (ZOFRAN ODT) 8 mg disintegrating tablet PLACE 1 TABLET ON TONGUE & ALLOW TO DISSOLVE EVERY 8 HOURS AS NEEDED FOR NAUSEA WITH HEADACHE 8/30/20   Provider, Historical   Aimovig Autoinjector 140 mg/mL injection INJECT 1 SYRINGE VIA SUBCUTANEOUS ROUTE ONCE A MONTH, AS DIRECTED 9/29/20   Provider, Historical   cholecalciferol, vitamin d3, (VITAMIN D3) 400 unit cap Take  by mouth. Provider, Historical   diphenhydrAMINE (BENADRYL) 25 mg capsule 2 tabs PO q8h PRN    Provider, Historical              Starting Weight: 310# Current Weight: 310#  Overall Pounds Lost: 0 Overall Pounds Gained: 0    Exercise/Physical Activity: none, hurt arm on ice. Is patient using New Directions products:yes  If yes, how many per day:1 bar and 1 shake    Aversions/side effects of product/program: not reported    Fluids used to mix with products: water    Reported Diet History: water is great 80+ oz every day. Some hot cocoa with whipped cream.  Flavored coffee with milk. Not skipping. Premier in morning. Light lunch, with vegetable and meat or cheese and fruit. Tried greek yogurt. Tried spaghetti squash. No rice with chinese food. Tried meatloaf. Will have a green vegetable with dinner with ranch dressing. Not snacking after dinner but wants chocolate most nights by 8pm.  Reports this is a habit she used to do nightly before the program. Has only had cookies once with mother, they baked a smaller amount. Barriers/concerns preventing patient from achieving goal(s) since last visit: mom and her are doing program together, and trying to support each other during more tempting times.     NUTRITION INTERVENTION:  Pt educated on nutrition recommendations for the New Direction Low Calorie Plan (LCD), with the specific meal pattern of 2 meal replacements every day plus a grocery meal and snack. Grocery meal:  Use the balanced plate method to plan meals, include 3-6 oz of lean source of protein, unlimited non-starchy vegetables, 1/2 cup whole grains/beans OR 1/2 cup fruit OR 1 serving of low fat dairy. Utilize handouts listing healthy snack and meal ideas. Read all nutrition labels. Demonstrated and emphasized identifying serving size, total fat, sugar and protein content. Defined low fat as </= 3 g per serving. Discussed lean and extra lean sources of protein. Avoid foods with sugar listed in the first 3 ingredients and >/10 g sugar per serving. Consume meal replacements every three to four hours or pattern as discussed with provider. Mix with water. May add herbs/spices for taste. Practice mindful eating habits; take small bites, chew thoroughly, avoid distractions, utilize hunger/fullness scale. Reviewed attendance policy of attending weekly nutrition classes. Metabolic support group recommended, and increase physical activity (approved per MD) for long term weight maintenance. NUTRITION MONITORING AND EVALUATION: pt making appropriate changes as evidenced by recall. Her and her mother are motivated. Adherence likely. The following goals were established with patient;  1) try one new vegetable by next visit:  Agreed to try cauliflower rice, mushrooms, and zucchini/squash/peppers  2) no food less than two hours before bed  3) try ND product in place of sweets in evening  4) increase movement to 150 minutes in seven days as tolerated      Specific tips and techniques to facilitate compliance with above recommendations were provided and discussed. If further details are desired please contact me at [phone]. This phone number was also provided to the patient for any further questions or concerns.           Rajinder Walsh, MS, RD, LDN

## 2022-01-24 ENCOUNTER — CLINICAL SUPPORT (OUTPATIENT)
Dept: SURGERY | Age: 22
End: 2022-01-24

## 2022-01-24 VITALS
DIASTOLIC BLOOD PRESSURE: 86 MMHG | HEIGHT: 65 IN | HEART RATE: 90 BPM | OXYGEN SATURATION: 99 % | BODY MASS INDEX: 48.82 KG/M2 | TEMPERATURE: 98.3 F | WEIGHT: 293 LBS | RESPIRATION RATE: 20 BRPM | SYSTOLIC BLOOD PRESSURE: 128 MMHG

## 2022-01-24 DIAGNOSIS — E66.01 CLASS 3 SEVERE OBESITY DUE TO EXCESS CALORIES WITH SERIOUS COMORBIDITY AND BODY MASS INDEX (BMI) OF 50.0 TO 59.9 IN ADULT (HCC): Primary | ICD-10-CM

## 2022-01-24 NOTE — PROGRESS NOTES
1. Have you been to the ER, urgent care clinic since your last visit? Hospitalized since your last visit? No    2. Have you seen or consulted any other health care providers outside of the [address] [place] [address] since your last visit? Include any pap smears or colon screening. No         Progress Note: Weekly Education Class in the [facility]         Patient is on Very Low Calorie Diet [] (4 meal replacements per day, 800 kcal/day)      Low Calorie Diet [x] (2-3 meal replacements per day, 9477-5294 kcal/day)    1) Did patient have any new symptoms or physical problems? Yes []    No [x]    If yes, check & comment: weakness [], fatigue [], lightheadedness [], headache [], cramps [], cold intolerance [], hair loss [], diarrhea [], constipation [],  NA [] other:                                2) Has patient had any medical attention from other providers, urgent care or the emergency room this week? Yes []  No [x]       NA [], If yes, why: went to pulmonologist- have asthma                                     3) Any other sugar sweetened beverages consumed this week? Yes []  No [x]    4) Did patient have any problems adhering to the diet? Yes [x]  No [] NA []    If yes, Vacation [], Celebrations [], Conferences [], Family Reunions [] other:                                               5) How many hours of sleep this week? 8-10   (range)  NA []    Number of meal replacements consumed daily? 0-1 (range)  NA []    Average ounces of water patient consumed daily this week (not including shakes)? 57 (divide the weekly total by 7)    Did you eat any food outside of the program? Yes [x] No []    Physical Activity Over the Past Week:    Cardio exercise: 0 min  Strength exercise: 0 workouts / week  Number of steps walked per day: 0    How has patient mood overall been this week?  Sad [], Happy [], Stressed [], Tired [], Content [], NA [], other irritable, okay           Medications reconciled by nurse Yes [x]  No[]    Patient was given therapeutic recommendations for any noted side effects of their dietary approach based upon [facility] patient manual per providers recommendation. Progress Note: Weekly Education Class in the [facility]         Patient is on Very Low Calorie Diet [] (4 meal replacements per day, 800 kcal/day)      Low Calorie Diet [x] (2-3 meal replacements per day, 6124-5613 kcal/day)    1) Did patient have any new symptoms or physical problems? Yes []    No [x]    If yes, check & comment: weakness [], fatigue [], lightheadedness [], headache [], cramps [], cold intolerance [], hair loss [], diarrhea [], constipation [],  NA [] other: lupus outbreak-bumps                               2) Has patient had any medical attention from other providers, urgent care or the emergency room this week? Yes [x]  No []       NA [], If yes, why: pcp for lupus outbreak-bumps, dentist                                      3) Any other sugar sweetened beverages consumed this week? Yes []  No [x]    4) Did patient have any problems adhering to the diet? Yes []  No [x] NA []    If yes, Vacation [], Celebrations [], Conferences [], Family Reunions [] other:                                               5) How many hours of sleep this week? 8-16   (range)  NA []    Number of meal replacements consumed daily? 0-1 (range)  NA []    Average ounces of water patient consumed daily this week (not including shakes)? 93    (divide the weekly total by 7)    Did you eat any food outside of the program? Yes [x] No []    Physical Activity Over the Past Week:    Cardio exercise: 0 min  Strength exercise: 0 workouts / week  Number of steps walked per day: 0    How has patient mood overall been this week?  Sad [], Happy [], Stressed [], Tired [], Content [], NA [], other okay, not good felt bad           Medications reconciled by nurse Yes [x]  No[]    Patient was given therapeutic recommendations for any noted side effects of their dietary approach based upon [employer] patient manual per providers recommendation.

## 2022-02-01 NOTE — PROGRESS NOTES
Steven Odonnell presents for weekly or bi-monthly evaluation of Obesity Body mass index is 51.57 kg/m². Visit Vitals  /86 (BP 1 Location: Left arm, BP Patient Position: Sitting, BP Cuff Size: Thigh)   Pulse 90   Temp 98.3 °F (36.8 °C)   Resp 20   Ht 5' 5\" (1.651 m)   Wt 309 lb 14.4 oz (140.6 kg)   SpO2 99%   BMI 51.57 kg/m²       Wt Readings from Last 3 Encounters:   01/24/22 309 lb 14.4 oz (140.6 kg)   01/10/22 310 lb 6.4 oz (140.8 kg)   12/10/21 310 lb 4.8 oz (140.8 kg)       1. Class 3 severe obesity due to excess calories with serious comorbidity and body mass index (BMI) of 50.0 to 59.9 in adult [facility])         I have reviewed and agree with nurse documentation of Weekly Education Class nurse progress note for [facility] Weight [address] [facility] [place]). Steven Odonnell is compliant with program requirements and may continue participation utilizing the [employer]irection meal replacements, as discussed. Patient has been advised to refer to the Levine, Susan. \[facility]ospital [facility] and Outlook.\"" Patient Manual and notify us if any side effects to this meal plan are present and/or persist.  Steven Odonnell may continue the current dietary approach, care and follow up at the monthly office visit with the provider, as scheduled. See RD for 1:1 session. Follow-up and Dispositions    · Return in about 2 weeks (around 2/7/2022) for weight check. Documentation true and accepted by Nanette Hartman.  Emelina Ware., AOBFP, Diplomate [employer]

## 2022-02-01 NOTE — PROGRESS NOTES
Arielle Jeffers presents for weekly or bi-monthly evaluation of Obesity Body mass index is 51.65 kg/m². Visit Vitals  /84 (BP 1 Location: Left arm, BP Patient Position: Sitting, BP Cuff Size: Thigh)   Pulse 91   Temp 98.1 °F (36.7 °C)   Resp 20   Ht 5' 5\" (1.651 m)   Wt 310 lb 6.4 oz (140.8 kg)   SpO2 99%   BMI 51.65 kg/m²       Wt Readings from Last 3 Encounters:   01/24/22 309 lb 14.4 oz (140.6 kg)   01/10/22 310 lb 6.4 oz (140.8 kg)   12/10/21 310 lb 4.8 oz (140.8 kg)       1. Class 3 severe obesity due to excess calories with serious comorbidity and body mass index (BMI) of 50.0 to 59.9 in adult [facility])         I have reviewed and agree with nurse documentation of Weekly Education Class nurse progress note for [facility] Weight [address] [facility] [place]). Arielle Jeffers is compliant with program requirements and may continue participation utilizing the New Direction meal replacements, as discussed. Patient has been advised to refer to the [facility] Patient Manual and notify us if any side effects to this meal plan are present and/or persist.  Arielle Jeffers may continue the current dietary approach, care and follow up at the monthly office visit with the provider, as scheduled. Follow-up and Dispositions    · Return in about 2 weeks (around 1/24/2022) for weight check. Documentation true and accepted by Ene Bishop.  Dominique Lomax., AOBFP, Diplomate [employer]

## 2022-02-24 ENCOUNTER — VIRTUAL VISIT (OUTPATIENT)
Dept: RHEUMATOLOGY | Age: 22
End: 2022-02-24
Payer: MEDICARE

## 2022-02-24 DIAGNOSIS — M35.9 UNDIFFERENTIATED CONNECTIVE TISSUE DISEASE (HCC): ICD-10-CM

## 2022-02-24 DIAGNOSIS — L70.0 PUSTULAR ACNE: Primary | ICD-10-CM

## 2022-02-24 DIAGNOSIS — R60.9 LIPEDEMA: ICD-10-CM

## 2022-02-24 PROCEDURE — G8428 CUR MEDS NOT DOCUMENT: HCPCS | Performed by: INTERNAL MEDICINE

## 2022-02-24 PROCEDURE — G9717 DOC PT DX DEP/BP F/U NT REQ: HCPCS | Performed by: INTERNAL MEDICINE

## 2022-02-24 PROCEDURE — G8756 NO BP MEASURE DOC: HCPCS | Performed by: INTERNAL MEDICINE

## 2022-02-24 PROCEDURE — 99215 OFFICE O/P EST HI 40 MIN: CPT | Performed by: INTERNAL MEDICINE

## 2022-02-24 RX ORDER — HYDROXYCHLOROQUINE SULFATE 200 MG/1
200 TABLET, FILM COATED ORAL 2 TIMES DAILY
Qty: 180 TABLET | Refills: 1 | Status: SHIPPED | OUTPATIENT
Start: 2022-02-24 | End: 2022-07-18 | Stop reason: SDUPTHER

## 2022-02-24 RX ORDER — DOXYCYCLINE 100 MG/1
100 TABLET ORAL 2 TIMES DAILY
Qty: 56 TABLET | Refills: 0 | Status: SHIPPED | OUTPATIENT
Start: 2022-02-24 | End: 2022-05-20

## 2022-02-24 NOTE — PROGRESS NOTES
REASON FOR VISIT:   Brown Terry is a 24 y.o. female with history of migraines and ADHD who is returning for followup of undifferentiated connective tissue disease c/b tachycardia, prurigo nodularis, polymorphic light eruption, livedo, and +WENDY 1:320 speckled and 1:640 nuclear dot patterns. HISTORY OF PRESENT ILLNESS    Diagnosed with asthma, has felt in cold weather she has had coughing fits, particularly since Flu last year. New skin lesions on face, buttocks, and arm. Had a reaction to generic bandaids so no longer using these. Feels she is doing well with avoiding scratching/picking now. Had to reduce Lyrica to once a day 2/2 brain fog. Remains on Cymbalta. Not on prednisone. No change in background redness. Uses Dial soap. Slipped on ice and landed on right shoulder, elbow, and wrist. Had more pain, elbow xrays done which were negative for fracture; continued with pain so went to [address], there xrays of hand/wrist were negative for fracture as well. Now completing hand OT. Right hand distal fingers feel numb to her constantly. Disease History:  Since 4 or 6yo, has had recurrent nodules on the skin diffusely--face, arms, legs, abdomen. Can drain pus. Has been treated over the years with oral antibiotics and topical antibiotics which help; swabs have been MRSA-positive in the past. Has tried Hibiclens baths in the past repeatedly without improvement. Pruritic, seem to respond to topical Mupirocin. Has had shave biopsy with Derm at [facility] in the past,punch biopsy in May 2019 was interpreted as mixed dermal inflammation with features of an excoriated hypersensitivity reaction; last seen in November 2020 when diagnosed with keratosis pilaris, rosacea, and neurodermatitis. In February, PCP ordered labs including an WENDY screen which returned +1:320 speckled and 1:640 nuclear dot pattern, with mild elevations of ESR (26) and CRP (17mg/L).    Turns \"red as a lobster\" in the sun even with sunscreen use, has always been the case. No chronic cough or progressive dyspnea on exertion. Nonrefreshing sleep and always fatigued in the evening. Runs to the bathroom repeatedly through the evening, doesn't feel she can reduce her fluid intake in the afternoon/evenings 2/2 chronic thirst and dry mouth. Sleep study ~3 years ago she says was normal.  Has more subjective eye dryness, not currently using AT's consistently or following with ophthalmology. Cold intolerant, feels hands and feet always feel like ice, feels worse over the last 2 years. Gaining weight again after losing nearly 100 pounds with metformin; has gained 15 pounds in the last 3 months. Feels digestive issues (possible gastroparesis in the past with postprandial pain, biliary dyskinesis s/p cholecystectomy) have resolved since starting a sucrose intolerance diet. Stool softeners haven't helped in the past. On current diet she is having daily bowel movements without abdominal pain. Knees ache. Denies joint swelling. Has sprained ankles in past and now feels wrists get more sore with use. REVIEW OF SYSTEMS  A comprehensive review of systems was negative except for that written in the HPI. A 10-point review of systems is per the new patient questionnaire, which has been reviewed extensively and scanned into the electronic medical record for future reference. Review of systems is as above and is otherwise negative. ALLERGIES  Yeast, dried and Sucrose    MEDICATIONS  Current Outpatient Medications   Medication Sig    pregabalin (LYRICA) 75 mg capsule Take 75 mg by mouth daily.  Reyvow 100 mg tab     traZODone (DESYREL) 50 mg tablet     lansoprazole (PREVACID) 30 mg capsule Take 30 mg by mouth Daily (before breakfast).  DULoxetine (Cymbalta) 60 mg capsule Take 60 mg by mouth daily.  triamcinolone acetonide (KENALOG) 0.1 % ointment Apply  to affected area two (2) times a day.  use thin layer    clotrimazole (LOTRIMIN) 1 % topical cream Apply to affected area two (2) times a day.  hydrOXYchloroQUINE (PLAQUENIL) 200 mg tablet TAKE 1 TABLET BY MOUTH TWICE A DAY (Patient taking differently: 200 mg two (2) times a day.)    Vyvanse 20 mg capsule TAKE 1 CAPSULE BY MOUTH EVERY MORNING    metFORMIN ER (GLUCOPHAGE XR) 750 mg tablet TAKE 1 TABLET BY MOUTH TWICE A DAY  Indications: polycystic ovarian syndrome, a disease with cysts in the ovaries, prevention of type 2 diabetes mellitus    ascorbic acid, vitamin C, (VITAMIN C) 500 mg tablet Take  by mouth.  sacrosidase (Sucraid) 8,500 unit/mL soln Take 1 mL by mouth.  b complex vitamins tablet ONE TABLE DAILY    hydrOXYzine HCL (ATARAX) 25 mg tablet TAKE 1 TO 2 TABLETS BY MOUTH TWICE A DAY AS NEEDED FOR ANXIETY    norethindrone-ethinyl estradiol-iron (Junel FE 1.5/30, 28,) 1.5 mg-30 mcg (21)/75 mg (7) tab TAKE 1 TAB BY MOUTH DAILY. CONTINUOUS PILLS ONLY.  ondansetron (ZOFRAN ODT) 8 mg disintegrating tablet PLACE 1 TABLET ON TONGUE & ALLOW TO DISSOLVE EVERY 8 HOURS AS NEEDED FOR NAUSEA WITH HEADACHE    Aimovig Autoinjector 140 mg/mL injection INJECT 1 SYRINGE VIA SUBCUTANEOUS ROUTE ONCE A MONTH, AS DIRECTED    cholecalciferol, vitamin d3, (VITAMIN D3) 400 unit cap Take  by mouth.  diphenhydrAMINE (BENADRYL) 25 mg capsule 2 tabs PO q8h PRN     No current facility-administered medications for this visit. PAST MEDICAL HISTORY  Past Medical History:   Diagnosis Date    Abdominal migraine     Dr. Kehinde Vanegas. vs Sucrose Intolerance.  Acid reflux     ADHD (attention deficit hyperactivity disorder)     Autism     High Functioning. Dr. Mckinley Romero.  Chronic rhinitis 2021    Dr. Izabela Sharp, allergist.    Edith Jimenes delay     Fibromyalgia     Dr. Halie Lennon MIGUEL A (generalized anxiety disorder)     Dr. Hari Tobin bladder pain     due to dysfunction.  Insulin resistance 09/08/2021    Dr. Ramos Nurse.  maurice Walker    Irritable bowel syndrome with diarrhea 4/13/2017    Lipedema 2021    BL legs. Dr. Jacki Munoz. [facility]    Migraine headache     Dr. Angela Khalil    Obesity, morbid [facility]) 12/12/2017    OCD (obsessive compulsive disorder)     Dr. Yeison Yan Sucrose intolerance     Dr. Brea Mathews, GI.    Tachycardia 06/02/2021    Mauri Cobian DO, [facility]    Tenosynovitis of right hand 07/2021    Dr. Emerald Mayfield    Undifferentiated connective tissue disease ([facility] [address]     (lupus like). Dr. Gatito Samuel. FAMILY HISTORY  family history includes Atrial Fibrillation in her mother; Cancer in her maternal uncle; Delayed Awakening in her mother; Endometriosis in her mother; Hypertension in her maternal grandfather, maternal grandmother, and mother; Migraines in her mother; Mult Sclerosis in her maternal aunt; No Known Problems in her father, paternal grandfather, and paternal grandmother; Obesity in her mother; Other in her maternal grandfather; Post-op Nausea/Vomiting in her mother. Mother diagnosed with sarcoidosis. Father has gout. Maternal aunt has MS.    SOCIAL HISTORY  She  reports that she has never smoked. She has never used smokeless tobacco. She reports that she does not drink alcohol and does not use drugs. 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 There is no height or weight on file to calculate BMI. No flowsheet data found. General:  The patient is well developed, well nourished, alert, and in no apparent distress. Eyes: Sclera are anicteric. No conjunctival injection. HEENT:  Oropharynx is clear. No obvious oral ulcers. Adequate salivary pooling. Lungs:  Speaking in full sentences, no stridor or retractions  Cor:  No peripheral edema or cyanosis  Abdomen: No distention or guarding. Skin: No petechiae or purpura. No photodistributed rashes.   Neuro: Nonfocal, no foot or wrist drop. Normal gait. Musculoskeletal:    No melinda swelling of hands or LE's    Labs:  9/15/21: CRP 13mg/L, C3 179 (high), C4 30; UA neg for blood, +trace protein. 21: WBC 11.4, HJgb 16.2, Hct 49.6, Plt 534; aerobic wound culture with scant growth of mixed skin babar. 9/3/21: WBC 8.9, Hgb 15.0, Plt 483, ESR 7, Cr 0.71, LFTs WNL, CRP 17mg/L;   21: WBC 6.9, Hgb 16.8, Plt 451, ESR 5, CRP 5mg/L  3/10/21: CRP 21mg/L, CPK 57, Marissa neg, RDL myositis panel neg,   21: WBC 8.9, Hgb 15.6, Plt 472; ANCAs negative, PR3 and MPO negative; Cr 0.7, CMP WNL;  WENDY 1:320 speckled, 1:640 nuclear dot; C3 high, C4 WNL; negative dsDNA, Scl70, SSA, SSB, RNP, Sm, ribosomal P, chromatin, centromere B antibodies. RF <10, CCP negative; ESR 26, CRP 17mg/L. Hep B cAb neg, Hep B sAg neg, Hep C Ab neg    Pathology:  19: U Surgical pathology final report, Priscila Pierre MD:  Skin, right lower leg, punch biopsy:  -Ulcer and mixed dermal inflammation (see comment). Sections reveal a punch biopsy to the depth of the mid dermis. There is a zone of ulceration with fibrinous crust containing neutrophils. In the superficial to mid dermis, there is a mildly dense perivascular lymphohistiocytic infiltrate with scattered neutrophils and eosinophils. PAS stain is negative for fungal organisms. Taken together, the findings are those of ulceration and mixed dermal inflammation. The features are suggestive of an excoriated hypersensitivity reaction, such as that seen in arthropod bites. Diagnostic features of folliculitis are not seen. Imagin21 MR brain:  Normal MRI of the brain. No intracranial mass, hemorrhage or evidence of acute infarction. 19 CXR (report only): Normal chest views. No change.     ASSESSMENT AND PLAN  Ms. Wai Ji is a 24 y.o. female with high-functioning autism and lipedema who presents for followup of undifferentiated connective tissue disease with resting tachycardia, pruritic nodules, polymorphic light eruption, livedo, and +WENDY 1:320 speckled and 1:640 nuclear dot patterns. Neurodermatitis has recently worsened, but no targets for immunosuppression. Patient is pursuing ongoing lymphedema treatment through [facility]. Continuing Plaquenil (hydroxychloroquine), adding doxycycline for acneiform lesions, possible contribution of Staph infections or hidradenitis. She has a new Dermatology appointment upcoming. 1. Undifferentiated connective tissue disease ([address]  - hydrOXYchloroQUINE (PLAQUENIL) 200 mg tablet; Take 1 Tablet by mouth two (2) times a day. Dispense: 180 Tablet; Refill: 1    2. Pustular acne  - doxycycline (ADOXA) 100 mg tablet; Take 1 Tablet by mouth two (2) times a day. Dispense: 56 Tablet; Refill: 0    3. Lipedema  - REFERRAL TO LYMPHEDEMA CLINIC      There are no Patient Instructions on file for this visit. Orders Placed This Encounter    REFERRAL TO LYMPHEDEMA CLINIC    hydrOXYchloroQUINE (PLAQUENIL) 200 mg tablet    doxycycline (ADOXA) 100 mg tablet       Pursuant to the emergency declaration under the Formerly named [facility], [address] [place] [address] authority and the [employer] Act, this Virtual  Visit was conducted, with patient's consent, to reduce the patient's risk of exposure to COVID-19 and provide continuity of care for an established patient. Services were provided through a video synchronous discussion virtually to substitute for in-person clinic visit. Medications: I have changed Erica Garcia's hydrOXYchloroQUINE. I am also having her start on doxycycline.  Additionally, I am having her maintain her diphenhydrAMINE, cholecalciferol (vitamin d3), ondansetron, Aimovig Autoinjector, norethindrone-ethinyl estradiol-iron, hydrOXYzine HCL, ascorbic acid (vitamin C), Sucraid, b complex vitamins, Vyvanse, metFORMIN ER, clotrimazole, triamcinolone acetonide, lansoprazole, DULoxetine, pregabalin, Reyvow, and traZODone.     Follow up: 3 months    Face to face time: 30 minutes  Note preparation and records review day of service: 15 minutes  Total provider time day of service: 45 minutes      Madeline Mcmullen MD    Adult Rheumatology   [address]  [facility], [address] [place] [address]   Phone [phone]  Fax [phone]

## 2022-03-02 NOTE — TELEPHONE ENCOUNTER
Mother states that patient is still complaining of pain when she swallows, she said she feels like she is choking, taking tylenol every 6 hours, patient could not sleep last night and is very irritable, mother is very concerned about patient, please advise. +irregular rhythm, normal rate/no murmur

## 2022-03-18 PROBLEM — K21.9 GASTROESOPHAGEAL REFLUX DISEASE WITHOUT ESOPHAGITIS: Status: ACTIVE | Noted: 2017-05-15

## 2022-03-18 PROBLEM — Z91.09 ALLERGY TO YEAST: Status: ACTIVE | Noted: 2017-09-13

## 2022-03-18 PROBLEM — K58.0 IRRITABLE BOWEL SYNDROME WITH DIARRHEA: Status: ACTIVE | Noted: 2017-04-13

## 2022-03-18 PROBLEM — Z91.018 ALLERGY TO CHOCOLATE: Status: ACTIVE | Noted: 2017-09-13

## 2022-03-18 PROBLEM — R60.9 LIPEDEMA: Status: ACTIVE | Noted: 2021-01-01

## 2022-03-19 PROBLEM — E78.89 LIPIDS ABNORMAL: Status: ACTIVE | Noted: 2018-02-20

## 2022-03-19 PROBLEM — M65.9 TENOSYNOVITIS OF RIGHT HAND: Status: ACTIVE | Noted: 2021-01-01

## 2022-03-19 PROBLEM — I10 HYPERTENSION: Status: ACTIVE | Noted: 2018-02-20

## 2022-03-19 PROBLEM — K82.8 BILIARY DYSKINESIA: Status: ACTIVE | Noted: 2019-06-04

## 2022-03-19 PROBLEM — M65.941 TENOSYNOVITIS OF RIGHT HAND: Status: ACTIVE | Noted: 2021-01-01

## 2022-03-19 PROBLEM — R60.9 EDEMA, PERIPHERAL: Status: ACTIVE | Noted: 2017-09-13

## 2022-03-19 PROBLEM — R11.0 CHRONIC NAUSEA: Status: ACTIVE | Noted: 2019-05-16

## 2022-03-19 PROBLEM — Z87.898 HISTORY OF PREDIABETES: Status: ACTIVE | Noted: 2021-07-20

## 2022-03-19 PROBLEM — F32.89 ATYPICAL DEPRESSION: Status: ACTIVE | Noted: 2018-02-28

## 2022-03-19 PROBLEM — K29.50 CHRONIC GASTRITIS WITHOUT BLEEDING: Status: ACTIVE | Noted: 2019-06-20

## 2022-03-19 PROBLEM — R60.0 EDEMA, PERIPHERAL: Status: ACTIVE | Noted: 2017-09-13

## 2022-03-19 PROBLEM — K81.1 CHRONIC CHOLECYSTITIS: Status: ACTIVE | Noted: 2019-06-04

## 2022-03-19 PROBLEM — K31.84 GASTROPARESIS: Status: ACTIVE | Noted: 2017-01-19

## 2022-03-20 PROBLEM — E66.01 OBESITY, MORBID (HCC): Status: ACTIVE | Noted: 2020-12-14

## 2022-04-05 ENCOUNTER — APPOINTMENT (OUTPATIENT)
Dept: GENERAL RADIOLOGY | Age: 22
End: 2022-04-05
Attending: EMERGENCY MEDICINE
Payer: MEDICARE

## 2022-04-05 ENCOUNTER — HOSPITAL ENCOUNTER (EMERGENCY)
Age: 22
Discharge: HOME | End: 2022-04-05
Attending: EMERGENCY MEDICINE
Payer: MEDICARE

## 2022-04-05 ENCOUNTER — APPOINTMENT (OUTPATIENT)
Dept: CT IMAGING | Age: 22
End: 2022-04-05
Attending: EMERGENCY MEDICINE
Payer: MEDICARE

## 2022-04-05 VITALS
RESPIRATION RATE: 20 BRPM | WEIGHT: 293 LBS | SYSTOLIC BLOOD PRESSURE: 134 MMHG | TEMPERATURE: 97.5 F | DIASTOLIC BLOOD PRESSURE: 87 MMHG | HEIGHT: 64 IN | BODY MASS INDEX: 50.02 KG/M2 | HEART RATE: 100 BPM | OXYGEN SATURATION: 98 %

## 2022-04-05 DIAGNOSIS — R04.2 COUGHING UP BLOOD: Primary | ICD-10-CM

## 2022-04-05 DIAGNOSIS — G43.809 OTHER MIGRAINE WITHOUT STATUS MIGRAINOSUS, NOT INTRACTABLE: ICD-10-CM

## 2022-04-05 LAB
ALBUMIN SERPL-MCNC: 3.4 G/DL (ref 3.5–5)
ALBUMIN/GLOB SERPL: 0.7 {RATIO} (ref 1.1–2.2)
ALP SERPL-CCNC: 73 U/L (ref 45–117)
ALT SERPL-CCNC: 21 U/L (ref 12–78)
ANION GAP SERPL CALC-SCNC: 1 MMOL/L (ref 5–15)
APTT PPP: 31.6 SEC (ref 22.1–31)
AST SERPL-CCNC: 7 U/L (ref 15–37)
ATRIAL RATE: 126 BPM
BASOPHILS # BLD: 0.1 K/UL (ref 0–0.1)
BASOPHILS NFR BLD: 1 % (ref 0–1)
BILIRUB SERPL-MCNC: 0.4 MG/DL (ref 0.2–1)
BUN SERPL-MCNC: 12 MG/DL (ref 6–20)
BUN/CREAT SERPL: 14 (ref 12–20)
CALCIUM SERPL-MCNC: 9.4 MG/DL (ref 8.5–10.1)
CALCULATED P AXIS, ECG09: 74 DEGREES
CALCULATED R AXIS, ECG10: 75 DEGREES
CALCULATED T AXIS, ECG11: 29 DEGREES
CHLORIDE SERPL-SCNC: 105 MMOL/L (ref 97–108)
CO2 SERPL-SCNC: 29 MMOL/L (ref 21–32)
COMMENT, HOLDF: NORMAL
CREAT SERPL-MCNC: 0.88 MG/DL (ref 0.55–1.02)
DIAGNOSIS, 93000: NORMAL
DIFFERENTIAL METHOD BLD: ABNORMAL
EOSINOPHIL # BLD: 0.1 K/UL (ref 0–0.4)
EOSINOPHIL NFR BLD: 1 % (ref 0–7)
ERYTHROCYTE [DISTWIDTH] IN BLOOD BY AUTOMATED COUNT: 13.6 % (ref 11.5–14.5)
GLOBULIN SER CALC-MCNC: 4.8 G/DL (ref 2–4)
GLUCOSE SERPL-MCNC: 105 MG/DL (ref 65–100)
HCT VFR BLD AUTO: 46.8 % (ref 35–47)
HGB BLD-MCNC: 14.7 G/DL (ref 11.5–16)
IMM GRANULOCYTES # BLD AUTO: 0.1 K/UL (ref 0–0.04)
IMM GRANULOCYTES NFR BLD AUTO: 0 % (ref 0–0.5)
INR PPP: 0.9 (ref 0.9–1.1)
LYMPHOCYTES # BLD: 2.2 K/UL (ref 0.8–3.5)
LYMPHOCYTES NFR BLD: 18 % (ref 12–49)
MCH RBC QN AUTO: 28.2 PG (ref 26–34)
MCHC RBC AUTO-ENTMCNC: 31.4 G/DL (ref 30–36.5)
MCV RBC AUTO: 89.7 FL (ref 80–99)
MONOCYTES # BLD: 0.8 K/UL (ref 0–1)
MONOCYTES NFR BLD: 7 % (ref 5–13)
NEUTS SEG # BLD: 8.9 K/UL (ref 1.8–8)
NEUTS SEG NFR BLD: 73 % (ref 32–75)
NRBC # BLD: 0 K/UL (ref 0–0.01)
NRBC BLD-RTO: 0 PER 100 WBC
P-R INTERVAL, ECG05: 132 MS
PLATELET # BLD AUTO: 509 K/UL (ref 150–400)
PMV BLD AUTO: 8.7 FL (ref 8.9–12.9)
POTASSIUM SERPL-SCNC: 3.8 MMOL/L (ref 3.5–5.1)
PROT SERPL-MCNC: 8.2 G/DL (ref 6.4–8.2)
PROTHROMBIN TIME: 9.5 SEC (ref 9–11.1)
Q-T INTERVAL, ECG07: 302 MS
QRS DURATION, ECG06: 86 MS
QTC CALCULATION (BEZET), ECG08: 437 MS
RBC # BLD AUTO: 5.22 M/UL (ref 3.8–5.2)
SAMPLES BEING HELD,HOLD: NORMAL
SODIUM SERPL-SCNC: 135 MMOL/L (ref 136–145)
THERAPEUTIC RANGE,PTTT: ABNORMAL SECS (ref 58–77)
VENTRICULAR RATE, ECG03: 126 BPM
WBC # BLD AUTO: 12 K/UL (ref 3.6–11)

## 2022-04-05 PROCEDURE — 71275 CT ANGIOGRAPHY CHEST: CPT

## 2022-04-05 PROCEDURE — 99285 EMERGENCY DEPT VISIT HI MDM: CPT

## 2022-04-05 PROCEDURE — 96365 THER/PROPH/DIAG IV INF INIT: CPT

## 2022-04-05 PROCEDURE — 96375 TX/PRO/DX INJ NEW DRUG ADDON: CPT

## 2022-04-05 PROCEDURE — 85610 PROTHROMBIN TIME: CPT

## 2022-04-05 PROCEDURE — 85730 THROMBOPLASTIN TIME PARTIAL: CPT

## 2022-04-05 PROCEDURE — 85025 COMPLETE CBC W/AUTO DIFF WBC: CPT

## 2022-04-05 PROCEDURE — 71046 X-RAY EXAM CHEST 2 VIEWS: CPT

## 2022-04-05 PROCEDURE — 74011000636 HC RX REV CODE- 636: Performed by: EMERGENCY MEDICINE

## 2022-04-05 PROCEDURE — 93005 ELECTROCARDIOGRAM TRACING: CPT

## 2022-04-05 PROCEDURE — 74011250636 HC RX REV CODE- 250/636: Performed by: EMERGENCY MEDICINE

## 2022-04-05 PROCEDURE — 80053 COMPREHEN METABOLIC PANEL: CPT

## 2022-04-05 RX ORDER — DIPHENHYDRAMINE HYDROCHLORIDE 50 MG/ML
25 INJECTION, SOLUTION INTRAMUSCULAR; INTRAVENOUS
Status: COMPLETED | OUTPATIENT
Start: 2022-04-05 | End: 2022-04-05

## 2022-04-05 RX ADMIN — SODIUM CHLORIDE 1000 ML: 9 INJECTION, SOLUTION INTRAVENOUS at 12:27

## 2022-04-05 RX ADMIN — Medication 25 MG: at 15:49

## 2022-04-05 RX ADMIN — DIPHENHYDRAMINE HYDROCHLORIDE 25 MG: 50 INJECTION, SOLUTION INTRAMUSCULAR; INTRAVENOUS at 15:26

## 2022-04-05 RX ADMIN — IOPAMIDOL 100 ML: 755 INJECTION, SOLUTION INTRAVENOUS at 13:27

## 2022-04-05 NOTE — ED PROVIDER NOTES
HPI       17y F with hx of migraine, asthma, lupus, fibromyalgia here with coughing up blood. Started in the past few days. No fever. No shortness of breath. No hx of similar. No dizziness or syncope. Recently started on albuterol for asthma. Called her PMD today who thought this could all be related to congestion/asthma but wanted her to come to the ED for evaluation. No recent travel. No weight loss. No night sweats. No rash or skin changes. No chest pain. Says she is coughing up blood with phlegm in it. Past Medical History:   Diagnosis Date    Abdominal migraine     Dr. Rowena Mckay. vs Sucrose Intolerance.  Acid reflux     ADHD (attention deficit hyperactivity disorder)     Autism     High Functioning. Dr. Anna Gooden.  Chronic rhinitis 2021    Dr. Yazmin David, allergist.    Fatou Díaz delay     Fibromyalgia     Dr. Julian Steiner MIGUEL A (generalized anxiety disorder)     Dr. Charlie Schulte bladder pain     due to dysfunction.  Insulin resistance 09/08/2021    Dr. Nan Jerry. Kochjenae, maurice    Irritable bowel syndrome with diarrhea 4/13/2017    Lipedema 2021    BL legs. Dr. Aman Harrison. [facility], [facility]    Migraine headache     Dr. Kirkpatrick Child    Obesity, [facility]) 12/12/2017    OCD (obsessive compulsive disorder)     Dr. Nava Handsome Sucrose intolerance     Dr. Rowena Mckay, GI.    Tachycardia 06/02/2021    Mauri Cobian DO, [facility] Peds cardio    Tenosynovitis of right hand 07/2021    Dr. Stephany Cummins    Undifferentiated connective tissue disease ([facility] [address]     (lupus like). Dr. Mir Stratton.        Past Surgical History:   Procedure Laterality Date    COLONOSCOPY N/A 12/20/2016    COLONOSCOPY performed by Guillermina Loya MD at [address] GERD TST W/ MUCOS [address] (BRAVO)  12/3/2020         HC CLP BRAVO  11/30/2020    HX CHOLECYSTECTOMY  06/2019    HX ENDOSCOPY  11/2020    dx'd with sucrose intolerance    HX TONSIL AND ADENOIDECTOMY  HX TONSILLECTOMY      AGE 1    HX WISDOM TEETH EXTRACTION      MO EGD TRANSORAL BIOPSY SINGLE/MULTIPLE  6/14/2019         MO EGD TRANSORAL BIOPSY SINGLE/MULTIPLE  11/30/2020    UPPER GI ENDOSCOPY,BIOPSY  12/20/2016              Family History:   Problem Relation Age of Onset    Endometriosis Mother     Delayed Awakening Mother     Post-op Nausea/Vomiting Mother     Migraines Mother         d/t accident - neck and brain injury    Atrial Fibrillation Mother     Hypertension Mother     Obesity Mother     Hypertension Maternal Grandfather     Other Maternal Grandfather         diverticulitis    No Known Problems Father     Hypertension Maternal Grandmother     No Known Problems Paternal Grandmother     No Known Problems Paternal Grandfather     Mult Sclerosis Maternal Aunt     Cancer Maternal Uncle        Social History     Socioeconomic History    Marital status: SINGLE     Spouse name: Not on file    Number of children: 0    Years of education: Not on file    Highest education level: Not on file   Occupational History    Occupation: student   Tobacco Use    Smoking status: Never Smoker    Smokeless tobacco: Never Used    Tobacco comment: no smoke exposure in the home   Vaping Use    Vaping Use: Never used   Substance and Sexual Activity    Alcohol use: No    Drug use: No    Sexual activity: Never     Partners: Male     Birth control/protection: Pill   Other Topics Concern     Service Not Asked    Blood Transfusions Not Asked    Caffeine Concern Not Asked    Occupational Exposure Not Asked    Hobby Hazards Not Asked    Sleep Concern Not Asked    Stress Concern Not Asked    Weight Concern Not Asked    Special Diet Not Asked    Back Care Not Asked    Exercise Not Asked    Bike Helmet Not Asked    Seat Belt Not Asked    Self-Exams Not Asked   Social History Narrative    Not on file     Social Determinants of Health     Financial Resource Strain:     Difficulty of Paying Living Expenses: Not on file   Food Insecurity:     Worried About Running Out of Food in the Last Year: Not on file    Ran Out of Food in the Last Year: Not on file   Transportation Needs:     Lack of Transportation (Medical): Not on file    Lack of Transportation (Non-Medical): Not on file   Physical Activity:     Days of Exercise per Week: Not on file    Minutes of Exercise per Session: Not on file   Stress:     Feeling of Stress : Not on file   Social Connections:     Frequency of Communication with Friends and Family: Not on file    Frequency of Social Gatherings with Friends and Family: Not on file    Attends Confucianism Services: Not on file    Active Member of [address] [place] [address] [employer] or Organizations: Not on file    Attends Club or Organization Meetings: Not on file    Marital Status: Not on file   Intimate Partner Violence:     Fear of Current or Ex-Partner: Not on file    Emotionally Abused: Not on file    Physically Abused: Not on file    Sexually Abused: Not on file   Housing Stability:     Unable to Pay for Housing in the Last Year: Not on file    Number of [place] in the Last Year: Not on file    Unstable Housing in the Last Year: Not on file         ALLERGIES: Yeast, dried and Sucrose    Review of Systems   Review of Systems   Constitutional: (-) weight loss. HEENT: (-) stiff neck   Eyes: (-) discharge. Respiratory: (+) cough. Cardiovascular: (-) syncope. Gastrointestinal: (-) blood in stool. Genitourinary: (-) hematuria. Musculoskeletal: (-) myalgias. Neurological: (-) seizure. Skin: (-) petechiae  Lymph/Immunologic: (-) enlarged lymph nodes  All other systems reviewed and are negative. Vitals:    04/05/22 1020   BP: (!) 96/58   Pulse: (!) 121   Resp: 22   Temp: 97.5 °F (36.4 °C)   SpO2: 98%   Weight: 141 kg (310 lb 13.6 oz)   Height: 5' 4\" (1.626 m)            Physical Exam Nursing note and vitals reviewed. Constitutional: oriented to person, place, and time. appears well-developed and obese. No distress. Head: Normocephalic and atraumatic. Sclera anicteric  Nose: No rhinorrhea  Mouth/Throat: Oropharynx is clear and moist. Pharynx normal  Eyes: Conjunctivae are normal. Pupils are equal, round, and reactive to light. Right eye exhibits no discharge. Left eye exhibits no discharge. Neck: Painless normal range of motion. Neck supple. No LAD. Cardiovascular: Normal rate, regular rhythm, normal heart sounds and intact distal pulses. Exam reveals no gallop and no friction rub. No murmur heard. Pulmonary/Chest:  No respiratory distress. No wheezes. No rales. No rhonchi. No increased work of breathing. No accessory muscle use. Good air exchange throughout. Abdominal: soft, non-tender, no rebound or guarding. No hepatosplenomegaly. Normal bowel sounds throughout. Back: no tenderness to palpation, no deformities, no CVA tenderness  Extremities/Musculoskeletal: Normal range of motion. no tenderness. No edema. Distal extremities are neurovasc intact. Lymphadenopathy:   No adenopathy. Neurological:  Alert and oriented to person, place, and time. Coordination normal. CN 2-12 intact. Motor and sensory function intact. Skin: Skin is warm and dry. No rash noted. No pallor. MDM 21y F here with coughing up blood. Will check labs and imaging. Procedures      ED EKG interpretation:  Rhythm: sinus tachycardia; and regular . Rate (approx.): 126; Axis: normal; P wave: normal; QRS interval: normal ; ST/T wave: normal;  This EKG was interpreted by Claudetta Bidding, MD,ED Provider.

## 2022-04-05 NOTE — ED TRIAGE NOTES
Patient is coming in coughing up blood since yesterday. Due to multiple medical issues they stated to come to the ED to be seen. History of Asthma and Lupus. + nausea. Migraine and shortness of breath also.

## 2022-04-05 NOTE — ED NOTES
MD reviewed discharge instructions and options with patient and patient verbalized understanding. RN reviewed discharge instructions using teachback method. Pt ambulated to exit without difficulty and in no signs of acute distress escorted by staff, and her mother  will drive home. No complaints or needs expressed at this time. Patient was counseled on medications prescribed at discharge. VSS, verbalized relief from most intense pain. Patient to call her PCP in the morning for appointment.

## 2022-04-12 ENCOUNTER — TELEPHONE (OUTPATIENT)
Dept: RHEUMATOLOGY | Age: 22
End: 2022-04-12

## 2022-04-15 ENCOUNTER — VIRTUAL VISIT (OUTPATIENT)
Dept: RHEUMATOLOGY | Age: 22
End: 2022-04-15
Payer: MEDICARE

## 2022-04-15 DIAGNOSIS — M19.041 PRIMARY OSTEOARTHRITIS OF BOTH HANDS: ICD-10-CM

## 2022-04-15 DIAGNOSIS — M19.042 PRIMARY OSTEOARTHRITIS OF BOTH HANDS: ICD-10-CM

## 2022-04-15 DIAGNOSIS — M79.7 FIBROMYALGIA: Primary | ICD-10-CM

## 2022-04-15 DIAGNOSIS — M35.9 UNDIFFERENTIATED CONNECTIVE TISSUE DISEASE (HCC): ICD-10-CM

## 2022-04-15 PROCEDURE — G8427 DOCREV CUR MEDS BY ELIG CLIN: HCPCS | Performed by: INTERNAL MEDICINE

## 2022-04-15 PROCEDURE — G8756 NO BP MEASURE DOC: HCPCS | Performed by: INTERNAL MEDICINE

## 2022-04-15 PROCEDURE — 99215 OFFICE O/P EST HI 40 MIN: CPT | Performed by: INTERNAL MEDICINE

## 2022-04-15 PROCEDURE — G9717 DOC PT DX DEP/BP F/U NT REQ: HCPCS | Performed by: INTERNAL MEDICINE

## 2022-04-15 RX ORDER — NALTREXONE 100 %
4.5 POWDER (GRAM) MISCELLANEOUS DAILY
Qty: 0.135 G | Refills: 5 | Status: SHIPPED | OUTPATIENT
Start: 2022-04-15 | End: 2022-09-26 | Stop reason: ALTCHOICE

## 2022-04-15 RX ORDER — CELECOXIB 200 MG/1
200 CAPSULE ORAL DAILY
Qty: 120 CAPSULE | Refills: 0 | Status: SHIPPED | OUTPATIENT
Start: 2022-04-15 | End: 2022-05-15

## 2022-04-15 NOTE — LETTER
PATIENT:   Jeremy Avendano   YOB: 2000  DATE OF VISIT: 4/15/2022      Dear Janet Alemanr: We recently saw Ms. Claudia Blancoelor in the [facility] for evaluation. My notes for this consultation are attached. If you have questions, please do not hesitate to call me. I look forward to following your patient along with you.       Sincerely,    Sindy Medeiros MD [facility]  Cell: [phone]   Cc:  Tello Piper DO  Via Fax: [phone]     Bobby Davis MD  Via Fax: DO Luan  Via Fax: [phone]     Gary Hammond MD  Via In [facility] [address]

## 2022-04-15 NOTE — PROGRESS NOTES
REASON FOR VISIT:   Ree Redmond is a 24 y.o. female with history of migraines and ADHD who is returning for virtual telehealth followup of undifferentiated connective tissue disease c/b tachycardia, prurigo nodularis, polymorphic light eruption, livedo, and +WENDY 1:320 speckled and 1:640 nuclear dot patterns. Pursuant to the emergency declaration under the [facility] [place] [address] [place] authority and the [employer] Act, this Virtual  Visit was conducted, with patient's consent, to reduce the patient's risk of exposure to COVID-19 and provide continuity of care for an established patient. Services were provided through a video synchronous discussion virtually to substitute for in-person clinic visit. HISTORY OF PRESENT ILLNESS    Having more allergy issues, recently diagnosed with asthma and started on 3 inhalers. Was having epistaxis followed by an episode of scant hemoptysis, went to ER, chest CT unremarkable. No recurrence since, she suspects was bloody mucous from sinuses. Has had multiple tests for allergies in the past which were always normal in the past, but this year particularly has had more sinus congestion     Referred to Dr. Ann Marie Gregory, started 3 inhalers and prescribed a ~7d prednisone taper. With this, breathing has been better. ANCA-associated antibodies were normal in June 2021. Noticed gluteal nodules cleared while taking the prednisone too. Feels exhausted chronically. Arthralgias increased as well. Has been having a hard time getting blood with peripheral draws, concerned this could reflect an underlying clotting disorder. [facility] group has been holding off on treatment while her asthma has been more active. Pain worse at night. Legs and right hand are the worst. Previously didn't feel the arthritis gloves helped at all. Has difficulty holding a pot or stirring.         Disease History:  Since 4 or 4yo, has had recurrent nodules on the skin diffusely--face, arms, legs, abdomen. Can drain pus. Has been treated over the years with oral antibiotics and topical antibiotics which help; swabs have been MRSA-positive in the past. Has tried Hibiclens baths in the past repeatedly without improvement. Pruritic, seem to respond to topical Mupirocin. Has had shave biopsy with Derm at [facility] in the past,punch biopsy in May 2019 was interpreted as mixed dermal inflammation with features of an excoriated hypersensitivity reaction; last seen in November 2020 when diagnosed with keratosis pilaris, rosacea, and neurodermatitis. In February, PCP ordered labs including an WENDY screen which returned +1:320 speckled and 1:640 nuclear dot pattern, with mild elevations of ESR (26) and CRP (17mg/L). Turns \"red as a lobster\" in the sun even with sunscreen use, has always been the case. No chronic cough or progressive dyspnea on exertion. Nonrefreshing sleep and always fatigued in the evening. Runs to the bathroom repeatedly through the evening, doesn't feel she can reduce her fluid intake in the afternoon/evenings 2/2 chronic thirst and dry mouth. Sleep study ~3 years ago she says was normal.  Has more subjective eye dryness, not currently using AT's consistently or following with ophthalmology. Cold intolerant, feels hands and feet always feel like ice, feels worse over the last 2 years. Gaining weight again after losing nearly 100 pounds with metformin; has gained 15 pounds in the last 3 months. Feels digestive issues (possible gastroparesis in the past with postprandial pain, biliary dyskinesis s/p cholecystectomy) have resolved since starting a sucrose intolerance diet. Stool softeners haven't helped in the past. On current diet she is having daily bowel movements without abdominal pain. Knees ache. Denies joint swelling. Has sprained ankles in past and now feels wrists get more sore with use.       REVIEW OF SYSTEMS  A comprehensive review of systems was negative except for that written in the HPI. A 10-point review of systems is per the new patient questionnaire, which has been reviewed extensively and scanned into the electronic medical record for future reference. Review of systems is as above and is otherwise negative. ALLERGIES  Yeast, dried and Sucrose    MEDICATIONS  Current Outpatient Medications   Medication Sig    hydrOXYchloroQUINE (PLAQUENIL) 200 mg tablet Take 1 Tablet by mouth two (2) times a day.  doxycycline (ADOXA) 100 mg tablet Take 1 Tablet by mouth two (2) times a day.  pregabalin (LYRICA) 75 mg capsule Take 75 mg by mouth daily.  Reyvow 100 mg tab     traZODone (DESYREL) 50 mg tablet     lansoprazole (PREVACID) 30 mg capsule Take 30 mg by mouth Daily (before breakfast).  DULoxetine (Cymbalta) 60 mg capsule Take 60 mg by mouth daily.  triamcinolone acetonide (KENALOG) 0.1 % ointment Apply  to affected area two (2) times a day. use thin layer    clotrimazole (LOTRIMIN) 1 % topical cream Apply  to affected area two (2) times a day.  Vyvanse 20 mg capsule TAKE 1 CAPSULE BY MOUTH EVERY MORNING    metFORMIN ER (GLUCOPHAGE XR) 750 mg tablet TAKE 1 TABLET BY MOUTH TWICE A DAY  Indications: polycystic ovarian syndrome, a disease with cysts in the ovaries, prevention of type 2 diabetes mellitus    ascorbic acid, vitamin C, (VITAMIN C) 500 mg tablet Take  by mouth.  sacrosidase (Sucraid) 8,500 unit/mL soln Take 1 mL by mouth.  b complex vitamins tablet ONE TABLE DAILY    hydrOXYzine HCL (ATARAX) 25 mg tablet TAKE 1 TO 2 TABLETS BY MOUTH TWICE A DAY AS NEEDED FOR ANXIETY    norethindrone-ethinyl estradiol-iron (Junel FE 1.5/30, 28,) 1.5 mg-30 mcg (21)/75 mg (7) tab TAKE 1 TAB BY MOUTH DAILY. CONTINUOUS PILLS ONLY.     ondansetron (ZOFRAN ODT) 8 mg disintegrating tablet PLACE 1 TABLET ON TONGUE & ALLOW TO DISSOLVE EVERY 8 HOURS AS NEEDED FOR NAUSEA WITH HEADACHE    Aimovig Autoinjector 140 mg/mL injection INJECT 1 SYRINGE VIA SUBCUTANEOUS ROUTE ONCE A MONTH, AS DIRECTED    cholecalciferol, vitamin d3, (VITAMIN D3) 400 unit cap Take  by mouth.  diphenhydrAMINE (BENADRYL) 25 mg capsule 2 tabs PO q8h PRN     No current facility-administered medications for this visit. PAST MEDICAL HISTORY  Past Medical History:   Diagnosis Date    Abdominal migraine     Dr. Filiberto Glynn. vs Sucrose Intolerance.  Acid reflux     ADHD (attention deficit hyperactivity disorder)     Autism     High Functioning. Dr. Nain Funes.  Chronic rhinitis 2021    Dr. Jayla Nguyen, allergist.    Pearlene Sacks delay     Fibromyalgia     Dr. Wellington Nowak MIGUEL A (generalized anxiety disorder)     Dr. Drake Lozoya bladder pain     due to dysfunction.  Insulin resistance 09/08/2021    Dr. Dora Floyd. maurice Walker    Irritable bowel syndrome with diarrhea 4/13/2017    Lipedema 2021    BL legs. Dr. Elma Teresa. Pastor Shields, [facility]    Migraine headache     Dr. Josefina Gan    Obesity, morbid [facility]) 12/12/2017    OCD (obsessive compulsive disorder)     Dr. Ocampo Ree Sucrose intolerance     Dr. Filiberto Glynn, GI.    Tachycardia 06/02/2021    Mauri Cobian DO, [facility] Peds cardio    Tenosynovitis of right hand 07/2021    Dr. Alicia Saenz    [facility]     (lupus like). Dr. Frederick Jimenez. FAMILY HISTORY  family history includes Atrial Fibrillation in her mother; Cancer in her maternal uncle; Delayed Awakening in her mother; Endometriosis in her mother; Hypertension in her maternal grandfather, maternal grandmother, and mother; Migraines in her mother; Mult Sclerosis in her maternal aunt; No Known Problems in her father, paternal grandfather, and paternal grandmother; Obesity in her mother; Other in her maternal grandfather; Post-op Nausea/Vomiting in her mother. Mother diagnosed with sarcoidosis. Father has gout.  Maternal aunt has MS.    SOCIAL HISTORY  She  reports that she has never smoked. She has never used smokeless tobacco. She reports that she does not drink alcohol and does not use drugs. 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 Eyes: Sclera are anicteric. No conjunctival injection. HEENT:  Oropharynx is clear. No obvious oral ulcers. Adequate salivary pooling. Lungs:  Speaking in full sentences, no stridor or retractions  Cor:  No peripheral edema or cyanosis  Abdomen: No distention or guarding. Skin: No petechiae or purpura. No photodistributed rashes. Neuro: Nonfocal, no foot or wrist drop. Normal gait. Musculoskeletal:    No melinda swelling of hands or LE's    Labs:  4/5/22: WBC 12.0, Hgb 14., Plt 509; Tbili 0.4, AST 7, ALT 21, AlkP 73, Tprot 8.2, Alb 3.4, INR 0.9  9/15/21: CRP 13mg/L, C3 179 (high), C4 30; UA neg for blood, +trace protein. 9/7/21: WBC 11.4, HJgb 16.2, Hct 49.6, Plt 534; aerobic wound culture with scant growth of mixed skin babar. 9/3/21: WBC 8.9, Hgb 15.0, Plt 483, ESR 7, Cr 0.71, LFTs WNL, CRP 17mg/L;   6/17/21: WBC 6.9, Hgb 16.8, Plt 451, ESR 5, CRP 5mg/L  3/10/21: CRP 21mg/L, CPK 57, Marissa neg, RDL myositis panel neg, cardiolipin antibodies   2/24/21: WBC 8.9, Hgb 15.6, Plt 472; ANCAs negative, PR3 and MPO negative; Cr 0.7, CMP WNL;  WENDY 1:320 speckled, 1:640 nuclear dot; C3 high, C4 WNL; negative dsDNA, Scl70, SSA, SSB, RNP, Sm, ribosomal P, chromatin, centromere B antibodies. RF <10, CCP negative; ESR 26, CRP 17mg/L. Hep B cAb neg, Hep B sAg neg, Hep C Ab neg    Pathology:  5/8/19: [facility] pathology final report, Simeon Edward MD:  Skin, right lower leg, punch biopsy:  -Ulcer and mixed dermal inflammation (see comment).   Sections reveal a punch biopsy to the depth of the mid dermis. There is a zone of ulceration with fibrinous crust containing neutrophils. In the superficial to mid dermis, there is a mildly dense perivascular lymphohistiocytic infiltrate with scattered neutrophils and eosinophils. PAS stain is negative for fungal organisms. Taken together, the findings are those of ulceration and mixed dermal inflammation. The features are suggestive of an excoriated hypersensitivity reaction, such as that seen in arthropod bites. Diagnostic features of folliculitis are not seen. Imagin21 MR brain:  Normal MRI of the brain. No intracranial mass, hemorrhage or evidence of acute infarction. 19 CXR (report only): Normal chest views. No change. ASSESSMENT AND PLAN  Ms. Kieran Marie is a 24 y.o. female with high-functioning autism and lipedema who presents for followup of undifferentiated connective tissue disease with resting tachycardia, pruritic nodules, polymorphic light eruption, livedo, and +WENDY 1:320 speckled and 1:640 nuclear dot patterns. Ulcers have been prednisone responsive, and clinical picture is suggestive of component of pyoderma, which could explain her elevated acute phase reactants. . similarly gluteal nodules raise possibility of hidradenitis which would respond to many of the same agents. Recent sinus congestion, epistaxis, wheezing, and skin nodules raise concern for EGPA, but ANCAs in  were negative and prior nodule biopsies were negative for granulomas. She has an upcoming appointment with Dermatology at [facility]. C[place]orro Heck She has had disappointing response to doxycycline, hypertension contraindicates cyclosporin. Favor subQ methotrexate vs trial of Humira (adalimumab), would need to monitor closely for e/o lupus-like syndrome with the latter given already high-titer WENDY. For now palliating with a trial of low dose naltrexone for pain and fatigue, celecoxib for joint pain.     1. Fibromyalgia  - Naltrexone, Bulk, 100 % powd; 4.5 mg by Does Not Apply route daily. Dispense: 0.135 g; Refill: 5  - celecoxib (CELEBREX) 200 mg capsule; Take 1 Capsule by mouth daily for 30 days. Dispense: 120 Capsule; Refill: 0    2. Primary osteoarthritis of both hands  - celecoxib (CELEBREX) 200 mg capsule; Take 1 Capsule by mouth daily for 30 days. Dispense: 120 Capsule; Refill: 0    3. Undifferentiated connective tissue disease (HCC)  - celecoxib (CELEBREX) 200 mg capsule; Take 1 Capsule by mouth daily for 30 days. Dispense: 120 Capsule; Refill: 0  - Cont Plaquenil (hydroxychloroquine) with at least annual eye exam  - Upcoming dermatology opinion re: skin lesions      There are no Patient Instructions on file for this visit. Orders Placed This Encounter    Naltrexone, Bulk, 100 % powd    celecoxib (CELEBREX) 200 mg capsule     Medications: I am having Candis Priest maintain her diphenhydrAMINE, cholecalciferol (vitamin d3), ondansetron, Aimovig Autoinjector, norethindrone-ethinyl estradiol-iron, hydrOXYzine HCL, ascorbic acid (vitamin C), Sucraid, b complex vitamins, Vyvanse, metFORMIN ER, clotrimazole, triamcinolone acetonide, lansoprazole, DULoxetine, pregabalin, Reyvow, traZODone, hydrOXYchloroQUINE, and doxycycline.     Follow up: 6 weeks    Face to face time: 42 minutes  Note preparation and records review day of service: 10 minutes  Total provider time day of service: 52 minutes      Inderjit Simpson MD    Adult Rheumatology   [id number] [facility] [address]  [facility] [place] [place], [address] [place] [address]   Phone [phone]  Fax [phone]

## 2022-04-26 NOTE — PROGRESS NOTES
[facility] [place] at [address], [address]  Tonya Fat: [phone]  F: [phone] Patient ID  Name: Lise Martínez  YOB: 2000  MRN: [id number]  Referring Provider:   No referring provider defined for this encounter. Primary Care Provider:   Jocelyn Alatorre DO       HEMATOLOGY/MEDICAL ONCOLOGY  NOTE   Date of Visit: 04/28/22  Reason for Evaluation:     Chief Complaint   Patient presents with    New Patient     Dayanara Bernardo is a pleasant 24year old woman who presents as a new patient. She denies pain     Subjective:     History of Present Illness:     Lise Martínez is a 24 y.o. F who presents for an initial evaluation for thrombocytosis. This is a chronic problem. Problem has occurred for years. Problem has worsened. Patient reports adequate oral intake of food and hydration. Patient denies any bowel or bladder problems. Patient denies any active pain complaints. Patient denies any shortness of breath. -  Past Medical History:   Diagnosis Date    Abdominal migraine     Dr. Le Bonilla. vs Sucrose Intolerance.  Acid reflux     ADHD (attention deficit hyperactivity disorder)     Autism     High Functioning. Dr. Diomedes Beard.  Chronic rhinitis 2021    Dr. Kerry Garcia, allergist.    Corrine Castleman delay     Fibromyalgia     Dr. Bernardo Knapp MIGUEL A (generalized anxiety disorder)     Dr. Vivian Allen    Insulin resistance 09/08/2021    Dr. David Begum. maurice Walker    Irritable bowel syndrome with diarrhea 4/13/2017    Lymphedema 2021    BL legs. Dr. Khadra Smith.   Zaida Stanley, [facility]    Migraine headache     Dr. Lyssa Kelly    Obesity, morbid [facility]) 12/12/2017    OCD (obsessive compulsive disorder)     Dr. Vivian Allen    Sucrose intolerance     Dr. Le Bonilla, GI.    Tachycardia 06/02/2021    Mauri Cobian DO, [facility] Peds cardio    Tenosynovitis of right hand 07/2021    Dr. Esa Phan Undifferentiated connective tissue disease (HCC)     (lupus like). Dr. Pepe Avila. Past Surgical History:   Procedure Laterality Date    COLONOSCOPY N/A 12/20/2016    COLONOSCOPY performed by Dimitris Mendoza MD at [address] [place] [address] ENDOSCOPY    GERD TST W/ MUCOS PH ELECTROD 50 HR (BRAVO)  12/3/2020         HC CLP BRAVO  11/30/2020    HX CHOLECYSTECTOMY  06/2019    HX ENDOSCOPY  11/2020    dx'd with sucrose intolerance    HX TONSIL AND ADENOIDECTOMY      HX TONSILLECTOMY      AGE 1    HX WISDOM TEETH EXTRACTION      DE EGD TRANSORAL BIOPSY SINGLE/MULTIPLE  6/14/2019         DE EGD TRANSORAL BIOPSY SINGLE/MULTIPLE  11/30/2020    UPPER GI ENDOSCOPY,BIOPSY  12/20/2016           Social History     Tobacco Use    Smoking status: Never Smoker    Smokeless tobacco: Never Used    Tobacco comment: no smoke exposure in the home   Substance Use Topics    Alcohol use: No      Family History   Problem Relation Age of Onset    Endometriosis Mother     Delayed Awakening Mother     Post-op Nausea/Vomiting Mother    [place] Migraines Mother         d/t accident - neck and brain injury    Atrial Fibrillation Mother     Hypertension Mother     Obesity Mother     Hypertension Maternal Grandfather     Other Maternal Grandfather         diverticulitis    No Known Problems Father     Hypertension Maternal Grandmother     No Known Problems Paternal Grandmother     No Known Problems Paternal Grandfather     Mult Sclerosis Maternal Aunt     Cancer Maternal Uncle     Prostate Cancer Other      Current Outpatient Medications   Medication Sig    Spiriva Respimat 1.25 mcg/actuation inhaler     predniSONE (STERAPRED DS) 10 mg dose pack     nystatin (MYCOSTATIN) 100,000 unit/mL suspension     montelukast (SINGULAIR) 10 mg tablet     Lidocaine Viscous 2 % solution     docosahexanoic acid-eicosapent 120-180 mg cap Take 2,000 mg by mouth daily.  Naltrexone, Bulk, 100 % powd 4.5 mg by Does Not Apply route daily.     celecoxib (CELEBREX) 200 mg capsule Take 1 Capsule by mouth daily for 30 days.  hydrOXYchloroQUINE (PLAQUENIL) 200 mg tablet Take 1 Tablet by mouth two (2) times a day.  doxycycline (ADOXA) 100 mg tablet Take 1 Tablet by mouth two (2) times a day.  pregabalin (LYRICA) 75 mg capsule Take 75 mg by mouth daily.  Reyvow 100 mg tab     traZODone (DESYREL) 50 mg tablet     lansoprazole (PREVACID) 30 mg capsule Take 30 mg by mouth Daily (before breakfast).  DULoxetine (Cymbalta) 60 mg capsule Take 60 mg by mouth daily.  triamcinolone acetonide (KENALOG) 0.1 % ointment Apply  to affected area two (2) times a day. use thin layer    clotrimazole (LOTRIMIN) 1 % topical cream Apply  to affected area two (2) times a day.  Vyvanse 20 mg capsule TAKE 1 CAPSULE BY MOUTH EVERY MORNING    metFORMIN ER (GLUCOPHAGE XR) 750 mg tablet TAKE 1 TABLET BY MOUTH TWICE A DAY  Indications: polycystic ovarian syndrome, a disease with cysts in the ovaries, prevention of type 2 diabetes mellitus    ascorbic acid, vitamin C, (VITAMIN C) 500 mg tablet Take  by mouth.  sacrosidase (Sucraid) 8,500 unit/mL soln Take 1 mL by mouth.  b complex vitamins tablet ONE TABLE DAILY    hydrOXYzine HCL (ATARAX) 25 mg tablet TAKE 1 TO 2 TABLETS BY MOUTH TWICE A DAY AS NEEDED FOR ANXIETY    norethindrone-ethinyl estradiol-iron (Junel FE 1.5/30, 28,) 1.5 mg-30 mcg (21)/75 mg (7) tab TAKE 1 TAB BY MOUTH DAILY. CONTINUOUS PILLS ONLY.  ondansetron (ZOFRAN ODT) 8 mg disintegrating tablet PLACE 1 TABLET ON TONGUE & ALLOW TO DISSOLVE EVERY 8 HOURS AS NEEDED FOR NAUSEA WITH HEADACHE    Aimovig Autoinjector 140 mg/mL injection INJECT 1 SYRINGE VIA SUBCUTANEOUS ROUTE ONCE A MONTH, AS DIRECTED    cholecalciferol, vitamin d3, (VITAMIN D3) 400 unit cap Take  by mouth.  diphenhydrAMINE (BENADRYL) 25 mg capsule 2 tabs PO q8h PRN     No current facility-administered medications for this visit.       Allergies   Allergen Reactions    Yeast, Dried Other (comments)     Upset stomach     Sucrose Nausea Only     SEVERE ABDOMINAL PAIN      -  Review of Systems provided by:patient  General: denies fever, reports chills and reports fatigue  Eyes: denies any acute vision loss and denies any eye pain  HEENT: denies epistaxis, denies trouble swallowing and reports allergic sinusitis  Cardio: denies any chest pain and reports leg swelling  Resp: denies any shortness of breath. denies any hemoptysis. Abdomen: denies any abdominal pain, denies any nausea, denies any vomiting, denies any diarrhea and denies any constipation  MSK: denies any myalgias, denies any arthralgias and possible fibromyalgia. Skin: reports rash and reports itching  Lymph: denies any lymph node enlargement and denies any lymph node tenderness  Neuro: denies any seizure history and reports a history of headaches  : Denies any dysuria. Denies any hematuria. Psych: denies depression, denies suicidal ideations, denies homicidal ideations and reports anxiety    Objective:     Visit Vitals  BP (!) 147/105   Pulse (!) 102   Resp 16   Ht 5' 4\" (1.626 m)   Wt 324 lb 9.6 oz (147.2 kg)   SpO2 98%   BMI 55.72 kg/m²     ECOG PS: 1- Restricted in physically strenuous activity but ambulatory and able to carry out work of a light or sedentary nature, e.g., light house work, office work. Physical Exam  Constitutional: No acute distress. , Non-toxic appearance. and Non-diaphoretic. HENT: Normocephalic and atraumatic head. Eyes: Normal Conjunctivae. Anicteric sclerae. Cardiovascular: S1,S2 auscultated. No pitting edema. No friction rub. Pulmonary: Normal Respiratory Effort. No wheezing. No rhonchi. No rales. Abdominal: Normal bowel sounds. Soft Abdomen to palpation. No abdominal tenderness. No guarding. No rebound tenderness. Musculoskeletal: No muscle pain on palpation. No temporal muscle wasting on inspection. Skin: No jaundice.  Rash present: multiple skin lesions with appearance of various stages of healing (see below) No petechiae. Neurological: Alert and oriented. No tremor on inspection. Normal Gait. Psychiatric: mood normal. normal speech rate normal affect  Lymph: No cervical, supraclavicular,axillary, or inguinal lymph node enlargement or tenderness. Results:     I personally reviewed Epic EHR labs/results below:   Lab Results   Component Value Date/Time    WBC 12.0 (H) 04/05/2022 10:50 AM    HGB 14.7 04/05/2022 10:50 AM    HCT 46.8 04/05/2022 10:50 AM    PLATELET 952 (H) 20/43/8607 10:50 AM    MCV 89.7 04/05/2022 10:50 AM    ABS. NEUTROPHILS 8.9 (H) 04/05/2022 10:50 AM     Lab Results   Component Value Date/Time    Sodium 135 (L) 04/05/2022 10:50 AM    Potassium 3.8 04/05/2022 10:50 AM    Chloride 105 04/05/2022 10:50 AM    CO2 29 04/05/2022 10:50 AM    Glucose 105 (H) 04/05/2022 10:50 AM    Glucose 86 12/18/2017 08:35 AM    BUN 12 04/05/2022 10:50 AM    Creatinine 0.88 04/05/2022 10:50 AM    GFR est AA >60 04/05/2022 10:50 AM    GFR est non-AA >60 04/05/2022 10:50 AM    Calcium 9.4 04/05/2022 10:50 AM    Glucose (POC) 112 (H) 06/14/2019 10:26 AM     Lab Results   Component Value Date/Time    Bilirubin, total 0.4 04/05/2022 10:50 AM    ALT (SGPT) 21 04/05/2022 10:50 AM    Alk. phosphatase 73 04/05/2022 10:50 AM    Protein, total 8.2 04/05/2022 10:50 AM    Albumin 3.4 (L) 04/05/2022 10:50 AM    Globulin 4.8 (H) 04/05/2022 10:50 AM     Reviewed CBC and elevated platelet count since 2017. Assessment and Recommendations:     1. Other thrombocytosis  Could be reactive. However, since present for 5 years and worsening will order mutation  studies as listed below along with the follwing.  - CBC WITH AUTOMATED DIFF; Future  - C REACTIVE PROTEIN, QT; Future  - SED RATE (ESR); Future  - PERIPHERAL SMEAR; Future  - JAK2 V617F, RFX CALR/E12-15/MPL    2. Elevated C-reactive protein (CRP)  Could be a sign of inflammation suggesting a secondary polycythemia.     3. Skin lesions  She will see [facility] dermatology this early Summer. Follow-up and Dispositions    · Return in about 4 weeks (around 5/26/2022).        Signed By:   Tuan Barclay MD

## 2022-04-28 ENCOUNTER — OFFICE VISIT (OUTPATIENT)
Dept: ONCOLOGY | Age: 22
End: 2022-04-28
Payer: MEDICARE

## 2022-04-28 VITALS
RESPIRATION RATE: 16 BRPM | OXYGEN SATURATION: 98 % | HEART RATE: 102 BPM | HEIGHT: 64 IN | BODY MASS INDEX: 50.02 KG/M2 | DIASTOLIC BLOOD PRESSURE: 105 MMHG | WEIGHT: 293 LBS | SYSTOLIC BLOOD PRESSURE: 147 MMHG

## 2022-04-28 DIAGNOSIS — L98.9 SKIN LESIONS: ICD-10-CM

## 2022-04-28 DIAGNOSIS — D75.838 OTHER THROMBOCYTOSIS: Primary | ICD-10-CM

## 2022-04-28 DIAGNOSIS — D75.839 THROMBOCYTOSIS: ICD-10-CM

## 2022-04-28 DIAGNOSIS — R79.82 ELEVATED C-REACTIVE PROTEIN (CRP): ICD-10-CM

## 2022-04-28 PROCEDURE — 99204 OFFICE O/P NEW MOD 45 MIN: CPT | Performed by: INTERNAL MEDICINE

## 2022-04-28 PROCEDURE — G8755 DIAS BP > OR = 90: HCPCS | Performed by: INTERNAL MEDICINE

## 2022-04-28 PROCEDURE — G8417 CALC BMI ABV UP PARAM F/U: HCPCS | Performed by: INTERNAL MEDICINE

## 2022-04-28 PROCEDURE — G8753 SYS BP > OR = 140: HCPCS | Performed by: INTERNAL MEDICINE

## 2022-04-28 PROCEDURE — G8427 DOCREV CUR MEDS BY ELIG CLIN: HCPCS | Performed by: INTERNAL MEDICINE

## 2022-04-28 PROCEDURE — G9717 DOC PT DX DEP/BP F/U NT REQ: HCPCS | Performed by: INTERNAL MEDICINE

## 2022-04-28 RX ORDER — TIOTROPIUM BROMIDE INHALATION SPRAY 1.56 UG/1
SPRAY, METERED RESPIRATORY (INHALATION)
COMMUNITY
Start: 2022-04-07 | End: 2022-06-02

## 2022-04-28 RX ORDER — LIDOCAINE HYDROCHLORIDE 20 MG/ML
SOLUTION ORAL; TOPICAL
COMMUNITY
Start: 2022-04-18 | End: 2022-06-02

## 2022-04-28 RX ORDER — PREDNISONE 10 MG/1
TABLET ORAL
COMMUNITY
Start: 2022-04-25 | End: 2022-06-02

## 2022-04-28 RX ORDER — NYSTATIN 100000 [USP'U]/ML
SUSPENSION ORAL
COMMUNITY
Start: 2022-03-23 | End: 2022-05-20

## 2022-04-28 RX ORDER — MONTELUKAST SODIUM 10 MG/1
TABLET ORAL
COMMUNITY
Start: 2022-03-14 | End: 2022-09-30 | Stop reason: SDUPTHER

## 2022-04-28 NOTE — PROGRESS NOTES
Chief Complaint   Patient presents with    New Patient     Carina Shields is a pleasant 24year old woman who presents as a new patient.  She denies pain

## 2022-04-28 NOTE — LETTER
4/28/2022    Patient: Madhu Garcia   YOB: 2000   Date of Visit: 4/28/2022     Iglesia Webb DO  [address] [facility] [place] [address]  Via Fax: [phone]    Dear Iglesia Webb DO,      Thank you for referring Ms. Loida Holt to [facility]  [facility] for evaluation. My notes for this consultation are attached. If you have questions, please do not hesitate to call me. I look forward to following your patient along with you.       Sincerely,    Jayesh Weiss MD

## 2022-04-29 LAB
BASOPHILS # BLD: 0 K/UL (ref 0–0.1)
BASOPHILS NFR BLD: 0 % (ref 0–1)
CRP SERPL-MCNC: 0.34 MG/DL (ref 0–0.6)
DIFFERENTIAL METHOD BLD: ABNORMAL
EOSINOPHIL # BLD: 0 K/UL (ref 0–0.4)
EOSINOPHIL NFR BLD: 0 % (ref 0–7)
ERYTHROCYTE [DISTWIDTH] IN BLOOD BY AUTOMATED COUNT: 14 % (ref 11.5–14.5)
ERYTHROCYTE [SEDIMENTATION RATE] IN BLOOD: 119 MM/HR (ref 0–20)
HCT VFR BLD AUTO: 46.6 % (ref 35–47)
HGB BLD-MCNC: 14.7 G/DL (ref 11.5–16)
IMM GRANULOCYTES # BLD AUTO: 0.1 K/UL (ref 0–0.04)
IMM GRANULOCYTES NFR BLD AUTO: 1 % (ref 0–0.5)
LYMPHOCYTES # BLD: 2.1 K/UL (ref 0.8–3.5)
LYMPHOCYTES NFR BLD: 11 % (ref 12–49)
MCH RBC QN AUTO: 28.8 PG (ref 26–34)
MCHC RBC AUTO-ENTMCNC: 31.5 G/DL (ref 30–36.5)
MCV RBC AUTO: 91.2 FL (ref 80–99)
MONOCYTES # BLD: 1.3 K/UL (ref 0–1)
MONOCYTES NFR BLD: 7 % (ref 5–13)
NEUTS SEG # BLD: 15.4 K/UL (ref 1.8–8)
NEUTS SEG NFR BLD: 81 % (ref 32–75)
NRBC # BLD: 0 K/UL (ref 0–0.01)
NRBC BLD-RTO: 0 PER 100 WBC
PERIPHERAL SMEAR,PSM: NORMAL
PLATELET # BLD AUTO: 511 K/UL (ref 150–400)
PMV BLD AUTO: 9.1 FL (ref 8.9–12.9)
RBC # BLD AUTO: 5.11 M/UL (ref 3.8–5.2)
WBC # BLD AUTO: 18.9 K/UL (ref 3.6–11)

## 2022-05-04 ENCOUNTER — TELEPHONE (OUTPATIENT)
Dept: RHEUMATOLOGY | Age: 22
End: 2022-05-04

## 2022-05-04 NOTE — TELEPHONE ENCOUNTER
Pt called to advise she found a hand doctor and needs the doc to fax the referral over to Dr. Deann Santacruz at [address] [place] [address] fax number [phone].  patient would like a call once the referral is fax over so she can call to make appt.    [phone]

## 2022-05-16 LAB
BACKGROUND, 081113: NORMAL
BACKGROUND, 489163: NORMAL
BACKGROUND: 480503: NORMAL
CALR MUTATION DETECTION RESULT, 489451: NORMAL
COMMENT, 489419: NORMAL
EXTRACTION: NORMAL
EXTRACTION: NORMAL
JAK2 GENE MUT ANL BLD/T: NORMAL
JAK2 P.V617F BLD/T QL: NORMAL
LAB DIRECTOR NAME PROVIDER: NORMAL
MPL GENE MUT TESTED BLD/T: NORMAL
MPL P.W515L+W515K+S505N BLD/T QL: NORMAL
REF LAB TEST METHOD: NORMAL
REF LAB TEST METHOD: NORMAL
REFERENCES, 150293: NORMAL
REFERENCES, 150293: NORMAL
REFERENCES: NORMAL
SERVICE CMNT-IMP: NORMAL

## 2022-05-17 ENCOUNTER — TELEPHONE (OUTPATIENT)
Dept: RHEUMATOLOGY | Age: 22
End: 2022-05-17

## 2022-05-17 NOTE — TELEPHONE ENCOUNTER
Patient called in stating doc was suppose to send lab orders or put them in the system so she can have labs done.  Would like a call at [phone]

## 2022-05-20 ENCOUNTER — OFFICE VISIT (OUTPATIENT)
Dept: ONCOLOGY | Age: 22
End: 2022-05-20
Payer: MEDICARE

## 2022-05-20 VITALS
TEMPERATURE: 98 F | HEART RATE: 99 BPM | WEIGHT: 293 LBS | RESPIRATION RATE: 18 BRPM | SYSTOLIC BLOOD PRESSURE: 134 MMHG | HEIGHT: 64 IN | BODY MASS INDEX: 50.02 KG/M2 | DIASTOLIC BLOOD PRESSURE: 79 MMHG | OXYGEN SATURATION: 98 %

## 2022-05-20 DIAGNOSIS — L98.9 SKIN LESIONS: ICD-10-CM

## 2022-05-20 DIAGNOSIS — R70.0 ELEVATED SED RATE: ICD-10-CM

## 2022-05-20 DIAGNOSIS — M12.9 ARTHROPATHY, UNSPECIFIED: ICD-10-CM

## 2022-05-20 DIAGNOSIS — D72.9 NEUTROPHILIA: ICD-10-CM

## 2022-05-20 DIAGNOSIS — D72.821 MONOCYTOSIS: ICD-10-CM

## 2022-05-20 DIAGNOSIS — D75.838 OTHER THROMBOCYTOSIS: Primary | ICD-10-CM

## 2022-05-20 PROBLEM — D72.828 NEUTROPHILIA: Status: ACTIVE | Noted: 2022-05-20

## 2022-05-20 PROCEDURE — G8417 CALC BMI ABV UP PARAM F/U: HCPCS | Performed by: INTERNAL MEDICINE

## 2022-05-20 PROCEDURE — G9717 DOC PT DX DEP/BP F/U NT REQ: HCPCS | Performed by: INTERNAL MEDICINE

## 2022-05-20 PROCEDURE — G8754 DIAS BP LESS 90: HCPCS | Performed by: INTERNAL MEDICINE

## 2022-05-20 PROCEDURE — G8427 DOCREV CUR MEDS BY ELIG CLIN: HCPCS | Performed by: INTERNAL MEDICINE

## 2022-05-20 PROCEDURE — 99214 OFFICE O/P EST MOD 30 MIN: CPT | Performed by: INTERNAL MEDICINE

## 2022-05-20 PROCEDURE — G8752 SYS BP LESS 140: HCPCS | Performed by: INTERNAL MEDICINE

## 2022-05-20 NOTE — PATIENT INSTRUCTIONS
No visits with results within 3 Week(s) from this visit. Latest known visit with results is:   Orders Only on 04/28/2022   Component Date Value Ref Range Status    WBC 04/28/2022 18.9* 3.6 - 11.0 K/uL Final    RBC 04/28/2022 5.11  3.80 - 5.20 M/uL Final    HGB 04/28/2022 14.7  11.5 - 16.0 g/dL Final    HCT 04/28/2022 46.6  35.0 - 47.0 % Final    MCV 04/28/2022 91.2  80.0 - 99.0 FL Final    MCH 04/28/2022 28.8  26.0 - 34.0 PG Final    MCHC 04/28/2022 31.5  30.0 - 36.5 g/dL Final    RDW 04/28/2022 14.0  11.5 - 14.5 % Final    PLATELET 08/55/7205 143* 150 - 400 K/uL Final    MPV 04/28/2022 9.1  8.9 - 12.9 FL Final    NRBC 04/28/2022 0.0  0  WBC Final    ABSOLUTE NRBC 04/28/2022 0.00  0.00 - 0.01 K/uL Final    NEUTROPHILS 04/28/2022 81* 32 - 75 % Final    LYMPHOCYTES 04/28/2022 11* 12 - 49 % Final    MONOCYTES 04/28/2022 7  5 - 13 % Final    EOSINOPHILS 04/28/2022 0  0 - 7 % Final    BASOPHILS 04/28/2022 0  0 - 1 % Final    IMMATURE GRANULOCYTES 04/28/2022 1* 0.0 - 0.5 % Final    ABS. NEUTROPHILS 04/28/2022 15.4* 1.8 - 8.0 K/UL Final    ABS. LYMPHOCYTES 04/28/2022 2.1  0.8 - 3.5 K/UL Final    ABS. MONOCYTES 04/28/2022 1.3* 0.0 - 1.0 K/UL Final    ABS. EOSINOPHILS 04/28/2022 0.0  0.0 - 0.4 K/UL Final    ABS. BASOPHILS 04/28/2022 0.0  0.0 - 0.1 K/UL Final    ABS. IMM. GRANS. 04/28/2022 0.1* 0.00 - 0.04 K/UL Final    DF 04/28/2022 AUTOMATED    Final    C-Reactive protein 04/28/2022 0.34  0.00 - 0.60 mg/dL Final    Comment: CRP is a nonspecific acute phase reactant that shows rapid, marked increases  with inflammation, infection, trauma, tissue necrosis, malignancies and  autoimmune diseases. Sequential CRP levels are useful in monitoring response to  antibacterial therapy. This assay is not equivalent to the hsCRP test since the  presence of one or more of the foregoing disease processes obviates the risk  stratification information available from hsCRP testing.       Sed rate, automated 2022 119* 0 - 20 mm/hr Final    PERIPHERAL SMEAR 2022 Pathologic examination results can be viewed in [facility] Chart Review under the Pathology tab. Final    [id number]           Specimen #:[id number]   Patient ID: [id number]                        Account [de-identified]   /Gender: 2000 (Age: 21)/F               Location: [address])     Collect: 2022    Rec'd: 2022      Reported: 2022     Physician(s):   Parminder Culp MD     PERIPHERAL SMEAR REVIEW       * * *CLINICAL HISTORY* * *     Not provided           ==========================================================================   * * *FINAL PATHOLOGIC DIAGNOSIS* * *          Blood, smear:        Normochromic normocytic erythrocytes. Leukocytosis with neutrophilia comprised of mature forms including   occasional hypersegmented forms. Morphologically unremarkable monocytes   and lymphocytes appear normal in numbers. Thrombocytosis with normally formed platelets. * * *Comment* * *     Clinical exclusion of reactive causes of leukocytosis/thrombocytosis is   recommended. In the case of persistent unexplained CBC abnormalities   additional testing as indicated. [id number]/[id number]   *Electronically Signed Out By Taylor Calloway.  Antonella Rudolph MD*

## 2022-05-20 NOTE — PROGRESS NOTES
[facility] [place] at [address], [address] [facility] 100[address] [facility]  Augusto Ledger: [phone]  F: [phone] Patient ID  Name: Laure Ahumada  YOB: 2000  MRN: [id number]  Referring Provider:   No referring provider defined for this encounter. Primary Care Provider:   Siri Rose       HEMATOLOGY/MEDICAL ONCOLOGY  NOTE   Date of Visit: 05/20/22  Reason for Evaluation:     Chief Complaint   Patient presents with    Thrombocytosis     Subjective:     History of Present Illness:     Laure Ahumada is a 24 y.o. F who presents for a follow-up evaluation for thrombocytosis. Patient overall reports feeling stable. She denies any recent active headaches. Denies any vision changes. Has a history of migraine generalized headaches. Denies any temporal pain. Reports still developing skin lesions with recent lesion on left arm. Having ongoing hand/wrist joint pain; follows with Dr. Bhavna Rivera. Patient/mother report that rheumatology suspects a \"lupus like\" connective tissue disorder. Will see [facility] next month. -  Fatigue:Grade 2  Anxiety:Grade 1  Pain:general; \"all over\"  Numbness/Tingling:Grade 1  Swelling:Grade 2    Past Medical History:   Diagnosis Date    Abdominal migraine     Dr. Ashley Hendrickson. vs Sucrose Intolerance.  Acid reflux     ADHD (attention deficit hyperactivity disorder)     Autism     High Functioning. Dr. Allyson Vasquez.  Chronic rhinitis 2021    Dr. Libra Flores, allergist.    Juan Jose Dunbar delay     Fibromyalgia     Dr. Rayne Mcnamara MIGUEL A (generalized anxiety disorder)     Dr. Martha Dalton    Insulin resistance 09/08/2021    [facility] Gan. [facility]    Irritable bowel syndrome with diarrhea 4/13/2017    Lymphedema 2021    BL legs. Dr. Lou Martinez.   [facility], [facility]    Migraine headache     Dr. aSman Walker    Obesity, morbid [facility]) 12/12/2017    OCD (obsessive compulsive disorder)     Dr. Reginaldo Jerome intolerance     Dr. Esme Fong, GI.    Tachycardia 06/02/2021    Mauri Cobian DO, [facility] cardio    Tenosynovitis of right hand 07/2021    Dr. Barrington Cruz    Undifferentiated connective tissue disease ([facility] [address]     (lupus like). Dr. Ada Paz.       Past Surgical History:   Procedure Laterality Date    COLONOSCOPY N/A 12/20/2016    COLONOSCOPY performed by Nba Gambino MD at [facility]    GERD TST W/ MUCOS PH ELECTROD 50 HR (BRAVO)  12/3/2020         HC CLP BRAVO  11/30/2020    HX CHOLECYSTECTOMY  06/2019    HX ENDOSCOPY  11/2020    dx'd with sucrose intolerance    HX TONSIL AND ADENOIDECTOMY      HX TONSILLECTOMY      AGE 2    HX WISDOM TEETH EXTRACTION      MS EGD TRANSORAL BIOPSY SINGLE/MULTIPLE  6/14/2019         MS EGD TRANSORAL BIOPSY SINGLE/MULTIPLE  11/30/2020    UPPER GI ENDOSCOPY,BIOPSY  12/20/2016           Social History     Tobacco Use    Smoking status: Never Smoker    Smokeless tobacco: Never Used    Tobacco comment: no smoke exposure in the home   Substance Use Topics    Alcohol use: No      Family History   Problem Relation Age of Onset    Endometriosis Mother     Delayed Awakening Mother     Post-op Nausea/Vomiting Mother    [facility] Migraines Mother         d/t accident - neck and brain injury    Atrial Fibrillation Mother     Hypertension Mother     Obesity Mother     Hypertension Maternal Grandfather     Other Maternal Grandfather         diverticulitis    No Known Problems Father     Hypertension Maternal Grandmother     No Known Problems Paternal Grandmother     No Known Problems Paternal Grandfather     Mult Sclerosis Maternal Aunt     Cancer Maternal Uncle     Lymphoma Other      Current Outpatient Medications   Medication Sig    Spiriva Respimat 1.25 mcg/actuation inhaler     predniSONE (STERAPRED DS) 10 mg dose pack     montelukast (SINGULAIR) 10 mg tablet     Lidocaine Viscous 2 % solution     docosahexanoic acid-eicosapent 120-180 mg cap Take 2,000 mg by mouth daily.  Naltrexone, Bulk, 100 % powd 4.5 mg by Does Not Apply route daily.  hydrOXYchloroQUINE (PLAQUENIL) 200 mg tablet Take 1 Tablet by mouth two (2) times a day.  pregabalin (LYRICA) 75 mg capsule Take 75 mg by mouth daily.  Reyvow 100 mg tab     traZODone (DESYREL) 50 mg tablet     lansoprazole (PREVACID) 30 mg capsule Take 30 mg by mouth Daily (before breakfast).  DULoxetine (Cymbalta) 60 mg capsule Take 60 mg by mouth daily.  triamcinolone acetonide (KENALOG) 0.1 % ointment Apply  to affected area two (2) times a day. use thin layer    clotrimazole (LOTRIMIN) 1 % topical cream Apply  to affected area two (2) times a day.  Vyvanse 20 mg capsule TAKE 1 CAPSULE BY MOUTH EVERY MORNING    metFORMIN ER (GLUCOPHAGE XR) 750 mg tablet TAKE 1 TABLET BY MOUTH TWICE A DAY  Indications: polycystic ovarian syndrome, a disease with cysts in the ovaries, prevention of type 2 diabetes mellitus    ascorbic acid, vitamin C, (VITAMIN C) 500 mg tablet Take  by mouth.  sacrosidase (Sucraid) 8,500 unit/mL soln Take 1 mL by mouth.  b complex vitamins tablet ONE TABLE DAILY    hydrOXYzine HCL (ATARAX) 25 mg tablet TAKE 1 TO 2 TABLETS BY MOUTH TWICE A DAY AS NEEDED FOR ANXIETY    norethindrone-ethinyl estradiol-iron (Junel FE 1.5/30, 28,) 1.5 mg-30 mcg (21)/75 mg (7) tab TAKE 1 TAB BY MOUTH DAILY. CONTINUOUS PILLS ONLY.  ondansetron (ZOFRAN ODT) 8 mg disintegrating tablet PLACE 1 TABLET ON TONGUE & ALLOW TO DISSOLVE EVERY 8 HOURS AS NEEDED FOR NAUSEA WITH HEADACHE    Aimovig Autoinjector 140 mg/mL injection INJECT 1 SYRINGE VIA SUBCUTANEOUS ROUTE ONCE A MONTH, AS DIRECTED    cholecalciferol, vitamin d3, (VITAMIN D3) 400 unit cap Take  by mouth.  diphenhydrAMINE (BENADRYL) 25 mg capsule 2 tabs PO q8h PRN    nystatin (MYCOSTATIN) 100,000 unit/mL suspension     doxycycline (ADOXA) 100 mg tablet Take 1 Tablet by mouth two (2) times a day.      No current facility-administered medications for this visit. Allergies   Allergen Reactions    Yeast, Dried Other (comments)     Upset stomach     Sucrose Nausea Only     SEVERE ABDOMINAL PAIN      Review of Systems Provided by:  Patient  Review of Systems: A complete review of systems was obtained, reviewed. Pertinent findings reviewed above. Objective:     Visit Vitals  /79   Pulse 99   Temp 98 °F (36.7 °C) (Temporal)   Resp 18   Ht 5' 4\" (1.626 m)   Wt 325 lb (147.4 kg)   SpO2 98%   BMI 55.79 kg/m²     ECOG PS: 1- Restricted in physically strenuous activity but ambulatory and able to carry out work of a light or sedentary nature, e.g., light house work, office work. Physical Exam  Constitutional: No acute distress. and Non-toxic appearance. HENT: Normocephalic and atraumatic head. No lips lesions but dry appearance. Eyes: Normal Conjunctivae. Anicteric sclerae. Cardiovascular: S1,S2 auscultated. No pitting edema. No friction rub. Pulmonary: Normal Respiratory Effort. No wheezing. No rhonchi. No rales. Abdominal: Normal bowel sounds. Soft Abdomen to palpation. No abdominal tenderness. No guarding. Skin: No jaundice. No petechiae. Focal skin lesions. Musculoskeletal: No muscle pain on palpation. No temporal muscle wasting on inspection. Neurological: Alert and oriented. No tremor on inspection. Normal Gait. Psychiatric: mood normal. normal speech rate normal affect    Results:     I personally reviewed Epic EHR labs/results below:   Lab Results   Component Value Date/Time    WBC 18.9 (H) 04/28/2022 03:30 PM    HGB 14.7 04/28/2022 03:30 PM    HCT 46.6 04/28/2022 03:30 PM    PLATELET 445 (H) 80/86/2161 03:30 PM    MCV 91.2 04/28/2022 03:30 PM    ABS.  NEUTROPHILS 15.4 (H) 04/28/2022 03:30 PM     Lab Results   Component Value Date/Time    Sodium 135 (L) 04/05/2022 10:50 AM    Potassium 3.8 04/05/2022 10:50 AM    Chloride 105 04/05/2022 10:50 AM    CO2 29 04/05/2022 10:50 AM    Glucose 105 (H) 04/05/2022 10:50 AM    Glucose 86 12/18/2017 08:35 AM    BUN 12 04/05/2022 10:50 AM    Creatinine 0.88 04/05/2022 10:50 AM    GFR est AA >60 04/05/2022 10:50 AM    GFR est non-AA >60 04/05/2022 10:50 AM    Calcium 9.4 04/05/2022 10:50 AM    Glucose (POC) 112 (H) 06/14/2019 10:26 AM     Lab Results   Component Value Date/Time    Bilirubin, total 0.4 04/05/2022 10:50 AM    ALT (SGPT) 21 04/05/2022 10:50 AM    Alk. phosphatase 73 04/05/2022 10:50 AM    Protein, total 8.2 04/05/2022 10:50 AM    Albumin 3.4 (L) 04/05/2022 10:50 AM    Globulin 4.8 (H) 04/05/2022 10:50 AM     Orders Only on 04/28/2022   Component Date Value Ref Range Status    WBC 04/28/2022 18.9* 3.6 - 11.0 K/uL Final    RBC 04/28/2022 5.11  3.80 - 5.20 M/uL Final    HGB 04/28/2022 14.7  11.5 - 16.0 g/dL Final    HCT 04/28/2022 46.6  35.0 - 47.0 % Final    MCV 04/28/2022 91.2  80.0 - 99.0 FL Final    MCH 04/28/2022 28.8  26.0 - 34.0 PG Final    MCHC 04/28/2022 31.5  30.0 - 36.5 g/dL Final    RDW 04/28/2022 14.0  11.5 - 14.5 % Final    PLATELET 29/28/0940 943* 150 - 400 K/uL Final    MPV 04/28/2022 9.1  8.9 - 12.9 FL Final    NRBC 04/28/2022 0.0  0  WBC Final    ABSOLUTE NRBC 04/28/2022 0.00  0.00 - 0.01 K/uL Final    NEUTROPHILS 04/28/2022 81* 32 - 75 % Final    LYMPHOCYTES 04/28/2022 11* 12 - 49 % Final    MONOCYTES 04/28/2022 7  5 - 13 % Final    EOSINOPHILS 04/28/2022 0  0 - 7 % Final    BASOPHILS 04/28/2022 0  0 - 1 % Final    IMMATURE GRANULOCYTES 04/28/2022 1* 0.0 - 0.5 % Final    ABS. NEUTROPHILS 04/28/2022 15.4* 1.8 - 8.0 K/UL Final    ABS. LYMPHOCYTES 04/28/2022 2.1  0.8 - 3.5 K/UL Final    ABS. MONOCYTES 04/28/2022 1.3* 0.0 - 1.0 K/UL Final    ABS. EOSINOPHILS 04/28/2022 0.0  0.0 - 0.4 K/UL Final    ABS. BASOPHILS 04/28/2022 0.0  0.0 - 0.1 K/UL Final    ABS. IMM.  GRANS. 04/28/2022 0.1* 0.00 - 0.04 K/UL Final    DF 04/28/2022 AUTOMATED    Final    C-Reactive protein 04/28/2022 0.34  0.00 - 0.60 mg/dL Final Comment: CRP is a nonspecific acute phase reactant that shows rapid, marked increases  with inflammation, infection, trauma, tissue necrosis, malignancies and  autoimmune diseases. Sequential CRP levels are useful in monitoring response to  antibacterial therapy. This assay is not equivalent to the hsCRP test since the  presence of one or more of the foregoing disease processes obviates the risk  stratification information available from hsCRP testing.  Sed rate, automated 04/28/2022 119* 0 - 20 mm/hr Final    PERIPHERAL SMEAR 04/28/2022 Pathologic examination results can be viewed in [facility] Chart Review under the Pathology tab. Final    PS22 056   Office Visit on 04/28/2022   Component Date Value Ref Range Status    JAK2 Mutation Analysis 04/28/2022 Comment   Final    Comment: Result: NEGATIVE for the JAK2 V617F mutation. Interpretation:  The G to T nucleotide change encoding the V617F  mutation was not detected. This result does not rule out the presence  of the JAK2 mutation at a level below the sensitivity of detection of  this assay, or the presence of other mutations within JAK2 not  detected by this assay. This result does not rule out a diagnosis of  polycythemia vera, essential thrombocythemia or idiopathic  myelofibrosis as the V617F mutation is not detected in all patients  with these disorders.  Background: 04/28/2022 Comment   Final    Comment: JAK2 is a cytoplasmic tyrosine kinase with a key role in signal  transduction from multiple hematopoietic growth factor receptors. A  point mutation within exon 14 of the JAK2 gene (T6335V) encoding a  valine to phenylalanine substitution at position 617 of the JAK2  protein (V617F) has been identified in most patients with polycythemia  vera, and in about half of those with either essential thrombocythemia  or idiopathic myelofibrosis.  The V617F has also been detected,  although infrequently, in other myeloid disorders such as chronic  myelomonocytic leukemia and chronic neutrophilic luekemia. V617F is  an acquired mutation that alters a highly conserved valine present in  the negative regulatory JH2 domain of the JAK2 protein and is  predicted to dysregulate kinase activity. Methodology: Total genomic DNA was extracted and subjected to TaqMan real-time PCR  amplification/detection. Two amplification products per sample were  monitored by real-time PCR using primers/probes specific                            to JAK2 wild  type (WT) and JAK2 mutant V617F. The [id number] Absolute Quantitation  software will compare the patient specimen valuse to the standard  curves and generate percent values for wild type and mutant type. In vitro studies have indicated that this assay has an analytical  sensitivity of 1%. References:  Luis Angel TORRES, Renlado Kc, et al. Acquired mutation of the  tyrosine kinase JAK2 in human myeloproliferative disorders. Lancet. 2005 Mar 19-25; 365(4377):5434-8310. Kandy Wilkinson JP. A unique clonal JAK2 mutation leading  to constitutive signaling causes polycythaemia vera. Nature. 2005 Apr 28; 206(7179):0102-0858. Mayank R, Louie F, Elba AS, et al. A gain-of-function  mutation of JAK2 in myeloproliferative disorders. N Engl J Med. 2005  Apr 28; 35217):6447-5352.  Director Review 04/28/2022 Comment   Final    Comment: Maine Morales, PhD, [facility].      Director, [address] [place] [address], [address]  [facility]      [phone]  This test was developed and its performance characteristics  determined by Paola Portillo. It has not been cleared or approved  by the Food and Drug Administration.  COMMENT 04/28/2022 Comment   Final    Comment: Reflex to CALR Mutation Analysis, JAK2 Exon 12-15 Mutation Analysis,  and MPL Mutation Analysis is indicated.       Extraction 04/28/2022 Completed   Final    CALR MUTATION DETECTION RESULT 04/28/2022 Comment   Final    Comment: NEGATIVE  No insertions or deletions were detected within the analyzed region  of the calreticulin (CALR) gene. A negative result does not entirely exclude the possibility of a  clonal population carrying CALR gene mutations that are not covered  by this assay. Results should be interpreted in conjunction with  clinical and laboratory findings for the most accurate interpretation.  Background 04/28/2022 Comment   Final    Comment: The calcium-binding endoplasmic reticulin chaperone protein,  calreticulin (CALR), is somatically mutated in approximately 70% of  patients with JAK2-negative essential thrombocythemia (ET) and 60-88%  of patients with JAK2-negative primary myelofibrosis(PMF). Only a  minority of patients (approximately 8%) with myelodysplasia have  mutations in  CALR gene. CALR mutations are rarely detected in  patients with de kassandra acute myeloid leukemia, chronic myelogenous  leukemia, lymphoid leukemia, or solid tumors. CALR mutations are not  detected in polycythemia and generally appear to be mutually exclusive  with JAK2 mutations and MPL mutations. The majority of mutational changes involve a variety of insertion or  deletion mutations in exon 9 of the calreticulin gene: approximately  53% of all CALR mutations are a 52 bp deletion (type-1) while the  second most prevalent mutation (approximately 32%) contains a 5 bp  insertion (type-2). Other mutations (non-type 1 or type 2) are seen  in a sma                           ll minority of cases.  CALR mutations in PMF tend to be  associated with a favorable prognosis compared to JAK2 V617F  mutations, whereas primary myelofibrosis negative for CALR, JAK2  V617F and MPL mutations (so-called triple negative) is associated  with a poor prognosis and shorter survival.  The detection of a CALR gene mutation aids in the specific diagnosis  of a myeloproliferative neoplasm, and help distinguish this clonal  disease from a benign reactive process.  Methodology 04/28/2022 Comment   Final    Comment: Genomic DNA was isolated from the provided specimen. Polymerase chain  reaction (PCR) of exon 9 of the CALR gene was performed with specific  fluorescent-labeled primers, and the PCR product was analyzed by  capillary gel electrophoresis to determine the size of the PCR  products. This PCR assay is capable of detecting a mutant cell  population with a sensitivity of 5 mutant cells per 100 normal cells. A negative result does not exclude the presence of a  myeloproliferative disorder or other neoplastic process. This test was developed and its performance characteristics determined  by [employer]. It has not been cleared or approved by the Food and Drug  Administration. The FDA has determined that such clearance or approval  is not necessary.  References 04/28/2022 Comment   Final    Comment: 1. Arpan Shields et al. (2013) Somatic mutations of calreticulin in     myeloproliferative neoplasms. New Engl. J. Med. [phone]:[phone].  2. Ghada Forman et al. (2013) Somatic CALR mutations in     myeloproliferative neoplasms with nonmutated JAK2. [place] Engl. [place] Cart [phone].  Director review 04/28/2022 Comment   Final    Comment: Doyle Abreu, PhD, [facility].                 Director, [address] [place] [address]                 [phone]      JAK2 Exons 12-15 Mut Det PCR 04/28/2022 Comment   Final    Comment: NEGATIVE  JAK2 mutations were not detected in exons 12, 13, 14 and 15. This  result does not rule out the presence of JAK2 mutation at a level  below the detection sensitivity of this assay, the presence of  other mutations outside the analyzed region of the JAK2 gene, or  the presence of a myeloproliferative or other neoplasm.  Result must  be correlated with other clinical data for the most accurate  diagnosis.  Background 04/28/2022 Comment   Final    Comment: JAK2 V617F mutation is detected in patients with polycythemia vera  (95%), essential thrombocythemia (50%) and primary myelofibrosis  (50%). A small percentage of JAK2 mutation positive patients (3.3%)  contain other non-V617F mutations within exons 12 to 15. The detection  of a JAK2 gene mutation aids in the specific diagnosis of a  myeloproliferative neoplasm, and help distinguish this clonal disease  from a benign reactive process.  Method 04/28/2022 Comment   Final    Comment: Total RNA was purified from the provided specimen. The JAK2 gene  region covering exons 12 to 15 was subjected to reverse-transcription  coupled PCR amplification, and bi-directional sequencing to identify  sequence variations. This assay has a sensitivity to detect  approximately 15% population of cells containing the JAK2 mutations  in a background of non-mutant cells. This test was developed and its performance characteristics determined  by [email or web address]. It has not been cleared or approved by the Food and Drug  Administration.  References 04/28/2022 Comment   Final    Comment: NORBERTO Taylor. et al. Detection of mutations in JAK2 exons 12-15 by  [place] sequencing. Int J Lab Hemato. 2015, 38:34-41. Echo Velasco al. Mutation profile of JAK2 transcripts in patients with  chronic myeloproliferative neoplasias. J Mol Diagn. 2009, 11:49-53.       Director review 04/28/2022 Comment   Final    Comment: Gold Leggett, PhD, [facility] [employer].                 Director, [facility] [employer]                 Biology and [address] [place] [address]                 [phone]      MPL MUTATION ANALYSIS RESULT 04/28/2022 Comment   Final    Comment: No MPL mutation was identified in the provided specimen of this  individual. Results should be interpreted in conjunction with  clinical and other laboratory findings for the most accurate  interpretation.  Background: 04/28/2022 Comment   Final    Comment: MPL (myeloproliferative leukemia virus oncogene homology) belongs to  the hematopoietin superfamily and enables its ligand thrombopoietin  to facilitate both global hematopoiesis and megakaryocyte growth and  differentiation. MPL W515 mutations are present in patients with  primary myelofibrosis (PMF) and essential thrombocythemia (ET) at a  frequency of approximately 5% and 1% respectively. The S505 mutation  is detected in patients with hereditary thrombocythemia.  Methodology 04/28/2022 Comment   Final    Comment: Genomic DNA was purified from the provided specimen. MPL gene region  covering the S505N and W515L/K mutations were subjected to PCR  amplification and bi-directional sequencing in duplicate to identify  sequence variations. This assay has a sensitivity to detect  approximately 20-25% population of cells containing the MPL mutations  in a background of non-mutant cells. This assay will not detect the  mutation below the sensitivity of this assay. Molecular-based testing  is highly accurate, but as in any laboratory test, rare diagnostic  errors may occur.  References 04/28/2022 Comment   Final    Comment: 1. Sherine MODI et al. (2006). BUL571 mutations in     myeloproliferative and other myeloid disorders: a study     of 1182 patients. Blood 144:1029-6961.  2. Meche Long and Surjit PIERSON. (2008). JAK2 and MPL     mutations in myeloproliferative neoplasms: discovery and     science. Leukemia 22:6799-2161.  3. Veronika Blair et al. (2009). Evidence for a  effect     of the MPL-S505N mutation in eight [facility] pedigrees with     hereditary thrombocythemia. Haematologica 94(10):8980-     7581.       Director review 04/28/2022 Comment   Final    Comment: Caroline Mary, PhD, [facility] [facility]                 Director, [address] [place] [address] for Molecular                 Biology and Pathology [address] [place], [place]                 [phone]  This test was developed and its performance characteristics  determined by [facility]. It has not been cleared or approved  by the Food and Drug Administration.  Extraction 04/28/2022 Comment   Final    This sample has been received and DNA extraction has been performed. Assessment and Recommendations:     1. Other thrombocytosis  -smear notes evaluation for a reactive process. However, due to the chronic nature of her thrombocytosis I do not find that it is unreasonable to pursue a bone marrow biopsy at this time. She would like to proceed.  -if she changes her mind, we discussed that BCR-abl testing for CML and flow cytometry for her monocytosis is not unreasonable to order. However, she is agreeable with bone marrow biopsy at this time. - CBC WITH AUTOMATED DIFF; Future  - CT BX BONE MARROW DIAGNOSTIC; Future    2. Monocytosis  Very well may be reactive to some undiagnosed systemic process/condition (skin lesions,arthropathy). However, it seems reasonable to order a bone marrow biopsy since her platelets have never normalized. With elevated neutrophils and monocytosis will proceed to bone marrow biopsy.  - CBC WITH AUTOMATED DIFF; Future  - CT BX BONE MARROW DIAGNOSTIC; Future    3. Neutrophilia  Could be reactive. However, will order bone marrow biopsy.  - CBC WITH AUTOMATED DIFF; Future  - CT BX BONE MARROW DIAGNOSTIC; Future  - IRON PROFILE; Future  - FERRITIN; Future    4. Skin lesions  -she does not have febrile episodes like you would see with Sweet syndrome. I will ultimately defer to Dermatology. 5. Elevated sed rate  -Denies any symptoms for temporal arteritis. Reviewed Dr. Andree Boast telephone note about results per my chart review after patient and mother noted that Dr. Dang Boothe is planning for repeat labs.  -Dr. Dang Boothe question if ESR is correct (I agree that there is some disconnect between ESR and CRP).  Since it is not clear if other labs are intended to be order per Rheumatology I will defer this further work-up to his clinic. He is welcome to contact our clinic if any other concerns or any specific requests. I will correspond our note to him. Appreciate his follow-up of patient too as her case is not exactly straightforward; further work-up as planned above. - IRON PROFILE; Future  - FERRITIN; Future    6. Arthropathy, unspecified   Will check iron studies to see  If any reactive/elevated ferritin studies or iron deficiency. However, with elevated WBC and chronic platelet elevation, iron deficiency seems less likely.  - IRON PROFILE; Future  - FERRITIN; Future      Follow-up and Dispositions    · Return in about 17 days (around 6/6/2022).          Caprice Farris MD  Hematology/Medical Oncology Provider  [facility]  P: [phone]    Signed By:   Torsten Perez MD

## 2022-05-20 NOTE — LETTER
5/20/2022    Patient: Dl Elkins   YOB: 2000   Date of Visit: 5/20/2022     Sofía Marrufo PA-C  [facility] [address]    Dear Sofía Marrufo PA-C,      Thank you for referring Ms. Carlo Cheng to [address] [place] [address] for evaluation. My notes for this consultation are attached. If you have questions, please do not hesitate to call me. I look forward to following your patient along with you.       Sincerely,    Lisa Pride MD

## 2022-05-20 NOTE — TELEPHONE ENCOUNTER
Spoke with pt and let her know that Dr. Lina Wu placed lab orders today and she can go get those done at any time at a [employer] that is convenient to her. She stated an understanding.

## 2022-05-24 ENCOUNTER — PATIENT MESSAGE (OUTPATIENT)
Dept: RHEUMATOLOGY | Age: 22
End: 2022-05-24

## 2022-05-24 DIAGNOSIS — R70.0 ELEVATED SED RATE: Primary | ICD-10-CM

## 2022-05-24 DIAGNOSIS — M35.9 UNDIFFERENTIATED CONNECTIVE TISSUE DISEASE (HCC): ICD-10-CM

## 2022-05-27 LAB
BASOPHILS # BLD AUTO: 0.1 X10E3/UL (ref 0–0.2)
BASOPHILS NFR BLD AUTO: 1 %
CRP SERPL-MCNC: 12 MG/L (ref 0–10)
EOSINOPHIL # BLD AUTO: 1 X10E3/UL (ref 0–0.4)
EOSINOPHIL NFR BLD AUTO: 11 %
ERYTHROCYTE [DISTWIDTH] IN BLOOD BY AUTOMATED COUNT: 14.4 % (ref 11.7–15.4)
ERYTHROCYTE [SEDIMENTATION RATE] IN BLOOD BY WESTERGREN METHOD: 17 MM/HR (ref 0–32)
HCT VFR BLD AUTO: 44.6 % (ref 34–46.6)
HGB BLD-MCNC: 14.8 G/DL (ref 11.1–15.9)
IMM GRANULOCYTES # BLD AUTO: 0 X10E3/UL (ref 0–0.1)
IMM GRANULOCYTES NFR BLD AUTO: 0 %
LDH SERPL-CCNC: 192 IU/L (ref 119–226)
LYMPHOCYTES # BLD AUTO: 2.5 X10E3/UL (ref 0.7–3.1)
LYMPHOCYTES NFR BLD AUTO: 28 %
MCH RBC QN AUTO: 28.8 PG (ref 26.6–33)
MCHC RBC AUTO-ENTMCNC: 33.2 G/DL (ref 31.5–35.7)
MCV RBC AUTO: 87 FL (ref 79–97)
MONOCYTES # BLD AUTO: 0.5 X10E3/UL (ref 0.1–0.9)
MONOCYTES NFR BLD AUTO: 6 %
NEUTROPHILS # BLD AUTO: 5 X10E3/UL (ref 1.4–7)
NEUTROPHILS NFR BLD AUTO: 54 %
PLATELET # BLD AUTO: 443 X10E3/UL (ref 150–450)
RBC # BLD AUTO: 5.13 X10E6/UL (ref 3.77–5.28)
WBC # BLD AUTO: 9.2 X10E3/UL (ref 3.4–10.8)

## 2022-05-31 ENCOUNTER — HOSPITAL ENCOUNTER (OUTPATIENT)
Dept: CT IMAGING | Age: 22
Discharge: HOME | End: 2022-05-31
Attending: INTERNAL MEDICINE
Payer: MEDICARE

## 2022-05-31 VITALS
SYSTOLIC BLOOD PRESSURE: 143 MMHG | RESPIRATION RATE: 22 BRPM | HEIGHT: 64 IN | HEART RATE: 101 BPM | DIASTOLIC BLOOD PRESSURE: 97 MMHG | OXYGEN SATURATION: 99 % | WEIGHT: 293 LBS | TEMPERATURE: 98.7 F | BODY MASS INDEX: 50.02 KG/M2

## 2022-05-31 DIAGNOSIS — D75.838 OTHER THROMBOCYTOSIS: ICD-10-CM

## 2022-05-31 DIAGNOSIS — D72.821 MONOCYTOSIS: ICD-10-CM

## 2022-05-31 DIAGNOSIS — D72.9 NEUTROPHILIA: ICD-10-CM

## 2022-05-31 LAB
BASOPHILS # BLD: 0.1 K/UL (ref 0–0.1)
BASOPHILS NFR BLD: 1 % (ref 0–1)
DIFFERENTIAL METHOD BLD: ABNORMAL
EOSINOPHIL # BLD: 5.1 K/UL (ref 0–0.4)
EOSINOPHIL NFR BLD: 37 % (ref 0–7)
ERYTHROCYTE [DISTWIDTH] IN BLOOD BY AUTOMATED COUNT: 15.3 % (ref 11.5–14.5)
HCT VFR BLD AUTO: 43.7 % (ref 35–47)
HGB BLD-MCNC: 14.6 G/DL (ref 11.5–16)
IMM GRANULOCYTES # BLD AUTO: 0 K/UL (ref 0–0.04)
IMM GRANULOCYTES NFR BLD AUTO: 0 % (ref 0–0.5)
LYMPHOCYTES # BLD: 3.1 K/UL (ref 0.8–3.5)
LYMPHOCYTES NFR BLD: 23 % (ref 12–49)
MCH RBC QN AUTO: 29.8 PG (ref 26–34)
MCHC RBC AUTO-ENTMCNC: 33.4 G/DL (ref 30–36.5)
MCV RBC AUTO: 89.2 FL (ref 80–99)
MONOCYTES # BLD: 0.9 K/UL (ref 0–1)
MONOCYTES NFR BLD: 7 % (ref 5–13)
NEUTS SEG # BLD: 4.3 K/UL (ref 1.8–8)
NEUTS SEG NFR BLD: 32 % (ref 32–75)
NRBC # BLD: 0 K/UL (ref 0–0.01)
NRBC BLD-RTO: 0 PER 100 WBC
PLATELET # BLD AUTO: 513 K/UL (ref 150–400)
PMV BLD AUTO: 8.9 FL (ref 8.9–12.9)
RBC # BLD AUTO: 4.9 M/UL (ref 3.8–5.2)
RBC MORPH BLD: ABNORMAL
WBC # BLD AUTO: 13.5 K/UL (ref 3.6–11)

## 2022-05-31 PROCEDURE — 88305 TISSUE EXAM BY PATHOLOGIST: CPT

## 2022-05-31 PROCEDURE — 88264 CHROMOSOME ANALYSIS 20-25: CPT

## 2022-05-31 PROCEDURE — 85025 COMPLETE CBC W/AUTO DIFF WBC: CPT

## 2022-05-31 PROCEDURE — 81450 HL NEO GSAP 5-50DNA/DNA&RNA: CPT

## 2022-05-31 PROCEDURE — 81479 UNLISTED MOLECULAR PATHOLOGY: CPT

## 2022-05-31 PROCEDURE — 74011250636 HC RX REV CODE- 250/636: Performed by: PHYSICIAN ASSISTANT

## 2022-05-31 PROCEDURE — 77030014115

## 2022-05-31 PROCEDURE — 38221 DX BONE MARROW BIOPSIES: CPT

## 2022-05-31 PROCEDURE — 88185 FLOWCYTOMETRY/TC ADD-ON: CPT

## 2022-05-31 PROCEDURE — 88184 FLOWCYTOMETRY/ TC 1 MARKER: CPT

## 2022-05-31 PROCEDURE — 36415 COLL VENOUS BLD VENIPUNCTURE: CPT

## 2022-05-31 PROCEDURE — 74011250636 HC RX REV CODE- 250/636: Performed by: RADIOLOGY

## 2022-05-31 PROCEDURE — 88374 M/PHMTRC ALYS ISHQUANT/SEMIQ: CPT

## 2022-05-31 PROCEDURE — 88342 IMHCHEM/IMCYTCHM 1ST ANTB: CPT

## 2022-05-31 PROCEDURE — 88341 IMHCHEM/IMCYTCHM EA ADD ANTB: CPT

## 2022-05-31 PROCEDURE — 88311 DECALCIFY TISSUE: CPT

## 2022-05-31 PROCEDURE — 88237 TISSUE CULTURE BONE MARROW: CPT

## 2022-05-31 PROCEDURE — 77030003666 HC NDL SPINAL BD -A

## 2022-05-31 PROCEDURE — 88313 SPECIAL STAINS GROUP 2: CPT

## 2022-05-31 PROCEDURE — 77030028872 HC BN BIOP NDL ON CNTRL TY TELE -C

## 2022-05-31 RX ORDER — SODIUM CHLORIDE 9 MG/ML
25 INJECTION, SOLUTION INTRAVENOUS CONTINUOUS
Status: DISCONTINUED | OUTPATIENT
Start: 2022-05-31 | End: 2022-06-04 | Stop reason: HOSPADM

## 2022-05-31 RX ORDER — MIDAZOLAM HYDROCHLORIDE 1 MG/ML
5 INJECTION, SOLUTION INTRAMUSCULAR; INTRAVENOUS
Status: DISCONTINUED | OUTPATIENT
Start: 2022-05-31 | End: 2022-06-04 | Stop reason: HOSPADM

## 2022-05-31 RX ORDER — FLUMAZENIL 0.1 MG/ML
0.1 INJECTION INTRAVENOUS ONCE
Status: ACTIVE | OUTPATIENT
Start: 2022-05-31 | End: 2022-05-31

## 2022-05-31 RX ORDER — SODIUM BICARBONATE 42 MG/ML
1 INJECTION, SOLUTION INTRAVENOUS
Status: DISPENSED | OUTPATIENT
Start: 2022-05-31 | End: 2022-05-31

## 2022-05-31 RX ORDER — NALOXONE HYDROCHLORIDE 0.4 MG/ML
0.4 INJECTION, SOLUTION INTRAMUSCULAR; INTRAVENOUS; SUBCUTANEOUS AS NEEDED
Status: DISCONTINUED | OUTPATIENT
Start: 2022-05-31 | End: 2022-06-04 | Stop reason: HOSPADM

## 2022-05-31 RX ORDER — FENTANYL CITRATE 50 UG/ML
100 INJECTION, SOLUTION INTRAMUSCULAR; INTRAVENOUS
Status: DISCONTINUED | OUTPATIENT
Start: 2022-05-31 | End: 2022-06-04 | Stop reason: HOSPADM

## 2022-05-31 RX ORDER — LIDOCAINE HYDROCHLORIDE 10 MG/ML
INJECTION, SOLUTION EPIDURAL; INFILTRATION; INTRACAUDAL; PERINEURAL
Status: DISPENSED
Start: 2022-05-31 | End: 2022-05-31

## 2022-05-31 RX ORDER — LIDOCAINE HYDROCHLORIDE 10 MG/ML
10 INJECTION INFILTRATION; PERINEURAL
Status: ACTIVE | OUTPATIENT
Start: 2022-05-31 | End: 2022-05-31

## 2022-05-31 RX ADMIN — MIDAZOLAM HYDROCHLORIDE 2 MG: 1 INJECTION, SOLUTION INTRAMUSCULAR; INTRAVENOUS at 08:58

## 2022-05-31 RX ADMIN — SODIUM CHLORIDE 25 ML/HR: 9 INJECTION, SOLUTION INTRAVENOUS at 08:47

## 2022-05-31 RX ADMIN — MIDAZOLAM HYDROCHLORIDE 1 MG: 1 INJECTION, SOLUTION INTRAMUSCULAR; INTRAVENOUS at 09:02

## 2022-05-31 RX ADMIN — FENTANYL CITRATE 25 MCG: 50 INJECTION, SOLUTION INTRAMUSCULAR; INTRAVENOUS at 09:02

## 2022-05-31 RX ADMIN — FENTANYL CITRATE 25 MCG: 50 INJECTION, SOLUTION INTRAMUSCULAR; INTRAVENOUS at 08:58

## 2022-05-31 RX ADMIN — FENTANYL CITRATE 50 MCG: 50 INJECTION, SOLUTION INTRAMUSCULAR; INTRAVENOUS at 08:50

## 2022-05-31 RX ADMIN — FENTANYL CITRATE 25 MCG: 50 INJECTION, SOLUTION INTRAMUSCULAR; INTRAVENOUS at 08:48

## 2022-05-31 RX ADMIN — MIDAZOLAM HYDROCHLORIDE 1 MG: 1 INJECTION, SOLUTION INTRAMUSCULAR; INTRAVENOUS at 08:48

## 2022-05-31 RX ADMIN — MIDAZOLAM HYDROCHLORIDE 1 MG: 1 INJECTION, SOLUTION INTRAMUSCULAR; INTRAVENOUS at 08:50

## 2022-05-31 NOTE — PROGRESS NOTES
Discharge instructions have been reviewed with patient and mother. Copy given to patient. Pt verbalized understand of instructions and was given  the opportunity to ask questions and receive answers prior to leaving.      IV d/c intact. Mother at bedside. Side rail lowered for dressing and patient discharge home. Pt/mother advised of medication side effects and need for additional safely all day today. Curtain closed for privacy. Clement Fly

## 2022-05-31 NOTE — H&P
INTERVENTIONAL RADIOLOGY  Preoperative History and Physical      Patient:  Luz Summers  :  2000  Age:  24 y.o. MRN:  [id number]  Today's Date:  2022      CC / HPI   Luz Summers is a 24 y.o. female with a history of thrombocytosis who presents for CT guided bone marrow biopsy. PAST MEDICAL HISTORY  Past Medical History:   Diagnosis Date    Abdominal migraine     Dr. Sergei Hdz. vs Sucrose Intolerance.  Acid reflux     ADHD (attention deficit hyperactivity disorder)     Autism     High Functioning. Dr. Awa Baires.  Chronic rhinitis     Dr. Vanessa Romano, allergist.    Dalene Collet delay     Fibromyalgia     Dr. Kenyon Parrish MIGUEL A (generalized anxiety disorder)     Dr. Malissa Kaba    Insulin resistance 2021    Dr. Laurel Walker, maurice    Irritable bowel syndrome with diarrhea 2017    Lymphedema     BL legs. Dr. Kartik Morrison. Kat Frazier[facility]    Migraine headache     Dr. Carroll Starch    Obesity, [facility]) 2017    OCD (obsessive compulsive disorder)     Dr. Claudia Sorenson Sucrose intolerance     Dr. Sergei Hdz, GI.    Tachycardia 2021    Mauri Cobian DO, [facility]    Tenosynovitis of right hand 2021    Dr. Felipe Brantley    Undifferentiated connective tissue disease ([facility] [address]     (lupus like). Dr. Shawn Pierre.        PAST SURGICAL HISTORY  Past Surgical History:   Procedure Laterality Date    COLONOSCOPY N/A 2016    COLONOSCOPY performed by [facility]y [facility]son MD at [facility]    GERD TST W/ MUCOS [address] ELECTROD 48 HR (BRAVO)  12/3/2020         HC CLP BRAVO  2020    HX CHOLECYSTECTOMY  2019    HX ENDOSCOPY  2020    dx'd with sucrose intolerance    HX TONSIL AND ADENOIDECTOMY      HX TONSILLECTOMY      AGE 1    HX WISDOM TEETH EXTRACTION      FL EGD TRANSORAL BIOPSY SINGLE/MULTIPLE  2019         FL EGD TRANSORAL BIOPSY SINGLE/MULTIPLE  2020    UPPER GI ENDOSCOPY,BIOPSY 12/20/2016            SOCIAL HISTORY  Social History     Socioeconomic History    Marital status: SINGLE     Spouse name: Not on file    Number of children: 0    Years of education: Not on file    Highest education level: Not on file   Occupational History    Occupation: student   Tobacco Use    Smoking status: Never Smoker    Smokeless tobacco: Never Used    Tobacco comment: no smoke exposure in the home   Vaping Use    Vaping Use: Never used   Substance and Sexual Activity    Alcohol use: No    Drug use: No    Sexual activity: Never     Partners: Male     Birth control/protection: Pill   Other Topics Concern     Service Not Asked    Blood Transfusions Not Asked    Caffeine Concern Not Asked    Occupational Exposure Not Asked    Hobby Hazards Not Asked    Sleep Concern Not Asked    Stress Concern Not Asked    Weight Concern Not Asked    Special Diet Not Asked    Back Care Not Asked    Exercise Not Asked    Bike Helmet Not Asked   [address] Not Asked    Self-Exams Not Asked   Social History Narrative    No known chemical exposures. Social Determinants of Health     Financial Resource Strain:     Difficulty of Paying Living Expenses: Not on file   Food Insecurity:     Worried About Running Out of Food in the Last Year: Not on file    Atilio of Food in the Last Year: Not on file   Transportation Needs:     Lack of Transportation (Medical): Not on file    Lack of Transportation (Non-Medical):  Not on file   Physical Activity:     Days of Exercise per Week: Not on file    Minutes of Exercise per Session: Not on file   Stress:     Feeling of Stress : Not on file   Social Connections:     Frequency of Communication with Friends and Family: Not on file    Frequency of Social Gatherings with Friends and Family: Not on file    Attends Scientologist Services: Not on file    Active Member of Clubs or Organizations: Not on file    Attends Club or Organization Meetings: Not on file  Marital Status: Not on file   Intimate Partner Violence:     Fear of Current or Ex-Partner: Not on file    Emotionally Abused: Not on file    Physically Abused: Not on file    Sexually Abused: Not on file   Housing Stability:     Unable to Pay for Housing in the Last Year: Not on file    Number of Places Lived in the Last Year: Not on file    Unstable Housing in the Last Year: Not on file       FAMILY HISTORY  Family History   Problem Relation Age of Onset    Endometriosis Mother     Delayed Awakening Mother     Post-op Nausea/Vomiting Mother     Migraines Mother         d/t accident - neck and brain injury    Atrial Fibrillation Mother     Hypertension Mother     Obesity Mother     Hypertension Maternal Grandfather     Other Maternal Grandfather         diverticulitis    No Known Problems Father     Hypertension Maternal Grandmother     No Known Problems Paternal Grandmother     No Known Problems Paternal Grandfather     Mult Sclerosis Maternal Aunt     Cancer Maternal Uncle     Lymphoma Other        CURRENT MEDICATIONS  Current Outpatient Medications   Medication Sig Dispense Refill    predniSONE (STERAPRED DS) 10 mg dose pack       montelukast (SINGULAIR) 10 mg tablet       docosahexanoic acid-eicosapent 120-180 mg cap Take 2,000 mg by mouth daily.  hydrOXYchloroQUINE (PLAQUENIL) 200 mg tablet Take 1 Tablet by mouth two (2) times a day. 180 Tablet 1    pregabalin (LYRICA) 75 mg capsule Take 75 mg by mouth daily.  traZODone (DESYREL) 50 mg tablet       lansoprazole (PREVACID) 30 mg capsule Take 30 mg by mouth Daily (before breakfast).  DULoxetine (Cymbalta) 60 mg capsule Take 60 mg by mouth daily.  clotrimazole (LOTRIMIN) 1 % topical cream Apply  to affected area two (2) times a day.  45 g 1    Vyvanse 20 mg capsule 30 mg.      metFORMIN ER (GLUCOPHAGE XR) 750 mg tablet TAKE 1 TABLET BY MOUTH TWICE A DAY  Indications: polycystic ovarian syndrome, a disease with cysts in the ovaries, prevention of type 2 diabetes mellitus 180 Tablet 5    ascorbic acid, vitamin C, (VITAMIN C) 500 mg tablet Take  by mouth.  sacrosidase (Sucraid) 8,500 unit/mL soln Take 1 mL by mouth.  b complex vitamins tablet ONE TABLE DAILY      norethindrone-ethinyl estradiol-iron (Junel FE 1.5/30, 28,) 1.5 mg-30 mcg (21)/75 mg (7) tab TAKE 1 TAB BY MOUTH DAILY. CONTINUOUS PILLS ONLY. 3 Package 5    ondansetron (ZOFRAN ODT) 8 mg disintegrating tablet PLACE 1 TABLET ON TONGUE & ALLOW TO DISSOLVE EVERY 8 HOURS AS NEEDED FOR NAUSEA WITH HEADACHE      Aimovig Autoinjector 140 mg/mL injection INJECT 1 SYRINGE VIA SUBCUTANEOUS ROUTE ONCE A MONTH, AS DIRECTED      cholecalciferol, vitamin d3, (VITAMIN D3) 400 unit cap Take  by mouth.  Spiriva Respimat 1.25 mcg/actuation inhaler       Lidocaine Viscous 2 % solution       Naltrexone, Bulk, 100 % powd 4.5 mg by Does Not Apply route daily. 0.135 g 5    Reyvow 100 mg tab  (Patient not taking: Reported on 5/31/2022)      triamcinolone acetonide (KENALOG) 0.1 % ointment Apply  to affected area two (2) times a day.  use thin layer 30 g 0    hydrOXYzine HCL (ATARAX) 25 mg tablet TAKE 1 TO 2 TABLETS BY MOUTH TWICE A DAY AS NEEDED FOR ANXIETY      diphenhydrAMINE (BENADRYL) 25 mg capsule 2 tabs PO q8h PRN       Current Facility-Administered Medications   Medication Dose Route Frequency Provider Last Rate Last Admin    sodium bicarbonate (4.2%) injection 42 mg  1 mL SubCUTAneous JASPER Kaplan MD        lidocaine (XYLOCAINE) 10 mg/mL (1 %) injection 10 mL  10 mL SubCUTAneous JASPER Kaplan MD        0.9% sodium chloride infusion  25 mL/hr IntraVENous CONTINUOUS BASILIA Martinez        midazolam (VERSED) injection 5 mg  5 mg IntraVENous Rad Stephany Del Rosario [place]  naloxone [facility]) injection 0.4 mg  0.4 mg IntraVENous PRN BASILIA Martinez        fentaNYL citrate (PF) injection 100 mcg  100 mcg IntraVENous Rad Multiple Stephany Brownlee [place]        flumazeniL (ROMAZICON) 0.1 mg/mL injection 0.1 mg  0.1 mg IntraVENous ONCE Stephany Brownlee [place]        lidocaine (PF) (XYLOCAINE) 10 mg/mL (1 %) injection                ALLERGIES  Allergies   Allergen Reactions    Yeast, Dried Other (comments)     Upset stomach     Sucrose Nausea Only     SEVERE ABDOMINAL PAIN       DIAGNOSTIC STUDIES   IMAGING STUDIES  Relevant Imaging studies reviewed    LABS  Lab Results   Component Value Date/Time    WBC 9.2 05/26/2022 11:07 AM    HGB 14.8 05/26/2022 11:07 AM    HCT 44.6 05/26/2022 11:07 AM    PLATELET 931 53/75/6812 11:07 AM    MCV 87 05/26/2022 11:07 AM     Lab Results   Component Value Date/Time    Sodium 135 (L) 04/05/2022 10:50 AM    Potassium 3.8 04/05/2022 10:50 AM    Chloride 105 04/05/2022 10:50 AM    CO2 29 04/05/2022 10:50 AM    Anion gap 1 (L) 04/05/2022 10:50 AM    Glucose 105 (H) 04/05/2022 10:50 AM    Glucose 86 12/18/2017 08:35 AM    BUN 12 04/05/2022 10:50 AM    Creatinine 0.88 04/05/2022 10:50 AM    BUN/Creatinine ratio 14 04/05/2022 10:50 AM    GFR est AA >60 04/05/2022 10:50 AM    GFR est non-AA >60 04/05/2022 10:50 AM    Calcium 9.4 04/05/2022 10:50 AM     Lab Results   Component Value Date/Time    INR 0.9 04/05/2022 10:50 AM    Prothrombin time 9.5 04/05/2022 10:50 AM       PHYSICAL EXAM   BP (!) 146/104 (BP 1 Location: Left lower arm, BP Patient Position: At rest)   Pulse (!) 104   Temp 98.7 °F (37.1 °C)   Resp 24   Ht 5' 4\" (1.626 m)   Wt 142.9 kg (315 lb)   SpO2 98%   Breastfeeding No   BMI 54.07 kg/m²   General:  NAD  Heart:  RRR  Lungs:  NWOB  Neurological:  AAOX3    PLAN   Procedure to be performed:  CT guided bone marrow biopsy  Plan for sedation:  moderate  Post procedure plan:  observation per protocol  Informed consent:  risks, benefits, and alternatives reviewed with the patient / family who agree to proceed  Code status:  No Order      Sita Randall, [address] [facility]

## 2022-05-31 NOTE — PROGRESS NOTES
Radilogy MD at bedside for pre procedure consult and consent. Family member present. Tyesha Green reviewed with understanding. Consent obtained and witnessed by RN. MD aware of all patients current medications and lab value results.

## 2022-05-31 NOTE — PROGRESS NOTES
Pt dressing in bed for discharge, mother helping with patient. While putting on shoes pt states she \"tripped\" resulting in a fall. Pt fell, landing in sitting position on floor. Denies striking head or  extremities. No LOC. Pt resumed standing position without assistance. Mother present w/ patient at all times. Ice pack applied to biopsy site. No visual injuries observed or verbalized by patient/mother. Providers services offered to patient for evaluation/treatment, mother denied x 2. [address] notified. Will continue to observe patient x 30 minutes. Patient denies any new injury or pain at this time. Biopsy site assessed and is unchanged. Ice pack used w/ good effect. Pt back to bed and resting. Charge RN present    This RN to reassess prior to discharge home.

## 2022-05-31 NOTE — PROGRESS NOTES
Pt re evaluated. Pt denies any injury or pain r/t to fall. Pt asking to go home. Biopsy site clean and dry intact. Medication side effects, changing positions, and safety education completed again with patient and mother. Provider care offered and refused by patient and mother upon discharge. Prodder notified. Pt assisted out via w/c for discharge home by RN.

## 2022-06-01 ENCOUNTER — TELEPHONE (OUTPATIENT)
Dept: RHEUMATOLOGY | Age: 22
End: 2022-06-01

## 2022-06-01 NOTE — TELEPHONE ENCOUNTER
Pt sent a PacketVideo message also regarding lab results and it was sent to Dr. Dang Boothe to respond to.

## 2022-06-02 ENCOUNTER — OFFICE VISIT (OUTPATIENT)
Dept: NEUROLOGY | Age: 22
End: 2022-06-02
Payer: MEDICARE

## 2022-06-02 VITALS
TEMPERATURE: 97.8 F | HEART RATE: 126 BPM | DIASTOLIC BLOOD PRESSURE: 84 MMHG | RESPIRATION RATE: 16 BRPM | SYSTOLIC BLOOD PRESSURE: 110 MMHG | OXYGEN SATURATION: 98 %

## 2022-06-02 DIAGNOSIS — G43.109 MIGRAINE WITH AURA AND WITHOUT STATUS MIGRAINOSUS, NOT INTRACTABLE: Primary | ICD-10-CM

## 2022-06-02 PROCEDURE — G8754 DIAS BP LESS 90: HCPCS | Performed by: PSYCHIATRY & NEUROLOGY

## 2022-06-02 PROCEDURE — G8427 DOCREV CUR MEDS BY ELIG CLIN: HCPCS | Performed by: PSYCHIATRY & NEUROLOGY

## 2022-06-02 PROCEDURE — G8752 SYS BP LESS 140: HCPCS | Performed by: PSYCHIATRY & NEUROLOGY

## 2022-06-02 PROCEDURE — 99204 OFFICE O/P NEW MOD 45 MIN: CPT | Performed by: PSYCHIATRY & NEUROLOGY

## 2022-06-02 PROCEDURE — G8417 CALC BMI ABV UP PARAM F/U: HCPCS | Performed by: PSYCHIATRY & NEUROLOGY

## 2022-06-02 PROCEDURE — G9717 DOC PT DX DEP/BP F/U NT REQ: HCPCS | Performed by: PSYCHIATRY & NEUROLOGY

## 2022-06-02 RX ORDER — CHOLECALCIFEROL (VITAMIN D3) 50 MCG
2000 CAPSULE ORAL
COMMUNITY

## 2022-06-02 RX ORDER — DULOXETIN HYDROCHLORIDE 30 MG/1
30 CAPSULE, DELAYED RELEASE ORAL DAILY
COMMUNITY
End: 2022-08-05

## 2022-06-02 RX ORDER — ERENUMAB-AOOE 140 MG/ML
INJECTION, SOLUTION SUBCUTANEOUS
Qty: 3 ML | Refills: 1 | Status: SHIPPED | OUTPATIENT
Start: 2022-06-02 | End: 2022-10-03 | Stop reason: SDUPTHER

## 2022-06-02 RX ORDER — BISMUTH SUBSALICYLATE 262 MG
1 TABLET,CHEWABLE ORAL DAILY
COMMUNITY

## 2022-06-02 RX ORDER — BUDESONIDE, GLYCOPYRROLATE, AND FORMOTEROL FUMARATE 160; 9; 4.8 UG/1; UG/1; UG/1
2 AEROSOL, METERED RESPIRATORY (INHALATION) DAILY
COMMUNITY

## 2022-06-02 RX ORDER — PROMETHAZINE HYDROCHLORIDE 25 MG/1
25 TABLET ORAL
Qty: 30 TABLET | Refills: 0 | Status: SHIPPED
Start: 2022-06-02 | End: 2022-08-05

## 2022-06-02 RX ORDER — ALBUTEROL SULFATE 90 UG/1
1 AEROSOL, METERED RESPIRATORY (INHALATION)
COMMUNITY

## 2022-06-02 NOTE — LETTER
6/2/2022    Patient: Margy Aguilera   YOB: 2000   Date of Visit: 6/2/2022     Gege Bergman DO  [address] [address]  Via Fax: [phone]    Dear Gege Bergman DO,      Thank you for referring Ms. Wende Harada to [facility] for evaluation. My notes for this consultation are attached. If you have questions, please do not hesitate to call me. I look forward to following your patient along with you.       Sincerely,    Elvi Aguilar DO

## 2022-06-02 NOTE — PROGRESS NOTES
Chief Complaint   Patient presents with    New Patient     New patient being seen for migraines since 2015; states are more severe around periods      Visit Vitals  /84 (BP 1 Location: Right arm, BP Patient Position: Sitting, BP Cuff Size: Thigh)   Pulse (!) 126   Temp 97.8 °F (36.6 °C) (Temporal)   Resp 16   SpO2 98%

## 2022-06-02 NOTE — PROGRESS NOTES
NEUROLOGY  NEW PATIENT EVALUATION/CONSULTATION       PATIENT NAME: Francisco Washington    MRN: [id number]    REASON FOR CONSULTATION: Headaches    06/02/22      Previous records (physician notes, laboratory reports, and radiology reports) and imaging studies were reviewed and summarized. My recommendations will be communicated back to the patient's physician(s) via electronic medical record and/or by [id number] [facility] [facility] mail. HISTORY OF PRESENT ILLNESS:  Francisco Washington is a 24 y.o.  female presenting for evaluation of headaches since age 12. Location: Bi-frontal  Character: throbbing  Intensity: On average: cannot determine  Frequency: 1x monthly  # HA free days per month: 30  Duration: 24-48h  Aura: Yes, scotomas  Associated Sx with HA: +nausea/vomiting, +phonophotophobia. Neurological ROS: Denies focal weakness, numbness or vision loss associated with headaches  Systemic ROS:   Denies snoring  Caffeine use: 1 cup daily  H/O Head trauma: None  Depressive or anxiety Sx: +Anxiety    Any change in pattern of HA? Improvement on preventative therapy, scotomas x 1 year    Triggers: Hormonal changes, weather changes. No worsening with change in position. Alleviating factors: Sleep/rest, hot compress  FHx HA/migraine: Mother with migraines    Treatment so far: Aimovig 140mg q30 days-tolerates this well, Cymbalta 90mg daily. Current rescue: Jose Salse well previously but ran out of medication  Prior preventatives: sertraline, citalopram, zonisamide, TPM, Trokendi, Emgality-ineffective  Prior rescue: Fioricet, rizatriptan-side effects    Investigations so far: 9/2021 MRI Brain NAP    PAST MEDICAL HISTORY:  Past Medical History:   Diagnosis Date    Abdominal migraine     Dr. Idalia Weller. vs Sucrose Intolerance.  Acid reflux     ADHD (attention deficit hyperactivity disorder)     Autism     High Functioning. Dr. Dianne Radford.     Chronic rhinitis 2021    Dr. Rangel Jacobs, allergist.    Nancy Altamirano Developmental delay  Fibromyalgia     Dr. Jovani Ramirez MIGUEL A (generalized anxiety disorder)     Dr. Iliana Fontenot    Insulin resistance 09/08/2021    Dr. Juan C Olvera. maurice Walker    Irritable bowel syndrome with diarrhea 4/13/2017    Lymphedema 2021    BL legs. Dr. Ramses Kincaid. [facility]    Migraine headache     Dr. Tristan Carlin    Obesity, morbid [facility]) 12/12/2017    OCD (obsessive compulsive disorder)     Dr. Duane Mcclendon Sucrose intolerance     Dr. Jaja Christian, GI.    Tachycardia 06/02/2021    Mauri Cobian DO, [facility]    Tenosynovitis of right hand 07/2021    Dr. Lynch Nj    Undifferentiated connective tissue disease ([facility] [address]     (lupus like). Dr. Hal Zapien.        PAST SURGICAL HISTORY:  Past Surgical History:   Procedure Laterality Date    COLONOSCOPY N/A 12/20/2016    COL[facility]COPY performed [facility] Rubio [facility] at [facility] ENDOSCOPY    GERD TST W/ MUCOS PH ELECTROD 50 HR (BRAVO)  12/3/2020         HC CLP BRAVO  11/30/2020    HX CHOLECYSTECTOMY  06/2019    HX ENDOSCOPY  11/2020    dx'd with sucrose intolerance    HX TONSIL AND ADENOIDECTOMY      HX TONSILLECTOMY      AGE 1    HX WISDOM TEETH EXTRACTION      WA EGD TRANSORAL BIOPSY SINGLE/MULTIPLE  6/14/2019         WA EGD TRANSORAL BIOPSY SINGLE/MULTIPLE  11/30/2020    UPPER GI ENDOSCOPY,BIOPSY  12/20/2016            FAMILY HISTORY:   Family History   Problem Relation Age of Onset    Endometriosis Mother     Delayed Awakening Mother     Post-op Nausea/Vomiting Mother    [facility] Migraines Mother         d/t accident - neck and brain injury    Atrial Fibrillation Mother     Hypertension Mother     Obesity Mother     Hypertension Maternal Grandfather     Other Maternal Grandfather         diverticulitis    No Known Problems Father     Hypertension Maternal Grandmother     No Known Problems Paternal Grandmother     No Known Problems Paternal Grandfather     Mult Sclerosis Maternal Aunt     Cancer Maternal Uncle  Lymphoma Other          SOCIAL HISTORY:  Social History     Socioeconomic History    Marital status: SINGLE    Number of children: 0   Occupational History    Occupation: student   Tobacco Use    Smoking status: Never Smoker    Smokeless tobacco: Never Used    Tobacco comment: no smoke exposure in the home   Vaping Use    Vaping Use: Never used   Substance and Sexual Activity    Alcohol use: No    Drug use: No    Sexual activity: Never     Partners: Male     Birth control/protection: Pill   Social History Narrative    No known chemical exposures. MEDICATIONS:   Current Outpatient Medications   Medication Sig Dispense Refill    DULoxetine (CYMBALTA) 30 mg capsule Take 30 mg by mouth daily. Taken with 60mg to equal 90mg      budesonide-glycopyr-formoterol (Breztri Aerosphere) 160-9-4.8 mcg/actuation HFAA Take 2 Puffs by inhalation daily.  omega 3-dha-epa-fish oil (Fish OiL) 100-160-1,000 mg cap Take 2,000 mg by mouth.  multivitamin (ONE A DAY) tablet Take 1 Tablet by mouth daily.  albuterol (PROVENTIL HFA, VENTOLIN HFA, PROAIR HFA) 90 mcg/actuation inhaler Take 1 Puff by inhalation every four (4) hours as needed for Wheezing.  montelukast (SINGULAIR) 10 mg tablet       Naltrexone, Bulk, 100 % powd 4.5 mg by Does Not Apply route daily. 0.135 g 5    hydrOXYchloroQUINE (PLAQUENIL) 200 mg tablet Take 1 Tablet by mouth two (2) times a day. 180 Tablet 1    pregabalin (LYRICA) 75 mg capsule Take 75 mg by mouth daily.  traZODone (DESYREL) 50 mg tablet       lansoprazole (PREVACID) 30 mg capsule Take 30 mg by mouth Daily (before breakfast).  DULoxetine (Cymbalta) 60 mg capsule Take 60 mg by mouth daily.       Vyvanse 20 mg capsule 30 mg.      metFORMIN ER (GLUCOPHAGE XR) 750 mg tablet TAKE 1 TABLET BY MOUTH TWICE A DAY  Indications: polycystic ovarian syndrome, a disease with cysts in the ovaries, prevention of type 2 diabetes mellitus 180 Tablet 5    ascorbic acid, vitamin C, (VITAMIN C) 500 mg tablet Take  by mouth.  sacrosidase (Sucraid) 8,500 unit/mL soln Take 1 mL by mouth.  b complex vitamins tablet ONE TABLE DAILY      hydrOXYzine HCL (ATARAX) 25 mg tablet TAKE 1 TO 2 TABLETS BY MOUTH TWICE A DAY AS NEEDED FOR ANXIETY      norethindrone-ethinyl estradiol-iron (Junel FE 1.5/30, 28,) 1.5 mg-30 mcg (21)/75 mg (7) tab TAKE 1 TAB BY MOUTH DAILY. CONTINUOUS PILLS ONLY. 3 Package 5    ondansetron (ZOFRAN ODT) 8 mg disintegrating tablet PLACE 1 TABLET ON TONGUE & ALLOW TO DISSOLVE EVERY 8 HOURS AS NEEDED FOR NAUSEA WITH HEADACHE      Aimovig Autoinjector 140 mg/mL injection INJECT 1 SYRINGE VIA SUBCUTANEOUS ROUTE ONCE A MONTH, AS DIRECTED      cholecalciferol, vitamin d3, (VITAMIN D3) 400 unit cap Take  by mouth.  diphenhydrAMINE (BENADRYL) 25 mg capsule 2 tabs PO q8h PRN         ALLERGIES:  Allergies   Allergen Reactions    Yeast, Dried Other (comments)     Upset stomach     Sucrose Nausea Only     SEVERE ABDOMINAL PAIN         REVIEW OF SYSTEMS:  10 point ROS reviewed with patient. Please see scanned document under media. +thrombocytosis with intermittent leukocytosis s/p recent BMBx followed by Hematology  +periodic ulcerated skin lesions-dermatology consultation pending    PHYSICAL EXAM:  Vital Signs:   Visit Vitals  /84 (BP 1 Location: Right arm, BP Patient Position: Sitting, BP Cuff Size: Thigh)   Pulse (!) 126   Temp 97.8 °F (36.6 °C) (Temporal)   Resp 16   SpO2 98%        General Medical Exam:  General:  Well appearing, comfortable, in no apparent distress. Eyes/ENT: see cranial nerve examination. Neck: No masses appreciated. Full range of motion without tenderness. Respiratory:  Clear to auscultation, good air entry bilaterally. Cardiac:  Regular rate and rhythm, no murmur. GI:  Soft, non-tender, non-distended abdomen. Bowel sounds normal. No masses, organomegaly. Extremities:  No deformities or skin discoloration. LE edema b/l. Skin: Ulcerated lesions scattered upper and lower extremities. Neurological:  · Mental Status:  Alert and oriented to person, place, and time with fluent speech. · Cranial Nerves:   CNII/III/IV/VI: Optic disc w/clear margins b/l, visual fields full to confrontation, EOMI, PERRL, no ptosis or nystagmus. CN V: Facial sensation intact bilaterally, masseter 5/5   CN VII: Facial muscles symmetric and strong   CN VIII: Hears finger rub well bilaterally, intact vestibular function   CN IX/X: Normal palatal movement   CN XI: Full strength shoulder shrug bilaterally   CN XII: Tongue protrusion full and midline without fasciculation or atrophy  · Motor: Normal tone and muscle bulk with no pronator drift. No atrophy or fasciculations present on examination. Individual muscle group testing:  Shoulder abduction:   Left:5/5   Right : 5/5    Shoulder adduction:   Left:5/5   Right : 5/5    Elbow flexion:      Left:5/5   Right : 5/5  Elbow extension:    Left:5/5   Right : 5/5   Wrist flexion:    Left:5/5   Right : 5/5  Wrist extension:    Left:5/5   Right : 5/5  Arm pronation:   Left:5/5   Right : 5/5  Arm supination:   Left:5/5   Right : 5/5    Finger flexion:    Left:5/5   Right : 5/5    Finger extension:   Left:5/5   Right : 5/5   Finger abduction:  Left:5/5   Right : 5/5   Finger adduction:   Left:5/5   Right : 5/5  Hip flexion:     Left:5/5   Right : 5/5         Hip extension:   Left:5/5   Right : 5/5    Knee flexion:    Left:5/5   Right : 5/5    Knee extension:   Left:5/5   Right : 5/5    Dorsiflexion:     Left:5/5   Right : 5/5  Plantar flexion:    Left:5/5   Right : 5/5      · MSRs: No crossed adductors or clonus. RIGHT  LEFT   Brachioradialis 3+ 3+   Biceps 3+ 3+   Triceps 3+ 3+   Knee 3+ 3+   Achilles 2+ 2+        Plantar response Downward Downward          · Sensation: Normal and symmetric perception of pinprick, temperature, light touch, proprioception, and vibration; (-) Romberg.   · Coordination: No dysmetria. Normal rapid alternating movements; finger-to-nose and heel-to- shin testing are within normal limits. · Gait: Normal native gait    PERTINENT DATA:  INTERNAL RECORDS:  The patient's electronic medical record was reviewed. The relevant details include:    MRI Results (maximum last 3): Results from Hospital Encounter encounter on 09/29/21    MRI BRAIN W WO CONT    Narrative  Clinical history: Headaches and paresthesias  INDICATION:   Migraine headaches and paresthesias    COMPARISON: 6/30/2020  TECHNIQUE: MR examination of the brain includes axial and sagittal T1 , axial  T2, axial FLAIR, axial gradient echo, axial DWI, coronal T1 . Pre and post  contrast axial T1-weighted imaging. Postcontrast T1-weighted imaging coronal  plane. CONTRAST: 20 ml ProHance  FINDINGS:  There is no intracranial mass, hemorrhage or acute infarction. There is no evidence of demyelinating process. IACs are symmetric in size. No  intraconal mass. There is no abnormal parenchymal enhancement. There is no abnormal meningeal  enhancement demonstrated. The brain architecture is normal. There is no evidence  of midline shift or mass-effect. The ventricles are normal in size, position and  configuration. There are no extra-axial fluid collections. Major intracranial  vascular flow-voids are unremarkable. The orbits are grossly symmetric. Dural  venous sinuses are grossly unremarkable. There is no Chiari or syrinx. Pituitary  and infundibulum grossly unremarkable. Cerebellopontine angles are unremarkable. No skull base mass is identified. Cavernous sinuses are symmetric. The mastoid  air cells and are well pneumatized and clear. Impression  Normal MRI of the brain. No intracranial mass, hemorrhage or evidence of acute infarction.       Results from [place] encounter on 06/29/20    MRI BRAIN W WO CONT    Narrative  EXAM: MRI BRAIN W WO CONT    TECHNIQUE: Sagittal and axial T1-weighted images axial FLAIR, T2-weighted,  diffusion weighted, gradient echo,  sagittal T2 FLAIR, precontrast. Axial and  coronal postcontrast T1-weighted images. The study was performed on the 0.7  [facility] open MRI unit. IV CONTRAST:  20 cc Dotarem    INDICATION:  Migraine, unspecified, not intractable, without status migrainosus  / Patient also having eye pain, family hx of MS.    COMPARISON:  None. FINDINGS:  BRAIN PARENCHYMA:  Normal. No significant white matter disease. No mass or  abnormal enhancement. No acute or chronic infarct. INTRACRANIAL HEMORRHAGE: None. CSF SPACES:  Normal in size and morphology for the patient's age. BASAL CISTERNS:  Patent. MIDLINE SHIFT: None. VASCULAR SYSTEM:  Normal flow voids. PARANASAL SINUSES AND MASTOID AIR CELLS:  No significant opacification. VISUALIZED ORBITS:  No significant abnormalities. VISUALIZED UPPER CERVICAL SPINE:  No significant abnormalities. SELLA:  No enlargement or  focal abnormality. SKULL BASE:  No significant abnormalities. Cerebellar tonsils in normal  position. CALVARIUM:  Intact. Impression  IMPRESSION:  No abnormalities demonstrated. ASSESSMENT:      ICD-10-CM ICD-9-CM    1. Migraine with aura and without status migrainosus, not intractable  G43.109 29.00    24year old female with a h/o mild autism, connective tissue disorder, lymphedema, chronic thrombocytosis/leukocytosis followed by Hematology s/p recent BMBx referred for evaluation of long-standing migraines w/aura since age 12, currently controlled on Aimovig injections. Neurological examination appears non-focal today. MRI Brain TORI was reviewed from 9/2021 without acute intracranial pathology. There appears to be both a hereditary and hormonal component to her migraines. She elects to continue with current preventative therapy, as this has significantly reduced the frequency of her previous migraines. Headache education  Discussed pathophysiology of headache.    Discussed use of headache diary.   Discussed treatment options, both abortive and preventive medications. Instructed patient about medications and potential side effects. Discussed medication overuse headache and to limit use of analgesics to less than 2 doses per week. PLAN:  · Continue Aimovig 140mg q30 days for migraine prophylaxis  · May resume Ubrelvy 100mg PRN for migraine rescue therapy limiting use to twice weekly  · Trial of phenergan 25mg PRN for nausea related to migraines       Follow-up and Dispositions    · Return in about 2 months (around 8/2/2022). I have discussed the diagnosis with the patient today and the intended plan as seen in the above orders with both the patient as well as referring provider and/or PCP via electronic correspondence. The patient has received an after-visit summary and questions were answered concerning future plans. I have discussed medication side effects and warnings with the patient as well. Erica Rodarte DO  Staff Neurologist  Diplomate, American Board of Psychiatry & Neurology       CC Referring provider:    Triston Rich DO

## 2022-06-04 NOTE — TELEPHONE ENCOUNTER
Discussed biopsy results with patient, answered questions. Will see patient later this week after Derm and Onc to explore next steps, suspect methotrexate for possible component of pyoderma.

## 2022-06-06 ENCOUNTER — OFFICE VISIT (OUTPATIENT)
Dept: ONCOLOGY | Age: 22
End: 2022-06-06
Payer: MEDICARE

## 2022-06-06 VITALS
BODY MASS INDEX: 50.02 KG/M2 | SYSTOLIC BLOOD PRESSURE: 176 MMHG | RESPIRATION RATE: 18 BRPM | DIASTOLIC BLOOD PRESSURE: 99 MMHG | HEIGHT: 64 IN | HEART RATE: 129 BPM | TEMPERATURE: 98.6 F | WEIGHT: 293 LBS | OXYGEN SATURATION: 97 %

## 2022-06-06 DIAGNOSIS — L98.9 SKIN LESIONS: ICD-10-CM

## 2022-06-06 DIAGNOSIS — D72.9 NEUTROPHILIA: Primary | ICD-10-CM

## 2022-06-06 DIAGNOSIS — R79.82 ELEVATED C-REACTIVE PROTEIN (CRP): ICD-10-CM

## 2022-06-06 DIAGNOSIS — D72.19 OTHER EOSINOPHILIA: ICD-10-CM

## 2022-06-06 PROCEDURE — 99212 OFFICE O/P EST SF 10 MIN: CPT | Performed by: INTERNAL MEDICINE

## 2022-06-06 PROCEDURE — G8417 CALC BMI ABV UP PARAM F/U: HCPCS | Performed by: INTERNAL MEDICINE

## 2022-06-06 PROCEDURE — G8753 SYS BP > OR = 140: HCPCS | Performed by: INTERNAL MEDICINE

## 2022-06-06 PROCEDURE — G8427 DOCREV CUR MEDS BY ELIG CLIN: HCPCS | Performed by: INTERNAL MEDICINE

## 2022-06-06 PROCEDURE — G9717 DOC PT DX DEP/BP F/U NT REQ: HCPCS | Performed by: INTERNAL MEDICINE

## 2022-06-06 PROCEDURE — G8755 DIAS BP > OR = 90: HCPCS | Performed by: INTERNAL MEDICINE

## 2022-06-06 NOTE — PROGRESS NOTES
1. Have you been to the ER, urgent care clinic since your last visit? Hospitalized since your last visit? No    2. Have you seen or consulted any other health care providers outside of the [address] [place] [address] since your last visit? Include any pap smears or colon screening. No        Visit Vitals  BP (!) 176/99 (BP 1 Location: Left lower arm, BP Patient Position: Sitting, BP Cuff Size: Adult)   Pulse (!) 129   Temp 98.6 °F (37 °C) (Oral)   Resp 18   Ht 5' 4\" (1.626 m)   Wt 318 lb 6.4 oz (144.4 kg)   SpO2 97%   BMI 54.65 kg/m²    Patient states blood pressure and heart rate normally run high. Reports being asymptomatic. Chief Complaint   Patient presents with    Follow-up     Patient presents as a follow-up for thrombocytosis. She reports soreness where the biopsy was completed.

## 2022-06-06 NOTE — LETTER
6/9/2022    Patient: Mariangel Werner   YOB: 2000   Date of Visit: 6/6/2022     Kizzy Mcallister DO  [address] [place] [id number]  Via Fax: [phone]    Dear Kizzy Mcallister DO,      Thank you for referring Ms. Jack Hampton to [facility] for evaluation. My notes for this consultation are attached. If you have questions, please do not hesitate to call me. I look forward to following your patient along with you.       Sincerely,    Chip Fernandez MD

## 2022-06-06 NOTE — PROGRESS NOTES
[facility] at [address], [address] [place] [address] Sitter: [phone]  F: [phone] Patient ID  Name: Alexandrea Banerjee  YOB: 2000  MRN: [id number]  Referring Provider:   No referring provider defined for this encounter. Primary Care Provider:   Kanika Loaiza DO       HEMATOLOGY/MEDICAL ONCOLOGY  NOTE   Date of Visit: 06/06/22  Reason for Evaluation:     Chief Complaint   Patient presents with    Follow-up     Patient presents as a follow-up for thrombocytosis. She reports soreness where the biopsy was completed. Subjective:     History of Present Illness:     Alexandrea Banerjee is a 24 y.o. F who presents for a follow-up evaluation and discussion of bone marrow biopsy results. . Patient overall reports feeling stable. She has a new skin lesion but her other skin lesions have healed or have resolved. -  Fatigue:Grade 2  Cognition:Grade 1  Concentration:Grade 1  Anxiety:Grade 1  Pain:moderate generalized painItching:Grade 1  Swelling:Grade 1    Past Medical History:   Diagnosis Date    Abdominal migraine     Dr. Cintia Castanon. vs Sucrose Intolerance.  Acid reflux     ADHD (attention deficit hyperactivity disorder)     Autism     High Functioning. Dr. Jeremy Miller.  Chronic rhinitis 2021    Dr. Krissy Mondragon, allergist.    Hildy Honor delay     Fibromyalgia     Dr. Kim Zaidi MIGUEL A (generalized anxiety disorder)     Dr. Renae Velez    Insulin resistance 09/08/2021    Dr. Pascual Narvaez. maurice Walker    Irritable bowel syndrome with diarrhea 4/13/2017    Lymphedema 2021    BL legs. Dr. Lilliam Eng.   [facility] [place]    Migraine headache     Dr. Orlando Whiting    Obesity, morbid [facility]) 12/12/2017    OCD (obsessive compulsive disorder)     Dr. Pema Baig Sucrose intolerance     Dr. Cintia Castanon, GI.    Tachycardia 06/02/2021    Mauri Cobian DO, [facility] Peds cardio    Tenosynovitis of right hand 07/2021 Dr. Karimi Daughters    Undifferentiated connective tissue disease ([facility] [address]     (lupus like). Dr. Bert Hernandez. Past Surgical History:   Procedure Laterality Date    COLONOSCOPY N/A 12/20/2016    COLONOSCOPY performed by Gus Delarosa MD at [facility] ENDOSCOPY    GERD TST W/ MUCOS PH ELECTROD 50 HR (BRAVO)  12/3/2020         HC CLP BRAVO  11/30/2020    HX CHOLECYSTECTOMY  06/2019    HX ENDOSCOPY  11/2020    dx'd with sucrose intolerance    HX TONSIL AND ADENOIDECTOMY      HX TONSILLECTOMY      AGE 1    HX WISDOM TEETH EXTRACTION      AK EGD TRANSORAL BIOPSY SINGLE/MULTIPLE  6/14/2019         AK EGD TRANSORAL BIOPSY SINGLE/MULTIPLE  11/30/2020    UPPER GI ENDOSCOPY,BIOPSY  12/20/2016           Social History     Tobacco Use    Smoking status: Never Smoker    Smokeless tobacco: Never Used    Tobacco comment: no smoke exposure in the home   Substance Use Topics    Alcohol use: No      Family History   Problem Relation Age of Onset    Endometriosis Mother     Delayed Awakening Mother     Post-op Nausea/Vomiting Mother    [facility] Migraines Mother         d/t accident - neck and brain injury    Atrial Fibrillation Mother     Hypertension Mother     Obesity Mother     Hypertension Maternal Grandfather     Other Maternal Grandfather         diverticulitis    No Known Problems Father     Hypertension Maternal Grandmother     No Known Problems Paternal Grandmother     No Known Problems Paternal Grandfather     Mult Sclerosis Maternal Aunt     Cancer Maternal Uncle     Lymphoma Other      Current Outpatient Medications   Medication Sig    DULoxetine (CYMBALTA) 30 mg capsule Take 30 mg by mouth daily. Taken with 60mg to equal 90mg    budesonide-glycopyr-formoterol (Breztri Aerosphere) 160-9-4.8 mcg/actuation HFAA Take 2 Puffs by inhalation daily.  omega 3-dha-epa-fish oil (Fish OiL) 100-160-1,000 mg cap Take 2,000 mg by mouth.  multivitamin (ONE A DAY) tablet Take 1 Tablet by mouth daily.     albuterol (PROVENTIL HFA, VENTOLIN HFA, PROAIR HFA) 90 mcg/actuation inhaler Take 1 Puff by inhalation every four (4) hours as needed for Wheezing.  Aimovig Autoinjector 140 mg/mL injection INJECT 1 SYRINGE VIA SUBCUTANEOUS ROUTE ONCE A MONTH, AS DIRECTED    promethazine (PHENERGAN) 25 mg tablet Take 1 Tablet by mouth every six (6) hours as needed for Nausea.  ubrogepant Tylene Limber) 100 mg tablet Take 1 Tablet by mouth as needed for Migraine (Take at onset of migraine. May repeat once after 2 hours if needed. ).  montelukast (SINGULAIR) 10 mg tablet     Naltrexone, Bulk, 100 % powd 4.5 mg by Does Not Apply route daily.  hydrOXYchloroQUINE (PLAQUENIL) 200 mg tablet Take 1 Tablet by mouth two (2) times a day.  pregabalin (LYRICA) 75 mg capsule Take 75 mg by mouth daily.  traZODone (DESYREL) 50 mg tablet     lansoprazole (PREVACID) 30 mg capsule Take 30 mg by mouth Daily (before breakfast).  DULoxetine (Cymbalta) 60 mg capsule Take 60 mg by mouth daily.  Vyvanse 20 mg capsule 30 mg.    metFORMIN ER (GLUCOPHAGE XR) 750 mg tablet TAKE 1 TABLET BY MOUTH TWICE A DAY  Indications: polycystic ovarian syndrome, a disease with cysts in the ovaries, prevention of type 2 diabetes mellitus    ascorbic acid, vitamin C, (VITAMIN C) 500 mg tablet Take  by mouth.  sacrosidase (Sucraid) 8,500 unit/mL soln Take 1 mL by mouth.  b complex vitamins tablet ONE TABLE DAILY    hydrOXYzine HCL (ATARAX) 25 mg tablet TAKE 1 TO 2 TABLETS BY MOUTH TWICE A DAY AS NEEDED FOR ANXIETY    norethindrone-ethinyl estradiol-iron (Junel FE 1.5/30, 28,) 1.5 mg-30 mcg (21)/75 mg (7) tab TAKE 1 TAB BY MOUTH DAILY. CONTINUOUS PILLS ONLY.  ondansetron (ZOFRAN ODT) 8 mg disintegrating tablet PLACE 1 TABLET ON TONGUE & ALLOW TO DISSOLVE EVERY 8 HOURS AS NEEDED FOR NAUSEA WITH HEADACHE    cholecalciferol, vitamin d3, (VITAMIN D3) 400 unit cap Take  by mouth.     diphenhydrAMINE (BENADRYL) 25 mg capsule 2 tabs PO q8h PRN     No current facility-administered medications for this visit. Allergies   Allergen Reactions    Yeast, Dried Other (comments)     Upset stomach     Sucrose Nausea Only     SEVERE ABDOMINAL PAIN      Review of Systems Provided by:  Patient  Review of Systems: A complete review of systems was obtained, reviewed. Pertinent findings reviewed above. Objective:     Visit Vitals  BP (!) 176/99 (BP 1 Location: Left lower arm, BP Patient Position: Sitting, BP Cuff Size: Adult)   Pulse (!) 129   Temp 98.6 °F (37 °C) (Oral)   Resp 18   Ht 5' 4\" (1.626 m)   Wt 318 lb 6.4 oz (144.4 kg)   SpO2 97%   BMI 54.65 kg/m²     ECOG PS: 1- Restricted in physically strenuous activity but ambulatory and able to carry out work of a light or sedentary nature, e.g., light house work, office work. Physical Exam  Constitutional: No acute distress. , Non-toxic appearance. and Non-diaphoretic. HENT: Normocephalic and atraumatic head. Eyes: Normal Conjunctivae. Anicteric sclerae. Cardiovascular: S1,S2 auscultated. No pitting edema. Pulmonary: Normal Respiratory Effort. No wheezing. No rhonchi. No rales. Abdominal: Normal bowel sounds. Soft Abdomen to palpation. No abdominal tenderness. Skin: No jaundice. No petechiae. Dry wounds at lesions on arms and face. Musculoskeletal: No muscle pain on palpation. No temporal muscle wasting on inspection. Neurological: Alert and oriented. No tremor on inspection. Normal Gait. Psychiatric: mood normal. normal speech rate normal affect      Results:     I personally reviewed Epic EHR labs/results below:   Lab Results   Component Value Date/Time    WBC 13.5 (H) 05/31/2022 08:07 AM    HGB 14.6 05/31/2022 08:07 AM    HCT 43.7 05/31/2022 08:07 AM    PLATELET 938 (H) 85/95/7283 08:07 AM    MCV 89.2 05/31/2022 08:07 AM    ABS.  NEUTROPHILS 4.3 05/31/2022 08:07 AM     Lab Results   Component Value Date/Time    Sodium 135 (L) 04/05/2022 10:50 AM    Potassium 3.8 04/05/2022 10:50 AM    Chloride 105 04/05/2022 10:50 AM    CO2 29 04/05/2022 10:50 AM    Glucose 105 (H) 04/05/2022 10:50 AM    Glucose 86 12/18/2017 08:35 AM    BUN 12 04/05/2022 10:50 AM    Creatinine 0.88 04/05/2022 10:50 AM    GFR est AA >60 04/05/2022 10:50 AM    GFR est non-AA >60 04/05/2022 10:50 AM    Calcium 9.4 04/05/2022 10:50 AM    Glucose (POC) 112 (H) 06/14/2019 10:26 AM     Lab Results   Component Value Date/Time    Bilirubin, total 0.4 04/05/2022 10:50 AM    ALT (SGPT) 21 04/05/2022 10:50 AM    Alk. phosphatase 73 04/05/2022 10:50 AM    Protein, total 8.2 04/05/2022 10:50 AM    Albumin 3.4 (L) 04/05/2022 10:50 AM    Globulin 4.8 (H) 04/05/2022 10:50 AM     Hospital Outpatient Visit on 05/31/2022   Component Date Value Ref Range Status    WBC 05/31/2022 13.5* 3.6 - 11.0 K/uL Final    RBC 05/31/2022 4.90  3.80 - 5.20 M/uL Final    HGB 05/31/2022 14.6  11.5 - 16.0 g/dL Final    HCT 05/31/2022 43.7  35.0 - 47.0 % Final    MCV 05/31/2022 89.2  80.0 - 99.0 FL Final    MCH 05/31/2022 29.8  26.0 - 34.0 PG Final    MCHC 05/31/2022 33.4  30.0 - 36.5 g/dL Final    RDW 05/31/2022 15.3* 11.5 - 14.5 % Final    PLATELET 79/93/9788 036* 150 - 400 K/uL Final    MPV 05/31/2022 8.9  8.9 - 12.9 FL Final    NRBC 05/31/2022 0.0  0  WBC Final    ABSOLUTE NRBC 05/31/2022 0.00  0.00 - 0.01 K/uL Final    NEUTROPHILS 05/31/2022 32  32 - 75 % Final    LYMPHOCYTES 05/31/2022 23  12 - 49 % Final    MONOCYTES 05/31/2022 7  5 - 13 % Final    EOSINOPHILS 05/31/2022 37* 0 - 7 % Final    BASOPHILS 05/31/2022 1  0 - 1 % Final    IMMATURE GRANULOCYTES 05/31/2022 0  0.0 - 0.5 % Final    ABS. NEUTROPHILS 05/31/2022 4.3  1.8 - 8.0 K/UL Final    ABS. LYMPHOCYTES 05/31/2022 3.1  0.8 - 3.5 K/UL Final    ABS. MONOCYTES 05/31/2022 0.9  0.0 - 1.0 K/UL Final    ABS. EOSINOPHILS 05/31/2022 5.1* 0.0 - 0.4 K/UL Final    ABS. BASOPHILS 05/31/2022 0.1  0.0 - 0.1 K/UL Final    ABS. IMM.  GRANS. 05/31/2022 0.0  0.00 - 0.04 K/UL Final    DF 05/31/2022 SMEAR SCANNED    Final    RBC COMMENTS 05/31/2022     Final                    Value:ANISOCYTOSIS  1+     Orders Only on 05/26/2022   Component Date Value Ref Range Status    WBC 05/26/2022 9.2  3.4 - 10.8 x10E3/uL Final    RBC 05/26/2022 5.13  3.77 - 5.28 x10E6/uL Final    HGB 05/26/2022 14.8  11.1 - 15.9 g/dL Final    HCT 05/26/2022 44.6  34.0 - 46.6 % Final    MCV 05/26/2022 87  79 - 97 fL Final    MCH 05/26/2022 28.8  26.6 - 33.0 pg Final    MCHC 05/26/2022 33.2  31.5 - 35.7 g/dL Final    RDW 05/26/2022 14.4  11.7 - 15.4 % Final    PLATELET 10/95/0714 756  150 - 450 x10E3/uL Final    NEUTROPHILS 05/26/2022 54  Not Estab. % Final    Lymphocytes 05/26/2022 28  Not Estab. % Final    MONOCYTES 05/26/2022 6  Not Estab. % Final    EOSINOPHILS 05/26/2022 11  Not Estab. % Final    BASOPHILS 05/26/2022 1  Not Estab. % Final    ABS. NEUTROPHILS 05/26/2022 5.0  1.4 - 7.0 x10E3/uL Final    Abs Lymphocytes 05/26/2022 2.5  0.7 - 3.1 x10E3/uL Final    ABS. MONOCYTES 05/26/2022 0.5  0.1 - 0.9 x10E3/uL Final    ABS. EOSINOPHILS 05/26/2022 1.0* 0.0 - 0.4 x10E3/uL Final    ABS. BASOPHILS 05/26/2022 0.1  0.0 - 0.2 x10E3/uL Final    IMMATURE GRANULOCYTES 05/26/2022 0  Not Estab. % Final    ABS. IMM. GRANS. 05/26/2022 0.0  0.0 - 0.1 x10E3/uL Final   Orders Only on 05/24/2022   Component Date Value Ref Range Status    Sed rate (ESR) 05/26/2022 17  0 - 32 mm/hr Final    C-Reactive Protein, Qt 05/26/2022 12* 0 - 10 mg/L Final    LD 05/26/2022 192  119 - 226 IU/L Final         Assessment and Recommendations:      1. Neutrophilia  -seems reactive but will await molecular studies to evaluate for any occult myeloproliferative neoplasm. 2. Other eosinophilia  Could be reactive. Eosinophilia at abs eos 1000. Continue to monitor but at present no clear sign of an eosinophilic related myeloproliferative neoplasm. 3. Skin lesions  Will see [facility] derm tomorrow.     4. Elevated C-reactive protein (CRP)  -CRP has been elevated at times. Unclear if reactive or systemic condition present (autoimmune). She also has diffuse joint pains of chronic nature; following with rheumatology. Follow-up and Dispositions    · Return in about 4 weeks (around 7/4/2022).        Virtual visit    Harleen Layne MD  Hematology/Medical Oncology Provider  [facility]  P: [phone]    Signed By:   Keven Lawson MD

## 2022-06-07 PROBLEM — D72.19 OTHER EOSINOPHILIA: Status: ACTIVE | Noted: 2022-06-07

## 2022-06-09 ENCOUNTER — OFFICE VISIT (OUTPATIENT)
Dept: RHEUMATOLOGY | Age: 22
End: 2022-06-09
Payer: MEDICARE

## 2022-06-09 VITALS
HEIGHT: 64 IN | DIASTOLIC BLOOD PRESSURE: 104 MMHG | SYSTOLIC BLOOD PRESSURE: 161 MMHG | TEMPERATURE: 97.9 F | HEART RATE: 100 BPM | RESPIRATION RATE: 20 BRPM | OXYGEN SATURATION: 98 % | WEIGHT: 293 LBS | BODY MASS INDEX: 50.02 KG/M2

## 2022-06-09 DIAGNOSIS — Z79.899 ONGOING USE OF POSSIBLY TOXIC MEDICATION: ICD-10-CM

## 2022-06-09 DIAGNOSIS — R60.9 LIPEDEMA: ICD-10-CM

## 2022-06-09 DIAGNOSIS — L71.9 ROSACEA: ICD-10-CM

## 2022-06-09 DIAGNOSIS — M35.9 UNDIFFERENTIATED CONNECTIVE TISSUE DISEASE (HCC): Primary | ICD-10-CM

## 2022-06-09 DIAGNOSIS — L28.1 PRURIGO NODULARIS: ICD-10-CM

## 2022-06-09 DIAGNOSIS — M79.2 NEUROPATHIC PAIN: ICD-10-CM

## 2022-06-09 PROCEDURE — G8417 CALC BMI ABV UP PARAM F/U: HCPCS | Performed by: INTERNAL MEDICINE

## 2022-06-09 PROCEDURE — G8755 DIAS BP > OR = 90: HCPCS | Performed by: INTERNAL MEDICINE

## 2022-06-09 PROCEDURE — G8753 SYS BP > OR = 140: HCPCS | Performed by: INTERNAL MEDICINE

## 2022-06-09 PROCEDURE — 99215 OFFICE O/P EST HI 40 MIN: CPT | Performed by: INTERNAL MEDICINE

## 2022-06-09 PROCEDURE — G9717 DOC PT DX DEP/BP F/U NT REQ: HCPCS | Performed by: INTERNAL MEDICINE

## 2022-06-09 PROCEDURE — G8427 DOCREV CUR MEDS BY ELIG CLIN: HCPCS | Performed by: INTERNAL MEDICINE

## 2022-06-09 RX ORDER — METRONIDAZOLE 7.5 MG/G
GEL TOPICAL 2 TIMES DAILY
Qty: 60 G | Refills: 0 | Status: SHIPPED
Start: 2022-06-09 | End: 2022-09-16

## 2022-06-09 RX ORDER — PREGABALIN 75 MG/1
75 CAPSULE ORAL 2 TIMES DAILY
Qty: 60 CAPSULE | Refills: 2 | Status: SHIPPED | OUTPATIENT
Start: 2022-06-09 | End: 2022-08-05 | Stop reason: SDUPTHER

## 2022-06-09 NOTE — LETTER
6/9/2022    Patient: Adriana Mariano   YOB: 2000   Date of Visit: 6/9/2022     Lori Maldonado DO  [address] [facility] [place] [id number]  Via Fax: [phone]    Dear Lori Maldonado DO,    We recently saw Ms. Lakesha Gaston in the [facility] for evaluation. My notes for this consultation are attached. If you have questions, please do not hesitate to call me. I look forward to following your patient along with you.     Sincerely,  Marisol Henriquez MD S  Cell: [phone]

## 2022-06-09 NOTE — PATIENT INSTRUCTIONS
1) Send me a message once you get the final word from the hematologist. If your workup is negative for cancer we can start you on methotrexate subcutaneously. 2) Continue on your current dose of plaquenil. 3) Check labs one month after starting the methotrexate. 4) Follow up in 3 months. Let me know if you have any questions or concerns in the meantime.

## 2022-06-09 NOTE — PROGRESS NOTES
REASON FOR VISIT:   Eliezer Weinstein is a 24 y.o. female with history of migraines and ADHD who is returning for a followup of undifferentiated connective tissue disease c/b tachycardia, prurigo nodularis, polymorphic light eruption, livedo, and +WENDY 1:320 speckled and 1:640 nuclear dot patterns. HISTORY OF PRESENT ILLNESS    Pt saw Dermatologist on 6/7 at [facility], disappointed that didn't feel she was given any new information or offered new treatment. Per note papules still attributed to prurigo nodularis; she had no active lesions at the time of the appointment. Pt main complaints are her fatigue and her bumps. She says that her bumps do not itch that bad but they are painful. She says that when they start they are small blisters. Pt was put on prednisone for an ear infection and then again for bronchitis, and she says this is causing her to gain more weight. She is also not able to do much activity because of her fatigue and joint soreness. Pt complains of joint soreness. Her main complaint is in her hand. She is having increased weakness in her hands. She can no longer do things such as scramble eggs. Pt cardiologist said that the echocardiogram showed that she had a \"sluggish\" heart due to sustained tachycardia, she believes they are planning to start beta blocker. She is going to check up with her cardiologist every 3 months. Pt is also worried because she developed asthma recently that she never had before, though not diagnosed with PFTs. Uses albuterol puffer when feeling tight or more wheezy, not regularly limiting. Pt notes that she has been bruising more easily lately. Still getting steroid tapers every couple of months with ear infections or particularly severe eruptions of prurigo. Pt says that she can feel a difference in fatigue and overall pain when taking the LDN. When she had a fever and she could not take it she felt a big difference.  Takes it at night since it makes her mildly sleepy    Disease History:  Since 4 or 4yo, has had recurrent nodules on the skin diffusely--face, arms, legs, abdomen. Can drain pus. Has been treated over the years with oral antibiotics and topical antibiotics which help; swabs have been MRSA-positive in the past. Has tried Hibiclens baths in the past repeatedly without improvement. Pruritic, seem to respond to topical Mupirocin. Has had shave biopsy with Derm at [facility] in the past,punch biopsy in May 2019 was interpreted as mixed dermal inflammation with features of an excoriated hypersensitivity reaction; last seen in November 2020 when diagnosed with keratosis pilaris, rosacea, and neurodermatitis. In February, PCP ordered labs including an WENDY screen which returned +1:320 speckled and 1:640 nuclear dot pattern, with mild elevations of ESR (26) and CRP (17mg/L). Turns \"red as a lobster\" in the sun even with sunscreen use, has always been the case. No chronic cough or progressive dyspnea on exertion. Nonrefreshing sleep and always fatigued in the evening. Runs to the bathroom repeatedly through the evening, doesn't feel she can reduce her fluid intake in the afternoon/evenings 2/2 chronic thirst and dry mouth. Sleep study ~3 years ago she says was normal.  Has more subjective eye dryness, not currently using AT's consistently or following with ophthalmology. Cold intolerant, feels hands and feet always feel like ice, feels worse over the last 2 years. Gaining weight again after losing nearly 100 pounds with metformin; has gained 15 pounds in the last 3 months. Feels digestive issues (possible gastroparesis in the past with postprandial pain, biliary dyskinesis s/p cholecystectomy) have resolved since starting a sucrose intolerance diet. Stool softeners haven't helped in the past. On current diet she is having daily bowel movements without abdominal pain. Knees ache. Denies joint swelling.  Has sprained ankles in past and now feels wrists get more sore with use.      REVIEW OF SYSTEMS  A comprehensive review of systems was negative except for that written in the HPI. A 10-point review of systems is per the new patient questionnaire, which has been reviewed extensively and scanned into the electronic medical record for future reference. Review of systems is as above and is otherwise negative. ALLERGIES  Yeast, dried and Sucrose    MEDICATIONS  Current Outpatient Medications   Medication Sig    DULoxetine (CYMBALTA) 30 mg capsule Take 30 mg by mouth daily. Taken with 60mg to equal 90mg    budesonide-glycopyr-formoterol (Breztri Aerosphere) 160-9-4.8 mcg/actuation HFAA Take 2 Puffs by inhalation daily.  omega 3-dha-epa-fish oil (Fish OiL) 100-160-1,000 mg cap Take 2,000 mg by mouth.  multivitamin (ONE A DAY) tablet Take 1 Tablet by mouth daily.  albuterol (PROVENTIL HFA, VENTOLIN HFA, PROAIR HFA) 90 mcg/actuation inhaler Take 1 Puff by inhalation every four (4) hours as needed for Wheezing.  Aimovig Autoinjector 140 mg/mL injection INJECT 1 SYRINGE VIA SUBCUTANEOUS ROUTE ONCE A MONTH, AS DIRECTED    promethazine (PHENERGAN) 25 mg tablet Take 1 Tablet by mouth every six (6) hours as needed for Nausea.  ubrogepant Tiana Fairy) 100 mg tablet Take 1 Tablet by mouth as needed for Migraine (Take at onset of migraine. May repeat once after 2 hours if needed. ).  montelukast (SINGULAIR) 10 mg tablet     Naltrexone, Bulk, 100 % powd 4.5 mg by Does Not Apply route daily.  hydrOXYchloroQUINE (PLAQUENIL) 200 mg tablet Take 1 Tablet by mouth two (2) times a day.  pregabalin (LYRICA) 75 mg capsule Take 75 mg by mouth daily.  traZODone (DESYREL) 50 mg tablet     lansoprazole (PREVACID) 30 mg capsule Take 30 mg by mouth Daily (before breakfast).  DULoxetine (Cymbalta) 60 mg capsule Take 60 mg by mouth daily.     Vyvanse 20 mg capsule 30 mg.    metFORMIN ER (GLUCOPHAGE XR) 750 mg tablet TAKE 1 TABLET BY MOUTH TWICE A DAY  Indications: polycystic ovarian syndrome, a disease with cysts in the ovaries, prevention of type 2 diabetes mellitus    ascorbic acid, vitamin C, (VITAMIN C) 500 mg tablet Take  by mouth.  sacrosidase (Sucraid) 8,500 unit/mL soln Take 1 mL by mouth.  b complex vitamins tablet ONE TABLE DAILY    hydrOXYzine HCL (ATARAX) 25 mg tablet TAKE 1 TO 2 TABLETS BY MOUTH TWICE A DAY AS NEEDED FOR ANXIETY    norethindrone-ethinyl estradiol-iron (Junel FE 1.5/30, 28,) 1.5 mg-30 mcg (21)/75 mg (7) tab TAKE 1 TAB BY MOUTH DAILY. CONTINUOUS PILLS ONLY.  ondansetron (ZOFRAN ODT) 8 mg disintegrating tablet PLACE 1 TABLET ON TONGUE & ALLOW TO DISSOLVE EVERY 8 HOURS AS NEEDED FOR NAUSEA WITH HEADACHE    cholecalciferol, vitamin d3, (VITAMIN D3) 400 unit cap Take  by mouth.  diphenhydrAMINE (BENADRYL) 25 mg capsule 2 tabs PO q8h PRN     No current facility-administered medications for this visit. PAST MEDICAL HISTORY  Past Medical History:   Diagnosis Date    Abdominal migraine     Dr. Homer Barbour. vs Sucrose Intolerance.  Acid reflux     ADHD (attention deficit hyperactivity disorder)     Autism     High Functioning. Dr. Kiara Talavera.  Chronic rhinitis 2021    Dr. Irene Estevez, allergist.    Vandana Prudent delay     Fibromyalgia     Dr. Kavitha Sanders MIGUEL A (generalized anxiety disorder)     Dr. Krystina Marshall    Insulin resistance 09/08/2021    Dr. Celia Humphrey. [facility], [facility]    Irritable bowel syndrome with diarrhea 4/13/2017    Lymphedema 2021    BL legs. Dr. Dash Roman. [facility]    Migraine headache     Dr. Siddharth Story    Obesity, morbid [facility]) 12/12/2017    OCD (obsessive compulsive disorder)     Dr. Lopez Corporal Sucrose intolerance     Dr. Homer Barbour, GI.    Tachycardia 06/02/2021    Mauri Cobian DO, [facility] cardio    Tenosynovitis of right hand 07/2021    Dr. Shanita Awad    Undifferentiated connective tissue disease ([facility] [address]     (lupus like). Dr. Gwen Melton.        FAMILY HISTORY  family history includes Atrial Fibrillation in her mother; Cancer in her maternal uncle; Delayed Awakening in her mother; Endometriosis in her mother; Hypertension in her maternal grandfather, maternal grandmother, and mother; Lymphoma in an other family member; Migraines in her mother; Mult Sclerosis in her maternal aunt; No Known Problems in her father, paternal grandfather, and paternal grandmother; Obesity in her mother; Other in her maternal grandfather; Post-op Nausea/Vomiting in her mother. Mother diagnosed with sarcoidosis. Father has gout. Maternal aunt has MS.    SOCIAL HISTORY  She  reports that she has never smoked. She has never used smokeless tobacco. She reports that she does not drink alcohol and does not use drugs. Social History     Social History Narrative    No known chemical exposures. 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 Eyes: Sclera are anicteric. No conjunctival injection. HEENT:  Oropharynx is clear. No oral ulcers. Adequate salivary pooling. No cervical or supraclavicular lymphadenopathy. Lungs:  Clear to auscultation bilaterally, without wheeze or stridor. Normal respiratory effort. Cor:  Regular rate and rhythm. No murmur rub or gallop. Abdomen: Soft, non-tender, without hepatomegaly or masses. Extremities: No calf tenderness or edema. Warm and well perfused. Skin:  Erythema and xerosis over bilateral upper and lower extremities, midfacial erythema including nasolabial folds most c/w rosacea. Active Raynaud's today with cyanosis across all distal R toes and L 3 and 4 toes.   Neuro: Nonfocal, no foot or wrist drop  Musculoskeletal:    A comprehensive musculoskeletal exam was performed for all joints of each upper and lower extremity and assessed for swelling, tenderness and range of motion. Results are documented as below:    No evidence of synovitis in the small joints of the hands, wrists, shoulders, elbows, hips, knees or ankles. Labs:  5/31/22: WBC 13.5, HGB 14.6, Plt 513  5/26/22: WBC 18.9, HGB 14.7, Plt 443, , CRP 12 mg/L, ESR 17  4/28/22: , CRP 0.34 mg/dL, WBC 18.9, HGB 14.7, Plt 511, Jak2 negative,   4/5/22: WBC 12.0, Hgb 14., Plt 509; Tbili 0.4, AST 7, ALT 21, AlkP 73, Tprot 8.2, Alb 3.4, INR 0.9  9/15/21: CRP 13mg/L, C3 179 (high), C4 30; UA neg for blood, +trace protein. 9/7/21: WBC 11.4, HJgb 16.2, Hct 49.6, Plt 534; aerobic wound culture with scant growth of mixed skin babar. 9/3/21: WBC 8.9, Hgb 15.0, Plt 483, ESR 7, Cr 0.71, LFTs WNL, CRP 17mg/L;   6/17/21: WBC 6.9, Hgb 16.8, Plt 451, ESR 5, CRP 5mg/L  3/10/21: CRP 21mg/L, CPK 57, Marissa neg, RDL myositis panel neg, cardiolipin antibodies   2/24/21: WBC 8.9, Hgb 15.6, Plt 472; ANCAs negative, PR3 and MPO negative; Cr 0.7, CMP WNL;  WENDY 1:320 speckled, 1:640 nuclear dot; C3 high, C4 WNL; negative dsDNA, Scl70, SSA, SSB, RNP, Sm, ribosomal P, chromatin, centromere B antibodies. RF <10, CCP negative; ESR 26, CRP 17mg/L. Hep B cAb neg, Hep B sAg neg, Hep C Ab neg    Pathology:  5/8/19: [facility] pathology final report, Tatyana High MD:  Skin, right lower leg, punch biopsy:  -Ulcer and mixed dermal inflammation (see comment). Sections reveal a punch biopsy to the depth of the mid dermis. There is a zone of ulceration with fibrinous crust containing neutrophils. In the superficial to mid dermis, there is a mildly dense perivascular lymphohistiocytic infiltrate with scattered neutrophils and eosinophils. PAS stain is negative for fungal organisms. Taken together, the findings are those of ulceration and mixed dermal inflammation.  The features are suggestive of an excoriated hypersensitivity reaction, such as that seen in arthropod bites. Diagnostic features of folliculitis are not seen. Imagin21 MR brain:  Normal MRI of the brain. No intracranial mass, hemorrhage or evidence of acute infarction. 19 CXR (report only): Normal chest views. No change. ASSESSMENT AND PLAN  Ms. Mark Franklin is a 24 y.o. female with high-functioning autism and lipedema who presents for followup of undifferentiated connective tissue disease with resting tachycardia, pruritic nodules, polymorphic light eruption, livedo, and +WENDY 1:320 speckled and 1:640 nuclear dot patterns. Ulcers and prurigo have been prednisone responsive, but she continues to add weight. Reviewed steroid-sparing drugs and she is amenable to a trial of methotrexate, assuming her final bone marrow pathology returns reassuring against malignancy. Also reviewed clinical trials for prurigo, which she is interested in pursuing at [facility] (near an [facility]). Encouraged her to look into criteria for abrocitinib trial.    1. Undifferentiated connective tissue disease ([facility] [address]  - C REACTIVE PROTEIN, QT; Future  - CBC WITH AUTOMATED DIFF; Future  - METABOLIC PANEL, COMPREHENSIVE; Future  - SED RATE (ESR); Future  - pregabalin (LYRICA) 75 mg capsule; Take 1 Capsule by mouth two (2) times a day. Max Daily Amount: 150 mg. Dispense: 60 Capsule; Refill: 2    2. Lipedema  - C REACTIVE PROTEIN, QT; Future  - CBC WITH AUTOMATED DIFF; Future  - METABOLIC PANEL, COMPREHENSIVE; Future  - SED RATE (ESR); Future    3. Prurigo nodularis  - C REACTIVE PROTEIN, QT; Future  - CBC WITH AUTOMATED DIFF; Future  - METABOLIC PANEL, COMPREHENSIVE; Future  - SED RATE (ESR); Future    4. Ongoing use of possibly toxic medication  - CBC WITH AUTOMATED DIFF; Future  - METABOLIC PANEL, COMPREHENSIVE; Future    5. Rosacea  - metroNIDAZOLE (METROGEL) 0.75 % topical gel;  Apply  to affected area two (2) times a day.  Dispense: 60 g; Refill: 0    6. Neuropathic pain  - pregabalin (LYRICA) 75 mg capsule; Take 1 Capsule by mouth two (2) times a day. Max Daily Amount: 150 mg. Dispense: 60 Capsule; Refill: 2        Patient Instructions   1) Send me a message once you get the final word from the hematologist. If your workup is negative for cancer we can start you on methotrexate subcutaneously. 2) Continue on your current dose of plaquenil. 3) Check labs one month after starting the methotrexate. 4) Follow up in 3 months. Let me know if you have any questions or concerns in the meantime. Orders Placed This Encounter    C REACTIVE PROTEIN, QT    CBC WITH AUTOMATED DIFF    METABOLIC PANEL, COMPREHENSIVE    SED RATE (ESR)    metroNIDAZOLE (METROGEL) 0.75 % topical gel    pregabalin (LYRICA) 75 mg capsule     Medications: I have changed Erica Garcia's pregabalin. I am also having her start on metroNIDAZOLE. Additionally, I am having her maintain her diphenhydrAMINE, cholecalciferol (vitamin d3), ondansetron, norethindrone-ethinyl estradiol-iron, hydrOXYzine HCL, ascorbic acid (vitamin C), Sucraid, b complex vitamins, Vyvanse, metFORMIN ER, lansoprazole, DULoxetine, traZODone, hydrOXYchloroQUINE, Naltrexone (Bulk), montelukast, DULoxetine, Breztri Aerosphere, Fish OiL, multivitamin, albuterol, Aimovig Autoinjector, promethazine, and ubrogepant.       Face to face time: 30 minutes  Note preparation and records review day of service: 25 minutes  Total provider time day of service: 55 minutes    This was scribed by Aditi Caro in the presence of Dr. Tina Christy MD    Adult Rheumatology   [address] [facility] [address]  [facility], [address] [place] [address]   Phone [phone]  Fax [phone]

## 2022-06-09 NOTE — PROGRESS NOTES
Chief Complaint   Patient presents with    Other     connective tissue disease     1. Have you been to the ER, urgent care clinic since your last visit? Hospitalized since your last visit? Yes Where: [facility]    2. Have you seen or consulted any other health care providers outside of the [address] [place] [address] since your last visit? Include any pap smears or colon screening.  No

## 2022-06-28 ENCOUNTER — TELEPHONE (OUTPATIENT)
Dept: RHEUMATOLOGY | Age: 22
End: 2022-06-28

## 2022-06-28 NOTE — TELEPHONE ENCOUNTER
Patient called and states she is having a flare up that started sat and would like someone to contact her at [phone]

## 2022-06-29 ENCOUNTER — HOSPITAL ENCOUNTER (EMERGENCY)
Age: 22
Discharge: HOME | End: 2022-06-29
Attending: EMERGENCY MEDICINE
Payer: MEDICARE

## 2022-06-29 VITALS
SYSTOLIC BLOOD PRESSURE: 138 MMHG | WEIGHT: 293 LBS | HEART RATE: 95 BPM | RESPIRATION RATE: 22 BRPM | DIASTOLIC BLOOD PRESSURE: 82 MMHG | TEMPERATURE: 97.2 F | OXYGEN SATURATION: 99 % | HEIGHT: 64 IN | BODY MASS INDEX: 50.02 KG/M2

## 2022-06-29 DIAGNOSIS — L50.9 LOCALIZED URTICARIA: Primary | ICD-10-CM

## 2022-06-29 DIAGNOSIS — T63.481A INSECT STINGS, ACCIDENTAL OR UNINTENTIONAL, INITIAL ENCOUNTER: ICD-10-CM

## 2022-06-29 PROCEDURE — 74011250637 HC RX REV CODE- 250/637: Performed by: EMERGENCY MEDICINE

## 2022-06-29 PROCEDURE — 99283 EMERGENCY DEPT VISIT LOW MDM: CPT

## 2022-06-29 RX ORDER — IBUPROFEN 800 MG/1
800 TABLET ORAL
Status: COMPLETED | OUTPATIENT
Start: 2022-06-29 | End: 2022-06-29

## 2022-06-29 RX ORDER — CAMPHOR 0.45 %
GEL (GRAM) TOPICAL
Qty: 1 G | Refills: 0 | Status: SHIPPED | OUTPATIENT
Start: 2022-06-29 | End: 2022-09-26 | Stop reason: ALTCHOICE

## 2022-06-29 RX ORDER — ACETAMINOPHEN 500 MG
1000 TABLET ORAL
Status: COMPLETED | OUTPATIENT
Start: 2022-06-29 | End: 2022-06-29

## 2022-06-29 RX ORDER — DIPHENHYDRAMINE HCL 25 MG
25 CAPSULE ORAL
Status: COMPLETED | OUTPATIENT
Start: 2022-06-29 | End: 2022-06-29

## 2022-06-29 RX ADMIN — ACETAMINOPHEN 1000 MG: 500 TABLET ORAL at 19:35

## 2022-06-29 RX ADMIN — IBUPROFEN 800 MG: 800 TABLET, FILM COATED ORAL at 19:35

## 2022-06-29 RX ADMIN — DIPHENHYDRAMINE HYDROCHLORIDE 25 MG: 25 CAPSULE ORAL at 18:59

## 2022-06-29 NOTE — ED NOTES
Bedside and Verbal shift change report given to [address] (oncoming nurse) by Anastacia Thayer RN (offgoing nurse). Report included the following information SBAR, ED Summary and MAR.

## 2022-06-29 NOTE — ED TRIAGE NOTES
Pt in ED c/o SOB after being stung by several wasps strings on legs and stomach. Pt has hx of asthma. Mom gave benadryl at home.

## 2022-06-29 NOTE — ED PROVIDER NOTES
79-year-old female with significant past medical history of lupus currently on steroids, fibromyalgia, autism, ADHD, presenting ER after being stung by bee or hornet 3 times. Patient reports pain at the sting sites with some localized swelling and itchiness. Patient reports afterwards feeling short of breath and used her inhaler which improved her symptoms. Denies any facial swelling or tongue swelling or difficulty swallowing or speaking no nausea vomiting diarrhea. Patient denies any history of allergic reaction to hornet or bee stings. Patient did take Benadryl. On arrival patient was tachycardic however this resolved spontaneously. Patient does admit to feeling slightly anxious. Past Medical History:   Diagnosis Date    Abdominal migraine     Dr. Danica Miles. vs Sucrose Intolerance.  Acid reflux     ADHD (attention deficit hyperactivity disorder)     Autism     High Functioning. Dr. Adrian Solorio.  Chronic rhinitis 2021    Dr. Aleisha Petit, allergist.    Nancy Shell delay     Fibromyalgia     Dr. Coker Check MIGUEL A (generalized anxiety disorder)     Dr. Adams Hong    Insulin resistance 09/08/2021    Dr. Elmo Gilliland. [facility], maurice    Irritable bowel syndrome with diarrhea 4/13/2017    Lymphedema 2021    BL legs. Dr. Guru Balderrama. [facility], [facility]    Migraine headache     Dr. Arben Crawley    Obesity, [facility]) 12/12/2017    OCD (obsessive compulsive disorder)     Dr. María Bruce Sucrose intolerance     Dr. Danica Miles, GI.    Tachycardia 06/02/2021    Mauri Cobian DO, [facility] Peds cardio    Tenosynovitis of right hand 07/2021    Dr. Chavez Points    Undifferentiated connective tissue disease ([facility] [address]     (lupus like). Dr. Lyndal Hodgkins.        Past Surgical History:   Procedure Laterality Date    COLONOSCOPY N/A 12/20/2016    COLONOSCOPY performed by Aaliyah Arambula MD at [address] GERD TST W/ MUCOS PH ELECTROD 48 HR (BRAVO)  12/3/2020  HC CLP BRAVO  11/30/2020    HX CHOLECYSTECTOMY  06/2019    HX ENDOSCOPY  11/2020    dx'd with sucrose intolerance    HX TONSIL AND ADENOIDECTOMY      HX TONSILLECTOMY      AGE 2    HX WISDOM TEETH EXTRACTION      IL EGD TRANSORAL BIOPSY SINGLE/MULTIPLE  6/14/2019         IL EGD TRANSORAL BIOPSY SINGLE/MULTIPLE  11/30/2020    UPPER GI ENDOSCOPY,BIOPSY  12/20/2016              Family History:   Problem Relation Age of Onset    Endometriosis Mother     Delayed Awakening Mother     Post-op Nausea/Vomiting Mother     Migraines Mother         d/t accident - neck and brain injury    Atrial Fibrillation Mother     Hypertension Mother     Obesity Mother     Hypertension Maternal Grandfather     Other Maternal Grandfather         diverticulitis    No Known Problems Father     Hypertension Maternal Grandmother     No Known Problems Paternal Grandmother     No Known Problems Paternal Grandfather     Mult Sclerosis Maternal Aunt     Cancer Maternal Uncle     Lymphoma Other        Social History     Socioeconomic History    Marital status: SINGLE     Spouse name: Not on file    Number of children: 0    Years of education: Not on file    Highest education level: Not on file   Occupational History    Occupation: student   Tobacco Use    Smoking status: Never Smoker    Smokeless tobacco: Never Used    Tobacco comment: no smoke exposure in the home   Vaping Use    Vaping Use: Never used   Substance and Sexual Activity    Alcohol use: No    Drug use: No    Sexual activity: Never     Partners: Male     Birth control/protection: Pill   Other Topics Concern     Service Not Asked    Blood Transfusions Not Asked    Caffeine Concern Not Asked    Occupational Exposure Not Asked    Hobby Hazards Not Asked    Sleep Concern Not Asked    Stress Concern Not Asked    Weight Concern Not Asked    Special Diet Not Asked    Back Care Not Asked    Exercise Not Asked    Bike Helmet Not Asked  Seat Belt Not Asked    Self-Exams Not Asked   Social History Narrative    No known chemical exposures. Social Determinants of Health     Financial Resource Strain:     Difficulty of Paying Living Expenses: Not on file   Food Insecurity:     Worried About Running Out of Food in the Last Year: Not on file    Atilio of Food in the Last Year: Not on file   Transportation Needs:     Lack of Transportation (Medical): Not on file    Lack of Transportation (Non-Medical): Not on file   Physical Activity:     Days of Exercise per Week: Not on file    Minutes of Exercise per Session: Not on file   Stress:     Feeling of Stress : Not on file   Social Connections:     Frequency of Communication with Friends and Family: Not on file    Frequency of Social Gatherings with Friends and Family: Not on file    Attends Christianity Services: Not on file    Active Member of [employer] or Organizations: Not on file    Attends Club or Organization Meetings: Not on file    Marital Status: Not on file   Intimate Partner Violence:     Fear of Current or Ex-Partner: Not on file    Emotionally Abused: Not on file    Physically Abused: Not on file    Sexually Abused: Not on file   Housing Stability:     Unable to Pay for Housing in the Last Year: Not on file    Number of [place] in the Last Year: Not on file    Unstable Housing in the Last Year: Not on file         ALLERGIES: Yeast, dried and Sucrose    Review of Systems   Constitutional: Negative for chills and fever. HENT: Negative for congestion, sore throat, trouble swallowing and voice change. Eyes: Negative for pain. Respiratory: Positive for shortness of breath (resolved). Cardiovascular: Negative for chest pain. Gastrointestinal: Negative for abdominal pain, diarrhea, nausea and vomiting. Genitourinary: Negative for dysuria and flank pain. Musculoskeletal: Negative for back pain and neck pain. Skin: Positive for rash.    Neurological: Negative for dizziness and headaches. Psychiatric/Behavioral: The patient is nervous/anxious. All other systems reviewed and are negative. Vitals:    06/29/22 1820 06/29/22 1848 06/29/22 1941   BP: (!) 162/106 (!) 146/96 138/82   Pulse: (!) 121  95   Resp: 24  22   Temp: 97.2 °F (36.2 °C)     SpO2: 100% 96% 99%   Weight: 146.5 kg (323 lb)     Height: 5' 4\" (1.626 m)              Physical Exam  Constitutional:       Appearance: She is well-developed. HENT:      Head: Normocephalic. Eyes:      Conjunctiva/sclera: Conjunctivae normal.   Cardiovascular:      Rate and Rhythm: Normal rate and regular rhythm. Pulmonary:      Effort: Pulmonary effort is normal. No respiratory distress. Breath sounds: Normal breath sounds. Abdominal:      General: Bowel sounds are normal.      Palpations: Abdomen is soft. Tenderness: There is no abdominal tenderness. Musculoskeletal:         General: Normal range of motion. Cervical back: Normal range of motion and neck supple. Skin:     General: Skin is warm. Capillary Refill: Capillary refill takes less than 2 seconds. Findings: No rash. Comments: 3 spots of local urticaria associated with insect bite/sting. No systemic hives. Neurological:      Mental Status: She is alert and oriented to person, place, and time. Comments: No gross motor or sensory deficits          MDM  Number of Diagnoses or Management Options  Insect stings, accidental or unintentional, initial encounter  Localized urticaria  Diagnosis management comments: Patient presenting ER with localized [place] area associated with an insect sting. I discussed symptomatic treatment. Patient is currently on systemic steroids due to a lupus flare. Initially had some shortness of breath which resolved after use of her inhaler and has no further episodes of shortness of breath or wheezing.       I discussed symptomatic treatment    Discussed the discharge impression and any labs and the results with the patient. Answered any questions and addressed any concerns. Discussed the importance of following up with their primary care provider and/or specialist.  Discussed signs or symptoms that would warrant return back to the ER for further evaluation. The patient is agreeable with discharge.            Procedures
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
## 2022-07-06 ENCOUNTER — VIRTUAL VISIT (OUTPATIENT)
Dept: ONCOLOGY | Age: 22
End: 2022-07-06
Payer: MEDICARE

## 2022-07-06 ENCOUNTER — TELEPHONE (OUTPATIENT)
Dept: RHEUMATOLOGY | Age: 22
End: 2022-07-06

## 2022-07-06 DIAGNOSIS — D75.838 OTHER THROMBOCYTOSIS: Primary | ICD-10-CM

## 2022-07-06 DIAGNOSIS — L98.9 SKIN LESIONS: ICD-10-CM

## 2022-07-06 DIAGNOSIS — R79.82 ELEVATED C-REACTIVE PROTEIN (CRP): ICD-10-CM

## 2022-07-06 PROCEDURE — G9717 DOC PT DX DEP/BP F/U NT REQ: HCPCS | Performed by: INTERNAL MEDICINE

## 2022-07-06 PROCEDURE — 99213 OFFICE O/P EST LOW 20 MIN: CPT | Performed by: INTERNAL MEDICINE

## 2022-07-06 PROCEDURE — G8417 CALC BMI ABV UP PARAM F/U: HCPCS | Performed by: INTERNAL MEDICINE

## 2022-07-06 PROCEDURE — G8756 NO BP MEASURE DOC: HCPCS | Performed by: INTERNAL MEDICINE

## 2022-07-06 PROCEDURE — G8427 DOCREV CUR MEDS BY ELIG CLIN: HCPCS | Performed by: INTERNAL MEDICINE
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
## 2022-07-06 NOTE — TELEPHONE ENCOUNTER
Pt already sent a Spritz message with this information. Message was sent to Dr. Sole Garcia for him to respond to her directly.

## 2022-07-06 NOTE — TELEPHONE ENCOUNTER
Pt called stating she would like to receive a call from Dr. María Leary to discuss what the hematology and oncology doctor stated. She also stated the doctor gave an ok to start medication. Please advise.     [phone]

## 2022-07-07 ENCOUNTER — TELEPHONE (OUTPATIENT)
Dept: ONCOLOGY | Age: 22
End: 2022-07-07

## 2022-07-12 ENCOUNTER — TELEPHONE (OUTPATIENT)
Dept: RHEUMATOLOGY | Age: 22
End: 2022-07-12

## 2022-07-12 DIAGNOSIS — D75.838 OTHER THROMBOCYTOSIS: Primary | ICD-10-CM

## 2022-07-12 NOTE — TELEPHONE ENCOUNTER
Pt called stating she would like to receive a call from Dr. Domi Issa to discuss what the hematology and oncology doctor stated.  Also states she has some other things she would like to discuss with him.      [phone]

## 2022-07-14 ENCOUNTER — TELEPHONE (OUTPATIENT)
Dept: PEDIATRIC ENDOCRINOLOGY | Age: 22
End: 2022-07-14

## 2022-07-14 NOTE — TELEPHONE ENCOUNTER
Patient is calling to get a referral for an adult Endo doctor.  Please advise    Fax:  [phone] - Attn: Dr Mckeon Sea

## 2022-07-18 ENCOUNTER — TELEPHONE (OUTPATIENT)
Dept: ONCOLOGY | Age: 22
End: 2022-07-18

## 2022-07-18 DIAGNOSIS — M35.9 UNDIFFERENTIATED CONNECTIVE TISSUE DISEASE (HCC): ICD-10-CM

## 2022-07-18 RX ORDER — HYDROXYCHLOROQUINE SULFATE 200 MG/1
200 TABLET, FILM COATED ORAL 2 TIMES DAILY
Qty: 180 TABLET | Refills: 1 | Status: SHIPPED | OUTPATIENT
Start: 2022-07-18

## 2022-07-18 RX ORDER — CELECOXIB 200 MG/1
CAPSULE ORAL
COMMUNITY
End: 2022-08-05

## 2022-07-18 NOTE — TELEPHONE ENCOUNTER
[address] at [place]  [phone]    07/18/22 1:02 PM - Called and spoke with the patient. Patient's ID verified x 2. She states she called [facility] to schedule her an appointment. They informed her she will be placed on a 1 to 2 year waiting list.  She states we have to send a different referral requesting a sooner appointment or call the hospital to request a sooner appointment. Advised this nurse will update Dr. Carl Hernandez of [facility] and call her back with recommendations. The patient voiced understanding and gratitude for the call. No further questions or concerns. [address] at [place]  [phone]    07/18/22 1:36 PM - Called and spoke with the patient. Patient's ID verified x 2. Advised, per Dr. Carl Hernandez, there is not a need for a sooner appointment with [facility] currently. Advised we could try referring her somewhere more local such as [facility] or [facility]. The patient expressed confusion. She states Dr. Carl Hernandez had told them during her last appointment that he did not had a definite answer and she could get a second opinion. She states Dr. Carl Hernandez advised her to go to [facility] or [facility] for the elevated counts since they have more resources. Advised, per Dr. Terrell Bruce note, he mentioned [facility] or [facility] for dermatology and a [facility] for the thrombocytosis. The patient states she is confused, as what they discussed at her appointment and what is written in the note are not the same. She would like an appointment to speak with Dr. Carl Hernandez for further clarification. She states her mother, who attended the virtual visit with her, is confused about what the note says versus the appointment conversation as well. Advised this nurse will update Dr. Carl Hernandez of [facility] and call her back with his recommendations as soon as possible. The patient voiced understanding and gratitude for the call. No further questions or concerns.

## 2022-07-18 NOTE — TELEPHONE ENCOUNTER
Patient called and stated that she called [facility] and they state it is a one year wait till she can be seen.  She would also like to talk to some one about her office notes being different then that she was told in the room   XL#[phone]

## 2022-07-18 NOTE — TELEPHONE ENCOUNTER
Received faxed request for refill of Hydroxychloroquine 200mg tablets. Last labs were done 7/6/22 & next visit is 9/9/22.

## 2022-07-20 DIAGNOSIS — D75.838 OTHER THROMBOCYTOSIS: Primary | ICD-10-CM

## 2022-07-25 ENCOUNTER — TELEPHONE (OUTPATIENT)
Dept: ONCOLOGY | Age: 22
End: 2022-07-25

## 2022-07-25 NOTE — TELEPHONE ENCOUNTER
[facility] at [facility]  [phone]        07/25/22 3:43 PM Placed return call to patient. Patient stated when she last spoke with Dr. Villa Richardson he was going to attempt to contact provider at [facility]. Patient calling to check on status of whether or not Dr. Villa Richardson had been able to reach provider. Per chart review, there is referral placed to [facility] hematology/oncology, Dr. Mike Canales. Referral faxed on 07/20. No appointment noted per encounters at this time. Advised this nurse would verify status of above and call patient back with update. She voiced understanding.     07/26/22 9:38 AM Call placed to patient. Advised of response per Dr. Villa Richardson. Also updated her that referral had been placed and records faxed. This office will follow up on status and continue to keep patient updated. Patient voiced understanding.

## 2022-07-25 NOTE — TELEPHONE ENCOUNTER
Patient called and LVM and stated that she has some question and would like a call back.   [phone]

## 2022-07-26 NOTE — TELEPHONE ENCOUNTER
[address] at [facility] [address]  [phone]    07/26/22 3:14 PM - Called and spoke with Concepción Garcia in new patient intake at [facility]. She states the patient does not have an appointment or referral/records in the system. She states this nurse should refax the information to [phone]. She will then send it to a nurse navigator to get an appointment scheduled. Provided this nurse's contact information for them to call once an appointment is scheduled. No further questions or concerns. 07/26/22 3:26 PM - Referral and records have been faxed.

## 2022-08-05 ENCOUNTER — OFFICE VISIT (OUTPATIENT)
Dept: FAMILY MEDICINE CLINIC | Age: 22
End: 2022-08-05
Payer: MEDICARE

## 2022-08-05 ENCOUNTER — DOCUMENTATION ONLY (OUTPATIENT)
Dept: FAMILY MEDICINE CLINIC | Age: 22
End: 2022-08-05

## 2022-08-05 VITALS
OXYGEN SATURATION: 98 % | HEART RATE: 94 BPM | WEIGHT: 293 LBS | SYSTOLIC BLOOD PRESSURE: 134 MMHG | TEMPERATURE: 98.7 F | BODY MASS INDEX: 50.02 KG/M2 | DIASTOLIC BLOOD PRESSURE: 88 MMHG | RESPIRATION RATE: 20 BRPM | HEIGHT: 64 IN

## 2022-08-05 DIAGNOSIS — N92.6 IRREGULAR MENSES: Primary | ICD-10-CM

## 2022-08-05 DIAGNOSIS — R52 PAIN: ICD-10-CM

## 2022-08-05 DIAGNOSIS — H92.01 RIGHT EAR PAIN: ICD-10-CM

## 2022-08-05 DIAGNOSIS — M79.7 FIBROMYALGIA: ICD-10-CM

## 2022-08-05 DIAGNOSIS — Z00.00 ENCOUNTER FOR MEDICAL EXAMINATION TO ESTABLISH CARE: ICD-10-CM

## 2022-08-05 DIAGNOSIS — I51.9 HEART DISEASE: ICD-10-CM

## 2022-08-05 DIAGNOSIS — Z87.898 HISTORY OF PREDIABETES: ICD-10-CM

## 2022-08-05 DIAGNOSIS — M79.2 NEUROPATHIC PAIN: ICD-10-CM

## 2022-08-05 DIAGNOSIS — M35.9 UNDIFFERENTIATED CONNECTIVE TISSUE DISEASE (HCC): ICD-10-CM

## 2022-08-05 PROCEDURE — G8754 DIAS BP LESS 90: HCPCS | Performed by: FAMILY MEDICINE

## 2022-08-05 PROCEDURE — G8752 SYS BP LESS 140: HCPCS | Performed by: FAMILY MEDICINE

## 2022-08-05 PROCEDURE — G8417 CALC BMI ABV UP PARAM F/U: HCPCS | Performed by: FAMILY MEDICINE

## 2022-08-05 PROCEDURE — G8427 DOCREV CUR MEDS BY ELIG CLIN: HCPCS | Performed by: FAMILY MEDICINE

## 2022-08-05 PROCEDURE — G9717 DOC PT DX DEP/BP F/U NT REQ: HCPCS | Performed by: FAMILY MEDICINE

## 2022-08-05 PROCEDURE — 99204 OFFICE O/P NEW MOD 45 MIN: CPT | Performed by: FAMILY MEDICINE

## 2022-08-05 RX ORDER — NORETHINDRONE ACETATE AND ETHINYL ESTRADIOL 1.5-30(21)
KIT ORAL
Qty: 3 DOSE PACK | Refills: 0 | Status: SHIPPED | OUTPATIENT
Start: 2022-08-05 | End: 2022-09-30 | Stop reason: SDUPTHER

## 2022-08-05 RX ORDER — PREGABALIN 75 MG/1
75 CAPSULE ORAL DAILY
Qty: 30 CAPSULE | Refills: 2 | Status: SHIPPED | OUTPATIENT
Start: 2022-08-05

## 2022-08-05 NOTE — PROGRESS NOTES
Faxed Records Request form to [facility]- Dr. Beni Ledesma and Vanesa Cruz NP (fax# [phone]). Confirmation received. Faxed Records Request form to [facility]- Dr. Gisela Wright (fax# [phone]). Confirmation received. Faxed Records Request form to [facility] Dr. Garcia Bartholomew (fax# [phone]). Confirmation received.

## 2022-08-05 NOTE — PROGRESS NOTES
Tyler Méndez (: 2000) is a 24 y.o. female, new patient, here for evaluation of the following chief complaint(s):  Establish Care, Ear Pain (Has been experiencing ear pain for about a week or two. Just finished antibiotics on Wednesday. ), and Medication Refill (Pregablin, Junel Fe )         ASSESSMENT/PLAN:  Below is the assessment and plan developed based on review of pertinent history, physical exam, labs, studies, and medications. 1. Irregular menses  Well controlled with OCP  Continue Rx  Patient is due to first pap but is very resistant to this. Will defer until after leukemia workup is complete   -     norethindrone-ethinyl estradiol-iron (Junel FE 1.5, ,) 1.5 mg-30 mcg (21)/75 mg (7) tab; TAKE 1 TAB BY MOUTH DAILY. CONTINUOUS PILLS ONLY., Normal, Disp-3 Dose Pack, R-0    2. Right ear pain  Persistent middle ear effusion after abx tx for otitis media  Rec nasal steroid and antihistamine   Mucinex as needed     3. Fibromyalgia  4. Pain  5. Undifferentiated connective tissue disease (HCC)  Continue lyrica 75mg daily   -     pregabalin (LYRICA) 75 mg capsule; Take 1 Capsule by mouth in the morning. Max Daily Amount: 75 mg., Normal, Disp-30 Capsule, R-2  6. Neuropathic pain  -     pregabalin (LYRICA) 75 mg capsule; Take 1 Capsule by mouth in the morning. Max Daily Amount: 75 mg., Normal, Disp-30 Capsule, R-2    7. Heart disease  Per patient, \"sluggish heart\"  Managed by cardiology   Records requested    8. Encounter for medical examination to establish care  Records requested from previous PCP and cardiologist     9. History of prediabetes  Managed by endocrine Dr. Queen Peterson Diabetes and endocrinology     10. ADHD  11. Anxiety   12. Autism spectrum disorder   Managed by psych Dr. Doretha Lay     Return in about 4 weeks (around 2022) for medicare wellness visit.       SUBJECTIVE/OBJECTIVE:  Chief Complaint   Patient presents with    Establish Care    Ear Pain     Has been experiencing ear pain for about a week or two. Just finished antibiotics on Wednesday. Medication Refill     Pregablin, Junel Fe      Patient is a 25 yo female with HTN, migraines, gastroparesis, IBS, GERD, prediabetes, lupus, lymphedema, anxiety, fibromyalgia, ADHD, leukocytosis. Presenting the office to establish care  She was seeing Dr. Ele Norton at [facility] practice with [facility]. She is switching to PCP within [facility]. Rheumatological conditions are managed by Dr. Jennifer Smith with [address]. She recently saw Dr. Shonda Michele hematology for elevated white blood cell count. He referred her to a leukemia specialist at [facility] for persistent elevated white blood cells and she is undergoing work-up with [facility] currently. She also sees dermatology for persistent skin lesions. Every couple of weeks she will get a papule somewhere on her body, progresses to a pustule, then a small ulcer, then will heal within a few weeks. She has seeing dermatology several times with multiple biopsies, they are considering going to [facility] or [facility] for dermatology. Previous PCP was managing chronic pain secondary to fibromyalgia and lupus with Lyrica 75mg was twice daily. However this has been reduced to Lyrica once daily in the morning which controls pain. PCP was also prescribed oral contraception for irregular and heavy menses. She takes continuous hormone, no placebo week due to severe migraines during placebo week. She has not had a Pap    Dr. Keely Ivory-- cardiologist for \"sluggish\" heart, just had echo and he put her on a med and it made her feel weird so she stopped this. She does not know what medication is. She has follow-up with him at the end of the month. She also gets lymphadema in her legs. She is also seeing Dr. Terra Burgos at [facility] for persistent pain in her pinky and fourth finger of her right hand. She has having PT and OT. She previously had MRI.     She has history of insulin resistance and prediabetes, she use to be on metformin but now she is seeing endocrinologist Dr. Susanna Daniel. She was treated for acute otitis media several weeks ago by her previous PCP. Reports she still is right ear fullness. No pain fevers chills nausea vomiting or hearing loss. Review of Systems   Constitutional:  Negative for chills, fatigue and fever. HENT:  Negative for hearing loss. Eyes:  Negative for pain and visual disturbance. Respiratory:  Negative for cough, chest tightness, shortness of breath and wheezing. Cardiovascular:  Negative for chest pain, palpitations and leg swelling. Gastrointestinal:  Negative for abdominal distention, abdominal pain, constipation, diarrhea, nausea and vomiting. Genitourinary:  Negative for dysuria, flank pain, frequency and hematuria. Musculoskeletal:  Negative for arthralgias, joint swelling and myalgias. Skin:  Negative for rash. Neurological:  Negative for dizziness, weakness, light-headedness and headaches. Psychiatric/Behavioral:  Negative for behavioral problems. The patient is not nervous/anxious. Current Outpatient Medications on File Prior to Visit   Medication Sig Dispense Refill    hydrOXYchloroQUINE (PLAQUENIL) 200 mg tablet Take 1 Tablet by mouth two (2) times a day. 180 Tablet 1    diphenhydrAMINE hcl (BenadryL) 2 % topical gel Apply  to affected area every four (4) hours as needed for Allergies. 1 g 0    budesonide-glycopyr-formoterol (Breztri Aerosphere) 160-9-4.8 mcg/actuation HFAA Take 2 Puffs by inhalation daily. omega 3-dha-epa-fish oil (Fish OiL) 100-160-1,000 mg cap Take 2,000 mg by mouth.      multivitamin (ONE A DAY) tablet Take 1 Tablet by mouth daily. albuterol (PROVENTIL HFA, VENTOLIN HFA, PROAIR HFA) 90 mcg/actuation inhaler Take 1 Puff by inhalation every four (4) hours as needed for Wheezing.       Aimovig Autoinjector 140 mg/mL injection INJECT 1 SYRINGE VIA SUBCUTANEOUS ROUTE ONCE A MONTH, AS DIRECTED 3 mL 1    ubrogepant Margert Bedford) 100 mg tablet Take 1 Tablet by mouth as needed for Migraine (Take at onset of migraine. May repeat once after 2 hours if needed. ). 16 Tablet 2    montelukast (SINGULAIR) 10 mg tablet       traZODone (DESYREL) 50 mg tablet       lansoprazole (PREVACID) 30 mg capsule Take 30 mg by mouth Daily (before breakfast). Vyvanse 20 mg capsule 30 mg.      ascorbic acid, vitamin C, (VITAMIN C) 500 mg tablet Take  by mouth. sacrosidase (Sucraid) 8,500 unit/mL soln Take 1 mL by mouth.      b complex vitamins tablet ONE TABLE DAILY      hydrOXYzine HCL (ATARAX) 25 mg tablet TAKE 1 TO 2 TABLETS BY MOUTH TWICE A DAY AS NEEDED FOR ANXIETY      ondansetron (ZOFRAN ODT) 8 mg disintegrating tablet PLACE 1 TABLET ON TONGUE & ALLOW TO DISSOLVE EVERY 8 HOURS AS NEEDED FOR NAUSEA WITH HEADACHE      cholecalciferol, vitamin d3, 10 mcg (400 unit) cap Take  by mouth. diphenhydrAMINE (BENADRYL) 25 mg capsule 2 tabs PO q8h PRN      celecoxib (CELEBREX) 200 mg capsule celecoxib 200 mg capsule   TAKE 1 CAPSULE BY MOUTH EVERY DAY      [DISCONTINUED] nirmatrelvir-ritonavir (PAXLOVID) 150 mg x 2- 100 mg tablet Follow instructions on box for treatment of COVID19. Do not take Ninfa Crystal, trazodone, or Breztri while taking this medication. (Patient not taking: Reported on 8/5/2022) 1 Box 0    metroNIDAZOLE (METROGEL) 0.75 % topical gel Apply  to affected area two (2) times a day. 60 g 0    [DISCONTINUED] pregabalin (LYRICA) 75 mg capsule Take 1 Capsule by mouth two (2) times a day. Max Daily Amount: 150 mg. 60 Capsule 2    DULoxetine (CYMBALTA) 30 mg capsule Take 30 mg by mouth daily. Taken with 60mg to equal 90mg      promethazine (PHENERGAN) 25 mg tablet Take 1 Tablet by mouth every six (6) hours as needed for Nausea. (Patient not taking: Reported on 8/5/2022) 30 Tablet 0    Naltrexone, Bulk, 100 % powd 4.5 mg by Does Not Apply route daily.  0.135 g 5    DULoxetine (CYMBALTA) 60 mg capsule Take 60 mg by mouth daily.      metFORMIN ER (GLUCOPHAGE XR) 750 mg tablet TAKE 1 TABLET BY MOUTH TWICE A DAY  Indications: polycystic ovarian syndrome, a disease with cysts in the ovaries, prevention of type 2 diabetes mellitus (Patient not taking: Reported on 8/5/2022) 180 Tablet 5    [DISCONTINUED] norethindrone-ethinyl estradiol-iron (Junel FE 1.5/30, 28,) 1.5 mg-30 mcg (21)/75 mg (7) tab TAKE 1 TAB BY MOUTH DAILY. CONTINUOUS PILLS ONLY. 3 Package 5     No current facility-administered medications on file prior to visit.      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[facility])     ADHD (attention deficit hyperactivity disorder)     Acid reflux     SLE (systemic lupus erythematosus) ([facility] [address]     History of prediabetes 07/20/2021    Lipedema 2021    Tenosynovitis of right hand 2021    Obesity, morbid ([address] 12/14/2020    Chronic gastritis without bleeding 06/20/2019    Chronic cholecystitis 06/04/2019    Biliary dyskinesia 06/04/2019    Chronic nausea 05/16/2019    Atypical depression 02/28/2018    Hypertension 02/20/2018    Lipids abnormal 02/20/2018    Edema, peripheral 09/13/2017    Allergy to yeast 09/13/2017    Allergy to chocolate 09/13/2017    Gastroesophageal reflux disease without esophagitis 05/15/2017    Irritable bowel syndrome with diarrhea 04/13/2017    Gastroparesis 01/19/2017    Medication overuse headache 09/21/2015    Autism spectrum disorder 09/21/2015    Anxiety 09/21/2015     Allergies   Allergen Reactions    Yeast, Dried Other (comments)     Upset stomach     Sucrose Nausea Only     SEVERE ABDOMINAL PAIN Past Surgical History:   Procedure Laterality Date    COLONOSCOPY N/A 12/20/2016    COLONOSCOPY performed by Chrissy Santos MD at [facility] ENDOSCOPY    GERD TST W/ MUCOS PH ELECTROD 50 HR (BRAVO)  12/3/2020         HC CLP BRAVO  11/30/2020    HX CHOLECYSTECTOMY  06/2019    HX ENDOSCOPY  11/2020    dx'd with sucrose intolerance    HX TONSIL AND ADENOIDECTOMY      HX TONSILLECTOMY      AGE 1    HX WISDOM TEETH EXTRACTION      SC EGD TRANSORAL BIOPSY SINGLE/MULTIPLE  6/14/2019         SC EGD TRANSORAL BIOPSY SINGLE/MULTIPLE  11/30/2020    UPPER GI ENDOSCOPY,BIOPSY  12/20/2016          Social History     Tobacco Use    Smoking status: Never    Smokeless tobacco: Never    Tobacco comments:     no smoke exposure in the home   Vaping Use    Vaping Use: Never used   Substance Use Topics    Alcohol use: No    Drug use: No     Family History   Problem Relation Age of Onset    Endometriosis Mother     Delayed Awakening Mother     Post-op Nausea/Vomiting Mother     Migraines Mother         d/t accident - neck and brain injury    Atrial Fibrillation Mother     Hypertension Mother     Obesity Mother     Hypertension Maternal Grandfather     Other Maternal Grandfather         diverticulitis    No Known Problems Father     Hypertension Maternal Grandmother     No Known Problems Paternal Grandmother     No Known Problems Paternal Grandfather     Mult Sclerosis Maternal Aunt     Cancer Maternal Uncle     Lymphoma Other      Vitals:    08/05/22 0744   BP: 134/88   Pulse: 94   Resp: 20   Temp: 98.7 °F (37.1 °C)   TempSrc: Oral   SpO2: 98%   Weight: 323 lb 6.4 oz (146.7 kg)   Height: 5' 4\" (1.626 m)   PainSc:   0 - No pain        Physical Exam  Constitutional:       Appearance: Normal appearance. She is obese. HENT:      Head: Normocephalic and atraumatic. Right Ear: Ear canal and external ear normal. A middle ear effusion is present.       Left Ear: Tympanic membrane, ear canal and external ear normal.   Eyes: Extraocular Movements: Extraocular movements intact. Conjunctiva/sclera: Conjunctivae normal.      Pupils: Pupils are equal, round, and reactive to light. Cardiovascular:      Rate and Rhythm: Normal rate and regular rhythm. Pulses: Normal pulses. Heart sounds: Normal heart sounds. Pulmonary:      Effort: Pulmonary effort is normal.      Breath sounds: Normal breath sounds. Abdominal:      General: Abdomen is flat. Bowel sounds are normal.      Palpations: Abdomen is soft. Musculoskeletal:      Cervical back: Normal range of motion and neck supple. Right lower leg: No edema. Left lower leg: No edema. Neurological:      Mental Status: She is alert. Psychiatric:         Mood and Affect: Mood normal.         Behavior: Behavior normal.           An electronic signature was used to authenticate this note.   -- Rusty Fairchild PA-C

## 2022-08-05 NOTE — PROGRESS NOTES
Salina Graham is a 24 y.o. female , id x 2(name and ). Reviewed record, history, and  medications. Chief Complaint   Patient presents with    Establish Care    Ear Pain     Has been experiencing ear pain for about a week or two. Just finished antibiotics on Wednesday. Medication Refill     Pregablin, Junel Fe        Vitals:    22 0744   BP: 134/88   Pulse: 94   Resp: 20   Temp: 98.7 °F (37.1 °C)   TempSrc: Oral   SpO2: 98%   Weight: 323 lb 6.4 oz (146.7 kg)   Height: 5' 4\" (1.626 m)       Coordination of Care Questionnaire:   1. Have you been to the ER, urgent care clinic since your last visit? Hospitalized since your last visit? No    2. Have you seen or consulted any other health care providers outside of the [address] [place] [address] since your last visit? Include any pap smears or colon screening. No      3 most recent PHQ Screens 2022   Little interest or pleasure in doing things Not at all   Feeling down, depressed, irritable, or hopeless Not at all   Total Score PHQ 2 0       Patient is accompanied by mother I have received verbal consent from Salina Graham to discuss any/all medical information while they are present in the room.

## 2022-08-22 DIAGNOSIS — M54.12 CERVICAL RADICULOPATHY: Primary | ICD-10-CM

## 2022-08-22 DIAGNOSIS — F40.240 CLAUSTROPHOBIA: ICD-10-CM

## 2022-08-22 RX ORDER — LORAZEPAM 1 MG/1
1 TABLET ORAL ONCE
Qty: 1 TABLET | Refills: 0 | Status: SHIPPED | OUTPATIENT
Start: 2022-08-22 | End: 2022-08-22

## 2022-08-23 ENCOUNTER — HOSPITAL ENCOUNTER (OUTPATIENT)
Dept: GENERAL RADIOLOGY | Age: 22
Discharge: HOME | End: 2022-08-23
Payer: MEDICARE

## 2022-08-23 DIAGNOSIS — M54.12 CERVICAL RADICULOPATHY: ICD-10-CM

## 2022-08-23 PROCEDURE — 72052 X-RAY EXAM NECK SPINE 6/>VWS: CPT

## 2022-08-29 ENCOUNTER — HOSPITAL ENCOUNTER (OUTPATIENT)
Dept: MRI IMAGING | Age: 22
Discharge: HOME | End: 2022-08-29
Attending: INTERNAL MEDICINE
Payer: MEDICARE

## 2022-08-29 DIAGNOSIS — M54.12 CERVICAL RADICULOPATHY: ICD-10-CM

## 2022-08-29 PROCEDURE — 72141 MRI NECK SPINE W/O DYE: CPT

## 2022-09-05 ENCOUNTER — HOSPITAL ENCOUNTER (EMERGENCY)
Age: 22
Discharge: HOME | End: 2022-09-05
Attending: EMERGENCY MEDICINE
Payer: MEDICARE

## 2022-09-05 ENCOUNTER — APPOINTMENT (OUTPATIENT)
Dept: GENERAL RADIOLOGY | Age: 22
End: 2022-09-05
Attending: EMERGENCY MEDICINE
Payer: MEDICARE

## 2022-09-05 VITALS
DIASTOLIC BLOOD PRESSURE: 117 MMHG | SYSTOLIC BLOOD PRESSURE: 146 MMHG | HEART RATE: 102 BPM | BODY MASS INDEX: 50.02 KG/M2 | OXYGEN SATURATION: 100 % | HEIGHT: 64 IN | RESPIRATION RATE: 20 BRPM | WEIGHT: 293 LBS | TEMPERATURE: 98 F

## 2022-09-05 DIAGNOSIS — S93.401A SPRAIN OF RIGHT ANKLE, UNSPECIFIED LIGAMENT, INITIAL ENCOUNTER: Primary | ICD-10-CM

## 2022-09-05 DIAGNOSIS — S99.911A INJURY OF RIGHT ANKLE, INITIAL ENCOUNTER: ICD-10-CM

## 2022-09-05 PROCEDURE — 99283 EMERGENCY DEPT VISIT LOW MDM: CPT

## 2022-09-05 PROCEDURE — 73610 X-RAY EXAM OF ANKLE: CPT

## 2022-09-05 PROCEDURE — 74011250637 HC RX REV CODE- 250/637: Performed by: EMERGENCY MEDICINE

## 2022-09-05 RX ORDER — ACETAMINOPHEN 500 MG
1000 TABLET ORAL 3 TIMES DAILY
Qty: 24 TABLET | Refills: 0 | Status: SHIPPED | OUTPATIENT
Start: 2022-09-05 | End: 2022-09-09

## 2022-09-05 RX ORDER — IBUPROFEN 800 MG/1
800 TABLET ORAL
Status: COMPLETED | OUTPATIENT
Start: 2022-09-05 | End: 2022-09-05

## 2022-09-05 RX ORDER — IBUPROFEN 800 MG/1
800 TABLET ORAL
Qty: 30 TABLET | Refills: 0 | Status: SHIPPED | OUTPATIENT
Start: 2022-09-05 | End: 2022-10-03

## 2022-09-05 RX ORDER — ACETAMINOPHEN 500 MG
1000 TABLET ORAL
Status: COMPLETED | OUTPATIENT
Start: 2022-09-05 | End: 2022-09-05

## 2022-09-05 RX ADMIN — IBUPROFEN 800 MG: 800 TABLET, FILM COATED ORAL at 19:26

## 2022-09-05 RX ADMIN — ACETAMINOPHEN 1000 MG: 500 TABLET ORAL at 19:26

## 2022-09-05 NOTE — ED PROVIDER NOTES
24-year-old female presenting ER with complaint of right ankle pain that occurred after she tripped and fell down several stairs. Patient reports no preceding symptoms. Tripped and rolled ankle causing pain in the ankle. Has not taken any medications for her symptoms has not iced the area. Patient denies any other injuries. No head trauma or loss of conscious. No blood thinners. Patient reports worsened pain to palpation and bearing weight. No previous injuries to the ankle. No numbness tingling or weakness. Past Medical History:   Diagnosis Date    Abdominal migraine     Dr. Epifanio Teresa. vs Sucrose Intolerance. Acid reflux     ADHD (attention deficit hyperactivity disorder)     Autism     High Functioning. Dr. Missy Hernandez. Chronic rhinitis 2021    Dr. Sandi Dominguez, allergist.     Developmental delay     Fibromyalgia     Dr. Minal Rojo    MIGUEL A (generalized anxiety disorder)     Dr. Ernie Lopez    Insulin resistance 09/08/2021    Dr. Esperanza Bess. maurice Walker    Irritable bowel syndrome with diarrhea 4/13/2017    Lymphedema 2021    BL legs. Dr. Dang Gooden. Ina Murillo, [facility]    Migraine headache     Dr. Susy Blanton    Obesity, morbid ([facility] 12/12/2017    OCD (obsessive compulsive disorder)     Dr. Ernie Lopez    Sucrose intolerance     Dr. Epifanio Teresa, GI. Tachycardia 06/02/2021    Mauri Cobian DO, [facility] Peds cardio    Tenosynovitis of right hand 07/2021    Dr. Darian Santiago    Undifferentiated connective tissue disease ([facility] [address]     (lupus like). Dr. Hunter Romero.        Past Surgical History:   Procedure Laterality Date    COLONOSCOPY N/A 12/20/2016    COLONOSCOPY performed by Nichelle Torres MD at [facility] ENDOSCOPY    GERD TST W/ MUCOS [address] ELECTROD 50 HR (BRAVO)  12/3/2020         HC CLP BRAVO  11/30/2020    HX CHOLECYSTECTOMY  06/2019    HX ENDOSCOPY  11/2020    dx'd with sucrose intolerance    HX TONSIL AND ADENOIDECTOMY      HX TONSILLECTOMY      AGE 1    HX WISDOM TEETH EXTRACTION      [place] E[place] TRANSORAL BIOPSY SINGLE/MULTIPLE  6/14/2019         [place] E[place] TRANSORAL BIOPSY SINGLE/MULTIPLE  11/30/2020    UPPER GI ENDOSCOPY,BIOPSY  12/20/2016              Family History:   Problem Relation Age of Onset    Endometriosis Mother     Delayed Awakening Mother     Post-op Nausea/Vomiting Mother     Migraines Mother         d/t accident - neck and brain injury    Atrial Fibrillation Mother     Hypertension Mother     Obesity Mother     Hypertension Maternal Grandfather     Other Maternal Grandfather         diverticulitis    No Known Problems Father     Hypertension Maternal Grandmother     No Known Problems Paternal Grandmother     No Known Problems Paternal Grandfather     Mult Sclerosis Maternal Aunt     Cancer Maternal Uncle     Lymphoma Other        Social History     Socioeconomic History    Marital status: SINGLE     Spouse name: Not on file    Number of children: 0    Years of education: Not on file    Highest education level: Not on file   Occupational History    Occupation: student   Tobacco Use    Smoking status: Never    Smokeless tobacco: Never    Tobacco comments:     no smoke exposure in the home   Vaping Use    Vaping Use: Never used   Substance and Sexual Activity    Alcohol use: No    Drug use: No    Sexual activity: Never     Partners: Male     Birth control/protection: Pill   Other Topics Concern     Service Not Asked    Blood Transfusions Not Asked    Caffeine Concern Not Asked    Occupational Exposure Not Asked    Hobby Hazards Not Asked    Sleep Concern Not Asked    Stress Concern Not Asked    Weight Concern Not Asked    Special Diet Not Asked    Back Care Not Asked    Exercise Not Asked    Bike Helmet Not Asked    Seat Belt Not Asked    Self-Exams Not Asked   Social History Narrative    No known chemical exposures.      Social Determinants of Health     Financial Resource Strain: Not on file   Food Insecurity: Not on file   Transportation Needs: Not on file Physical Activity: Inactive    Days of Exercise per Week: 0 days    Minutes of Exercise per Session: 0 min   Stress: Not on file   Social Connections: Not on file   Intimate Partner Violence: Unknown    Fear of Current or Ex-Partner: Patient refused    Emotionally Abused: Patient refused    Physically Abused: Patient refused    Sexually Abused: Patient refused   Housing Stability: Not on file         ALLERGIES: Yeast, dried and Sucrose    Review of Systems   Respiratory:  Negative for shortness of breath. Cardiovascular:  Negative for chest pain. Gastrointestinal:  Negative for abdominal pain. Musculoskeletal:  Positive for joint swelling. Negative for back pain and neck pain. Skin:  Negative for wound. Neurological:  Negative for weakness, numbness and headaches. All other systems reviewed and are negative. Vitals:    09/05/22 1917 09/05/22 1931   BP: (!) 146/117    Pulse: (!) 102    Resp: 20    Temp: 98 °F (36.7 °C)    SpO2: 100% 100%   Weight: 145.2 kg (320 lb)    Height: 5' 4\" (1.626 m)             Physical Exam  Vitals and nursing note reviewed. Constitutional:       Appearance: Normal appearance. HENT:      Head: Normocephalic and atraumatic. Nose: Nose normal.      Mouth/Throat:      Pharynx: Oropharynx is clear. Eyes:      Conjunctiva/sclera: Conjunctivae normal.   Cardiovascular:      Rate and Rhythm: Normal rate. Pulses:           Dorsalis pedis pulses are 2+ on the right side. Pulmonary:      Effort: Pulmonary effort is normal.      Breath sounds: Normal breath sounds. Musculoskeletal:         General: Swelling, tenderness and signs of injury present. No deformity. Cervical back: Neck supple. Right ankle: Swelling present. No deformity or lacerations. Tenderness present over the medial malleolus. No lateral malleolus, base of 5th metatarsal or proximal fibula tenderness. Right Achilles Tendon: Normal.   Skin:     General: Skin is warm.       Capillary Refill: Capillary refill takes less than 2 seconds. Neurological:      General: No focal deficit present. Mental Status: She is alert and oriented to person, place, and time. MDM  Number of Diagnoses or Management Options  Injury of right ankle, initial encounter  Sprain of right ankle, unspecified ligament, initial encounter  Diagnosis management comments: Patient presenting ER with injury to her right ankle. Tenderness over the medial malleolus. Most consistent with ankle sprain. X-ray with no acute fracture seen. Given ACE wrap and crutches and follow-up with patient Ortho. Discussed rest, ice, elevation and compression. Amount and/or Complexity of Data Reviewed  Tests in the radiology section of CPT®: reviewed      ED Course as of 09/05/22 2021   Mon Sep 05, 2022   1950 XR ANKLE RT MIN 3 V  Personally reviewed x-ray with no acute fractures seen.  [ZD]      ED Course User Index  [ZD] Julianna Caal MD       SplintMyron    Date/Time: 9/5/2022 7:48 PM  Performed by: Julianna Caal MD  Authorized by: Julianna Caal MD     Patient Ace wrap not splinted

## 2022-09-05 NOTE — Clinical Note
[address]  [facility] EMERGENCY DEPT  [address] [id number] [phone]    Work/School Note    Date: 9/5/2022    To Whom It May concern:    Sarah Cohen was seen and treated today in the emergency room by the following provider(s):  Attending Provider: Quynh Pride MD.      Sarah Cohen is excused from work/school on 9/5/2022 through 9/7/2022. She is medically clear to return to work/school on 9/8/2022.          Sincerely,          Lisa Kerr MD

## 2022-09-06 NOTE — ED NOTES
Patient does not appear to be in any acute distress/shows no evidence of clinical instability at this time. Pt was educated on how to use crutches and demonstrated understating. Provider has reviewed discharge instructions with the patient/family. The patient/family verbalized understanding instructions as well as need for follow up for any further symptoms. Discharge papers given, education provided, and any questions answered. Patient discharged by provider.

## 2022-09-16 ENCOUNTER — OFFICE VISIT (OUTPATIENT)
Dept: FAMILY MEDICINE CLINIC | Age: 22
End: 2022-09-16
Payer: MEDICARE

## 2022-09-16 VITALS
SYSTOLIC BLOOD PRESSURE: 126 MMHG | RESPIRATION RATE: 20 BRPM | OXYGEN SATURATION: 98 % | WEIGHT: 293 LBS | BODY MASS INDEX: 56.4 KG/M2 | TEMPERATURE: 97.8 F | HEART RATE: 114 BPM | DIASTOLIC BLOOD PRESSURE: 83 MMHG

## 2022-09-16 DIAGNOSIS — L03.116 CELLULITIS OF LEFT LOWER EXTREMITY: ICD-10-CM

## 2022-09-16 DIAGNOSIS — S93.401A SPRAIN OF RIGHT ANKLE, UNSPECIFIED LIGAMENT, INITIAL ENCOUNTER: Primary | ICD-10-CM

## 2022-09-16 DIAGNOSIS — D75.9: ICD-10-CM

## 2022-09-16 PROCEDURE — G8427 DOCREV CUR MEDS BY ELIG CLIN: HCPCS | Performed by: FAMILY MEDICINE

## 2022-09-16 PROCEDURE — 99214 OFFICE O/P EST MOD 30 MIN: CPT | Performed by: FAMILY MEDICINE

## 2022-09-16 PROCEDURE — G8754 DIAS BP LESS 90: HCPCS | Performed by: FAMILY MEDICINE

## 2022-09-16 PROCEDURE — G8752 SYS BP LESS 140: HCPCS | Performed by: FAMILY MEDICINE

## 2022-09-16 PROCEDURE — G9717 DOC PT DX DEP/BP F/U NT REQ: HCPCS | Performed by: FAMILY MEDICINE

## 2022-09-16 PROCEDURE — G8417 CALC BMI ABV UP PARAM F/U: HCPCS | Performed by: FAMILY MEDICINE

## 2022-09-16 RX ORDER — DULOXETIN HYDROCHLORIDE 60 MG/1
60 CAPSULE, DELAYED RELEASE ORAL DAILY
COMMUNITY

## 2022-09-16 RX ORDER — DULOXETIN HYDROCHLORIDE 30 MG/1
30 CAPSULE, DELAYED RELEASE ORAL DAILY
COMMUNITY

## 2022-09-16 RX ORDER — SULFAMETHOXAZOLE AND TRIMETHOPRIM 800; 160 MG/1; MG/1
1 TABLET ORAL 2 TIMES DAILY
Qty: 20 TABLET | Refills: 0 | Status: SHIPPED
Start: 2022-09-16 | End: 2022-09-19

## 2022-09-16 NOTE — PROGRESS NOTES
David Mejía is a 24 y.o. female , id x 2(name and ). Reviewed record, history, and  medications. Chief Complaint   Patient presents with    Hospital Follow Up     [facility] to [facility] ED on 22 due to falling down the stairs. Ankle Pain     Experiencing pain in the (R) ankle since the fall. Pain is getting better but is not 100% yet. Other     Has a spot on her (L) leg that just appeared. Vitals:    22 1600   Weight: 328 lb 9.6 oz (149.1 kg)       Coordination of Care Questionnaire:   1. Have you been to the ER, urgent care clinic since your last visit? Hospitalized since your last visit? Yes [facility] ED on 22     2. Have you seen or consulted any other health care providers outside of the [address] [place] [address] [place] since your last visit? Include any pap smears or colon screening.  Yes [facility] JANICE Hernandez

## 2022-09-16 NOTE — PROGRESS NOTES
Darek Buchanan (: 2000) is a 24 y.o. female, established patient, here for evaluation of the following chief complaint(s):  Hospital Follow Up [facility] to 's ED on 22 due to falling down the stairs. ), Ankle Pain (Experiencing pain in the (R) ankle since the fall. Keanu Ritchie is getting better but is not 100% yet. ), and Other (Has a spot on her (L) leg that just appeared. )         ASSESSMENT/PLAN:  Below is the assessment and plan developed based on review of pertinent history, physical exam, labs, studies, and medications. 1. Sprain of right ankle, unspecified ligament, initial encounter  Healing well, will refer to PT for ongoing treatment  -     REFERRAL TO PHYSICAL THERAPY    2. Cellulitis of left lower extremity  Start Bactrim with purulent discharge. Continue topical bacitracin. Return to clinic if symptoms worsen or fail to improve. -     trimethoprim-sulfamethoxazole (BACTRIM DS, SEPTRA DS) 160-800 mg per tablet; Take 1 Tablet by mouth two (2) times a day for 10 days. , Normal, Disp-20 Tablet, R-0    3. 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 She has appointment with genetics 2022.  -Per patient she will need blood work done by PCP after genetic appointment, recommend she return to office for this and just further details. SUBJECTIVE/OBJECTIVE:  Chief Complaint   Patient presents with    Hospital Follow Up     Went to 's ED on 22 due to falling down the stairs. Ankle Pain     Experiencing pain in the (R) ankle since the fall. Pain is getting better but is not 100% yet. Other     Has a spot on her (L) leg that just appeared.       Miriam South is a 23 yo female with high functioning autism, fibromyalgia, undifferentiated connective tissue disorder, neuropathic pain, presented the office for right ankle pain and left skin lesion. There is a concern that she had leukemia and she met with hematology oncology at [facility]. She has a mutation in the FLT3 which increases her risk of developing AML. Also increases her risk of developing other cancers such as ovarian, breast, colon and GI. Heme-onc at [facility] is checking CBC with differential and FLT3 and peripheral blood monthly. They referred her to  at [facility] and she has an appointment with Nancy Whittaker on 11/11/2022. Apparently she was told by genetics that PCP would need to order certain blood work. Patient will follow with PCP after this appointment. Patient also complains of right ankle pain. She had a fall on 9/5/2022, went to the ER, right ankle x-ray was negative for acute fracture and she was diagnosed with a right ankle sprain. Most likely posterior talofibular ligament as her pain is posterior to her lateral malleolus. She was on crutches for a few days, then saw chiropractor taped the ankle to help with stability. Pain and swelling significantly improved. She is requesting referral to PT as she already sees PT and like to work with them on recovery. She is seen at [facility] [place] already for her right wrist.    She has also developed a skin lesion of her left lower extremity. About midway down her left calf on the medial surface. She has had these chronic lesions her entire life. Often when they pop up she needs antibiotics as they become cellulitis. The symptoms been present for 3 days. 1 cm oval-shaped ulcer with surrounding erythema about 1 inch. There is some purulent drainage from the center of the ulcer. She has been using topical Bactroban ointment but it does not resolve. It is getting bigger. There is slight pain to the area. No fever or chills or nausea or vomiting. Tetanus in 2020     Otherwise she is feeling well.       Current Outpatient Medications on File Prior to Visit   Medication Sig Dispense Refill    ZINC ACETATE PO Take  by mouth. DULoxetine (Cymbalta) 60 mg capsule Take 60 mg by mouth daily. DULoxetine (Cymbalta) 30 mg capsule Take 30 mg by mouth daily. ibuprofen (MOTRIN) 800 mg tablet Take 1 Tablet by mouth every six (6) hours as needed for Pain. Indications: minor musculoskeletal injury, pain 30 Tablet 0    pregabalin (LYRICA) 75 mg capsule Take 1 Capsule by mouth in the morning. Max Daily Amount: 75 mg. 30 Capsule 2    norethindrone-ethinyl estradiol-iron (Junel FE 1.5/30, 28,) 1.5 mg-30 mcg (21)/75 mg (7) tab TAKE 1 TAB BY MOUTH DAILY. CONTINUOUS PILLS ONLY. 3 Dose Pack 0    hydrOXYchloroQUINE (PLAQUENIL) 200 mg tablet Take 1 Tablet by mouth two (2) times a day. 180 Tablet 1    diphenhydrAMINE hcl (BenadryL) 2 % topical gel Apply  to affected area every four (4) hours as needed for Allergies. 1 g 0    budesonide-glycopyr-formoterol (Breztri Aerosphere) 160-9-4.8 mcg/actuation HFAA Take 2 Puffs by inhalation daily. omega 3-dha-epa-fish oil (Fish OiL) 100-160-1,000 mg cap Take 2,000 mg by mouth.      multivitamin (ONE A DAY) tablet Take 1 Tablet by mouth daily. albuterol (PROVENTIL HFA, VENTOLIN HFA, PROAIR HFA) 90 mcg/actuation inhaler Take 1 Puff by inhalation every four (4) hours as needed for Wheezing. Aimovig Autoinjector 140 mg/mL injection INJECT 1 SYRINGE VIA SUBCUTANEOUS ROUTE ONCE A MONTH, AS DIRECTED 3 mL 1    ubrogepant (Ubrelvy) 100 mg tablet Take 1 Tablet by mouth as needed for Migraine (Take at onset of migraine. May repeat once after 2 hours if needed. ). 16 Tablet 2    montelukast (SINGULAIR) 10 mg tablet       Naltrexone, Bulk, 100 % powd 4.5 mg by Does Not Apply route daily. 0.135 g 5    traZODone (DESYREL) 50 mg tablet       lansoprazole (PREVACID) 30 mg capsule Take 30 mg by mouth Daily (before breakfast).       Vyvanse 20 mg capsule 30 mg.      ascorbic acid, vitamin C, (VITAMIN C) 500 mg tablet Take  by mouth. sacrosidase (Sucraid) 8,500 unit/mL soln Take 1 mL by mouth.      b complex vitamins tablet ONE TABLE DAILY      hydrOXYzine HCL (ATARAX) 25 mg tablet TAKE 1 TO 2 TABLETS BY MOUTH TWICE A DAY AS NEEDED FOR ANXIETY      ondansetron (ZOFRAN ODT) 8 mg disintegrating tablet PLACE 1 TABLET ON TONGUE & ALLOW TO DISSOLVE EVERY 8 HOURS AS NEEDED FOR NAUSEA WITH HEADACHE      cholecalciferol, vitamin d3, 10 mcg (400 unit) cap Take  by mouth. diphenhydrAMINE (BENADRYL) 25 mg capsule 2 tabs PO q8h PRN      metroNIDAZOLE (METROGEL) 0.75 % topical gel Apply  to affected area two (2) times a day. (Patient not taking: Reported on 9/16/2022) 60 g 0     No current facility-administered medications on file prior to visit.      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[facility])     ADHD (attention deficit hyperactivity disorder)     Acid reflux     SLE (systemic lupus erythematosus) ([facility] [address]     History of prediabetes 07/20/2021    Lipedema 2021    Tenosynovitis of right hand 2021    Obesity, morbid ([address] 12/14/2020    Chronic gastritis without bleeding 06/20/2019    Chronic cholecystitis 06/04/2019    Biliary dyskinesia 06/04/2019    Chronic nausea 05/16/2019    Atypical depression 02/28/2018    Hypertension 02/20/2018    Lipids abnormal 02/20/2018    Edema, peripheral 09/13/2017    Allergy to yeast 09/13/2017    Allergy to chocolate 09/13/2017    Gastroesophageal reflux disease without esophagitis 05/15/2017    Irritable bowel syndrome with diarrhea 04/13/2017    Gastroparesis 01/19/2017    Medication overuse headache 09/21/2015    Autism spectrum disorder 09/21/2015    Anxiety 09/21/2015     Allergies   Allergen Reactions    Yeast, Dried Other (comments)     Upset stomach     Sucrose Nausea Only     SEVERE ABDOMINAL PAIN     Past Surgical History:   Procedure Laterality Date    COLONOSCOPY N/A 12/20/2016    COLONOSCOPY performed by Efrain Ojeda MD at [facility] ENDOSCOPY    GERD TST W/ MUCOS [address] HR (BRAVO)  12/3/2020         HC CLP BRAVO  11/30/2020    HX CHOLECYSTECTOMY  06/2019    HX ENDOSCOPY  11/2020    dx'd with sucrose intolerance    HX TONSIL AND ADENOIDECTOMY      HX TONSILLECTOMY      AGE 1    HX WISDOM TEETH EXTRACTION      [place] EGD TRANSORAL BIOPSY SINGLE/MULTIPLE  6/14/2019         [place] EGD TRANSORAL BIOPSY SINGLE/MULTIPLE  11/30/2020    UPPER GI ENDOSCOPY,BIOPSY  12/20/2016          Social History     Tobacco Use    Smoking status: Never    Smokeless tobacco: Never    Tobacco comments:     no smoke exposure in the home   Vaping Use    Vaping Use: Never used   Substance Use Topics    Alcohol use: No    Drug use: No     Vitals:    09/16/22 1600   BP: 126/83   Pulse: (!) 114   Resp: 20   Temp: 97.8 °F (36.6 °C)   TempSrc: Oral   SpO2: 98%   Weight: 328 lb 9.6 oz (149.1 kg)   PainSc:   5   PainLoc: Ankle        Physical Exam  Constitutional:       Appearance: Normal appearance. HENT:      Head: Normocephalic and atraumatic. Eyes:      Extraocular Movements: Extraocular movements intact. Conjunctiva/sclera: Conjunctivae normal.      Pupils: Pupils are equal, round, and reactive to light. Cardiovascular:      Rate and Rhythm: Normal rate and regular rhythm. Pulses: Normal pulses. Heart sounds: Normal heart sounds. Pulmonary:      Effort: Pulmonary effort is normal.      Breath sounds: Normal breath sounds. Abdominal:      General: Abdomen is flat. Bowel sounds are normal.      Palpations: Abdomen is soft. Musculoskeletal:      Right ankle: No swelling or deformity. Tenderness present over the posterior TF ligament. No lateral malleolus, ATF ligament, AITF ligament, CF ligament, base of 5th metatarsal or proximal fibula tenderness. Normal range of motion. Normal pulse. Comments: Support tape in place around right ankle. Skin:     Comments: 1 cm oval-shaped shallow ulceration left lower extremity about midway down left calf on the medial surface. There is 1 inch of surrounding erythema and slight warmth. There is some purulent drainage from the center of the ulcer. Neurological:      Mental Status: She is alert. Psychiatric:         Mood and Affect: Mood normal.         Behavior: Behavior normal.           An electronic signature was used to authenticate this note.   -- Pearlene Cogan, PA-C

## 2022-09-19 ENCOUNTER — PATIENT MESSAGE (OUTPATIENT)
Dept: FAMILY MEDICINE CLINIC | Age: 22
End: 2022-09-19

## 2022-09-19 ENCOUNTER — TELEPHONE (OUTPATIENT)
Dept: FAMILY MEDICINE CLINIC | Age: 22
End: 2022-09-19

## 2022-09-19 DIAGNOSIS — L03.116 CELLULITIS OF LEFT LOWER EXTREMITY: Primary | ICD-10-CM

## 2022-09-19 RX ORDER — DOXYCYCLINE 100 MG/1
100 TABLET ORAL 2 TIMES DAILY
Qty: 20 TABLET | Refills: 0 | Status: SHIPPED | OUTPATIENT
Start: 2022-09-19 | End: 2022-09-29

## 2022-09-19 NOTE — TELEPHONE ENCOUNTER
----- Message from Nazia Bhatt sent at 9/19/2022 12:39 PM EDT -----  Subject: Message to Provider    QUESTIONS  Information for Provider? Patient was supposed to be hearing back from the   practice this morning regarding medication that she is allergic to. Needing alternative medication.   ---------------------------------------------------------------------------  --------------  Chel Stone [facility]  [phone]; OK to leave message on voicemail  ---------------------------------------------------------------------------  --------------  SCRIPT ANSWERS  Relationship to Patient?  Self

## 2022-09-19 NOTE — PROGRESS NOTES
Spoke with patient. She developed oral thrush with bactrim. Med was discontinued yesterday and she has been using med for oral thrush she already had. Leg has not improved at all since Friday. I called in doxycyline or her today instead. Advised if sx worsen or fail to improve to go in for repeat eval at urgent care [place]  Rx called to local [facility] and [facility] said [facility] [place]

## 2022-09-23 NOTE — TELEPHONE ENCOUNTER
From: Jane Pickard  To: Hillary Luna PA-C  Sent: 9/19/2022 1:05 PM EDT  Subject: Medicine reaction     Hi! I talked with the Dr on call yesterday and they said u would contact me today. I was wondering if you by any chance got that message? I am allergic to the medicine you called in and my leg still looks bad so I was wondering if u could call something else in for it?

## 2022-09-26 ENCOUNTER — OFFICE VISIT (OUTPATIENT)
Dept: ENDOCRINOLOGY | Age: 22
End: 2022-09-26
Payer: MEDICARE

## 2022-09-26 VITALS
HEIGHT: 66 IN | DIASTOLIC BLOOD PRESSURE: 84 MMHG | HEART RATE: 108 BPM | WEIGHT: 293 LBS | SYSTOLIC BLOOD PRESSURE: 122 MMHG | BODY MASS INDEX: 47.09 KG/M2 | OXYGEN SATURATION: 97 % | RESPIRATION RATE: 16 BRPM

## 2022-09-26 DIAGNOSIS — Z87.898 HISTORY OF PREDIABETES: Primary | ICD-10-CM

## 2022-09-26 LAB — HBA1C MFR BLD HPLC: 5.2 %

## 2022-09-26 PROCEDURE — 83036 HEMOGLOBIN GLYCOSYLATED A1C: CPT | Performed by: INTERNAL MEDICINE

## 2022-09-26 PROCEDURE — G8752 SYS BP LESS 140: HCPCS | Performed by: INTERNAL MEDICINE

## 2022-09-26 PROCEDURE — G9717 DOC PT DX DEP/BP F/U NT REQ: HCPCS | Performed by: INTERNAL MEDICINE

## 2022-09-26 PROCEDURE — G8417 CALC BMI ABV UP PARAM F/U: HCPCS | Performed by: INTERNAL MEDICINE

## 2022-09-26 PROCEDURE — G8754 DIAS BP LESS 90: HCPCS | Performed by: INTERNAL MEDICINE

## 2022-09-26 PROCEDURE — G8427 DOCREV CUR MEDS BY ELIG CLIN: HCPCS | Performed by: INTERNAL MEDICINE

## 2022-09-26 PROCEDURE — 99204 OFFICE O/P NEW MOD 45 MIN: CPT | Performed by: INTERNAL MEDICINE

## 2022-09-26 RX ORDER — EPINEPHRINE 0.3 MG/.3ML
INJECTION SUBCUTANEOUS
COMMUNITY
Start: 2022-07-11 | End: 2022-10-03

## 2022-09-26 RX ORDER — MELOXICAM 15 MG/1
15 TABLET ORAL DAILY
COMMUNITY

## 2022-09-26 RX ORDER — SPIRONOLACTONE 25 MG/1
TABLET ORAL
COMMUNITY
Start: 2022-09-15 | End: 2022-10-03

## 2022-09-26 RX ORDER — SERTRALINE HYDROCHLORIDE 50 MG/1
TABLET, FILM COATED ORAL DAILY
COMMUNITY
End: 2022-10-03

## 2022-09-26 RX ORDER — METFORMIN HYDROCHLORIDE 500 MG/1
1 TABLET, FILM COATED, EXTENDED RELEASE ORAL
Qty: 360 MG | Refills: 2 | Status: SHIPPED | OUTPATIENT
Start: 2022-09-26

## 2022-09-26 RX ORDER — ZONISAMIDE 100 MG/1
CAPSULE ORAL DAILY
COMMUNITY
End: 2022-10-03

## 2022-09-26 RX ORDER — BUPROPION HYDROCHLORIDE 150 MG/1
TABLET ORAL
Qty: 180 TABLET | Refills: 0 | Status: SHIPPED | OUTPATIENT
Start: 2022-09-26 | End: 2022-09-26 | Stop reason: SDUPTHER

## 2022-09-26 RX ORDER — BUPROPION HYDROCHLORIDE 150 MG/1
TABLET ORAL
Qty: 180 TABLET | Refills: 0 | Status: SHIPPED | OUTPATIENT
Start: 2022-09-26 | End: 2022-09-30 | Stop reason: SDUPTHER

## 2022-09-26 RX ORDER — NALTREXONE HYDROCHLORIDE 50 MG/1
25 TABLET, FILM COATED ORAL DAILY
Qty: 45 TABLET | Refills: 1 | Status: SHIPPED | OUTPATIENT
Start: 2022-09-26 | End: 2022-09-30 | Stop reason: SDUPTHER

## 2022-09-26 RX ORDER — METOPROLOL SUCCINATE 25 MG/1
TABLET, EXTENDED RELEASE ORAL
COMMUNITY
Start: 2022-06-07 | End: 2022-10-03

## 2022-09-26 RX ORDER — MELATONIN 5 MG
CAPSULE ORAL
COMMUNITY
End: 2022-10-03

## 2022-09-26 RX ORDER — SERTRALINE HYDROCHLORIDE 25 MG/1
TABLET, FILM COATED ORAL DAILY
COMMUNITY
End: 2022-10-03

## 2022-09-26 RX ORDER — LISDEXAMFETAMINE DIMESYLATE 30 MG/1
30 CAPSULE ORAL DAILY
COMMUNITY
Start: 2022-09-13

## 2022-09-26 RX ORDER — ATOMOXETINE 40 MG/1
CAPSULE ORAL
COMMUNITY
End: 2022-09-26 | Stop reason: ALTCHOICE

## 2022-09-26 RX ORDER — BUDESONIDE AND FORMOTEROL FUMARATE DIHYDRATE 160; 4.5 UG/1; UG/1
AEROSOL RESPIRATORY (INHALATION)
COMMUNITY

## 2022-09-26 NOTE — PROGRESS NOTES
Chief Complaint   Patient presents with    New Patient    PCOS     History of Present Illness: Pepe Harrell is a 20 y.o. female with a past medical hx significant for ADHD seen in referral from [place], 2020 [facility] MARY ELLEN Howe for discussion related to weight gain. 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 Internal BMBx performed 8/25/2022 showing normocellular marrow with active trilineage hematopoiesis. 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 Normal female karyotype. FISH negative. Will monitor closely for development of AML. Of note, FLT3 mutations also increase risk of various other tumor types, thus, will refer to medical genetics for further discussion. Bryan Medrano and her Mother are here today to discuss medication options for weight loss. Previously lost 100lbs following cholecystectomy. Currently working as a , snacking throughout the day and then has a matthew with the family in the evening. Endorses cravings for carbohydrates. Currently taking low dose naltrexone (4.5mg) due to pain (rx by Rheum). Taking vyvansefor ADHD, recently switched from [address] Also taking cymbalta. Past Medical History:   Diagnosis Date    Abdominal migraine     Dr. Alexandria Abreu. vs Sucrose Intolerance. Acid reflux     ADHD (attention deficit hyperactivity disorder)     Autism     High Functioning. Dr. Deni Gaitan. Chronic rhinitis 2021    Dr. Ambrocio Bowling, allergist.     Developmental delay     Fibromyalgia     Dr. Rico Morrison    MIGUEL A (generalized anxiety disorder)     Dr. Jeanine Sorenson    Insulin resistance 09/08/2021    Dr. Yoly Wang. [place], endo    Irritable bowel syndrome with diarrhea 4/13/2017    Lymphedema 2021    BL legs. Dr. Avtar Rodarte.   [facility], [facility]    Migraine headache     Dr. Rayne Ellison    Obesity, morbid ([address] 12/12/2017    OCD (obsessive compulsive disorder)     Dr. Jeanine Sorenson    Sucrose intolerance     Dr. Roni Guillen, GI. Tachycardia 06/02/2021    Mauri Cobian DO, [facility] cardio    Tenosynovitis of right hand 07/2021    Dr. Shraddha Leung    Undifferentiated connective tissue disease ([facility]     (lupus like). Dr. Enedelia Tapia. Past Surgical History:   Procedure Laterality Date    COLONOSCOPY N/A 12/20/2016    COLONOSCOPY performed by Elvira Dixon MD at [facility] ENDOSCOPY    GERD TST W/ MUCOS [address] 48 HR (BRAVO)  12/3/2020         HC CLP BRAVO  11/30/2020    HX CHOLECYSTECTOMY  06/2019    HX ENDOSCOPY  11/2020    dx'd with sucrose intolerance    HX TONSIL AND ADENOIDECTOMY      HX TONSILLECTOMY      AGE 1    HX WISDOM TEETH EXTRACTION      [facility] EGD TRANSORAL BIOPSY SINGLE/MULTIPLE  6/14/2019         [facility] EGD TRANSORAL BIOPSY SINGLE/MULTIPLE  11/30/2020    UPPER GI ENDOSCOPY,BIOPSY  12/20/2016          Current Outpatient Medications   Medication Sig    EPINEPHrine (EPIPEN) 0.3 mg/0.3 mL injection     metoprolol succinate (TOPROL-XL) 25 mg XL tablet metoprolol succinate ER 25 mg tablet,extended release 24 hr    spironolactone (ALDACTONE) 25 mg tablet     budesonide-formoteroL (SYMBICORT) 160-4.5 mcg/actuation HFAA Symbicort 160 mcg-4.5 mcg/actuation HFA aerosol inhaler    zonisamide (ZONEGRAN) 100 mg capsule Take  by mouth daily. Lisdexamfetamine (Vyvanse) 40 mg capsule Take 40 mg by mouth every morning. sertraline (ZOLOFT) 25 mg tablet Take  by mouth daily. sertraline (ZOLOFT) 50 mg tablet Take  by mouth daily. melatonin 5 mg cap capsule Take  by mouth nightly. meloxicam (MOBIC) 15 mg tablet Take 15 mg by mouth daily. topiramate ER (Trokendi XR) 100 mg capsule Take  by mouth daily. docusate sodium (STOOL SOFTENER PO) Take  by mouth.    naltrexone (DEPADE) 50 mg tablet Take 0.5 Tablets by mouth daily. buPROPion XL (WELLBUTRIN XL) 150 mg tablet Take 1 in the AM for 2 weeks, then increase to 1 in the morning and 1 in the afternoon.     metFORMIN (GLUMETZA ER) 500 mg TG24 24 hour tablet Take 500 mg by mouth Before breakfast and dinner. doxycycline (ADOXA) 100 mg tablet Take 1 Tablet by mouth two (2) times a day for 10 days. DULoxetine (CYMBALTA) 60 mg capsule Take 60 mg by mouth daily. DULoxetine (CYMBALTA) 30 mg capsule Take 30 mg by mouth daily. ibuprofen (MOTRIN) 800 mg tablet Take 1 Tablet by mouth every six (6) hours as needed for Pain. Indications: minor musculoskeletal injury, pain    pregabalin (LYRICA) 75 mg capsule Take 1 Capsule by mouth in the morning. Max Daily Amount: 75 mg.    norethindrone-ethinyl estradiol-iron (Junel FE 1.5/30, 28,) 1.5 mg-30 mcg (21)/75 mg (7) tab TAKE 1 TAB BY MOUTH DAILY. CONTINUOUS PILLS ONLY. hydrOXYchloroQUINE (PLAQUENIL) 200 mg tablet Take 1 Tablet by mouth two (2) times a day. budesonide-glycopyr-formoterol (Breztri Aerosphere) 160-9-4.8 mcg/actuation HFAA Take 2 Puffs by inhalation daily. omega 3-dha-epa-fish oil (Fish OiL) 100-160-1,000 mg cap Take 2,000 mg by mouth.    multivitamin (ONE A DAY) tablet Take 1 Tablet by mouth daily. albuterol (PROVENTIL HFA, VENTOLIN HFA, PROAIR HFA) 90 mcg/actuation inhaler Take 1 Puff by inhalation every four (4) hours as needed for Wheezing. Aimovig Autoinjector 140 mg/mL injection INJECT 1 SYRINGE VIA SUBCUTANEOUS ROUTE ONCE A MONTH, AS DIRECTED    ubrogepant (Ubrelvy) 100 mg tablet Take 1 Tablet by mouth as needed for Migraine (Take at onset of migraine. May repeat once after 2 hours if needed. ).    montelukast (SINGULAIR) 10 mg tablet     traZODone (DESYREL) 50 mg tablet     lansoprazole (PREVACID) 30 mg capsule Take 30 mg by mouth Daily (before breakfast). ascorbic acid, vitamin C, (VITAMIN C) 500 mg tablet Take  by mouth.     sacrosidase (Sucraid) 8,500 unit/mL soln Take 1 mL by mouth.    b complex vitamins tablet ONE TABLE DAILY    hydrOXYzine HCL (ATARAX) 25 mg tablet TAKE 1 TO 2 TABLETS BY MOUTH TWICE A DAY AS NEEDED FOR ANXIETY    ondansetron (ZOFRAN ODT) 8 mg disintegrating tablet PLACE 1 TABLET ON TONGUE & ALLOW TO DISSOLVE EVERY 8 HOURS AS NEEDED FOR NAUSEA WITH HEADACHE    cholecalciferol, vitamin d3, 10 mcg (400 unit) cap Take  by mouth. diphenhydrAMINE (BENADRYL) 25 mg capsule 2 tabs PO q8h PRN    Vyvanse 30 mg capsule Take 30 mg by mouth daily. No current facility-administered medications for this visit.      Allergies   Allergen Reactions    Bactrim [Sulfamethoprim Ds] Other (comments)     Developed oral thrush    Yeast, Dried Other (comments)     Upset stomach     Sucrose Nausea Only     SEVERE ABDOMINAL PAIN     Family History   Problem Relation Age of Onset    Endometriosis Mother     Delayed Awakening Mother     Post-op Nausea/Vomiting Mother     Migraines Mother         d/t accident - neck and brain injury    Atrial Fibrillation Mother     Hypertension Mother     Obesity Mother     Hypertension Maternal Grandfather     Other Maternal Grandfather         diverticulitis    No Known Problems Father     Hypertension Maternal Grandmother     No Known Problems Paternal Grandmother     No Known Problems Paternal Grandfather     Mult Sclerosis Maternal Aunt     Cancer Maternal Uncle     Lymphoma Other        Social Hx: , also in classes     Review of Systems:  See HPI    Physical Examination:  Visit Vitals  /84   Pulse (!) 108   Resp 16   Ht 5' 6\" (1.676 m)   Wt 325 lb (147.4 kg)   SpO2 97%   BMI 52.46 kg/m²       - GENERAL: NCAT, BMI 52  - EYES: EOMI, non-icteric, no proptosis   - Ear/Nose/Throat: NCAT, no visible inflammation or masses   - CARDIOVASCULAR: no cyanosis, no visible JVD   - RESPIRATORY: respiratory effort normal without any distress or labored breathing   - MUSCULOSKELETAL: Normal ROM of neck and upper extremities observed   - SKIN: No rash on face  - NEUROLOGIC:  No facial asymmetry (Cranial nerve 7 motor function), No gaze palsy   - PSYCHIATRIC: Normal affect, Normal insight and judgement     Data Reviewed:   Lab Results Component Value Date/Time    Hemoglobin A1c 4.8 09/03/2021 10:03 AM    Hemoglobin A1c (POC) 5.2 09/26/2022 02:59 PM         Assessment/Plan: This is a very pleasant 19 yo female seen for discussion related to BMI of 52 and medication options for weight loss. Reviewed my preference for GLP1 agonists but cannot get these covered in the absence of diabetes. Resume metformin and up-titrate to 1000mg BID. Phentermine is not a good option given history of anxiety. Will use combination of generic naltrexone (already taking and tolerating), increase to 25mg (only available option is 50mg tablet), and also add in bupropion 150mg xr. Discussed potential enhancement of vyvanse effects with addition of bupropion and naltrexone. Information provided for nutritionists through [employer]. #BMI 46  -avoid atarax/antihistamine as they are weight positive and interfere with other meds  -unable to use GLP1 agonist due to absence of diabetes  -resume metformin and up-titrate to 1000mg BID  -referral provided for nutrition  -d/c LDN and start 25mg (1/2 50mg tab)  -initiate bupropion 150mg xr, increase to 150mg xr 2 tabs daily if tolerated   -reviewed risk of anxiety/diarrhea/headache/nausea with both    - CORTISOL, URINE FREE 24 HR; Future  - SALIVARY CORTISOL (2), TIMED;  Future    Copy sent to:Trini Newell    [place] [address] Diabetes [facility] Endocrinology

## 2022-09-28 DIAGNOSIS — E28.2 PCOS (POLYCYSTIC OVARIAN SYNDROME): Primary | ICD-10-CM

## 2022-09-29 ENCOUNTER — PATIENT MESSAGE (OUTPATIENT)
Dept: NEUROLOGY | Age: 22
End: 2022-09-29

## 2022-09-30 DIAGNOSIS — N92.6 IRREGULAR MENSES: ICD-10-CM

## 2022-09-30 RX ORDER — NALTREXONE HYDROCHLORIDE 50 MG/1
25 TABLET, FILM COATED ORAL DAILY
Qty: 45 TABLET | Refills: 1 | Status: SHIPPED | OUTPATIENT
Start: 2022-09-30

## 2022-09-30 RX ORDER — BUPROPION HYDROCHLORIDE 150 MG/1
TABLET ORAL
Qty: 180 TABLET | Refills: 1 | Status: SHIPPED | OUTPATIENT
Start: 2022-09-30

## 2022-09-30 RX ORDER — NORETHINDRONE ACETATE AND ETHINYL ESTRADIOL 1.5-30(21)
KIT ORAL
Qty: 3 DOSE PACK | Refills: 0 | Status: SHIPPED | OUTPATIENT
Start: 2022-09-30

## 2022-09-30 RX ORDER — MONTELUKAST SODIUM 10 MG/1
10 TABLET ORAL DAILY
Qty: 90 TABLET | Refills: 1 | Status: SHIPPED | OUTPATIENT
Start: 2022-09-30

## 2022-09-30 RX ORDER — ERENUMAB-AOOE 140 MG/ML
INJECTION, SOLUTION SUBCUTANEOUS
Qty: 3 ML | Refills: 1 | Status: CANCELLED | OUTPATIENT
Start: 2022-09-30

## 2022-10-03 ENCOUNTER — DOCUMENTATION ONLY (OUTPATIENT)
Dept: NEUROLOGY | Age: 22
End: 2022-10-03

## 2022-10-03 ENCOUNTER — OFFICE VISIT (OUTPATIENT)
Dept: NEUROLOGY | Age: 22
End: 2022-10-03
Payer: MEDICARE

## 2022-10-03 VITALS
OXYGEN SATURATION: 98 % | SYSTOLIC BLOOD PRESSURE: 122 MMHG | HEART RATE: 121 BPM | DIASTOLIC BLOOD PRESSURE: 88 MMHG | HEIGHT: 65 IN | WEIGHT: 293 LBS | BODY MASS INDEX: 48.82 KG/M2

## 2022-10-03 DIAGNOSIS — G43.109 MIGRAINE WITH AURA AND WITHOUT STATUS MIGRAINOSUS, NOT INTRACTABLE: Primary | ICD-10-CM

## 2022-10-03 DIAGNOSIS — M79.641 RIGHT HAND PAIN: ICD-10-CM

## 2022-10-03 PROCEDURE — G8754 DIAS BP LESS 90: HCPCS | Performed by: PSYCHIATRY & NEUROLOGY

## 2022-10-03 PROCEDURE — G9717 DOC PT DX DEP/BP F/U NT REQ: HCPCS | Performed by: PSYCHIATRY & NEUROLOGY

## 2022-10-03 PROCEDURE — 99214 OFFICE O/P EST MOD 30 MIN: CPT | Performed by: PSYCHIATRY & NEUROLOGY

## 2022-10-03 PROCEDURE — G8752 SYS BP LESS 140: HCPCS | Performed by: PSYCHIATRY & NEUROLOGY

## 2022-10-03 PROCEDURE — G8427 DOCREV CUR MEDS BY ELIG CLIN: HCPCS | Performed by: PSYCHIATRY & NEUROLOGY

## 2022-10-03 PROCEDURE — G8417 CALC BMI ABV UP PARAM F/U: HCPCS | Performed by: PSYCHIATRY & NEUROLOGY

## 2022-10-03 RX ORDER — ERENUMAB-AOOE 140 MG/ML
INJECTION, SOLUTION SUBCUTANEOUS
Qty: 3 ML | Refills: 1 | Status: SHIPPED | OUTPATIENT
Start: 2022-10-03

## 2022-10-03 NOTE — PROGRESS NOTES
Neurology Clinic Follow up Note    Patient ID:  Lizett Fulton  [id number]  67 y.o.  2000      Ms. Eileen Kirkland is here for follow up today of  Chief Complaint   Patient presents with    Migraine     Having trouble getting [employer] approved we can give her samples also asking you to review MRI cervical spine        Last Appointment With Me:  6/2/2022     \"Erica Purvis is presenting for evaluation of headaches since age 12. Location: Bi-frontal  Character: throbbing  Intensity: On average: cannot determine  Frequency: 1x monthly  # HA free days per month: 30  Duration: 24-48h  Aura: Yes, scotomas  Associated Sx with HA: +nausea/vomiting, +phonophotophobia. Neurological ROS: Denies focal weakness, numbness or vision loss associated with headaches  Systemic ROS:   Denies snoring  Caffeine use: 1 cup daily  H/O Head trauma: None  Depressive or anxiety Sx: +Anxiety    Any change in pattern of HA? Improvement on preventative therapy, scotomas x 1 year    Triggers: Hormonal changes, weather changes. No worsening with change in position. Alleviating factors: Sleep/rest, hot compress  FHx HA/migraine: Mother with migraines    Treatment so far: Aimovig 140mg q30 days-tolerates this well, Cymbalta 90mg daily. Current rescue: Maykel Zepeda well previously but ran out of medication  Prior preventatives: sertraline, citalopram, zonisamide, TPM, Trokendi, Emgality-ineffective  Prior rescue: Fioricet, rizatriptan-side effects    Investigations so far: 9/2021 MRI Brain NAP\"  Interval History:   She missed last months Aimovig injections due to issues receiving medication from her pharmacy. Headaches are doing well, occurring 1-2x monthly on average. These respond well to Ubrelvy PRN. She is tolerating injections well-no reported side effects. PMHx/ PSHx/ FHx/ SHx:  Reviewed and unchanged previous visit. Past Medical History:   Diagnosis Date    Abdominal migraine     Dr. Fabio Light.   vs Sucrose Intolerance. Acid reflux     ADHD (attention deficit hyperactivity disorder)     Autism     High Functioning. Dr. Becky Hilario. Chronic rhinitis 2021    Dr. Carmelina Eaton, allergist.     Developmental delay     Fibromyalgia     Dr. Adalid Aguilar    MIGUEL A (generalized anxiety disorder)     Dr. Edilberto Christy    Insulin resistance 09/08/2021    Dr. Cale Giraldo. maurice Walker    Irritable bowel syndrome with diarrhea 4/13/2017    Lymphedema 2021    BL legs. Dr. Lizeth Murray. [facility], [facility]    Migraine headache     Dr. Mcneill Dear    Obesity, morbid ([address] 12/12/2017    OCD (obsessive compulsive disorder)     Dr. Edilberto Christy    Sucrose intolerance     Dr. Sarah Asif, GI. Tachycardia 06/02/2021    Mauri Cobian DO, [facility] Peds cardio    Tenosynovitis of right hand 07/2021    Dr. Maddy Abarca    Undifferentiated connective tissue disease ([facility]     (lupus like). Dr. Jing Esposito. ROS:  Comprehensive review of systems negative except for as noted above. Objective:       Meds:  Current Outpatient Medications   Medication Sig Dispense Refill    montelukast (SINGULAIR) 10 mg tablet Take 1 Tablet by mouth daily. 90 Tablet 1    norethindrone-ethinyl estradiol-iron (Junel FE 1.5/30, 28,) 1.5 mg-30 mcg (21)/75 mg (7) tab TAKE 1 TAB BY MOUTH DAILY. CONTINUOUS PILLS ONLY. 3 Dose Pack 0    naltrexone (DEPADE) 50 mg tablet Take 0.5 Tablets by mouth daily. 45 Tablet 1    buPROPion XL (WELLBUTRIN XL) 150 mg tablet Take 1 in the AM for 2 weeks, then increase to 1 in the morning and 1 in the afternoon. 180 Tablet 1    budesonide-formoteroL (SYMBICORT) 160-4.5 mcg/actuation HFAA Symbicort 160 mcg-4.5 mcg/actuation HFA aerosol inhaler      meloxicam (MOBIC) 15 mg tablet Take 15 mg by mouth daily. Vyvanse 30 mg capsule Take 30 mg by mouth daily. metFORMIN (GLUMETZA ER) 500 mg TG24 24 hour tablet Take 500 mg by mouth Before breakfast and dinner.  360 mg 2    DULoxetine (CYMBALTA) 60 mg capsule Take 60 mg by mouth daily. DULoxetine (CYMBALTA) 30 mg capsule Take 30 mg by mouth daily. pregabalin (LYRICA) 75 mg capsule Take 1 Capsule by mouth in the morning. Max Daily Amount: 75 mg. 30 Capsule 2    hydrOXYchloroQUINE (PLAQUENIL) 200 mg tablet Take 1 Tablet by mouth two (2) times a day. 180 Tablet 1    budesonide-glycopyr-formoterol (Breztri Aerosphere) 160-9-4.8 mcg/actuation HFAA Take 2 Puffs by inhalation daily. omega 3-dha-epa-fish oil (Fish OiL) 100-160-1,000 mg cap Take 2,000 mg by mouth.      multivitamin (ONE A DAY) tablet Take 1 Tablet by mouth daily. albuterol (PROVENTIL HFA, VENTOLIN HFA, PROAIR HFA) 90 mcg/actuation inhaler Take 1 Puff by inhalation every four (4) hours as needed for Wheezing. Aimovig Autoinjector 140 mg/mL injection INJECT 1 SYRINGE VIA SUBCUTANEOUS ROUTE ONCE A MONTH, AS DIRECTED 3 mL 1    ubrogepant (Ubrelvy) 100 mg tablet Take 1 Tablet by mouth as needed for Migraine (Take at onset of migraine. May repeat once after 2 hours if needed. ). 16 Tablet 2    traZODone (DESYREL) 50 mg tablet       lansoprazole (PREVACID) 30 mg capsule Take 30 mg by mouth Daily (before breakfast). ascorbic acid, vitamin C, (VITAMIN C) 500 mg tablet Take  by mouth. sacrosidase (Sucraid) 8,500 unit/mL soln Take 1 mL by mouth.      b complex vitamins tablet ONE TABLE DAILY      hydrOXYzine HCL (ATARAX) 25 mg tablet TAKE 1 TO 2 TABLETS BY MOUTH TWICE A DAY AS NEEDED FOR ANXIETY      ondansetron (ZOFRAN ODT) 8 mg disintegrating tablet PLACE 1 TABLET ON TONGUE & ALLOW TO DISSOLVE EVERY 8 HOURS AS NEEDED FOR NAUSEA WITH HEADACHE      cholecalciferol, vitamin d3, 10 mcg (400 unit) cap Take  by mouth.       diphenhydrAMINE (BENADRYL) 25 mg capsule 2 tabs PO q8h PRN         Exam:  Visit Vitals  /88   Pulse (!) 121   Ht 5' 5\" (1.651 m)   Wt 319 lb (144.7 kg)   SpO2 98%   BMI 53.08 kg/m²     NEUROLOGICAL EXAM:  General: Awake, alert, speech fluent  CN: PERRL, EOMI without nystagmus, VFF to confrontation, facial sensation and strength are normal and symmetric, hearing is intact to finger rub bilaterally, palate and tongue movements are intact and symmetric. Motor: Normal tone, bulk and strength bilaterally except for distal RUE/hand 5-/5. Reflexes: 3/4 throughout  Coordination: FNF, ARLET, HTS intact. Sensation: LT intact throughout. Gait: Normal-based and steady. LABS  Results for orders placed or performed in visit on 09/26/22   AMB POC HEMOGLOBIN A1C   Result Value Ref Range    Hemoglobin A1c (POC) 5.2 %       IMAGING:  MRI Results (most recent):  Results from Hospital Encounter encounter on 08/29/22    MRI CERV SPINE WO CONT    Narrative  EXAM: MRI CERV SPINE WO CONT  CLINICAL HISTORY: Right hand weakness  INDICATION: Right hand weakness,  COMPARISON: None    TECHNIQUE: MR imaging of the cervical spine was performed using the following  sequences: sagittal T1, T2, STIR;  axial T2, T1.    CONTRAST:  None. FINDINGS:    There is mild reversal cervical lordosis. There is no Chiari or syrinx. Vertebral body heights are maintained. Marrow signal is normal. Left vertebral  artery is dominant. The craniocervical junction is intact. The course, caliber, and signal intensity  of the spinal cord are normal.    The paraspinal soft tissues are within normal limits. C2-C3: No herniation or stenosis. C3-C4: No herniation or stenosis. C4-C5: Minimal disc desiccation. Minimal disc bulge. Canal is patent. Foramina are patent    C5-C6: Minimal disc desiccation. Minimal disc bulge. Canal is patent. Foramina are patent    C6-C7: No herniation or stenosis. C7-T1: No herniation or stenosis. Impression  Trace disc degenerative changes. There is no evidence of canal or foraminal  compromise. There is no cord signal abnormality. Assessment:     Encounter Diagnoses     ICD-10-CM ICD-9-CM   1. Migraine with aura and without status migrainosus, not intractable  G43.109 346.00   2. Right hand pain  M79.641 67.5   20year old female with a h/o mild autism, connective tissue disorder, lymphedema, chronic thrombocytosis/leukocytosis followed by Hematology s/p recent BMBx referred for evaluation of long-standing migraines w/aura since age 12, currently controlled on Aimovig injections. MRI Brain WWO was reviewed from 9/2021 without acute intracranial pathology. There appears to be both a hereditary and hormonal component to her migraines. She elects to continue with current preventative therapy, as this has significantly reduced the frequency of her previous migraines. She also mentions right hand pain, intrinsic weakness/incoordination, chronic for over one year not responsive to PT/OT. MRI Cervical spine was completed recently without evidence of significant canal/foraminal stenosis or cord pathology. EMG RUE was also previously completed (Yaya Dick) and normal without evidence of mononeuropathy or cervical radiculopathy. Prior intracranial imaging was performed after symptoms onset without noted pathology. I do not have a clear Neurologic etiology for her right hand pain/weakness. Encourage follow up with her Rheumatologist to determine if underlying connective tissue disorder may be contributing. Headache education  Discussed pathophysiology of headache. Discussed use of headache diary. Discussed treatment options, both abortive and preventive medications. Instructed patient about medications and potential side effects. Discussed medication overuse headache and to limit use of analgesics to less than 2 doses per week. Plan:   Continue Aimovig 140mg q30 days for migraine prophylaxis  May resume Ubrelvy 100mg PRN for migraine rescue therapy limiting use to twice weekly  Trial of phenergan 25mg PRN for nausea related to migraines    Follow-up and Dispositions    Return in about 6 months (around 4/3/2023).          I have discussed the diagnosis with the patient today and the intended plan as seen in the above orders with both the patient as well as referring provider and/or PCP via electronic correspondence. The patient has received an after-visit summary and questions were answered concerning future plans. I have discussed medication side effects and warnings with the patient as well.       Signed:  Nayely Andujar DO  10/3/2022  10:55 AM

## 2022-10-03 NOTE — PROGRESS NOTES
Gave the pt Aimovig 70mg inj. In Rt Arm tolerated well. Called the Pt. To give the 2nd 70 Aimovig inj at home to equal the 140mg.  Lot number [id number] exp. 03/2023

## 2022-10-06 ENCOUNTER — OFFICE VISIT (OUTPATIENT)
Dept: RHEUMATOLOGY | Age: 22
End: 2022-10-06
Payer: MEDICARE

## 2022-10-06 VITALS
DIASTOLIC BLOOD PRESSURE: 72 MMHG | HEART RATE: 98 BPM | TEMPERATURE: 98.4 F | OXYGEN SATURATION: 99 % | WEIGHT: 293 LBS | BODY MASS INDEX: 53.08 KG/M2 | SYSTOLIC BLOOD PRESSURE: 120 MMHG | RESPIRATION RATE: 16 BRPM

## 2022-10-06 DIAGNOSIS — L28.1 PRURIGO NODULARIS: ICD-10-CM

## 2022-10-06 DIAGNOSIS — R60.9 LIPEDEMA: ICD-10-CM

## 2022-10-06 DIAGNOSIS — L71.9 ROSACEA: ICD-10-CM

## 2022-10-06 DIAGNOSIS — M35.9 UNDIFFERENTIATED CONNECTIVE TISSUE DISEASE (HCC): Primary | ICD-10-CM

## 2022-10-06 PROCEDURE — G8417 CALC BMI ABV UP PARAM F/U: HCPCS | Performed by: INTERNAL MEDICINE

## 2022-10-06 PROCEDURE — 3078F DIAST BP <80 MM HG: CPT | Performed by: INTERNAL MEDICINE

## 2022-10-06 PROCEDURE — G8752 SYS BP LESS 140: HCPCS | Performed by: INTERNAL MEDICINE

## 2022-10-06 PROCEDURE — 99215 OFFICE O/P EST HI 40 MIN: CPT | Performed by: INTERNAL MEDICINE

## 2022-10-06 PROCEDURE — G8427 DOCREV CUR MEDS BY ELIG CLIN: HCPCS | Performed by: INTERNAL MEDICINE

## 2022-10-06 PROCEDURE — G8754 DIAS BP LESS 90: HCPCS | Performed by: INTERNAL MEDICINE

## 2022-10-06 PROCEDURE — 3074F SYST BP LT 130 MM HG: CPT | Performed by: INTERNAL MEDICINE

## 2022-10-06 PROCEDURE — G9717 DOC PT DX DEP/BP F/U NT REQ: HCPCS | Performed by: INTERNAL MEDICINE

## 2022-10-06 NOTE — PROGRESS NOTES
Chief Complaint   Patient presents with    Joint Pain     1. Have you been to the ER, urgent care clinic since your last visit? Hospitalized since your last visit? Yes [facility] d/t foot injury     2. Have you seen or consulted any other health care providers outside of the [address] [place] [address] since your last visit? Include any pap smears or colon screening.  No

## 2022-10-06 NOTE — PROGRESS NOTES
REASON FOR VISIT:   Janice Lobato is a 20 y.o. female with history of migraines and ADHD who is returning for a followup of undifferentiated connective tissue disease c/b tachycardia, prurigo nodularis, polymorphic light eruption, livedo, and +WENDY 1:320 speckled and 1:640 nuclear dot patterns. HISTORY OF PRESENT ILLNESS    Pt returns for a follow up. Pt still takes 2 pills of plaquenil daily. She saw her eye doctor 2 weeks ago, no retinal toxicity. Pt takes Lyrica but she still has pain all over her body. 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 She says that her colon, uterus, and ovaries are going to be watched extra carefully for cancers, and has been advised against immunosuppression to minimize the risk of conversion to AML. She has been getting monthly blood work until she sees the  next month. Pt is going to start taking 25mg of Naltrexone. She is also getting back on Metformin. She is trying to get in with a nutritionist but insurance will not help her unless she is in heart failure or gets diabetes. Pt goes to the lymphoedema clinic and she gets compression therapy. Pt goes to physical therapy and occupational therapy twice a week for her hands. Pt feels constantly fatigued. She says that she sleeps from about 11-8 but she wakes up a lot at night. She does not feel refreshed when she wakes up in the morning. She started to take a sleeping pill. Pt says that her face is extra red and itchy today. Pt says that she feels very \"accident prone\" lately. She says that she feels off balance and has been falling a lot more frequently. Disease History:  Since 4 or 4yo, has had recurrent nodules on the skin diffusely--face, arms, legs, abdomen. Can drain pus.  Has been treated over the years with oral antibiotics and topical antibiotics which help; swabs have been MRSA-positive in the past. Has tried Hibiclens baths in the past repeatedly without improvement. Pruritic, seem to respond to topical Mupirocin. Has had shave biopsy with Derm at [facility] in the past,punch biopsy in May 2019 was interpreted as mixed dermal inflammation with features of an excoriated hypersensitivity reaction; last seen in November 2020 when diagnosed with keratosis pilaris, rosacea, and neurodermatitis. In February, PCP ordered labs including an WENDY screen which returned +1:320 speckled and 1:640 nuclear dot pattern, with mild elevations of ESR (26) and CRP (17mg/L). Turns \"red as a lobster\" in the sun even with sunscreen use, has always been the case. No chronic cough or progressive dyspnea on exertion. Nonrefreshing sleep and always fatigued in the evening. Runs to the bathroom repeatedly through the evening, doesn't feel she can reduce her fluid intake in the afternoon/evenings 2/2 chronic thirst and dry mouth. Sleep study ~3 years ago she says was normal.  Has more subjective eye dryness, not currently using AT's consistently or following with ophthalmology. Cold intolerant, feels hands and feet always feel like ice, feels worse over the last 2 years. Gaining weight again after losing nearly 100 pounds with metformin; has gained 15 pounds in the last 3 months. Feels digestive issues (possible gastroparesis in the past with postprandial pain, biliary dyskinesis s/p cholecystectomy) have resolved since starting a sucrose intolerance diet. Stool softeners haven't helped in the past. On current diet she is having daily bowel movements without abdominal pain. Knees ache. Denies joint swelling. Has sprained ankles in past and now feels wrists get more sore with use. REVIEW OF SYSTEMS  A comprehensive review of systems was negative except for that written in the HPI.   A 10-point review of systems is per the new patient questionnaire, which has been reviewed extensively and scanned into the electronic medical record for future reference. Review of systems is as above and is otherwise negative. ALLERGIES  Bactrim [sulfamethoprim ds]; Yeast, dried; and Sucrose    MEDICATIONS  Current Outpatient Medications   Medication Sig    Aimovig Autoinjector 140 mg/mL injection INJECT 1 SYRINGE VIA SUBCUTANEOUS ROUTE ONCE A MONTH, AS DIRECTED    ubrogepant (Ubrelvy) 100 mg tablet Take 1 Tablet by mouth as needed for Migraine (Take at onset of migraine. May repeat once after 2 hours if needed. ).    montelukast (SINGULAIR) 10 mg tablet Take 1 Tablet by mouth daily. norethindrone-ethinyl estradiol-iron (Junel FE 1.5/30, 28,) 1.5 mg-30 mcg (21)/75 mg (7) tab TAKE 1 TAB BY MOUTH DAILY. CONTINUOUS PILLS ONLY. naltrexone (DEPADE) 50 mg tablet Take 0.5 Tablets by mouth daily. buPROPion XL (WELLBUTRIN XL) 150 mg tablet Take 1 in the AM for 2 weeks, then increase to 1 in the morning and 1 in the afternoon. budesonide-formoteroL (SYMBICORT) 160-4.5 mcg/actuation HFAA Symbicort 160 mcg-4.5 mcg/actuation HFA aerosol inhaler    meloxicam (MOBIC) 15 mg tablet Take 15 mg by mouth daily. Vyvanse 30 mg capsule Take 30 mg by mouth daily. metFORMIN (GLUMETZA ER) 500 mg TG24 24 hour tablet Take 500 mg by mouth Before breakfast and dinner. DULoxetine (CYMBALTA) 60 mg capsule Take 60 mg by mouth daily. DULoxetine (CYMBALTA) 30 mg capsule Take 30 mg by mouth daily. pregabalin (LYRICA) 75 mg capsule Take 1 Capsule by mouth in the morning. Max Daily Amount: 75 mg.    hydrOXYchloroQUINE (PLAQUENIL) 200 mg tablet Take 1 Tablet by mouth two (2) times a day. budesonide-glycopyr-formoterol (Breztri Aerosphere) 160-9-4.8 mcg/actuation HFAA Take 2 Puffs by inhalation daily. omega 3-dha-epa-fish oil (Fish OiL) 100-160-1,000 mg cap Take 2,000 mg by mouth.    multivitamin (ONE A DAY) tablet Take 1 Tablet by mouth daily.     albuterol (PROVENTIL HFA, VENTOLIN HFA, PROAIR HFA) 90 mcg/actuation inhaler Take 1 Puff by inhalation every four (4) hours as needed for Wheezing. traZODone (DESYREL) 50 mg tablet     lansoprazole (PREVACID) 30 mg capsule Take 30 mg by mouth Daily (before breakfast). ascorbic acid, vitamin C, (VITAMIN C) 500 mg tablet Take  by mouth. sacrosidase (Sucraid) 8,500 unit/mL soln Take 1 mL by mouth.    b complex vitamins tablet ONE TABLE DAILY    hydrOXYzine HCL (ATARAX) 25 mg tablet TAKE 1 TO 2 TABLETS BY MOUTH TWICE A DAY AS NEEDED FOR ANXIETY    ondansetron (ZOFRAN ODT) 8 mg disintegrating tablet PLACE 1 TABLET ON TONGUE & ALLOW TO DISSOLVE EVERY 8 HOURS AS NEEDED FOR NAUSEA WITH HEADACHE    cholecalciferol, vitamin d3, 10 mcg (400 unit) cap Take  by mouth. diphenhydrAMINE (BENADRYL) 25 mg capsule 2 tabs PO q8h PRN     No current facility-administered medications for this visit. PAST MEDICAL HISTORY  Past Medical History:   Diagnosis Date    Abdominal migraine     Dr. Krista Fried. vs Sucrose Intolerance. Acid reflux     ADHD (attention deficit hyperactivity disorder)     Autism     High Functioning. Dr. Louise Garrido. Chronic rhinitis 2021    Dr. Sarkis Gonsales, allergist.     Developmental delay     Fibromyalgia     Dr. Natan Cordova    MIGUEL A (generalized anxiety disorder)     Dr. Yovani Brown    Insulin resistance 09/08/2021    Dr. Familia Ying. Denise, maurice    Irritable bowel syndrome with diarrhea 4/13/2017    Lymphedema 2021    BL legs. Dr. Chip Gan. [facility] Lymphed[facility]    Migraine headache     Dr. Maddie Kendrick    Obesity, morbid ([facility] [address] 12/12/2017    OCD (obsessive compulsive disorder)     Dr. Yovani Brown    Sucrose intolerance     Dr. Krista Fried, GI. Tachycardia 06/02/2021    Mauri Cobian DO, [facility] cardio    Tenosynovitis of right hand 07/2021    Dr. Morenita Richards    [facility]     (lupus like). Dr. Bailey Bloch.        FAMILY HISTORY  family history includes Atrial Fibrillation in her mother; Cancer in her maternal uncle; Delayed Awakening in her mother; Endometriosis in her mother; Hypertension in her maternal grandfather, maternal grandmother, and mother; Lymphoma in an other family member; Migraines in her mother; Mult Sclerosis in her maternal aunt; No Known Problems in her father, paternal grandfather, and paternal grandmother; Obesity in her mother; Other in her maternal grandfather; Post-op Nausea/Vomiting in her mother. Mother diagnosed with sarcoidosis. Father has gout. Maternal aunt has MS.    SOCIAL HISTORY  She  reports that she has never smoked. She has never been exposed to tobacco smoke. She has never used smokeless tobacco. She reports that she does not drink alcohol and does not use drugs. Social History     Social History Narrative    No known chemical exposures. 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 General:  The patient is well developed, well nourished, alert, and in no apparent distress. Eyes: Sclera are anicteric. No conjunctival injection. HEENT:  Oropharynx is clear. No oral ulcers. Adequate salivary pooling. No cervical or supraclavicular lymphadenopathy. Lungs:  Clear to auscultation bilaterally, without wheeze or stridor. Normal respiratory effort. Cor:  Regular rate and rhythm. No murmur rub or gallop. Abdomen: Soft, non-tender, without hepatomegaly or masses. Extremities: No calf tenderness or edema. Warm and well perfused. Skin:  No significant abnormalities. Neuro: Nonfocal  Musculoskeletal:    A comprehensive musculoskeletal exam was performed for all joints of each upper and lower extremity and assessed for swelling, tenderness and range of motion. Results are documented as below:  No evidence of synovitis in the small joints of the hands, wrists, shoulders, elbows, hips, knees or ankles.     ___  General:  The patient is pleasant, obese, alert, and in no apparent distress. Eyes: Sclera are anicteric. No conjunctival injection. HEENT:  Oropharynx is clear. No oral ulcers. Adequate salivary pooling. No cervical or supraclavicular lymphadenopathy. Lungs:  Clear to auscultation bilaterally, without wheeze or stridor. Normal respiratory effort. Cor:  Regular rate and rhythm. No murmur rub or gallop. Abdomen: Soft, non-tender, without hepatomegaly or masses. Extremities: No calf tenderness or edema. Warm and well perfused. Skin:  Erythema and xerosis over bilateral upper and lower extremities, midfacial erythema including nasolabial folds most c/w rosacea. Active Raynaud's today with cyanosis across all distal R toes and L 3 and 4 toes. Neuro: Nonfocal, no foot or wrist drop  Musculoskeletal:    A comprehensive musculoskeletal exam was performed for all joints of each upper and lower extremity and assessed for swelling, tenderness and range of motion. Results are documented as below:    No evidence of synovitis in the small joints of the hands, wrists, shoulders, elbows, hips, knees or ankles. Labs:  8/25/22: CBC WNL, PT 12, INR 0.9, Ferritin 19, Transferrin 366, Iron 62, Transferrin Sat 12, Vit B12 538, Folate 11, Erythopoietin 19.1  7/6/22: WBC 12.1, HGB 14.5, Plt 479, Immunoglobulin E 298, ESR 15, Cr 0.69, LFT WNL, CRP 21 mg/L  5/31/22: WBC 13.5, HGB 14.6, Plt 513  5/26/22: WBC 18.9, HGB 14.7, Plt 443, , CRP 12 mg/L, ESR 17  4/28/22: , CRP 0.34 mg/dL, WBC 18.9, HGB 14.7, Plt 511, Jak2 negative,   4/5/22: WBC 12.0, Hgb 14., Plt 509; Tbili 0.4, AST 7, ALT 21, AlkP 73, Tprot 8.2, Alb 3.4, INR 0.9  9/15/21: CRP 13mg/L, C3 179 (high), C4 30; UA neg for blood, +trace protein. 9/7/21: WBC 11.4, HJgb 16.2, Hct 49.6, Plt 534; aerobic wound culture with scant growth of mixed skin babar.   9/3/21: WBC 8.9, Hgb 15.0, Plt 483, ESR 7, Cr 0.71, LFTs WNL, CRP 17mg/L;   6/17/21: WBC 6.9, Hgb 16.8, Plt 451, ESR 5, CRP 5mg/L  3/10/21: CRP 21mg/L, CPK 57, Marissa neg, RDL myositis panel neg, cardiolipin antibodies   2/24/21: WBC 8.9, Hgb 15.6, Plt 472; ANCAs negative, PR3 and MPO negative; Cr 0.7, CMP WNL;  WENDY 1:320 speckled, 1:640 nuclear dot; C3 high, C4 WNL; negative dsDNA, Scl70, SSA, SSB, RNP, Sm, ribosomal P, chromatin, centromere B antibodies. RF <10, CCP negative; ESR 26, CRP 17mg/L. Hep B cAb neg, Hep B sAg neg, Hep C Ab neg    Pathology:  5/8/19: [facility] Surgical pathology final report, Jose Barry MD:  Skin, right lower leg, punch biopsy:  -Ulcer and mixed dermal inflammation (see comment). Sections reveal a punch biopsy to the depth of the mid dermis. There is a zone of ulceration with fibrinous crust containing neutrophils. In the superficial to mid dermis, there is a mildly dense perivascular lymphohistiocytic infiltrate with scattered neutrophils and eosinophils. PAS stain is negative for fungal organisms. Taken together, the findings are those of ulceration and mixed dermal inflammation. The features are suggestive of an excoriated hypersensitivity reaction, such as that seen in arthropod bites. Diagnostic features of folliculitis are not seen. Imaging:    XR ANKLE RT MIN 3 V (9/5/22): No acute fracture or dislocation. MRI CERV SPINE WO CONT (8/29/22): Trace disc degenerative changes. There is no evidence of canal or foraminal  compromise. There is no cord signal abnormality. XR SPINE CERV W OBL/FLEX/EXT MIN 6 V COMP (8/23/22): Normal cervical spine. . No instability    9/29/21 MR brain:  Normal MRI of the brain. No intracranial mass, hemorrhage or evidence of acute infarction. 1/17/19 CXR (report only): Normal chest views. No change.     ASSESSMENT AND PLAN  Ms. Lorelee Paget is a 20 y.o. female with high-functioning autism and lipedema who presents for followup of undifferentiated connective tissue disease with resting tachycardia, pruritic nodules, polymorphic light eruption, livedo, and +WENDY 1:320 speckled and 1:640 nuclear dot patterns as well as a FLT3 mutation of unclear significance. Continuing Plaquenil (hydroxychloroquine), no role currently for immunosuppression which we will avoid barring deep organ injury, due to associated malignancy risk. Referring back to lymphedema clinic, reviewed sleep hygiene, and reasonable to pursue a trial of low dose naltrexone for fibromyalgia. 1. Undifferentiated connective tissue disease (HCC)  - Cont Plaquenil (hydroxychloroquine)     2. Rosacea  -Low suspicion for active lupus rash currently, reviewed behavioral triggers, pt aware of topical options    3. Prurigo nodularis  - Atarax, lymphedema treatment, topical emollients    4. Lipedema  - REFERRAL TO LYMPHEDEMA CLINIC     Patient Instructions   1) Continue 2 pills of plaquenil per day. Make sure you see an ophthalmologist at least once per year as long as you are on this medicine. The chances are 1 in 5000 after 5 years that you could develop visual changes. 2) We should not try any medications that suppress your immune system until you meet with the  and are all in agreement with risks and benefits of any such treatment. 3) Take the updated lymphedema referral to your therapist at the lymphedema clinic. 4) Follow up in 2 months. Let me know if you have any questions or concerns in the meantime. Orders Placed This Encounter    REFERRAL TO LYMPHEDEMA CLINIC       Medications: I am having Rickey Villa maintain her diphenhydrAMINE, cholecalciferol (vitamin d3), ondansetron, hydrOXYzine HCL, ascorbic acid (vitamin C), Sucraid, b complex vitamins, lansoprazole, traZODone, Breztri Aerosphere, Fish OiL, multivitamin, albuterol, hydrOXYchloroQUINE, pregabalin, DULoxetine, DULoxetine, budesonide-formoteroL, meloxicam, Vyvanse, metFORMIN, montelukast, norethindrone-ethinyl estradiol-iron, naltrexone, buPROPion XL, Aimovig Autoinjector, and ubrogepant.     Face to face time: 25 minutes  Note preparation and records review day of service: 20 minutes  Total provider time day of service: 45 minutes    This was scribed by Maggy Domínguez in the presence of Dr. Murray Rizo. The note was reviewed and amended personally, and I agree with the above information.     Yeison Mccabe MD    Adult Rheumatology   [facility]  A Part of [facility], [address] [place] [address]   Phone [phone]  Fax [phone]

## 2022-10-06 NOTE — PATIENT INSTRUCTIONS
1) Continue 2 pills of plaquenil per day. Make sure you see an ophthalmologist at least once per year as long as you are on this medicine. The chances are 1 in 5000 after 5 years that you could develop visual changes. 2) We should not try any medications that suppress your immune system until you meet with the  and are all in agreement with risks and benefits of any such treatment. 3) Take the updated lymphedema referral to your therapist at the lymphedema clinic. 4) Follow up in 2 months. Let me know if you have any questions or concerns in the meantime.

## 2022-11-08 ENCOUNTER — PATIENT MESSAGE (OUTPATIENT)
Dept: FAMILY MEDICINE CLINIC | Age: 22
End: 2022-11-08

## 2022-11-08 DIAGNOSIS — N92.6 IRREGULAR MENSES: ICD-10-CM

## 2022-11-08 DIAGNOSIS — M79.2 NEUROPATHIC PAIN: ICD-10-CM

## 2022-11-08 DIAGNOSIS — M35.9 UNDIFFERENTIATED CONNECTIVE TISSUE DISEASE (HCC): ICD-10-CM

## 2022-11-09 ENCOUNTER — TELEPHONE (OUTPATIENT)
Dept: FAMILY MEDICINE CLINIC | Age: 22
End: 2022-11-09

## 2022-11-09 NOTE — TELEPHONE ENCOUNTER
Spoke with pt. Pt id x 2. Notified as per Lor Odom PA-C and verbalized understanding. Pt accepted, appt scheduled.

## 2022-11-10 ENCOUNTER — OFFICE VISIT (OUTPATIENT)
Dept: FAMILY MEDICINE CLINIC | Age: 22
End: 2022-11-10
Payer: MEDICARE

## 2022-11-10 VITALS
WEIGHT: 293 LBS | BODY MASS INDEX: 48.82 KG/M2 | DIASTOLIC BLOOD PRESSURE: 82 MMHG | TEMPERATURE: 98.3 F | RESPIRATION RATE: 18 BRPM | SYSTOLIC BLOOD PRESSURE: 139 MMHG | OXYGEN SATURATION: 98 % | HEIGHT: 65 IN | HEART RATE: 107 BPM

## 2022-11-10 DIAGNOSIS — J01.00 ACUTE MAXILLARY SINUSITIS, RECURRENCE NOT SPECIFIED: Primary | ICD-10-CM

## 2022-11-10 PROCEDURE — 3074F SYST BP LT 130 MM HG: CPT | Performed by: FAMILY MEDICINE

## 2022-11-10 PROCEDURE — G8754 DIAS BP LESS 90: HCPCS | Performed by: FAMILY MEDICINE

## 2022-11-10 PROCEDURE — 99213 OFFICE O/P EST LOW 20 MIN: CPT | Performed by: FAMILY MEDICINE

## 2022-11-10 PROCEDURE — G8417 CALC BMI ABV UP PARAM F/U: HCPCS | Performed by: FAMILY MEDICINE

## 2022-11-10 PROCEDURE — G8752 SYS BP LESS 140: HCPCS | Performed by: FAMILY MEDICINE

## 2022-11-10 PROCEDURE — 3078F DIAST BP <80 MM HG: CPT | Performed by: FAMILY MEDICINE

## 2022-11-10 PROCEDURE — G8427 DOCREV CUR MEDS BY ELIG CLIN: HCPCS | Performed by: FAMILY MEDICINE

## 2022-11-10 PROCEDURE — G9717 DOC PT DX DEP/BP F/U NT REQ: HCPCS | Performed by: FAMILY MEDICINE

## 2022-11-10 RX ORDER — DOXYCYCLINE HYCLATE 100 MG
TABLET ORAL
COMMUNITY
Start: 2022-11-01 | End: 2022-11-17

## 2022-11-10 RX ORDER — AMOXICILLIN AND CLAVULANATE POTASSIUM 875; 125 MG/1; MG/1
1 TABLET, FILM COATED ORAL EVERY 12 HOURS
Qty: 20 TABLET | Refills: 0 | Status: SHIPPED
Start: 2022-11-10 | End: 2022-11-17

## 2022-11-10 NOTE — PROGRESS NOTES
Freddie López is a 20 y.o. female , id x 2(name and ). Reviewed record, history, and  medications. Chief Complaint   Patient presents with    Sinus Infection     Went to UC last Tuesday, constant pain around her eyes, behind her eyes and ears. Was prescribed Doxycycline, no relief. Vitals:    11/10/22 1603   BP: 139/82   Pulse: (!) 107   Resp: 18   Temp: 98.3 °F (36.8 °C)   TempSrc: Oral   SpO2: 98%   Weight: 325 lb 1.6 oz (147.5 kg)   Height: 5' 5\" (1.651 m)       Coordination of Care Questionnaire:   1. Have you been to the ER, urgent care clinic since your last visit? Hospitalized since your last visit? [facility]    2. Have you seen or consulted any other health care providers outside of the [address] [place] [address] since your last visit? Include any pap smears or colon screening.  No

## 2022-11-10 NOTE — PROGRESS NOTES
Jillian Kyle (: 2000) is a 20 y.o. female, established patient, here for evaluation of the following chief complaint(s):  Sinus Infection (Went to  last Tuesday, constant pain around her eyes, behind her eyes and ears. /Was prescribed Doxycycline, no relief.)         ASSESSMENT/PLAN:  Below is the assessment and plan developed based on review of pertinent history, physical exam, labs, studies, and medications. 1. Acute maxillary sinusitis, recurrence not specified  -     amoxicillin-clavulanate (Augmentin) 875-125 mg per tablet; Take 1 Tablet by mouth every twelve (12) hours. , Normal, Disp-20 Tablet, R-0  Stop doxycycline due to lack of response and switch to augmentin   Recommend mucinex, add nasonex or nasocort, add neti pot one to four times per day for sx relief as well     Follow up as scheduled next week       SUBJECTIVE/OBJECTIVE:  Chief Complaint   Patient presents with    Sinus Infection     Went to  last Tuesday, constant pain around her eyes, behind her eyes and ears. Was prescribed Doxycycline, no relief. Patient presents with sinus symptoms. She went to urgent care last week for sinus infection and wheezing. She got prednisone and doxycycline. This calmed down wheezing and shortness of breath but now she still has sinus pressure. She has maxillay facial pressure, not improving with doxy. Pain In R ear and some in left, pressure as well   She tried sudafed, not helped at all. She tried mucinex and it did not help. She takes singulair. Cannot do flonase due to nosebleed  She tried neti pot in the past but did not tolerate this well. No cough, SOB, wheezing, fever, chills, sore throat.      Current Outpatient Medications on File Prior to Visit   Medication Sig Dispense Refill    doxycycline (VIBRA-TABS) 100 mg tablet       Aimovig Autoinjector 140 mg/mL injection INJECT 1 SYRINGE VIA SUBCUTANEOUS ROUTE ONCE A MONTH, AS DIRECTED 3 mL 1    ubrogepant (Ubrelvy) 100 mg tablet Take 1 Tablet by mouth as needed for Migraine (Take at onset of migraine. May repeat once after 2 hours if needed. ). 16 Tablet 2    montelukast (SINGULAIR) 10 mg tablet Take 1 Tablet by mouth daily. 90 Tablet 1    norethindrone-ethinyl estradiol-iron (Junel FE 1.5/30, 28,) 1.5 mg-30 mcg (21)/75 mg (7) tab TAKE 1 TAB BY MOUTH DAILY. CONTINUOUS PILLS ONLY. 3 Dose Pack 0    naltrexone (DEPADE) 50 mg tablet Take 0.5 Tablets by mouth daily. 45 Tablet 1    buPROPion XL (WELLBUTRIN XL) 150 mg tablet Take 1 in the AM for 2 weeks, then increase to 1 in the morning and 1 in the afternoon. 180 Tablet 1    meloxicam (MOBIC) 15 mg tablet Take 15 mg by mouth daily. Vyvanse 30 mg capsule Take 30 mg by mouth daily. metFORMIN (GLUMETZA ER) 500 mg TG24 24 hour tablet Take 500 mg by mouth Before breakfast and dinner. 360 mg 2    DULoxetine (CYMBALTA) 60 mg capsule Take 120 mg by mouth daily. pregabalin (LYRICA) 75 mg capsule Take 1 Capsule by mouth in the morning. Max Daily Amount: 75 mg. 30 Capsule 2    hydrOXYchloroQUINE (PLAQUENIL) 200 mg tablet Take 1 Tablet by mouth two (2) times a day. 180 Tablet 1    budesonide-glycopyr-formoterol (Breztri Aerosphere) 160-9-4.8 mcg/actuation HFAA Take 2 Puffs by inhalation daily. omega 3-dha-epa-fish oil (Fish OiL) 100-160-1,000 mg cap Take 2,000 mg by mouth.      multivitamin (ONE A DAY) tablet Take 1 Tablet by mouth daily. albuterol (PROVENTIL HFA, VENTOLIN HFA, PROAIR HFA) 90 mcg/actuation inhaler Take 1 Puff by inhalation every four (4) hours as needed for Wheezing. traZODone (DESYREL) 50 mg tablet       lansoprazole (PREVACID) 30 mg capsule Take 30 mg by mouth Daily (before breakfast). ascorbic acid, vitamin C, (VITAMIN C) 500 mg tablet Take  by mouth.       sacrosidase (Sucraid) 8,500 unit/mL soln Take 1 mL by mouth.      b complex vitamins tablet ONE TABLE DAILY      hydrOXYzine HCL (ATARAX) 25 mg tablet TAKE 1 TO 2 TABLETS BY MOUTH TWICE A DAY AS NEEDED FOR ANXIETY      ondansetron (ZOFRAN ODT) 8 mg disintegrating tablet PLACE 1 TABLET ON TONGUE & ALLOW TO DISSOLVE EVERY 8 HOURS AS NEEDED FOR NAUSEA WITH HEADACHE      cholecalciferol, vitamin d3, 10 mcg (400 unit) cap Take  by mouth. diphenhydrAMINE (BENADRYL) 25 mg capsule 2 tabs PO q8h PRN      [DISCONTINUED] budesonide-glycopyr-formoterol 160-9-4.8 mcg/actuation HFAA Take 2 Inhalation by inhalation two (2) times a day. [DISCONTINUED] budesonide-formoteroL (SYMBICORT) 160-4.5 mcg/actuation HFAA Symbicort 160 mcg-4.5 mcg/actuation HFA aerosol inhaler (Patient not taking: Reported on 11/10/2022)      [DISCONTINUED] DULoxetine (CYMBALTA) 30 mg capsule Take 30 mg by mouth daily. (Patient not taking: Reported on 11/10/2022)       No current facility-administered medications on file prior to visit.      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HCC)     ADHD (attention deficit hyperactivity disorder)     Acid reflux     SLE (systemic lupus erythematosus) ([facility] [address]     History of prediabetes 07/20/2021    Lipedema 2021    Tenosynovitis of right hand 2021    Obesity, morbid ([address] 12/14/2020    Chronic gastritis without bleeding 06/20/2019    Chronic cholecystitis 06/04/2019    Biliary dyskinesia 06/04/2019    Chronic nausea 05/16/2019    Atypical depression 02/28/2018    Hypertension 02/20/2018    Lipids abnormal 02/20/2018    Edema, peripheral 09/13/2017    Allergy to yeast 09/13/2017    Allergy to chocolate 09/13/2017    Gastroesophageal reflux disease without esophagitis 05/15/2017    Irritable bowel syndrome with diarrhea 04/13/2017    Gastroparesis 01/19/2017    Medication overuse headache 09/21/2015    Autism spectrum disorder 09/21/2015    Anxiety 09/21/2015     Allergies   Allergen Reactions    Bactrim [Sulfamethoprim Ds] Other (comments)     Developed oral thrush    Rizatriptan Other (comments)    Sulfa (Sulfonamide Antibiotics) Unknown (comments)    Yeast, Dried Other (comments)     Upset stomach     Sucrose Nausea Only     SEVERE ABDOMINAL PAIN     Past Surgical History:   Procedure Laterality Date    COLONOSCOPY N/A 12/20/2016    COLONOSCOPY performed by Stephen Shaffer MD at [facility] ENDOSCOPY    GERD TST W/ MUCOS [place] ELECTROD 50 HR (BRAVO)  12/3/2020         HC CLP BRAVO  11/30/2020    HX CHOLECYSTECTOMY  06/2019    HX ENDOSCOPY  11/2020    dx'd with sucrose intolerance    HX TONSIL AND ADENOIDECTOMY      HX TONSILLECTOMY      AGE 1    HX WISDOM TEETH EXTRACTION      OK EGD TRANSORAL BIOPSY SINGLE/MULTIPLE  6/14/2019         OK EGD TRANSORAL BIOPSY SINGLE/MULTIPLE  11/30/2020    UPPER GI ENDOSCOPY,BIOPSY  12/20/2016          Social History     Tobacco Use    Smoking status: Never     Passive exposure: Never    Smokeless tobacco: Never    Tobacco comments:     no smoke exposure in the home   Vaping Use    Vaping Use: Never used   Substance Use Topics    Alcohol use: No    Drug use: No     Family History   Problem Relation Age of Onset    Endometriosis Mother     Delayed Awakening Mother     Post-op Nausea/Vomiting Mother     Migraines Mother         d/t accident - neck and brain injury    Atrial Fibrillation Mother     Hypertension Mother     Obesity Mother     Hypertension Maternal Grandfather     Other Maternal Grandfather         diverticulitis    No Known Problems Father     Hypertension Maternal Grandmother     No Known Problems Paternal Grandmother     No Known Problems Paternal Grandfather     Mult Sclerosis Maternal Aunt     Cancer Maternal Uncle     Lymphoma Other      Vitals:    11/10/22 1603   BP: 139/82   Pulse: (!) 107   Resp: 18   Temp: 98.3 °F (36.8 °C)   TempSrc: Oral   SpO2: 98%   Weight: 325 lb 1.6 oz (147.5 kg)   Height: 5' 5\" (1.651 m)   PainSc:   6   PainLoc: Eye        Physical Exam  Constitutional:       Appearance: Normal appearance. HENT:      Head: Normocephalic and atraumatic. Right Ear: A middle ear effusion is present. Left Ear: A middle ear effusion is present. Nose:      Right Sinus: Maxillary sinus tenderness and frontal sinus tenderness present. Left Sinus: Maxillary sinus tenderness and frontal sinus tenderness present. Eyes:      Extraocular Movements: Extraocular movements intact. Conjunctiva/sclera: Conjunctivae normal.      Pupils: Pupils are equal, round, and reactive to light. Cardiovascular:      Rate and Rhythm: Normal rate and regular rhythm. Pulses: Normal pulses. Heart sounds: Normal heart sounds. Pulmonary:      Effort: Pulmonary effort is normal.      Breath sounds: Normal breath sounds. Abdominal:      General: Abdomen is flat. Bowel sounds are normal.      Palpations: Abdomen is soft. Neurological:      Mental Status: She is alert. Psychiatric:         Mood and Affect: Mood normal.         Behavior: Behavior normal.       An electronic signature was used to authenticate this note.   -- Celia Kimbrough PA-C

## 2022-11-10 NOTE — PATIENT INSTRUCTIONS
Try nasonex or nasocort for the nasal spray   Try to use the neti pot sinus rinse as much as you can -- up to 4 times per day!

## 2022-11-14 NOTE — PROGRESS NOTES
Hi there,    Could you let the family know the abdominal xray was negative for constipation? They should proceed with the trial course of bentyl/dicyclomine to see if this helps.   Thanks, karyna
No

## 2022-11-17 ENCOUNTER — OFFICE VISIT (OUTPATIENT)
Dept: FAMILY MEDICINE CLINIC | Age: 22
End: 2022-11-17
Payer: MEDICARE

## 2022-11-17 VITALS
WEIGHT: 293 LBS | SYSTOLIC BLOOD PRESSURE: 144 MMHG | TEMPERATURE: 97.8 F | HEART RATE: 102 BPM | DIASTOLIC BLOOD PRESSURE: 92 MMHG | BODY MASS INDEX: 48.82 KG/M2 | OXYGEN SATURATION: 98 % | HEIGHT: 65 IN | RESPIRATION RATE: 18 BRPM

## 2022-11-17 DIAGNOSIS — R68.89 COLD FEELING: ICD-10-CM

## 2022-11-17 DIAGNOSIS — Z23 NEEDS FLU SHOT: ICD-10-CM

## 2022-11-17 DIAGNOSIS — R00.0 TACHYCARDIA: ICD-10-CM

## 2022-11-17 DIAGNOSIS — E61.1 LOW IRON: ICD-10-CM

## 2022-11-17 DIAGNOSIS — N92.6 IRREGULAR MENSES: ICD-10-CM

## 2022-11-17 DIAGNOSIS — M79.2 NEUROPATHIC PAIN: ICD-10-CM

## 2022-11-17 DIAGNOSIS — D75.9: ICD-10-CM

## 2022-11-17 DIAGNOSIS — E55.9 VITAMIN D DEFICIENCY: ICD-10-CM

## 2022-11-17 DIAGNOSIS — H65.93 MIDDLE EAR EFFUSION, BILATERAL: ICD-10-CM

## 2022-11-17 DIAGNOSIS — I10 PRIMARY HYPERTENSION: Primary | ICD-10-CM

## 2022-11-17 DIAGNOSIS — M35.9 UNDIFFERENTIATED CONNECTIVE TISSUE DISEASE (HCC): ICD-10-CM

## 2022-11-17 DIAGNOSIS — Z15.89 GENE MUTATION: ICD-10-CM

## 2022-11-17 PROCEDURE — G8427 DOCREV CUR MEDS BY ELIG CLIN: HCPCS | Performed by: FAMILY MEDICINE

## 2022-11-17 PROCEDURE — G8417 CALC BMI ABV UP PARAM F/U: HCPCS | Performed by: FAMILY MEDICINE

## 2022-11-17 PROCEDURE — 99214 OFFICE O/P EST MOD 30 MIN: CPT | Performed by: FAMILY MEDICINE

## 2022-11-17 PROCEDURE — 90686 IIV4 VACC NO PRSV 0.5 ML IM: CPT | Performed by: FAMILY MEDICINE

## 2022-11-17 PROCEDURE — G8754 DIAS BP LESS 90: HCPCS | Performed by: FAMILY MEDICINE

## 2022-11-17 PROCEDURE — 3078F DIAST BP <80 MM HG: CPT | Performed by: FAMILY MEDICINE

## 2022-11-17 PROCEDURE — G9717 DOC PT DX DEP/BP F/U NT REQ: HCPCS | Performed by: FAMILY MEDICINE

## 2022-11-17 PROCEDURE — 3074F SYST BP LT 130 MM HG: CPT | Performed by: FAMILY MEDICINE

## 2022-11-17 PROCEDURE — G8753 SYS BP > OR = 140: HCPCS | Performed by: FAMILY MEDICINE

## 2022-11-17 PROCEDURE — G0008 ADMIN INFLUENZA VIRUS VAC: HCPCS | Performed by: FAMILY MEDICINE

## 2022-11-17 RX ORDER — PREGABALIN 75 MG/1
75 CAPSULE ORAL DAILY
Qty: 30 CAPSULE | Refills: 2 | Status: SHIPPED | OUTPATIENT
Start: 2022-11-17

## 2022-11-17 RX ORDER — SPIRONOLACTONE 50 MG/1
50 TABLET, FILM COATED ORAL DAILY
COMMUNITY

## 2022-11-17 RX ORDER — DILTIAZEM HYDROCHLORIDE 120 MG/1
120 CAPSULE, EXTENDED RELEASE ORAL 2 TIMES DAILY
COMMUNITY

## 2022-11-17 RX ORDER — NORETHINDRONE ACETATE AND ETHINYL ESTRADIOL 1.5-30(21)
KIT ORAL
Qty: 3 DOSE PACK | Refills: 0 | Status: SHIPPED | OUTPATIENT
Start: 2022-11-17

## 2022-11-17 RX ORDER — SPIRONOLACTONE 25 MG/1
TABLET ORAL DAILY
COMMUNITY

## 2022-11-17 NOTE — PROGRESS NOTES
Maribel Davis (: 2000) is a 20 y.o. female, established patient, here for evaluation of the following chief complaint(s):  Follow-up and Referral Request (Cardiology- to get a second opinion since bp keeps going up. )         ASSESSMENT/PLAN:  Below is the assessment and plan developed based on review of pertinent history, physical exam, labs, studies, and medications. 1. Primary hypertension  BP is above goal in office   She sees cardiology, is on diltiazem and spironolactone but wishes to get second opinion   Refer to Dr. Latoya Simmons  2. Tachycardia  Hx of tachycardia, HR is 102bpm in office today, she would like second opinion with cardiology-- refer to Dr. Latoya Simmons    3. Needs flu shot  See nursing note for administration details   -     INFLUENZA, FLUARIX, FLULAVAL, FLUZONE (AGE 6 MO+), AFLURIA(AGE 3Y+) IM, PF, 0.5 ML    4. Cold feeling  5. Low iron  Iron was borderline low with [facility] labs 3 mo ago, recheck and alternating with  Cbc no anemia on 2022  She eats very limited diet which could contribute to Vit deficiencies. B12 and folate were normal with [facility] labs 3 mo ago   -     FERRITIN; Future  -     IRON PROFILE; Future    6. Vitamin D deficiency  Check level, she is on MVI   -     VITAMIN D, 25 HYDROXY; Future    7. Undifferentiated connective tissue disease ([facility] [address]  Managed by rheumatology dr. Fernando Kennedy     8. Neuropathic pain  Continue lyrica which controls pain well   -     pregabalin (LYRICA) 75 mg capsule; Take 1 Capsule by mouth daily. Max Daily Amount: 75 mg., Normal, Disp-30 Capsule, R-2    9. Irregular menses  Well controlled with junel, continue rx   -     norethindrone-ethinyl estradiol-iron (Junel FE 1.5/, ,) 1.5 mg-30 mcg (21)/75 mg (7) tab; TAKE 1 TAB BY MOUTH DAILY. CONTINUOUS PILLS ONLY., Normal, Disp-3 Dose Pack, R-0    10. Bilateral middle ear effusion   Infxn has resolved, continue flonase and antihistamine.  Advised patient this can take weeks to resolve     Return in about 3 months (around 2/17/2023). SUBJECTIVE/OBJECTIVE:  Chief Complaint   Patient presents with    Follow-up    Referral Request     Cardiology- to get a second opinion since bp keeps going up. Patient is a 21 yo female presenting for follow up. She sees cardiologist for  \"sluggish heart. \" She is on diltiazem and spironolactone for tachycardia and HTN. BP has been increasing over the last few months, HR runs around 100 all the time. She sometimes feels slight palpitation but no chest pain or SOB. No lower extremity swelling. She has not been happy with cardiologist and would like to see different cardiologist.  I have requested records from cardiology but have not received any. She did see  at [address] [place] [address]: told her she is the guinea pig-- only 20 cases in the entire world.  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 They are sending her to [facility] for skin biopsy for more information. This is scheduled January 12th. [email or web address]     Patient has very limited diet and feels cold often, transferrin saturation was low with labs with [facility] a few months ago. She eats very little iron in her diet, not on MVI with iron in it. She also has Vit D deficiency in the past-- on OTC supplement. Also takes B12 and folate which were normal on labs several months ago with [facility] 8/2022  TSH normal in the past, most recent labs 12/2021    Otherwise she is feeling well. Lyrica continues to control pain well. She would like to get prevnar and flu vaccine. Ears have felt better with recent augmentin. L ear feels clogged still.     Review of systems negative other than mentioned in HPI    Current Outpatient Medications on File Prior to Visit   Medication Sig Dispense Refill    dilTIAZem ER (DILACOR XR) 120 mg capsule Take 120 mg by mouth two (2) times a day. spironolactone (ALDACTONE) 50 mg tablet Take 50 mg by mouth daily. spironolactone (ALDACTONE) 25 mg tablet Take  by mouth daily. Aimovig Autoinjector 140 mg/mL injection INJECT 1 SYRINGE VIA SUBCUTANEOUS ROUTE ONCE A MONTH, AS DIRECTED 3 mL 1    ubrogepant (Ubrelvy) 100 mg tablet Take 1 Tablet by mouth as needed for Migraine (Take at onset of migraine. May repeat once after 2 hours if needed. ). 16 Tablet 2    montelukast (SINGULAIR) 10 mg tablet Take 1 Tablet by mouth daily. 90 Tablet 1    naltrexone (DEPADE) 50 mg tablet Take 0.5 Tablets by mouth daily. 45 Tablet 1    buPROPion XL (WELLBUTRIN XL) 150 mg tablet Take 1 in the AM for 2 weeks, then increase to 1 in the morning and 1 in the afternoon. 180 Tablet 1    Vyvanse 30 mg capsule Take 30 mg by mouth daily. metFORMIN (GLUMETZA ER) 500 mg TG24 24 hour tablet Take 500 mg by mouth Before breakfast and dinner. 360 mg 2    DULoxetine (CYMBALTA) 60 mg capsule Take 120 mg by mouth daily. hydrOXYchloroQUINE (PLAQUENIL) 200 mg tablet Take 1 Tablet by mouth two (2) times a day. 180 Tablet 1    budesonide-glycopyr-formoterol (Breztri Aerosphere) 160-9-4.8 mcg/actuation HFAA Take 2 Puffs by inhalation daily. omega 3-dha-epa-fish oil (Fish OiL) 100-160-1,000 mg cap Take 2,000 mg by mouth.      multivitamin (ONE A DAY) tablet Take 1 Tablet by mouth daily. albuterol (PROVENTIL HFA, VENTOLIN HFA, PROAIR HFA) 90 mcg/actuation inhaler Take 1 Puff by inhalation every four (4) hours as needed for Wheezing. traZODone (DESYREL) 50 mg tablet       lansoprazole (PREVACID) 30 mg capsule Take 30 mg by mouth Daily (before breakfast). ascorbic acid, vitamin C, (VITAMIN C) 500 mg tablet Take  by mouth.       sacrosidase (Sucraid) 8,500 unit/mL soln Take 1 mL by mouth.      b complex vitamins tablet ONE TABLE DAILY      hydrOXYzine HCL (ATARAX) 25 mg tablet TAKE 1 TO 2 TABLETS BY MOUTH TWICE A DAY AS NEEDED FOR ANXIETY      ondansetron (ZOFRAN ODT) 8 mg disintegrating tablet PLACE 1 TABLET ON TONGUE & ALLOW TO DISSOLVE EVERY 8 HOURS AS NEEDED FOR NAUSEA WITH HEADACHE      cholecalciferol, vitamin d3, 10 mcg (400 unit) cap Take  by mouth. diphenhydrAMINE (BENADRYL) 25 mg capsule 2 tabs PO q8h PRN      [DISCONTINUED] doxycycline (VIBRA-TABS) 100 mg tablet  (Patient not taking: Reported on 11/17/2022)      [DISCONTINUED] amoxicillin-clavulanate (Augmentin) 875-125 mg per tablet Take 1 Tablet by mouth every twelve (12) hours. (Patient not taking: Reported on 11/17/2022) 20 Tablet 0    [DISCONTINUED] norethindrone-ethinyl estradiol-iron (Junel FE 1.5/30, 28,) 1.5 mg-30 mcg (21)/75 mg (7) tab TAKE 1 TAB BY MOUTH DAILY. CONTINUOUS PILLS ONLY. 3 Dose Pack 0    [DISCONTINUED] meloxicam (MOBIC) 15 mg tablet Take 15 mg by mouth daily. (Patient not taking: Reported on 11/17/2022)      [DISCONTINUED] pregabalin (LYRICA) 75 mg capsule Take 1 Capsule by mouth in the morning. Max Daily Amount: 75 mg. 30 Capsule 2     No current facility-administered medications on file prior to visit.      Patient Active Problem List    Diagnosis Date Noted    Gene mutation 11/17/2022    Neuropathic pain 11/17/2022    Undifferentiated connective tissue disease ([facility] [address] 11/17/2022    Disease of hematopoietic system 09/16/2022    Other eosinophilia 06/07/2022    Elevated sed rate 05/20/2022    Neutrophilia 05/20/2022    Monocytosis 05/20/2022    Other thrombocytosis 04/28/2022    Elevated C-reactive protein (CRP) 04/28/2022    Skin lesions 04/28/2022    Migraine headache     Autism     MIGUEL A (generalized anxiety disorder)     Tachycardia     Fibromyalgia     Sucrose intolerance     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HCC)     ADHD (attention deficit hyperactivity disorder)     Acid reflux     SLE (systemic lupus erythematosus) ([facility] [address]     History of prediabetes 07/20/2021    Lipedema 2021    Tenosynovitis of right hand 2021    Obesity, morbid ([facility] [address] 12/14/2020    Chronic gastritis without bleeding 06/20/2019    Chronic cholecystitis 06/04/2019    Biliary dyskinesia 06/04/2019    Chronic nausea 05/16/2019    Atypical depression 02/28/2018    Hypertension 02/20/2018    Lipids abnormal 02/20/2018    Edema, peripheral 09/13/2017    Allergy to yeast 09/13/2017    Allergy to chocolate 09/13/2017    Gastroesophageal reflux disease without esophagitis 05/15/2017    Irritable bowel syndrome with diarrhea 04/13/2017    Gastroparesis 01/19/2017    Medication overuse headache 09/21/2015    Autism spectrum disorder 09/21/2015    Anxiety 09/21/2015     Allergies   Allergen Reactions    Bactrim [Sulfamethoprim Ds] Other (comments)     Developed oral thrush    Rizatriptan Other (comments)    Sulfa (Sulfonamide Antibiotics) Unknown (comments)    Yeast, Dried Other (comments)     Upset stomach     Sucrose Nausea Only     SEVERE ABDOMINAL PAIN     Past Surgical History:   Procedure Laterality Date    COLONOSCOPY N/A 12/20/2016    COLONOSCOPY performed by Maryjane Nye MD at [facility] ENDOSCOPY    GERD TST W/ MUCOS [address] ELECTROD 50 HR (BRAVO)  12/3/2020         HC CLP BRAVO  11/30/2020    HX CHOLECYSTECTOMY  06/2019    HX ENDOSCOPY  11/2020    dx'd with sucrose intolerance    HX TONSIL AND ADENOIDECTOMY      HX TONSILLECTOMY      AGE 1    HX WISDOM TEETH EXTRACTION      MI EGD TRANSORAL BIOPSY SINGLE/MULTIPLE  6/14/2019         MI EGD TRANSORAL BIOPSY SINGLE/MULTIPLE  11/30/2020    UPPER GI ENDOSCOPY,BIOPSY  12/20/2016          Social History     Tobacco Use    Smoking status: Never     Passive exposure: Never    Smokeless tobacco: Never    Tobacco comments:     no smoke exposure in the home   Vaping Use    Vaping Use: Never used   Substance Use Topics    Alcohol use: No    Drug use: No     Family History   Problem Relation Age of Onset    Endometriosis Mother     Delayed Awakening Mother     Post-op Nausea/Vomiting Mother     Migraines Mother         d/t accident - neck and brain injury    Atrial Fibrillation Mother     Hypertension Mother     Obesity Mother     Hypertension Maternal Grandfather     Other Maternal Grandfather         diverticulitis    No Known Problems Father     Hypertension Maternal Grandmother     No Known Problems Paternal Grandmother     No Known Problems Paternal Grandfather     Mult Sclerosis Maternal Aunt     Cancer Maternal Uncle     Lymphoma Other      Vitals:    11/17/22 1325 11/17/22 1358 11/17/22 1731   BP: (!) 142/86 (!) 144/92    Pulse: (!) 114 (!) 116 (!) 102   Resp: 18     Temp: 97.8 °F (36.6 °C)     TempSrc: Oral     SpO2: 98%     Weight: 325 lb 8 oz (147.6 kg)     Height: 5' 5\" (1.651 m)     PainSc:   0 - No pain          Physical Exam  Constitutional:       Appearance: Normal appearance. HENT:      Head: Normocephalic and atraumatic. Right Ear: Ear canal and external ear normal.      Left Ear: Ear canal and external ear normal.      Ears:      Comments: Middle ear effusion bilaterally, no erythema     Nose: No congestion or rhinorrhea. Mouth/Throat:      Pharynx: No oropharyngeal exudate or posterior oropharyngeal erythema. Eyes:      Extraocular Movements: Extraocular movements intact. Conjunctiva/sclera: Conjunctivae normal.      Pupils: Pupils are equal, round, and reactive to light. Cardiovascular:      Rate and Rhythm: Normal rate and regular rhythm. Pulses: Normal pulses. Heart sounds: Normal heart sounds. Pulmonary:      Effort: Pulmonary effort is normal.      Breath sounds: Normal breath sounds. Abdominal:      General: Abdomen is flat. Bowel sounds are normal.      Palpations: Abdomen is soft. Musculoskeletal:      Right lower leg: No edema. Left lower leg: No edema. Neurological:      Mental Status: She is alert.    Psychiatric: Mood and Affect: Mood normal.         Behavior: Behavior normal.       An electronic signature was used to authenticate this note.   -- Dash Hansen PA-C

## 2022-11-17 NOTE — PATIENT INSTRUCTIONS
Vaccine Information Statement    Influenza (Flu) Vaccine (Inactivated or Recombinant): What You Need to Know    Many vaccine information statements are available in Yoruba and other languages. See www.immunize.org/vis. Hojas de información sobre vacunas están disponibles en español y en muchos otros idiomas. Visite www.immunize.org/vis. 1. Why get vaccinated? Influenza vaccine can prevent influenza (flu). Flu is a contagious disease that spreads around the [facility] every year, usually between October and May. Anyone can get the flu, but it is more dangerous for some people. Infants and young children, people 72 years and older, pregnant people, and people with certain health conditions or a weakened immune system are at greatest risk of flu complications. Pneumonia, bronchitis, sinus infections, and ear infections are examples of flu-related complications. If you have a medical condition, such as heart disease, cancer, or diabetes, flu can make it worse. Flu can cause fever and chills, sore throat, muscle aches, fatigue, cough, headache, and runny or stuffy nose. Some people may have vomiting and diarrhea, though this is more common in children than adults. In an average year, thousands of people in the [facility] die from flu, and many more are hospitalized. Flu vaccine prevents millions of illnesses and flu-related visits to the doctor each year. 2. Influenza vaccines     CDC recommends everyone 6 months and older get vaccinated every flu season. Children 6 months through 6years of age may need 2 doses during a single flu season. Everyone else needs only 1 dose each flu season. It takes about 2 weeks for protection to develop after vaccination. There are many flu viruses, and they are always changing. Each year a new flu vaccine is made to protect against the influenza viruses believed to be likely to cause disease in the upcoming flu season.  Even when the vaccine doesnt exactly match these viruses, it may still provide some protection. Influenza vaccine does not cause flu. Influenza vaccine may be given at the same time as other vaccines. 3. Talk with your health care provider    Tell your vaccination provider if the person getting the vaccine:  Has had an allergic reaction after a previous dose of influenza vaccine, or has any severe, life-threatening allergies   Has ever had Guillain-Barré Syndrome (also called GBS)    In some cases, your health care provider may decide to postpone influenza vaccination until a future visit. Influenza vaccine can be administered at any time during pregnancy. People who are or will be pregnant during influenza season should receive inactivated influenza vaccine. People with minor illnesses, such as a cold, may be vaccinated. People who are moderately or severely ill should usually wait until they recover before getting influenza vaccine. Your health care provider can give you more information. 4. Risks of a vaccine reaction    Soreness, redness, and swelling where the shot is given, fever, muscle aches, and headache can happen after influenza vaccination. There may be a very small increased risk of Guillain-Barré Syndrome (GBS) after inactivated influenza vaccine (the flu shot). Isabelle Shanna children who get the flu shot along with pneumococcal vaccine (PCV13) and/or DTaP vaccine at the same time might be slightly more likely to have a seizure caused by fever. Tell your health care provider if a child who is getting flu vaccine has ever had a seizure. People sometimes faint after medical procedures, including vaccination. Tell your provider if you feel dizzy or have vision changes or ringing in the ears. As with any medicine, there is a very remote chance of a vaccine causing a severe allergic reaction, other serious injury, or death. 5. What if there is a serious problem?     An allergic reaction could occur after the vaccinated person leaves the clinic. If you see signs of a severe allergic reaction (hives, swelling of the face and throat, difficulty breathing, a fast heartbeat, dizziness, or weakness), call 9-1-1 and get the person to the nearest hospital.    For other signs that concern you, call your health care provider. Adverse reactions should be reported to the Vaccine Adverse Event Reporting System (VAERS). Your health care provider will usually file this report, or you can do it yourself. Visit the VAERS website at www.vaers. [facility].gov or call 4-944.563.8263. VAERS is only for reporting reactions, and VAERS staff members do not give medical advice. 6. The National Vaccine Injury Compensation Program    The [facility] Vaccine Injury Compensation Program (VICP) is a federal program that was created to compensate people who may have been injured by certain vaccines. Claims regarding alleged injury or death due to vaccination have a time limit for filing, which may be as short as two years. Visit the VICP website at www.[facility].gov/vaccinecompensation or call 2-161.190.8679 to learn about the program and about filing a claim. 7. How can I learn more? Ask your health care provider. Call your local or state health department. Visit the website of the Food and Drug Administration (FDA) for vaccine package inserts and additional information at www.fda.gov/vaccines-blood-biologics/vaccines. Contact the Centers for Disease Control and Prevention (CDC): Call 8-552.736.1696 (1-800-CDC-INFO) or  Visit CDCs influenza website at www.cdc.gov/flu. Vaccine Information Statement   Inactivated Influenza Vaccine   8/6/2021  42 DEJUAN Lamar [id number]-06   Department of Health and Human Services  Centers for Disease Control and Prevention    Office Use Only

## 2022-11-17 NOTE — PROGRESS NOTES
Zully Herrera is a 20 y.o. female , id x 2(name and ). Reviewed record, history, and  medications. Chief Complaint   Patient presents with    Follow-up    Referral Request     Cardiology- to get a second opinion since bp keeps going up. Vitals:    22 1325 22 1358   BP: (!) 142/86 (!) 144/92   Pulse: (!) 114 (!) 116   Resp: 18    Temp: 97.8 °F (36.6 °C)    TempSrc: Oral    SpO2: 98%    Weight: 325 lb 8 oz (147.6 kg)    Height: 5' 5\" (1.651 m)        Coordination of Care Questionnaire:   1. Have you been to the ER, urgent care clinic since your last visit? Hospitalized since your last visit? No    2. Have you seen or consulted any other health care providers outside of the [address] [place] [address] since your last visit? Include any pap smears or colon screening. No    After obtaining Erica Garcia's consent, and per orders of [facility] access hospital, MARY ELLEN, the vaccine ordered (Influenza) was given by Brittany Garner LPN. Patient instructed to remain in clinic for 20 minutes afterwards, and to report any adverse reaction to me immediately. Patient did not display any adverse side effects. Pt / caregiver given opportunity to review vaccine information sheet prior to vaccine administration. Opportunity given for questions and concerns. No questions or concerns at this time.

## 2022-11-19 LAB
25(OH)D3+25(OH)D2 SERPL-MCNC: 119 NG/ML (ref 30–100)
FERRITIN SERPL-MCNC: 18 NG/ML (ref 15–150)
IRON SATN MFR SERPL: 14 % (ref 15–55)
IRON SERPL-MCNC: 63 UG/DL (ref 27–159)
TIBC SERPL-MCNC: 443 UG/DL (ref 250–450)
UIBC SERPL-MCNC: 380 UG/DL (ref 131–425)

## 2022-11-21 ENCOUNTER — PATIENT MESSAGE (OUTPATIENT)
Dept: RHEUMATOLOGY | Age: 22
End: 2022-11-21

## 2022-11-21 NOTE — PROGRESS NOTES
Beryllium message sent to patient with results:   Tae Maldonado,  -Your labs show your iron is on the low end of normal. It would not be a bad idea to add a small over the counter iron supplement into your diet as you are likely not getting enough iron with your limited diet. -on the other hand, your vitamin D is too high! Please stop any vitamin D supplements you are taking and we need to recheck in 1 month.      Erika Rodriguez PA-C

## 2022-11-22 NOTE — TELEPHONE ENCOUNTER
Cheryl Hall, LPN 04/77/2772 7:49 PM EST    Please advise   ----- Message -----  From: Laura Devi  Sent: 11/21/2022 3:54 PM EST  To: [facility] Nurse Cosme  Subject: Medicine reaction     What should I used to help the rash also could you put this in my chart so we don't try this gel again?

## 2022-12-08 NOTE — LETTER
12/8/2022    Ms. Ankur Sullivan [id number]      Dear Ankur Raymond:    Please find your most recent results below. Resulted Orders   17-OH PROGESTERONE LCMS   Result Value Ref Range    17-OH Progesterone <10 ng/dL    Narrative    Test(s) 858563-12-DZ Progesterone LCMS  was developed and its performance characteristics determined  by [facility]. It has not been cleared or approved by the Food  and Drug Administration. Performed at:  [address]  [id number]  : Lakia Ellsworth MD, Phone:  [phone]   TESTOSTERONE, FREE & TOTAL   Result Value Ref Range    Testosterone 19 13 - 71 ng/dL    Free testosterone (Direct) 0.9 0.0 - 4.2 pg/mL    Narrative    Performed at:  [address]  [id number]  : Lakia Ellsworth MD, Phone:  [phone]   DHEA SULFATE   Result Value Ref Range    DHEA Sulfate 48.0 (L) 110.0 - 431.7 ug/dL    Narrative    Performed at:  [address]  [id number]  : Lakia Ellsworth MD, Phone:  [phone]       RECOMMENDATIONS:    Emerita Finley,     I was waiting on the cortisol levels to come back to comment on these. I'm not sure if you've completed those yet so I'll go ahead and let you know that these rule out conditions that mimic PCOS. We would typically expect to see an elevated testosterone level in the setting of PCOS but you may meet criteria without this. Let's discuss more in clinic.     Please call me if you have any questions: [phone]    Sincerely,      Rose Marie Noel MD

## 2022-12-13 ENCOUNTER — OFFICE VISIT (OUTPATIENT)
Dept: CARDIOLOGY CLINIC | Age: 22
End: 2022-12-13
Payer: MEDICARE

## 2022-12-13 VITALS
SYSTOLIC BLOOD PRESSURE: 130 MMHG | HEIGHT: 65 IN | WEIGHT: 293 LBS | BODY MASS INDEX: 48.82 KG/M2 | DIASTOLIC BLOOD PRESSURE: 84 MMHG | HEART RATE: 105 BPM | OXYGEN SATURATION: 98 %

## 2022-12-13 DIAGNOSIS — I10 PRIMARY HYPERTENSION: Primary | ICD-10-CM

## 2022-12-13 DIAGNOSIS — I42.9 CARDIOMYOPATHY, UNSPECIFIED TYPE (HCC): ICD-10-CM

## 2022-12-13 DIAGNOSIS — R00.0 TACHYCARDIA: ICD-10-CM

## 2022-12-13 PROBLEM — E11.9 TYPE 2 DIABETES MELLITUS (HCC): Status: ACTIVE | Noted: 2022-12-13

## 2022-12-13 PROCEDURE — G8752 SYS BP LESS 140: HCPCS | Performed by: SPECIALIST

## 2022-12-13 PROCEDURE — 3074F SYST BP LT 130 MM HG: CPT | Performed by: SPECIALIST

## 2022-12-13 PROCEDURE — 99204 OFFICE O/P NEW MOD 45 MIN: CPT | Performed by: SPECIALIST

## 2022-12-13 PROCEDURE — 3078F DIAST BP <80 MM HG: CPT | Performed by: SPECIALIST

## 2022-12-13 PROCEDURE — G8427 DOCREV CUR MEDS BY ELIG CLIN: HCPCS | Performed by: SPECIALIST

## 2022-12-13 PROCEDURE — G9717 DOC PT DX DEP/BP F/U NT REQ: HCPCS | Performed by: SPECIALIST

## 2022-12-13 PROCEDURE — G8754 DIAS BP LESS 90: HCPCS | Performed by: SPECIALIST

## 2022-12-13 PROCEDURE — G8417 CALC BMI ABV UP PARAM F/U: HCPCS | Performed by: SPECIALIST

## 2022-12-13 RX ORDER — NEBIVOLOL 5 MG/1
5 TABLET ORAL DAILY
Qty: 30 TABLET | Refills: 12 | Status: SHIPPED | OUTPATIENT
Start: 2022-12-13

## 2022-12-13 NOTE — PROGRESS NOTES
Ankur Raymond is a 20 y.o. female    Visit Vitals  /84 (BP 1 Location: Left upper arm, BP Patient Position: Sitting, BP Cuff Size: Adult)   Pulse (!) 105   Ht 5' 5\" (1.651 m)   Wt 324 lb (147 kg)   SpO2 98%   BMI 53.92 kg/m²       Chief Complaint   Patient presents with    New Patient     Ref by BASILIA Gambino    Rapid Heart Rate    Hypertension    Peripheral Edema       Chest pain NO  SOB YES, ASTHMA  Dizziness NO  Swelling YES  Recent hospital visit NO  Refills NO  COVID VACCINE STATUS YES  HAD COVID?  NO

## 2022-12-13 NOTE — PROGRESS NOTES
Orders for Echo for cardiomyopathy when possible  - use definity if needed Scott Aaron to do)     See me in 6 weeks  per Dr. Martha Peña VO.

## 2022-12-13 NOTE — PROGRESS NOTES
Gene Gilbert MD. [facility]          Patient: Kurt Craft  : 2000      Today's Date: 2022        HISTORY OF PRESENT ILLNESS:     History of Present Illness:    Referred from PCP, BASILIA Zacarias, for elevated BP and HR. Was seeing Dr. Phil Freeman at [address] [facility]. Here to establish care. Pt taking cardizem 120 mg daily and feels like this is making her shaky. Also taking spironolactone 75 mg daily. previously took metoprolol and she did not feel herself. So this was stopped. Started seeing cardiology for elevated HR and BP. Resting HR prior to medications would be approximately 120 bpm. She does not feel like the medication has helped. Does not feel heart racing or palpitations. Swelling in lower extremities. Was told it is lymphedema and is working with insurance on [employer]. Denies chest pain, shortness of breath. With the exception when she has asthma attacks. Denies lightheadedness, dizziness. PAST MEDICAL HISTORY:     Past Medical History:   Diagnosis Date    Abdominal migraine     Dr. Ciara Go. vs Sucrose Intolerance. Acid reflux     ADHD (attention deficit hyperactivity disorder)     Autism     High Functioning. Dr. Patrice Stone. Chronic rhinitis     Dr. Srinath Vazquez, allergist.     Developmental delay     Fibromyalgia     Dr. Tori Mccann    MIGUEL A (generalized anxiety disorder)     Dr. Fernando Wright    Insulin resistance 2021    Dr. Socorro Alonso. Denise, maurice    Irritable bowel syndrome with diarrhea 2017    Lymphedema     BL legs. Dr. Calos Blnaco. Sherrie Sales, [facility]    Migraine headache     Dr. Wai Lovell    Obesity, morbid ([facility] 2017    OCD (obsessive compulsive disorder)     Dr. Fernando Wright    Sucrose intolerance     Dr. Ciara Go, GI.     Tachycardia 2021    Mauri Cobian DO, [facility] Peds cardio    Tenosynovitis of right hand 2021    Dr. Naif Rushing    [facility] (lupus like). Dr. Yolanda Spurling. Past Surgical History:   Procedure Laterality Date    COLONOSCOPY N/A 12/20/2016    COLONOSCOPY performed by Ritu Villeda MD at [facility] ENDOSCOPY    [facility] E[facility] HR (BRAVO)  12/3/2020         HC [facility]P BRAVO  11/30/2020    HX CHOLECYSTECTOMY  06/2019    HX ENDOSCOPY  11/2020    dx'd with sucrose intolerance    HX TONSIL AND ADENOIDECTOMY      HX TONSILLECTOMY      AGE 1    HX WISDOM TEETH EXTRACTION      FL EGD TRANSORAL BIOPSY SINGLE/MULTIPLE  6/14/2019         FL EGD TRANSORAL BIOPSY SINGLE/MULTIPLE  11/30/2020    UPPER GI ENDOSCOPY,BIOPSY  12/20/2016                  CURRENT MEDICATIONS:    .  Current Outpatient Medications   Medication Sig Dispense Refill    hydrOXYchloroQUINE (PLAQUENIL) 200 mg tablet TAKE 1 TABLET BY MOUTH  TWICE DAILY 180 Tablet 3    dilTIAZem ER (DILACOR XR) 120 mg capsule Take 120 mg by mouth daily. spironolactone (ALDACTONE) 50 mg tablet Take 50 mg by mouth daily. spironolactone (ALDACTONE) 25 mg tablet Take  by mouth daily. pregabalin (LYRICA) 75 mg capsule Take 1 Capsule by mouth daily. Max Daily Amount: 75 mg. 30 Capsule 2    norethindrone-ethinyl estradiol-iron (Junel FE 1.5/30, 28,) 1.5 mg-30 mcg (21)/75 mg (7) tab TAKE 1 TAB BY MOUTH DAILY. CONTINUOUS PILLS ONLY. 3 Dose Pack 0    Aimovig Autoinjector 140 mg/mL injection INJECT 1 SYRINGE VIA SUBCUTANEOUS ROUTE ONCE A MONTH, AS DIRECTED 3 mL 1    ubrogepant (Ubrelvy) 100 mg tablet Take 1 Tablet by mouth as needed for Migraine (Take at onset of migraine. May repeat once after 2 hours if needed. ). 16 Tablet 2    montelukast (SINGULAIR) 10 mg tablet Take 1 Tablet by mouth daily. 90 Tablet 1    naltrexone (DEPADE) 50 mg tablet Take 0.5 Tablets by mouth daily. 45 Tablet 1    buPROPion XL (WELLBUTRIN XL) 150 mg tablet Take 1 in the AM for 2 weeks, then increase to 1 in the morning and 1 in the afternoon. 180 Tablet 1    Vyvanse 30 mg capsule Take 30 mg by mouth daily. metFORMIN (GLUMETZA ER) 500 mg TG24 24 hour tablet Take 500 mg by mouth Before breakfast and dinner. 360 mg 2    DULoxetine (CYMBALTA) 60 mg capsule Take 120 mg by mouth daily. budesonide-glycopyr-formoterol (Breztri Aerosphere) 160-9-4.8 mcg/actuation HFAA Take 2 Puffs by inhalation daily. omega 3-dha-epa-fish oil (Fish OiL) 100-160-1,000 mg cap Take 2,000 mg by mouth.      multivitamin (ONE A DAY) tablet Take 1 Tablet by mouth daily. albuterol (PROVENTIL HFA, VENTOLIN HFA, PROAIR HFA) 90 mcg/actuation inhaler Take 1 Puff by inhalation every four (4) hours as needed for Wheezing. traZODone (DESYREL) 50 mg tablet       lansoprazole (PREVACID) 30 mg capsule Take 30 mg by mouth Daily (before breakfast). ascorbic acid, vitamin C, (VITAMIN C) 500 mg tablet Take  by mouth. sacrosidase (Sucraid) 8,500 unit/mL soln Take 1 mL by mouth.      b complex vitamins tablet ONE TABLE DAILY      hydrOXYzine HCL (ATARAX) 25 mg tablet TAKE 1 TO 2 TABLETS BY MOUTH TWICE A DAY AS NEEDED FOR ANXIETY      ondansetron (ZOFRAN ODT) 8 mg disintegrating tablet PLACE 1 TABLET ON TONGUE & ALLOW TO DISSOLVE EVERY 8 HOURS AS NEEDED FOR NAUSEA WITH HEADACHE      cholecalciferol, vitamin d3, 10 mcg (400 unit) cap Take  by mouth.       diphenhydrAMINE (BENADRYL) 25 mg capsule 2 tabs PO q8h PRN         Allergies   Allergen Reactions    Bactrim [Sulfamethoprim Ds] Other (comments)     Developed oral thrush    Rizatriptan Other (comments)    Sulfa (Sulfonamide Antibiotics) Unknown (comments)    Yeast, Dried Other (comments)     Upset stomach     Flagyl [Metronidazole] Rash     Worsened acneiform rash with TOPICAL flagyl used to treat rosacea    Sucrose Nausea Only     SEVERE ABDOMINAL PAIN         SOCIAL HISTORY:     Social History     Tobacco Use    Smoking status: Never     Passive exposure: Never    Smokeless tobacco: Never    Tobacco comments:     no smoke exposure in the home   Vaping Use    Vaping Use: Never used Substance Use Topics    Alcohol use: No    Drug use: No         FAMILY HISTORY:     Family History   Problem Relation Age of Onset    Endometriosis Mother     Delayed Awakening Mother     Post-op Nausea/Vomiting Mother     Migraines Mother         d/t accident - neck and brain injury    Atrial Fibrillation Mother     Hypertension Mother     Obesity Mother     No Known Problems Father     Heart Disease Maternal Grandmother     Atrial Fibrillation Maternal Grandmother     Hypertension Maternal Grandmother     Heart Disease Maternal Grandfather     Hypertension Maternal Grandfather     Other Maternal Grandfather         diverticulitis    No Known Problems Paternal Grandmother     No Known Problems Paternal Grandfather     Mult Sclerosis Maternal Aunt     Cancer Maternal Uncle     Lymphoma Other          REVIEW OF SYMPTOMS:     Review of Symptoms:  Constitutional: Negative for fever, chills  HEENT: Negative for nosebleeds, tinnitus, and vision changes. Respiratory: Negative for cough, wheezing  Cardiovascular: Negative for orthopnea, claudication, syncope, and PND. Gastrointestinal: Negative for abdominal pain, diarrhea, melena. Genitourinary: Negative for dysuria  Musculoskeletal: Negative for myalgias. Skin: Negative for rash  Heme: No problems bleeding. Neurological: Negative for speech change and focal weakness. PHYSICAL EXAM:     Physical Exam:  Visit Vitals  /84 (BP 1 Location: Left upper arm, BP Patient Position: Sitting, BP Cuff Size: Adult)   Pulse (!) 105   Ht 5' 5\" (1.651 m)   Wt 324 lb (147 kg)   SpO2 98%   BMI 53.92 kg/m²       Patient appears generally well, mood and affect are appropriate and pleasant. HEENT:  Hearing intact, non-icteric, normocephalic, atraumatic. Neck Exam: Supple   Lung Exam: Clear to auscultation, even breath sounds.    Cardiac Exam: Regular rate and rhythm with no murmur or rub  Abdomen: Soft, non-tender, Obesity   Extremities: Moves all ext well. + non-pitting edema   MSKTL: Overall good ROM ext  Skin: No significant rashes  Psych: Appropriate affect  Neuro - Grossly intact        LABS / OTHER STUDIES:     Lab Results   Component Value Date/Time    Sodium 136 07/06/2022 01:00 PM    Potassium 4.5 07/06/2022 01:00 PM    Chloride 99 07/06/2022 01:00 PM    CO2 22 07/06/2022 01:00 PM    Anion gap 1 (L) 04/05/2022 10:50 AM    Glucose 78 07/06/2022 01:00 PM    Glucose 86 12/18/2017 08:35 AM    BUN 9 07/06/2022 01:00 PM    Creatinine 0.69 07/06/2022 01:00 PM    BUN/Creatinine ratio 13 07/06/2022 01:00 PM    GFR est AA >60 04/05/2022 10:50 AM    GFR est non-AA >60 04/05/2022 10:50 AM    Calcium 9.4 07/06/2022 01:00 PM    Bilirubin, total 0.3 07/06/2022 01:00 PM    Alk. phosphatase 66 07/06/2022 01:00 PM    Protein, total 6.9 07/06/2022 01:00 PM    Albumin 4.4 07/06/2022 01:00 PM    Globulin 4.8 (H) 04/05/2022 10:50 AM    A-G Ratio 1.8 07/06/2022 01:00 PM    ALT (SGPT) 10 07/06/2022 01:00 PM    AST (SGOT) 12 07/06/2022 01:00 PM       Lab Results   Component Value Date/Time    WBC 12.1 (H) 07/06/2022 01:00 PM    HGB 14.5 07/06/2022 01:00 PM    HCT 43.3 07/06/2022 01:00 PM    PLATELET 186 (H) 10/21/2289 01:00 PM    MCV 88 07/06/2022 01:00 PM       Lab Results   Component Value Date/Time    Cholesterol, total 217 (H) 09/03/2021 10:03 AM    Cholesterol, Total 210 (H) 01/10/2022 02:35 PM    HDL Cholesterol 54 09/03/2021 10:03 AM    LDL, calculated 144 (H) 09/03/2021 10:03 AM    LDL, calculated 145 (H) 01/07/2020 10:06 AM    VLDL, calculated 19 09/03/2021 10:03 AM    VLDL, calculated 19 01/07/2020 10:06 AM    Triglyceride 106 09/03/2021 10:03 AM           CARDIAC DIAGNOSTICS:     Cardiac Evaluation Includes:  I reviewed the test results below.     Echo 5/26/22 ([facility]) - LVEF 45%        EKG 4/13/22 ([facility]) - sinus tach,  bpm, normal study         ASSESSMENT AND PLAN:     Assessment and Plan:    1) HTN and tachycardia   - she has long history of sinus tachycardia and has seen Dr. Dominic Ramos   - Sinus tach could in part be due to her meds   - She did not tolerate metoprolol   - Cont aldactone (reviewed fetal risks)   - She feels cardizem makes her shaky ---->  switch cardizem to Bystolic 5 mg daily     2) LVEF depressed on prior echo 5/22 at [facility]  ---> repeat an echo with definity     3) Morbid Obesity   - needs weight loss     4) SLE / connective tissue disorder     5) Chronic LE edema /  lymphedema   - her edema is non-pitting and likely related to her obesity   - She is getting a Lymphapress which was previously planned     6) See me in 6 weeks     Is a . Likes dogs. Bre Lockwood MD, [place] [address] [facility], [address]  [place] [address]  [address]  [address]  Ph: [phone]   Ph [phone]

## 2022-12-14 ENCOUNTER — TELEPHONE (OUTPATIENT)
Dept: FAMILY MEDICINE CLINIC | Age: 22
End: 2022-12-14

## 2022-12-14 NOTE — TELEPHONE ENCOUNTER
Called and spoke with pt. Pt id x 2. Relayed the previous message per Tressa Onofre PA-C. She verbalized understanding. She states her insurance changed on 11/1/22 and it does not cover urgent care. She asked if [facility] could fit her in tomorrow to be seen. Informed her will inform the provider of her request and will call her back when she responds. She verbalized understanding.        (Checked other providers schedules for this week, no available appts this week.)

## 2022-12-14 NOTE — TELEPHONE ENCOUNTER
Pt called in and states she is having ear pain again. She is wondering if she could finish taking the amoxicillin for it? Call back number for her is [phone]. Thanks.

## 2022-12-14 NOTE — TELEPHONE ENCOUNTER
No she should not start an old prescription. If infection returned she may need different abx.   Would either need appt or urgent care, etc for treatment

## 2022-12-15 ENCOUNTER — OFFICE VISIT (OUTPATIENT)
Dept: FAMILY MEDICINE CLINIC | Age: 22
End: 2022-12-15
Payer: MEDICARE

## 2022-12-15 VITALS
SYSTOLIC BLOOD PRESSURE: 139 MMHG | BODY MASS INDEX: 48.82 KG/M2 | RESPIRATION RATE: 16 BRPM | HEIGHT: 65 IN | WEIGHT: 293 LBS | DIASTOLIC BLOOD PRESSURE: 87 MMHG | HEART RATE: 106 BPM | TEMPERATURE: 98.3 F | OXYGEN SATURATION: 98 %

## 2022-12-15 DIAGNOSIS — E74.819 DISORDERS OF GLUCOSE TRANSPORT, UNSPECIFIED (HCC): ICD-10-CM

## 2022-12-15 DIAGNOSIS — J30.2 SEASONAL ALLERGIC RHINITIS, UNSPECIFIED TRIGGER: Primary | ICD-10-CM

## 2022-12-15 DIAGNOSIS — R09.81 NASAL CONGESTION: ICD-10-CM

## 2022-12-15 DIAGNOSIS — E11.9 TYPE 2 DIABETES MELLITUS WITHOUT COMPLICATION, WITHOUT LONG-TERM CURRENT USE OF INSULIN (HCC): ICD-10-CM

## 2022-12-15 PROCEDURE — 3044F HG A1C LEVEL LT 7.0%: CPT | Performed by: FAMILY MEDICINE

## 2022-12-15 PROCEDURE — 99213 OFFICE O/P EST LOW 20 MIN: CPT | Performed by: FAMILY MEDICINE

## 2022-12-15 PROCEDURE — G8752 SYS BP LESS 140: HCPCS | Performed by: FAMILY MEDICINE

## 2022-12-15 PROCEDURE — 3078F DIAST BP <80 MM HG: CPT | Performed by: FAMILY MEDICINE

## 2022-12-15 PROCEDURE — G8427 DOCREV CUR MEDS BY ELIG CLIN: HCPCS | Performed by: FAMILY MEDICINE

## 2022-12-15 PROCEDURE — G8754 DIAS BP LESS 90: HCPCS | Performed by: FAMILY MEDICINE

## 2022-12-15 PROCEDURE — 3074F SYST BP LT 130 MM HG: CPT | Performed by: FAMILY MEDICINE

## 2022-12-15 PROCEDURE — G9717 DOC PT DX DEP/BP F/U NT REQ: HCPCS | Performed by: FAMILY MEDICINE

## 2022-12-15 PROCEDURE — G8417 CALC BMI ABV UP PARAM F/U: HCPCS | Performed by: FAMILY MEDICINE

## 2022-12-15 PROCEDURE — 2022F DILAT RTA XM EVC RTNOPTHY: CPT | Performed by: FAMILY MEDICINE

## 2022-12-15 RX ORDER — CETIRIZINE HYDROCHLORIDE 10 MG/1
10 TABLET ORAL
Qty: 90 TABLET | Refills: 1 | Status: SHIPPED | OUTPATIENT
Start: 2022-12-15

## 2022-12-15 RX ORDER — AZELASTINE HCL 205.5 UG/1
1 SPRAY NASAL 2 TIMES DAILY
Qty: 1 EACH | Refills: 2 | Status: SHIPPED | OUTPATIENT
Start: 2022-12-15

## 2022-12-15 RX ORDER — AZELASTINE 1 MG/ML
1 SPRAY, METERED NASAL 2 TIMES DAILY
Qty: 1 EACH | Status: CANCELLED | OUTPATIENT
Start: 2022-12-15

## 2022-12-15 NOTE — PROGRESS NOTES
Freddie López (: 2000) is a 20 y.o. female, established patient, here for evaluation of the following chief complaint(s):  Ear Pain (Experiencing ear pain since the last visit. Rhesa Jacinto gotten worse. Delories Danes pressure in sinuses. Doneta Tej throat just started today. Guillermina Pichardo Covid test yesterday was negative. )         ASSESSMENT/PLAN:  Below is the assessment and plan developed based on review of pertinent history, physical exam, labs, studies, and medications. 1. Seasonal allergic rhinitis, unspecified trigger  2. Nasal congestion  intermittent nasal congestion and mild right middle ear effusion for last 3 months, no sign of otitis media or bacterial sinusitis on exam today   Singulair was stopped by pulm yesterday  Start zyrtec  Stop nasocort due to nose bleeds  Start azelastine nasal spray instead  She cannot tolerate nasal saline rinses   Continue humidifier in room at night   Refer to ENT for persistent right middle ear effusion   -     cetirizine (ZYRTEC) 10 mg tablet; Take 1 Tablet by mouth nightly., Normal, Disp-90 Tablet, R-1  -     azelastine (ASTEPRO) 205.5 mcg (0.15 %); 1 [place] by Both Nostrils route two (2) times a day., Normal, Disp-1 Each, R-2  -     REFERRAL TO ENT-OTOLARYNGOLOGY    3. Disorders of glucose transport, unspecified ([address]  4. Type 2 diabetes mellitus without complication, without long-term current use of insulin ([address]  -managed by endocrine       SUBJECTIVE/OBJECTIVE:  Chief Complaint   Patient presents with    Ear Pain     Experiencing ear pain since the last visit. Has gotten worse. Feels pressure in sinuses. Sore throat just started today. Home Covid test yesterday was negative. Patient is a 22-year female presenting office for persistent right ear pain, nasal congestion and tickling in her throat. Patient present entire fall she has issues with pressure in her ears, nasal congestion and intermittent dry cough.   She was treated for otitis media about 6 weeks ago and symptoms resolved at that point, however she has persistent right ear pressure. Nasal congestion comes and goes. Reports her right ear feels like it has pressure and an aching pain at times. She was using Nasacort but stopped using this due to it causing postnasal drip and nosebleeds. Stopped about 3 to 4 days ago. She was also on Singulair but pulmonology stopped this yesterday and recommended she start daily antihistamine instead. Patient denies any fever, shortness of breath, difficulty breathing. Throat is not painful, rather feels a tickle. She has chronic body aches with fibromyalgia but these have not worsened in the last 2 to 3 days. No chest pains. She is seen ENT in the past and was told nothing is wrong with her ears. She would like a different opinion. Review of systems negative other than mentioned in HPI    Current Outpatient Medications on File Prior to Visit   Medication Sig Dispense Refill    ferrous sulfate (SLOW FE PO) Take  by mouth. nebivoloL (BYSTOLIC) 5 mg tablet Take 1 Tablet by mouth daily. 30 Tablet 12    hydrOXYchloroQUINE (PLAQUENIL) 200 mg tablet TAKE 1 TABLET BY MOUTH  TWICE DAILY 180 Tablet 3    spironolactone (ALDACTONE) 50 mg tablet Take 50 mg by mouth daily. spironolactone (ALDACTONE) 25 mg tablet Take  by mouth daily. pregabalin (LYRICA) 75 mg capsule Take 1 Capsule by mouth daily. Max Daily Amount: 75 mg. 30 Capsule 2    norethindrone-ethinyl estradiol-iron (Junel FE 1.5/30, 28,) 1.5 mg-30 mcg (21)/75 mg (7) tab TAKE 1 TAB BY MOUTH DAILY. CONTINUOUS PILLS ONLY. 3 Dose Pack 0    Aimovig Autoinjector 140 mg/mL injection INJECT 1 SYRINGE VIA SUBCUTANEOUS ROUTE ONCE A MONTH, AS DIRECTED 3 mL 1    ubrogepant (Ubrelvy) 100 mg tablet Take 1 Tablet by mouth as needed for Migraine (Take at onset of migraine. May repeat once after 2 hours if needed. ). 16 Tablet 2    montelukast (SINGULAIR) 10 mg tablet Take 1 Tablet by mouth daily.  90 Tablet 1    naltrexone (DEPADE) 50 mg tablet Take 0.5 Tablets by mouth daily. 45 Tablet 1    buPROPion XL (WELLBUTRIN XL) 150 mg tablet Take 1 in the AM for 2 weeks, then increase to 1 in the morning and 1 in the afternoon. 180 Tablet 1    Vyvanse 30 mg capsule Take 30 mg by mouth daily. metFORMIN (GLUMETZA ER) 500 mg TG24 24 hour tablet Take 500 mg by mouth Before breakfast and dinner. 360 mg 2    DULoxetine (CYMBALTA) 60 mg capsule Take 120 mg by mouth daily. budesonide-glycopyr-formoterol (Breztri Aerosphere) 160-9-4.8 mcg/actuation HFAA Take 2 Puffs by inhalation daily. omega 3-dha-epa-fish oil (Fish OiL) 100-160-1,000 mg cap Take 2,000 mg by mouth. albuterol (PROVENTIL HFA, VENTOLIN HFA, PROAIR HFA) 90 mcg/actuation inhaler Take 1 Puff by inhalation every four (4) hours as needed for Wheezing. traZODone (DESYREL) 50 mg tablet       lansoprazole (PREVACID) 30 mg capsule Take 30 mg by mouth Daily (before breakfast). ascorbic acid, vitamin C, (VITAMIN C) 500 mg tablet Take  by mouth. sacrosidase (Sucraid) 8,500 unit/mL soln Take 1 mL by mouth.      b complex vitamins tablet ONE TABLE DAILY      hydrOXYzine HCL (ATARAX) 25 mg tablet TAKE 1 TO 2 TABLETS BY MOUTH TWICE A DAY AS NEEDED FOR ANXIETY      ondansetron (ZOFRAN ODT) 8 mg disintegrating tablet PLACE 1 TABLET ON TONGUE & ALLOW TO DISSOLVE EVERY 8 HOURS AS NEEDED FOR NAUSEA WITH HEADACHE      diphenhydrAMINE (BENADRYL) 25 mg capsule 2 tabs PO q8h PRN      multivitamin (ONE A DAY) tablet Take 1 Tablet by mouth daily. (Patient not taking: Reported on 12/15/2022)      cholecalciferol, vitamin d3, 10 mcg (400 unit) cap Take  by mouth. (Patient not taking: Reported on 12/15/2022)       No current facility-administered medications on file prior to visit.      Patient Active Problem List    Diagnosis Date Noted    Type 2 diabetes mellitus 12/13/2022    Gene mutation 11/17/2022    Neuropathic pain 11/17/2022    Undifferentiated connective tissue disease ([facility] 11/17/2022    Disease of hematopoietic system 09/16/2022    Other eosinophilia 06/07/2022    Elevated sed rate 05/20/2022    Neutrophilia 05/20/2022    Monocytosis 05/20/2022    Other thrombocytosis 04/28/2022    Elevated C-reactive protein (CRP) 04/28/2022    Skin lesions 04/28/2022    Migraine headache     Autism     MIGUEL A (generalized anxiety disorder)     Tachycardia     Fibromyalgia     Sucrose intolerance     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[facility])     ADHD (attention deficit hyperactivity disorder)     Acid reflux     SLE (systemic lupus erythematosus) ([facility]     History of prediabetes 07/20/2021    Lipedema 2021    Tenosynovitis of right hand 2021    Obesity, morbid ([facility] 12/14/2020    Chronic gastritis without bleeding 06/20/2019    Chronic cholecystitis 06/04/2019    Biliary dyskinesia 06/04/2019    Chronic nausea 05/16/2019    Atypical depression 02/28/2018    Hypertension 02/20/2018    Lipids abnormal 02/20/2018    Edema, peripheral 09/13/2017    Allergy to yeast 09/13/2017    Allergy to chocolate 09/13/2017    Gastroesophageal reflux disease without esophagitis 05/15/2017    Irritable bowel syndrome with diarrhea 04/13/2017    Gastroparesis 01/19/2017    Medication overuse headache 09/21/2015    Autism spectrum disorder 09/21/2015    Anxiety 09/21/2015     Allergies   Allergen Reactions    Bactrim [Sulfamethoprim Ds] Other (comments)     Developed oral thrush    Rizatriptan Other (comments)    Sulfa (Sulfonamide Antibiotics) Unknown (comments)    Yeast, Dried Other (comments)     Upset stomach     Flagyl [Metronidazole] Rash     Worsened acneiform rash with TOPICAL flagyl used to treat rosacea    Sucrose Nausea Only     SEVERE ABDOMINAL PAIN     Vitals:    12/15/22 1554   BP: 139/87   Pulse: (!) 106   Resp: 16   Temp: 98.3 °F (36.8 °C)   TempSrc: Oral   SpO2: 98%   Weight: 321 lb 4.8 oz (145.7 kg)   Height: 5' 5\" (1.651 m)        Physical Exam  Constitutional:       Appearance: Normal appearance. HENT:      Head: Normocephalic and atraumatic. Right Ear: Ear canal and external ear normal. No swelling or tenderness. No mastoid tenderness. Left Ear: Tympanic membrane, ear canal and external ear normal. No swelling or tenderness. No mastoid tenderness. Ears:      Comments: Right-sided middle ear effusion, many membranes not bulging or erythematous. Nose: Rhinorrhea present. No congestion. Comments: Dried scabbing blood in the right nares      Mouth/Throat:      Pharynx: No oropharyngeal exudate or posterior oropharyngeal erythema. Eyes:      Extraocular Movements: Extraocular movements intact. Conjunctiva/sclera: Conjunctivae normal.      Pupils: Pupils are equal, round, and reactive to light. Cardiovascular:      Rate and Rhythm: Normal rate and regular rhythm. Pulses: Normal pulses. Heart sounds: Normal heart sounds. Pulmonary:      Effort: Pulmonary effort is normal. No respiratory distress. Breath sounds: Normal breath sounds. No wheezing. Abdominal:      General: Abdomen is flat. Bowel sounds are normal.      Palpations: Abdomen is soft. Musculoskeletal:      Cervical back: Normal range of motion and neck supple. No rigidity. Lymphadenopathy:      Cervical: No cervical adenopathy. Neurological:      General: No focal deficit present. Mental Status: She is alert and oriented to person, place, and time. Psychiatric:         Mood and Affect: Mood normal.         Behavior: Behavior normal.       An electronic signature was used to authenticate this note.   -- Ross Lamar PA-C

## 2022-12-15 NOTE — PROGRESS NOTES
Jeet Childress is a 20 y.o. female , id x 2(name and ). Reviewed record, history, and  medications. Chief Complaint   Patient presents with    Ear Pain     Experiencing ear pain since the last visit. Has gotten worse. Feels pressure in sinuses. Sore throat just started today. Home Covid test yesterday was negative. Vitals:    12/15/22 1554   BP: 139/87   Pulse: (!) 106   Resp: 16   Temp: 98.3 °F (36.8 °C)   TempSrc: Oral   SpO2: 98%   Weight: 321 lb 4.8 oz (145.7 kg)   Height: 5' 5\" (1.651 m)       Coordination of Care Questionnaire:   1. Have you been to the ER, urgent care clinic since your last visit? Hospitalized since your last visit? No    2. Have you seen or consulted any other health care providers outside of the [address] [place] [address] since your last visit? Include any pap smears or colon screening.  Yes [facility]

## 2022-12-16 ENCOUNTER — TELEPHONE (OUTPATIENT)
Dept: NEUROLOGY | Age: 22
End: 2022-12-16

## 2022-12-16 NOTE — TELEPHONE ENCOUNTER
Response to the Pt. In My Chart:  Good afternoon,  Dr. Dalila Fam feels it is likely an injection site reaction. She recommends you to take  the next injection with benadryl and monitor for any recurrent symptoms. Let us know if you have any other questions or concerns. Thanks,  Belkis   ===View-only below this line===      ----- Message -----       From:Erica Bass        Sent:12/5/2022  6:55 PM EST         To:Linda Mendoza DO    Subject:Amivig     Hi! I took my amivig shot last week on the 1st in the morning. That night around 12ish I woke to what I guess was a allergic reaction. It was very itchy and there was a red lump. This has never happened before. I wanted to make you aware of this. I am not sure if it is because I did it in my thigh instead of arm. Or just something weird. This has never happened before. I just mainly want to make you aware of this. I want to try again next month and see if the same thing happens. As long as you think that is okay.

## 2022-12-19 RX ORDER — METFORMIN HYDROCHLORIDE 500 MG/1
1000 TABLET, FILM COATED, EXTENDED RELEASE ORAL
Qty: 360 TABLET | Refills: 2 | Status: SHIPPED | OUTPATIENT
Start: 2022-12-19 | End: 2022-12-21 | Stop reason: ALTCHOICE

## 2022-12-20 ENCOUNTER — TELEPHONE (OUTPATIENT)
Dept: ENDOCRINOLOGY | Age: 22
End: 2022-12-20

## 2022-12-20 ENCOUNTER — OFFICE VISIT (OUTPATIENT)
Dept: RHEUMATOLOGY | Age: 22
End: 2022-12-20
Payer: MEDICARE

## 2022-12-20 VITALS
BODY MASS INDEX: 48.82 KG/M2 | RESPIRATION RATE: 18 BRPM | TEMPERATURE: 98.9 F | SYSTOLIC BLOOD PRESSURE: 124 MMHG | HEIGHT: 65 IN | HEART RATE: 91 BPM | DIASTOLIC BLOOD PRESSURE: 75 MMHG | WEIGHT: 293 LBS | OXYGEN SATURATION: 98 %

## 2022-12-20 DIAGNOSIS — M35.9 UNDIFFERENTIATED CONNECTIVE TISSUE DISEASE (HCC): Primary | ICD-10-CM

## 2022-12-20 DIAGNOSIS — M79.7 FIBROMYALGIA: ICD-10-CM

## 2022-12-20 DIAGNOSIS — R60.9 LIPEDEMA: ICD-10-CM

## 2022-12-20 DIAGNOSIS — L71.9 ROSACEA: ICD-10-CM

## 2022-12-20 DIAGNOSIS — L28.1 PRURIGO NODULARIS: ICD-10-CM

## 2022-12-20 DIAGNOSIS — Z79.899 LONG-TERM USE OF HYDROXYCHLOROQUINE: ICD-10-CM

## 2022-12-20 DIAGNOSIS — E67.3 HYPERVITAMINOSIS D: ICD-10-CM

## 2022-12-20 PROCEDURE — G8417 CALC BMI ABV UP PARAM F/U: HCPCS | Performed by: INTERNAL MEDICINE

## 2022-12-20 PROCEDURE — 99215 OFFICE O/P EST HI 40 MIN: CPT | Performed by: INTERNAL MEDICINE

## 2022-12-20 PROCEDURE — 3078F DIAST BP <80 MM HG: CPT | Performed by: INTERNAL MEDICINE

## 2022-12-20 PROCEDURE — G8754 DIAS BP LESS 90: HCPCS | Performed by: INTERNAL MEDICINE

## 2022-12-20 PROCEDURE — G8752 SYS BP LESS 140: HCPCS | Performed by: INTERNAL MEDICINE

## 2022-12-20 PROCEDURE — G8427 DOCREV CUR MEDS BY ELIG CLIN: HCPCS | Performed by: INTERNAL MEDICINE

## 2022-12-20 PROCEDURE — G9717 DOC PT DX DEP/BP F/U NT REQ: HCPCS | Performed by: INTERNAL MEDICINE

## 2022-12-20 PROCEDURE — 3074F SYST BP LT 130 MM HG: CPT | Performed by: INTERNAL MEDICINE

## 2022-12-20 RX ORDER — PREGABALIN 75 MG/1
75 CAPSULE ORAL DAILY
Qty: 90 CAPSULE | Refills: 1 | Status: SHIPPED | OUTPATIENT
Start: 2022-12-20

## 2022-12-20 RX ORDER — NORETHINDRONE ACETATE AND ETHINYL ESTRADIOL 1.5-30(21)
KIT ORAL
Qty: 3 DOSE PACK | Refills: 1 | Status: SHIPPED | OUTPATIENT
Start: 2022-12-20

## 2022-12-20 NOTE — PATIENT INSTRUCTIONS
1) Continue to take one pill of Plaquenil twice daily with food. Make sure you are seeing an eye doctor annually while on this medication. 2) I am referring you to a dermatologist. Call the number on the printout to schedule. 3) Check labs in 3 months before you next follow up. 4) Follow up in 3 months. Let me know if you have any questions or concerns in the meantime.

## 2022-12-20 NOTE — PROGRESS NOTES
REASON FOR VISIT:   Kylah Ignacio is a 20 y.o. female with history of migraines and ADHD who is returning for a followup of undifferentiated connective tissue disease c/b tachycardia, prurigo nodularis, polymorphic light eruption, livedo, and +WENDY 1:320 speckled and 1:640 nuclear dot patterns. HISTORY OF PRESENT ILLNESS    Pt returns for a follow up. LV 10/6/2022. Pt takes 2 pills of Plaquenil. She says that this has been bothering her stomach. Pt says that Lyrica makes her brain foggy and forgetful. Pt just started Iron pills and stopped taking Vitamin D supplements because she had a vitamin D level above 100 and a very low iron level. The only vitamin D she had been taking was in a multivitamin. Pt saw her oncologist and  yesterday. She was sent to another oncologist and told that insurance would not cover the test that they want to do. Plans to space out blood work to q3mo for monitoring of FLT3 mutation-related cytopenias, mutation per chart remains of unclear clinical significance. Pt went to the cardiologist, changed from diltiazem to Bystolic which she feels has been better at keeping her HR down. Repeat echo upcoming. Pt says that she is noticing new skin spots popping up on her face. She says that after she took the flagyl she had a reaction with worsened raised redness and ulcers on chin and maxilla. She says that she still has not found a dermatologist that she trusts--frustrated with consistent diagnosis of prurigo nodularis and instructions to treat anxiety. Interested in establishing with provider in the [facility] system for better records sharing. Pt says that she has pain in her R hand that limits what she can do with it, pain is still mostly focused in ulnar aspect of right hand and wrist. She goes to physical therapy for this problem. She has rotational neck pain at extremes of range of motion, but no advanced stenosis on August cervical MRI.     Pt says that she found out she is allergic to sulfa medicines. She says that her whole mouth swelled up when she took a sulfa medication, though per chart reaction was thrush. She is going to try Dupixent injections for allergies, extrinsic asthma, and skin irritation. Pt takes Naltrexone for weight loss, Endocrinologist is approximating Contrave formulation which had been nonformulary. Disease History:  Since 4 or 4yo, has had recurrent nodules on the skin diffusely--face, arms, legs, abdomen. Can drain pus. Has been treated over the years with oral antibiotics and topical antibiotics which help; swabs have been MRSA-positive in the past. Has tried Hibiclens baths in the past repeatedly without improvement. Pruritic, seem to respond to topical Mupirocin. Has had shave biopsy with Derm at [address] [place] [address] in the past,punch biopsy in May 2019 was interpreted as mixed dermal inflammation with features of an excoriated hypersensitivity reaction; last seen in November 2020 when diagnosed with keratosis pilaris, rosacea, and neurodermatitis. In February, PCP ordered labs including an WENDY screen which returned +1:320 speckled and 1:640 nuclear dot pattern, with mild elevations of ESR (26) and CRP (17mg/L). Turns \"red as a lobster\" in the sun even with sunscreen use, has always been the case. No chronic cough or progressive dyspnea on exertion. Nonrefreshing sleep and always fatigued in the evening. Runs to the bathroom repeatedly through the evening, doesn't feel she can reduce her fluid intake in the afternoon/evenings 2/2 chronic thirst and dry mouth. Sleep study ~3 years ago she says was normal.  Has more subjective eye dryness, not currently using AT's consistently or following with ophthalmology. Cold intolerant, feels hands and feet always feel like ice, feels worse over the last 2 years. Gaining weight again after losing nearly 100 pounds with metformin; has gained 15 pounds in the last 3 months.  Feels digestive issues (possible gastroparesis in the past with postprandial pain, biliary dyskinesis s/p cholecystectomy) have resolved since starting a sucrose intolerance diet. Stool softeners haven't helped in the past. On current diet she is having daily bowel movements without abdominal pain. Knees ache. Denies joint swelling. Has sprained ankles in past and now feels wrists get more sore with use. REVIEW OF SYSTEMS  A comprehensive review of systems was negative except for that written in the HPI. A 10-point review of systems is per the new patient questionnaire, which has been reviewed extensively and scanned into the electronic medical record for future reference. Review of systems is as above and is otherwise negative. ALLERGIES  Bactrim [sulfamethoprim ds]; Rizatriptan; Sulfa (sulfonamide antibiotics); Yeast, dried; Flagyl [metronidazole]; and Sucrose    MEDICATIONS  Current Outpatient Medications   Medication Sig    pregabalin (LYRICA) 75 mg capsule Take 1 Capsule by mouth daily. Max Daily Amount: 75 mg.    norethindrone-ethinyl estradiol-iron (Junel FE 1.5/30, 28,) 1.5 mg-30 mcg (21)/75 mg (7) tab TAKE 1 TAB BY MOUTH DAILY. CONTINUOUS PILLS ONLY. metFORMIN (GLUMETZA ER) 500 mg TG24 24 hour tablet Take 1,000 mg by mouth Before breakfast and dinner. ferrous sulfate (SLOW FE PO) Take  by mouth. cetirizine (ZYRTEC) 10 mg tablet Take 1 Tablet by mouth nightly. azelastine (ASTEPRO) 205.5 mcg (0.15 %) 1 [place] by Both Nostrils route two (2) times a day. nebivoloL (BYSTOLIC) 5 mg tablet Take 1 Tablet by mouth daily. hydrOXYchloroQUINE (PLAQUENIL) 200 mg tablet TAKE 1 TABLET BY MOUTH  TWICE DAILY    spironolactone (ALDACTONE) 50 mg tablet Take 50 mg by mouth daily. spironolactone (ALDACTONE) 25 mg tablet Take  by mouth daily.     Aimovig Autoinjector 140 mg/mL injection INJECT 1 SYRINGE VIA SUBCUTANEOUS ROUTE ONCE A MONTH, AS DIRECTED    ubrogepant (Ubrelvy) 100 mg tablet Take 1 Tablet by mouth as needed for Migraine (Take at onset of migraine. May repeat once after 2 hours if needed. ).    montelukast (SINGULAIR) 10 mg tablet Take 1 Tablet by mouth daily. naltrexone (DEPADE) 50 mg tablet Take 0.5 Tablets by mouth daily. buPROPion XL (WELLBUTRIN XL) 150 mg tablet Take 1 in the AM for 2 weeks, then increase to 1 in the morning and 1 in the afternoon. Vyvanse 30 mg capsule Take 30 mg by mouth daily. DULoxetine (CYMBALTA) 60 mg capsule Take 120 mg by mouth daily. budesonide-glycopyr-formoterol (Breztri Aerosphere) 160-9-4.8 mcg/actuation HFAA Take 2 Puffs by inhalation daily. omega 3-dha-epa-fish oil (Fish OiL) 100-160-1,000 mg cap Take 2,000 mg by mouth.    multivitamin (ONE A DAY) tablet Take 1 Tablet by mouth daily. (Patient not taking: Reported on 12/15/2022)    albuterol (PROVENTIL HFA, VENTOLIN HFA, PROAIR HFA) 90 mcg/actuation inhaler Take 1 Puff by inhalation every four (4) hours as needed for Wheezing. traZODone (DESYREL) 50 mg tablet     lansoprazole (PREVACID) 30 mg capsule Take 30 mg by mouth Daily (before breakfast). ascorbic acid, vitamin C, (VITAMIN C) 500 mg tablet Take  by mouth. sacrosidase (Sucraid) 8,500 unit/mL soln Take 1 mL by mouth.    b complex vitamins tablet ONE TABLE DAILY    hydrOXYzine HCL (ATARAX) 25 mg tablet TAKE 1 TO 2 TABLETS BY MOUTH TWICE A DAY AS NEEDED FOR ANXIETY    ondansetron (ZOFRAN ODT) 8 mg disintegrating tablet PLACE 1 TABLET ON TONGUE & ALLOW TO DISSOLVE EVERY 8 HOURS AS NEEDED FOR NAUSEA WITH HEADACHE    cholecalciferol, vitamin d3, 10 mcg (400 unit) cap Take  by mouth. (Patient not taking: Reported on 12/15/2022)    diphenhydrAMINE (BENADRYL) 25 mg capsule 2 tabs PO q8h PRN     No current facility-administered medications for this visit. PAST MEDICAL HISTORY  Past Medical History:   Diagnosis Date    Abdominal migraine     Dr. Dakota Salmeron. vs Sucrose Intolerance.     Acid reflux     ADHD (attention deficit hyperactivity disorder) Asthma     Autism     High Functioning. Dr. Neeru Rihcmond. Chronic rhinitis     Dr. Araceli Stephenson, allergist.     Connective tissue disease [facility])     Developmental delay     Fibromyalgia     Dr. Dias     MIGUEL A (generalized anxiety disorder)     Dr. Loren Ayala    Insulin resistance 2021    Dr. Ulises Fox. maurice Walker    Irritable bowel syndrome with diarrhea 2017    Lymphedema     BL legs. Dr. Clarke Jamil. [facility] Lymphedema [facility] Re[facility]    Migraine headache     Dr. David Greenwood    Obesity, morbid ([facility] [address] 2017    OCD (obsessive compulsive disorder)     Dr. Loren Ayala    Sucrose intolerance     Dr. Lulu John, GI. Tachycardia 2021    Mauri Cobian DO, [facility] cardio    Tenosynovitis of right hand 2021    Dr. Jewell Sever    Undifferentiated connective tissue disease ([facility] [address]     (lupus like). Dr. Nicol Díaz. FAMILY HISTORY  family history includes Atrial Fibrillation in her maternal grandmother and mother; Cancer in her maternal uncle; Delayed Awakening in her mother; Endometriosis in her mother; Heart Disease in her maternal grandfather and maternal grandmother; Hypertension in her maternal grandfather, maternal grandmother, and mother; Lymphoma in an other family member; Migraines in her mother; Mult Sclerosis in her maternal aunt; No Known Problems in her father, paternal grandfather, and paternal grandmother; Obesity in her mother; Other in her maternal grandfather; Post-op Nausea/Vomiting in her mother. Mother diagnosed with sarcoidosis. Father has gout. Maternal aunt has MS.    SOCIAL HISTORY  She  reports that she has never smoked. She has never been exposed to tobacco smoke. She has never used smokeless tobacco. She reports that she does not drink alcohol and does not use drugs. Social History     Social History Narrative    No known chemical exposures.    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 Eyes: Sclera are anicteric. No conjunctival injection. HEENT:  Oropharynx is clear. No oral ulcers. Adequate salivary pooling. No cervical or supraclavicular lymphadenopathy. Lungs:  Clear to auscultation bilaterally, without wheeze or stridor. Normal respiratory effort. Cor:  Regular rate and rhythm. No murmur rub or gallop. Abdomen: Soft, non-tender, without hepatomegaly or masses. Extremities: No calf tenderness or edema. Diffuse LE > UE swelling in keeping with lipedema. Warm and well perfused, no active Raynaud's currently. Skin:  Erythema and xerosis over bilateral upper and lower extremities, persistent midfacial erythema including nasolabial folds most c/w rosacea. Neuro: Nonfocal, no foot or wrist drop  Musculoskeletal:    A comprehensive musculoskeletal exam was performed for all joints of each upper and lower extremity and assessed for swelling, tenderness and range of motion. Results are documented as below:    No evidence of synovitis in the small joints of the hands, wrists, shoulders, elbows, hips, knees or ankles. Labs:  11/18/22: Ferritin 18, Vitamin D 119, Iron % saturation 14, DHEA Sulfate 48, Testosterone 19, Free testosterone 0.9, 17-OH Progesterone <10  11/4/22: WBC 14.4, HGB 15.4, Plt 553  10/11/22: CMV IgG Ab neg, WBC 11.6, HGB 15.2, Plt 467  8/25/22: CBC WNL, PT 12, INR 0.9, Ferritin 19, Transferrin 366, Iron 62, Transferrin Sat 12, Vit B12 538, Folate 11, Erythopoietin 19.1  7/6/22: WBC 12.1, HGB 14.5, Plt 479, Immunoglobulin E 298, ESR 15, Cr 0.69, LFT WNL, CRP 21 mg/L  5/31/22: WBC 13.5, HGB 14.6, Plt 513  5/26/22:  WBC 18.9, HGB 14.7, Plt 443, , CRP 12 mg/L, ESR 17  4/28/22: , CRP 0.34 mg/dL, WBC 18.9, HGB 14.7, Plt 511, Jak2 negative,   4/5/22: WBC 12.0, Hgb 14., Plt 509; Tbili 0.4, AST 7, ALT 21, AlkP 73, Tprot 8.2, Alb 3.4, INR 0.9  9/15/21: CRP 13mg/L, C3 179 (high), C4 30; UA neg for blood, +trace protein. 9/7/21: WBC 11.4, HJgb 16.2, Hct 49.6, Plt 534; aerobic wound culture with scant growth of mixed skin babar. 9/3/21: WBC 8.9, Hgb 15.0, Plt 483, ESR 7, Cr 0.71, LFTs WNL, CRP 17mg/L;   6/17/21: WBC 6.9, Hgb 16.8, Plt 451, ESR 5, CRP 5mg/L  3/10/21: CRP 21mg/L, CPK 57, Marissa neg, RDL myositis panel neg, cardiolipin antibodies   2/24/21: WBC 8.9, Hgb 15.6, Plt 472; ANCAs negative, PR3 and MPO negative; Cr 0.7, CMP WNL;  WENDY 1:320 speckled, 1:640 nuclear dot; C3 high, C4 WNL; negative dsDNA, Scl70, SSA, SSB, RNP, Sm, ribosomal P, chromatin, centromere B antibodies. RF <10, CCP negative; ESR 26, CRP 17mg/L. Hep B cAb neg, Hep B sAg neg, Hep C Ab neg    Pathology:  5/8/19: [facility] pathology final report, Iglesia Gruber MD:  Skin, right lower leg, punch biopsy:  -Ulcer and mixed dermal inflammation (see comment). Sections reveal a punch biopsy to the depth of the mid dermis. There is a zone of ulceration with fibrinous crust containing neutrophils. In the superficial to mid dermis, there is a mildly dense perivascular lymphohistiocytic infiltrate with scattered neutrophils and eosinophils. PAS stain is negative for fungal organisms. Taken together, the findings are those of ulceration and mixed dermal inflammation. The features are suggestive of an excoriated hypersensitivity reaction, such as that seen in arthropod bites. Diagnostic features of folliculitis are not seen. Imaging:    XR ANKLE RT MIN 3 V (9/5/22): No acute fracture or dislocation. MRI CERV SPINE WO CONT (8/29/22): Trace disc degenerative changes. There is no evidence of canal or foraminal  compromise. There is no cord signal abnormality.     XR SPINE CERV W OBL/FLEX/EXT MIN 6 V COMP (8/23/22): Normal cervical spine. . No instability    9/29/21 MR brain:  Normal MRI of the brain. No intracranial mass, hemorrhage or evidence of acute infarction. 1/17/19 CXR (report only): Normal chest views. No change. ASSESSMENT AND PLAN  Ms. Nadine Briggs is a 20 y.o. female with high-functioning autism and lipedema who presents for followup of undifferentiated connective tissue disease with resting tachycardia, pruritic nodules, polymorphic light eruption, livedo, and +WENDY 1:320 speckled and 1:640 nuclear dot patterns as well as a FLT3 mutation of unclear significance. Continuing Plaquenil (hydroxychloroquine) for prevention of deep organ damage, and agree with a trial of Dupixent. Will continue to hold immunosuppression as she has no evident synovitis today, and this may increase malignancy risk. Referring once more to Dermatology as she does have concurrent facial rash more c/w rosacea though poor response to topical Flagyl. 1. Undifferentiated connective tissue disease ([facility] [address]  - Cont Plaquenil 400mg/day, pt will try to take doses with food  - SED RATE (ESR); Future  - C REACTIVE PROTEIN, QT; Future  - COMPLEMENT, C3 & C4; Future  - METABOLIC PANEL, COMPREHENSIVE; Future  - CBC WITH AUTOMATED DIFF; Future  - URINALYSIS W/MICROSCOPIC; Future  - PROTEIN/CREATININE RATIO, URINE; Future    2. Rosacea  - REFERRAL TO DERMATOLOGY    3. Prurigo nodularis  - REFERRAL TO DERMATOLOGY  - SED RATE (ESR); Future  - C REACTIVE PROTEIN, QT; Future  - COMPLEMENT, C3 & C4; Future  - METABOLIC PANEL, COMPREHENSIVE; Future  - CBC WITH AUTOMATED DIFF; Future  - URINALYSIS W/MICROSCOPIC; Future  - PROTEIN/CREATININE RATIO, URINE; Future    4. Lipedema  - Pt to revisit compression pump for LE's after New Year  - REFERRAL TO DERMATOLOGY    5. Fibromyalgia  -Fibromyalgia is a disease characterized by chronic widespread musculoskeletal pain.  Fibromyalgia is caused by abnormal processing of pain signals in the central nervous system, leading to exaggerated pain responses. Non-pharmacologic therapies such as cardiovascular exercise and Cognitive Behavioral Therapy have been shown to be of benefit ([facility], Am J Med 2009). Dre Chi in particular has proven efficacy in the treatment of fibromyalgia NUNU Lozano 2010). If pharmacotherapy is pursued, pregabalin (Lyrica), gabapentin (Neurontin), milnacipran (Naoma Gores), and duloxetine (Cymbalta) are FDA approved medications for the treatment of fibromyalgia. Narcotics have not been proven to be efficacious in the treatment of fibromyalgia. In fact, narcotic use in this patient population has been observed to exacerbate depression, and may enhance the hyperalgesia which is characteristic of this condition (Rima Reasons Rheum 2006). They also are at increased risk for opioid-induced hyperalgesia due predominantly to central sensitization Julissa VILLARREAL et al. Cheryl Sames Clin Rheumatol. 2013 Mar;19(2):72-7). Specifically, a double-blind placebo-controlled trial by Ana Sterling al published in 1995 demonstrated that intravenous morphine did not reduce pain in fibromyalgia patients. A study by Taylor Gregory al published in 2003 showed that fibromyalgia patients taking oral opiates did not experience improvement in their pain at four years of follow up, and also reported increased depression over the last two years of the study. There is subsequent concern that the prolonged use of narcotics to treat fibromyalgia may cause harm to these patients Panfilo Raymond, Pain 2005). Opioid use in fibromyalgia had poorer symptoms and functional and occupational status compared to nonusers (Cristi RANDOLPH et al. Pain Res Treat. 6248;5620:903829). We therefore recommend that narcotics be avoided in all patients with fibromyalgia. Furthermore, naltrexone 4.5mg daily has been shown to improve daily pain scores in fibromyalgia in a randomized, controlled clinical trial (Arthritis Rheum.  2013 Feb;65(2):529-38); this can be prescribed through a compounding pharmacy and is a consideration for patients not already dependent on opiate-type medications. For all patients, we recommend Dre Chi stretching exercises for at least 30 minutes per day. The Arthritis Foundation has made a videotape of Farzana Rey that she can borrow from [email or web address], purchase online or watch for free on [email or web address] com Dre Chi for Arthritis. We discussed treating secondary causes, such as sleep apnea, poor sleep quality, depression, anxiety, weight loss, vitamin deficiencies, such as vitamin D, and pursuing aquatherapy. I encouraged her to do [place] [address]. My recommendations were provided to her and she was informed to follow up with her primary care physician for further management of her fibromyalgia. 6. Long-term use of hydroxychloroquine  - Cont ophthalmology followup at least annually  - SED RATE (ESR); Future  - C REACTIVE PROTEIN, QT; Future  - COMPLEMENT, C3 & C4; Future  - METABOLIC PANEL, COMPREHENSIVE; Future  - CBC WITH AUTOMATED DIFF; Future  - URINALYSIS W/MICROSCOPIC; Future  - PROTEIN/CREATININE RATIO, URINE; Future    7. Hypervitaminosis D  - Pt to avoid vitamin D-containing supplements      Patient Instructions   1) Continue to take one pill of Plaquenil twice daily with food. Make sure you are seeing an eye doctor annually while on this medication. 2) I am referring you to a dermatologist. Call the number on the printout to schedule. 3) Check labs in 3 months before you next follow up. 4) Follow up in 3 months. Let me know if you have any questions or concerns in the meantime.      Orders Placed This Encounter    SED RATE (ESR)    C REACTIVE PROTEIN, QT    COMPLEMENT, C3 & C4    METABOLIC PANEL, COMPREHENSIVE    CBC WITH AUTOMATED DIFF    URINALYSIS W/MICROSCOPIC    PROTEIN/CREATININE RATIO, URINE    REFERRAL TO DERMATOLOGY       Medications: I am having Shanelle Finn maintain her diphenhydrAMINE, cholecalciferol (vitamin d3), ondansetron, hydrOXYzine HCL, ascorbic acid (vitamin C), Sucraid, b complex vitamins, lansoprazole, traZODone, Breztri Aerosphere, Fish OiL, multivitamin, albuterol, DULoxetine, Vyvanse, montelukast, naltrexone, buPROPion XL, Aimovig Autoinjector, ubrogepant, spironolactone, spironolactone, hydrOXYchloroQUINE, nebivoloL, ferrous sulfate (SLOW FE PO), cetirizine, azelastine, metFORMIN, pregabalin, and norethindrone-ethinyl estradiol-iron. Face to face time: 30 minutes  Note preparation and records review day of service: 35 minutes  Total provider time day of service: 65 minutes    This was scribed by Vivian Pham in the presence of Dr. Bc Cunha. The note was reviewed and amended personally, and I agree with the above information.     Reed Mcqueen MD    Adult Rheumatology   [facility]  A Part of [facility], [address] [place] [address]   Phone [phone]  Fax [phone]

## 2022-12-20 NOTE — TELEPHONE ENCOUNTER
From: Deyvi Manriquez  To: Zechariah Jaimes PA-C  Sent: 11/8/2022 9:39 PM EST  Subject: Sick    Hi! I have been sick the last couple of weeks. I believe it is this weather. It has been a mixture of my asthma and also sinus infection. I went to [facility] care last Tues but I am not seeming to be able to fight off it. Do you have anything else you suggest doing? I have taken sudifed and take Prednisone and am on a antibiotic? I tried to come in to see you but there were no apts. What should I do? Any tips? I see my pulmonary Dr again in December.

## 2022-12-20 NOTE — PROGRESS NOTES
Rm    Chief Complaint   Patient presents with    Follow-up     Connective tissue diseasea        Visit Vitals  /75 (BP 1 Location: Left upper arm, BP Patient Position: Sitting, BP Cuff Size: Adult long)   Pulse 91   Temp 98.9 °F (37.2 °C) (Temporal)   Resp 18   Ht 5' 5\" (1.651 m)   Wt 324 lb 12.8 oz (147.3 kg)   SpO2 98%   BMI 54.05 kg/m²        1. Have you been to the ER, urgent care clinic since your last visit? Hospitalized since your last visit? No    2. Have you seen or c  onsulted any other health care providers outside of the [address] [place] [address] since your last visit? Include any pap smears or colon screening. No     Health Maintenance Due   Topic Date Due    Hepatitis C Screening  Never done    Pneumococcal 0-64 years (1 - PCV) Never done    Foot Exam Q1  Never done    MICROALBUMIN Q1  Never done    Eye Exam Retinal or Dilated  Never done    Pap Smear  Never done    DTaP/Tdap/Td series (7 - Td or Tdap) 09/22/2020    COVID-19 Vaccine (3 - Pfizer risk series) 11/10/2021    Medicare Yearly Exam  Never done        3 most recent PHQ Screens 12/20/2022   Little interest or pleasure in doing things Not at all   Feeling down, depressed, irritable, or hopeless Not at all   Total Score PHQ 2 0        Fall Risk Assessment, last 12 mths 3/8/2021   Able to walk? Yes   Fall in past 12 months? 0   Do you feel unsteady?  0       Learning Assessment 10/15/2021   PRIMARY LEARNER Patient   HIGHEST LEVEL OF EDUCATION - PRIMARY LEARNER  -   BARRIERS PRIMARY LEARNER -   CO-LEARNER CAREGIVER No   CO-LEARNER NAME -   Eliezer 32 LEVEL OF EDUCATION -   Lou Choudhury 10 -   PRIMARY LANGUAGE ENGLISH   PRIMARY LANGUAGE CO-LEARNER -    NEED -   LEARNER PREFERENCE PRIMARY DEMONSTRATION     -   LEARNER PREFERENCE CO-LEARNER -   ANSWERED BY patient    RELATIONSHIP SELF

## 2022-12-21 PROBLEM — J45.51 SEVERE PERSISTENT ASTHMA WITH ACUTE EXACERBATION: Status: ACTIVE | Noted: 2022-12-21

## 2022-12-21 RX ORDER — METFORMIN HYDROCHLORIDE 500 MG/1
1000 TABLET, EXTENDED RELEASE ORAL
Qty: 360 TABLET | Refills: 1 | Status: SHIPPED | OUTPATIENT
Start: 2022-12-21

## 2022-12-28 ENCOUNTER — TELEPHONE (OUTPATIENT)
Dept: RHEUMATOLOGY | Age: 22
End: 2022-12-28

## 2022-12-28 NOTE — TELEPHONE ENCOUNTER
Mrs Val Brewer already have a 6 mos appointment but in my Chart it's said she should have a 3 mos follow-up so she is confuse on which one she should do. If she should've the 3 mos then need to be schedule for 3 mos follow-up, Need a call to confirm which one she is right.     [phone] Paola Prieto

## 2022-12-29 ENCOUNTER — OFFICE VISIT (OUTPATIENT)
Dept: FAMILY MEDICINE CLINIC | Age: 22
End: 2022-12-29
Payer: MEDICARE

## 2022-12-29 VITALS
RESPIRATION RATE: 16 BRPM | HEIGHT: 65 IN | BODY MASS INDEX: 48.82 KG/M2 | HEART RATE: 94 BPM | WEIGHT: 293 LBS | TEMPERATURE: 97.5 F | SYSTOLIC BLOOD PRESSURE: 123 MMHG | OXYGEN SATURATION: 98 % | DIASTOLIC BLOOD PRESSURE: 82 MMHG

## 2022-12-29 DIAGNOSIS — Z00.00 MEDICARE ANNUAL WELLNESS VISIT, SUBSEQUENT: ICD-10-CM

## 2022-12-29 DIAGNOSIS — M79.2 NEUROPATHIC PAIN: ICD-10-CM

## 2022-12-29 DIAGNOSIS — E11.9 TYPE 2 DIABETES MELLITUS WITHOUT COMPLICATION, WITHOUT LONG-TERM CURRENT USE OF INSULIN (HCC): ICD-10-CM

## 2022-12-29 DIAGNOSIS — E61.1 LOW IRON: Primary | ICD-10-CM

## 2022-12-29 DIAGNOSIS — M32.9 SYSTEMIC LUPUS ERYTHEMATOSUS, UNSPECIFIED SLE TYPE, UNSPECIFIED ORGAN INVOLVEMENT STATUS (HCC): ICD-10-CM

## 2022-12-29 DIAGNOSIS — M35.9 UNDIFFERENTIATED CONNECTIVE TISSUE DISEASE (HCC): ICD-10-CM

## 2022-12-29 DIAGNOSIS — E67.3 HYPERVITAMINOSIS D: ICD-10-CM

## 2022-12-29 PROCEDURE — G8427 DOCREV CUR MEDS BY ELIG CLIN: HCPCS | Performed by: FAMILY MEDICINE

## 2022-12-29 PROCEDURE — G0439 PPPS, SUBSEQ VISIT: HCPCS | Performed by: FAMILY MEDICINE

## 2022-12-29 PROCEDURE — 99214 OFFICE O/P EST MOD 30 MIN: CPT | Performed by: FAMILY MEDICINE

## 2022-12-29 PROCEDURE — 3044F HG A1C LEVEL LT 7.0%: CPT | Performed by: FAMILY MEDICINE

## 2022-12-29 PROCEDURE — G8417 CALC BMI ABV UP PARAM F/U: HCPCS | Performed by: FAMILY MEDICINE

## 2022-12-29 PROCEDURE — 3074F SYST BP LT 130 MM HG: CPT | Performed by: FAMILY MEDICINE

## 2022-12-29 PROCEDURE — 2022F DILAT RTA XM EVC RTNOPTHY: CPT | Performed by: FAMILY MEDICINE

## 2022-12-29 PROCEDURE — 3078F DIAST BP <80 MM HG: CPT | Performed by: FAMILY MEDICINE

## 2022-12-29 PROCEDURE — G9717 DOC PT DX DEP/BP F/U NT REQ: HCPCS | Performed by: FAMILY MEDICINE

## 2022-12-29 RX ORDER — PREGABALIN 75 MG/1
75 CAPSULE ORAL DAILY
Qty: 90 CAPSULE | Refills: 1 | Status: SHIPPED | OUTPATIENT
Start: 2022-12-29

## 2022-12-29 NOTE — ACP (ADVANCE CARE PLANNING)
Advance Care Planning     General Advance Care Planning (ACP) Conversation      Date of Conversation: 12/29/2022  Conducted with: Patient with Decision Making Capacity    Healthcare Decision Maker:     Primary Decision Maker: Yvette Ramirez - [phone]  Click here to complete [address] including selection of the Healthcare Decision Maker Relationship (ie \"Primary\")    Today we documented Decision Maker(s) consistent with Legal Next of Kin hierarchy.     Content/Action Overview:   DECLINED ACP conversation - will revisit periodically     Length of Voluntary ACP Conversation in minutes:  <16 minutes (Non-Billable)    Chavez Mehta PA-C

## 2022-12-29 NOTE — PATIENT INSTRUCTIONS
Medicare Wellness Visit, Female     The best way to live healthy is to have a lifestyle where you eat a well-balanced diet, exercise regularly, limit alcohol use, and quit all forms of tobacco/nicotine, if applicable. Regular preventive services are another way to keep healthy. Preventive services (vaccines, screening tests, monitoring & exams) can help personalize your care plan, which helps you manage your own care. Screening tests can find health problems at the earliest stages, when they are easiest to treat. [facility] follows the current, evidence-based guidelines published by the [facility] ([facility]) when recommending preventive services for our patients. Because we follow these guidelines, sometimes recommendations change over time as research supports it. (For example, mammograms used to be recommended annually. Even though Medicare will still pay for an annual mammogram, the newer guidelines recommend a mammogram every two years for women of average risk). Of course, you and your doctor may decide to screen more often for some diseases, based on your risk and your co-morbidities (chronic disease you are already diagnosed with). Preventive services for you include:  - Medicare offers their members a free annual wellness visit, which is time for you and your primary care provider to discuss and plan for your preventive service needs.  Take advantage of this benefit every year!    -Over the age of 72 should receive the recommended pneumonia vaccines.    -All adults should have a flu vaccine yearly.  -All adults should have a tetanus vaccine every 10 years.   -Over the age 48 should receive the shingles vaccines.        -All adults should be screened once for Hepatitis C.  -All adults age 38-68 who are overweight should have a diabetes screening test once every three years.   -Other screening tests and preventive services for persons with diabetes include: an eye exam to screen for diabetic retinopathy, a kidney function test, a foot exam, and stricter control over your cholesterol.   -Cardiovascular screening for adults with routine risk involves an electrocardiogram (ECG) at intervals determined by your doctor.     -Colorectal cancer screenings should be done for adults age 39-70 with no increased risk factors for colorectal cancer. There are a number of acceptable methods of screening for this type of cancer. Each test has its own benefits and drawbacks. Discuss with your doctor what is most appropriate for you during your annual wellness visit. The different tests include: colonoscopy (considered the best screening method), a fecal occult blood test, a fecal DNA test, and sigmoidoscopy.    -Lung cancer screening is recommended annually with a low dose CT scan for adults between age 54 and 68, who have smoked at least 30 pack years (equivalent of 1 pack per day for 30 days), and who is a current smoker or quit less than 15 years ago.    -A bone mass density test is recommended when a woman turns 65 to screen for osteoporosis. This test is only recommended one time, as a screening. Some providers will use this same test as a disease monitoring tool if you already have osteoporosis. -Breast cancer screenings are recommended every other year for women of normal risk, age 54-69.    -Cervical cancer screenings for women over age 72 are only recommended with certain risk factors.      Here is a list of your current Health Maintenance items (your personalized list of preventive services) with a due date:  Health Maintenance Due   Topic Date Due    Hepatitis C Test  Never done    Pneumococcal Vaccine (1 - PCV) Never done    Diabetic Foot Care  Never done    Eye Exam  Never done    URINE CHECK FOR KIDNEY PROBLEMS  Never done    Shingles Vaccine (1 of 2) Never done    Pap Smear  Never done    DTaP/Tdap/Td  (7 - Td or Tdap) 09/22/2020    COVID-19 Vaccine (3 - Pfizer risk series) 11/10/2021

## 2022-12-29 NOTE — PROGRESS NOTES
Jeet Childress (: 2000) is a 20 y.o. female, established patient, here for evaluation of the following chief complaint(s):  Follow-up and Labs         ASSESSMENT/PLAN:  Below is the assessment and plan developed based on review of pertinent history, physical exam, labs, studies, and medications. 1. Low iron  She has very limited diet, now on oral iron slow fe over the counter   -     IRON PROFILE; Future  -     FERRITIN; Future  2. Hypervitaminosis D  She stopped taking MVI and vit d supplement 1 mo ago  Recheck today   Reassured patient vitamin water she has does not have vit d in them (we reviewed label) but to check other beverages she has to ensure no vit d in them   -     VITAMIN D, 25 HYDROXY; Future    3 Undifferentiated connective tissue disease (HCC)  Fibromyalgia pain is controlled on lyrica, continue rx  Pdmp reviewed and appropriate   -6 mo refill sent   -     pregabalin (LYRICA) 75 mg capsule; Take 1 Capsule by mouth daily. Max Daily Amount: 75 mg., Normal, Disp-90 Capsule, R-1  4. Neuropathic pain  -     pregabalin (LYRICA) 75 mg capsule; Take 1 Capsule by mouth daily. Max Daily Amount: 75 mg., Normal, Disp-90 Capsule, R-1    5. Persistent middle ear effusion  -no middle ear effusion or OM today, no pain on exam. Continue zyrtec and azelastine nasal spray   -She reports she can feel pressure in ear come and go, advised to Keep appt with ENT as scheduled    Return in about 6 months (around 2023). SUBJECTIVE/OBJECTIVE:  Chief Complaint   Patient presents with    Follow-up    Labs     Patient is a 21 yo female with hypertension, lupus, T2DM, asthma, autism spectrum, fibromyalgia, presenting to office for follow up, recheck labs. Vit D was high on labs 5 weeks ago. She since stopped Vit d supplement and MVI with Vit d in it. Iron was also borderline low and she started otc iron supplement. She has limited diet and lacks oral iron in her diet.      She continues to follow with Dr. 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 She is also going to [facility] dermatology in next several weeks for derm consult with recurrent skin lesions. She has been diagnosed with prurigo nodularis in the past.     Fibromyalgia pain is controlled with lyrica. She still has intermittent ear pressure, she has appt with ENT in a month. She cannot tolerate nasal saline rinses or flonase but she is taking zyrtec. No hearing loss, no drainage or fever. Current Outpatient Medications on File Prior to Visit   Medication Sig Dispense Refill    metFORMIN ER (GLUCOPHAGE XR) 500 mg tablet Take 2 Tablets by mouth Before breakfast and dinner. 360 Tablet 1    norethindrone-ethinyl estradiol-iron (Junel FE 1.5/30, 28,) 1.5 mg-30 mcg (21)/75 mg (7) tab TAKE 1 TAB BY MOUTH DAILY. CONTINUOUS PILLS ONLY. 3 Dose Pack 1    ferrous sulfate (SLOW FE PO) Take  by mouth. cetirizine (ZYRTEC) 10 mg tablet Take 1 Tablet by mouth nightly. 90 Tablet 1    azelastine (ASTEPRO) 205.5 mcg (0.15 %) 1 [place] by Both Nostrils route two (2) times a day. 1 Each 2    nebivoloL (BYSTOLIC) 5 mg tablet Take 1 Tablet by mouth daily. 30 Tablet 12    hydrOXYchloroQUINE (PLAQUENIL) 200 mg tablet TAKE 1 TABLET BY MOUTH  TWICE DAILY 180 Tablet 3    spironolactone (ALDACTONE) 50 mg tablet Take 50 mg by mouth daily. spironolactone (ALDACTONE) 25 mg tablet Take  by mouth daily. Aimovig Autoinjector 140 mg/mL injection INJECT 1 SYRINGE VIA SUBCUTANEOUS ROUTE ONCE A MONTH, AS DIRECTED 3 mL 1    ubrogepant (Ubrelvy) 100 mg tablet Take 1 Tablet by mouth as needed for Migraine (Take at onset of migraine. May repeat once after 2 hours if needed. ). 16 Tablet 2    montelukast (SINGULAIR) 10 mg tablet Take 1 Tablet by mouth daily. 90 Tablet 1    naltrexone (DEPADE) 50 mg tablet Take 0.5 Tablets by mouth daily.  45 Tablet 1 buPROPion XL (WELLBUTRIN XL) 150 mg tablet Take 1 in the AM for 2 weeks, then increase to 1 in the morning and 1 in the afternoon. 180 Tablet 1    Vyvanse 30 mg capsule Take 30 mg by mouth daily. DULoxetine (CYMBALTA) 60 mg capsule Take 120 mg by mouth daily. budesonide-glycopyr-formoterol (Breztri Aerosphere) 160-9-4.8 mcg/actuation HFAA Take 2 Puffs by inhalation daily. omega 3-dha-epa-fish oil (Fish OiL) 100-160-1,000 mg cap Take 2,000 mg by mouth. albuterol (PROVENTIL HFA, VENTOLIN HFA, PROAIR HFA) 90 mcg/actuation inhaler Take 1 Puff by inhalation every four (4) hours as needed for Wheezing. traZODone (DESYREL) 50 mg tablet       lansoprazole (PREVACID) 30 mg capsule Take 30 mg by mouth Daily (before breakfast). ascorbic acid, vitamin C, (VITAMIN C) 500 mg tablet Take  by mouth. sacrosidase (Sucraid) 8,500 unit/mL soln Take 1 mL by mouth.      b complex vitamins tablet ONE TABLE DAILY      hydrOXYzine HCL (ATARAX) 25 mg tablet TAKE 1 TO 2 TABLETS BY MOUTH TWICE A DAY AS NEEDED FOR ANXIETY      ondansetron (ZOFRAN ODT) 8 mg disintegrating tablet PLACE 1 TABLET ON TONGUE & ALLOW TO DISSOLVE EVERY 8 HOURS AS NEEDED FOR NAUSEA WITH HEADACHE      diphenhydrAMINE (BENADRYL) 25 mg capsule 2 tabs PO q8h PRN      [DISCONTINUED] pregabalin (LYRICA) 75 mg capsule Take 1 Capsule by mouth daily. Max Daily Amount: 75 mg. 90 Capsule 1    multivitamin (ONE A DAY) tablet Take 1 Tablet by mouth daily. (Patient not taking: No sig reported)      cholecalciferol, vitamin d3, 10 mcg (400 unit) cap Take  by mouth. (Patient not taking: Reported on 12/29/2022)       No current facility-administered medications on file prior to visit.      Patient Active Problem List    Diagnosis Date Noted    Severe persistent asthma with acute exacerbation 12/21/2022    Type 2 diabetes mellitus 12/13/2022    Gene mutation 11/17/2022    Neuropathic pain 11/17/2022    Undifferentiated connective tissue disease ([facility] 11/17/2022    Disease of hematopoietic system 09/16/2022    Other eosinophilia 06/07/2022    Elevated sed rate 05/20/2022    Neutrophilia 05/20/2022    Monocytosis 05/20/2022    Other thrombocytosis 04/28/2022    Elevated C-reactive protein (CRP) 04/28/2022    Skin lesions 04/28/2022    Migraine headache     Autism     MIGUEL A (generalized anxiety disorder)     Tachycardia     Fibromyalgia     Sucrose intolerance     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[facility])     ADHD (attention deficit hyperactivity disorder)     Acid reflux     SLE (systemic lupus erythematosus) ([facility] [address]     History of prediabetes 07/20/2021    Lipedema 2021    Tenosynovitis of right hand 2021    Obesity, morbid ([facility] [address] 12/14/2020    Chronic gastritis without bleeding 06/20/2019    Chronic cholecystitis 06/04/2019    Biliary dyskinesia 06/04/2019    Chronic nausea 05/16/2019    Atypical depression 02/28/2018    Hypertension 02/20/2018    Lipids abnormal 02/20/2018    Edema, peripheral 09/13/2017    Allergy to yeast 09/13/2017    Allergy to chocolate 09/13/2017    Gastroesophageal reflux disease without esophagitis 05/15/2017    Irritable bowel syndrome with diarrhea 04/13/2017    Gastroparesis 01/19/2017    Medication overuse headache 09/21/2015    Autism spectrum disorder 09/21/2015    Anxiety 09/21/2015     Vitals:    12/29/22 1137   BP: 123/82   Pulse: 94   Resp: 16   Temp: 97.5 °F (36.4 °C)   TempSrc: Oral   SpO2: 98%   Weight: 325 lb 9.6 oz (147.7 kg)   Height: 5' 5\" (1.651 m)        Physical Exam  Constitutional:       Appearance: Normal appearance. HENT:      Head: Normocephalic and atraumatic. Right Ear: Tympanic membrane, ear canal and external ear normal.      Left Ear: Tympanic membrane, ear canal and external ear normal.      Nose: No congestion or rhinorrhea. Eyes:      Extraocular Movements: Extraocular movements intact.       Conjunctiva/sclera: Conjunctivae normal.      Pupils: Pupils are equal, round, and reactive to light. Cardiovascular:      Rate and Rhythm: Normal rate and regular rhythm. Pulses: Normal pulses. Heart sounds: Normal heart sounds. Pulmonary:      Effort: Pulmonary effort is normal.      Breath sounds: Normal breath sounds. Abdominal:      General: Abdomen is flat. Bowel sounds are normal.      Palpations: Abdomen is soft. Musculoskeletal:      Cervical back: Normal range of motion and neck supple. Right lower leg: No edema. Left lower leg: No edema. Neurological:      Mental Status: She is alert. Psychiatric:         Mood and Affect: Mood normal.         Behavior: Behavior normal.           An electronic signature was used to authenticate this note. -- Nubia Nickerson PA-C       This is the Subsequent Medicare Annual Wellness Exam, performed 12 months or more after the Initial AWV or the last Subsequent AWV    I have reviewed the patient's medical history in detail and updated the computerized patient record. Assessment/Plan   Education and counseling provided:  Are appropriate based on today's review and evaluation  Pneumococcal Vaccine  Influenza Vaccine  Screening Mammography      1. Medicare annual wellness visit, subsequent  She had prevnar 20 vaccine, updated in chart   Denies ACP conversation at this time     2. Type 2 diabetes mellitus without complication, without long-term current use of insulin ([facility] [address]  Managed by endocrine Dr. Kely Elder     3.  Systemic lupus erythematosus, unspecified SLE type, unspecified organ involvement status ([facility] [address]   Managed by rheumatology Dr. Ariel Quiñonez     3 most recent [facility] Screens 12/29/2022   Little interest or pleasure in doing things Not at all   Feeling down, depressed, irritable, or hopeless Not at all   Total Score PHQ 2 0       Alcohol & Drug Abuse Risk Screen    Do you average more than 1 drink per night or more than 7 drinks a week:  No    On any one occasion in the past three months have you have had more than 3 drinks containing alcohol:  No          Functional Ability and Level of Safety    Hearing: Hearing is good. She says she sometimes cannot hear well when she has pressure in her ears-- she has ENT in February       Activities of Daily Living: The home contains: handrails and grab bars  Patient does total self care      Ambulation: with no difficulty     Fall Risk:  Fall Risk Assessment, last 12 mths 3/8/2021   Able to walk? Yes   Fall in past 12 months? 0   Do you feel unsteady?  0      Abuse Screen:  Patient is not abused       Cognitive Screening    Has your family/caregiver stated any concerns about your memory: no     Cognitive Screening: Normal - Mini Cog Test    Health Maintenance Due     Health Maintenance Due   Topic Date Due    Hepatitis C Screening  Never done    Foot Exam Q1  Never done    Eye Exam Retinal or Dilated  Never done    Diabetic Alb to Cr ratio (uACR) test  Never done    Shingles Vaccine (1 of 2) Never done    Pap Smear  Never done    DTaP/Tdap/Td series (7 - Td or Tdap) 09/22/2020    COVID-19 Vaccine (3 - Pfizer risk series) 11/10/2021       Patient Care Team   Patient Care Team:  Eric Tan as PCP - General (Physician Assistant)  Eric Tan as PCP - [facility] Empaneled Provider  Naomy Humphries MD as Physician (Pediatric Gastroenterology)  Graciela John NP as Nurse Practitioner (Nurse Practitioner)  Sanna T[place] (Physician Assistant)  Maurizio Moy DO (Bariatrics)  Leavy Lesches, DO (Cardiovascular Disease Physician)  Eric Tan as Referring Provider (Physician Assistant)  Zach Melissa MD (Internal Medicine Physician)  Jenna Griffin MD as Physician (Endocrinology Physician)    History     Patient Active Problem List   Diagnosis Code    Medication overuse headache G44.40    Autism spectrum disorder F84.0    Anxiety F41.9    Gastroparesis K31.84    Irritable bowel syndrome with diarrhea K58.0    Gastroesophageal reflux disease without esophagitis K21.9    Edema, peripheral R60.9    Allergy to yeast Z91.09    Allergy to chocolate Z91.018    Hypertension I10    Lipids abnormal E78.89    Atypical depression F32.89    Chronic nausea R11.0    Chronic cholecystitis K81.1    Biliary dyskinesia K82.8    Chronic gastritis without bleeding K29.50    Obesity, morbid ([facility]) E66.01    History of prediabetes Z87.898    Migraine headache G43.909    Autism F84.0    MIGUEL A (generalized anxiety disorder) F41.1    Tachycardia R00.0    Fibromyalgia M79.7    Sucrose intolerance E74.31    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[facility]) E72.4    Lipedema R60.9    ADHD (attention deficit hyperactivity disorder) F90.9    Acid reflux K21.9    SLE (systemic lupus erythematosus) ([facility]) M32.9    Tenosynovitis of right hand M65.9    Other thrombocytosis D75.838    Elevated C-reactive protein (CRP) R79.82    Skin lesions L98.9    Elevated sed rate R70.0    Neutrophilia D72.9    Monocytosis D72.821    Other eosinophilia D72.19    Disease of hematopoietic system D75.9    Gene mutation Z15.89    Neuropathic pain M79.2    Undifferentiated connective tissue disease ([facility]) M35.9    Type 2 diabetes mellitus E11.9    Severe persistent asthma with acute exacerbation J45.51     Past Medical History:   Diagnosis Date    Abdominal migraine     Dr. Zeyad Viveros. vs Sucrose Intolerance. Acid reflux     ADHD (attention deficit hyperactivity disorder)     Asthma     Autism     High Functioning. Dr. Marcie Meade. Chronic rhinitis     Dr. Eusebia Palacios, allergist.     Connective tissue disease [facility])     Developmental delay     Fibromyalgia     Dr. Romelle Apgar    MIGUEL A (generalized anxiety disorder)     Dr. Kathryn Diaz    Insulin resistance 2021    Dr. Eloisa Louie. [facility]    Irritable bowel syndrome with diarrhea 2017    Lymphedema     BL legs. Dr. Gladys Crystal.   [facility]    Migraine headache     Dr. Camila Mock [facility] 12/12/2017    OCD (obsessive compulsive disorder)     Dr. Peralta Sample    Sucrose intolerance     Dr. Zeyad Viveros, GI. Tachycardia 06/02/2021    Mauri Cobian DO, [facility] cardio    Tenosynovitis of right hand 07/2021    Dr. Jeni Acosta    Undifferentiated connective tissue disease ([facility]     (lupus like). Dr. Candace Marroquin. Past Surgical History:   Procedure Laterality Date    COLONOSCOPY N/A 12/20/2016    COLONOSCOPY performed by Maryjane Nye MD at [facility] ENDOSCOPY    [facility] HR (BRAVO)  12/3/2020         HC CLP BRAVO  11/30/2020    HX CHOLECYSTECTOMY  06/2019    HX ENDOSCOPY  11/2020    dx'd with sucrose intolerance    HX TONSIL AND ADENOIDECTOMY      HX TONSILLECTOMY      AGE 1    HX WISDOM TEETH EXTRACTION      IA EGD TRANSORAL BIOPSY SINGLE/MULTIPLE  6/14/2019         IA EGD TRANSORAL BIOPSY SINGLE/MULTIPLE  11/30/2020    UPPER GI ENDOSCOPY,BIOPSY  12/20/2016          Current Outpatient Medications   Medication Sig Dispense Refill    pregabalin (LYRICA) 75 mg capsule Take 1 Capsule by mouth daily. Max Daily Amount: 75 mg. 90 Capsule 1    metFORMIN ER (GLUCOPHAGE XR) 500 mg tablet Take 2 Tablets by mouth Before breakfast and dinner. 360 Tablet 1    norethindrone-ethinyl estradiol-iron (Junel FE 1.5/30, 28,) 1.5 mg-30 mcg (21)/75 mg (7) tab TAKE 1 TAB BY MOUTH DAILY. CONTINUOUS PILLS ONLY. 3 Dose Pack 1    ferrous sulfate (SLOW FE PO) Take  by mouth. cetirizine (ZYRTEC) 10 mg tablet Take 1 Tablet by mouth nightly. 90 Tablet 1    azelastine (ASTEPRO) 205.5 mcg (0.15 %) 1 [place] by Both Nostrils route two (2) times a day. 1 Each 2    nebivoloL (BYSTOLIC) 5 mg tablet Take 1 Tablet by mouth daily. 30 Tablet 12    hydrOXYchloroQUINE (PLAQUENIL) 200 mg tablet TAKE 1 TABLET BY MOUTH  TWICE DAILY 180 Tablet 3    spironolactone (ALDACTONE) 50 mg tablet Take 50 mg by mouth daily. spironolactone (ALDACTONE) 25 mg tablet Take  by mouth daily. Aimovig Autoinjector 140 mg/mL injection INJECT 1 SYRINGE VIA SUBCUTANEOUS ROUTE ONCE A MONTH, AS DIRECTED 3 mL 1    ubrogepant (Ubrelvy) 100 mg tablet Take 1 Tablet by mouth as needed for Migraine (Take at onset of migraine. May repeat once after 2 hours if needed. ). 16 Tablet 2    montelukast (SINGULAIR) 10 mg tablet Take 1 Tablet by mouth daily. 90 Tablet 1    naltrexone (DEPADE) 50 mg tablet Take 0.5 Tablets by mouth daily. 45 Tablet 1    buPROPion XL (WELLBUTRIN XL) 150 mg tablet Take 1 in the AM for 2 weeks, then increase to 1 in the morning and 1 in the afternoon. 180 Tablet 1    Vyvanse 30 mg capsule Take 30 mg by mouth daily. DULoxetine (CYMBALTA) 60 mg capsule Take 120 mg by mouth daily. budesonide-glycopyr-formoterol (Breztri Aerosphere) 160-9-4.8 mcg/actuation HFAA Take 2 Puffs by inhalation daily. omega 3-dha-epa-fish oil (Fish OiL) 100-160-1,000 mg cap Take 2,000 mg by mouth. albuterol (PROVENTIL HFA, VENTOLIN HFA, PROAIR HFA) 90 mcg/actuation inhaler Take 1 Puff by inhalation every four (4) hours as needed for Wheezing. traZODone (DESYREL) 50 mg tablet       lansoprazole (PREVACID) 30 mg capsule Take 30 mg by mouth Daily (before breakfast). ascorbic acid, vitamin C, (VITAMIN C) 500 mg tablet Take  by mouth. sacrosidase (Sucraid) 8,500 unit/mL soln Take 1 mL by mouth.      b complex vitamins tablet ONE TABLE DAILY      hydrOXYzine HCL (ATARAX) 25 mg tablet TAKE 1 TO 2 TABLETS BY MOUTH TWICE A DAY AS NEEDED FOR ANXIETY      ondansetron (ZOFRAN ODT) 8 mg disintegrating tablet PLACE 1 TABLET ON TONGUE & ALLOW TO DISSOLVE EVERY 8 HOURS AS NEEDED FOR NAUSEA WITH HEADACHE      diphenhydrAMINE (BENADRYL) 25 mg capsule 2 tabs PO q8h PRN      multivitamin (ONE A DAY) tablet Take 1 Tablet by mouth daily. (Patient not taking: No sig reported)      cholecalciferol, vitamin d3, 10 mcg (400 unit) cap Take  by mouth.  (Patient not taking: Reported on 12/29/2022)       Allergies Allergen Reactions    Bactrim [Sulfamethoprim Ds] Other (comments)     Developed oral thrush    Rizatriptan Other (comments)    Sulfa (Sulfonamide Antibiotics) Unknown (comments)    Yeast, Dried Other (comments)     Upset stomach     Flagyl [Metronidazole] Rash     Worsened acneiform rash with TOPICAL flagyl used to treat rosacea    Sucrose Nausea Only     SEVERE ABDOMINAL PAIN       Family History   Problem Relation Age of Onset    Endometriosis Mother     Delayed Awakening Mother     Post-op Nausea/Vomiting Mother     Migraines Mother         d/t accident - neck and brain injury    Atrial Fibrillation Mother     Hypertension Mother     Obesity Mother     No Known Problems Father     Heart Disease Maternal Grandmother     Atrial Fibrillation Maternal Grandmother     Hypertension Maternal Grandmother     Heart Disease Maternal Grandfather     Hypertension Maternal Grandfather     Other Maternal Grandfather         diverticulitis    No Known Problems Paternal Grandmother     No Known Problems Paternal Grandfather     Mult Sclerosis Maternal Aunt     Cancer Maternal Uncle     Lymphoma Other      Social History     Tobacco Use    Smoking status: Never     Passive exposure: Never    Smokeless tobacco: Never    Tobacco comments:     no smoke exposure in the home   Substance Use Topics    Alcohol use: No         Bc Topete PA-C

## 2022-12-29 NOTE — TELEPHONE ENCOUNTER
Called Patient to reschedule appointment from June to March (3mo follow up). Left voicemail to call back.

## 2022-12-29 NOTE — PROGRESS NOTES
Nancy Carlson is a 20 y.o. female , id x 2(name and ). Reviewed record, history, and  medications. Chief Complaint   Patient presents with    Follow-up    Labs       Vitals:    22 1137   BP: 123/82   Pulse: 94   Resp: 16   Temp: 97.5 °F (36.4 °C)   TempSrc: Oral   SpO2: 98%   Weight: 325 lb 9.6 oz (147.7 kg)   Height: 5' 5\" (1.651 m)       Coordination of Care Questionnaire:   1. Have you been to the ER, urgent care clinic since your last visit? Hospitalized since your last visit? No    2. Have you seen or consulted any other health care providers outside of the [address] [place] [address] since your last visit? Include any pap smears or colon screening.  No

## 2022-12-30 ENCOUNTER — TELEPHONE (OUTPATIENT)
Dept: FAMILY MEDICINE CLINIC | Age: 22
End: 2022-12-30

## 2022-12-30 NOTE — TELEPHONE ENCOUNTER
----- Message from Toña Zelaya sent at 12/30/2022  3:41 PM EST -----  Subject: Message to Provider    QUESTIONS  Information for Provider? Patient called asking for a copy of her PE she   had to be [employer] fax? [phone]. Patient also asked if a copy can   be mailed to her also. Please call once completed. ---------------------------------------------------------------------------  --------------  Anahi Coffey [facility]  [phone]; OK to leave message on voicemail  ---------------------------------------------------------------------------  --------------  SCRIPT ANSWERS  Relationship to Patient?  Self

## 2022-12-31 LAB
25(OH)D3 SERPL-MCNC: 80.2 NG/ML (ref 30–100)
FERRITIN SERPL-MCNC: 19 NG/ML (ref 26–388)
IRON SATN MFR SERPL: 22 % (ref 20–50)
IRON SERPL-MCNC: 99 UG/DL (ref 35–150)
TIBC SERPL-MCNC: 459 UG/DL (ref 250–450)

## 2023-01-03 ENCOUNTER — PATIENT MESSAGE (OUTPATIENT)
Dept: FAMILY MEDICINE CLINIC | Age: 23
End: 2023-01-03

## 2023-01-03 NOTE — PROGRESS NOTES
Tae Maldonado,    Your vitamin D level is now normal! Do not restart any vitamin D supplements. Your iron levels are about the same as they were. I would like you to follow up with your hematologist at [facility] to discuss this further to see what their recommendations are. Do you have their fax number or phone number where we can get the fax number so we can send them the labs?   Shaila Del Rosario PA-C

## 2023-01-03 NOTE — LETTER
NOTIFICATION RETURN TO WORK / SCHOOL    1/4/2023 4:01 PM    Ms. Christa Martinez  [address] [facility] [address] [address]      To Whom It May Concern:    Christa Martinez is currently under the care of [address] [facility]. She was seen in our office for a wellness exam on 12/29/2022. If there are questions or concerns please have the patient contact our office.         Sincerely,      Deborah Herman PA-C

## 2023-01-04 NOTE — TELEPHONE ENCOUNTER
Called and spoke with pt. Pt id x 2. Informed her the last office note and letter has been printed and left at the  for . She verbalized understanding and stated she will pick them up at the end of the week. She states she forgot to tell Valerie Monroy at her last visit. She had her second bone marrow biopsy at the end of August. She's been having really bad back pain, she gets no relief. The pain is constant, gets worse, causing spasms down both of her legs. It starts as lower back pain but radiates up her spine. There are times that she can't bend over because the pain is so bad. She talked to her Oncologist but was informed to follow up with her PCP. Would like to know what Valerie Monroy suggests she do. Informed her will inform the provider. She verbalized understanding.

## 2023-01-04 NOTE — TELEPHONE ENCOUNTER
Called and spoke with pt. Pt id x 2. She states she doesn't need anything else faxed to [facility]. She wanted her last office note to be faxed to a company called [employer] (fax# [phone]). She states she doesn't want what was discussed between her and Kaleigh Jasso to be on the office note. She feels that it is none of that company's business. Informed her the office note contains everything that was discussed at her last appt. She states she does not want anything to be faxed to [employer]. She would like the last office note to be printed instead and left at the  for pickup. She states she would like to know if Kaleigh Jasso could write a letter stating that she had her yearly physical and everything was fine.

## 2023-01-04 NOTE — TELEPHONE ENCOUNTER
Feroz Soto PA-C  You 9 minutes ago (2:55 PM)     Yes we can print out the last office note for her. I can write a letter stating we had her wellness visit but I cannot write  \"everything is fine\" as that is broad. What is the company requesting? Called and spoke with pt. Pt id x 2. Relayed the previous message per Minerva Bates PA-C. She verbalized understanding. She states [employer] just needs to know the date of the exam and basic information like her height and weight. They just need to know that a wellness exam was completed by the physician. Informed her that she would need to sign a release form in order for us to send anything to [employer]. She verbalized understanding. She states she just wants the letter and her last office note printed and she will come by the office to pick them up. She doesn't want anything sent to them.

## 2023-01-09 ENCOUNTER — TELEPHONE (OUTPATIENT)
Dept: FAMILY MEDICINE CLINIC | Age: 23
End: 2023-01-09

## 2023-01-09 DIAGNOSIS — E61.1 IRON DEFICIENCY: Primary | ICD-10-CM

## 2023-01-09 RX ORDER — LANOLIN ALCOHOL/MO/W.PET/CERES
325 CREAM (GRAM) TOPICAL
Qty: 30 TABLET | Refills: 1 | Status: SHIPPED | OUTPATIENT
Start: 2023-01-09 | End: 2023-01-09 | Stop reason: SDUPTHER

## 2023-01-09 RX ORDER — DOCUSATE SODIUM 100 MG/1
100 CAPSULE, LIQUID FILLED ORAL 2 TIMES DAILY
Qty: 60 CAPSULE | Refills: 2 | Status: SHIPPED | OUTPATIENT
Start: 2023-01-09 | End: 2023-01-09 | Stop reason: SDUPTHER

## 2023-01-09 NOTE — TELEPHONE ENCOUNTER
I spoke with patient. [facility] hematology referred back to pcp for iron deficiency as she does not have anemia. Plan to increase oral iron dose to prescription strength rather than OTC supplement. Rx called in as well as colace to prevent constipation. I called and spoke with patient and she voiced understanding. Recheck labs at follow up in February.

## 2023-01-09 NOTE — TELEPHONE ENCOUNTER
----- Message from Emmy Wheeler sent at 1/9/2023  4:10 PM EST -----  Subject: Message to Provider    QUESTIONS  Information for Provider? Pt spoke with her hematologist and was told to   have her PCP f/u with her iron levels. Please call patient to advise next   steps.   ---------------------------------------------------------------------------  --------------  Kade KIDD  [phone]; OK to leave message on voicemail  ---------------------------------------------------------------------------  --------------  SCRIPT ANSWERS  Relationship to Patient?  Self

## 2023-01-11 ENCOUNTER — TELEPHONE (OUTPATIENT)
Dept: FAMILY MEDICINE CLINIC | Age: 23
End: 2023-01-11

## 2023-01-11 NOTE — TELEPHONE ENCOUNTER
Christopher Ortega PA-C  You 4 hours ago (7:31 AM)     Can you call patient and clarify-   Colace is twice per day   Iron is once per day on empty stomach, not three times per day as the directions on the bottle say. The directions on the bottle are incorrect for the iron   Erica Newell PA-C        Called and spoke with pt. Pt id x 2. Notified as per Gil Martines PA-C and verbalized understanding. She states she would like to know from [place], how long she should wait after taking the Iron to eat a meal?   She states she usually takes her medications once she gets up in the morning.  Gets dressed and then eats a meal. Wants to know if she can still do that with adding the Iron, or should she wait a while before eating her first meal.

## 2023-01-11 NOTE — TELEPHONE ENCOUNTER
Take oral iron 30 minutes before other meds or a meal. If it causes GI upset, she can take it with food but it is better absorbed when taken on an empty stomach

## 2023-01-12 ENCOUNTER — PATIENT MESSAGE (OUTPATIENT)
Dept: FAMILY MEDICINE CLINIC | Age: 23
End: 2023-01-12

## 2023-01-19 ENCOUNTER — OFFICE VISIT (OUTPATIENT)
Dept: FAMILY MEDICINE CLINIC | Age: 23
End: 2023-01-19
Payer: MEDICARE

## 2023-01-19 VITALS
HEART RATE: 106 BPM | BODY MASS INDEX: 48.82 KG/M2 | WEIGHT: 293 LBS | OXYGEN SATURATION: 98 % | TEMPERATURE: 99.2 F | RESPIRATION RATE: 18 BRPM | HEIGHT: 65 IN | SYSTOLIC BLOOD PRESSURE: 126 MMHG | DIASTOLIC BLOOD PRESSURE: 80 MMHG

## 2023-01-19 DIAGNOSIS — E11.9 TYPE 2 DIABETES MELLITUS WITHOUT COMPLICATION, WITHOUT LONG-TERM CURRENT USE OF INSULIN (HCC): ICD-10-CM

## 2023-01-19 DIAGNOSIS — G89.29 CHRONIC MIDLINE LOW BACK PAIN WITHOUT SCIATICA: Primary | ICD-10-CM

## 2023-01-19 DIAGNOSIS — M35.9 UNDIFFERENTIATED CONNECTIVE TISSUE DISEASE (HCC): ICD-10-CM

## 2023-01-19 DIAGNOSIS — I42.9 CARDIOMYOPATHY, UNSPECIFIED TYPE (HCC): ICD-10-CM

## 2023-01-19 DIAGNOSIS — E74.819 DISORDERS OF GLUCOSE TRANSPORT, UNSPECIFIED (HCC): ICD-10-CM

## 2023-01-19 DIAGNOSIS — E66.01 MORBID OBESITY WITH BMI OF 50.0-59.9, ADULT (HCC): ICD-10-CM

## 2023-01-19 DIAGNOSIS — M54.50 CHRONIC MIDLINE LOW BACK PAIN WITHOUT SCIATICA: Primary | ICD-10-CM

## 2023-01-19 DIAGNOSIS — M79.2 NEUROPATHIC PAIN: ICD-10-CM

## 2023-01-19 DIAGNOSIS — H92.01 RIGHT EAR PAIN: ICD-10-CM

## 2023-01-19 PROCEDURE — 3046F HEMOGLOBIN A1C LEVEL >9.0%: CPT | Performed by: FAMILY MEDICINE

## 2023-01-19 PROCEDURE — 3074F SYST BP LT 130 MM HG: CPT | Performed by: FAMILY MEDICINE

## 2023-01-19 PROCEDURE — G8417 CALC BMI ABV UP PARAM F/U: HCPCS | Performed by: FAMILY MEDICINE

## 2023-01-19 PROCEDURE — G9717 DOC PT DX DEP/BP F/U NT REQ: HCPCS | Performed by: FAMILY MEDICINE

## 2023-01-19 PROCEDURE — 99214 OFFICE O/P EST MOD 30 MIN: CPT | Performed by: FAMILY MEDICINE

## 2023-01-19 PROCEDURE — 2022F DILAT RTA XM EVC RTNOPTHY: CPT | Performed by: FAMILY MEDICINE

## 2023-01-19 PROCEDURE — G8427 DOCREV CUR MEDS BY ELIG CLIN: HCPCS | Performed by: FAMILY MEDICINE

## 2023-01-19 PROCEDURE — 3079F DIAST BP 80-89 MM HG: CPT | Performed by: FAMILY MEDICINE

## 2023-01-19 RX ORDER — PREGABALIN 25 MG/1
CAPSULE ORAL
Qty: 10 CAPSULE | Refills: 0 | Status: SHIPPED | OUTPATIENT
Start: 2023-01-19 | End: 2023-01-26

## 2023-01-19 RX ORDER — GABAPENTIN 100 MG/1
100 CAPSULE ORAL 3 TIMES DAILY
Qty: 90 CAPSULE | Refills: 0 | Status: SHIPPED | OUTPATIENT
Start: 2023-01-19

## 2023-01-19 RX ORDER — LIDOCAINE 50 MG/G
PATCH TOPICAL
Qty: 30 EACH | Refills: 0 | Status: SHIPPED | OUTPATIENT
Start: 2023-01-19

## 2023-01-19 NOTE — PROGRESS NOTES
Haja Brown is a 20 y.o. female , id x 2(name and ). Reviewed record, history, and  medications. Chief Complaint   Patient presents with    Back Pain     Pt states had 2 bone marrow procedures, after second procedure she's been experiencing horrible back pain (since Aug 2022). Low back pain that comes and goes but gets worse throughout the day. Especially the days that she works. Vitals:    23 0951   BP: 126/80   Pulse: (!) 106   Resp: 18   Temp: 99.2 °F (37.3 °C)   TempSrc: Oral   SpO2: 98%   Weight: 323 lb 14.4 oz (146.9 kg)   Height: 5' 5\" (1.651 m)       Coordination of Care Questionnaire:   1. Have you been to the ER, urgent care clinic since your last visit? Hospitalized since your last visit? No    2. Have you seen or consulted any other health care providers outside of the [address] [place] [address] since your last visit? Include any pap smears or colon screening.  No

## 2023-01-19 NOTE — Clinical Note
Please fax referral and note to   advance physical therapy in [place] [address],[facility], [place], [address] [facility] [phone]

## 2023-01-19 NOTE — PROGRESS NOTES
Zenon Heaton (: 2000) is a 20 y.o. female, established patient, here for evaluation of the following chief complaint(s):  Back Pain (Pt states had 2 bone marrow procedures, after second procedure she's been experiencing horrible back pain (since Aug 2022). /Low back pain that comes and goes but gets worse throughout the day. [place] the days that she works. )         ASSESSMENT/PLAN:  Below is the assessment and plan developed based on review of pertinent history, physical exam, labs, studies, and medications. 1. Chronic midline low back pain without sciatica  Chronic back pain worsened after bone marrow biopsies, patient already addressed with oncologist who recommended stretching  No rash or cellulitis on exam   Start PT-- will fax referral to advance physical therapy in [place]  Add lidocaine patches PRN for pain   -     REFERRAL TO PHYSICAL THERAPY  -     lidocaine (LIDODERM) 5 %; Apply patch to the affected area for 12 hours a day and remove for 12 hours a day., Normal, Disp-30 Each, R-0    2. Cardiomyopathy, unspecified type [facility])  Managed by cardiology Dr. Faby Grimes, echo scheduled in a few weeks    3. Morbid obesity with BMI of 50.0-59.9, adult [facility])  Patient is working with PT on body mechanics and increasing physical exercise, hoping to start aquatic therapy and exercise soon at [facility]     4. Undifferentiated connective tissue disease ([facility]  Managed by Dr. Marilyn Phalen rheumatology     5. Disorders of glucose transport, unspecified ([facility]  6. Type 2 diabetes mellitus without complication, without long-term current use of insulin ([facility]  Managed by endocrinology Dr. Ro Mason    7. Neuropathic pain  Fibromyalgia and pain secondary to lupus   Change lyrica to gabapentin-- taper off of lyrica then start gabapentin once at night and can increase to TID if tolerating well   -     gabapentin (NEURONTIN) 100 mg capsule; Take 1 Capsule by mouth three (3) times daily.  Max Daily Amount: 300 mg., Normal, Disp-90 Capsule, R-0  -     pregabalin (LYRICA) 25 mg capsule; Take 2 Capsules by mouth daily for 3 days, THEN 1 Capsule daily for 4 days. Max Daily Amount: 50 mg., Normal, Disp-10 Capsule, R-0  8. Right ear pain  -pimple at entrance to canal, advised not to pick at it and let it heal     Follow up 1 mo as scheduled      SUBJECTIVE/OBJECTIVE:  Chief Complaint   Patient presents with    Back Pain     Pt states had 2 bone marrow procedures, after second procedure she's been experiencing horrible back pain (since Aug 2022). Low back pain that comes and goes but gets worse throughout the day. Especially the days that she works. Patient is 19 yo female presenting with back pain. Ongoing issue x years, worse since 2 bone marrow biopsies 8/2023. Patient states after second bone marrow biopsy she has cellulitis which was treated with abx by oncologist.   Since then her daily back pain has been worse. Felt midline, with radiation to right and left lower back. No radiation into buttocks or legs. No saddle anesthesia or bowel/bladder incontinence or retention. Pain is worsened by being on feet at work all day. Pain improves with rest and stretching. No numbness or weakness in lower extremities. No headache or neck stiffness. She did address pain with oncologist and was told to stretch and there were no issues with biopsy. She saw chiropractor who has made adjustments but pain ever fully goes away. She has been trying to stretch nightly but stretching has been limited to bending to touch her toes. She does PT exercises for her foot and ankle but not her back. She also would like to try a different med for neuropathic type pain/fibromyalgia secondary to lupus. She has been on lyrica for a while but felt when she took 2 doses her brain felt foggy.  So she takes 1 dose per day but notes this does not help with pain-- pain is low back pain as well as pain throughout her body, aching in her joints as well.      She goes to advance physical therapy in [address] [place] [address], [address] [facility]  [phone]    Hoping to do water therapy with [facility] eventually for lupus pain and chronic pain and is hoping to transition there for PT in the next few weeks but this has not happened yet. She also c/o pain and feels like something is in her R ear-- at the entrance to canal. Ear has not been popping, no deep ear pain. Review of systems negative other than mentioned in HPI    Current Outpatient Medications on File Prior to Visit   Medication Sig Dispense Refill    ferrous sulfate (IRON) 325 mg (65 mg iron) EC tablet Take 1 Tablet by mouth three (3) times daily (with meals). 30 Tablet 2    docusate sodium (COLACE) 100 mg capsule Take 1 Capsule by mouth two (2) times a day for 90 days. 60 Capsule 2    metFORMIN ER (GLUCOPHAGE XR) 500 mg tablet Take 2 Tablets by mouth Before breakfast and dinner. 360 Tablet 1    norethindrone-ethinyl estradiol-iron (Junel FE 1.5/30, 28,) 1.5 mg-30 mcg (21)/75 mg (7) tab TAKE 1 TAB BY MOUTH DAILY. CONTINUOUS PILLS ONLY. 3 Dose Pack 1    cetirizine (ZYRTEC) 10 mg tablet Take 1 Tablet by mouth nightly. 90 Tablet 1    azelastine (ASTEPRO) 205.5 mcg (0.15 %) 1 [place] by Both Nostrils route two (2) times a day. 1 Each 2    hydrOXYchloroQUINE (PLAQUENIL) 200 mg tablet TAKE 1 TABLET BY MOUTH  TWICE DAILY 180 Tablet 3    spironolactone (ALDACTONE) 50 mg tablet Take 50 mg by mouth daily. Aimovig Autoinjector 140 mg/mL injection INJECT 1 SYRINGE VIA SUBCUTANEOUS ROUTE ONCE A MONTH, AS DIRECTED 3 mL 1    ubrogepant (Ubrelvy) 100 mg tablet Take 1 Tablet by mouth as needed for Migraine (Take at onset of migraine. May repeat once after 2 hours if needed. ). 16 Tablet 2    naltrexone (DEPADE) 50 mg tablet Take 0.5 Tablets by mouth daily.  45 Tablet 1    buPROPion XL (WELLBUTRIN XL) 150 mg tablet Take 1 in the AM for 2 weeks, then increase to 1 in the morning and 1 in the afternoon. 180 Tablet 1    Vyvanse 30 mg capsule Take 30 mg by mouth daily. DULoxetine (CYMBALTA) 60 mg capsule Take 120 mg by mouth daily. budesonide-glycopyr-formoterol (Breztri Aerosphere) 160-9-4.8 mcg/actuation HFAA Take 2 Puffs by inhalation daily. omega 3-dha-epa-fish oil (Fish OiL) 100-160-1,000 mg cap Take 2,000 mg by mouth. albuterol (PROVENTIL HFA, VENTOLIN HFA, PROAIR HFA) 90 mcg/actuation inhaler Take 1 Puff by inhalation every four (4) hours as needed for Wheezing. traZODone (DESYREL) 50 mg tablet       lansoprazole (PREVACID) 30 mg capsule Take 30 mg by mouth Daily (before breakfast). ascorbic acid, vitamin C, (VITAMIN C) 500 mg tablet Take  by mouth. sacrosidase (Sucraid) 8,500 unit/mL soln Take 1 mL by mouth.      b complex vitamins tablet ONE TABLE DAILY      hydrOXYzine HCL (ATARAX) 25 mg tablet TAKE 1 TO 2 TABLETS BY MOUTH TWICE A DAY AS NEEDED FOR ANXIETY      ondansetron (ZOFRAN ODT) 8 mg disintegrating tablet PLACE 1 TABLET ON TONGUE & ALLOW TO DISSOLVE EVERY 8 HOURS AS NEEDED FOR NAUSEA WITH HEADACHE      diphenhydrAMINE (BENADRYL) 25 mg capsule 2 tabs PO q8h PRN      [DISCONTINUED] pregabalin (LYRICA) 75 mg capsule Take 1 Capsule by mouth daily. Max Daily Amount: 75 mg. 90 Capsule 1    [DISCONTINUED] nebivoloL (BYSTOLIC) 5 mg tablet Take 1 Tablet by mouth daily. 30 Tablet 12    [DISCONTINUED] spironolactone (ALDACTONE) 25 mg tablet Take  by mouth daily. [DISCONTINUED] montelukast (SINGULAIR) 10 mg tablet Take 1 Tablet by mouth daily. 90 Tablet 1    cholecalciferol, vitamin d3, 10 mcg (400 unit) cap Take  by mouth. (Patient not taking: Reported on 12/29/2022)       No current facility-administered medications on file prior to visit.      Patient Active Problem List    Diagnosis Date Noted    Cardiomyopathy, unspecified type [facility] 01/19/2023    Severe persistent asthma with acute exacerbation 12/21/2022    Type 2 diabetes mellitus 12/13/2022    Gene mutation 11/17/2022    Neuropathic pain 11/17/2022    Undifferentiated connective tissue disease ([facility] 11/17/2022    Disease of hematopoietic system 09/16/2022    Other eosinophilia 06/07/2022    Elevated sed rate 05/20/2022    Neutrophilia 05/20/2022    Monocytosis 05/20/2022    Other thrombocytosis 04/28/2022    Elevated C-reactive protein (CRP) 04/28/2022    Skin lesions 04/28/2022    Migraine headache     Autism     MIGUEL A (generalized anxiety disorder)     Tachycardia     Fibromyalgia     Sucrose intolerance     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[facility])     ADHD (attention deficit hyperactivity disorder)     Acid reflux     SLE (systemic lupus erythematosus) [facility]     History of prediabetes 07/20/2021    Lipedema 2021    Tenosynovitis of right hand 2021    Obesity, morbid ([facility] 12/14/2020    Chronic gastritis without bleeding 06/20/2019    Chronic cholecystitis 06/04/2019    Biliary dyskinesia 06/04/2019    Chronic nausea 05/16/2019    Atypical depression 02/28/2018    Hypertension 02/20/2018    Lipids abnormal 02/20/2018    Edema, peripheral 09/13/2017    Allergy to yeast 09/13/2017    Allergy to chocolate 09/13/2017    Gastroesophageal reflux disease without esophagitis 05/15/2017    Irritable bowel syndrome with diarrhea 04/13/2017    Gastroparesis 01/19/2017    Medication overuse headache 09/21/2015    Autism spectrum disorder 09/21/2015    Anxiety 09/21/2015     Vitals:    01/19/23 0951   BP: 126/80   Pulse: (!) 106   Resp: 18   Temp: 99.2 °F (37.3 °C)   TempSrc: Oral   SpO2: 98%   Weight: 323 lb 14.4 oz (146.9 kg)   Height: 5' 5\" (1.651 m)   PainSc:   5   PainLoc: Back        Physical Exam  Constitutional:       Appearance: Normal appearance. HENT:      Head: Normocephalic and atraumatic.       Right Ear: Tympanic membrane and ear canal normal.      Left Ear: Tympanic membrane, ear canal and external ear normal.      Ears:      Comments: Small pustule anterior surface entrance R ear canal where patient c/o pain  Eyes:      Extraocular Movements: Extraocular movements intact. Conjunctiva/sclera: Conjunctivae normal.      Pupils: Pupils are equal, round, and reactive to light. Cardiovascular:      Rate and Rhythm: Normal rate and regular rhythm. Pulses: Normal pulses. Heart sounds: Normal heart sounds. Pulmonary:      Effort: Pulmonary effort is normal.      Breath sounds: Normal breath sounds. Abdominal:      General: Abdomen is flat. Bowel sounds are normal.      Palpations: Abdomen is soft. Musculoskeletal:      Cervical back: Normal range of motion and neck supple. No rigidity or tenderness. Thoracic back: No spasms, tenderness or bony tenderness. Normal range of motion. Lumbar back: Spasms, tenderness and bony tenderness present. Decreased range of motion. Negative right straight leg raise test and negative left straight leg raise test.      Right lower leg: No edema. Left lower leg: No edema. Lymphadenopathy:      Cervical: No cervical adenopathy. Skin:     Comments: No rash on back   Neurological:      General: No focal deficit present. Mental Status: She is alert and oriented to person, place, and time. Psychiatric:         Mood and Affect: Mood normal.         Behavior: Behavior normal.       An electronic signature was used to authenticate this note.   -- Leroy Johnson PA-C

## 2023-01-20 ENCOUNTER — TELEPHONE (OUTPATIENT)
Dept: FAMILY MEDICINE CLINIC | Age: 23
End: 2023-01-20

## 2023-01-20 NOTE — TELEPHONE ENCOUNTER
----- Message from Nickie Camarena sent at 1/19/2023  3:47 PM EST -----  Subject: Message to Provider    QUESTIONS  Information for Provider? pt said pharmacy called to tell her she needs   prior auth for the lidocaine patches. please call pt, if calling 1/19 call   [phone]  ---------------------------------------------------------------------------  --------------  Jaleel KIDD  [phone]; OK to leave message on voicemail  ---------------------------------------------------------------------------  --------------  SCRIPT ANSWERS  Relationship to Patient?  Self

## 2023-01-24 ENCOUNTER — ANCILLARY PROCEDURE (OUTPATIENT)
Dept: CARDIOLOGY CLINIC | Age: 23
End: 2023-01-24

## 2023-01-24 VITALS
DIASTOLIC BLOOD PRESSURE: 84 MMHG | SYSTOLIC BLOOD PRESSURE: 120 MMHG | HEIGHT: 65 IN | WEIGHT: 293 LBS | BODY MASS INDEX: 48.82 KG/M2

## 2023-01-24 DIAGNOSIS — R00.0 TACHYCARDIA: ICD-10-CM

## 2023-01-24 DIAGNOSIS — I10 PRIMARY HYPERTENSION: ICD-10-CM

## 2023-01-24 DIAGNOSIS — I42.9 CARDIOMYOPATHY, UNSPECIFIED TYPE (HCC): ICD-10-CM

## 2023-01-24 LAB
ECHO AO ASC DIAM: 2.8 CM
ECHO AO ASCENDING AORTA INDEX: 1.15 CM/M2
ECHO AO ROOT DIAM: 3.1 CM
ECHO AO ROOT INDEX: 1.28 CM/M2
ECHO AV AREA PEAK VELOCITY: 3.9 CM2
ECHO AV AREA VTI: 3.9 CM2
ECHO AV AREA/BSA PEAK VELOCITY: 1.6 CM2/M2
ECHO AV AREA/BSA VTI: 1.6 CM2/M2
ECHO AV MEAN GRADIENT: 3 MMHG
ECHO AV MEAN VELOCITY: 0.9 M/S
ECHO AV PEAK GRADIENT: 7 MMHG
ECHO AV PEAK VELOCITY: 1.3 M/S
ECHO AV VELOCITY RATIO: 0.85
ECHO AV VTI: 20.2 CM
ECHO EST RA PRESSURE: 3 MMHG
ECHO LA DIAMETER INDEX: 1.48 CM/M2
ECHO LA DIAMETER: 3.6 CM
ECHO LA TO AORTIC ROOT RATIO: 1.16
ECHO LA VOL 2C: 49 ML (ref 22–52)
ECHO LA VOL 4C: 40 ML (ref 22–52)
ECHO LA VOLUME AREA LENGTH: 49 ML
ECHO LA VOLUME INDEX A2C: 20 ML/M2 (ref 16–34)
ECHO LA VOLUME INDEX A4C: 16 ML/M2 (ref 16–34)
ECHO LA VOLUME INDEX AREA LENGTH: 20 ML/M2 (ref 16–34)
ECHO LV E' LATERAL VELOCITY: 12 CM/S
ECHO LV E' SEPTAL VELOCITY: 9 CM/S
ECHO LV EDV A2C: 177 ML
ECHO LV EDV A4C: 172 ML
ECHO LV EDV BP: 174 ML (ref 56–104)
ECHO LV EDV INDEX A4C: 71 ML/M2
ECHO LV EDV INDEX BP: 72 ML/M2
ECHO LV EDV NDEX A2C: 73 ML/M2
ECHO LV EJECTION FRACTION A2C: 56 %
ECHO LV EJECTION FRACTION A4C: 60 %
ECHO LV EJECTION FRACTION BIPLANE: 56 % (ref 55–100)
ECHO LV ESV A2C: 78 ML
ECHO LV ESV A4C: 70 ML
ECHO LV ESV BP: 76 ML (ref 19–49)
ECHO LV ESV INDEX A2C: 32 ML/M2
ECHO LV ESV INDEX A4C: 29 ML/M2
ECHO LV ESV INDEX BP: 31 ML/M2
ECHO LV FRACTIONAL SHORTENING: 30 % (ref 28–44)
ECHO LV INTERNAL DIMENSION DIASTOLE INDEX: 2.35 CM/M2
ECHO LV INTERNAL DIMENSION DIASTOLIC: 5.7 CM (ref 3.9–5.3)
ECHO LV INTERNAL DIMENSION SYSTOLIC INDEX: 1.65 CM/M2
ECHO LV INTERNAL DIMENSION SYSTOLIC: 4 CM
ECHO LV IVSD: 0.8 CM (ref 0.6–0.9)
ECHO LV MASS 2D: 170.2 G (ref 67–162)
ECHO LV MASS INDEX 2D: 70 G/M2 (ref 43–95)
ECHO LV POSTERIOR WALL DIASTOLIC: 0.8 CM (ref 0.6–0.9)
ECHO LV RELATIVE WALL THICKNESS RATIO: 0.28
ECHO LVOT AREA: 4.5 CM2
ECHO LVOT AV VTI INDEX: 0.89
ECHO LVOT DIAM: 2.4 CM
ECHO LVOT MEAN GRADIENT: 3 MMHG
ECHO LVOT PEAK GRADIENT: 5 MMHG
ECHO LVOT PEAK VELOCITY: 1.1 M/S
ECHO LVOT STROKE VOLUME INDEX: 33.5 ML/M2
ECHO LVOT SV: 81.4 ML
ECHO LVOT VTI: 18 CM
ECHO MV A VELOCITY: 0.57 M/S
ECHO MV AREA PHT: 4 CM2
ECHO MV AREA VTI: 6.4 CM2
ECHO MV E DECELERATION TIME (DT): 189.6 MS
ECHO MV E VELOCITY: 0.69 M/S
ECHO MV E/A RATIO: 1.21
ECHO MV E/E' LATERAL: 5.75
ECHO MV E/E' RATIO (AVERAGED): 6.71
ECHO MV E/E' SEPTAL: 7.67
ECHO MV LVOT VTI INDEX: 0.71
ECHO MV MAX VELOCITY: 0.8 M/S
ECHO MV MEAN GRADIENT: 1 MMHG
ECHO MV MEAN VELOCITY: 0.6 M/S
ECHO MV PEAK GRADIENT: 2 MMHG
ECHO MV PRESSURE HALF TIME (PHT): 55 MS
ECHO MV VTI: 12.8 CM
ECHO RV INTERNAL DIMENSION: 4 CM
ECHO RV TAPSE: 2.3 CM (ref 1.7–?)

## 2023-01-25 NOTE — PROGRESS NOTES
Dear Ms. DanielBowmancameron Gutierrez,  Your echo shows normal findings. Heart function is normal.   Please let me know if you have any questions.    Dr. Areli Harley

## 2023-01-26 ENCOUNTER — OFFICE VISIT (OUTPATIENT)
Dept: ENDOCRINOLOGY | Age: 23
End: 2023-01-26
Payer: MEDICARE

## 2023-01-26 VITALS
DIASTOLIC BLOOD PRESSURE: 85 MMHG | HEIGHT: 67 IN | OXYGEN SATURATION: 100 % | BODY MASS INDEX: 45.99 KG/M2 | RESPIRATION RATE: 20 BRPM | SYSTOLIC BLOOD PRESSURE: 124 MMHG | HEART RATE: 117 BPM | WEIGHT: 293 LBS

## 2023-01-26 PROCEDURE — 99214 OFFICE O/P EST MOD 30 MIN: CPT | Performed by: INTERNAL MEDICINE

## 2023-01-26 PROCEDURE — G8417 CALC BMI ABV UP PARAM F/U: HCPCS | Performed by: INTERNAL MEDICINE

## 2023-01-26 PROCEDURE — G9717 DOC PT DX DEP/BP F/U NT REQ: HCPCS | Performed by: INTERNAL MEDICINE

## 2023-01-26 PROCEDURE — 3074F SYST BP LT 130 MM HG: CPT | Performed by: INTERNAL MEDICINE

## 2023-01-26 PROCEDURE — 3079F DIAST BP 80-89 MM HG: CPT | Performed by: INTERNAL MEDICINE

## 2023-01-26 PROCEDURE — G8427 DOCREV CUR MEDS BY ELIG CLIN: HCPCS | Performed by: INTERNAL MEDICINE

## 2023-01-26 NOTE — PROGRESS NOTES
Chief Complaint   Patient presents with    PCOS    Follow-up    Medication Refill     History of Present Illness: Nereyda Luong is a 20 y.o. female with a past medical hx significant for ADHD seen in referral from [place], [address] [place] [address] MARY ELLEN Howe for discussion related to weight gain. 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 Internal BMBx performed 8/25/2022 showing normocellular marrow with active trilineage hematopoiesis. 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 Normal female karyotype. FISH negative. Will monitor closely for development of AML. Of note, FLT3 mutations also increase risk of various other tumor types, thus, will refer to medical genetics for further discussion. Valerie Monroy and her Mother are here today to discuss medication options for weight loss. Previously lost 100lbs following cholecystectomy. Currently working as a , snacking throughout the day and then has a matthew with the family in the evening. Endorses cravings for carbohydrates. Currently taking low dose naltrexone (4.5mg) due to pain (rx by Rheum). Taking vyvansefor ADHD, recently switched from [address] Also taking cymbalta. 01/26/2023: Not consistently taking 2000 mg a day of metformin, titrated off of prednisone-was up to 20 mg once a day, down to 10 mg. Has essentially been on this continuously for about 8 months. Is down 5 pounds. Not experiencing bad anxiety on current regimen. Current Outpatient Medications   Medication Sig    pregabalin (LYRICA) 25 mg capsule Take 2 Capsules by mouth daily for 3 days, THEN 1 Capsule daily for 4 days. Max Daily Amount: 50 mg.    lidocaine (LIDODERM) 5 % Apply patch to the affected area for 12 hours a day and remove for 12 hours a day. ferrous sulfate (IRON) 325 mg (65 mg iron) EC tablet Take 1 Tablet by mouth three (3) times daily (with meals).     docusate sodium (COLACE) 100 mg capsule Take 1 Capsule by mouth two (2) times a day for 90 days. metFORMIN ER (GLUCOPHAGE XR) 500 mg tablet Take 2 Tablets by mouth Before breakfast and dinner. (Patient taking differently: Take 1,000 mg by mouth Before breakfast and dinner. 1 tab bid)    norethindrone-ethinyl estradiol-iron (Junel FE 1.5/30, 28,) 1.5 mg-30 mcg (21)/75 mg (7) tab TAKE 1 TAB BY MOUTH DAILY. CONTINUOUS PILLS ONLY. cetirizine (ZYRTEC) 10 mg tablet Take 1 Tablet by mouth nightly. azelastine (ASTEPRO) 205.5 mcg (0.15 %) 1 Issue by Both Nostrils route two (2) times a day.    hydrOXYchloroQUINE (PLAQUENIL) 200 mg tablet TAKE 1 TABLET BY MOUTH  TWICE DAILY    spironolactone (ALDACTONE) 50 mg tablet Take 50 mg by mouth daily. Aimovig Autoinjector 140 mg/mL injection INJECT 1 SYRINGE VIA SUBCUTANEOUS ROUTE ONCE A MONTH, AS DIRECTED    ubrogepant (Ubrelvy) 100 mg tablet Take 1 Tablet by mouth as needed for Migraine (Take at onset of migraine. May repeat once after 2 hours if needed. ).    naltrexone (DEPADE) 50 mg tablet Take 0.5 Tablets by mouth daily. buPROPion XL (WELLBUTRIN XL) 150 mg tablet Take 1 in the AM for 2 weeks, then increase to 1 in the morning and 1 in the afternoon. Vyvanse 30 mg capsule Take 30 mg by mouth daily. DULoxetine (CYMBALTA) 60 mg capsule Take 120 mg by mouth daily. budesonide-glycopyr-formoterol (Breztri Aerosphere) 160-9-4.8 mcg/actuation HFAA Take 2 Puffs by inhalation daily. omega 3-dha-epa-fish oil (Fish OiL) 100-160-1,000 mg cap Take 2,000 mg by mouth. albuterol (PROVENTIL HFA, VENTOLIN HFA, PROAIR HFA) 90 mcg/actuation inhaler Take 1 Puff by inhalation every four (4) hours as needed for Wheezing. traZODone (DESYREL) 50 mg tablet     lansoprazole (PREVACID) 30 mg capsule Take 30 mg by mouth Daily (before breakfast). ascorbic acid, vitamin C, (VITAMIN C) 500 mg tablet Take  by mouth.     sacrosidase (Sucraid) 8,500 unit/mL soln Take 1 mL by mouth.    b complex vitamins tablet ONE TABLE DAILY hydrOXYzine HCL (ATARAX) 25 mg tablet TAKE 1 TO 2 TABLETS BY MOUTH TWICE A DAY AS NEEDED FOR ANXIETY    ondansetron (ZOFRAN ODT) 8 mg disintegrating tablet PLACE 1 TABLET ON TONGUE & ALLOW TO DISSOLVE EVERY 8 HOURS AS NEEDED FOR NAUSEA WITH HEADACHE    diphenhydrAMINE (BENADRYL) 25 mg capsule 2 tabs PO q8h PRN    gabapentin (NEURONTIN) 100 mg capsule Take 1 Capsule by mouth three (3) times daily. Max Daily Amount: 300 mg. (Patient not taking: Reported on 1/26/2023)     No current facility-administered medications for this visit. Allergies   Allergen Reactions    Bactrim [Sulfamethoprim Ds] Other (comments)     Developed oral thrush    Rizatriptan Other (comments)    Sulfa (Sulfonamide Antibiotics) Unknown (comments)    Yeast, Dried Other (comments)     Upset stomach     Flagyl [Metronidazole] Rash     Worsened acneiform rash with TOPICAL flagyl used to treat rosacea    Sucrose Nausea Only     SEVERE ABDOMINAL PAIN   Social Hx: , also in classes     Review of Systems:  See HPI    Physical Examination:  Visit Vitals  /85   Pulse (!) 117   Resp 20   Ht 5' 7\" (1.702 m)   Wt 320 lb 12.8 oz (145.5 kg)   SpO2 100%   BMI 50.24 kg/m²       - GENERAL: NCAT, BMI 52  - EYES: EOMI, non-icteric, no proptosis   - Ear/Nose/Throat: NCAT, no visible inflammation or masses   - CARDIOVASCULAR: no cyanosis, no visible JVD   - RESPIRATORY: respiratory effort normal without any distress or labored breathing   - MUSCULOSKELETAL: Normal ROM of neck and upper extremities observed   - SKIN: No rash on face  - NEUROLOGIC:  No facial asymmetry (Cranial nerve 7 motor function), No gaze palsy   - PSYCHIATRIC: Normal affect, Normal insight and judgement     Data Reviewed:   Lab Results   Component Value Date/Time    Hemoglobin A1c 4.8 09/03/2021 10:03 AM    Hemoglobin A1c (POC) 5.2 09/26/2022 02:59 PM         Assessment/Plan:  This is a very pleasant 21 yo female seen for discussion related to BMI of 50 and medication options for weight loss. Reviewed my preference for GLP1 agonists but cannot get these covered in the absence of diabetes. Resume metformin and up-titrate to 1000mg BID. Phentermine is not a good option given history of anxiety. Will use combination of generic naltrexone (already taking and tolerating), increase to 25mg (only available option is 50mg tablet), and also add in bupropion 150mg xr. Discussed potential enhancement of vyvanse effects with addition of bupropion and naltrexone. Information provided for nutritionists through [employer] will not pay for). #BMI 46  -avoid atarax/antihistamine as they are weight positive and interfere with other meds  -unable to use GLP1 agonist due to absence of diabetes  -resume metformin and up-titrate to 1000mg BID (okay to take both with dinner)  -referral provided for nutrition (insurance will not pay for this)  -d/c LDN and start 25mg (1/2 50mg tab)  -initiate bupropion 150mg xr, increase to 150mg xr 2 tabs daily if tolerated   -reviewed risk of anxiety/diarrhea/headache/nausea with both    -Of note this patient's labs are not consistent with PCOS    - CORTISOL, URINE FREE 24 HR; Future  - SALIVARY CORTISOL (2), TIMED;  Future    Copy sent to:Flo Newell PA-C    RTC 4 mo    Luke Jensen MD  [facility]

## 2023-01-27 ENCOUNTER — TELEPHONE (OUTPATIENT)
Dept: ENDOCRINOLOGY | Age: 23
End: 2023-01-27

## 2023-01-27 NOTE — TELEPHONE ENCOUNTER
Pt called 1/27 @ 2:00 PM    Pt would like to know if she needs labs done before her next appt in 4 months.     Pt# [phone]

## 2023-01-30 NOTE — TELEPHONE ENCOUNTER
Spoke with and she wanted to know if blood work was ordered for her to complete prior to her f/u appt. Pt was made aware that no blood were was ordered, however, labs were. Pt stated that she has the order for the labs but wanted to make sure blood work was not incl-     \"CORTISOL, URINE FREE 24 HR; Future  - SALIVARY CORTISOL (2), TIMED; Future   RTC 4 mouded. \"    Pt was made aware that only the urine and salivary cortisol labs were requested at her most recent office visit.

## 2023-01-31 ENCOUNTER — VIRTUAL VISIT (OUTPATIENT)
Dept: CARDIOLOGY CLINIC | Age: 23
End: 2023-01-31
Payer: MEDICARE

## 2023-01-31 ENCOUNTER — TELEPHONE (OUTPATIENT)
Dept: CARDIOLOGY CLINIC | Age: 23
End: 2023-01-31

## 2023-01-31 ENCOUNTER — TELEPHONE (OUTPATIENT)
Dept: NEUROLOGY | Age: 23
End: 2023-01-31

## 2023-01-31 DIAGNOSIS — R00.0 TACHYCARDIA: Primary | ICD-10-CM

## 2023-01-31 DIAGNOSIS — I10 PRIMARY HYPERTENSION: ICD-10-CM

## 2023-01-31 PROCEDURE — G9717 DOC PT DX DEP/BP F/U NT REQ: HCPCS | Performed by: SPECIALIST

## 2023-01-31 PROCEDURE — 99214 OFFICE O/P EST MOD 30 MIN: CPT | Performed by: SPECIALIST

## 2023-01-31 PROCEDURE — G8427 DOCREV CUR MEDS BY ELIG CLIN: HCPCS | Performed by: SPECIALIST

## 2023-01-31 PROCEDURE — G8417 CALC BMI ABV UP PARAM F/U: HCPCS | Performed by: SPECIALIST

## 2023-01-31 RX ORDER — NEBIVOLOL 5 MG/1
5 TABLET ORAL DAILY
Qty: 90 TABLET | Refills: 3 | Status: SHIPPED | OUTPATIENT
Start: 2023-01-31

## 2023-01-31 NOTE — TELEPHONE ENCOUNTER
Enrolled with [employer] - Ordered and being shipped to patient's home address on file. ETA within 5-7 business days. 3 day holter  Received:  Today  MD Saadia Boucher  Can you please send her a 3 day holter to assess 24 hour HR for tachycardia       Thanks   SK

## 2023-01-31 NOTE — PROGRESS NOTES
Verified number/email to use for VV. Reviewed PCP, allergens, meds, pharmacy. Pulse always runs high. If no changes, will need refills.

## 2023-01-31 NOTE — PROGRESS NOTES
Tirso Regalado MD. [address] [facility], [address] [place] [address] [phone]; Fax [phone]        Patient: Delmy Grider  : 2000      Today's Date: 2023        [facility] Cardiology Telemedicine Encounter                                                         Pursuant to the emergency declaration under the [address] [place] [address] authority and the [employer] Act, this Virtual  Visit was conducted, with patient's consent, to reduce the patient's risk of exposure to COVID-19 and provide continuity of care for an established patient. Services were provided through a video synchronous discussion virtually to substitute for in-person clinic visit. HISTORY OF PRESENT ILLNESS:     History of Present Illness:    Delmy Grider is a 20 y.o. female who was seen by synchronous (real-time) audio-video technology on 2023. Referred from PCP, BASILIA Ramsey, for elevated BP and HR. Was seeing Dr. Rangel Scott at [address] [place] [address]. Here to establish care. Pt taking cardizem 120 mg daily and feels like this is making her shaky. Also taking spironolactone 75 mg daily. previously took metoprolol and she did not feel herself. So this was stopped. Started seeing cardiology for elevated HR and BP. Resting HR prior to medications would be approximately 120 bpm. She does not feel like the medication has helped. Does not feel heart racing or palpitations. Swelling in lower extremities. Was told it is lymphedema and is working with insurance on [employer]. Denies chest pain, shortness of breath. With the exception when she has asthma attacks. Denies lightheadedness, dizziness. PAST MEDICAL HISTORY:     Past Medical History:   Diagnosis Date    Abdominal migraine     Dr. Aguila Johnson. vs Sucrose Intolerance.     Acid reflux     ADHD (attention deficit hyperactivity disorder) Asthma     Autism     High Functioning. Dr. Lucho Obrien. Chronic rhinitis 2021    Dr. Perla Hernandez, allergist.     Connective tissue disease [facility])     Developmental delay     Fibromyalgia     Dr. Raffi Horton    MIGUEL A (generalized anxiety disorder)     Dr. Tirso Alarcon    Insulin resistance 09/08/2021    Dr. Gume Mabry. maurice Walker    Irritable bowel syndrome with diarrhea 04/13/2017    Lymphedema 2021    BL legs. Dr. Merna Lynn. [facility], Lymphedema and Rehab consultants    Migraine headache     Dr. Melony Velazco    Obesity, morbid ([facility] 12/12/2017    OCD (obsessive compulsive disorder)     Dr. Tirso Alarcon    Sucrose intolerance     Dr. Clearnce Riedel, GI. Tachycardia 06/02/2021    Mauri Cobian DO, [facility] cardio    Tenosynovitis of right hand 07/2021    Dr. Sandeep Osei    Undifferentiated connective tissue disease ([facility]     (lupus like). Dr. Kathy Adame.            Past Surgical History:   Procedure Laterality Date    COLONOSCOPY N/A 12/20/2016    COLONOSCOPY performed by Efe Ray MD at [facility] ENDOSCOPY    GERD TST W/ MUCOS [place] ELECTROD 50 HR (BRAVO)  12/3/2020         HC CLP BRAVO  11/30/2020    HX CHOLECYSTECTOMY  06/2019    HX ENDOSCOPY  11/2020    dx'd with sucrose intolerance    HX TONSIL AND ADENOIDECTOMY      HX TONSILLECTOMY      AGE 1    HX WISDOM TEETH EXTRACTION      [facility] EGD TRANSORAL BIOPSY SINGLE/MULTIPLE  6/14/2019         [facility] EGD TRANSORAL BIOPSY SINGLE/MULTIPLE  11/30/2020    UPPER GI ENDOSCOPY,BIOPSY  12/20/2016                Patient Active Problem List   Diagnosis Code    Medication overuse headache G44.40    Autism spectrum disorder F84.0    Anxiety F41.9    Gastroparesis K31.84    Irritable bowel syndrome with diarrhea K58.0    Gastroesophageal reflux disease without esophagitis K21.9    Edema, peripheral R60.9    Allergy to yeast Z91.09    Allergy to chocolate Z91.018    Hypertension I10    Lipids abnormal E78.89    Atypical depression F32.89    Chronic nausea R11.0    Chronic cholecystitis K81.1    Biliary dyskinesia K82.8    Chronic gastritis without bleeding K29.50    Obesity, morbid ([facility]) E66.01    History of prediabetes Z87.898    Migraine headache G43.909    Autism F84.0    MIGUEL A (generalized anxiety disorder) F41.1    Tachycardia R00.0    Fibromyalgia M79.7    Sucrose intolerance E74.31    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    Lipedema R60.9    ADHD (attention deficit hyperactivity disorder) F90.9    Acid reflux K21.9    SLE (systemic lupus erythematosus) ([facility]) M32.9    Tenosynovitis of right hand M65.9    Other thrombocytosis D75.838    Elevated C-reactive protein (CRP) R79.82    Skin lesions L98.9    Elevated sed rate R70.0    Neutrophilia D72.9    Monocytosis D72.821    Other eosinophilia D72.19    Disease of hematopoietic system D75.9    Gene mutation Z15.89    Neuropathic pain M79.2    Undifferentiated connective tissue disease ([facility]) M35.9    Type 2 diabetes mellitus E11.9    Severe persistent asthma with acute exacerbation J45.51    Cardiomyopathy, unspecified type ([facility]) I42.9             MEDICATIONS:     Current Outpatient Medications   Medication Sig Dispense Refill    nebivoloL (BYSTOLIC) 5 mg tablet Take 1 Tablet by mouth daily. 90 Tablet 3    gabapentin (NEURONTIN) 100 mg capsule Take 1 Capsule by mouth three (3) times daily. Max Daily Amount: 300 mg. 90 Capsule 0    lidocaine (LIDODERM) 5 % Apply patch to the affected area for 12 hours a day and remove for 12 hours a day. 30 Each 0    ferrous sulfate (IRON) 325 mg (65 mg iron) EC tablet Take 1 Tablet by mouth three (3) times daily (with meals). 30 Tablet 2    docusate sodium (COLACE) 100 mg capsule Take 1 Capsule by mouth two (2) times a day for 90 days. 60 Capsule 2    metFORMIN ER (GLUCOPHAGE XR) 500 mg tablet Take 2 Tablets by mouth Before breakfast and dinner. 360 Tablet 1    norethindrone-ethinyl estradiol-iron (Junel FE 1.5/30, 28,) 1.5 mg-30 mcg (21)/75 mg (7) tab TAKE 1 TAB BY MOUTH DAILY.  CONTINUOUS PILLS ONLY. 3 Dose Pack 1    cetirizine (ZYRTEC) 10 mg tablet Take 1 Tablet by mouth nightly. 90 Tablet 1    azelastine (ASTEPRO) 205.5 mcg (0.15 %) 1 [place] by Both Nostrils route two (2) times a day. 1 Each 2    hydrOXYchloroQUINE (PLAQUENIL) 200 mg tablet TAKE 1 TABLET BY MOUTH  TWICE DAILY 180 Tablet 3    spironolactone (ALDACTONE) 50 mg tablet Take 50 mg by mouth daily. Aimovig Autoinjector 140 mg/mL injection INJECT 1 SYRINGE VIA SUBCUTANEOUS ROUTE ONCE A MONTH, AS DIRECTED 3 mL 1    ubrogepant (Ubrelvy) 100 mg tablet Take 1 Tablet by mouth as needed for Migraine (Take at onset of migraine. May repeat once after 2 hours if needed. ). 16 Tablet 2    naltrexone (DEPADE) 50 mg tablet Take 0.5 Tablets by mouth daily. 45 Tablet 1    buPROPion XL (WELLBUTRIN XL) 150 mg tablet Take 1 in the AM for 2 weeks, then increase to 1 in the morning and 1 in the afternoon. 180 Tablet 1    Vyvanse 30 mg capsule Take 30 mg by mouth daily. DULoxetine (CYMBALTA) 60 mg capsule Take 120 mg by mouth daily. budesonide-glycopyr-formoterol (Breztri Aerosphere) 160-9-4.8 mcg/actuation HFAA Take 2 Puffs by inhalation daily. omega 3-dha-epa-fish oil (Fish OiL) 100-160-1,000 mg cap Take 2,000 mg by mouth. albuterol (PROVENTIL HFA, VENTOLIN HFA, PROAIR HFA) 90 mcg/actuation inhaler Take 1 Puff by inhalation every four (4) hours as needed for Wheezing. traZODone (DESYREL) 50 mg tablet       lansoprazole (PREVACID) 30 mg capsule Take 30 mg by mouth Daily (before breakfast). ascorbic acid, vitamin C, (VITAMIN C) 500 mg tablet Take  by mouth.       sacrosidase (Sucraid) 8,500 unit/mL soln Take 1 mL by mouth.      b complex vitamins tablet ONE TABLE DAILY      hydrOXYzine HCL (ATARAX) 25 mg tablet TAKE 1 TO 2 TABLETS BY MOUTH TWICE A DAY AS NEEDED FOR ANXIETY      ondansetron (ZOFRAN ODT) 8 mg disintegrating tablet PLACE 1 TABLET ON TONGUE & ALLOW TO DISSOLVE EVERY 8 HOURS AS NEEDED FOR NAUSEA WITH HEADACHE diphenhydrAMINE (BENADRYL) 25 mg capsule 2 tabs PO q8h PRN             Allergies   Allergen Reactions    Bactrim [Sulfamethoprim Ds] Other (comments)     Developed oral thrush    Rizatriptan Other (comments)    Sulfa (Sulfonamide Antibiotics) Unknown (comments)    Yeast, Dried Other (comments)     Upset stomach     Flagyl [Metronidazole] Rash     Worsened acneiform rash with TOPICAL flagyl used to treat rosacea    Sucrose Nausea Only     SEVERE ABDOMINAL PAIN               SOCIAL HISTORY:     Social History     Tobacco Use    Smoking status: Never     Passive exposure: Never    Smokeless tobacco: Never    Tobacco comments:     no smoke exposure in the home   Vaping Use    Vaping Use: Never used   Substance Use Topics    Alcohol use: No    Drug use: No               FAMILY HISTORY:     Family History   Problem Relation Age of Onset    Endometriosis Mother     Delayed Awakening Mother     Post-op Nausea/Vomiting Mother     Migraines Mother         d/t accident - neck and brain injury    Atrial Fibrillation Mother     Hypertension Mother     Obesity Mother     No Known Problems Father     Heart Disease Maternal Grandmother     Atrial Fibrillation Maternal Grandmother     Hypertension Maternal Grandmother     Heart Disease Maternal Grandfather     Hypertension Maternal Grandfather     Other Maternal Grandfather         diverticulitis    No Known Problems Paternal Grandmother     No Known Problems Paternal Grandfather     Mult Sclerosis Maternal Aunt     Cancer Maternal Uncle     Lymphoma Other                REVIEW OF SYMPTOMS:     Review of Symptoms:  Constitutional: Negative for fever, chills  HEENT: Negative for nosebleeds, vision changes. Respiratory: Negative for cough, wheezing  Cardiovascular: Negative for claudication, syncope  Gastrointestinal: Negative for abdominal pain, diarrhea, melena. Genitourinary: Negative for dysuria  Musculoskeletal: Negative for myalgias.    Skin: Negative for rash  Heme: No problems bleeding. Neurological: Negative for speech change and focal weakness. PHYSICAL EXAM:       Physical Exam:    Due to this being a TeleHealth evaluation, many elements of the physical examination are unable to be assessed. General: Well developed, in no acute distress, cooperative and alert  HEENT: Hearing intact, non-icteric, normocephalic, atraumatic. Pupils equal/round. Moist mucous membranes. Lungs / Respiratory: No audible wheezing, no signs of respiratory distress, lips non cyanotic  Cardiac: No marked JVD visible on video. Extremities:  No edema  Neuro: A&Ox3, speech clear, no facial droop, answering questions appropriately  Skin: Skin color is normal. No rashes or lesions. Non diaphoretic on visible skin during exam  Psych: Appropriate affect         LABS / OTHER STUDIES reviewed:         Lab Results   Component Value Date/Time    Cholesterol, total 217 (H) 09/03/2021 10:03 AM    Cholesterol, Total 210 (H) 01/10/2022 02:35 PM    HDL Cholesterol 54 09/03/2021 10:03 AM    LDL, calculated 144 (H) 09/03/2021 10:03 AM    LDL, calculated 145 (H) 01/07/2020 10:06 AM    Triglyceride 106 09/03/2021 10:03 AM       Lab Results   Component Value Date/Time    Sodium 136 07/06/2022 01:00 PM    Potassium 4.5 07/06/2022 01:00 PM    Chloride 99 07/06/2022 01:00 PM    CO2 22 07/06/2022 01:00 PM    Anion gap 1 (L) 04/05/2022 10:50 AM    Glucose 78 07/06/2022 01:00 PM    Glucose 86 12/18/2017 08:35 AM    BUN 9 07/06/2022 01:00 PM    Creatinine 0.69 07/06/2022 01:00 PM    BUN/Creatinine ratio 13 07/06/2022 01:00 PM    GFR est AA >60 04/05/2022 10:50 AM    GFR est non-AA >60 04/05/2022 10:50 AM    Calcium 9.4 07/06/2022 01:00 PM    Bilirubin, total 0.3 07/06/2022 01:00 PM    Alk.  phosphatase 66 07/06/2022 01:00 PM    Protein, total 6.9 07/06/2022 01:00 PM    Albumin 4.4 07/06/2022 01:00 PM    Globulin 4.8 (H) 04/05/2022 10:50 AM    A-G Ratio 1.8 07/06/2022 01:00 PM    ALT (SGPT) 10 07/06/2022 01:00 PM        Lab Results   Component Value Date/Time    WBC 12.1 (H) 07/06/2022 01:00 PM    HGB 14.5 07/06/2022 01:00 PM    HCT 43.3 07/06/2022 01:00 PM    PLATELET 341 (H) 71/52/5172 01:00 PM    MCV 88 07/06/2022 01:00 PM       Lab Results   Component Value Date/Time    TSH 0.891 12/11/2020 09:46 AM               CARDIAC DIAGNOSTICS:      Cardiac Evaluation Includes:    I reviewed the results below. Echo 5/26/22 (S) - LVEF 45%  Echo 1/24/23 - TDS. LVEF 56%, normal study          EKG 4/13/22 (S) - sinus tach,  bpm, normal study              ASSESSMENT AND PLAN:      Assessment and Plan:     1) HTN and tachycardia   - she has long history of sinus tachycardia and has seen Dr. Dominic Ramos   - Sinus tach could in part be due to her meds   - She did not tolerate metoprolol   - Cont aldactone (reviewed fetal risks) for HTN  - She feels cardizem makes her shaky ----> On 12/22 switched cardizem to Bystolic  - On 2/49/92 - She is tolerating Bystolic but HR still is fast she feels. Will check a 3 day holter to assess 24 hour HR. Cont low dose Bystolic for now. Check TSH and serum metanaphrines. 2) LVEF depressed on prior echo 5/22 at [facility]  - Echo 5/26/22 (S) - LVEF 45%  - Echo 1/24/23 - TDS. LVEF 56%, normal study      3) Morbid Obesity   - needs weight loss      4) SLE / connective tissue disorder      5) Chronic LE edema /  lymphedema   - her edema is non-pitting and likely related to her obesity   - She is getting a Lymphapress which was previously planned      6) See me in 3 months       Is a . Likes dogs. Bre Lockwood MD, [place] [id number] [facility], [address]  [place] [address] [address] [place] [address], [address] [place] [address] Drive  [place], [address] [place] [address]  Ph: [phone]   Ph [phone]          We discussed the expected course, resolution and complications of the diagnosis(es) in detail. Medication risks, benefits, costs, interactions, and alternatives were discussed as indicated. I advised her to contact the office if her condition worsens, changes or fails to improve as anticipated. She expressed understanding with the diagnosis(es) and plan    Darlene Wright MD    This visit was conducted using [email or web address] telemedicine services.

## 2023-02-01 NOTE — TELEPHONE ENCOUNTER
Called patient. Patient stated that her migraines has been lasting longer. Reports it may be due to the weather. Informed her that the doctor stated, \"Can provide samples of Nurtec 75mg PRN (max dose 75mg/24h). If this works, can send in prescription,\" through the [employer]. Patient reports she would like to try it and would like to  samples today. Hours were given. Patient also stated that what usually works is receiving toradol. She stated that she would receive it from her pediatric doctor and that would work. Informed her that it will be up to the doctor, if she would like to prescribe that.

## 2023-02-01 NOTE — TELEPHONE ENCOUNTER
Patient came by the office today to  samples of nurtec. Informed her that the doctor stated, Puneet Lay may consider [facility] dispatch [facility] for Toradol injection if this does not relieve her headache. \"

## 2023-02-02 ENCOUNTER — OFFICE VISIT (OUTPATIENT)
Dept: NEUROLOGY | Age: 23
End: 2023-02-02
Payer: MEDICARE

## 2023-02-02 VITALS
WEIGHT: 293 LBS | HEIGHT: 64 IN | OXYGEN SATURATION: 97 % | DIASTOLIC BLOOD PRESSURE: 70 MMHG | BODY MASS INDEX: 50.02 KG/M2 | SYSTOLIC BLOOD PRESSURE: 118 MMHG | HEART RATE: 120 BPM

## 2023-02-02 DIAGNOSIS — G43.109 MIGRAINE WITH AURA AND WITHOUT STATUS MIGRAINOSUS, NOT INTRACTABLE: Primary | ICD-10-CM

## 2023-02-02 DIAGNOSIS — M79.641 RIGHT HAND PAIN: ICD-10-CM

## 2023-02-02 PROCEDURE — 3078F DIAST BP <80 MM HG: CPT | Performed by: PSYCHIATRY & NEUROLOGY

## 2023-02-02 PROCEDURE — G8427 DOCREV CUR MEDS BY ELIG CLIN: HCPCS | Performed by: PSYCHIATRY & NEUROLOGY

## 2023-02-02 PROCEDURE — G8417 CALC BMI ABV UP PARAM F/U: HCPCS | Performed by: PSYCHIATRY & NEUROLOGY

## 2023-02-02 PROCEDURE — 99214 OFFICE O/P EST MOD 30 MIN: CPT | Performed by: PSYCHIATRY & NEUROLOGY

## 2023-02-02 PROCEDURE — 3074F SYST BP LT 130 MM HG: CPT | Performed by: PSYCHIATRY & NEUROLOGY

## 2023-02-02 PROCEDURE — G9717 DOC PT DX DEP/BP F/U NT REQ: HCPCS | Performed by: PSYCHIATRY & NEUROLOGY

## 2023-02-02 RX ORDER — KETOROLAC TROMETHAMINE 10 MG/1
10 TABLET, FILM COATED ORAL
Qty: 30 TABLET | Refills: 0 | Status: SHIPPED | OUTPATIENT
Start: 2023-02-02

## 2023-02-02 NOTE — PROGRESS NOTES
Neurology Clinic Follow up Note    Patient ID:  Jf Mckeon  [id number]  48 y.o.  2000      Ms. Roscoe Eckert is here for follow up today of  Chief Complaint   Patient presents with    Migraine     Pt reports migraines have increased in frequency          Last Appointment With Me:  10/3/2022    \"Erica Taylor is presenting for evaluation of headaches since age 12. Location: Bi-frontal  Character: throbbing  Intensity: On average: cannot determine  Frequency: 1x monthly  # HA free days per month: 30  Duration: 24-48h  Aura: Yes, scotomas  Associated Sx with HA: +nausea/vomiting, +phonophotophobia. Neurological ROS: Denies focal weakness, numbness or vision loss associated with headaches  Systemic ROS:   Denies snoring  Caffeine use: 1 cup daily  H/O Head trauma: None  Depressive or anxiety Sx: +Anxiety    Any change in pattern of HA? Improvement on preventative therapy, scotomas x 1 year    Triggers: Hormonal changes, weather changes. No worsening with change in position. Alleviating factors: Sleep/rest, hot compress  FHx HA/migraine: Mother with migraines    Treatment so far: Aimovig 140mg q30 days-tolerates this well, Cymbalta 90mg daily. Current rescue: Oscar Santosh well previously but ran out of medication  Prior preventatives: sertraline, citalopram, zonisamide, TPM, Trokendi, Emgality-ineffective  Prior rescue: Fioricet, rizatriptan-side effects    Investigations so far: 9/2021 MRI Brain NAP\"  Interval History:   She is compliant with Aimovig injections. Reports headaches are currently occurring 1-2x weekly, worse with weather changes. Frequency increased around the beginning of December. She has noted slight injection site reaction with Aimovig. She has transitioned from Lurena Raspberry (ineffective) to [facility] for migraine rescue therapy which is working well. She has failed sumatriptan/rizatriptan in the past as well. PMHx/ PSHx/ FHx/ SHx:  Reviewed and unchanged previous visit.    Past Medical History:   Diagnosis Date    Abdominal migraine     Dr. Sarah Asif. vs Sucrose Intolerance. Acid reflux     ADHD (attention deficit hyperactivity disorder)     Asthma     Autism     High Functioning. Dr. Becky Hilario. Chronic rhinitis 2021    Dr. Carmelina Eaton, allergist.     Connective tissue disease [facility])     Developmental delay     Fibromyalgia     Dr. Adalid Aguilar    MIGUEL A (generalized anxiety disorder)     Dr. Edilberto Christy    Insulin resistance 09/08/2021    Dr. Cale Giraldo. [place], endo    Irritable bowel syndrome with diarrhea 04/13/2017    Lymphedema 2021    BL legs. Dr. Liezth Murray. [facility], [facility]    Migraine headache     Dr. Mcneill Deaaníbal    Obesity, morbid ([facility] [address] 12/12/2017    OCD (obsessive compulsive disorder)     Dr. Edilberto Christy    Sucrose intolerance     Dr. Sarah Asif, GI. Tachycardia 06/02/2021    Mauri Cobian DO, [facility] cardio    Tenosynovitis of right hand 07/2021    Dr. Maddy Contrerastingham    Undifferentiated connective tissue disease ([facility] [address]     (lupus like). Dr. Jing Esposito. ROS:  Comprehensive review of systems negative except for as noted above. Objective:       Meds:  Current Outpatient Medications   Medication Sig Dispense Refill    nebivoloL (BYSTOLIC) 5 mg tablet Take 1 Tablet by mouth daily. 90 Tablet 3    gabapentin (NEURONTIN) 100 mg capsule Take 1 Capsule by mouth three (3) times daily. Max Daily Amount: 300 mg. 90 Capsule 0    lidocaine (LIDODERM) 5 % Apply patch to the affected area for 12 hours a day and remove for 12 hours a day. 30 Each 0    ferrous sulfate (IRON) 325 mg (65 mg iron) EC tablet Take 1 Tablet by mouth three (3) times daily (with meals). 30 Tablet 2    docusate sodium (COLACE) 100 mg capsule Take 1 Capsule by mouth two (2) times a day for 90 days. 60 Capsule 2    metFORMIN ER (GLUCOPHAGE XR) 500 mg tablet Take 2 Tablets by mouth Before breakfast and dinner.  360 Tablet 1    norethindrone-ethinyl estradiol-iron (Junel FE 1.5/30, 28,) 1.5 mg-30 mcg (21)/75 mg (7) tab TAKE 1 TAB BY MOUTH DAILY. CONTINUOUS PILLS ONLY. 3 Dose Pack 1    cetirizine (ZYRTEC) 10 mg tablet Take 1 Tablet by mouth nightly. 90 Tablet 1    azelastine (ASTEPRO) 205.5 mcg (0.15 %) 1 [place] by Both Nostrils route two (2) times a day. 1 Each 2    hydrOXYchloroQUINE (PLAQUENIL) 200 mg tablet TAKE 1 TABLET BY MOUTH  TWICE DAILY 180 Tablet 3    spironolactone (ALDACTONE) 50 mg tablet Take 50 mg by mouth daily. Aimovig Autoinjector 140 mg/mL injection INJECT 1 SYRINGE VIA SUBCUTANEOUS ROUTE ONCE A MONTH, AS DIRECTED 3 mL 1    ubrogepant (Ubrelvy) 100 mg tablet Take 1 Tablet by mouth as needed for Migraine (Take at onset of migraine. May repeat once after 2 hours if needed. ). 16 Tablet 2    naltrexone (DEPADE) 50 mg tablet Take 0.5 Tablets by mouth daily. 45 Tablet 1    buPROPion XL (WELLBUTRIN XL) 150 mg tablet Take 1 in the AM for 2 weeks, then increase to 1 in the morning and 1 in the afternoon. 180 Tablet 1    Vyvanse 30 mg capsule Take 30 mg by mouth daily. DULoxetine (CYMBALTA) 60 mg capsule Take 120 mg by mouth daily. budesonide-glycopyr-formoterol (Breztri Aerosphere) 160-9-4.8 mcg/actuation HFAA Take 2 Puffs by inhalation daily. omega 3-dha-epa-fish oil (Fish OiL) 100-160-1,000 mg cap Take 2,000 mg by mouth. albuterol (PROVENTIL HFA, VENTOLIN HFA, PROAIR HFA) 90 mcg/actuation inhaler Take 1 Puff by inhalation every four (4) hours as needed for Wheezing. traZODone (DESYREL) 50 mg tablet       lansoprazole (PREVACID) 30 mg capsule Take 30 mg by mouth Daily (before breakfast). ascorbic acid, vitamin C, (VITAMIN C) 500 mg tablet Take  by mouth.       sacrosidase (Sucraid) 8,500 unit/mL soln Take 1 mL by mouth.      b complex vitamins tablet ONE TABLE DAILY      hydrOXYzine HCL (ATARAX) 25 mg tablet TAKE 1 TO 2 TABLETS BY MOUTH TWICE A DAY AS NEEDED FOR ANXIETY      ondansetron (ZOFRAN ODT) 8 mg disintegrating tablet PLACE 1 TABLET ON TONGUE & ALLOW TO DISSOLVE EVERY 8 HOURS AS NEEDED FOR NAUSEA WITH HEADACHE      diphenhydrAMINE (BENADRYL) 25 mg capsule 2 tabs PO q8h PRN         Exam:  Visit Vitals  /70   Pulse (!) 120   Ht 5' 4\" (1.626 m)   Wt 318 lb (144.2 kg)   SpO2 97%   BMI 54.58 kg/m²     NEUROLOGICAL EXAM:  General: Awake, alert, speech fluent  CN: PERRL, EOMI without nystagmus, VFF to confrontation, facial sensation and strength are normal and symmetric, hearing is intact to finger rub bilaterally, palate and tongue movements are intact and symmetric. Motor: Normal tone, bulk and strength bilaterally  Reflexes: 3/4 throughout  Coordination: FNF, ARLET, HTS intact. Sensation: LT intact throughout.   Gait: Normal-based    LABS  Results for orders placed or performed in visit on 01/24/23   ECHO ADULT COMPLETE   Result Value Ref Range    IVSd 0.8 0.6 - 0.9 cm    LVIDd 5.7 (A) 3.9 - 5.3 cm    LVIDs 4.0 cm    LVOT Diameter 2.4 cm    LVPWd 0.8 0.6 - 0.9 cm    EF BP 56 55 - 100 %    LV Ejection Fraction A2C 56 %    LV Ejection Fraction A4C 60 %    LV EDV A2C 177 mL    LV EDV A4C 172 mL    LV EDV  (A) 56 - 104 mL    LV ESV A2C 78 mL    LV ESV A4C 70 mL    LV ESV BP 76 (A) 19 - 49 mL    LVOT Peak Gradient 5 mmHg    LVOT Mean Gradient 3 mmHg    LVOT SV 81.4 ml    LVOT Peak Velocity 1.1 m/s    LVOT VTI 18.0 cm    RVIDd 4.0 cm    LA Diameter 3.6 cm    LA Volume A/L 49 mL    LA Volume 2C 49 22 - 52 mL    LA Volume 4C 40 22 - 52 mL    Est. RA Pressure 3 mmHg    AV Area by Peak Velocity 3.9 cm2    AV Area by VTI 3.9 cm2    AV Peak Gradient 7 mmHg    AV Mean Gradient 3 mmHg    AV Peak Velocity 1.3 m/s    AV Mean Velocity 0.9 m/s    AV VTI 20.2 cm    MV A Velocity 0.57 m/s    MV E Wave Deceleration Time 189.6 ms    MV E Velocity 0.69 m/s    LV E' Lateral Velocity 12 cm/s    LV E' Septal Velocity 9 cm/s    MV PHT 55.0 ms    MV Peak Gradient 2 mmHg    MV Mean Gradient 1 mmHg    MV Max Velocity 0.8 m/s    MV Mean Velocity 0.6 m/s    MV VTI 12.8 cm TAPSE 2.3 1.7 cm    Ascending Aorta 2.8 cm    Aortic Root 3.1 cm    Fractional Shortening 2D 30 28 - 44 %    LV ESV Index BP 31 mL/m2    LV EDV Index BP 72 mL/m2    LV ESV Index A4C 29 mL/m2    LV EDV Index A4C 71 mL/m2    LV ESV Index A2C 32 mL/m2    LV EDV Index A2C 73 mL/m2    LVIDd Index 2.35 cm/m2    LVIDs Index 1.65 cm/m2    LV RWT Ratio 0.28     LV Mass 2D 170.2 (A) 67 - 162 g    LV Mass 2D Index 70.0 43 - 95 g/m2    MV E/A 1.21     E/E' Ratio (Averaged) 6.71     E/E' Lateral 5.75     E/E' Septal 7.67     LA Volume Index A/L 20 16 - 34 mL/m2    LVOT Stroke Volume Index 33.5 mL/m2    LVOT Area 4.5 cm2    LA Volume Index 2C 20 16 - 34 mL/m2    LA Volume Index 4C 16 16 - 34 mL/m2    LA Size Index 1.48 cm/m2    LA/AO Root Ratio 1.16     Ao Root Index 1.28 cm/m2    Ascending Aorta Index 1.15 cm/m2    AV Velocity Ratio 0.85     LVOT:AV VTI Index 0.89     TYRON/BSA VTI 1.6 cm2/m2    TYRON/BSA Peak Velocity 1.6 cm2/m2    MV:LVOT VTI Index 0.71     MV Area by PHT 4.0 cm2    MV Area by VTI 6.4 cm2       IMAGING:  MRI Results (most recent):  Results from Hospital Encounter encounter on 08/29/22    MRI CERV SPINE WO CONT    Narrative  EXAM: MRI CERV SPINE WO CONT  CLINICAL HISTORY: Right hand weakness  INDICATION: Right hand weakness,  COMPARISON: None    TECHNIQUE: MR imaging of the cervical spine was performed using the following  sequences: sagittal T1, T2, STIR;  axial T2, T1.    CONTRAST:  None. FINDINGS:    There is mild reversal cervical lordosis. There is no Chiari or syrinx. Vertebral body heights are maintained. Marrow signal is normal. Left vertebral  artery is dominant. The craniocervical junction is intact. The course, caliber, and signal intensity  of the spinal cord are normal.    The paraspinal soft tissues are within normal limits. C2-C3: No herniation or stenosis. C3-C4: No herniation or stenosis. C4-C5: Minimal disc desiccation. Minimal disc bulge. Canal is patent.  Foramina are patent    C5-C6: Minimal disc desiccation. Minimal disc bulge. Canal is patent. Foramina are patent    C6-C7: No herniation or stenosis. C7-T1: No herniation or stenosis. Impression  Trace disc degenerative changes. There is no evidence of canal or foraminal  compromise. There is no cord signal abnormality. Assessment:     Encounter Diagnoses     ICD-10-CM ICD-9-CM   1. Migraine with aura and without status migrainosus, not intractable  G43.109 346.00   2. Right hand pain  M79.641 67.5   20year old female with a h/o mild autism, connective tissue disorder, lymphedema, chronic thrombocytosis/leukocytosis followed by Hematology s/p recent BMBx referred for evaluation of long-standing migraines w/aura since age 12, currently controlled on Aimovig injections. MRI Brain WWO was reviewed from 9/2021 without acute intracranial pathology. There appears to be both a hereditary and hormonal component to her migraines. She reports a slight increase in migraine frequency since her last visit as well as injection site reaction with Aimovig. We discussed transition to Ajovy injections and monitoring for reduction in headache frequency as well as any further injection site reaction. For rescue therapy, I have provided her with samples of Nurtec 75mg PRN and Toradol 10mg PRN. She also mentions right hand pain, intrinsic weakness/incoordination, chronic for over one year not responsive to PT/OT. MRI Cervical spine was completed recently without evidence of significant canal/foraminal stenosis or cord pathology. EMG RUE was also previously completed (Yaya Dick) and normal without evidence of mononeuropathy or cervical radiculopathy. Prior intracranial imaging was performed after symptoms onset without noted pathology. I do not have a clear Neurologic etiology for her right hand pain/weakness. Encourage follow up with her Rheumatologist to determine if underlying connective tissue disorder may be contributing. Headache education  Discussed pathophysiology of headache. Discussed use of headache diary. Discussed treatment options, both abortive and preventive medications. Instructed patient about medications and potential side effects. Discussed medication overuse headache and to limit use of analgesics to less than 2 doses per week. Plan:   Transition to Ajovy 225mg SC injection q30 days for migraine prophylaxis  May use Nurtec 75mg or toradol 10mg PRN for migraine rescue therapy limiting use to twice weekly  Phenergan 25mg PRN for nausea related to migraines  She will follow up at our [address] [place] [address]      Follow-up and Dispositions    Return in about 6 months (around 8/2/2023). I have discussed the diagnosis with the patient today and the intended plan as seen in the above orders with both the patient as well as referring provider and/or PCP via electronic correspondence. The patient has received an after-visit summary and questions were answered concerning future plans. I have discussed medication side effects and warnings with the patient as well.       Signed:  Diamante Woods DO  2/2/2023

## 2023-02-06 ENCOUNTER — PATIENT MESSAGE (OUTPATIENT)
Dept: FAMILY MEDICINE CLINIC | Age: 23
End: 2023-02-06

## 2023-02-06 DIAGNOSIS — M54.50 CHRONIC MIDLINE LOW BACK PAIN WITHOUT SCIATICA: ICD-10-CM

## 2023-02-06 DIAGNOSIS — G89.29 CHRONIC MIDLINE LOW BACK PAIN WITHOUT SCIATICA: ICD-10-CM

## 2023-02-07 ENCOUNTER — OFFICE VISIT (OUTPATIENT)
Dept: ENT CLINIC | Age: 23
End: 2023-02-07
Payer: MEDICARE

## 2023-02-07 VITALS
DIASTOLIC BLOOD PRESSURE: 80 MMHG | SYSTOLIC BLOOD PRESSURE: 115 MMHG | OXYGEN SATURATION: 98 % | HEART RATE: 70 BPM | RESPIRATION RATE: 16 BRPM | WEIGHT: 293 LBS | BODY MASS INDEX: 50.02 KG/M2 | HEIGHT: 64 IN

## 2023-02-07 DIAGNOSIS — Z91.09 ENVIRONMENTAL ALLERGIES: ICD-10-CM

## 2023-02-07 DIAGNOSIS — R09.81 NASAL CONGESTION: ICD-10-CM

## 2023-02-07 DIAGNOSIS — H92.03 OTALGIA, BILATERAL: Primary | ICD-10-CM

## 2023-02-07 DIAGNOSIS — J32.9 RECURRENT SINUS INFECTIONS: ICD-10-CM

## 2023-02-07 PROCEDURE — G8417 CALC BMI ABV UP PARAM F/U: HCPCS | Performed by: OTOLARYNGOLOGY

## 2023-02-07 PROCEDURE — G8427 DOCREV CUR MEDS BY ELIG CLIN: HCPCS | Performed by: OTOLARYNGOLOGY

## 2023-02-07 PROCEDURE — G9717 DOC PT DX DEP/BP F/U NT REQ: HCPCS | Performed by: OTOLARYNGOLOGY

## 2023-02-07 PROCEDURE — 3074F SYST BP LT 130 MM HG: CPT | Performed by: OTOLARYNGOLOGY

## 2023-02-07 PROCEDURE — 3078F DIAST BP <80 MM HG: CPT | Performed by: OTOLARYNGOLOGY

## 2023-02-07 PROCEDURE — 99204 OFFICE O/P NEW MOD 45 MIN: CPT | Performed by: OTOLARYNGOLOGY

## 2023-02-07 NOTE — PROGRESS NOTES
Chief Complaint   Patient presents with    Ear Pain    Sinus Infection     Visit Vitals  /80   Pulse 70   Resp 16   Ht 5' 4\" (1.626 m)   Wt 321 lb (145.6 kg)   SpO2 98%   BMI 55.10 kg/m²     1. Have you been to the ER, urgent care clinic since your last visit? Hospitalized since your last visit? No    2. Have you seen or consulted any other health care providers outside of the [address] [place] [address] since your last visit? Include any pap smears or colon screening.  No

## 2023-02-07 NOTE — PROGRESS NOTES
Otolaryngology-Head and Neck Surgery  New Patient Visit     Patient: Lanie Reddy  YOB: 2000  MRN: [id number]  Date of Service: 2/7/2023    Chief Complaint:   Chief Complaint   Patient presents with    Ear Pain    Sinus Infection         History of Present Illness: Lanie Reddy is a 20y.o. year old female who presents today for discussion of frequent ear infections and sinus pain    Over the last couple of years has had nasal congestion, sinus pain, concern for infections  Also has frequent otalgia and told has fluid in her ears    Has seen a few different providers, including allergy at [facility] as well as [facility] - allergy testing at [facility] did show some allergies and she may have started immunotherapy, but did not continue due to accessibility. Allergy testing at [facility] was apparently unremarkable    Has also seen Otology at [facility], unremarkable     Past Medical History:  Past Medical History:   Diagnosis Date    Abdominal migraine     Dr. Wilberto Velazco. vs Sucrose Intolerance. Acid reflux     ADHD (attention deficit hyperactivity disorder)     Asthma     Autism     High Functioning. Dr. Geeta Sorto. Chronic rhinitis 2021    Dr. Cabrera Mendoza, allergist.     Connective tissue disease [facility])     Developmental delay     Fibromyalgia     Dr. Minh Washington    MIGUEL A (generalized anxiety disorder)     Dr. Simone Medrano    Insulin resistance 09/08/2021    Dr. Lakesha Ritter. maurice Walker    Irritable bowel syndrome with diarrhea 04/13/2017    Lymphedema 2021    BL legs. Dr. Sandhya Gilmore. Alana Vazquez, [facility]ymphedema [facility] Rehab consultants    Migraine headache     Dr. Zi Robin    Obesity, morbid ([facility] [address] 12/12/2017    OCD (obsessive compulsive disorder)     Dr. Simone Medrano    Sucrose intolerance     Dr. Wilberto Velazco, GI. Tachycardia 06/02/2021    Mauri Cobian DO, [facility] Peds cardio    Tenosynovitis of right hand 07/2021    Dr. Babs Gutierrez    Undifferentiated connective tissue disease ([facility] [address]     (lupus like). Dr. Erin Gaston. Past Surgical History:   Past Surgical History:   Procedure Laterality Date    COLONOSCOPY N/A 12/20/2016    COLONOSCOPY performed by Dunia Posey MD at [address] [place] [address] ENDOSCOPY    GERD TST W/ MUCOS [address] ELECTROD 48 HR (BRAVO)  12/3/2020         HC CLP BRAVO  11/30/2020    HX CHOLECYSTECTOMY  06/2019    HX ENDOSCOPY  11/2020    dx'd with sucrose intolerance    HX TONSIL AND ADENOIDECTOMY      HX TONSILLECTOMY      AGE 1    HX WISDOM TEETH EXTRACTION      AL EGD TRANSORAL BIOPSY SINGLE/MULTIPLE  6/14/2019         AL EGD TRANSORAL BIOPSY SINGLE/MULTIPLE  11/30/2020    UPPER GI ENDOSCOPY,BIOPSY  12/20/2016            Medications:   Current Outpatient Medications   Medication Instructions    albuterol (PROVENTIL HFA, VENTOLIN HFA, PROAIR HFA) 90 mcg/actuation inhaler 1 Puff, Inhalation, EVERY 4 HOURS AS NEEDED    ascorbic acid, vitamin C, (VITAMIN C) 500 mg tablet Oral    azelastine (ASTEPRO) 205.5 mcg (0.15 %) 1 Spray, Both Nostrils, 2 TIMES DAILY    b complex vitamins tablet ONE TABLE DAILY    budesonide-glycopyr-formoterol (Breztri Aerosphere) 160-9-4.8 mcg/actuation HFAA 2 Puffs, Inhalation, DAILY    buPROPion XL (WELLBUTRIN XL) 150 mg tablet Take 1 in the AM for 2 weeks, then increase to 1 in the morning and 1 in the afternoon.     cetirizine (ZYRTEC) 10 mg, Oral, EVERY BEDTIME    diphenhydrAMINE (BENADRYL) 25 mg capsule 2 tabs PO q8h PRN    docusate sodium (COLACE) 100 mg, Oral, 2 TIMES DAILY    DULoxetine (CYMBALTA) 120 mg, Oral, DAILY    ferrous sulfate (IRON) 325 mg, Oral, 3 TIMES DAILY WITH MEALS    fremanezumab-vfrm (AJOVY) 225 mg, SubCUTAneous, EVERY MONTH    gabapentin (NEURONTIN) 100 mg, Oral, 3 TIMES DAILY    hydrOXYchloroQUINE (PLAQUENIL) 200 mg tablet TAKE 1 TABLET BY MOUTH  TWICE DAILY    hydrOXYzine HCL (ATARAX) 25 mg tablet TAKE 1 TO 2 TABLETS BY MOUTH TWICE A DAY AS NEEDED FOR ANXIETY    ketorolac (TORADOL) 10 mg, Oral, EVERY 6 HOURS AS NEEDED    lansoprazole (PREVACID) 30 mg, Oral, DAILY BEFORE BREAKFAST    lidocaine (LIDODERM) 5 % Apply patch to the affected area for 12 hours a day and remove for 12 hours a day. metFORMIN ER (GLUCOPHAGE XR) 1,000 mg, Oral, 2 TIMES DAILY BEFORE MEALS    naltrexone (DEPADE) 25 mg, Oral, DAILY    nebivoloL (BYSTOLIC) 5 mg, Oral, DAILY    norethindrone-ethinyl estradiol-iron (Junel FE 1.5/30, 28,) 1.5 mg-30 mcg (21)/75 mg (7) tab TAKE 1 TAB BY MOUTH DAILY. CONTINUOUS PILLS ONLY. omega 3-dha-epa-fish oil (Fish OiL) 100-160-1,000 mg cap 2,000 mg, Oral    ondansetron (ZOFRAN ODT) 8 mg disintegrating tablet PLACE 1 TABLET ON TONGUE & ALLOW TO DISSOLVE EVERY 8 HOURS AS NEEDED FOR NAUSEA WITH HEADACHE    rimegepant (NURTEC) 75 mg, Oral, AS NEEDED    sacrosidase (Sucraid) 8,500 unit/mL soln 1 mL, Oral    spironolactone (ALDACTONE) 50 mg, Oral, DAILY    traZODone (DESYREL) 50 mg tablet No dose, route, or frequency recorded. Vyvanse 30 mg, Oral, DAILY       Allergies:    Allergies   Allergen Reactions    Bactrim [Sulfamethoprim Ds] Other (comments)     Developed oral thrush    Rizatriptan Other (comments)    Sulfa (Sulfonamide Antibiotics) Unknown (comments)    Yeast, Dried Other (comments)     Upset stomach     Flagyl [Metronidazole] Rash     Worsened acneiform rash with TOPICAL flagyl used to treat rosacea    Sucrose Nausea Only     SEVERE ABDOMINAL PAIN       Social History:   Social History     Tobacco Use    Smoking status: Never     Passive exposure: Never    Smokeless tobacco: Never    Tobacco comments:     no smoke exposure in the home   Vaping Use    Vaping Use: Never used   Substance Use Topics    Alcohol use: No    Drug use: No        Family History:  Family History   Problem Relation Age of Onset    Endometriosis Mother     Delayed Awakening Mother     Post-op Nausea/Vomiting Mother     Migraines Mother         d/t accident - neck and brain injury    Atrial Fibrillation Mother     Hypertension Mother     Obesity Mother     No Known Problems Father     Heart Disease Maternal Grandmother     Atrial Fibrillation Maternal Grandmother     Hypertension Maternal Grandmother     Heart Disease Maternal Grandfather     Hypertension Maternal Grandfather     Other Maternal Grandfather         diverticulitis    No Known Problems Paternal Grandmother     No Known Problems Paternal Grandfather     Mult Sclerosis Maternal Aunt     Cancer Maternal Uncle     Lymphoma Other        Review of Systems:    Consitutional: denies fever, excessive weight gain or loss. Eyes: denies diplopia, eye pain. Integumentary: denies new concerning skin lesions. Ears, Nose, Mouth, Throat: denies except as per HPI. Endocrine: denies hot or cold intolerance, increased thirst.  Respiratory: denies cough, hemoptysis, wheezing  Gastrointestinal: denies trouble swallowing, nausea, emesis, regurgitation  Musculoskeletal: denies muscle weakness or wasting  Cardiovascular: denies chest pain, shortness of breath  Neurologic: denies seizures, numbness or tingling, syncope  Hematologic: denies easy bleeding or bruising    Physical Examination:   Vitals:    02/07/23 1313   BP: 115/80   Pulse: 70   Resp: 16   Height: 5' 4\" (1.626 m)   Weight: 321 lb (145.6 kg)   SpO2: 98%        General: Comfortable, pleasant, appears stated age  Voice: Strong, speaking in full sentences, no stridor    Face: No masses or lesions, facial strength symmetric   Ears: External ears unremarkable. Bilateral ear canal clear. Tympanic membrane clear and intact, with visible landmarks. Clear middle ear space  Nose: External nose unremarkable. Dorsum midline. Anterior rhinoscopy demonstrates no lesions. Septum midline. Turbinates with mild hypertrophy  Oral Cavity / Oropharynx: No trismus. Mucosa pink and moist. No lesions. Tongue is midline and mobile. Palate elevates symmetrically. Uvula midline. Tonsils unremarkable. Base of tongue soft. Floor of mouth soft. Neck: Supple. No adenopathy. Thyroid unremarkable. Palpable laryngeal landmarks.  Full neck range of motion   Neurologic: CN II - XI intact. Normal gait      Assessment and Plan:   Nasal congestion  Allergic rhinitis  Recurrent sinusitis  Otalgia  - Discussed ear exam today is reassuring  - Will see if we can review her prior allergy test results to see if repeat testing or IT would be beneficial  - She had MRI head in 2021 which did not show sinus disease so will hold on CT sinus for now  - She is following with [facility] for asthma and dupixent is being considered  - Discussed this may also have allergy/sinus benefits so would hold on additional Rx if dupixent to be started          The patient was instructed to return to clinic if no improvement or progression of symptoms. Signs to watch out for reviewed.       MD [address] [facility]  [address] [place] [address] [address]  [facility]  Office Phone: [phone]

## 2023-02-08 RX ORDER — LIDOCAINE 50 MG/G
PATCH TOPICAL
Qty: 30 EACH | Refills: 0 | Status: SHIPPED | OUTPATIENT
Start: 2023-02-08

## 2023-02-08 NOTE — TELEPHONE ENCOUNTER
From: Madeline Ferguson  To: Chris Richard PA-C  Sent: 2/6/2023 12:40 PM EST  Subject: Lidocaine patches     Hi! I have been waiting for the lidocaine patches to come in at the pharmacy but they just told me that they can not get them in. I was wondering if you had something else like them to use you could call in? Or if you knew if they are at any other pharmacy?

## 2023-02-10 ENCOUNTER — PATIENT MESSAGE (OUTPATIENT)
Dept: CARDIOLOGY CLINIC | Age: 23
End: 2023-02-10

## 2023-02-16 DIAGNOSIS — M79.2 NEUROPATHIC PAIN: ICD-10-CM

## 2023-02-16 RX ORDER — GABAPENTIN 100 MG/1
CAPSULE ORAL
Qty: 90 CAPSULE | Refills: 1 | Status: SHIPPED | OUTPATIENT
Start: 2023-02-16

## 2023-02-23 ENCOUNTER — OFFICE VISIT (OUTPATIENT)
Dept: FAMILY MEDICINE CLINIC | Age: 23
End: 2023-02-23
Payer: MEDICARE

## 2023-02-23 ENCOUNTER — TELEPHONE (OUTPATIENT)
Dept: CARDIOLOGY CLINIC | Age: 23
End: 2023-02-23

## 2023-02-23 VITALS
RESPIRATION RATE: 18 BRPM | BODY MASS INDEX: 50.02 KG/M2 | OXYGEN SATURATION: 100 % | HEIGHT: 64 IN | WEIGHT: 293 LBS | SYSTOLIC BLOOD PRESSURE: 128 MMHG | HEART RATE: 87 BPM | DIASTOLIC BLOOD PRESSURE: 74 MMHG | TEMPERATURE: 98.1 F

## 2023-02-23 DIAGNOSIS — J30.1 SEASONAL ALLERGIC RHINITIS DUE TO POLLEN: ICD-10-CM

## 2023-02-23 DIAGNOSIS — E61.1 IRON DEFICIENCY: Primary | ICD-10-CM

## 2023-02-23 DIAGNOSIS — M79.7 FIBROMYALGIA: ICD-10-CM

## 2023-02-23 PROCEDURE — G8417 CALC BMI ABV UP PARAM F/U: HCPCS | Performed by: FAMILY MEDICINE

## 2023-02-23 PROCEDURE — G9717 DOC PT DX DEP/BP F/U NT REQ: HCPCS | Performed by: FAMILY MEDICINE

## 2023-02-23 PROCEDURE — 99214 OFFICE O/P EST MOD 30 MIN: CPT | Performed by: FAMILY MEDICINE

## 2023-02-23 PROCEDURE — 3074F SYST BP LT 130 MM HG: CPT | Performed by: FAMILY MEDICINE

## 2023-02-23 PROCEDURE — 3078F DIAST BP <80 MM HG: CPT | Performed by: FAMILY MEDICINE

## 2023-02-23 PROCEDURE — G8427 DOCREV CUR MEDS BY ELIG CLIN: HCPCS | Performed by: FAMILY MEDICINE

## 2023-02-23 RX ORDER — ZINC GLUCONATE 50 MG
TABLET ORAL
COMMUNITY

## 2023-02-23 NOTE — PROGRESS NOTES
Dana Looney is a 20 y.o. female , id x 2(name and ). Reviewed record, history, and  medications. Chief Complaint   Patient presents with    Follow-up    Labs       Vitals:    23 0957   BP: 128/74   Pulse: 87   Resp: 18   Temp: 98.1 °F (36.7 °C)   TempSrc: Oral   SpO2: 100%   Weight: 316 lb 14.4 oz (143.7 kg)   Height: 5' 4\" (1.626 m)       Coordination of Care Questionnaire:   1. Have you been to the ER, urgent care clinic since your last visit? Hospitalized since your last visit? Yes [facility]    2. Have you seen or consulted any other health care providers outside of the [address] [place] [address] since your last visit? Include any pap smears or colon screening.  No

## 2023-02-23 NOTE — PROGRESS NOTES
Trudy Frankel (: 2000) is a 20 y.o. female, established patient, here for evaluation of the following chief complaint(s):  Follow-up and Labs         ASSESSMENT/PLAN:  Below is the assessment and plan developed based on review of pertinent history, physical exam, labs, studies, and medications. 1. Iron deficiency  Recheck labs, she will take slip to [facility]  She has very limited diet and is now on oral iron   -     IRON PROFILE; Future  -     FERRITIN; Future    2. Seasonal allergic rhinitis due to pollen  Continue antihistamine, nasal spray, Singulair, and prednisone from pulmonology   -as nasal congestion has only been present x 6 days will hold off on abx at this time as she has had numerous courses of abx in the last 6 mo. If sx persist >10 days or fever, productive cough, worsening sinus pain develop patient will return to office     3. Fibromyalgia  -pain is well controlled with low dose gabapentin, continue gabapentin 100mg daily    Follow up 3 mo      SUBJECTIVE/OBJECTIVE:  Chief Complaint   Patient presents with    Follow-up    Labs     Patient is a 69-year-old female present office to recheck iron. Iron is found to be low several months ago, persistently low despite once daily multivitamin she was started once a lower iron. She has a very limited diet, she does not have menstrual cycles as she is on continuous OCP. She has never had an anemia associate with iron deficiency. She does have 3-month CBCs checked by [facility] hematology for monitoring due to genetic abnormality that could increase her likelihood of developing leukemia in the future. She is tolerating once daily oral iron okay. She does take this with meals that she feels nauseous when taking it without meals. She does not have any diarrhea, constipation, nausea, epigastric discomfort. Allergies and asthma have been worse over the last 2 weeks with the change in weather and increased pollen counts.   She reports nasal congestion, mild sinus pressure that is not worsening with no fevers. No mucopurulent nasal discharge. She does have a dry cough and wheezing throughout the day and she is on prednisone currently and using albuterol more frequently per guidance of her pulmonologist.  No ear pains, chest pain, difficulty breathing. She did start [address] from her pulmonologist.  She reports her ENT doctor as well as dermatologist, she now sees [address] [place] [address] dermatology, hopeful that this medication will help her overall symptoms of allergies, asthma, skin lesions. Per report of patient, she had skin biopsy with  dermatology which showed eczematous like appearance of the lesion but they are not confident this is what is causing her symptoms. They believe it may be hidradenitis suppurativa or pickers nodularis. She reports her chronic pain she is taking gabapentin once per day and this is controlling fibromyalgia and neuropathic pain well. She does not wish to increase this medication at this time. She continues to work with PT for low back pain and this is improving. Current Outpatient Medications on File Prior to Visit   Medication Sig Dispense Refill    dupilumab (DUPIXENT SYRINGE SC) by SubCUTAneous route. Every 14 days      ubrogepant Cristhian Afshin) 100 mg tablet Take 100 mg by mouth once as needed for Migraine. Magnesium Oxide 500 mg cap Take  by mouth. ZINC PO Take  by mouth.      gabapentin (NEURONTIN) 100 mg capsule TAKE ONE CAPSULE BY MOUTH THREE TIMES A DAY **MAX DAILY AMOUNT: 300MG** 90 Capsule 1    montelukast (SINGULAIR) 10 mg tablet Take 1 Tablet by mouth daily. 90 Tablet 1    fremanezumab-vfrm (AJOVY) 225 mg/1.5 mL auto-injector 1.5 mL by SubCUTAneous route every month. 1.5 mL 5    rimegepant (NURTEC) 75 mg disintegrating tablet Take 1 Tablet by mouth as needed for Migraine. 8 Tablet 5    nebivoloL (BYSTOLIC) 5 mg tablet Take 1 Tablet by mouth daily.  90 Tablet 3    ferrous sulfate (IRON) 325 mg (65 mg iron) EC tablet Take 1 Tablet by mouth three (3) times daily (with meals). 30 Tablet 2    docusate sodium (COLACE) 100 mg capsule Take 1 Capsule by mouth two (2) times a day for 90 days. 60 Capsule 2    metFORMIN ER (GLUCOPHAGE XR) 500 mg tablet Take 2 Tablets by mouth Before breakfast and dinner. 360 Tablet 1    norethindrone-ethinyl estradiol-iron (Junel FE 1.5/30, 28,) 1.5 mg-30 mcg (21)/75 mg (7) tab TAKE 1 TAB BY MOUTH DAILY. CONTINUOUS PILLS ONLY. 3 Dose Pack 1    cetirizine (ZYRTEC) 10 mg tablet Take 1 Tablet by mouth nightly. 90 Tablet 1    azelastine (ASTEPRO) 205.5 mcg (0.15 %) 1 [place] by Both Nostrils route two (2) times a day. 1 Each 2    hydrOXYchloroQUINE (PLAQUENIL) 200 mg tablet TAKE 1 TABLET BY MOUTH  TWICE DAILY 180 Tablet 3    naltrexone (DEPADE) 50 mg tablet Take 0.5 Tablets by mouth daily. 45 Tablet 1    buPROPion XL (WELLBUTRIN XL) 150 mg tablet Take 1 in the AM for 2 weeks, then increase to 1 in the morning and 1 in the afternoon. 180 Tablet 1    Vyvanse 30 mg capsule Take 30 mg by mouth daily. DULoxetine (CYMBALTA) 60 mg capsule Take 120 mg by mouth daily. budesonide-glycopyr-formoterol (Breztri Aerosphere) 160-9-4.8 mcg/actuation HFAA Take 2 Puffs by inhalation daily. omega 3-dha-epa-fish oil (Fish OiL) 100-160-1,000 mg cap Take 2,000 mg by mouth. albuterol (PROVENTIL HFA, VENTOLIN HFA, PROAIR HFA) 90 mcg/actuation inhaler Take 1 Puff by inhalation every four (4) hours as needed for Wheezing. traZODone (DESYREL) 50 mg tablet       lansoprazole (PREVACID) 30 mg capsule Take 30 mg by mouth Daily (before breakfast). ascorbic acid, vitamin C, (VITAMIN C) 500 mg tablet Take  by mouth.       sacrosidase (Sucraid) 8,500 unit/mL soln Take 1 mL by mouth.      b complex vitamins tablet ONE TABLE DAILY      ondansetron (ZOFRAN ODT) 8 mg disintegrating tablet PLACE 1 TABLET ON TONGUE & ALLOW TO DISSOLVE EVERY 8 HOURS AS NEEDED FOR NAUSEA WITH HEADACHE      diphenhydrAMINE (BENADRYL) 25 mg capsule 2 tabs PO q8h PRN      lidocaine (LIDODERM) 5 % Apply patch to the affected area for 12 hours a day and remove for 12 hours a day. (Patient not taking: Reported on 2/23/2023) 30 Each 0    ketorolac (TORADOL) 10 mg tablet Take 1 Tablet by mouth every six (6) hours as needed for Pain (Limit use to no more than twice weekly). (Patient not taking: Reported on 2/23/2023) 30 Tablet 0    spironolactone (ALDACTONE) 50 mg tablet Take 50 mg by mouth daily. (Patient not taking: Reported on 2/23/2023)      hydrOXYzine HCL (ATARAX) 25 mg tablet TAKE 1 TO 2 TABLETS BY MOUTH TWICE A DAY AS NEEDED FOR ANXIETY (Patient not taking: Reported on 2/23/2023)       No current facility-administered medications on file prior to visit.      Patient Active Problem List    Diagnosis Date Noted    Cardiomyopathy, unspecified type ([facility] [address] 01/19/2023    Severe persistent asthma with acute exacerbation 12/21/2022    Type 2 diabetes mellitus 12/13/2022    Gene mutation 11/17/2022    Neuropathic pain 11/17/2022    Undifferentiated connective tissue disease ([address] 11/17/2022    Disease of hematopoietic system 09/16/2022    Other eosinophilia 06/07/2022    Elevated sed rate 05/20/2022    Neutrophilia 05/20/2022    Monocytosis 05/20/2022    Other thrombocytosis 04/28/2022    Elevated C-reactive protein (CRP) 04/28/2022    Skin lesions 04/28/2022    Migraine headache     Autism     MIGUEL A (generalized anxiety disorder)     Tachycardia     Fibromyalgia     Sucrose intolerance     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[address]     ADHD (attention deficit hyperactivity disorder)     Acid reflux     SLE (systemic lupus erythematosus) ([address]     History of prediabetes 07/20/2021    Lipedema 2021    Tenosynovitis of right hand 2021    Obesity, morbid ([address] 12/14/2020    Chronic gastritis without bleeding 06/20/2019    Chronic cholecystitis 06/04/2019    Biliary dyskinesia 06/04/2019    Chronic nausea 05/16/2019    Atypical depression 02/28/2018 Hypertension 02/20/2018    Lipids abnormal 02/20/2018    Edema, peripheral 09/13/2017    Allergy to yeast 09/13/2017    Allergy to chocolate 09/13/2017    Gastroesophageal reflux disease without esophagitis 05/15/2017    Irritable bowel syndrome with diarrhea 04/13/2017    Gastroparesis 01/19/2017    Medication overuse headache 09/21/2015    Autism spectrum disorder 09/21/2015    Anxiety 09/21/2015     Vitals:    02/23/23 0957   BP: 128/74   Pulse: 87   Resp: 18   Temp: 98.1 °F (36.7 °C)   TempSrc: Oral   SpO2: 100%   Weight: 316 lb 14.4 oz (143.7 kg)   Height: 5' 4\" (1.626 m)        Physical Exam  Constitutional:       Appearance: Normal appearance. HENT:      Head: Normocephalic and atraumatic. Nose: Congestion present. No rhinorrhea. Mouth/Throat:      Pharynx: No oropharyngeal exudate or posterior oropharyngeal erythema. Eyes:      Extraocular Movements: Extraocular movements intact. Conjunctiva/sclera: Conjunctivae normal.      Pupils: Pupils are equal, round, and reactive to light. Cardiovascular:      Rate and Rhythm: Normal rate and regular rhythm. Pulses: Normal pulses. Heart sounds: Normal heart sounds. Pulmonary:      Effort: Pulmonary effort is normal.      Breath sounds: Normal breath sounds. Abdominal:      General: Abdomen is flat. Bowel sounds are normal.      Palpations: Abdomen is soft. Musculoskeletal:      Cervical back: Normal range of motion and neck supple. No rigidity or tenderness. Right lower leg: No edema. Left lower leg: No edema. Lymphadenopathy:      Cervical: No cervical adenopathy. Neurological:      Mental Status: She is alert. Psychiatric:         Mood and Affect: Mood normal.         Behavior: Behavior normal.           An electronic signature was used to authenticate this note.   -- Jaden Bucio PA-C

## 2023-02-24 ENCOUNTER — TELEPHONE (OUTPATIENT)
Dept: CARDIOLOGY CLINIC | Age: 23
End: 2023-02-24

## 2023-02-24 DIAGNOSIS — R00.0 TACHYCARDIA: Primary | ICD-10-CM

## 2023-02-24 LAB
25(OH)D3+25(OH)D2 SERPL-MCNC: 119 NG/ML (ref 30–100)
FERRITIN SERPL-MCNC: 25 NG/ML (ref 15–150)
IRON SATN MFR SERPL: 29 % (ref 15–55)
IRON SERPL-MCNC: 126 UG/DL (ref 27–159)
TIBC SERPL-MCNC: 432 UG/DL (ref 250–450)
UIBC SERPL-MCNC: 306 UG/DL (ref 131–425)

## 2023-02-24 NOTE — PROGRESS NOTES
[employer] message sent to patient with results:  Tae Maldonado,  Your labs show your iron levels are great. However your it looks like they renzo your vitamin D level although I did not order that yesterday, it may have been on another lab slip. Regardless, it is showing high again, the same level as 3 months ago. Do you know if another provider ordered this? Regardless, can you send me which vitamins you are taking currently? Did you restart a Vitamin D supplement?   Cipriano Rooney PA-C

## 2023-02-24 NOTE — PROGRESS NOTES
Called and spoke with patient regarding monitor results. Per Dr. Son Torres. Holter 2/10/23 - 1 day - NSR, Avg  bpm m(), Tachy 52% of the time.   ---> HR still fast most of the time- Possibly inappropriate sinus tachycardia   Has previously been intolerant of BB / CCB although has been on Bystolic. Will add Ivabradine 5 mg BID, patient agreeable. Will have pt see NP in 1 month for follow-up. Should check EKG at visit due to Qtc prolongation with hydroxychloroquine.

## 2023-02-27 ENCOUNTER — PATIENT MESSAGE (OUTPATIENT)
Dept: RHEUMATOLOGY | Age: 23
End: 2023-02-27

## 2023-02-28 NOTE — TELEPHONE ENCOUNTER
Swati Garcia LPN 5/22/7410 9:00 PM EST      ----- Message -----  From: Analy Balderrama  Sent: 2/27/2023 12:01 PM EST  To: [facility] Nurse Pool  Subject: Lab Work     Hi! I did the bloodwork you ordered last visit on 2/23/23 I was wondering if you got the results yet? I know I did it early but I needed to do lab work for 2 other drs and wanted to get it all done at once if possible. I am asking because I got a notification this morning from my chart that said I had lab work that needed to be done and I wanted to make sure when I got it done they did the right thing or do I need to go back and do more.

## 2023-03-23 ENCOUNTER — OFFICE VISIT (OUTPATIENT)
Dept: RHEUMATOLOGY | Age: 23
End: 2023-03-23
Payer: MEDICARE

## 2023-03-23 VITALS — HEART RATE: 91 BPM | TEMPERATURE: 97.6 F | RESPIRATION RATE: 16 BRPM | OXYGEN SATURATION: 98 %

## 2023-03-23 DIAGNOSIS — L71.9 ROSACEA: ICD-10-CM

## 2023-03-23 DIAGNOSIS — M35.9 UNDIFFERENTIATED CONNECTIVE TISSUE DISEASE (HCC): Primary | ICD-10-CM

## 2023-03-23 DIAGNOSIS — Z79.899 LONG-TERM USE OF HYDROXYCHLOROQUINE: ICD-10-CM

## 2023-03-23 DIAGNOSIS — M79.7 FIBROMYALGIA: ICD-10-CM

## 2023-03-23 PROCEDURE — G8417 CALC BMI ABV UP PARAM F/U: HCPCS | Performed by: INTERNAL MEDICINE

## 2023-03-23 PROCEDURE — 99214 OFFICE O/P EST MOD 30 MIN: CPT | Performed by: INTERNAL MEDICINE

## 2023-03-23 PROCEDURE — G9717 DOC PT DX DEP/BP F/U NT REQ: HCPCS | Performed by: INTERNAL MEDICINE

## 2023-03-23 PROCEDURE — G8427 DOCREV CUR MEDS BY ELIG CLIN: HCPCS | Performed by: INTERNAL MEDICINE

## 2023-03-23 NOTE — PROGRESS NOTES
Chief Complaint   Patient presents with    Joint Pain     1. Have you been to the ER, urgent care clinic since your last visit? Hospitalized since your last visit? No    2. Have you seen or consulted any other health care providers outside of the [address] [place] [address] since your last visit? Include any pap smears or colon screening.  Yes

## 2023-03-23 NOTE — PROGRESS NOTES
REASON FOR VISIT:   Coco Ball is a 20 y.o. female with history of migraines and ADHD who is returning for a followup of undifferentiated connective tissue disease c/b tachycardia, prurigo nodularis, polymorphic light eruption, livedo, and +WENDY 1:320 speckled and 1:640 nuclear dot patterns. HISTORY OF PRESENT ILLNESS    Pt returns for a follow up. LV 12/20/2022. Has now received 2 doses of Dupixent for asthma and atopic dermatitis. No obvious changes so far, though no new bumps have appeared. More brain fog. Tired all the time. Working 3 days/week and going to school. Goes to sleep around 11, sleeps until [address] [place] [address] Drive she is a light sleeper and still never feels refreshed. About 2 years ago had a sleep study she thinks was normal. Still taking cetirizine 10mg nightly. Feels brain fog is worse over the last couple of months but was PCP switched her from pregabalin to gabapentin on 1/30. Feels sore all over. In PT, working all over. Occasional transient shooting pains down leg. Noticing a fast tremor in the hands. Sees ophthalmology every Fall for Plaquenil toxicity monitoring. Doing labs every 3 months and visits every 6 months now for underlying FLT3 mutation of unclear clinical significance. Disease History:  Since 4 or 6yo, has had recurrent nodules on the skin diffusely--face, arms, legs, abdomen. Can drain pus. Has been treated over the years with oral antibiotics and topical antibiotics which help; swabs have been MRSA-positive in the past. Has tried Hibiclens baths in the past repeatedly without improvement. Pruritic, seem to respond to topical Mupirocin. Has had shave biopsy with Derm at [facility] in the past,punch biopsy in May 2019 was interpreted as mixed dermal inflammation with features of an excoriated hypersensitivity reaction; last seen in November 2020 when diagnosed with keratosis pilaris, rosacea, and neurodermatitis.   In February, PCP ordered labs including an WENDY screen which returned +1:320 speckled and 1:640 nuclear dot pattern, with mild elevations of ESR (26) and CRP (17mg/L). Turns \"red as a lobster\" in the sun even with sunscreen use, has always been the case. No chronic cough or progressive dyspnea on exertion. Nonrefreshing sleep and always fatigued in the evening. Runs to the bathroom repeatedly through the evening, doesn't feel she can reduce her fluid intake in the afternoon/evenings 2/2 chronic thirst and dry mouth. Sleep study ~3 years ago she says was normal.  Has more subjective eye dryness, not currently using AT's consistently or following with ophthalmology. Cold intolerant, feels hands and feet always feel like ice, feels worse over the last 2 years. Gaining weight again after losing nearly 100 pounds with metformin; has gained 15 pounds in the last 3 months. Feels digestive issues (possible gastroparesis in the past with postprandial pain, biliary dyskinesis s/p cholecystectomy) have resolved since starting a sucrose intolerance diet. Stool softeners haven't helped in the past. On current diet she is having daily bowel movements without abdominal pain. Knees ache. Denies joint swelling. Has sprained ankles in past and now feels wrists get more sore with use. REVIEW OF SYSTEMS  A comprehensive review of systems was negative except for that written in the HPI. A 10-point review of systems is per the new patient questionnaire, which has been reviewed extensively and scanned into the electronic medical record for future reference. Review of systems is as above and is otherwise negative. ALLERGIES  Bactrim [sulfamethoprim ds]; Rizatriptan; Sulfa (sulfonamide antibiotics); Yeast, dried; Flagyl [metronidazole]; and Sucrose    MEDICATIONS  Current Outpatient Medications   Medication Sig    ivabradine (CORLANOR) 5 mg tablet Take 1 Tablet by mouth two (2) times daily (with meals). dupilumab (DUPIXENT SYRINGE SC) by SubCUTAneous route.  Every 14 days ubrogepant (UBRELVY) 100 mg tablet Take 100 mg by mouth once as needed for Migraine. Magnesium Oxide 500 mg cap Take  by mouth. ZINC PO Take  by mouth.    gabapentin (NEURONTIN) 100 mg capsule TAKE ONE CAPSULE BY MOUTH THREE TIMES A DAY **MAX DAILY AMOUNT: 300MG**    montelukast (SINGULAIR) 10 mg tablet Take 1 Tablet by mouth daily. fremanezumab-vfrm (AJOVY) 225 mg/1.5 mL auto-injector 1.5 mL by SubCUTAneous route every month. rimegepant (NURTEC) 75 mg disintegrating tablet Take 1 Tablet by mouth as needed for Migraine. nebivoloL (BYSTOLIC) 5 mg tablet Take 1 Tablet by mouth daily. ferrous sulfate (IRON) 325 mg (65 mg iron) EC tablet Take 1 Tablet by mouth three (3) times daily (with meals). docusate sodium (COLACE) 100 mg capsule Take 1 Capsule by mouth two (2) times a day for 90 days. metFORMIN ER (GLUCOPHAGE XR) 500 mg tablet Take 2 Tablets by mouth Before breakfast and dinner. norethindrone-ethinyl estradiol-iron (Junel FE 1.5/30, 28,) 1.5 mg-30 mcg (21)/75 mg (7) tab TAKE 1 TAB BY MOUTH DAILY. CONTINUOUS PILLS ONLY. cetirizine (ZYRTEC) 10 mg tablet Take 1 Tablet by mouth nightly. azelastine (ASTEPRO) 205.5 mcg (0.15 %) 1 [place] by Both Nostrils route two (2) times a day.    hydrOXYchloroQUINE (PLAQUENIL) 200 mg tablet TAKE 1 TABLET BY MOUTH  TWICE DAILY    naltrexone (DEPADE) 50 mg tablet Take 0.5 Tablets by mouth daily. buPROPion XL (WELLBUTRIN XL) 150 mg tablet Take 1 in the AM for 2 weeks, then increase to 1 in the morning and 1 in the afternoon. Vyvanse 30 mg capsule Take 30 mg by mouth daily. DULoxetine (CYMBALTA) 60 mg capsule Take 120 mg by mouth daily. budesonide-glycopyr-formoterol (Breztri Aerosphere) 160-9-4.8 mcg/actuation HFAA Take 2 Puffs by inhalation daily. omega 3-dha-epa-fish oil (Fish OiL) 100-160-1,000 mg cap Take 2,000 mg by mouth.     albuterol (PROVENTIL HFA, VENTOLIN HFA, PROAIR HFA) 90 mcg/actuation inhaler Take 1 Puff by inhalation every four (4) hours as needed for Wheezing. traZODone (DESYREL) 50 mg tablet     lansoprazole (PREVACID) 30 mg capsule Take 30 mg by mouth Daily (before breakfast). ascorbic acid, vitamin C, (VITAMIN C) 500 mg tablet Take  by mouth. sacrosidase (Sucraid) 8,500 unit/mL soln Take 1 mL by mouth.    b complex vitamins tablet ONE TABLE DAILY    ondansetron (ZOFRAN ODT) 8 mg disintegrating tablet PLACE 1 TABLET ON TONGUE & ALLOW TO DISSOLVE EVERY 8 HOURS AS NEEDED FOR NAUSEA WITH HEADACHE    diphenhydrAMINE (BENADRYL) 25 mg capsule 2 tabs PO q8h PRN     No current facility-administered medications for this visit. PAST MEDICAL HISTORY  Past Medical History:   Diagnosis Date    Abdominal migraine     Dr. Carly Chris. vs Sucrose Intolerance. Acid reflux     ADHD (attention deficit hyperactivity disorder)     Asthma     Autism     High Functioning. Dr. Mónica Christianson. Chronic rhinitis 2021    Dr. Narda Garcia, allergist.     Connective tissue disease [facility])     Developmental delay     Fibromyalgia     Dr. Manuel Jean Baptiste    MIGUEL A (generalized anxiety disorder)     Dr. Rupinder Paz    Insulin resistance 09/08/2021    Dr. Zeenat Campos. Denise, maurice    Irritable bowel syndrome with diarrhea 04/13/2017    Lymphedema 2021    BL legs. Dr. Kristopher Cheek. Patrick Shahid[facility]    Migraine headache     Dr. Yusef Winston    Obesity, morbid ([facility] 12/12/2017    OCD (obsessive compulsive disorder)     Dr. Rupinder Paz    Sucrose intolerance     Dr. Carly Chris, GI. Tachycardia 06/02/2021    Mauri Cobian DO, [facility]    Tenosynovitis of right hand 07/2021    Dr. Abigail English    Undifferentiated connective tissue disease ([facility]     (lupus like). Dr. Nannette Lee.        FAMILY HISTORY  family history includes Atrial Fibrillation in her maternal grandmother and mother; Cancer in her maternal uncle; Delayed Awakening in her mother; Endometriosis in her mother; Heart Disease in her maternal grandfather and maternal grandmother; Hypertension in her maternal grandfather, maternal grandmother, and mother; Lymphoma in an other family member; Migraines in her mother; Mult Sclerosis in her maternal aunt; No Known Problems in her father, paternal grandfather, and paternal grandmother; Obesity in her mother; Other in her maternal grandfather; Post-op Nausea/Vomiting in her mother. Mother diagnosed with sarcoidosis. Father has gout. Maternal aunt has MS.    SOCIAL HISTORY  She  reports that she has never smoked. She has never been exposed to tobacco smoke. She has never used smokeless tobacco. She reports that she does not drink alcohol and does not use drugs. Social History     Social History Narrative    No known chemical exposures. 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 Eyes: Sclera are anicteric. No conjunctival injection. HEENT:  Oropharynx is clear. No oral ulcers. Adequate salivary pooling. No cervical or supraclavicular lymphadenopathy. Lungs:  Clear to auscultation bilaterally, without wheeze or stridor. Normal respiratory effort. Cor:  Regular rate and rhythm. No murmur rub or gallop. Abdomen: Soft, non-tender, without hepatomegaly or masses. Extremities: No calf tenderness or edema. Diffuse LE > UE swelling in keeping with lipedema. Warm and well perfused, no active Raynaud's currently. Skin:  Erythema and xerosis over bilateral upper and lower extremities, persistent midfacial erythema including nasolabial folds most c/w rosacea. Neuro: Nonfocal, no foot or wrist drop  Musculoskeletal:    A comprehensive musculoskeletal exam was performed for all joints of each upper and lower extremity and assessed for swelling, tenderness and range of motion.  Results are documented as below:    No evidence of synovitis in the small joints of the hands, wrists, shoulders, elbows, hips, knees or ankles. Labs:  11/18/22: Ferritin 18, Vitamin D 119, Iron % saturation 14, DHEA Sulfate 48, Testosterone 19, Free testosterone 0.9, 17-OH Progesterone <10  11/4/22: WBC 14.4, HGB 15.4, Plt 553  10/11/22: CMV IgG Ab neg, WBC 11.6, HGB 15.2, Plt 467  8/25/22: CBC WNL, PT 12, INR 0.9, Ferritin 19, Transferrin 366, Iron 62, Transferrin Sat 12, Vit B12 538, Folate 11, Erythopoietin 19.1  7/6/22: WBC 12.1, HGB 14.5, Plt 479, Immunoglobulin E 298, ESR 15, Cr 0.69, LFT WNL, CRP 21 mg/L  5/31/22: WBC 13.5, HGB 14.6, Plt 513  5/26/22: WBC 18.9, HGB 14.7, Plt 443, , CRP 12 mg/L, ESR 17  4/28/22: , CRP 0.34 mg/dL, WBC 18.9, HGB 14.7, Plt 511, Jak2 negative,   4/5/22: WBC 12.0, Hgb 14., Plt 509; Tbili 0.4, AST 7, ALT 21, AlkP 73, Tprot 8.2, Alb 3.4, INR 0.9  9/15/21: CRP 13mg/L, C3 179 (high), C4 30; UA neg for blood, +trace protein. 9/7/21: WBC 11.4, HJgb 16.2, Hct 49.6, Plt 534; aerobic wound culture with scant growth of mixed skin babar. 9/3/21: WBC 8.9, Hgb 15.0, Plt 483, ESR 7, Cr 0.71, LFTs WNL, CRP 17mg/L;   6/17/21: WBC 6.9, Hgb 16.8, Plt 451, ESR 5, CRP 5mg/L  3/10/21: CRP 21mg/L, CPK 57, Marissa neg, RDL myositis panel neg, cardiolipin antibodies   2/24/21: WBC 8.9, Hgb 15.6, Plt 472; ANCAs negative, PR3 and MPO negative; Cr 0.7, CMP WNL;  WENDY 1:320 speckled, 1:640 nuclear dot; C3 high, C4 WNL; negative dsDNA, Scl70, SSA, SSB, RNP, Sm, ribosomal P, chromatin, centromere B antibodies. RF <10, CCP negative; ESR 26, CRP 17mg/L. Hep B cAb neg, Hep B sAg neg, Hep C Ab neg    Pathology:  5/8/19: [facility] Surgical pathology final report, Susie Sarkar MD:  Skin, right lower leg, punch biopsy:  -Ulcer and mixed dermal inflammation (see comment). Sections reveal a punch biopsy to the depth of the mid dermis. There is a zone of ulceration with fibrinous crust containing neutrophils.  In the superficial to mid dermis, there is a mildly dense perivascular lymphohistiocytic infiltrate with scattered neutrophils and eosinophils. PAS stain is negative for fungal organisms. Taken together, the findings are those of ulceration and mixed dermal inflammation. The features are suggestive of an excoriated hypersensitivity reaction, such as that seen in arthropod bites. Diagnostic features of folliculitis are not seen. Imaging:    XR ANKLE RT MIN 3 V (9/5/22): No acute fracture or dislocation. MRI CERV SPINE WO CONT (8/29/22): Trace disc degenerative changes. There is no evidence of canal or foraminal  compromise. There is no cord signal abnormality. XR SPINE CERV W OBL/FLEX/EXT MIN 6 V COMP (8/23/22): Normal cervical spine. . No instability    9/29/21 MR brain:  Normal MRI of the brain. No intracranial mass, hemorrhage or evidence of acute infarction. 1/17/19 CXR (report only): Normal chest views. No change. ASSESSMENT AND PLAN  Ms. Leroy Pérez is a 20 y.o. female with high-functioning autism and lipedema who presents for followup of undifferentiated connective tissue disease with resting tachycardia, pruritic nodules, polymorphic light eruption, livedo, and +WENDY 1:320 speckled and 1:640 nuclear dot patterns as well as a FLT3 mutation of unclear significance. Continuing Plaquenil (hydroxychloroquine) for prevention of deep organ damage, and agree with a trial of Dupixent. Will continue to hold immunosuppression as she has no evident synovitis today, and this may increase malignancy risk. Referring once more to Dermatology as she does have concurrent facial rash more c/w rosacea though poor response to topical Flagyl. 1. [facility]different[facility] ([facility]  - Cont Plaquenil 400mg/day, pt will try to take doses with food  - SED RATE (ESR); Future  - C REACTIVE PROTEIN, QT; Future  - COMPLEMENT, C3 & C4; Future  - METABOLIC PANEL, COMPREHENSIVE; Future  - CBC WITH AUTOMATED DIFF;  Future  - URINALYSIS W/MICROSCOPIC; Future  - PROTEIN/CREATININE RATIO, URINE; Future    2. Rosacea  - REFERRAL TO DERMATOLOGY    3. Prurigo nodularis  - REFERRAL TO DERMATOLOGY  - SED RATE (ESR); Future  - C REACTIVE PROTEIN, QT; Future  - COMPLEMENT, C3 & C4; Future  - METABOLIC PANEL, COMPREHENSIVE; Future  - CBC WITH AUTOMATED DIFF; Future  - URINALYSIS W/MICROSCOPIC; Future  - PROTEIN/CREATININE RATIO, URINE; Future    4. Lipedema  - Pt to revisit compression pump for LE's after New Year  - REFERRAL TO DERMATOLOGY    5. Fibromyalgia  -Fibromyalgia is a disease characterized by chronic widespread musculoskeletal pain. Fibromyalgia is caused by abnormal processing of pain signals in the central nervous system, leading to exaggerated pain responses. Non-pharmacologic therapies such as cardiovascular exercise and Cognitive Behavioral Therapy have been shown to be of benefit ([facility], Am J Med 2009). Dre Chi in particular has proven efficacy in the treatment of fibromyalgia NUNU Bedoya 2010). If pharmacotherapy is pursued, pregabalin (Lyrica), gabapentin (Neurontin), milnacipran (Roshan Chester), and duloxetine (Cymbalta) are FDA approved medications for the treatment of fibromyalgia. Narcotics have not been proven to be efficacious in the treatment of fibromyalgia. In fact, narcotic use in this patient population has been observed to exacerbate depression, and may enhance the hyperalgesia which is characteristic of this condition (Chau Murdock Rheum 2006). They also are at increased risk for opioid-induced hyperalgesia due predominantly to central sensitization [place] Gallagher al. Vitaliy [facility] Clin Rheumatol. 2013 Mar;19(2):72-7). Specifically, a double-blind placebo-controlled trial by Mady Dye al published in 1995 demonstrated that intravenous morphine did not reduce pain in fibromyalgia patients.  A study by Alphonso Jaramillo al published in 2003 showed that fibromyalgia patients taking oral opiates did not experience improvement in their pain at four years of follow up, and also reported increased depression over the last two years of the study. There is subsequent concern that the prolonged use of narcotics to treat fibromyalgia may cause harm to these patients Trey Borrego, Pain 2005). Opioid use in fibromyalgia had poorer symptoms and functional and occupational status compared to nonusers (Cristi RANDOLPH et al. Pain Res Treat. 5226;5354:510216). We therefore recommend that narcotics be avoided in all patients with fibromyalgia. Furthermore, naltrexone 4.5mg daily has been shown to improve daily pain scores in fibromyalgia in a randomized, controlled clinical trial (Arthritis Rheum. 2013 Feb;65(2):529-38); this can be prescribed through a compounding pharmacy and is a consideration for patients not already dependent on opiate-type medications. For all patients, we recommend Dre Chi stretching exercises for at least 30 minutes per day. The Arth[employer] has made a videotape of [address] that she can borrow from [employer], purchase online or watch for free on [employer] Dre Chi for Arthritis. We discussed treating secondary causes, such as sleep apnea, poor sleep quality, depression, anxiety, weight loss, vitamin deficiencies, such as vitamin D, and pursuing aquatherapy. I encouraged her to do [address]. My recommendations were provided to her and she was informed to follow up with her primary care physician for further management of her fibromyalgia. 6. Long-term use of hydroxychloroquine  - Cont ophthalmology followup at least annually  - SED RATE (ESR); Future  - C REACTIVE PROTEIN, QT; Future  - COMPLEMENT, C3 & C4; Future  - METABOLIC PANEL, COMPREHENSIVE; Future  - CBC WITH AUTOMATED DIFF; Future  - URINALYSIS W/MICROSCOPIC; Future  - PROTEIN/CREATININE RATIO, URINE; Future    7. Hypervitaminosis D  - Pt to avoid vitamin D-containing supplements      There are no Patient Instructions on file for this visit.     No orders of the defined types were placed in this encounter. Medications: I am having Shaji Michelle maintain her diphenhydrAMINE, ondansetron, ascorbic acid (vitamin C), Sucraid, b complex vitamins, lansoprazole, traZODone, Breztri Aerosphere, Fish OiL, albuterol, DULoxetine, Vyvanse, naltrexone, buPROPion XL, hydrOXYchloroQUINE, cetirizine, azelastine, norethindrone-ethinyl estradiol-iron, metFORMIN ER, ferrous sulfate, docusate sodium, nebivoloL, fremanezumab-vfrm, rimegepant, gabapentin, montelukast, dupilumab (DUPIXENT SYRINGE SC), ubrogepant, Magnesium Oxide, ZINC PO, and ivabradine. Face to face time: 30 minutes  Note preparation and records review day of service: 35 minutes  Total provider time day of service: 65 minutes    This was scribed by Genoveva Eddy in the presence of Dr. Maria Antonia Garcia. The note was reviewed and amended personally, and I agree with the above information.     Wilda Gross MD    Adult Rheumatology   [facility]  [facility], [address] [place] [address]   Phone [phone]  Fax [phone]

## 2023-03-26 ENCOUNTER — APPOINTMENT (OUTPATIENT)
Dept: GENERAL RADIOLOGY | Age: 23
End: 2023-03-26
Attending: EMERGENCY MEDICINE
Payer: MEDICARE

## 2023-03-26 ENCOUNTER — HOSPITAL ENCOUNTER (EMERGENCY)
Age: 23
Discharge: HOME | End: 2023-03-26
Attending: EMERGENCY MEDICINE
Payer: MEDICARE

## 2023-03-26 VITALS
HEART RATE: 96 BPM | WEIGHT: 293 LBS | HEIGHT: 64 IN | OXYGEN SATURATION: 98 % | SYSTOLIC BLOOD PRESSURE: 141 MMHG | BODY MASS INDEX: 50.02 KG/M2 | DIASTOLIC BLOOD PRESSURE: 91 MMHG | TEMPERATURE: 98.2 F | RESPIRATION RATE: 17 BRPM

## 2023-03-26 DIAGNOSIS — J45.21 MILD INTERMITTENT ASTHMA WITH ACUTE EXACERBATION: Primary | ICD-10-CM

## 2023-03-26 PROCEDURE — 96374 THER/PROPH/DIAG INJ IV PUSH: CPT

## 2023-03-26 PROCEDURE — 71046 X-RAY EXAM CHEST 2 VIEWS: CPT

## 2023-03-26 PROCEDURE — 94640 AIRWAY INHALATION TREATMENT: CPT

## 2023-03-26 PROCEDURE — 74011250636 HC RX REV CODE- 250/636: Performed by: EMERGENCY MEDICINE

## 2023-03-26 PROCEDURE — 99284 EMERGENCY DEPT VISIT MOD MDM: CPT

## 2023-03-26 PROCEDURE — 74011000250 HC RX REV CODE- 250: Performed by: EMERGENCY MEDICINE

## 2023-03-26 RX ORDER — SODIUM CHLORIDE 0.9 % (FLUSH) 0.9 %
5-40 SYRINGE (ML) INJECTION EVERY 8 HOURS
Status: DISCONTINUED | OUTPATIENT
Start: 2023-03-26 | End: 2023-03-26 | Stop reason: HOSPADM

## 2023-03-26 RX ORDER — PREDNISONE 20 MG/1
60 TABLET ORAL DAILY
Qty: 12 TABLET | Refills: 0 | Status: SHIPPED | OUTPATIENT
Start: 2023-03-26 | End: 2023-03-30

## 2023-03-26 RX ORDER — SODIUM CHLORIDE 0.9 % (FLUSH) 0.9 %
5-40 SYRINGE (ML) INJECTION AS NEEDED
Status: DISCONTINUED | OUTPATIENT
Start: 2023-03-26 | End: 2023-03-26 | Stop reason: HOSPADM

## 2023-03-26 RX ORDER — IPRATROPIUM BROMIDE AND ALBUTEROL SULFATE 2.5; .5 MG/3ML; MG/3ML
3 SOLUTION RESPIRATORY (INHALATION) ONCE
Status: COMPLETED | OUTPATIENT
Start: 2023-03-26 | End: 2023-03-26

## 2023-03-26 RX ADMIN — IPRATROPIUM BROMIDE AND ALBUTEROL SULFATE 3 ML: 2.5; .5 SOLUTION RESPIRATORY (INHALATION) at 12:36

## 2023-03-26 RX ADMIN — METHYLPREDNISOLONE SODIUM SUCCINATE 125 MG: 125 INJECTION, POWDER, FOR SOLUTION INTRAMUSCULAR; INTRAVENOUS at 12:38

## 2023-03-26 RX ADMIN — SODIUM CHLORIDE 500 ML: 9 INJECTION, SOLUTION INTRAVENOUS at 12:37

## 2023-03-26 NOTE — DISCHARGE INSTRUCTIONS
Please return the emergency department for any worsening symptoms including worsening breathing, wheeze cough difficulty breathing. Return the emergency department your x-ray did not show any evidence. Of pneumonia. Return emerged department for chest pain or other concerns. Otherwise follow-up with your primary care physician once he is ready scheduled.

## 2023-03-26 NOTE — ED TRIAGE NOTES
Patient arrives to ED with mother with c/o worsening shortness of breath for 4 days. Patient with history of asthma. Patient states she uses albuterol nebulizer treatments at home but has had no relief. Patient with stable vital signs and able to speak in full sentences without difficulty on triage assessment.
General

## 2023-03-26 NOTE — ED PROVIDER NOTES
Shortness of Breath  Pleasant 54-year-old female with a history of reflux, asthma, fibromyalgia presents emerged from complaining of cough and wheeze. Patient does have a history of asthma. Has been having intermittent wheeze and cough. Did call her primary care physician and recommendation to come to the emergency department for further evaluation. Patient denies any headache or back pain. No associated neurological symptoms. No numbness ting or paresthesias. Denies any chest pain. No history of DVT or PE. No active chest pain. Past Medical History:   Diagnosis Date    Abdominal migraine     Dr. Adore Maxwell. vs Sucrose Intolerance. Acid reflux     ADHD (attention deficit hyperactivity disorder)     Asthma     Autism     High Functioning. Dr. Montes Lung. Chronic rhinitis 2021    Dr. Idalmis Herman, allergist.     Connective tissue disease [facility])     Developmental delay     Fibromyalgia     Dr. Paige Childress    MIGUEL A (generalized anxiety disorder)     Dr. Santiago Lion    Insulin resistance 09/08/2021    Dr. Jeff Doyle. Denise, maurice    Irritable bowel syndrome with diarrhea 04/13/2017    Lymphedema 2021    BL legs. Dr. Ishaan Miller. Yves Sylvester, [facility]    Migraine headache     Dr. Elizabeth Goyal    Obesity, morbid ([address] 12/12/2017    OCD (obsessive compulsive disorder)     Dr. Santiago Lion    Sucrose intolerance     Dr. Adore Maxwell, GI. Tachycardia 06/02/2021    Mauri Cobian DO, [facility]    Tenosynovitis of right hand 07/2021    Dr. Bettina Salas    Undifferentiated connective tissue disease ([address]     (lupus like). Dr. Jt Espinoza.        Past Surgical History:   Procedure Laterality Date    COLONOSCOPY N/A 12/20/2016    COLONOSCOPY performed by Nazia Aranda MD at [facility] ENDOSCOPY    GERD TST W/ MUCOS [address] 48 HR (BRAVO)  12/3/2020         HC CLP BRAVO  11/30/2020    HX CHOLECYSTECTOMY  06/2019    HX ENDOSCOPY  11/2020    dx'd with sucrose intolerance    HX TONSIL AND ADENOIDECTOMY      HX TONSILLECTOMY      AGE 1    HX WISDOM TEETH EXTRACTION      [place] EGD TRANSORAL BIOPSY SINGLE/MULTIPLE  6/14/2019         FL EGD TRANSORAL BIOPSY SINGLE/MULTIPLE  11/30/2020    UPPER GI ENDOSCOPY,BIOPSY  12/20/2016              Family History:   Problem Relation Age of Onset    Endometriosis Mother     Delayed Awakening Mother     Post-op Nausea/Vomiting Mother     Migraines Mother         d/t accident - neck and brain injury    Atrial Fibrillation Mother     Hypertension Mother     Obesity Mother     No Known Problems Father     Heart Disease Maternal Grandmother     Atrial Fibrillation Maternal Grandmother     Hypertension Maternal Grandmother     Heart Disease Maternal Grandfather     Hypertension Maternal Grandfather     Other Maternal Grandfather         diverticulitis    No Known Problems Paternal Grandmother     No Known Problems Paternal Grandfather     Mult Sclerosis Maternal Aunt     Cancer Maternal Uncle     Lymphoma Other        Social History     Socioeconomic History    Marital status: SINGLE     Spouse name: Not on file    Number of children: 0    Years of education: Not on file    Highest education level: Not on file   Occupational History    Occupation: student   Tobacco Use    Smoking status: Never     Passive exposure: Never    Smokeless tobacco: Never    Tobacco comments:     no smoke exposure in the home   Vaping Use    Vaping Use: Never used   Substance and Sexual Activity    Alcohol use: No    Drug use: No    Sexual activity: Never     Partners: Male     Birth control/protection: Pill   Other Topics Concern     Service Not Asked    Blood Transfusions Not Asked    Caffeine Concern Not Asked    Occupational Exposure Not Asked    Hobby Hazards Not Asked    Sleep Concern Not Asked    Stress Concern Not Asked    Weight Concern Not Asked    Special Diet Not Asked    Back Care Not Asked    Exercise Not Asked    Bike Helmet Not Asked    Seat Belt Not Asked    Self-Exams Not Asked   Social History Narrative    No known chemical exposures. Social Determinants of Health     Financial Resource Strain: Unknown    Difficulty of Paying Living Expenses: Patient refused   Food Insecurity: No Food Insecurity    Worried About Running Out of Food in the Last Year: Never true    Ran Out of Food in the Last Year: Never true   Transportation Needs: No Transportation Needs    Lack of Transportation (Medical): No    Lack of Transportation (Non-Medical): No   Physical Activity: Inactive    Days of Exercise per Week: 0 days    Minutes of Exercise per Session: 0 min   Stress: Not on file   Social Connections: Not on file   Intimate Partner Violence: Unknown    Fear of Current or Ex-Partner: Patient refused    Emotionally Abused: Patient refused    Physically Abused: Patient refused    Sexually Abused: Patient refused   Housing Stability: Unknown    Unable to Pay for Housing in the Last Year: Patient refused    Number of Places Lived in the Last Year: 1    Unstable Housing in the Last Year: No         ALLERGIES: Bactrim [sulfamethoprim ds]; Rizatriptan; Sulfa (sulfonamide antibiotics); Yeast, dried; Flagyl [metronidazole]; and Sucrose    Review of Systems   Respiratory:  Positive for shortness of breath. Vitals:    03/26/23 1200   BP: (!) 143/78   Pulse: 96   Resp: 17   Temp: 98.2 °F (36.8 °C)   SpO2: 99%   Weight: 145.2 kg (320 lb)   Height: 5' 4\" (1.626 m)            Physical Exam   pulse Ox Evaluation: Normal.    Physical Exam: PHYSICAL EXAMINATION  GENERAL: awake, alert and in no acute distress. A and O x 3. Pleasant female in no acute distress. HEENT: Normocephalic, atraumatic. The pupils are equal, round and react to light appropriately. Extraoccular motions appear intact. Mucous membranes moist.     NECK: Trachea midline. LUNGS: Cough with mild wheeze. However no respiratory distress. Wheezing bilateral lower lung fields. Speaking in full sentences. No tachypnea.   HEART: Regular rate and rhythm. GASTROINTESTINAL: soft, nontender, nondistended without any rigidity, rebound or guarding. No CVA tenderness. EXTREMITIES: No clubbing, cyanosis or edema. 2+and symmetric peripheral pulses in lower extremities. GENITOURINARY: deferred  SKIN: Warm and dry with normal color and turgor. Normal cap refill. NEUROLOGICAL: Awake, alert and answering questions appropriately. Facial symmetry is normal. Moving all extremities equally on exam. CN nerves 2-12 intact. 5/5 strength upper and lower extremity. normal gait. No focal deficit. GCS of 15. MUSCULOSKELETAL: Good strength and tone overall. No bony tenderness. PSYCHIATRIC: Normal affect, insight, concentration. Medical Decision Making  Amount and/or Complexity of Data Reviewed  Radiology: ordered. Risk  Prescription drug management. No results found for this or any previous visit (from the past 24 hour(s)). XR CHEST PA LAT    Result Date: 3/26/2023  EXAM: XR CHEST PA LAT INDICATION: Cough COMPARISON: CT 4/5/2022 TECHNIQUE: PA and lateral chest views FINDINGS: The cardiac size is within normal limits. The pulmonary vasculature is within normal limits. The lungs and pleural spaces are clear. The visualized bones and upper abdomen are age-appropriate. No evidence of acute process. Patient is a 18-year-old female with a history of reflux, asthma, fibromyalgia presents emerged from complaining of cough and wheeze. Differential diagnosis includes asthma exacerbation versus pneumonia versus COVID-19 versus upper respiratory infection. I did consider pulmonary embolism however the patient is low risk by Wells criteria PERC negative. And does not have a history of PE or DVT. This puts her at less than 2% of pulmonary given that she is low risk by Wells criteria with and PERC negative. Patient does not any chest pain distress ACS. X-ray shows no evidence of pneumonia or acute process.   Patient did receive albumin Atrovent. As well as Solu-Medrol. On reevaluation after approximately a 2-hour 41-minute ED course patient is feeling much better eating invention. Repeat breath sounds were resolved. Satting 99% room air. Heart rate in the 90s. Without any respiratory distress. Patient is to follow-up with her primary care physician as well as pulmonologist this week. Instruction to return the emerge apartment for vomiting of any worsening symptoms. Particularly any development of worsening shortness of breath, wheeze, chest pain or any other concerns return the emergency department. Vitals are stable upon discharge and breath sounds clear repeat evaluation at 2:20 PM.    Patient doing better after ED intervention. Vitals are stable. Symptoms have resolved. Patient is instructed return for any worsening symptoms. Otherwise will follow-up with the primary care physician days for reevaluation.   Does have medical decision-making capacity  Diagnosis: Mild asthma exacerbation and cough

## 2023-03-29 ENCOUNTER — OFFICE VISIT (OUTPATIENT)
Dept: FAMILY MEDICINE CLINIC | Age: 23
End: 2023-03-29
Payer: MEDICARE

## 2023-03-29 VITALS
HEART RATE: 105 BPM | HEIGHT: 64 IN | DIASTOLIC BLOOD PRESSURE: 91 MMHG | OXYGEN SATURATION: 98 % | WEIGHT: 293 LBS | TEMPERATURE: 98.3 F | SYSTOLIC BLOOD PRESSURE: 148 MMHG | BODY MASS INDEX: 50.02 KG/M2

## 2023-03-29 DIAGNOSIS — M54.41 CHRONIC RIGHT-SIDED LOW BACK PAIN WITH RIGHT-SIDED SCIATICA: Primary | ICD-10-CM

## 2023-03-29 DIAGNOSIS — G89.29 CHRONIC RIGHT-SIDED LOW BACK PAIN WITH RIGHT-SIDED SCIATICA: Primary | ICD-10-CM

## 2023-03-29 DIAGNOSIS — I10 PRIMARY HYPERTENSION: ICD-10-CM

## 2023-03-29 DIAGNOSIS — J45.51 SEVERE PERSISTENT ASTHMA WITH ACUTE EXACERBATION: ICD-10-CM

## 2023-03-29 DIAGNOSIS — E61.1 IRON DEFICIENCY: ICD-10-CM

## 2023-03-29 PROBLEM — Z91.018 ALLERGY TO CHOCOLATE: Status: RESOLVED | Noted: 2017-09-13 | Resolved: 2023-03-29

## 2023-03-29 PROBLEM — L20.9 ATOPIC DERMATITIS: Status: ACTIVE | Noted: 2023-03-29

## 2023-03-29 PROBLEM — K82.8 BILIARY DYSKINESIA: Status: RESOLVED | Noted: 2019-06-04 | Resolved: 2023-03-29

## 2023-03-29 PROBLEM — Z91.09 ALLERGY TO YEAST: Status: RESOLVED | Noted: 2017-09-13 | Resolved: 2023-03-29

## 2023-03-29 PROBLEM — K81.1 CHRONIC CHOLECYSTITIS: Status: RESOLVED | Noted: 2019-06-04 | Resolved: 2023-03-29

## 2023-03-29 PROCEDURE — 3077F SYST BP >= 140 MM HG: CPT | Performed by: STUDENT IN AN ORGANIZED HEALTH CARE EDUCATION/TRAINING PROGRAM

## 2023-03-29 PROCEDURE — 99214 OFFICE O/P EST MOD 30 MIN: CPT | Performed by: STUDENT IN AN ORGANIZED HEALTH CARE EDUCATION/TRAINING PROGRAM

## 2023-03-29 PROCEDURE — G9717 DOC PT DX DEP/BP F/U NT REQ: HCPCS | Performed by: STUDENT IN AN ORGANIZED HEALTH CARE EDUCATION/TRAINING PROGRAM

## 2023-03-29 PROCEDURE — G8427 DOCREV CUR MEDS BY ELIG CLIN: HCPCS | Performed by: STUDENT IN AN ORGANIZED HEALTH CARE EDUCATION/TRAINING PROGRAM

## 2023-03-29 PROCEDURE — G8417 CALC BMI ABV UP PARAM F/U: HCPCS | Performed by: STUDENT IN AN ORGANIZED HEALTH CARE EDUCATION/TRAINING PROGRAM

## 2023-03-29 PROCEDURE — 3080F DIAST BP >= 90 MM HG: CPT | Performed by: STUDENT IN AN ORGANIZED HEALTH CARE EDUCATION/TRAINING PROGRAM

## 2023-03-29 NOTE — ASSESSMENT & PLAN NOTE
Date: 2022   Patient Name: Rui Pang  : 1968   MRN: [id number]     Follows with Dr. Cathy TEJADA  Ongoing work-up and changes to rx regimen  Reviewed that tremors are likely due to rx SE - prednisone and frequent albuterol use "      Dimple Rush,   [facility]

## 2023-03-29 NOTE — ASSESSMENT & PLAN NOTE
Elevated in office, possibly due to frequent albuterol and steroid use recently  On Bystolic 5 mg daily. Could increase or add alternate rx  Encouraged home monitoring and follow-up sooner for persistent elevations.

## 2023-03-29 NOTE — PROGRESS NOTES
Teresa Zapata is a 20 y.o. female , id x 2(name and ). Reviewed record, history, and  medications. Chief Complaint   Patient presents with    ED Follow-up     ED for asthma attack- Has pulm     Establish Care       Vitals:    23 0822   BP: (!) 148/91   Pulse: (!) 105   Temp: 98.3 °F (36.8 °C)   SpO2: 98%   Weight: 315 lb 11.2 oz (143.2 kg)   Height: 5' 4\" (1.626 m)       Coordination of Care Questionnaire:   1. Have you been to the ER, urgent care clinic since your last visit? Hospitalized since your last visit? No    2. Have you seen or consulted any other health care providers outside of the [address] [place] [address] since your last visit? Include any pap smears or colon screening. No      3 most recent PHQ Screens 3/29/2023   Little interest or pleasure in doing things Not at all   Feeling down, depressed, irritable, or hopeless Not at all   Total Score PHQ 2 0       Social Determinants of Health     Tobacco Use: Low Risk     Smoking Tobacco Use: Never    Smokeless Tobacco Use: Never    Passive Exposure: Never   Alcohol Use: Not on file   Financial Resource Strain: Unknown    Difficulty of Paying Living Expenses: Patient refused   Food Insecurity: No Food Insecurity    Worried About Running Out of Food in the Last Year: Never true    Ran Out of Food in the Last Year: Never true   Transportation Needs: No Transportation Needs    Lack of Transportation (Medical): No    Lack of Transportation (Non-Medical):  No   Physical Activity: Inactive    Days of Exercise per Week: 0 days    Minutes of Exercise per Session: 0 min   Stress: Not on file   Social Connections: Not on file   Intimate Partner Violence: Unknown    Fear of Current or Ex-Partner: Patient refused    Emotionally Abused: Patient refused    Physically Abused: Patient refused    Sexually Abused: Patient refused   Depression: Not at risk    PHQ-2 Score: 0   Housing Stability: Unknown    Unable to Pay for Housing in the Last Year: Patient refused Number of Places Lived in the Last Year: 1    Unstable Housing in the Last Year: No       Patient is accompanied by self I have received verbal consent from Teresa Zapata to discuss any/all medical information while they are present in the room.

## 2023-03-29 NOTE — PROGRESS NOTES
Progress Note    she is a 20y.o. year old female who presents for evalution. Assessment/ Plan:   Diagnoses and all orders for this visit:    1. Chronic right-sided low back pain with right-sided sciatica  Assessment & Plan:  Pain persistent, not improving with PT  Patient concerned for nerve injury related to bone marrow biopsies last year. Refer to ortho for further evaluation and treatment. Orders:  -     REFERRAL TO ORTHOPEDICS    2. Severe persistent asthma with acute exacerbation  Assessment & Plan:  Follows with PARDr. Delia  Ongoing work-up and changes to rx regimen  Reviewed that tremors are likely due to rx SE - prednisone and frequent albuterol use      3. Primary hypertension  Assessment & Plan:  Elevated in office, possibly due to frequent albuterol and steroid use recently  On Bystolic 5 mg daily. Could increase or add alternate rx  Encouraged home monitoring and follow-up sooner for persistent elevations. 4. Iron deficiency  Assessment & Plan: Without anemia  Continue slow fe daily, recheck in 3 months. Encouraged to discuss with GI         Follow-up and Dispositions    Return in about 3 months (around 6/29/2023) for follow-up pain and chronic medical conditions. .           I have discussed the diagnosis with the patient and the intended plan as seen in the above orders. The patient has received an after-visit summary and questions were answered concerning future plans. Pt conveyed understanding of plan. Medication Side Effects and Warnings were discussed with patient        Subjective:     Chief Complaint   Patient presents with    ED Follow-up     ED for asthma attack- Has pulm     Establish Care     Developed asthma attack after pollen  Following with a pulmonologist Dr. Adrian Beebe location  Referring to allergist  She has a nebulizer at home.    She has plenty of albuterol on hand  Using albuterol every 4 hours   Feels a little jittery, but it calms down.   Having some shakes/tremors    Back pain is constant  History of bone marrow biopsy of the right hip   May and august 2022  Pain is in right lower back, occasionally down the leg, laterally. Worse with sitting for long periods of time. No changes with type of clothing  She has been doing PT without relief. Psychiatrist: Dr. Hernandez Serve  Rheum: Dr. Albania Vizcaino (he is leaving in July 2023), planning to establish with [facility]  Derm: saw Dr. Nick Arroyo at [facility] but they no longer take her insurance    Unclear why she had iron deficiency. Suddenly appeared last December 2022  Has not seen GI. On continuous OCPs, no menstrual bleeding      Reviewed PmHx, RxHx, FmHx, SocHx, AllgHx and updated and dated in the chart. Review of Systems - negative except as listed above in the HPI    Objective:     Vitals:    03/29/23 0822   BP: (!) 148/91   Pulse: (!) 105   Temp: 98.3 °F (36.8 °C)   SpO2: 98%   Weight: 315 lb 11.2 oz (143.2 kg)   Height: 5' 4\" (1.626 m)       Current Outpatient Medications   Medication Sig    predniSONE (DELTASONE) 20 mg tablet Take 3 Tablets by mouth daily for 4 doses. ivabradine (CORLANOR) 5 mg tablet Take 1 Tablet by mouth two (2) times daily (with meals). dupilumab (DUPIXENT SYRINGE SC) by SubCUTAneous route. Every 14 days    ubrogepant (UBRELVY) 100 mg tablet Take 100 mg by mouth once as needed for Migraine. Magnesium Oxide 500 mg cap Take  by mouth. ZINC PO Take  by mouth.    gabapentin (NEURONTIN) 100 mg capsule TAKE ONE CAPSULE BY MOUTH THREE TIMES A DAY **MAX DAILY AMOUNT: 300MG**    montelukast (SINGULAIR) 10 mg tablet Take 1 Tablet by mouth daily. fremanezumab-vfrm (AJOVY) 225 mg/1.5 mL auto-injector 1.5 mL by SubCUTAneous route every month. rimegepant (NURTEC) 75 mg disintegrating tablet Take 1 Tablet by mouth as needed for Migraine. nebivoloL (BYSTOLIC) 5 mg tablet Take 1 Tablet by mouth daily.     ferrous sulfate (IRON) 325 mg (65 mg iron) EC tablet Take 1 Tablet by mouth three (3) times daily (with meals). docusate sodium (COLACE) 100 mg capsule Take 1 Capsule by mouth two (2) times a day for 90 days. metFORMIN ER (GLUCOPHAGE XR) 500 mg tablet Take 2 Tablets by mouth Before breakfast and dinner. norethindrone-ethinyl estradiol-iron (Junel FE 1.5/30, 28,) 1.5 mg-30 mcg (21)/75 mg (7) tab TAKE 1 TAB BY MOUTH DAILY. CONTINUOUS PILLS ONLY. cetirizine (ZYRTEC) 10 mg tablet Take 1 Tablet by mouth nightly. azelastine (ASTEPRO) 205.5 mcg (0.15 %) 1 [place] by Both Nostrils route two (2) times a day.    hydrOXYchloroQUINE (PLAQUENIL) 200 mg tablet TAKE 1 TABLET BY MOUTH  TWICE DAILY    naltrexone (DEPADE) 50 mg tablet Take 0.5 Tablets by mouth daily. buPROPion XL (WELLBUTRIN XL) 150 mg tablet Take 1 in the AM for 2 weeks, then increase to 1 in the morning and 1 in the afternoon. Vyvanse 30 mg capsule Take 30 mg by mouth daily. DULoxetine (CYMBALTA) 60 mg capsule Take 120 mg by mouth daily. budesonide-glycopyr-formoterol (Breztri Aerosphere) 160-9-4.8 mcg/actuation HFAA Take 2 Puffs by inhalation daily. omega 3-dha-epa-fish oil (Fish OiL) 100-160-1,000 mg cap Take 2,000 mg by mouth. albuterol (PROVENTIL HFA, VENTOLIN HFA, PROAIR HFA) 90 mcg/actuation inhaler Take 1 Puff by inhalation every four (4) hours as needed for Wheezing. traZODone (DESYREL) 50 mg tablet     lansoprazole (PREVACID) 30 mg capsule Take 30 mg by mouth Daily (before breakfast). ascorbic acid, vitamin C, (VITAMIN C) 500 mg tablet Take  by mouth. sacrosidase (Sucraid) 8,500 unit/mL soln Take 1 mL by mouth.    b complex vitamins tablet ONE TABLE DAILY    ondansetron (ZOFRAN ODT) 8 mg disintegrating tablet PLACE 1 TABLET ON TONGUE & ALLOW TO DISSOLVE EVERY 8 HOURS AS NEEDED FOR NAUSEA WITH HEADACHE    diphenhydrAMINE (BENADRYL) 25 mg capsule 2 tabs PO q8h PRN     No current facility-administered medications for this visit.        Physical Exam  Vitals and nursing note reviewed. Constitutional:       General: She is not in acute distress. Appearance: Normal appearance. She is obese. She is not ill-appearing, toxic-appearing or diaphoretic. HENT:      Head: Normocephalic and atraumatic. Eyes:      General: No scleral icterus. Right eye: No discharge. Left eye: No discharge. Conjunctiva/sclera: Conjunctivae normal.   Cardiovascular:      Rate and Rhythm: Normal rate and regular rhythm. Pulses: Normal pulses. Heart sounds: Normal heart sounds. Pulmonary:      Effort: Pulmonary effort is normal. No respiratory distress. Breath sounds: Normal breath sounds. Musculoskeletal:         General: Tenderness (generalized, mild) present. No swelling or deformity. Cervical back: No rigidity. Right lower leg: Edema (non-pitting) present. Left lower leg: Edema (non-pitting) present. Skin:     General: Skin is warm and dry. Neurological:      General: No focal deficit present. Mental Status: She is alert. Psychiatric:         Mood and Affect: Mood normal.         Behavior: Behavior normal.         Thought Content:  Thought content normal.         Judgment: Judgment normal.            Nan Levy MD

## 2023-03-29 NOTE — PATIENT INSTRUCTIONS
Contact your gastroenterologist regarding the iron deficiency anemia    Make sure all your specialists forward updates to Dr. Umu Chairez. Recheck blood counts and iron levels this summer.

## 2023-03-29 NOTE — ASSESSMENT & PLAN NOTE
Pain persistent, not improving with PT  Patient concerned for nerve injury related to bone marrow biopsies last year. Refer to ortho for further evaluation and treatment.

## 2023-04-05 RX ORDER — BUPROPION HYDROCHLORIDE 150 MG/1
TABLET ORAL
Qty: 180 TABLET | Refills: 3
Start: 2023-04-05

## 2023-04-18 ENCOUNTER — OFFICE VISIT (OUTPATIENT)
Dept: CARDIOLOGY CLINIC | Age: 23
End: 2023-04-18

## 2023-04-18 VITALS
HEIGHT: 64 IN | DIASTOLIC BLOOD PRESSURE: 70 MMHG | SYSTOLIC BLOOD PRESSURE: 120 MMHG | WEIGHT: 293 LBS | BODY MASS INDEX: 50.02 KG/M2 | OXYGEN SATURATION: 99 % | HEART RATE: 114 BPM

## 2023-04-18 DIAGNOSIS — R00.0 TACHYCARDIA: ICD-10-CM

## 2023-04-18 DIAGNOSIS — I10 PRIMARY HYPERTENSION: Primary | ICD-10-CM

## 2023-04-18 NOTE — PROGRESS NOTES
Gloria Rodriguez MD. [address] [place] [address], [address] [place] [address]  Ph [phone]; Fax [phone]        Patient: Mathew Bergman  : 2000      Today's Date: 2023          HISTORY OF PRESENT ILLNESS:     History of Present Illness:      Prior note stated \"Referred from PCP, BASILIA Farrar, for elevated BP and HR. Was seeing Dr. Earline Perez at [address] [facility]. Here to establish care. Pt taking cardizem 120 mg daily and feels like this is making her shaky. Also taking spironolactone 75 mg daily. previously took metoprolol and she did not feel herself. So this was stopped. Started seeing cardiology for elevated HR and BP. Resting HR prior to medications would be approximately 120 bpm. She does not feel like the medication has helped. Does not feel heart racing or palpitations. Swelling in lower extremities. Was told it is lymphedema and is working with insurance on [employer]. Denies chest pain, shortness of breath. With the exception when she has asthma attacks. Denies lightheadedness, dizziness. \"      ON 23 -         PAST MEDICAL HISTORY:     Past Medical History:   Diagnosis Date    Abdominal migraine     Dr. Adilson Mcguire. vs Sucrose Intolerance. Acid reflux     ADHD (attention deficit hyperactivity disorder)     Allergy to chocolate 2017    Allergy to yeast 2017    Asthma     Autism     High Functioning. Dr. Frantz Cheng. Biliary dyskinesia 2019    Chronic cholecystitis 2019    Chronic rhinitis     Dr. Timothy Anderson, allergist.     Connective tissue disease [facility])     Developmental delay     Fibromyalgia     Dr. Jaron Caro    MIGUEL A (generalized anxiety disorder)     Dr. Hoang Eldridge    Insulin resistance 2021    Dr. Gagan Mendez. maurice Walker    Irritable bowel syndrome with diarrhea 2017    Lymphedema     BL legs. Dr. Marsha Rodriguez.   Mariya Peck, [facility]    Medication overuse headache 2015    Migraine no edema, no murmurs, regular rate and rhythm headache     Dr. Frandy Mccoy    Obesity, morbid ([facility] 12/12/2017    OCD (obsessive compulsive disorder)     Dr. Michelle Tao    Sucrose intolerance     Dr. Tulio Rose, GI. Tachycardia 06/02/2021    Mauri Cobian DO, [facility] cardio    Tenosynovitis of right hand 07/2021    Dr. Rafael Chris    Undifferentiated connective tissue disease ([facility]     (lupus like). Dr. Catherine Ruiz.            Past Surgical History:   Procedure Laterality Date    COLONOSCOPY N/A 12/20/2016    COLONOSCOPY performed by Satya Cueto MD at [facility] ENDOSCOPY    GERD TST W/ MUCOS [place] [address] ELECTROD 50 HR (BRAVO)  12/3/2020         HC CLP BRAVO  11/30/2020    HX CHOLECYSTECTOMY  06/2019    HX ENDOSCOPY  11/2020    dx'd with sucrose intolerance    HX TONSIL AND ADENOIDECTOMY      HX TONSILLECTOMY      AGE 1    HX WISDOM TEETH EXTRACTION      MD EGD TRANSORAL BIOPSY SINGLE/MULTIPLE  6/14/2019         MD EGD TRANSORAL BIOPSY SINGLE/MULTIPLE  11/30/2020    UPPER GI ENDOSCOPY,BIOPSY  12/20/2016                Patient Active Problem List   Diagnosis Code    Gastroparesis K31.84    Gastroesophageal reflux disease without esophagitis K21.9    Edema, peripheral R60.9    Hypertension I10    Lipids abnormal E78.89    Atypical depression F32.89    Chronic nausea R11.0    Chronic gastritis without bleeding K29.50    Obesity, morbid ([facility]) E66.01    Migraine headache G43.909    Autism F84.0    MIGUEL A (generalized anxiety disorder) F41.1    Tachycardia R00.0    Fibromyalgia M79.7    Sucrose intolerance E74.31    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    Lipedema R60.9    ADHD (attention deficit hyperactivity disorder) F90.9    Acid reflux K21.9    SLE (systemic lupus erythematosus) ([facility]) M32.9    Tenosynovitis of right hand M65.9    Other thrombocytosis D75.838    Elevated C-reactive protein (CRP) R79.82    Skin lesions L98.9    Elevated sed rate R70.0    Neutrophilia D72.9    Monocytosis D72.821    Other eosinophilia D72.19    Disease of hematopoietic system D75.9    Gene mutation Z15.89    Neuropathic pain M79.2    Undifferentiated connective tissue disease ([facility] M35.9    Type 2 diabetes mellitus E11.9    Severe persistent asthma with acute exacerbation J45.51    Cardiomyopathy, unspecified type ([facility] I42.9    Chronic right-sided low back pain with right-sided sciatica M54.41, G89.29    Atopic dermatitis L20.9    Iron deficiency E61.1             MEDICATIONS:     Current Outpatient Medications   Medication Sig Dispense Refill    buPROPion XL (WELLBUTRIN XL) 150 mg tablet TAKE 1 TABLET BY MOUTH IN THE  MORNING FOR 2 WEEKS, THEN  INCREASE TO 1 TABLET IN THE  MORNING AND 1 TABLET IN THE  AFTERNOON (Patient taking differently: Take 1 Tablet by mouth daily. ) 180 Tablet 3    norethindrone-ethinyl estradiol-iron (Junel FE 1.5/30, 28,) 1.5 mg-30 mcg (21)/75 mg (7) tab TAKE 1 TAB BY MOUTH DAILY. CONTINUOUS PILLS ONLY. 3 Dose Pack 3    dupilumab (DUPIXENT SYRINGE SC) by SubCUTAneous route. Every 14 days      ubrogepant (UBRELVY) 100 mg tablet Take 1 Tablet by mouth once as needed for Migraine. Magnesium Oxide 500 mg cap Take  by mouth. ZINC PO Take  by mouth.      gabapentin (NEURONTIN) 100 mg capsule TAKE ONE CAPSULE BY MOUTH THREE TIMES A DAY **MAX DAILY AMOUNT: 300MG** 90 Capsule 1    montelukast (SINGULAIR) 10 mg tablet Take 1 Tablet by mouth daily. 90 Tablet 1    fremanezumab-vfrm (AJOVY) 225 mg/1.5 mL auto-injector 1.5 mL by SubCUTAneous route every month. 1.5 mL 5    rimegepant (NURTEC) 75 mg disintegrating tablet Take 1 Tablet by mouth as needed for Migraine. 8 Tablet 5    nebivoloL (BYSTOLIC) 5 mg tablet Take 1 Tablet by mouth daily. 90 Tablet 3    ferrous sulfate (IRON) 325 mg (65 mg iron) EC tablet Take 1 Tablet by mouth three (3) times daily (with meals). 30 Tablet 2    metFORMIN ER (GLUCOPHAGE XR) 500 mg tablet Take 2 Tablets by mouth Before breakfast and dinner.  (Patient taking differently: Take 2 Tablets by mouth Before breakfast and dinner. 1 tab in the AM and 2 tab PM.) 360 Tablet 1    cetirizine (ZYRTEC) 10 mg tablet Take 1 Tablet by mouth nightly. 90 Tablet 1    azelastine (ASTEPRO) 205.5 mcg (0.15 %) 1 [place] by Both Nostrils route two (2) times a day. 1 Each 2    hydrOXYchloroQUINE (PLAQUENIL) 200 mg tablet TAKE 1 TABLET BY MOUTH  TWICE DAILY 180 Tablet 3    naltrexone (DEPADE) 50 mg tablet Take 0.5 Tablets by mouth daily. 45 Tablet 1    Vyvanse 30 mg capsule Take 1 Capsule by mouth daily. DULoxetine (CYMBALTA) 60 mg capsule Take 2 Capsules by mouth daily. budesonide-glycopyr-formoterol (Breztri Aerosphere) 160-9-4.8 mcg/actuation HFAA Take 2 Puffs by inhalation daily. omega 3-dha-epa-fish oil (Fish OiL) 100-160-1,000 mg cap Take 2,000 mg by mouth. albuterol (PROVENTIL HFA, VENTOLIN HFA, PROAIR HFA) 90 mcg/actuation inhaler Take 1 Puff by inhalation every four (4) hours as needed for Wheezing. traZODone (DESYREL) 50 mg tablet       lansoprazole (PREVACID) 30 mg capsule Take 1 Capsule by mouth Daily (before breakfast). ascorbic acid, vitamin C, (VITAMIN C) 500 mg tablet Take  by mouth.       sacrosidase (Sucraid) 8,500 unit/mL soln Take 1 mL by mouth.      b complex vitamins tablet ONE TABLE DAILY      ondansetron (ZOFRAN ODT) 8 mg disintegrating tablet PLACE 1 TABLET ON TONGUE & ALLOW TO DISSOLVE EVERY 8 HOURS AS NEEDED FOR NAUSEA WITH HEADACHE      diphenhydrAMINE (BENADRYL) 25 mg capsule 2 tabs PO q8h PRN             Allergies   Allergen Reactions    Bactrim [Sulfamethoprim Ds] Other (comments)     Developed oral thrush    Adhesive Rash     MEDIPORE TAPE    Rizatriptan Other (comments)    Sulfa (Sulfonamide Antibiotics) Unknown (comments)    Flagyl [Metronidazole] Rash     Worsened acneiform rash with TOPICAL flagyl used to treat rosacea    Sucrose Nausea Only     SEVERE ABDOMINAL PAIN               SOCIAL HISTORY:     Social History     Tobacco Use    Smoking status: Never     Passive exposure: Never Smokeless tobacco: Never    Tobacco comments:     no smoke exposure in the home   Vaping Use    Vaping Use: Never used   Substance Use Topics    Alcohol use: No    Drug use: No               FAMILY HISTORY:     Family History   Problem Relation Age of Onset    Endometriosis Mother     Delayed Awakening Mother     Post-op Nausea/Vomiting Mother     Migraines Mother         d/t accident - neck and brain injury    Atrial Fibrillation Mother     Hypertension Mother     Obesity Mother     No Known Problems Father     Heart Disease Maternal Grandmother     Atrial Fibrillation Maternal Grandmother     Hypertension Maternal Grandmother     Heart Disease Maternal Grandfather     Hypertension Maternal Grandfather     Other Maternal Grandfather         diverticulitis    No Known Problems Paternal Grandmother     No Known Problems Paternal Grandfather     Mult Sclerosis Maternal Aunt     Cancer Maternal Uncle     Lymphoma Other                REVIEW OF SYMPTOMS:     Review of Symptoms:  Constitutional: Negative for fever, chills  HEENT: Negative for nosebleeds, vision changes. Respiratory: Negative for cough, wheezing  Cardiovascular: Negative for claudication, syncope  Gastrointestinal: Negative for abdominal pain, diarrhea, melena. Genitourinary: Negative for dysuria  Musculoskeletal: Negative for myalgias. Skin: Negative for rash  Heme: No problems bleeding. Neurological: Negative for speech change and focal weakness. PHYSICAL EXAM:     Physical Exam:  Visit Vitals  /70 (BP 1 Location: Left lower arm, BP Patient Position: Sitting)   Pulse (!) 114   Ht 5' 4\" (1.626 m)   Wt 316 lb (143.3 kg)   SpO2 99%   BMI 54.24 kg/m²     Patient appears generally well, mood and affect are appropriate and pleasant. HEENT:  Hearing intact, non-icteric, normocephalic, atraumatic. Neck Exam: Supple, No JVD   Lung Exam: Clear to auscultation, even breath sounds.    Cardiac Exam: Regular rate and rhythm with no murmur or rub  Abdomen: Soft, non-tender  Extremities: Moves all ext well. No lower extremity edema. MSKTL: Overall good ROM ext  Skin: No significant rashes  Psych: Appropriate affect  Neuro - Grossly intact          LABS / OTHER STUDIES reviewed:         Lab Results   Component Value Date/Time    Cholesterol, total 217 (H) 09/03/2021 10:03 AM    Cholesterol, Total 210 (H) 01/10/2022 02:35 PM    HDL Cholesterol 54 09/03/2021 10:03 AM    LDL, calculated 144 (H) 09/03/2021 10:03 AM    LDL, calculated 145 (H) 01/07/2020 10:06 AM    Triglyceride 106 09/03/2021 10:03 AM       Lab Results   Component Value Date/Time    Sodium 138 02/23/2023 12:59 PM    Potassium 4.0 02/23/2023 12:59 PM    Chloride 96 02/23/2023 12:59 PM    CO2 23 02/23/2023 12:59 PM    Anion gap 1 (L) 04/05/2022 10:50 AM    Glucose 70 02/23/2023 12:59 PM    Glucose 86 12/18/2017 08:35 AM    BUN 18 02/23/2023 12:59 PM    Creatinine 0.95 02/23/2023 12:59 PM    BUN/Creatinine ratio 19 02/23/2023 12:59 PM    GFR est AA >60 04/05/2022 10:50 AM    GFR est non-AA >60 04/05/2022 10:50 AM    Calcium 9.8 02/23/2023 12:59 PM    Bilirubin, total 0.4 02/23/2023 12:59 PM    Alk. phosphatase 67 02/23/2023 12:59 PM    Protein, total 7.2 02/23/2023 12:59 PM    Albumin 4.7 02/23/2023 12:59 PM    Globulin 4.8 (H) 04/05/2022 10:50 AM    A-G Ratio 1.9 02/23/2023 12:59 PM    ALT (SGPT) 36 (H) 02/23/2023 12:59 PM        Lab Results   Component Value Date/Time    WBC 16.8 (H) 02/23/2023 12:59 PM    HGB 15.7 02/23/2023 12:59 PM    HCT 47.0 (H) 02/23/2023 12:59 PM    PLATELET 876 (H) 72/39/4848 12:59 PM    MCV 91 02/23/2023 12:59 PM       Lab Results   Component Value Date/Time    TSH 1.650 02/23/2023 12:53 PM               CARDIAC DIAGNOSTICS:      Cardiac Evaluation Includes:    I reviewed the results below. Echo 5/26/22 ([facility]) - LVEF 45%  Echo 1/24/23 - TDS.   LVEF 56%, normal study       Holter 2/10/23 - 1 day - NSR, Avg  bpm, (), Tachy 52% of the time.   ---> HR still fast most of the time-       EKG 4/13/22 (S) - sinus tach,  bpm, normal study              ASSESSMENT AND PLAN:      Assessment and Plan:     1) HTN and tachycardia   - she has long history of sinus tachycardia and has seen Dr. Dali Hansen   - Sinus tach could in part be due to her meds   - She did not tolerate metoprolol   - She felt cardizem makes her shaky ----> On 12/22 switched cardizem to Bystolic  - On 6/22/17 - She is tolerating Bystolic but HR still is fast she feels. Will check a 3 day holter to assess 24 hour HR. Cont low dose Bystolic for now. - TSH and serum metanaphrines normal   - Holter 2/10/23 - 1 day - NSR, Avg  bpm, (), Tachy 52% of the time. - Possibly inappropriate sinus tachycardia - - she has been intolerant of some BB / CCB   - She also had stopped bystolic and ivabradine due to concerns of shaking (which it seems was from Wellbutrin)   - On 4/18/23 ----> She will resume Bystolic and we'll consider adding Ivabradine later. Check D-Dimer     2) LVEF depressed on prior echo 5/22 at [facility]  - Echo 5/26/22 (S) - LVEF 45%  - Echo 1/24/23 - TDS. LVEF 56%, normal study      3) Morbid Obesity   - needs weight loss      4) SLE / connective tissue disorder      5) Chronic LE edema /  lymphedema   - her edema is non-pitting and likely related to her obesity   - She is getting a Lymphapress which was previously planned      6) See me in 3 months       Is a . Likes dogs. Kayla Mondragon MD, [address]  [address] [facility], [address]  [place] [address]  [address]  Aayush Rainey [address] [place] [address]  Ph: [phone]   Ph [phone]

## 2023-04-18 NOTE — PROGRESS NOTES
Chief Complaint   Patient presents with    Follow-up     4 months    Cardiomyopathy    Hypertension    Rapid Heart Rate     Vitals:    04/18/23 1034   Height: 5' 4\" (1.626 m)   Weight: 316 lb (143.3 kg)       Chest pain denied     SOB - yes due to Asthma     Palpitations denied     Swelling in hands/feet denied     Dizziness denied     Recent hospital stays denied     Refills denied

## 2023-04-22 ENCOUNTER — TRANSCRIBE ORDERS (OUTPATIENT)
Facility: HOSPITAL | Age: 23
End: 2023-04-22

## 2023-04-22 DIAGNOSIS — M54.50 LUMBAR PAIN: Primary | ICD-10-CM

## 2023-04-23 DIAGNOSIS — M54.50 LUMBAR PAIN: Primary | ICD-10-CM

## 2023-04-27 ENCOUNTER — OFFICE VISIT (OUTPATIENT)
Dept: FAMILY MEDICINE CLINIC | Age: 23
End: 2023-04-27
Payer: MEDICARE

## 2023-04-27 VITALS
HEART RATE: 91 BPM | WEIGHT: 293 LBS | DIASTOLIC BLOOD PRESSURE: 74 MMHG | BODY MASS INDEX: 50.02 KG/M2 | TEMPERATURE: 98.8 F | RESPIRATION RATE: 20 BRPM | OXYGEN SATURATION: 99 % | HEIGHT: 64 IN | SYSTOLIC BLOOD PRESSURE: 124 MMHG

## 2023-04-27 DIAGNOSIS — J01.00 ACUTE MAXILLARY SINUSITIS, RECURRENCE NOT SPECIFIED: ICD-10-CM

## 2023-04-27 DIAGNOSIS — R35.0 URINARY FREQUENCY: Primary | ICD-10-CM

## 2023-04-27 LAB
BILIRUB UR QL STRIP: NEGATIVE
GLUCOSE UR-MCNC: NEGATIVE MG/DL
KETONES P FAST UR STRIP-MCNC: NEGATIVE MG/DL
PH UR STRIP: 6 [PH] (ref 4.6–8)
PROT UR QL STRIP: NEGATIVE
SP GR UR STRIP: 1.02 (ref 1–1.03)
UA UROBILINOGEN AMB POC: NORMAL (ref 0.2–1)
URINALYSIS CLARITY POC: CLEAR
URINALYSIS COLOR POC: YELLOW
URINE BLOOD POC: NORMAL
URINE LEUKOCYTES POC: NORMAL
URINE NITRITES POC: NEGATIVE

## 2023-04-27 PROCEDURE — 3078F DIAST BP <80 MM HG: CPT | Performed by: PHYSICIAN ASSISTANT

## 2023-04-27 PROCEDURE — G9717 DOC PT DX DEP/BP F/U NT REQ: HCPCS | Performed by: PHYSICIAN ASSISTANT

## 2023-04-27 PROCEDURE — G8417 CALC BMI ABV UP PARAM F/U: HCPCS | Performed by: PHYSICIAN ASSISTANT

## 2023-04-27 PROCEDURE — 81002 URINALYSIS NONAUTO W/O SCOPE: CPT | Performed by: PHYSICIAN ASSISTANT

## 2023-04-27 PROCEDURE — G8427 DOCREV CUR MEDS BY ELIG CLIN: HCPCS | Performed by: PHYSICIAN ASSISTANT

## 2023-04-27 PROCEDURE — 99213 OFFICE O/P EST LOW 20 MIN: CPT | Performed by: PHYSICIAN ASSISTANT

## 2023-04-27 PROCEDURE — 3074F SYST BP LT 130 MM HG: CPT | Performed by: PHYSICIAN ASSISTANT

## 2023-04-27 RX ORDER — BUPROPION HYDROCHLORIDE 150 MG/1
150 TABLET ORAL DAILY
COMMUNITY

## 2023-04-27 RX ORDER — AMOXICILLIN AND CLAVULANATE POTASSIUM 875; 125 MG/1; MG/1
1 TABLET, FILM COATED ORAL 2 TIMES DAILY
Qty: 20 TABLET | Refills: 0 | Status: SHIPPED | OUTPATIENT
Start: 2023-04-27 | End: 2023-05-07

## 2023-04-27 RX ORDER — SPIRONOLACTONE 50 MG/1
TABLET, FILM COATED ORAL
COMMUNITY
Start: 2023-04-03

## 2023-04-27 NOTE — PROGRESS NOTES
Chief Complaint   Patient presents with    UTI    Sinus Infection     Pt being seen for uti   -pt states that she has burning when she urinates  -pt also reports it being a small amount when she goes    Pt states that last week she though it was her allergies  -pt states that it has not gone away and has been having pain in her sinus area    1. Have you been to the ER, urgent care clinic since your last visit? Hospitalized since your last visit? No    2. Have you seen or consulted any other health care providers outside of the [address] [place] [address] since your last visit? Include any pap smears or colon screening. No    Abuse Screening Questionnaire 4/27/2023   Do you ever feel afraid of your partner? N   Are you in a relationship with someone who physically or mentally threatens you? N   Is it safe for you to go home? Y     Depression: Not at risk    PHQ-2 Score: 0     Learning Assessment 4/27/2023   PRIMARY LEARNER Patient   HIGHEST LEVEL OF EDUCATION - PRIMARY LEARNER  -   BARRIERS PRIMARY LEARNER -   Rody 88 LEVEL OF EDUCATION -   Lou Choudhury 10 -   PRIMARY LANGUAGE ENGLISH   PRIMARY LANGUAGE CO-LEARNER -    NEED -   LEARNER PREFERENCE PRIMARY DEMONSTRATION     -   LEARNER PREFERENCE CO-LEARNER -   ANSWERED BY patient   RELATIONSHIP SELF         Pt has no other concerns   Chief Complaint   Patient presents with    UTI    Sinus Infection     she is a 20y.o. year old female who presents for evaluation. Reviewed and agree with Nurse Note and duplicated in this note. Reviewed PmHx, RxHx, FmHx, SocHx, AllgHx and updated and dated in the chart.     Review of Systems - negative except as listed above    Objective:     Vitals:    04/27/23 0930   BP: 124/74   Pulse: 91   Resp: 20   Temp: 98.8 °F (37.1 °C)   TempSrc: Axillary   SpO2: 99%   Weight: 319 lb (144.7 kg)   Height: 5' 4\" (1.626 m)     Physical Examination: General appearance - alert, well appearing, and in no distress and morbidly obese    Ears - bilateral TM's and external ear canals normal  Nose - sinus tenderness noted frontal and maxillary sinuses  Mouth - mucous membranes moist, pharynx normal without lesions  Neck - supple, no significant adenopathy  Chest - clear to auscultation, no wheezes, rales or rhonchi, symmetric air entry  Heart - normal rate, regular rhythm, normal S1, S2, no murmurs, rubs, clicks or gallops  Abdomen - soft, nontender, nondistended, no masses or organomegaly    Assessment/ Plan:   Diagnoses and all orders for this visit:    1. Urinary frequency  -     amoxicillin-clavulanate (AUGMENTIN) 875-125 mg per tablet; Take 1 Tablet by mouth two (2) times a day for 10 days. -     CULTURE, URINE; Future    2. Acute maxillary sinusitis, recurrence not specified  -     amoxicillin-clavulanate (AUGMENTIN) 875-125 mg per tablet; Take 1 Tablet by mouth two (2) times a day for 10 days. Medication has been sent to the pharmacy to treat possible UTI as well as sinusitis. Advised the patient that we will be in contact with her once the urine culture is back. Patient should follow-up if symptoms persist or worsen. I have discussed the diagnosis with the patient and the intended plan as seen in the above orders. The patient has received an after-visit summary and questions were answered concerning future plans.      Medication Side Effects and Warnings were discussed with patient: yes  Patient Labs were reviewed and or requested: yes  Patient Past Records were reviewed and or requested  yes    Jihan Celeste PA-C

## 2023-04-27 NOTE — PROGRESS NOTES
Chief Complaint   Patient presents with    UTI    Sinus Infection     Pt being seen for uti   -pt states that she has burning when she urinates  -pt also reports it being a small amount when she goes    Pt states that last week she though it was her allergies  -pt states that it has not gone away and has been having pain in her sinus area    1. Have you been to the ER, urgent care clinic since your last visit? Hospitalized since your last visit? No    2. Have you seen or consulted any other health care providers outside of the [address] [place] [address] since your last visit? Include any pap smears or colon screening. No    Abuse Screening Questionnaire 4/27/2023   Do you ever feel afraid of your partner? N   Are you in a relationship with someone who physically or mentally threatens you? N   Is it safe for you to go home?  Y     Depression: Not at risk    PHQ-2 Score: 0     Learning Assessment 4/27/2023   PRIMARY LEARNER Patient   HIGHEST LEVEL OF EDUCATION - PRIMARY LEARNER  -   BARRIERS PRIMARY LEARNER -   Rody 88 LEVEL OF EDUCATION -   Lou Choudhury 10 -   PRIMARY LANGUAGE ENGLISH   PRIMARY LANGUAGE CO-LEARNER -    NEED -   LEARNER PREFERENCE PRIMARY DEMONSTRATION     -   LEARNER PREFERENCE CO-LEARNER -   ANSWERED BY patient   RELATIONSHIP SELF         Pt has no other concerns

## 2023-04-28 LAB
BACTERIA SPEC CULT: NORMAL
SERVICE CMNT-IMP: NORMAL

## 2023-05-18 ENCOUNTER — OFFICE VISIT (OUTPATIENT)
Age: 23
End: 2023-05-18
Payer: MEDICARE

## 2023-05-18 VITALS
DIASTOLIC BLOOD PRESSURE: 76 MMHG | HEART RATE: 96 BPM | RESPIRATION RATE: 18 BRPM | BODY MASS INDEX: 50.02 KG/M2 | WEIGHT: 293 LBS | TEMPERATURE: 97.4 F | SYSTOLIC BLOOD PRESSURE: 130 MMHG | HEIGHT: 64 IN | OXYGEN SATURATION: 97 %

## 2023-05-18 DIAGNOSIS — G43.709 CHRONIC MIGRAINE W/O AURA, NOT INTRACTABLE, W/O STAT MIGR: Primary | ICD-10-CM

## 2023-05-18 DIAGNOSIS — E66.01 MORBID OBESITY WITH BMI OF 50.0-59.9, ADULT (HCC): ICD-10-CM

## 2023-05-18 PROCEDURE — 99215 OFFICE O/P EST HI 40 MIN: CPT | Performed by: PSYCHIATRY & NEUROLOGY

## 2023-05-18 PROCEDURE — 3075F SYST BP GE 130 - 139MM HG: CPT | Performed by: PSYCHIATRY & NEUROLOGY

## 2023-05-18 PROCEDURE — G8427 DOCREV CUR MEDS BY ELIG CLIN: HCPCS | Performed by: PSYCHIATRY & NEUROLOGY

## 2023-05-18 PROCEDURE — 3078F DIAST BP <80 MM HG: CPT | Performed by: PSYCHIATRY & NEUROLOGY

## 2023-05-18 PROCEDURE — 1036F TOBACCO NON-USER: CPT | Performed by: PSYCHIATRY & NEUROLOGY

## 2023-05-18 PROCEDURE — G8417 CALC BMI ABV UP PARAM F/U: HCPCS | Performed by: PSYCHIATRY & NEUROLOGY

## 2023-05-18 RX ORDER — METFORMIN HYDROCHLORIDE 500 MG/1
1000 TABLET, EXTENDED RELEASE ORAL
COMMUNITY
Start: 2022-12-21

## 2023-05-18 RX ORDER — DILTIAZEM HYDROCHLORIDE 240 MG/1
CAPSULE, COATED, EXTENDED RELEASE ORAL
COMMUNITY
Start: 2023-04-03 | End: 2023-05-18

## 2023-05-18 RX ORDER — MONTELUKAST SODIUM 10 MG/1
10 TABLET ORAL DAILY
COMMUNITY
Start: 2023-02-16

## 2023-05-18 RX ORDER — SPIRONOLACTONE 50 MG/1
TABLET, FILM COATED ORAL
COMMUNITY
Start: 2023-04-03 | End: 2023-05-18

## 2023-05-18 RX ORDER — GABAPENTIN 100 MG/1
100 CAPSULE ORAL DAILY
COMMUNITY
Start: 2023-02-16

## 2023-05-18 RX ORDER — DICLOFENAC SODIUM 75 MG/1
75 TABLET, DELAYED RELEASE ORAL AS NEEDED
COMMUNITY
Start: 2023-05-16

## 2023-05-18 RX ORDER — DULOXETIN HYDROCHLORIDE 60 MG/1
60 CAPSULE, DELAYED RELEASE ORAL 2 TIMES DAILY
COMMUNITY
Start: 2023-03-21

## 2023-05-18 RX ORDER — NEBIVOLOL 5 MG/1
5 TABLET ORAL DAILY
COMMUNITY
Start: 2023-01-31 | End: 2023-05-18

## 2023-05-18 RX ORDER — HYDROXYCHLOROQUINE SULFATE 200 MG/1
200 TABLET, FILM COATED ORAL
COMMUNITY

## 2023-05-18 RX ORDER — BUPROPION HYDROCHLORIDE 150 MG/1
150 TABLET ORAL DAILY
COMMUNITY

## 2023-05-18 RX ORDER — DUPILUMAB 300 MG/2ML
INJECTION, SOLUTION SUBCUTANEOUS
COMMUNITY
Start: 2023-04-15

## 2023-05-18 RX ORDER — ONDANSETRON HYDROCHLORIDE 8 MG/1
8 TABLET, FILM COATED ORAL EVERY 8 HOURS PRN
COMMUNITY

## 2023-05-18 RX ORDER — NALTREXONE HYDROCHLORIDE 50 MG/1
25 TABLET, FILM COATED ORAL DAILY
COMMUNITY
Start: 2022-05-09

## 2023-05-18 RX ORDER — LANSOPRAZOLE 30 MG/1
30 CAPSULE, DELAYED RELEASE ORAL
COMMUNITY

## 2023-05-18 RX ORDER — SACROSIDASE 8500 [IU]/ML
2 SOLUTION ORAL
COMMUNITY

## 2023-05-18 RX ORDER — TRAZODONE HYDROCHLORIDE 50 MG/1
TABLET ORAL
COMMUNITY
Start: 2021-12-06

## 2023-05-18 RX ORDER — NORETHINDRONE ACETATE AND ETHINYL ESTRADIOL 1.5-30(21)
KIT ORAL
COMMUNITY
Start: 2023-03-29 | End: 2023-05-18

## 2023-05-18 RX ORDER — NARATRIPTAN 2.5 MG/1
TABLET ORAL
Qty: 9 TABLET | Refills: 3 | Status: SHIPPED | OUTPATIENT
Start: 2023-05-18

## 2023-05-18 NOTE — PROGRESS NOTES
NEUROLOGY CLINIC NOTE    Patient ID:  Armand Corral  [id number]  12 y.o.  2000    Date of Consultation:  May 18, 2023    Reason for Consultation:  migraines      Chief Complaint   Patient presents with    Migraine     Patient is a former patient of Dr. Dary Larkin and last seen 2/2023. Patient reports she has about 2-3 migraines per week. History of Present Illness:     Patient Active Problem List    Diagnosis Date Noted    Chronic right-sided low back pain with right-sided sciatica 03/29/2023    Atopic dermatitis 03/29/2023    Iron deficiency 03/29/2023    Cardiomyopathy, unspecified type ([facility] 01/19/2023    Severe persistent asthma with acute exacerbation 12/21/2022    Type 2 diabetes mellitus ([facility] 12/13/2022    Gene mutation 11/17/2022    Neuropathic pain 11/17/2022    Undifferentiated connective tissue disease ([facility] 11/17/2022    Disease of hematopoietic system 09/16/2022    Other eosinophilia 06/07/2022    Elevated sed rate 05/20/2022    Neutrophilia 05/20/2022    Monocytosis 05/20/2022    Other thrombocytosis 04/28/2022    Elevated C-reactive protein (CRP) 04/28/2022    Skin lesions 04/28/2022    SLE (systemic lupus erythematosus) ([facility])     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[facility])     FIORDALIZA (generalized anxiety disorder)     Sucrose intolerance     Acid reflux     Migraine headache     Fibromyalgia     Autism     ADHD (attention deficit hyperactivity disorder)     Tachycardia     Lipedema 01/01/2021    Tenosynovitis of right hand 01/01/2021    Obesity, morbid ([facility] 12/14/2020    Chronic gastritis without bleeding 06/20/2019    Chronic nausea 05/16/2019    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 Aury Rueda. vs Sucrose Intolerance.

## 2023-05-19 ENCOUNTER — CLINICAL DOCUMENTATION (OUTPATIENT)
Age: 23
End: 2023-05-19

## 2023-05-19 NOTE — PROGRESS NOTES
Received fax request for records from [facility] for loop due to insurance. Faxed copy of OV 1/31/23 Dr Mary Arshad, [address] 12/13/22, EKG 4/5/22 & echo 1/24/23. Faxed records to fax # [phone]. Received confirmation.

## 2023-05-20 RX ORDER — BUDESONIDE, GLYCOPYRROLATE, AND FORMOTEROL FUMARATE 160; 9; 4.8 UG/1; UG/1; UG/1
2 AEROSOL, METERED RESPIRATORY (INHALATION) DAILY
COMMUNITY

## 2023-05-20 RX ORDER — AZELASTINE HCL 205.5 UG/1
1 SPRAY NASAL 2 TIMES DAILY
COMMUNITY
Start: 2022-12-15 | End: 2023-06-21

## 2023-05-20 RX ORDER — FOLIC ACID 0.8 MG
TABLET ORAL
COMMUNITY

## 2023-05-20 RX ORDER — ASCORBIC ACID 500 MG
TABLET ORAL
COMMUNITY
Start: 2021-06-02

## 2023-05-20 RX ORDER — DIPHENHYDRAMINE HCL 25 MG
CAPSULE ORAL
COMMUNITY

## 2023-05-20 RX ORDER — HYDROXYCHLOROQUINE SULFATE 200 MG/1
1 TABLET, FILM COATED ORAL 2 TIMES DAILY
COMMUNITY
Start: 2022-12-06

## 2023-05-20 RX ORDER — CETIRIZINE HYDROCHLORIDE 10 MG/1
1 TABLET ORAL NIGHTLY
COMMUNITY
Start: 2022-12-15

## 2023-05-20 RX ORDER — ALBUTEROL SULFATE 90 UG/1
1 AEROSOL, METERED RESPIRATORY (INHALATION) EVERY 4 HOURS PRN
COMMUNITY

## 2023-05-22 ENCOUNTER — PATIENT MESSAGE (OUTPATIENT)
Age: 23
End: 2023-05-22

## 2023-05-22 DIAGNOSIS — E61.1 IRON DEFICIENCY: Primary | ICD-10-CM

## 2023-05-23 DIAGNOSIS — G43.709 CHRONIC MIGRAINE W/O AURA, NOT INTRACTABLE, W/O STAT MIGR: Primary | ICD-10-CM

## 2023-05-23 DIAGNOSIS — E74.818 OTHER DISORDERS OF GLUCOSE TRANSPORT (HCC): ICD-10-CM

## 2023-05-23 DIAGNOSIS — R79.9 ABNORMAL FINDING OF BLOOD CHEMISTRY, UNSPECIFIED: ICD-10-CM

## 2023-05-23 RX ORDER — ZOLMITRIPTAN 5 MG/1
TABLET, FILM COATED ORAL
Qty: 9 TABLET | Refills: 3 | Status: SHIPPED | OUTPATIENT
Start: 2023-05-23

## 2023-05-31 LAB
ALBUMIN SERPL-MCNC: 4.4 G/DL (ref 3.9–5)
ALBUMIN/GLOB SERPL: 1.8 {RATIO} (ref 1.2–2.2)
ALP SERPL-CCNC: 69 IU/L (ref 44–121)
ALT SERPL-CCNC: 13 IU/L (ref 0–32)
AST SERPL-CCNC: 11 IU/L (ref 0–40)
BASOPHILS # BLD AUTO: 0.1 X10E3/UL (ref 0–0.2)
BASOPHILS NFR BLD AUTO: 1 %
BILIRUB SERPL-MCNC: 0.4 MG/DL (ref 0–1.2)
BUN SERPL-MCNC: 10 MG/DL (ref 6–20)
BUN/CREAT SERPL: 14 (ref 9–23)
CALCIUM SERPL-MCNC: 9.7 MG/DL (ref 8.7–10.2)
CHLORIDE SERPL-SCNC: 102 MMOL/L (ref 96–106)
CO2 SERPL-SCNC: 23 MMOL/L (ref 20–29)
CREAT SERPL-MCNC: 0.7 MG/DL (ref 0.57–1)
D DIMER PPP FEU-MCNC: 0.23 MG/L FEU (ref 0–0.49)
EGFRCR SERPLBLD CKD-EPI 2021: 125 ML/MIN/1.73
EOSINOPHIL # BLD AUTO: 0.4 X10E3/UL (ref 0–0.4)
EOSINOPHIL NFR BLD AUTO: 4 %
ERYTHROCYTE [DISTWIDTH] IN BLOOD BY AUTOMATED COUNT: 12.6 % (ref 11.7–15.4)
GLOBULIN SER CALC-MCNC: 2.4 G/DL (ref 1.5–4.5)
GLUCOSE SERPL-MCNC: 92 MG/DL (ref 70–99)
HCT VFR BLD AUTO: 45.1 % (ref 34–46.6)
HGB BLD-MCNC: 15.6 G/DL (ref 11.1–15.9)
IMM GRANULOCYTES # BLD AUTO: 0 X10E3/UL (ref 0–0.1)
IMM GRANULOCYTES NFR BLD AUTO: 0 %
IRON SATN MFR SERPL: 18 % (ref 15–55)
IRON SERPL-MCNC: 74 UG/DL (ref 27–159)
LYMPHOCYTES # BLD AUTO: 2.5 X10E3/UL (ref 0.7–3.1)
LYMPHOCYTES NFR BLD AUTO: 23 %
MCH RBC QN AUTO: 31.6 PG (ref 26.6–33)
MCHC RBC AUTO-ENTMCNC: 34.6 G/DL (ref 31.5–35.7)
MCV RBC AUTO: 91 FL (ref 79–97)
MONOCYTES # BLD AUTO: 0.7 X10E3/UL (ref 0.1–0.9)
MONOCYTES NFR BLD AUTO: 7 %
NEUTROPHILS # BLD AUTO: 7.3 X10E3/UL (ref 1.4–7)
NEUTROPHILS NFR BLD AUTO: 65 %
PLATELET # BLD AUTO: 418 X10E3/UL (ref 150–450)
POTASSIUM SERPL-SCNC: 4.3 MMOL/L (ref 3.5–5.2)
PROT SERPL-MCNC: 6.8 G/DL (ref 6–8.5)
RBC # BLD AUTO: 4.94 X10E6/UL (ref 3.77–5.28)
SODIUM SERPL-SCNC: 139 MMOL/L (ref 134–144)
TIBC SERPL-MCNC: 415 UG/DL (ref 250–450)
UIBC SERPL-MCNC: 341 UG/DL (ref 131–425)
WBC # BLD AUTO: 11.1 X10E3/UL (ref 3.4–10.8)

## 2023-06-02 ENCOUNTER — PATIENT MESSAGE (OUTPATIENT)
Age: 23
End: 2023-06-02

## 2023-06-02 DIAGNOSIS — E61.1 IRON DEFICIENCY: Primary | ICD-10-CM

## 2023-06-05 ENCOUNTER — HOSPITAL ENCOUNTER (OUTPATIENT)
Age: 23
Discharge: HOME | End: 2023-06-08
Payer: MEDICARE

## 2023-06-05 ENCOUNTER — TELEPHONE (OUTPATIENT)
Age: 23
End: 2023-06-05

## 2023-06-05 DIAGNOSIS — M54.50 LUMBAR PAIN: ICD-10-CM

## 2023-06-05 PROCEDURE — 72148 MRI LUMBAR SPINE W/O DYE: CPT

## 2023-06-05 NOTE — TELEPHONE ENCOUNTER
Called patient and spoke with her about appt. She is going to call back when she can to schedule with [facility].

## 2023-06-07 NOTE — PROGRESS NOTES
NEUROLOGIC:  No facial asymmetry (Cranial nerve 7 motor function), No gaze palsy   - PSYCHIATRIC: Normal affect, Normal insight and judgement     Data Reviewed:   Lab Results   Component Value Date/Time    Hemoglobin A1c 4.8 09/03/2021 10:03 AM    Hemoglobin A1c (POC) 5.2 09/26/2022 02:59 PM         Assessment/Plan: This is a very pleasant 19 yo female seen for discussion related to BMI of 54 and medication options for weight loss. Reviewed my preference for GLP1 agonists but cannot get these covered in the absence of diabetes. We resumed metformin and up-titrated to the max tolerated dose of 1000mg whs. Phentermine is not a good option given history of anxiety. Attempted to use combination of generic naltrexone (already taking low dose and tolerating at time of initiation), increase to 25mg (only available option is 50mg tablet), and also added in bupropion 150mg xr. Previously discussed potential enhancement of vyvanse effects with addition of bupropion and naltrexone. As of 06/08/2023, Jada Shashi noted worsening in her tremor with the bupropion. Discussed healthy plate 12/71/9621 as it related to insulin resistance and reactive hypoglycemia. #BMI 47  -avoid atarax/antihistamine as they are weight positive and interfere with other meds  -unable to use GLP1 agonist due to absence of diabetes  -resume metformin 1000mg qhs  -referral provided for nutrition (insurance will not pay for this)- discussed insulin resistance diet 06/08/2023  -d/c LDN previously and start 25mg (1/2 50mg tab)  -consider resumption of bupropion 150mg xr, increase to 150mg xr 2 tabs daily if tolerated   -reviewed risk of anxiety/diarrhea/headache/nausea with both    -Of note this patient's labs are not consistent with PCOS    - CORTISOL, URINE FREE 24 HR; Future  - SALIVARY CORTISOL (2), TIMED;  Future    Copy sent to:Bertha Kirk PA-C    RTC 3-4 mo    Jonas Wallis MD  [facility]

## 2023-06-08 ENCOUNTER — OFFICE VISIT (OUTPATIENT)
Age: 23
End: 2023-06-08
Payer: MEDICARE

## 2023-06-08 VITALS — BODY MASS INDEX: 50.02 KG/M2 | HEIGHT: 64 IN | WEIGHT: 293 LBS

## 2023-06-08 VITALS
WEIGHT: 293 LBS | HEART RATE: 101 BPM | BODY MASS INDEX: 50.02 KG/M2 | RESPIRATION RATE: 22 BRPM | HEIGHT: 64 IN | TEMPERATURE: 98 F | SYSTOLIC BLOOD PRESSURE: 125 MMHG | OXYGEN SATURATION: 96 % | DIASTOLIC BLOOD PRESSURE: 78 MMHG

## 2023-06-08 DIAGNOSIS — R79.89 ELEVATED LFTS: Primary | ICD-10-CM

## 2023-06-08 DIAGNOSIS — D72.9 NEUTROPHILIA: ICD-10-CM

## 2023-06-08 DIAGNOSIS — R79.89 ELEVATED LFTS: ICD-10-CM

## 2023-06-08 DIAGNOSIS — Z15.89 GENE MUTATION: ICD-10-CM

## 2023-06-08 DIAGNOSIS — Z86.39 HISTORY OF IRON DEFICIENCY: Primary | ICD-10-CM

## 2023-06-08 PROCEDURE — G8427 DOCREV CUR MEDS BY ELIG CLIN: HCPCS | Performed by: INTERNAL MEDICINE

## 2023-06-08 PROCEDURE — 99214 OFFICE O/P EST MOD 30 MIN: CPT | Performed by: INTERNAL MEDICINE

## 2023-06-08 PROCEDURE — G8417 CALC BMI ABV UP PARAM F/U: HCPCS | Performed by: INTERNAL MEDICINE

## 2023-06-08 PROCEDURE — 1036F TOBACCO NON-USER: CPT | Performed by: INTERNAL MEDICINE

## 2023-06-08 RX ORDER — CYCLOBENZAPRINE HCL 10 MG
TABLET ORAL
COMMUNITY
Start: 2023-04-13

## 2023-06-08 RX ORDER — CHLORAL HYDRATE 500 MG
CAPSULE ORAL
COMMUNITY
Start: 2022-04-13

## 2023-06-08 RX ORDER — METFORMIN HYDROCHLORIDE 500 MG/1
1000 TABLET, EXTENDED RELEASE ORAL EVERY EVENING
Qty: 180 TABLET | Refills: 0 | Status: SHIPPED | OUTPATIENT
Start: 2023-06-08

## 2023-06-08 RX ORDER — B-COMPLEX WITH VITAMIN C
TABLET ORAL
COMMUNITY
Start: 2021-06-02

## 2023-06-08 RX ORDER — NALTREXONE HYDROCHLORIDE 50 MG/1
25 TABLET, FILM COATED ORAL DAILY
Qty: 30 TABLET | Refills: 0 | Status: SHIPPED | OUTPATIENT
Start: 2023-06-08

## 2023-06-08 RX ORDER — ERENUMAB-AOOE 140 MG/ML
INJECTION, SOLUTION SUBCUTANEOUS
COMMUNITY
Start: 2020-12-02

## 2023-06-08 RX ORDER — NORETHINDRONE ACETATE AND ETHINYL ESTRADIOL 1MG-20(21)
KIT ORAL
COMMUNITY

## 2023-06-08 RX ORDER — BUPROPION HYDROCHLORIDE 150 MG/1
150 TABLET ORAL EVERY MORNING
Qty: 90 TABLET | Refills: 3 | Status: SHIPPED | OUTPATIENT
Start: 2023-06-08

## 2023-06-08 ASSESSMENT — PATIENT HEALTH QUESTIONNAIRE - PHQ9: DEPRESSION UNABLE TO ASSESS: PT REFUSES

## 2023-06-08 NOTE — PROGRESS NOTES
Chief Complaint   Patient presents with    Follow-up           Vitals:    06/08/23 0914   BP: 125/78   Pulse: (!) 101   Resp: 22   Temp: 98 °F (36.7 °C)   SpO2: 96%            1. Have you been to the ER, urgent care clinic since your last visit? Hospitalized since your last visit? Yes [facility] for asthma   2. Have you seen or consulted any other health care providers outside of the [address] [place] [address] since your last visit? Include any pap smears or colon screening.    Yes [facility]
with Dr. Pauletta Cushing. -- I personally reviewed Dr Wei Hoffman note from 12/19/2022. Her clinic is monitoring her every 3 months. FOLLOW-UP:  Return if symptoms worsen or fail to improve.     Signed By:   Sharlee Kocher, MD Trey Overlie, MD  Hematology/Medical Oncology Provider  Natan Kearney  P: [phone]

## 2023-06-09 ENCOUNTER — APPOINTMENT (OUTPATIENT)
Facility: HOSPITAL | Age: 23
End: 2023-06-09
Payer: MEDICARE

## 2023-06-09 ENCOUNTER — HOSPITAL ENCOUNTER (EMERGENCY)
Facility: HOSPITAL | Age: 23
Discharge: HOME | End: 2023-06-09
Attending: EMERGENCY MEDICINE
Payer: MEDICARE

## 2023-06-09 VITALS
DIASTOLIC BLOOD PRESSURE: 94 MMHG | OXYGEN SATURATION: 100 % | SYSTOLIC BLOOD PRESSURE: 150 MMHG | HEART RATE: 84 BPM | TEMPERATURE: 98 F | RESPIRATION RATE: 16 BRPM

## 2023-06-09 DIAGNOSIS — S61.412A LACERATION OF LEFT HAND WITHOUT FOREIGN BODY, INITIAL ENCOUNTER: ICD-10-CM
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
## 2023-06-09 PROCEDURE — 6370000000 HC RX 637 (ALT 250 FOR IP): Performed by: EMERGENCY MEDICINE

## 2023-06-09 PROCEDURE — 73130 X-RAY EXAM OF HAND: CPT

## 2023-06-09 PROCEDURE — 6360000002 HC RX W HCPCS: Performed by: NURSE PRACTITIONER

## 2023-06-09 PROCEDURE — 90714 TD VACC NO PRESV 7 YRS+ IM: CPT | Performed by: NURSE PRACTITIONER

## 2023-06-09 PROCEDURE — 90471 IMMUNIZATION ADMIN: CPT | Performed by: NURSE PRACTITIONER

## 2023-06-09 RX ORDER — AMOXICILLIN AND CLAVULANATE POTASSIUM 875; 125 MG/1; MG/1
1 TABLET, FILM COATED ORAL 2 TIMES DAILY
Qty: 14 TABLET | Refills: 0 | Status: SHIPPED | OUTPATIENT
Start: 2023-06-09 | End: 2023-06-16

## 2023-06-09 RX ORDER — ACETAMINOPHEN 500 MG
1000 TABLET ORAL
Status: COMPLETED | OUTPATIENT
Start: 2023-06-09 | End: 2023-06-09

## 2023-06-09 RX ORDER — IBUPROFEN 800 MG/1
800 TABLET ORAL EVERY 6 HOURS PRN
Qty: 30 TABLET | Refills: 1 | Status: SHIPPED | OUTPATIENT
Start: 2023-06-09

## 2023-06-09 RX ORDER — TETANUS AND DIPHTHERIA TOXOIDS ADSORBED 2; 2 [LF]/.5ML; [LF]/.5ML
0.5 INJECTION INTRAMUSCULAR ONCE
Status: COMPLETED | OUTPATIENT
Start: 2023-06-09 | End: 2023-06-09

## 2023-06-09 RX ORDER — IBUPROFEN 800 MG/1
800 TABLET ORAL
Status: COMPLETED | OUTPATIENT
Start: 2023-06-09 | End: 2023-06-09

## 2023-06-09 RX ORDER — ACETAMINOPHEN 500 MG
1000 TABLET ORAL 3 TIMES DAILY
Qty: 120 TABLET | Refills: 3 | Status: SHIPPED | OUTPATIENT
Start: 2023-06-09

## 2023-06-09 RX ORDER — AMOXICILLIN AND CLAVULANATE POTASSIUM 875; 125 MG/1; MG/1
1 TABLET, FILM COATED ORAL
Status: COMPLETED | OUTPATIENT
Start: 2023-06-09 | End: 2023-06-09

## 2023-06-09 RX ADMIN — AMOXICILLIN AND CLAVULANATE POTASSIUM 1 TABLET: 875; 125 TABLET, FILM COATED ORAL at 19:25

## 2023-06-09 RX ADMIN — IBUPROFEN 800 MG: 800 TABLET ORAL at 19:25

## 2023-06-09 RX ADMIN — ACETAMINOPHEN 1000 MG: 500 TABLET ORAL at 19:25

## 2023-06-09 RX ADMIN — TETANUS AND DIPHTHERIA TOXOIDS ADSORBED 0.5 ML: 2; 2 INJECTION INTRAMUSCULAR at 18:21

## 2023-06-09 NOTE — ED NOTES
Patient was discharged at 1 . Patient verbalized understanding of all discharge instructions. Patient alert and oriented, no acute distress when leaving ED. Patient ambulatory when leaving ED.          Reta Orosco RN  06/09/23 2841

## 2023-06-09 NOTE — ED NOTES
Wound cleaned with saline flush x3. Ice applied after cleaning, no dressing applied.  [facility]  06/09/23 1924

## 2023-06-09 NOTE — ED PROVIDER NOTES
mouthHistorical Med      diclofenac (VOLTAREN) 75 MG EC tablet 1 tablet as neededHistorical Med      DULoxetine (CYMBALTA) 60 MG extended release capsule Take 1 capsule by mouth 2 times dailyHistorical Med      DUPIXENT 300 MG/2ML SOPN injection DAWHistorical Med      lansoprazole (PREVACID) 30 MG delayed release capsule Take 1 capsule by mouth every morning (before breakfast)Historical Med      lisdexamfetamine (VYVANSE) 30 MG capsule Take 1 capsule by mouth daily. Historical Med      traZODone (DESYREL) 50 MG tablet Historical Med      ondansetron (ZOFRAN) 8 MG tablet Take 1 tablet by mouth every 8 hours as needed for Nausea or VomitingHistorical Med      !! hydroxychloroquine (PLAQUENIL) 200 MG tablet Take 1 tablet by mouth nightlyHistorical Med      Sacrosidase (SUCRAID) 8500 UNIT/ML SOLN Take 2 mLs by mouth 3 times daily (before meals)Historical Med      naratriptan (AMERGE) 2.5 MG tablet 2.5 mg at onset of headache, may repeat in 4 hours if needed, Disp-9 tablet, R-3Normal       !! - Potential duplicate medications found. Please discuss with provider.           ALLERGIES     Sulfamethoprim [sulfamethoxazole-trimethoprim], Rizatriptan, Sulfa antibiotics, Adhesive tape, Metronidazole, and Sucrose    FAMILY HISTORY       Family History   Problem Relation Age of Onset    Hypertension Maternal Grandmother     Heart Disease Maternal Grandmother     Atrial Fibrillation Maternal Grandmother     Heart Disease Maternal Grandfather     Hypertension Maternal Grandfather     Other Maternal Grandfather         diverticulitis    No Known Problems Paternal Grandmother     No Known Problems Paternal Grandfather     Mult Sclerosis Maternal Aunt     Cancer Maternal Uncle     Endometriosis Mother     Delayed Awakening Mother     Post-op Nausea/Vomiting Mother     Migraines Mother         d/t accident - neck and brain injury    Atrial Fibrillation Mother     Hypertension Mother     Obesity Mother     No Known Problems Father

## 2023-06-09 NOTE — ED NOTES
Assumed care of patient at this time. Received report from Eddie Solares, [place].       Aliya Branch RN  06/09/23 1910

## 2023-06-09 NOTE — ED TRIAGE NOTES
Pt arrives to ED ambulatory w/ c/o dog bite. Dog is up to date on rabies vaccine and is patient's own dog. Pt has hx of lupus    No medication changes since last visit.     Last tdap >5yrs

## 2023-06-09 NOTE — ED NOTES
Wound dressed with non-adherent and kerlex prior to discharge.       Alice Castañeda RN  06/09/23 9581

## 2023-06-10 ENCOUNTER — HOSPITAL ENCOUNTER (EMERGENCY)
Facility: HOSPITAL | Age: 23
Discharge: HOME | End: 2023-06-10
Attending: EMERGENCY MEDICINE
Payer: MEDICARE

## 2023-06-10 ENCOUNTER — APPOINTMENT (OUTPATIENT)
Facility: HOSPITAL | Age: 23
End: 2023-06-10
Payer: MEDICARE

## 2023-06-10 VITALS
DIASTOLIC BLOOD PRESSURE: 79 MMHG | OXYGEN SATURATION: 97 % | BODY MASS INDEX: 50.02 KG/M2 | TEMPERATURE: 98.2 F | WEIGHT: 293 LBS | RESPIRATION RATE: 17 BRPM | SYSTOLIC BLOOD PRESSURE: 140 MMHG | HEIGHT: 64 IN | HEART RATE: 90 BPM

## 2023-06-10 DIAGNOSIS — L03.114 CELLULITIS OF LEFT HAND: ICD-10-CM
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
## 2023-06-10 LAB
ALBUMIN SERPL-MCNC: 3.9 G/DL (ref 3.5–5.2)
ALBUMIN/GLOB SERPL: 1.3 (ref 1.1–2.2)
ALP SERPL-CCNC: 61 U/L (ref 35–104)
ALT SERPL-CCNC: 15 U/L (ref 10–35)
ANION GAP SERPL CALC-SCNC: 11 MMOL/L (ref 5–15)
AST SERPL-CCNC: 11 U/L (ref 10–35)
BASOPHILS # BLD: 0.1 K/UL (ref 0–1)
BASOPHILS NFR BLD: 1 % (ref 0–1)
BILIRUB SERPL-MCNC: 0.5 MG/DL (ref 0.2–1)
BUN SERPL-MCNC: 11 MG/DL (ref 6–20)
BUN/CREAT SERPL: 15 (ref 12–20)
CALCIUM SERPL-MCNC: 9.2 MG/DL (ref 8.6–10)
CHLORIDE SERPL-SCNC: 104 MMOL/L (ref 98–107)
CO2 SERPL-SCNC: 26 MMOL/L (ref 22–29)
CREAT SERPL-MCNC: 0.71 MG/DL (ref 0.5–0.9)
CRP SERPL-MCNC: 3.62 MG/DL
DIFFERENTIAL METHOD BLD: ABNORMAL
EOSINOPHIL # BLD: 0.2 K/UL (ref 0–0.4)
EOSINOPHIL NFR BLD: 2 %
ERYTHROCYTE [DISTWIDTH] IN BLOOD BY AUTOMATED COUNT: 13 % (ref 11.5–14.5)
ERYTHROCYTE [SEDIMENTATION RATE] IN BLOOD: 20 MM/HR (ref 0–20)
GLOBULIN SER CALC-MCNC: 2.9 G/DL (ref 2–4)
GLUCOSE SERPL-MCNC: 97 MG/DL (ref 65–100)
HCT VFR BLD AUTO: 43.8 % (ref 35–47)
HGB BLD-MCNC: 14.6 G/DL (ref 11.5–16)
IMM GRANULOCYTES # BLD AUTO: 0 K/UL (ref 0–0.04)
IMM GRANULOCYTES NFR BLD AUTO: 0 % (ref 0–0.5)
LYMPHOCYTES # BLD: 3.3 K/UL (ref 0.8–3.5)
LYMPHOCYTES NFR BLD: 24 % (ref 12–49)
MCH RBC QN AUTO: 31.3 PG (ref 26–34)
MCHC RBC AUTO-ENTMCNC: 33.3 G/DL (ref 30–36.5)
MCV RBC AUTO: 94 FL (ref 80–99)
MONOCYTES # BLD: 1 K/UL (ref 0–1)
MONOCYTES NFR BLD: 7 % (ref 5–13)
NEUTS SEG # BLD: 9.1 K/UL (ref 1.8–8)
NEUTS SEG NFR BLD: 66 % (ref 32–75)
NRBC # BLD: 0 K/UL (ref 0–0.01)
NRBC BLD-RTO: 0 PER 100 WBC
PLATELET # BLD AUTO: 402 K/UL (ref 150–400)
PMV BLD AUTO: 8.5 FL (ref 8.9–12.9)
POTASSIUM SERPL-SCNC: 3.9 MMOL/L (ref 3.5–5.1)
PROT SERPL-MCNC: 6.8 G/DL (ref 6.4–8.3)
RBC # BLD AUTO: 4.66 M/UL (ref 3.8–5.2)
SODIUM SERPL-SCNC: 141 MMOL/L (ref 136–145)
WBC # BLD AUTO: 13.6 K/UL (ref 3.6–11)

## 2023-06-10 PROCEDURE — 6370000000 HC RX 637 (ALT 250 FOR IP): Performed by: EMERGENCY MEDICINE

## 2023-06-10 PROCEDURE — 85652 RBC SED RATE AUTOMATED: CPT

## 2023-06-10 PROCEDURE — 80053 COMPREHEN METABOLIC PANEL: CPT

## 2023-06-10 PROCEDURE — 86140 C-REACTIVE PROTEIN: CPT

## 2023-06-10 PROCEDURE — 73201 CT UPPER EXTREMITY W/DYE: CPT

## 2023-06-10 PROCEDURE — 85025 COMPLETE CBC W/AUTO DIFF WBC: CPT

## 2023-06-10 PROCEDURE — 6360000004 HC RX CONTRAST MEDICATION: Performed by: EMERGENCY MEDICINE

## 2023-06-10 PROCEDURE — 36415 COLL VENOUS BLD VENIPUNCTURE: CPT

## 2023-06-10 PROCEDURE — 6360000002 HC RX W HCPCS: Performed by: EMERGENCY MEDICINE

## 2023-06-10 RX ORDER — KETOROLAC TROMETHAMINE 30 MG/ML
30 INJECTION, SOLUTION INTRAMUSCULAR; INTRAVENOUS
Status: COMPLETED | OUTPATIENT
Start: 2023-06-10 | End: 2023-06-10

## 2023-06-10 RX ORDER — GINSENG 100 MG
CAPSULE ORAL
Status: COMPLETED | OUTPATIENT
Start: 2023-06-10 | End: 2023-06-10

## 2023-06-10 RX ADMIN — KETOROLAC TROMETHAMINE 30 MG: 30 INJECTION, SOLUTION INTRAMUSCULAR; INTRAVENOUS at 22:06

## 2023-06-10 RX ADMIN — Medication: at 22:58

## 2023-06-10 RX ADMIN — IOPAMIDOL 100 ML: 755 INJECTION, SOLUTION INTRAVENOUS at 22:00

## 2023-06-10 ASSESSMENT — PAIN - FUNCTIONAL ASSESSMENT: PAIN_FUNCTIONAL_ASSESSMENT: 0-10

## 2023-06-10 ASSESSMENT — PAIN SCALES - GENERAL
PAINLEVEL_OUTOF10: 4
PAINLEVEL_OUTOF10: 5

## 2023-06-10 NOTE — ED NOTES
Pt called ED requesting radiology results. Provider advised and spoke over the phone with pt.       Karen Ramirez RN  06/09/23 6887

## 2023-06-20 ENCOUNTER — OFFICE VISIT (OUTPATIENT)
Age: 23
End: 2023-06-20
Payer: MEDICARE

## 2023-06-20 VITALS
OXYGEN SATURATION: 97 % | DIASTOLIC BLOOD PRESSURE: 80 MMHG | BODY MASS INDEX: 53.97 KG/M2 | SYSTOLIC BLOOD PRESSURE: 122 MMHG | HEIGHT: 64 IN | HEART RATE: 99 BPM

## 2023-06-20 DIAGNOSIS — H92.03 OTALGIA, BILATERAL: Primary | ICD-10-CM

## 2023-06-20 DIAGNOSIS — R09.81 NASAL CONGESTION: ICD-10-CM

## 2023-06-20 DIAGNOSIS — H69.83 ETD (EUSTACHIAN TUBE DYSFUNCTION), BILATERAL: ICD-10-CM

## 2023-06-20 PROCEDURE — 3074F SYST BP LT 130 MM HG: CPT | Performed by: OTOLARYNGOLOGY

## 2023-06-20 PROCEDURE — 1036F TOBACCO NON-USER: CPT | Performed by: OTOLARYNGOLOGY

## 2023-06-20 PROCEDURE — G8427 DOCREV CUR MEDS BY ELIG CLIN: HCPCS | Performed by: OTOLARYNGOLOGY

## 2023-06-20 PROCEDURE — 3078F DIAST BP <80 MM HG: CPT | Performed by: OTOLARYNGOLOGY

## 2023-06-20 PROCEDURE — 99213 OFFICE O/P EST LOW 20 MIN: CPT | Performed by: OTOLARYNGOLOGY

## 2023-06-20 PROCEDURE — G8417 CALC BMI ABV UP PARAM F/U: HCPCS | Performed by: OTOLARYNGOLOGY

## 2023-06-21 NOTE — PROGRESS NOTES
Stacy Scott is a 20 y.o. female here for   Chief Complaint   Patient presents with    Follow-up       /80   Pulse 99   Ht 5' 4.2\" (1.631 m)   LMP  (LMP Unknown) Comment: Taking birth control continuously  SpO2 97%   BMI 53.97 kg/m²
25 mg tablet TAKE 1 TO 2 TABLETS BY MOUTH TWICE A DAY AS NEEDED FOR ANXIETY    ketorolac (TORADOL) 10 mg, Oral, EVERY 6 HOURS AS NEEDED    lansoprazole (PREVACID) 30 mg, Oral, DAILY BEFORE BREAKFAST    lidocaine (LIDODERM) 5 % Apply patch to the affected area for 12 hours a day and remove for 12 hours a day. metFORMIN ER (GLUCOPHAGE XR) 1,000 mg, Oral, 2 TIMES DAILY BEFORE MEALS    naltrexone (DEPADE) 25 mg, Oral, DAILY    nebivoloL (BYSTOLIC) 5 mg, Oral, DAILY    norethindrone-ethinyl estradiol-iron (Junel FE 1.5/30, 28,) 1.5 mg-30 mcg (21)/75 mg (7) tab TAKE 1 TAB BY MOUTH DAILY. CONTINUOUS PILLS ONLY. omega 3-dha-epa-fish oil (Fish OiL) 100-160-1,000 mg cap 2,000 mg, Oral    ondansetron (ZOFRAN ODT) 8 mg disintegrating tablet PLACE 1 TABLET ON TONGUE & ALLOW TO DISSOLVE EVERY 8 HOURS AS NEEDED FOR NAUSEA WITH HEADACHE    rimegepant (NURTEC) 75 mg, Oral, AS NEEDED    sacrosidase (Sucraid) 8,500 unit/mL soln 1 mL, Oral    spironolactone (ALDACTONE) 50 mg, Oral, DAILY    traZODone (DESYREL) 50 mg tablet No dose, route, or frequency recorded. Vyvanse 30 mg, Oral, DAILY       Allergies:    Allergies   Allergen Reactions    Bactrim [Sulfamethoprim Ds] Other (comments)     Developed oral thrush    Rizatriptan Other (comments)    Sulfa (Sulfonamide Antibiotics) Unknown (comments)    Yeast, Dried Other (comments)     Upset stomach     Flagyl [Metronidazole] Rash     Worsened acneiform rash with TOPICAL flagyl used to treat rosacea    Sucrose Nausea Only     SEVERE ABDOMINAL PAIN       Social History:   Social History     Tobacco Use    Smoking status: Never     Passive exposure: Never    Smokeless tobacco: Never    Tobacco comments:     no smoke exposure in the home   Vaping Use    Vaping Use: Never used   Substance Use Topics    Alcohol use: No    Drug use: No        Family History:  Family History   Problem Relation Age of Onset    Endometriosis Mother     Delayed Awakening Mother     Post-op Nausea/Vomiting

## 2023-06-23 ENCOUNTER — PATIENT MESSAGE (OUTPATIENT)
Age: 23
End: 2023-06-23

## 2023-06-23 DIAGNOSIS — M54.41 CHRONIC RIGHT-SIDED LOW BACK PAIN WITH RIGHT-SIDED SCIATICA: Primary | ICD-10-CM

## 2023-06-23 DIAGNOSIS — G89.29 CHRONIC RIGHT-SIDED LOW BACK PAIN WITH RIGHT-SIDED SCIATICA: Primary | ICD-10-CM

## 2023-06-24 RX ORDER — ONDANSETRON HYDROCHLORIDE 8 MG/1
8 TABLET, FILM COATED ORAL EVERY 8 HOURS PRN
Qty: 60 TABLET | Refills: 1 | Status: SHIPPED | OUTPATIENT
Start: 2023-06-24

## 2023-06-28 RX ORDER — GABAPENTIN 100 MG/1
100 CAPSULE ORAL 2 TIMES DAILY
Qty: 180 CAPSULE | Refills: 0 | Status: SHIPPED | OUTPATIENT
Start: 2023-06-28 | End: 2023-09-26

## 2023-06-28 NOTE — LETTER
7/11/2018 9:02 PM 
 
Patient:  Judd Shaw YOB: 2000 Date of Visit: 7/11/2018 Dear Juan Madden MD 
[address] [place] [address] [address] VIA Facsimile: [phone] 
 : Thank you for referring Ms. Remigio Sommer to me for evaluation/treatment. Below are the relevant portions of my assessment and plan of care. Chief Complaint Patient presents with  Follow-up  
  insulin resistance Subjective:  
 
 
Reason for visit: Judd Shaw is a 24 y.o. female here for follow up of - Morbid obesity - Severe insulin resistance based on OGTT, not clinical.  - On Metformin 750 mg BiD - Irregular menstrual cycles - on continued OCP as menses cause debilitating migraines - Abnormal lipid profile She was last seen 3 months ago. She was initially referred by her gynecologist. She is here today with her mother. History of present illness: As per mother, patients history is significant for - Autism - Migraines severe on multiple medications - See below Menstrual history - attained menarche at age 5 years, started on OCPs 12/2016  for severe menstrual migraines. Followed by Gynecology. She has not had a cycle for more than a year as she is continued OCP's to prevent migraine. Has baseline migraines requiring sumatriptan injections 2-3x a week. Used to be on Depo but associated with weight gain Need to make fu apt with Gynecology Morbid Obesity - Has struggled with obesity for many years. Lost 9 lb in 3 months Started going to pool 7/2018 - Stopped due to ingrown toe nail - Had to stop swimming, will restart Denies polyphagia, + polydipsia - 16 oz x 5 times/day, Nocturia once at night. Denies symptoms of hypothyroidism such as cold tolerance, dry hair, dry skin, constipation. No snoring at night except when really tired. No hip or joint pains Improved exercise intolerance, No SOB, No chest pain, palpitations Activity -  
Does yoga twice a week, but no intense activity. Does chores around the home. Going to gym since January - one - twice a week. No migraines associated with exercise now. . Does weights for 20 minues and swimming for an hour. Has seen Nutritionist in the past.  
Breakfast - None as she feels nauseous on waking up Lunch -  Smoothies - Strawberry and Mangoes, greek Yoghurt/ Skim milk Dinner - Meat and vegetables with sweet potatoes - does portion control Drinks - [employer] Insulin resistance -  OGTT done in 11/2017 revealed insulin resistance INSULIN Result Value Ref Range Insulin 2 hour  277 (H) <37 Insulin - 1 hour  286 (H) <51 Insulin Fasting  37.4 (H) <9 uIU/mL Started on Metformin  mg OD. Increased to 750 mg Bid 4 month AGO - Tolerating it well. Improved fasting insulin Hypertension - In the previous visit - due to hypertension and striae noted, ACTH (low)and random cortisol (9.0) were drawn and they were not high, however they are not excellent screening tests to assess hypercortisolism. Patient had 24 hour urinary cortisol done with  (Ph# [phone]) at [facility] due to similar concerns of Cushings which was normal - 7 (Normal 0-50). Pt refused BP measurement today. Checked last week at [address] office - WNL as per verbal report from mother Abnormal lipid profile - 9/2016 - Lipids - High TG - 116, High Total cholesterol -209, ldl - 134, hdl - 52. Repeat done last week - Increasing TG noted. 7/3/2018 Triglyceride 173 (H) Cholesterol 195 (H) HDL  44  (H) Vitamin D deficiency - History - On Vitamin D 400 international units , Most recent Vitamin D 79 MIgraines are very severe. - On multiple medications. Triggers - anxiety, noise , certain aura Autism and anxiety - Receives weekly counseling. Changes - Is going to come off Zoloft. Ritalin was switched to Adderall. Stopped using Adderall, planning to start Vyvanse. Gets Acupuncture for feet twice a month  Previously. Seeing a chiropractor. Birth History - Weighed 6 lbs, Term, NVD Surgeries: NONE Hospitalizations: NONE Family history: No family history of thyroid disease, heart disease, hypertension or high cholesterol or DM. Fathers side unknown. He has Gout. Father is obese. MGF, MGM, Mother - Hypertension MGreat grandparents - Heartdisease at later age Social History: 
Home schooled for past 1 year as anxiety at school seemed to worsen headaches Finished 12th grade Doing well. ROS: 
Constitutional: decreased energy ENT: normal hearing, no sorethroat Eye: normal vision, denied double vision, blurred vision Respiratory system: no wheezing, no respiratory discomfort CVS: no palpitations, + lower extremity edema - Not improving GI: normal bowel movements, abdominal pain followed by GI. Allergy: no skin rash or angioedema, significant stria on abdomen and inner thighs and arms, habit of skin pricking Neuorlogical: headache, no focal weakness. No burning Behavioural: normal behavior, normal mood. Medications - See scanned Prior to Admission medications Medication Sig Start Date End Date Taking? Authorizing Provider  
metFORMIN ER (GLUCOPHAGE XR) 750 mg tablet Take 1 Tab by mouth two (2) times a day. Indications: PREVENTION OF TYPE 2 DIABETES MELLITUS 7/3/18  Yes Claudio Castillo MD  
methylphenidate HCl (RITALIN) 5 mg tablet Take 10 mg by mouth daily. Yes Historical Provider SUMAtriptan succinate (ZEMBRACE SYMTOUCH) 3 mg/0.5 mL pnij by SubCUTAneous route. Indications: MIGRAINE   Yes Historical Provider  
hydrOXYzine HCl (ATARAX) 25 mg tablet TK 1 T PO Q 6 HOURS PRF ITCHING 6/6/17  Yes Historical Provider  
fexofenadine (ALLEGRA) 180 mg tablet Take 180 mg by mouth daily. Yes Historical Provider  
norethindrone-ethinyl estradiol (JUNEL 1/20, 21,) 1-20 mg-mcg tab Take 1 Tab by mouth daily. Yes Historical Provider ketoconazole (NIZORAL) 2 % topical cream KIARA EXT AA QD 8/30/17  Yes Historical Provider  
mupirocin (BACTROBAN) 2 % ointment APPLY TO AFFECTED AREA OF SKIN TID UNTIL RESOLVED 6/6/17  Yes Historical Provider  
triamcinolone acetonide (KENALOG) 0.1 % topical cream APPLY TO INSECT BITES BID PRN DO NOT APPLY TO FACE 6/6/17  Yes Historical Provider  
sertraline (ZOLOFT) 50 mg tablet Take one tablet by mouth daily 4/13/17  Yes Historical Provider  
butalbital-acetaminophen-caff (FIORICET) -40 mg per capsule Take 1-2 capsules by mouth every 8-12 hours prn 4/4/17  Yes Historical Provider  
diclofenac potassium (CATAFLAM) 50 mg tablet Take one tablet by mouth at onset of migraine. May repeat in 2 hours. No more than 2 tablets per 24 hours 4/21/17  Yes Historical Provider  
isomethepten-caf-acetaminophen 65- mg tab Take by mouth. Take 1 tablet by mouth at onset of migraine (may repeat in 2 hours-max dose 2 tablets in 24 hours   Yes Historical Provider  
diphenhydrAMINE (BENADRYL) 25 mg capsule Take by mouth as needed (migraine). Yes Historical Provider  
diclofenac potassium (ZIPSOR) 25 mg capsule Take 50 mg by mouth. Yes Historical Provider B INFANTIS/B ANI/B ADDISON/B BIFID (PROBIOTIC DIGESTIVE CARE PO) Take  by mouth. Yes Historical Provider  
acetaminophen-isometheptene-caffeine 500-130-20 mg (PRODRIN) 130- mg tablet Take 1 Tab by mouth. Indications: take one tab at onset of HA may repeat in 2 hours   Yes Historical Provider  
zonisamide (ZONEGRAN) 100 mg capsule Take 100 mg by mouth daily. Yes Historical Provider  
ondansetron (ZOFRAN ODT) 8 mg disintegrating tablet Take 1 Tab by mouth every eight (8) hours as needed for Nausea. 10/15/15  Yes Negar Hurt MD  
ketorolac (TORADOL) 10 mg tablet Take 1 Tab by mouth every six (6) hours as needed for Pain.  Indications: SEVERE PAIN 10/15/15  Yes Negar Hurt MD  
butalbital-acetaminophen-caffeine (FIORICET, ESGIC) -40 mg per tablet Take 1 Tab by mouth every six (6) hours as needed for Pain or Headache. Max Daily Amount: 4 Tabs. Indications: MIGRAINE 9/24/15  Yes Laura Crowe MD  
 
 
Allergies: Allergies Allergen Reactions  Yeast, Dried Other (comments) Upset stomach Objective:  
 
 
Visit Vitals  Pulse 98  Temp 97.7 °F (36.5 °C) (Oral)  Ht 5' 5.55\" (1.665 m)  Wt 289 lb 6.4 oz (131.3 kg)  SpO2 97%  BMI 47.35 kg/m2 Wt Readings from Last 3 Encounters:  
07/11/18 289 lb 6.4 oz (131.3 kg) (>99 %, Z= 2.71)*  
04/27/18 297 lb 3.2 oz (134.8 kg) (>99 %, Z= 2.73)*  
02/28/18 302 lb 6.4 oz (137.2 kg) (>99 %, Z= 2.74)* * Growth percentiles are based on CDC 2-20 Years data. Ht Readings from Last 3 Encounters:  
07/11/18 5' 5.55\" (1.665 m) (69 %, Z= 0.50)*  
04/27/18 5' 6.34\" (1.685 m) (79 %, Z= 0.82)*  
02/28/18 5' 5.47\" (1.663 m) (68 %, Z= 0.48)* * Growth percentiles are based on CDC 2-20 Years data. Height: 69 %ile (Z= 0.50) based on CDC 2-20 Years stature-for-age data using vitals from 7/11/2018. Weight: >99 %ile (Z= 2.71) based on CDC 2-20 Years weight-for-age data using vitals from 7/11/2018. BMI: Body mass index is 47.35 kg/(m^2). Percentile: [unfilled] Alert, Cooperative, obese - Face appears less obese including trunk. But lower abdomen still distended along with lower extremity edema. HEENT: No thyromegaly, EOM intact, No tonsillar hypertrophy S1 S2 heard: Normal rhythm Bilateral air entry. No rhonchi or crepitation Abdomen is soft, non tender, No organomegaly Tray 5 breast  
 - Tray 5 Pubic hair MSK - Normal ROM Skin - No rashes or birth marks, striae on abdomen, upper arms and inner thighs - worsened compared to previous. Few are velvety in color and wide, No acanthosis 
pedal edema, non pitting. Laboratory data: 
Results for orders placed or performed in visit on 07/02/18 HEMOGLOBIN A1C WITH EAG Result Value Ref Range Hemoglobin A1c 5.2 4.8 - 5.6 % Estimated average glucose 103 mg/dL METABOLIC PANEL, COMPREHENSIVE Result Value Ref Range Glucose 89 65 - 99 mg/dL BUN 8 6 - 20 mg/dL Creatinine 0.73 0.57 - 1.00 mg/dL GFR est non- >59 mL/min/1.73 GFR est  >59 mL/min/1.73  
 BUN/Creatinine ratio 11 9 - 23 Sodium 140 134 - 144 mmol/L Potassium 4.4 3.5 - 5.2 mmol/L Chloride 100 96 - 106 mmol/L  
 CO2 21 20 - 29 mmol/L Calcium 9.5 8.7 - 10.2 mg/dL Protein, total 7.2 6.0 - 8.5 g/dL Albumin 4.1 3.5 - 5.5 g/dL GLOBULIN, TOTAL 3.1 1.5 - 4.5 g/dL A-G Ratio 1.3 1.2 - 2.2 Bilirubin, total 0.4 0.0 - 1.2 mg/dL Alk. phosphatase 59 43 - 101 IU/L  
 AST (SGOT) 14 0 - 40 IU/L  
 ALT (SGPT) 13 0 - 32 IU/L  
LIPID PANEL Result Value Ref Range Cholesterol, total 195 (H) 100 - 169 mg/dL Triglyceride 173 (H) 0 - 89 mg/dL HDL Cholesterol 44 >39 mg/dL VLDL, calculated 35 5 - 40 mg/dL LDL, calculated 116 (H) 0 - 109 mg/dL INSULIN Result Value Ref Range Insulin 29.1 (H) 2.6 - 24.9 uIU/mL CORTISOL, AM  
Result Value Ref Range Cortisol, a.m. 25.6 (H) 6.2 - 19.4 ug/dL VITAMIN D, 25 HYDROXY Result Value Ref Range VITAMIN D, 25-HYDROXY 70.3 30.0 - 100.0 ng/mL CVD REPORT Result Value Ref Range INTERPRETATION Note 9/2016 - Lipids - High TG, High Total cholesterol 4/2017 - TFT - WNL 
9/2016 - A1C - 5.5 
4/2017 - Vitamin D - 51 Radiology -  
4/2017 - Pelvic US - wnl Assessment/ Plan :  
 
 
Dixie Mckay is a 24 y.o. female presenting - Morbid obesity - Lost 9 lbs in 4 months - Severe insulin resistance based on OGTT, not clinical.  - On Metformin 750 mg BiD - Irregular menstrual cycles - on continued OCP to prevent migraines - Abnormal lipid profile - Recommended OTC Fish oil. Pt concerned of GI side effects. Recommended plant sterols such as mikel seeds and flax seeds. Needs monitoring fasting Q6 months - Next due 1/2019 - Vitamin D deficiency - Most recent level 79. Will repeat 1/2019 
- Striae seems to have worsened - Will repeat testing for Cushings - Will evaluate 24 hour Urinary cortisol first  
 
Her diet is not unhealthy, however is not very active which is also limited as this may be a potential trigger for migraines. No improvement in lower extremity edema noted. BP at last visit, refused measurement today as recent IV draw was very painful and . Has BP monitor at home. Renal function - WNL. Will assess urine protein and also refer to Pediatric Cardiology Plan - As above Diagnosis, etiology, pathophysiology, risk/ benefits of rx, proposed eval, and expected follow up discussed with family and all questions answered Orders Placed This Encounter  CORTISOL, URINE FREE 24 HR  
 URINALYSIS W/O MICRO  REFERRAL TO PEDIATRIC CARDIOLOGY Referral Priority:   Routine Referral Type:   Consultation Referral Reason:   Specialty Services Required Referred to Provider:   Isaiah Bosworth, MD  
 
Repeat labs 1/2019 - Fasting insulin , lipid profile, Vitamin D Diet and exercise recommendations provided. Will need to consider cycling OCP after discussion with Gynecologist 
 
- Follow up in 3 month Total time with patient 40 minutes Time spent counseling patient more than 50% If you have questions, please do not hesitate to call me. I look forward to following Ms. Negar Murray along with you. Sincerely, Geovanny Pete MD 
 
 ATTENDING ATTESTATION  I have seen and examined this patient with the resident housestaftf. I have reviewed all labs, imaging and reports. I have participated in formulating the plan, and have read and agree with the history, ROS, exam, assessment and plan as stated above.     Dx: recurrent cholecystitis with cholelithiasis  Plan: admit, abx, OR today for lap rohini.   Discussed risk for possible lap rohini tube if dissection is unsafe.     Yamileth Gilbert M.D., M.S.  Dept of Trauma, Emergency General Surgery and Critical Care

## 2023-06-30 ENCOUNTER — OFFICE VISIT (OUTPATIENT)
Age: 23
End: 2023-06-30
Payer: MEDICARE

## 2023-06-30 VITALS
HEART RATE: 95 BPM | RESPIRATION RATE: 16 BRPM | WEIGHT: 293 LBS | SYSTOLIC BLOOD PRESSURE: 110 MMHG | DIASTOLIC BLOOD PRESSURE: 60 MMHG | BODY MASS INDEX: 54.14 KG/M2 | OXYGEN SATURATION: 98 %

## 2023-06-30 DIAGNOSIS — M35.9 UNDIFFERENTIATED CONNECTIVE TISSUE DISEASE (HCC): Primary | ICD-10-CM

## 2023-06-30 DIAGNOSIS — R60.9 LIPEDEMA: ICD-10-CM

## 2023-06-30 PROCEDURE — 99215 OFFICE O/P EST HI 40 MIN: CPT | Performed by: INTERNAL MEDICINE

## 2023-06-30 PROCEDURE — 1036F TOBACCO NON-USER: CPT | Performed by: INTERNAL MEDICINE

## 2023-06-30 PROCEDURE — G8417 CALC BMI ABV UP PARAM F/U: HCPCS | Performed by: INTERNAL MEDICINE

## 2023-06-30 PROCEDURE — 3078F DIAST BP <80 MM HG: CPT | Performed by: INTERNAL MEDICINE

## 2023-06-30 PROCEDURE — G8427 DOCREV CUR MEDS BY ELIG CLIN: HCPCS | Performed by: INTERNAL MEDICINE

## 2023-06-30 PROCEDURE — 3074F SYST BP LT 130 MM HG: CPT | Performed by: INTERNAL MEDICINE

## 2023-06-30 RX ORDER — HYDROXYCHLOROQUINE SULFATE 200 MG/1
400 TABLET, FILM COATED ORAL
Qty: 180 TABLET | Refills: 1 | Status: SHIPPED | OUTPATIENT
Start: 2023-06-30

## 2023-06-30 ASSESSMENT — PATIENT HEALTH QUESTIONNAIRE - PHQ9
SUM OF ALL RESPONSES TO PHQ QUESTIONS 1-9: 0
SUM OF ALL RESPONSES TO PHQ QUESTIONS 1-9: 0
2. FEELING DOWN, DEPRESSED OR HOPELESS: 0
SUM OF ALL RESPONSES TO PHQ QUESTIONS 1-9: 0
1. LITTLE INTEREST OR PLEASURE IN DOING THINGS: 0
SUM OF ALL RESPONSES TO PHQ QUESTIONS 1-9: 0
SUM OF ALL RESPONSES TO PHQ9 QUESTIONS 1 & 2: 0

## 2023-07-03 ENCOUNTER — TELEPHONE (OUTPATIENT)
Age: 23
End: 2023-07-03

## 2023-07-13 DIAGNOSIS — G43.709 CHRONIC MIGRAINE W/O AURA W/O STATUS MIGRAINOSUS, NOT INTRACTABLE: Primary | ICD-10-CM

## 2023-07-13 RX ORDER — EPTINEZUMAB-JJMR 100 MG/ML
300 INJECTION INTRAVENOUS
Qty: 3 ML | Refills: 3
Start: 2023-07-13

## 2023-07-18 DIAGNOSIS — H69.83 ETD (EUSTACHIAN TUBE DYSFUNCTION), BILATERAL: Primary | ICD-10-CM

## 2023-07-18 RX ORDER — LORAZEPAM 1 MG/1
1 TABLET ORAL
Qty: 2 TABLET | Refills: 0 | Status: SHIPPED | OUTPATIENT
Start: 2023-07-18 | End: 2023-07-18

## 2023-07-19 ENCOUNTER — TELEPHONE (OUTPATIENT)
Age: 23
End: 2023-07-19

## 2023-07-19 NOTE — TELEPHONE ENCOUNTER
LVM informing pt that her 8/3 appt needs to be rescheduled due to Dr. Mike Thomas being out of the office that day. Asked her to call back to reschedule. Can be moved to 8/17 if that works for the pt.

## 2023-07-20 RX ORDER — SODIUM CHLORIDE 0.9 % (FLUSH) 0.9 %
5-40 SYRINGE (ML) INJECTION PRN
Status: CANCELLED | OUTPATIENT
Start: 2023-07-31

## 2023-07-21 ENCOUNTER — OFFICE VISIT (OUTPATIENT)
Age: 23
End: 2023-07-21
Payer: MEDICARE

## 2023-07-21 VITALS
HEART RATE: 96 BPM | RESPIRATION RATE: 24 BRPM | BODY MASS INDEX: 50.02 KG/M2 | OXYGEN SATURATION: 98 % | WEIGHT: 293 LBS | HEIGHT: 64 IN

## 2023-07-21 DIAGNOSIS — R60.9 EDEMA, PERIPHERAL: ICD-10-CM

## 2023-07-21 DIAGNOSIS — E67.3 HIGH VITAMIN D LEVEL: ICD-10-CM

## 2023-07-21 DIAGNOSIS — F32.89 ATYPICAL DEPRESSION: ICD-10-CM

## 2023-07-21 DIAGNOSIS — E88.81 INSULIN RESISTANCE: ICD-10-CM

## 2023-07-21 DIAGNOSIS — F84.0 AUTISM: ICD-10-CM

## 2023-07-21 DIAGNOSIS — F41.1 GAD (GENERALIZED ANXIETY DISORDER): Primary | ICD-10-CM

## 2023-07-21 DIAGNOSIS — R89.2 ABNORMAL DRUG SCREEN: ICD-10-CM

## 2023-07-21 DIAGNOSIS — I10 PRIMARY HYPERTENSION: ICD-10-CM

## 2023-07-21 DIAGNOSIS — R60.9 LIPEDEMA: ICD-10-CM

## 2023-07-21 DIAGNOSIS — E11.9 TYPE 2 DIABETES MELLITUS WITHOUT COMPLICATION, WITHOUT LONG-TERM CURRENT USE OF INSULIN (HCC): ICD-10-CM

## 2023-07-21 DIAGNOSIS — F90.9 ATTENTION DEFICIT HYPERACTIVITY DISORDER (ADHD), UNSPECIFIED ADHD TYPE: ICD-10-CM

## 2023-07-21 PROBLEM — E88.819 INSULIN RESISTANCE: Status: ACTIVE | Noted: 2023-07-21

## 2023-07-21 LAB
AMPHETAMINE, URINE, POC: NORMAL
BARBITURATES, URINE, POC: NEGATIVE
BENZODIAZEPINES, URINE, POC: NORMAL
COCAINE, URINE, POC: NEGATIVE
LOT EXP DATE, POC: NORMAL
Lab: NORMAL
MARIJUANA (THC), URINE, POC: NORMAL
MDMA/ECSTASY, URINE, POC: NEGATIVE
METHADONE, URINE, POC: NEGATIVE
METHAMPHETAMINE, URINE, POC: NEGATIVE
OPIATES 300, URINE, POC: NEGATIVE
OXYCODONE, URINE, POC: NEGATIVE
PHENCYCLIDINE, URINE, POC: NEGATIVE
TRICYCLIC ANTIDEPRESSANTS, URINE, POC: NEGATIVE

## 2023-07-21 PROCEDURE — 2022F DILAT RTA XM EVC RTNOPTHY: CPT | Performed by: STUDENT IN AN ORGANIZED HEALTH CARE EDUCATION/TRAINING PROGRAM

## 2023-07-21 PROCEDURE — G8417 CALC BMI ABV UP PARAM F/U: HCPCS | Performed by: STUDENT IN AN ORGANIZED HEALTH CARE EDUCATION/TRAINING PROGRAM

## 2023-07-21 PROCEDURE — G8428 CUR MEDS NOT DOCUMENT: HCPCS | Performed by: STUDENT IN AN ORGANIZED HEALTH CARE EDUCATION/TRAINING PROGRAM

## 2023-07-21 PROCEDURE — 99214 OFFICE O/P EST MOD 30 MIN: CPT | Performed by: STUDENT IN AN ORGANIZED HEALTH CARE EDUCATION/TRAINING PROGRAM

## 2023-07-21 PROCEDURE — PBSHW AMB POC USCREEN 12 DRUG: Performed by: STUDENT IN AN ORGANIZED HEALTH CARE EDUCATION/TRAINING PROGRAM

## 2023-07-21 PROCEDURE — 3046F HEMOGLOBIN A1C LEVEL >9.0%: CPT | Performed by: STUDENT IN AN ORGANIZED HEALTH CARE EDUCATION/TRAINING PROGRAM

## 2023-07-21 PROCEDURE — 80305 DRUG TEST PRSMV DIR OPT OBS: CPT | Performed by: STUDENT IN AN ORGANIZED HEALTH CARE EDUCATION/TRAINING PROGRAM

## 2023-07-21 PROCEDURE — 1036F TOBACCO NON-USER: CPT | Performed by: STUDENT IN AN ORGANIZED HEALTH CARE EDUCATION/TRAINING PROGRAM

## 2023-07-21 RX ORDER — ALCLOMETASONE DIPROPIONATE 0.5 MG/G
CREAM TOPICAL
COMMUNITY
Start: 2023-05-31 | End: 2023-08-03

## 2023-07-21 RX ORDER — UBROGEPANT 100 MG/1
TABLET ORAL
COMMUNITY

## 2023-07-21 RX ORDER — DULOXETIN HYDROCHLORIDE 60 MG/1
60 CAPSULE, DELAYED RELEASE ORAL 2 TIMES DAILY
Qty: 180 CAPSULE | Refills: 1 | Status: SHIPPED | OUTPATIENT
Start: 2023-07-21

## 2023-07-21 SDOH — ECONOMIC STABILITY: FOOD INSECURITY: WITHIN THE PAST 12 MONTHS, THE FOOD YOU BOUGHT JUST DIDN'T LAST AND YOU DIDN'T HAVE MONEY TO GET MORE.: NEVER TRUE

## 2023-07-21 SDOH — ECONOMIC STABILITY: HOUSING INSECURITY
IN THE LAST 12 MONTHS, WAS THERE A TIME WHEN YOU DID NOT HAVE A STEADY PLACE TO SLEEP OR SLEPT IN A SHELTER (INCLUDING NOW)?: NO

## 2023-07-21 SDOH — ECONOMIC STABILITY: FOOD INSECURITY: WITHIN THE PAST 12 MONTHS, YOU WORRIED THAT YOUR FOOD WOULD RUN OUT BEFORE YOU GOT MONEY TO BUY MORE.: NEVER TRUE

## 2023-07-21 SDOH — ECONOMIC STABILITY: INCOME INSECURITY: HOW HARD IS IT FOR YOU TO PAY FOR THE VERY BASICS LIKE FOOD, HOUSING, MEDICAL CARE, AND HEATING?: NOT HARD AT ALL

## 2023-07-21 ASSESSMENT — PATIENT HEALTH QUESTIONNAIRE - PHQ9
SUM OF ALL RESPONSES TO PHQ QUESTIONS 1-9: 0
SUM OF ALL RESPONSES TO PHQ QUESTIONS 1-9: 0
2. FEELING DOWN, DEPRESSED OR HOPELESS: 0
SUM OF ALL RESPONSES TO PHQ QUESTIONS 1-9: 0
1. LITTLE INTEREST OR PLEASURE IN DOING THINGS: 0
SUM OF ALL RESPONSES TO PHQ9 QUESTIONS 1 & 2: 0
SUM OF ALL RESPONSES TO PHQ QUESTIONS 1-9: 0

## 2023-07-21 NOTE — ASSESSMENT & PLAN NOTE
I agreed to bridge patient's medications until she can establish with a new psychiatrist  Encouraged to [facility] access [id number]

## 2023-07-21 NOTE — PROGRESS NOTES
Chief Complaint   Patient presents with    Diabetes     3 month f/u    Mental Health Problem     Pt states that her previous psych provider is no longer in the network with her insurance. She reports calling several different facilities/ providers to initiate care, but all advised of reasoning they would not be able to see her. Pt requesting PCP to manage Rx's. Vitals:    07/21/23 1410   Pulse: 96   Resp: 24   SpO2: 98%   Weight: (!) 310 lb (140.6 kg)   Height: 5' 4.2\" (1.631 m)     1. Have you been to the ER, urgent care clinic since your last visit? Hospitalized since your last visit? No    2. Have you seen or consulted any other health care providers outside of the [address] [place] [address] Avenue since your last visit? Include any pap smears or colon screening.  No      Results for orders placed or performed in visit on 07/21/23   AMB POC USCREEN 12 DRUG   Result Value Ref Range    Lot Number [id number]     LOT EXP DATE, POC 2,036,462     Marijuana (THC), Urine, POC Presumptive Positive     Cocaine, Urine, POC Negative     Opiates 300, Urine, POC negative     Phencyclidine, Urine, POC Negative     Oxycodone, Urine, POC Negative     Tricyclic antidepressants (TCA), UR POC Negative     MDMA/Ecstasy, Urine, POC Negative     Amphetamine, Urine, POC Presumptive Positive     Methamphetamine, Urine, POC Negative     Barbiturates, Urine, POC Negative     Benzodiazepines, Urine, POC Presumptive Positive     Methadone, Urine, POC negative

## 2023-07-21 NOTE — PROGRESS NOTES
Progress Note    she is a 20y.o. year old female who presents for evaluation of Diabetes (3 month f/u) and Mental Health Problem (Pt states that her previous psych provider is no longer in the network with her insurance. She reports calling several different facilities/ providers to initiate care, but all advised of reasoning they would not be able to see her. Pt requesting PCP to manage Rx's. )  . Assessment/ Plan:     1. FIORDALIZA (generalized anxiety disorder)  Assessment & Plan:  I agreed to bridge patient's medications until she can establish with a new psychiatrist  Encouraged to initiate the process with [facility] to access [facility]  Orders:  -     DULoxetine (CYMBALTA) 60 MG extended release capsule; Take 1 capsule by mouth 2 times daily, Disp-180 capsule, R-1Normal  2. Atypical depression  3. Autism  4. Attention deficit hyperactivity disorder (ADHD), unspecified ADHD type  -     AMB POC USCREEN 12 DRUG  5. Abnormal drug screen    6. Primary hypertension  -     Lipid Panel; Future  -     TSH; Future  -     Comprehensive Metabolic Panel; Future    7. Lipedema  -     LYMPHEDEMA PUMP; SEE ADMIN INSTRUCTIONS Starting Fri 7/21/2023, Disp-2 each, R-0, PrintUse pump on bilateral legs for lymphedema for 1 hour daily. (Patient not taking: Reported on 7/25/2023)  8. Edema, peripheral  -     TSH; Future  -     LYMPHEDEMA PUMP; SEE ADMIN INSTRUCTIONS Starting Fri 7/21/2023, Disp-2 each, R-0, PrintUse pump on bilateral legs for lymphedema for 1 hour daily. (Patient not taking: Reported on 7/25/2023)    9. Insulin resistance  -     Hemoglobin A1C; Future  10. Type 2 diabetes mellitus without complication, without long-term current use of insulin (HCC)  -     Hemoglobin A1C; Future    11. High vitamin D level  -     Vitamin D 25 Hydroxy; Future      Patient provided with lab slip. Return in about 3 months (around 10/21/2023) for follow-up ADHD.       I have discussed the diagnosis with the

## 2023-07-25 ENCOUNTER — OFFICE VISIT (OUTPATIENT)
Age: 23
End: 2023-07-25
Payer: MEDICARE

## 2023-07-25 ENCOUNTER — TELEPHONE (OUTPATIENT)
Age: 23
End: 2023-07-25

## 2023-07-25 VITALS
HEIGHT: 64 IN | BODY MASS INDEX: 50.02 KG/M2 | DIASTOLIC BLOOD PRESSURE: 80 MMHG | HEART RATE: 102 BPM | WEIGHT: 293 LBS | OXYGEN SATURATION: 96 % | SYSTOLIC BLOOD PRESSURE: 126 MMHG

## 2023-07-25 DIAGNOSIS — R00.0 TACHYCARDIA, UNSPECIFIED: ICD-10-CM

## 2023-07-25 DIAGNOSIS — I89.0 LYMPHEDEMA: Primary | ICD-10-CM

## 2023-07-25 PROCEDURE — 3074F SYST BP LT 130 MM HG: CPT | Performed by: SPECIALIST

## 2023-07-25 PROCEDURE — 1036F TOBACCO NON-USER: CPT | Performed by: SPECIALIST

## 2023-07-25 PROCEDURE — G8417 CALC BMI ABV UP PARAM F/U: HCPCS | Performed by: SPECIALIST

## 2023-07-25 PROCEDURE — 3079F DIAST BP 80-89 MM HG: CPT | Performed by: SPECIALIST

## 2023-07-25 PROCEDURE — G8427 DOCREV CUR MEDS BY ELIG CLIN: HCPCS | Performed by: SPECIALIST

## 2023-07-25 PROCEDURE — 99214 OFFICE O/P EST MOD 30 MIN: CPT | Performed by: SPECIALIST

## 2023-07-25 NOTE — TELEPHONE ENCOUNTER
Patient would like to speak with nurse about a positive urine specimen.  Patient's phone: [phone]

## 2023-07-26 DIAGNOSIS — Z51.81 MEDICATION MONITORING ENCOUNTER: ICD-10-CM

## 2023-07-26 DIAGNOSIS — R89.2 ABNORMAL DRUG SCREEN: Primary | ICD-10-CM

## 2023-07-26 LAB
AMPHETAMINE, URINE, POC: NORMAL
BARBITURATES, URINE, POC: NEGATIVE
BENZODIAZEPINES, URINE, POC: NEGATIVE
COCAINE, URINE, POC: NEGATIVE
LOT EXP DATE, POC: NORMAL
Lab: NORMAL
MARIJUANA (THC), URINE, POC: NEGATIVE
MDMA/ECSTASY, URINE, POC: NEGATIVE
METHADONE, URINE, POC: NEGATIVE
METHAMPHETAMINE, URINE, POC: NEGATIVE
OPIATES 300, URINE, POC: NEGATIVE
OXYCODONE, URINE, POC: NEGATIVE
PHENCYCLIDINE, URINE, POC: NEGATIVE
TRICYCLIC ANTIDEPRESSANTS, URINE, POC: NEGATIVE

## 2023-07-26 PROCEDURE — PBSHW AMB POC USCREEN 12 DRUG: Performed by: STUDENT IN AN ORGANIZED HEALTH CARE EDUCATION/TRAINING PROGRAM

## 2023-07-26 NOTE — TELEPHONE ENCOUNTER
Addressed in O[facility]. Pt will return to office to leave a new sample. Previous sample was not sent for confirmatory testing, if still positive for benzos or thc, we can send out for testing. ADMIT

## 2023-07-31 ENCOUNTER — HOSPITAL ENCOUNTER (OUTPATIENT)
Facility: HOSPITAL | Age: 23
Setting detail: INFUSION SERIES
End: 2023-07-31
Payer: MEDICARE

## 2023-07-31 VITALS
OXYGEN SATURATION: 99 % | HEIGHT: 64 IN | DIASTOLIC BLOOD PRESSURE: 84 MMHG | HEART RATE: 115 BPM | SYSTOLIC BLOOD PRESSURE: 166 MMHG | RESPIRATION RATE: 22 BRPM | WEIGHT: 293 LBS | TEMPERATURE: 98 F | BODY MASS INDEX: 50.02 KG/M2

## 2023-07-31 DIAGNOSIS — G43.919 INTRACTABLE MIGRAINE WITHOUT STATUS MIGRAINOSUS, UNSPECIFIED MIGRAINE TYPE: Primary | ICD-10-CM

## 2023-07-31 PROCEDURE — 6360000002 HC RX W HCPCS: Performed by: PSYCHIATRY & NEUROLOGY

## 2023-07-31 PROCEDURE — 2580000003 HC RX 258: Performed by: PSYCHIATRY & NEUROLOGY

## 2023-07-31 PROCEDURE — 96413 CHEMO IV INFUSION 1 HR: CPT

## 2023-07-31 RX ORDER — SODIUM CHLORIDE 9 MG/ML
5-250 INJECTION, SOLUTION INTRAVENOUS PRN
Status: DISCONTINUED | OUTPATIENT
Start: 2023-07-31 | End: 2023-08-01 | Stop reason: HOSPADM

## 2023-07-31 RX ORDER — SODIUM CHLORIDE 0.9 % (FLUSH) 0.9 %
5-40 SYRINGE (ML) INJECTION PRN
Status: CANCELLED | OUTPATIENT
Start: 2023-10-23

## 2023-07-31 RX ORDER — SODIUM CHLORIDE 9 MG/ML
5-250 INJECTION, SOLUTION INTRAVENOUS PRN
Status: CANCELLED | OUTPATIENT
Start: 2023-10-23

## 2023-07-31 RX ADMIN — EPTINEZUMAB-JJMR 300 MG: 100 INJECTION INTRAVENOUS at 11:08

## 2023-07-31 RX ADMIN — SODIUM CHLORIDE 25 ML/HR: 900 INJECTION, SOLUTION INTRAVENOUS at 11:01

## 2023-07-31 ASSESSMENT — PAIN SCALES - GENERAL: PAINLEVEL_OUTOF10: 0

## 2023-07-31 NOTE — PROGRESS NOTES
\[facility]\"" Progress Note    Date: 2023    Name: Kinsey Pérez    MRN: [id number]         : 2000    Ms. Stephanie Dietrich Arrived ambulatory and in no distress for Vyepti Infusion. Assessment was completed, no acute issues at this time, no new complaints voiced. 24 G PIV established to right arm, + blood return. Ms. Sherra Dakin vitals were reviewed. Vitals:    23 1150   BP: (!) 166/84   Pulse:    Resp:    Temp:    SpO2:          Medications:  Medications Administered         0.9 % sodium chloride infusion Admin Date  2023 Action  New Bag Dose  25 mL/hr Rate  25 mL/hr Route  IntraVENous Administered By  Rachel Kilpatrick RN        eptinezumab-jjmr (VYEPTI) 300 mg in sodium chloride 0.9 % 113 mL IVPB Admin Date  2023 Action  New Bag Dose  300 mg Rate  226 mL/hr Route  IntraVENous Administered By  Rachel Kilpatrick RN              Ms. Stephanie Dietrich tolerated treatment well and was discharged from [address] [place] [address] in stable condition. PIV flushed & removed. She is to return on  10/23/23 at 1000 for her next appointment.     Rachel Kilpatrick RN  2023

## 2023-08-01 RX ORDER — NALTREXONE HYDROCHLORIDE 50 MG/1
TABLET, FILM COATED ORAL
Qty: 30 TABLET | Refills: 1 | Status: SHIPPED | OUTPATIENT
Start: 2023-08-01

## 2023-08-03 ENCOUNTER — OFFICE VISIT (OUTPATIENT)
Age: 23
End: 2023-08-03
Payer: MEDICARE

## 2023-08-03 VITALS
TEMPERATURE: 97.7 F | BODY MASS INDEX: 50.02 KG/M2 | HEIGHT: 64 IN | DIASTOLIC BLOOD PRESSURE: 80 MMHG | SYSTOLIC BLOOD PRESSURE: 132 MMHG | RESPIRATION RATE: 18 BRPM | WEIGHT: 293 LBS | HEART RATE: 96 BPM | OXYGEN SATURATION: 98 %

## 2023-08-03 DIAGNOSIS — E66.01 MORBID OBESITY WITH BMI OF 50.0-59.9, ADULT (HCC): ICD-10-CM

## 2023-08-03 DIAGNOSIS — R25.1 TREMOR: ICD-10-CM

## 2023-08-03 DIAGNOSIS — G43.709 CHRONIC MIGRAINE W/O AURA, NOT INTRACTABLE, W/O STAT MIGR: Primary | ICD-10-CM

## 2023-08-03 PROCEDURE — 99214 OFFICE O/P EST MOD 30 MIN: CPT | Performed by: PSYCHIATRY & NEUROLOGY

## 2023-08-03 PROCEDURE — 3078F DIAST BP <80 MM HG: CPT | Performed by: PSYCHIATRY & NEUROLOGY

## 2023-08-03 PROCEDURE — G8417 CALC BMI ABV UP PARAM F/U: HCPCS | Performed by: PSYCHIATRY & NEUROLOGY

## 2023-08-03 PROCEDURE — G8427 DOCREV CUR MEDS BY ELIG CLIN: HCPCS | Performed by: PSYCHIATRY & NEUROLOGY

## 2023-08-03 PROCEDURE — 1036F TOBACCO NON-USER: CPT | Performed by: PSYCHIATRY & NEUROLOGY

## 2023-08-03 PROCEDURE — 3074F SYST BP LT 130 MM HG: CPT | Performed by: PSYCHIATRY & NEUROLOGY

## 2023-08-03 RX ORDER — CYCLOBENZAPRINE HCL 10 MG
10 TABLET ORAL 3 TIMES DAILY PRN
COMMUNITY

## 2023-08-03 RX ORDER — ELETRIPTAN HYDROBROMIDE 40 MG/1
TABLET, FILM COATED ORAL
Qty: 9 TABLET | Refills: 3 | Status: SHIPPED | OUTPATIENT
Start: 2023-08-03

## 2023-08-03 RX ORDER — RIMEGEPANT SULFATE 75 MG/75MG
TABLET, ORALLY DISINTEGRATING ORAL
COMMUNITY

## 2023-08-03 NOTE — PROGRESS NOTES
findings typically seen in Parkinson's disease. Suspect tremor issue may be related to current medications the patient is on especially in relation to her respiratory issues. Monitor for now. Patient was referred to bariatric service for more aggressive weight loss program and other potential treatment options given that she does have a a lot of medical issues likely stemming from her morbid obesity. Patient canceled that appointment. All questions and concerns were answered. This note was created using voice recognition software. Despite editing, there may be syntax errors.

## 2023-08-04 ENCOUNTER — PROCEDURE VISIT (OUTPATIENT)
Age: 23
End: 2023-08-04

## 2023-08-04 VITALS
HEIGHT: 64 IN | BODY MASS INDEX: 50.02 KG/M2 | WEIGHT: 293 LBS | OXYGEN SATURATION: 96 % | HEART RATE: 115 BPM | RESPIRATION RATE: 16 BRPM

## 2023-08-04 DIAGNOSIS — H69.83 ETD (EUSTACHIAN TUBE DYSFUNCTION), BILATERAL: Primary | ICD-10-CM

## 2023-08-04 DIAGNOSIS — E11.9 TYPE 2 DIABETES MELLITUS WITHOUT COMPLICATION, WITHOUT LONG-TERM CURRENT USE OF INSULIN (HCC): ICD-10-CM

## 2023-08-04 DIAGNOSIS — R60.9 EDEMA, PERIPHERAL: ICD-10-CM

## 2023-08-04 DIAGNOSIS — I10 PRIMARY HYPERTENSION: ICD-10-CM

## 2023-08-04 DIAGNOSIS — Z86.39 HISTORY OF IRON DEFICIENCY: ICD-10-CM

## 2023-08-04 DIAGNOSIS — E67.3 HIGH VITAMIN D LEVEL: ICD-10-CM

## 2023-08-04 DIAGNOSIS — E88.819 INSULIN RESISTANCE: ICD-10-CM

## 2023-08-04 LAB
ALBUMIN SERPL-MCNC: 3.7 G/DL (ref 3.5–5)
ALBUMIN/GLOB SERPL: 1.1 (ref 1.1–2.2)
ALP SERPL-CCNC: 66 U/L (ref 45–117)
ALT SERPL-CCNC: 30 U/L (ref 12–78)
ANION GAP SERPL CALC-SCNC: 4 MMOL/L (ref 5–15)
AST SERPL-CCNC: 15 U/L (ref 15–37)
BASOPHILS # BLD: 0.1 K/UL (ref 0–0.1)
BASOPHILS NFR BLD: 1 % (ref 0–1)
BILIRUB SERPL-MCNC: 0.5 MG/DL (ref 0.2–1)
BUN SERPL-MCNC: 10 MG/DL (ref 6–20)
BUN/CREAT SERPL: 10 (ref 12–20)
CALCIUM SERPL-MCNC: 9.1 MG/DL (ref 8.5–10.1)
CHLORIDE SERPL-SCNC: 105 MMOL/L (ref 97–108)
CHOLEST SERPL-MCNC: 225 MG/DL
CO2 SERPL-SCNC: 28 MMOL/L (ref 21–32)
CREAT SERPL-MCNC: 0.98 MG/DL (ref 0.55–1.02)
DIFFERENTIAL METHOD BLD: ABNORMAL
EOSINOPHIL # BLD: 0.2 K/UL (ref 0–0.4)
EOSINOPHIL NFR BLD: 2 % (ref 0–7)
ERYTHROCYTE [DISTWIDTH] IN BLOOD BY AUTOMATED COUNT: 12.6 % (ref 11.5–14.5)
EST. AVERAGE GLUCOSE BLD GHB EST-MCNC: 88 MG/DL
FERRITIN SERPL-MCNC: 19 NG/ML (ref 8–252)
GLOBULIN SER CALC-MCNC: 3.5 G/DL (ref 2–4)
GLUCOSE SERPL-MCNC: 112 MG/DL (ref 65–100)
HBA1C MFR BLD: 4.7 % (ref 4–5.6)
HCT VFR BLD AUTO: 47.9 % (ref 35–47)
HDLC SERPL-MCNC: 52 MG/DL
HDLC SERPL: 4.3 (ref 0–5)
HGB BLD-MCNC: 15.5 G/DL (ref 11.5–16)
IMM GRANULOCYTES # BLD AUTO: 0 K/UL (ref 0–0.04)
IMM GRANULOCYTES NFR BLD AUTO: 0 % (ref 0–0.5)
LDLC SERPL CALC-MCNC: 135.4 MG/DL (ref 0–100)
LYMPHOCYTES # BLD: 2.1 K/UL (ref 0.8–3.5)
LYMPHOCYTES NFR BLD: 21 % (ref 12–49)
MCH RBC QN AUTO: 30.3 PG (ref 26–34)
MCHC RBC AUTO-ENTMCNC: 32.4 G/DL (ref 30–36.5)
MCV RBC AUTO: 93.7 FL (ref 80–99)
MONOCYTES # BLD: 0.7 K/UL (ref 0–1)
MONOCYTES NFR BLD: 7 % (ref 5–13)
NEUTS SEG # BLD: 6.6 K/UL (ref 1.8–8)
NEUTS SEG NFR BLD: 69 % (ref 32–75)
NRBC # BLD: 0 K/UL (ref 0–0.01)
NRBC BLD-RTO: 0 PER 100 WBC
PLATELET # BLD AUTO: 461 K/UL (ref 150–400)
PMV BLD AUTO: 9.2 FL (ref 8.9–12.9)
POTASSIUM SERPL-SCNC: 4 MMOL/L (ref 3.5–5.1)
PROT SERPL-MCNC: 7.2 G/DL (ref 6.4–8.2)
RBC # BLD AUTO: 5.11 M/UL (ref 3.8–5.2)
SODIUM SERPL-SCNC: 137 MMOL/L (ref 136–145)
TRIGL SERPL-MCNC: 188 MG/DL
TSH SERPL DL<=0.05 MIU/L-ACNC: 1.1 UIU/ML (ref 0.36–3.74)
VLDLC SERPL CALC-MCNC: 37.6 MG/DL
WBC # BLD AUTO: 9.7 K/UL (ref 3.6–11)

## 2023-08-04 NOTE — PROGRESS NOTES
Laurie Eisenberg  2000  [id number]    8/4/2023    PROCEDURE: Right MYRINGOTOMY     Preoperative diagnosis: Right ETD  Postoperative diagnosis: Same    Risks, benefits and alternatives were reviewed including but not limited to risk of bleeding, infection, TM perforation, hearing loss, no improvement in hearing, otorrhea, retention of tube. Informed consent was obtained and the patient agreed to the procedure. Under microscopy, the right ear was inspected with a speculum. Any obstructive cerumen or debris was removed. The inferior tympanic membrane was anesthetized with phenol. A radial myringotomy was performed in the inferior quadrant. The middle ear space was clear. Ciprodex drops were then instilled. The patient tolerated the procedure well.       Rosendo Amor MD   [address] [place] [address] ENT & Allergy  [address] [place] [address] [address] [place] [address]  Office Phone: [phone]

## 2023-08-05 LAB
25(OH)D3 SERPL-MCNC: 92.1 NG/ML (ref 30–100)
IRON SATN MFR SERPL: 22 % (ref 20–50)
IRON SERPL-MCNC: 101 UG/DL (ref 35–150)
TIBC SERPL-MCNC: 460 UG/DL (ref 250–450)

## 2023-08-09 RX ORDER — FREMANEZUMAB-VFRM 225 MG/1.5ML
INJECTION SUBCUTANEOUS
Qty: 3 ML | Refills: 5 | OUTPATIENT
Start: 2023-08-09

## 2023-08-16 RX ORDER — METFORMIN HYDROCHLORIDE 500 MG/1
TABLET, EXTENDED RELEASE ORAL
Qty: 180 TABLET | Refills: 3 | Status: SHIPPED | OUTPATIENT
Start: 2023-08-16

## 2023-08-22 ENCOUNTER — TELEPHONE (OUTPATIENT)
Age: 23
End: 2023-08-22

## 2023-08-22 NOTE — TELEPHONE ENCOUNTER
Has an appt to get th etube placed in her ear. Wants to know if she can get the medication that she got before that helps to relax her sent into the pharmacy.

## 2023-08-23 DIAGNOSIS — H69.83 ETD (EUSTACHIAN TUBE DYSFUNCTION), BILATERAL: Primary | ICD-10-CM

## 2023-08-23 RX ORDER — LORAZEPAM 1 MG/1
1 TABLET ORAL
Qty: 2 TABLET | Refills: 0 | Status: SHIPPED | OUTPATIENT
Start: 2023-08-23 | End: 2023-08-23

## 2023-08-25 ENCOUNTER — HOSPITAL ENCOUNTER (EMERGENCY)
Facility: HOSPITAL | Age: 23
Discharge: HOME | End: 2023-08-26
Attending: STUDENT IN AN ORGANIZED HEALTH CARE EDUCATION/TRAINING PROGRAM
Payer: MEDICARE

## 2023-08-25 DIAGNOSIS — R10.31 RIGHT LOWER QUADRANT ABDOMINAL PAIN: Primary | ICD-10-CM

## 2023-08-25 DIAGNOSIS — N30.00 ACUTE CYSTITIS WITHOUT HEMATURIA: ICD-10-CM

## 2023-08-25 DIAGNOSIS — R50.9 FEVER, UNSPECIFIED: ICD-10-CM

## 2023-08-25 PROCEDURE — 81001 URINALYSIS AUTO W/SCOPE: CPT

## 2023-08-25 PROCEDURE — 96375 TX/PRO/DX INJ NEW DRUG ADDON: CPT

## 2023-08-25 PROCEDURE — 99285 EMERGENCY DEPT VISIT HI MDM: CPT

## 2023-08-25 PROCEDURE — 96374 THER/PROPH/DIAG INJ IV PUSH: CPT

## 2023-08-25 ASSESSMENT — LIFESTYLE VARIABLES
HOW MANY STANDARD DRINKS CONTAINING ALCOHOL DO YOU HAVE ON A TYPICAL DAY: PATIENT DOES NOT DRINK
HOW OFTEN DO YOU HAVE A DRINK CONTAINING ALCOHOL: NEVER

## 2023-08-25 ASSESSMENT — PAIN SCALES - WONG BAKER: WONGBAKER_NUMERICALRESPONSE: 8

## 2023-08-25 ASSESSMENT — PAIN - FUNCTIONAL ASSESSMENT: PAIN_FUNCTIONAL_ASSESSMENT: WONG-BAKER FACES

## 2023-08-25 ASSESSMENT — PAIN DESCRIPTION - LOCATION: LOCATION: ABDOMEN

## 2023-08-25 ASSESSMENT — PAIN SCALES - GENERAL: PAINLEVEL_OUTOF10: 8

## 2023-08-26 ENCOUNTER — APPOINTMENT (OUTPATIENT)
Facility: HOSPITAL | Age: 23
End: 2023-08-26
Payer: MEDICARE

## 2023-08-26 VITALS
DIASTOLIC BLOOD PRESSURE: 99 MMHG | TEMPERATURE: 97.9 F | HEART RATE: 90 BPM | RESPIRATION RATE: 18 BRPM | BODY MASS INDEX: 50.02 KG/M2 | HEIGHT: 64 IN | WEIGHT: 293 LBS | OXYGEN SATURATION: 99 % | SYSTOLIC BLOOD PRESSURE: 140 MMHG

## 2023-08-26 LAB
ALBUMIN SERPL-MCNC: 4.7 G/DL (ref 3.5–5.2)
ALBUMIN/GLOB SERPL: 1.4 (ref 1.1–2.2)
ALP SERPL-CCNC: 73 U/L (ref 35–104)
ALT SERPL-CCNC: 32 U/L (ref 10–35)
ANION GAP SERPL CALC-SCNC: 16 MMOL/L (ref 5–15)
APPEARANCE UR: CLEAR
AST SERPL-CCNC: 18 U/L (ref 10–35)
BACTERIA URNS QL MICRO: ABNORMAL /HPF
BASOPHILS # BLD: 0 K/UL (ref 0–1)
BASOPHILS NFR BLD: 0 % (ref 0–1)
BILIRUB SERPL-MCNC: 0.4 MG/DL (ref 0.2–1)
BILIRUB UR QL: NEGATIVE
BUN SERPL-MCNC: 14 MG/DL (ref 6–20)
BUN/CREAT SERPL: 17 (ref 12–20)
CALCIUM SERPL-MCNC: 9.8 MG/DL (ref 8.6–10)
CHLORIDE SERPL-SCNC: 99 MMOL/L (ref 98–107)
CO2 SERPL-SCNC: 23 MMOL/L (ref 22–29)
COLOR UR: ABNORMAL
CREAT SERPL-MCNC: 0.84 MG/DL (ref 0.5–0.9)
DIFFERENTIAL METHOD BLD: ABNORMAL
EOSINOPHIL # BLD: 0 K/UL (ref 0–0.4)
EOSINOPHIL NFR BLD: 0 %
EPITH CASTS URNS QL MICRO: ABNORMAL /LPF
ERYTHROCYTE [DISTWIDTH] IN BLOOD BY AUTOMATED COUNT: 12.6 % (ref 11.5–14.5)
GLOBULIN SER CALC-MCNC: 3.3 G/DL (ref 2–4)
GLUCOSE SERPL-MCNC: 115 MG/DL (ref 65–100)
GLUCOSE UR STRIP.AUTO-MCNC: NEGATIVE MG/DL
HCT VFR BLD AUTO: 47.3 % (ref 35–47)
HGB BLD-MCNC: 15.8 G/DL (ref 11.5–16)
HGB UR QL STRIP: NEGATIVE
IMM GRANULOCYTES # BLD AUTO: 0.1 K/UL (ref 0–0.04)
IMM GRANULOCYTES NFR BLD AUTO: 0 % (ref 0–0.5)
KETONES UR QL STRIP.AUTO: ABNORMAL MG/DL
LEUKOCYTE ESTERASE UR QL STRIP.AUTO: ABNORMAL
LYMPHOCYTES # BLD: 2.1 K/UL (ref 0.8–3.5)
LYMPHOCYTES NFR BLD: 14 % (ref 12–49)
MCH RBC QN AUTO: 31 PG (ref 26–34)
MCHC RBC AUTO-ENTMCNC: 33.4 G/DL (ref 30–36.5)
MCV RBC AUTO: 92.7 FL (ref 80–99)
MONOCYTES # BLD: 1.1 K/UL (ref 0–1)
MONOCYTES NFR BLD: 7 % (ref 5–13)
NEUTS SEG # BLD: 11.5 K/UL (ref 1.8–8)
NEUTS SEG NFR BLD: 79 % (ref 32–75)
NITRITE UR QL STRIP.AUTO: NEGATIVE
NRBC # BLD: 0 K/UL (ref 0–0.01)
NRBC BLD-RTO: 0 PER 100 WBC
PH UR STRIP: 6.5 (ref 5–8)
PLATELET # BLD AUTO: 495 K/UL (ref 150–400)
PMV BLD AUTO: 8.4 FL (ref 8.9–12.9)
POTASSIUM SERPL-SCNC: 4.2 MMOL/L (ref 3.5–5.1)
PROT SERPL-MCNC: 8 G/DL (ref 6.4–8.3)
PROT UR STRIP-MCNC: ABNORMAL MG/DL
RBC # BLD AUTO: 5.1 M/UL (ref 3.8–5.2)
RBC #/AREA URNS HPF: ABNORMAL /HPF
SODIUM SERPL-SCNC: 138 MMOL/L (ref 136–145)
SP GR UR REFRACTOMETRY: 1.01 (ref 1–1.03)
UROBILINOGEN UR QL STRIP.AUTO: 0.2 EU/DL (ref 0.2–1)
WBC # BLD AUTO: 14.8 K/UL (ref 3.6–11)
WBC URNS QL MICRO: ABNORMAL /HPF (ref 0–4)
YEAST URNS QL MICRO: PRESENT

## 2023-08-26 PROCEDURE — 96375 TX/PRO/DX INJ NEW DRUG ADDON: CPT

## 2023-08-26 PROCEDURE — 36415 COLL VENOUS BLD VENIPUNCTURE: CPT

## 2023-08-26 PROCEDURE — 74177 CT ABD & PELVIS W/CONTRAST: CPT

## 2023-08-26 PROCEDURE — 6360000002 HC RX W HCPCS: Performed by: STUDENT IN AN ORGANIZED HEALTH CARE EDUCATION/TRAINING PROGRAM

## 2023-08-26 PROCEDURE — 6360000004 HC RX CONTRAST MEDICATION: Performed by: STUDENT IN AN ORGANIZED HEALTH CARE EDUCATION/TRAINING PROGRAM

## 2023-08-26 PROCEDURE — 6370000000 HC RX 637 (ALT 250 FOR IP): Performed by: STUDENT IN AN ORGANIZED HEALTH CARE EDUCATION/TRAINING PROGRAM

## 2023-08-26 PROCEDURE — 80053 COMPREHEN METABOLIC PANEL: CPT

## 2023-08-26 PROCEDURE — 96374 THER/PROPH/DIAG INJ IV PUSH: CPT

## 2023-08-26 PROCEDURE — 85025 COMPLETE CBC W/AUTO DIFF WBC: CPT

## 2023-08-26 RX ORDER — ACETAMINOPHEN 325 MG/1
650 TABLET ORAL
Status: COMPLETED | OUTPATIENT
Start: 2023-08-26 | End: 2023-08-26

## 2023-08-26 RX ORDER — ONDANSETRON 2 MG/ML
4 INJECTION INTRAMUSCULAR; INTRAVENOUS ONCE
Status: COMPLETED | OUTPATIENT
Start: 2023-08-26 | End: 2023-08-26

## 2023-08-26 RX ORDER — CEPHALEXIN 250 MG/1
500 CAPSULE ORAL
Status: COMPLETED | OUTPATIENT
Start: 2023-08-26 | End: 2023-08-26

## 2023-08-26 RX ORDER — CEPHALEXIN 500 MG/1
500 CAPSULE ORAL 3 TIMES DAILY
Qty: 21 CAPSULE | Refills: 0 | Status: SHIPPED | OUTPATIENT
Start: 2023-08-26 | End: 2023-08-28 | Stop reason: SINTOL

## 2023-08-26 RX ORDER — KETOROLAC TROMETHAMINE 30 MG/ML
15 INJECTION, SOLUTION INTRAMUSCULAR; INTRAVENOUS ONCE
Status: COMPLETED | OUTPATIENT
Start: 2023-08-26 | End: 2023-08-26

## 2023-08-26 RX ADMIN — KETOROLAC TROMETHAMINE 15 MG: 30 INJECTION, SOLUTION INTRAMUSCULAR; INTRAVENOUS at 02:28

## 2023-08-26 RX ADMIN — CEPHALEXIN 500 MG: 250 CAPSULE ORAL at 03:43

## 2023-08-26 RX ADMIN — ONDANSETRON 4 MG: 2 INJECTION INTRAMUSCULAR; INTRAVENOUS at 02:28

## 2023-08-26 RX ADMIN — IOPAMIDOL 100 ML: 755 INJECTION, SOLUTION INTRAVENOUS at 02:49

## 2023-08-26 RX ADMIN — ACETAMINOPHEN 650 MG: 325 TABLET ORAL at 03:43

## 2023-08-26 ASSESSMENT — PAIN SCALES - WONG BAKER
WONGBAKER_NUMERICALRESPONSE: 2
WONGBAKER_NUMERICALRESPONSE: 4

## 2023-08-26 ASSESSMENT — ENCOUNTER SYMPTOMS
EYE DISCHARGE: 0
NAUSEA: 1
SHORTNESS OF BREATH: 0
COUGH: 0
VOMITING: 0
DIARRHEA: 0
EYE REDNESS: 0
ABDOMINAL PAIN: 1
RHINORRHEA: 0

## 2023-08-26 ASSESSMENT — PAIN - FUNCTIONAL ASSESSMENT
PAIN_FUNCTIONAL_ASSESSMENT: WONG-BAKER FACES
PAIN_FUNCTIONAL_ASSESSMENT: 0-10

## 2023-08-26 ASSESSMENT — PAIN DESCRIPTION - LOCATION
LOCATION: ABDOMEN
LOCATION: ABDOMEN

## 2023-08-26 ASSESSMENT — PAIN SCALES - GENERAL: PAINLEVEL_OUTOF10: 4

## 2023-08-26 NOTE — ED PROVIDER NOTES
[facility] EMERGENCY DEPT  EMERGENCY DEPARTMENT ENCOUNTER      Pt Name: Maciej Aponte  MRN: [id number]  [address] [facility] 2000  Date of evaluation: 8/25/2023  Provider: Mary Goss       Chief Complaint   Patient presents with    Abdominal Pain         HISTORY OF PRESENT ILLNESS    HPI    Maciej Aponte is a 20 y.o. female with a past medical history listed below who presents to the emergency department for evaluation of abdominal pain. Patient notes since last night she has been experiencing right lower quadrant abdominal pain which has been worsening since onset. Today she was at work and began to feel hot and cold flashes, subsequently went home and checked her temperature and notes it was 101. She took Tylenol around 9:30 PM and is afebrile on arrival here. She endorses nausea and decreased appetite today but no vomiting. No diarrhea or bloody stools. No urinary symptoms. Nursing Notes were reviewed. REVIEW OF SYSTEMS       Review of Systems   Constitutional:  Positive for appetite change and fever. Negative for chills. HENT:  Negative for congestion and rhinorrhea. Eyes:  Negative for discharge and redness. Respiratory:  Negative for cough and shortness of breath. Cardiovascular:  Negative for chest pain. Gastrointestinal:  Positive for abdominal pain and nausea. Negative for diarrhea and vomiting. Genitourinary:  Negative for dysuria and flank pain. Neurological:  Negative for speech difficulty. Psychiatric/Behavioral:  Negative for agitation. PAST MEDICAL HISTORY     Past Medical History:   Diagnosis Date    Abdominal migraine     Dr. Garcia Antis. vs Sucrose Intolerance. Acid reflux     ADHD (attention deficit hyperactivity disorder)     Allergy to chocolate 9/13/2017    Allergy to yeast 9/13/2017    Asthma     Autism     High Functioning. Dr. Ye Drafts.     Biliary dyskinesia 6/4/2019    Chronic cholecystitis 6/4/2019    Chronic rhinitis

## 2023-08-26 NOTE — ED NOTES
Introduced self to patient as primary RN. Pt A&Ox4. Pt resting in stretcher with mother at bedside. Pt respirations unlabored, no distress noted. Pt states she is still in some pain; md notified.      Titus Lopez RN  08/26/23 1290

## 2023-08-26 NOTE — RT PROTOCOL NOTE
Brought PT to CT, started injecting contrast, IV site got cold and hard, stopped injection brought PT back to room for new IV to be started so that scan could be tried again in there future. 33 ML of contrast delivered in original IV site.

## 2023-08-26 NOTE — ED NOTES
Patient reports symptom improvement or at least no worsening of symptoms since arrival to ED. Pain reassessment 4/10. VSS at time of discharge. I have reviewed discharge instructions with the patient and parent, who verbalized understanding. Patient stable and discharged from ED via [address]  and with FAMILY/FRIEND.    If applicable, PIV removed yes       Ramirez Arshad RN  08/26/23 8697

## 2023-08-28 ENCOUNTER — OFFICE VISIT (OUTPATIENT)
Age: 23
End: 2023-08-28
Payer: MEDICARE

## 2023-08-28 VITALS
BODY MASS INDEX: 50.02 KG/M2 | WEIGHT: 293 LBS | HEIGHT: 64 IN | OXYGEN SATURATION: 98 % | RESPIRATION RATE: 20 BRPM | DIASTOLIC BLOOD PRESSURE: 87 MMHG | TEMPERATURE: 98 F | SYSTOLIC BLOOD PRESSURE: 138 MMHG | HEART RATE: 117 BPM

## 2023-08-28 DIAGNOSIS — R10.9 FLANK PAIN, ACUTE: Primary | ICD-10-CM

## 2023-08-28 DIAGNOSIS — N30.00 ACUTE CYSTITIS WITHOUT HEMATURIA: ICD-10-CM

## 2023-08-28 DIAGNOSIS — R11.0 NAUSEA: ICD-10-CM

## 2023-08-28 LAB
BILIRUBIN, URINE, POC: NEGATIVE
BLOOD URINE, POC: NEGATIVE
GLUCOSE URINE, POC: NEGATIVE
KETONES, URINE, POC: NORMAL
LEUKOCYTE ESTERASE, URINE, POC: NORMAL
NITRITE, URINE, POC: NEGATIVE
PH, URINE, POC: 7 (ref 4.6–8)
PROTEIN,URINE, POC: NORMAL
SPECIFIC GRAVITY, URINE, POC: 1.02 (ref 1–1.03)
URINALYSIS CLARITY, POC: CLEAR
URINALYSIS COLOR, POC: YELLOW
UROBILINOGEN, POC: NORMAL

## 2023-08-28 PROCEDURE — 99213 OFFICE O/P EST LOW 20 MIN: CPT | Performed by: PHYSICIAN ASSISTANT

## 2023-08-28 PROCEDURE — 81003 URINALYSIS AUTO W/O SCOPE: CPT | Performed by: PHYSICIAN ASSISTANT

## 2023-08-28 PROCEDURE — 1036F TOBACCO NON-USER: CPT | Performed by: PHYSICIAN ASSISTANT

## 2023-08-28 PROCEDURE — 3075F SYST BP GE 130 - 139MM HG: CPT | Performed by: PHYSICIAN ASSISTANT

## 2023-08-28 PROCEDURE — 3079F DIAST BP 80-89 MM HG: CPT | Performed by: PHYSICIAN ASSISTANT

## 2023-08-28 PROCEDURE — G8427 DOCREV CUR MEDS BY ELIG CLIN: HCPCS | Performed by: PHYSICIAN ASSISTANT

## 2023-08-28 PROCEDURE — PBSHW AMB POC URINALYSIS DIP STICK AUTO W/O MICRO: Performed by: PHYSICIAN ASSISTANT

## 2023-08-28 PROCEDURE — G8417 CALC BMI ABV UP PARAM F/U: HCPCS | Performed by: PHYSICIAN ASSISTANT

## 2023-08-28 RX ORDER — ONDANSETRON HYDROCHLORIDE 8 MG/1
8 TABLET, FILM COATED ORAL EVERY 8 HOURS PRN
Qty: 60 TABLET | Refills: 0 | Status: SHIPPED | OUTPATIENT
Start: 2023-08-28

## 2023-08-28 RX ORDER — NITROFURANTOIN 25; 75 MG/1; MG/1
100 CAPSULE ORAL 2 TIMES DAILY
Qty: 10 CAPSULE | Refills: 0 | Status: SHIPPED | OUTPATIENT
Start: 2023-08-28 | End: 2023-09-02

## 2023-08-28 RX ORDER — PANTOPRAZOLE SODIUM 40 MG/1
1 TABLET, DELAYED RELEASE ORAL 2 TIMES DAILY
COMMUNITY

## 2023-08-28 ASSESSMENT — ANXIETY QUESTIONNAIRES
4. TROUBLE RELAXING: 0
5. BEING SO RESTLESS THAT IT IS HARD TO SIT STILL: 0
3. WORRYING TOO MUCH ABOUT DIFFERENT THINGS: 0
2. NOT BEING ABLE TO STOP OR CONTROL WORRYING: 0
IF YOU CHECKED OFF ANY PROBLEMS ON THIS QUESTIONNAIRE, HOW DIFFICULT HAVE THESE PROBLEMS MADE IT FOR YOU TO DO YOUR WORK, TAKE CARE OF THINGS AT HOME, OR GET ALONG WITH OTHER PEOPLE: NOT DIFFICULT AT ALL
7. FEELING AFRAID AS IF SOMETHING AWFUL MIGHT HAPPEN: 0
1. FEELING NERVOUS, ANXIOUS, OR ON EDGE: 0
6. BECOMING EASILY ANNOYED OR IRRITABLE: 0
GAD7 TOTAL SCORE: 0

## 2023-08-28 ASSESSMENT — PATIENT HEALTH QUESTIONNAIRE - PHQ9
2. FEELING DOWN, DEPRESSED OR HOPELESS: 0
6. FEELING BAD ABOUT YOURSELF - OR THAT YOU ARE A FAILURE OR HAVE LET YOURSELF OR YOUR FAMILY DOWN: 0
9. THOUGHTS THAT YOU WOULD BE BETTER OFF DEAD, OR OF HURTING YOURSELF: 0
3. TROUBLE FALLING OR STAYING ASLEEP: 0
4. FEELING TIRED OR HAVING LITTLE ENERGY: 0
1. LITTLE INTEREST OR PLEASURE IN DOING THINGS: 0
8. MOVING OR SPEAKING SO SLOWLY THAT OTHER PEOPLE COULD HAVE NOTICED. OR THE OPPOSITE, BEING SO FIGETY OR RESTLESS THAT YOU HAVE BEEN MOVING AROUND A LOT MORE THAN USUAL: 0
10. IF YOU CHECKED OFF ANY PROBLEMS, HOW DIFFICULT HAVE THESE PROBLEMS MADE IT FOR YOU TO DO YOUR WORK, TAKE CARE OF THINGS AT HOME, OR GET ALONG WITH OTHER PEOPLE: 0
5. POOR APPETITE OR OVEREATING: 0
SUM OF ALL RESPONSES TO PHQ QUESTIONS 1-9: 0
7. TROUBLE CONCENTRATING ON THINGS, SUCH AS READING THE NEWSPAPER OR WATCHING TELEVISION: 0
SUM OF ALL RESPONSES TO PHQ9 QUESTIONS 1 & 2: 0
SUM OF ALL RESPONSES TO PHQ QUESTIONS 1-9: 0

## 2023-08-28 NOTE — PROGRESS NOTES
Charmaine Nuno is a 20 y.o. female , id x 2(name and ). Reviewed record, history, and  medications. Chief Complaint   Patient presents with    Abdominal Pain     Patient c/o RUQ pain for about 1 week, but progressing, stabbing, achy, throbbing. Patient works in a  office, handles heavy dogs. Patient went to [facility] ER, for RUQ pain, patient was running a fever, gave antibiotics after CT for appendix, treated patient for UTI. Vitals:    23 1457   Resp: 20   Weight: (!) 311 lb 9.6 oz (141.3 kg)   Height: 5' 4\" (1.626 m)       Coordination of Care Questionnaire:   1. Have you been to the ER, urgent care clinic since your last visit? Hospitalized since your last visit? Yes John SINGER, BS    2. Have you seen or consulted any other health care providers outside of the [address] [place] [address] since your last visit? Include any pap smears or colon screening. Yes [facility], Rheumatology. PHQ-9  2023   Depression Unable to Assess -   Little interest or pleasure in doing things 0   Little interest or pleasure in doing things -   Feeling down, depressed, or hopeless 0   Trouble falling or staying asleep, or sleeping too much 0   Feeling tired or having little energy 0   Poor appetite or overeating 0   Feeling bad about yourself - or that you are a failure or have let yourself or your family down 0   Trouble concentrating on things, such as reading the newspaper or watching television 0   Moving or speaking so slowly that other people could have noticed.  Or the opposite - being so fidgety or restless that you have been moving around a lot more than usual 0   Thoughts that you would be better off dead, or of hurting yourself in some way 0   PHQ-2 Score 0   Total Score PHQ 2 -   PHQ-9 Total Score 0   If you checked off any problems, how difficult have these problems made it for you to do your work, take care of things at home, or get along with other people? 0       FIORDALIZA-7 SCREENING 2023
was used for level of billing: no     No follow-up provider specified. I have discussed the diagnosis with the patient and the intended plan as seen in the above orders. The patient has received an after-visit summary and questions were answered concerning future plans.      Medication Side Effects and Warnings were discussed with patient: Yes  Patient Labs were reviewed and or requested: Yes  Patient Past Records were reviewed and or requested  Yes    Katlyn Acuna PA-C

## 2023-08-30 ENCOUNTER — TELEPHONE (OUTPATIENT)
Age: 23
End: 2023-08-30

## 2023-08-30 LAB
BACTERIA SPEC CULT: NORMAL
CC UR VC: NORMAL
SERVICE CMNT-IMP: NORMAL

## 2023-08-30 NOTE — TELEPHONE ENCOUNTER
Pt is calling about the UTI and the medication Amelia sent in pt also has left a mychart message please advise

## 2023-09-02 ENCOUNTER — PATIENT MESSAGE (OUTPATIENT)
Age: 23
End: 2023-09-02

## 2023-09-02 DIAGNOSIS — H69.83 ETD (EUSTACHIAN TUBE DYSFUNCTION), BILATERAL: Primary | ICD-10-CM

## 2023-09-05 ENCOUNTER — OFFICE VISIT (OUTPATIENT)
Age: 23
End: 2023-09-05
Payer: MEDICARE

## 2023-09-05 VITALS
WEIGHT: 293 LBS | HEIGHT: 64 IN | BODY MASS INDEX: 50.02 KG/M2 | OXYGEN SATURATION: 98 % | SYSTOLIC BLOOD PRESSURE: 122 MMHG | HEART RATE: 95 BPM | DIASTOLIC BLOOD PRESSURE: 76 MMHG

## 2023-09-05 DIAGNOSIS — M32.9 SYSTEMIC LUPUS ERYTHEMATOSUS, UNSPECIFIED SLE TYPE, UNSPECIFIED ORGAN INVOLVEMENT STATUS (HCC): ICD-10-CM

## 2023-09-05 DIAGNOSIS — I89.0 LYMPHEDEMA: Primary | ICD-10-CM

## 2023-09-05 DIAGNOSIS — R00.0 TACHYCARDIA, UNSPECIFIED: ICD-10-CM

## 2023-09-05 PROCEDURE — 1036F TOBACCO NON-USER: CPT | Performed by: SPECIALIST

## 2023-09-05 PROCEDURE — 3074F SYST BP LT 130 MM HG: CPT | Performed by: SPECIALIST

## 2023-09-05 PROCEDURE — G8427 DOCREV CUR MEDS BY ELIG CLIN: HCPCS | Performed by: SPECIALIST

## 2023-09-05 PROCEDURE — G8417 CALC BMI ABV UP PARAM F/U: HCPCS | Performed by: SPECIALIST

## 2023-09-05 PROCEDURE — 99214 OFFICE O/P EST MOD 30 MIN: CPT | Performed by: SPECIALIST

## 2023-09-05 PROCEDURE — 3078F DIAST BP <80 MM HG: CPT | Performed by: SPECIALIST

## 2023-09-07 RX ORDER — LORAZEPAM 1 MG/1
1 TABLET ORAL
Qty: 2 TABLET | Refills: 0 | Status: SHIPPED | OUTPATIENT
Start: 2023-09-07 | End: 2023-09-07

## 2023-09-08 ENCOUNTER — OFFICE VISIT (OUTPATIENT)
Age: 23
End: 2023-09-08

## 2023-09-08 VITALS — SYSTOLIC BLOOD PRESSURE: 144 MMHG | DIASTOLIC BLOOD PRESSURE: 88 MMHG | HEART RATE: 101 BPM | OXYGEN SATURATION: 98 %

## 2023-09-08 DIAGNOSIS — H69.83 ETD (EUSTACHIAN TUBE DYSFUNCTION), BILATERAL: Primary | ICD-10-CM

## 2023-09-08 PROCEDURE — 99024 POSTOP FOLLOW-UP VISIT: CPT | Performed by: OTOLARYNGOLOGY

## 2023-09-11 NOTE — PROGRESS NOTES
Otolaryngology-Head and Neck Surgery  Follow Up Patient Visit     Patient: Taylor Fried  YOB: 2000  MRN: [id number]  Date of Service: 9/8/2023    Chief Complaint:   Chief Complaint   Patient presents with    Ear Pain    Sinus Infection       Interval hx   1 mo s/p R myringotomy in the office  Found this really helped her ear symptoms - resolution of plugging and pulsing   Last week right ear plugging recurred   Presents for possible tube placement today     Interval hx  On dupixent now for 4-5 months  Allergies may be somewhat improved  Did have asthma exacerbation requiring significant prednisone Rx  R > L ear plugging remains very bothersome    History of Present Illness: Taylor Fried is a 20y.o. year old female who presents today for discussion of frequent ear infections and sinus pain    Over the last couple of years has had nasal congestion, sinus pain, concern for infections  Also has frequent otalgia and told has fluid in her ears    Has seen a few different providers, including allergy at [facility] as well as [facility] - allergy testing at [facility] did show some allergies and she may have started immunotherapy, but did not continue due to accessibility. Allergy testing at [facility] was apparently unremarkable    Has also seen Otology at [facility], unremarkable     Past Medical History:  Past Medical History:   Diagnosis Date    Abdominal migraine     Dr. Millie Cheadle. vs Sucrose Intolerance. Acid reflux     ADHD (attention deficit hyperactivity disorder)     Asthma     Autism     High Functioning. Dr. Ingrid Gentile. Chronic rhinitis 2021    Dr. Mary Beth Dsouza, allergist.     Connective tissue disease [facility])     Developmental delay     Fibromyalgia     Dr. Alice Garduno    FIORDALIZA (generalized anxiety disorder)     Dr. Yoselin Lomas    Insulin resistance 09/08/2021    Dr. Rosalie Garg. alejandrina Piper    Irritable bowel syndrome with diarrhea 04/13/2017    Lymphedema 2021    BL legs. Dr. Monroe Horton.   Beni Oliveira, Lymphedema

## 2023-09-13 ENCOUNTER — TELEPHONE (OUTPATIENT)
Age: 23
End: 2023-09-13

## 2023-09-13 NOTE — TELEPHONE ENCOUNTER
LVM asking pt to contact me to schedule procedure with Dr. Manish Morse    Left my direct line for pt

## 2023-09-16 DIAGNOSIS — M54.41 CHRONIC RIGHT-SIDED LOW BACK PAIN WITH RIGHT-SIDED SCIATICA: ICD-10-CM

## 2023-09-16 DIAGNOSIS — G89.29 CHRONIC RIGHT-SIDED LOW BACK PAIN WITH RIGHT-SIDED SCIATICA: ICD-10-CM

## 2023-09-19 ENCOUNTER — OFFICE VISIT (OUTPATIENT)
Age: 23
End: 2023-09-19
Payer: MEDICAID

## 2023-09-19 ENCOUNTER — TELEPHONE (OUTPATIENT)
Age: 23
End: 2023-09-19

## 2023-09-19 VITALS
HEIGHT: 64 IN | WEIGHT: 293 LBS | HEART RATE: 104 BPM | OXYGEN SATURATION: 92 % | TEMPERATURE: 98.4 F | BODY MASS INDEX: 50.02 KG/M2

## 2023-09-19 DIAGNOSIS — R23.3 EASY BRUISING: ICD-10-CM

## 2023-09-19 DIAGNOSIS — G89.29 CHRONIC RIGHT-SIDED LOW BACK PAIN WITH RIGHT-SIDED SCIATICA: ICD-10-CM

## 2023-09-19 DIAGNOSIS — M32.9 SYSTEMIC LUPUS ERYTHEMATOSUS, UNSPECIFIED SLE TYPE, UNSPECIFIED ORGAN INVOLVEMENT STATUS (HCC): ICD-10-CM

## 2023-09-19 DIAGNOSIS — M79.605 BILATERAL LEG PAIN: ICD-10-CM

## 2023-09-19 DIAGNOSIS — R60.9 LIPEDEMA: ICD-10-CM

## 2023-09-19 DIAGNOSIS — Z00.00 WELL ADULT EXAM: Primary | ICD-10-CM

## 2023-09-19 DIAGNOSIS — M79.604 BILATERAL LEG PAIN: ICD-10-CM

## 2023-09-19 DIAGNOSIS — M54.41 CHRONIC RIGHT-SIDED LOW BACK PAIN WITH RIGHT-SIDED SCIATICA: ICD-10-CM

## 2023-09-19 PROCEDURE — 99395 PREV VISIT EST AGE 18-39: CPT | Performed by: STUDENT IN AN ORGANIZED HEALTH CARE EDUCATION/TRAINING PROGRAM

## 2023-09-19 PROCEDURE — 99214 OFFICE O/P EST MOD 30 MIN: CPT | Performed by: STUDENT IN AN ORGANIZED HEALTH CARE EDUCATION/TRAINING PROGRAM

## 2023-09-19 RX ORDER — PREDNISONE 5 MG/1
TABLET ORAL
Qty: 27 TABLET | Refills: 0 | Status: SHIPPED | OUTPATIENT
Start: 2023-09-19 | End: 2023-09-25

## 2023-09-19 RX ORDER — BUDESONIDE AND FORMOTEROL FUMARATE DIHYDRATE 160; 4.5 UG/1; UG/1
2 AEROSOL RESPIRATORY (INHALATION) 2 TIMES DAILY
COMMUNITY
Start: 2023-09-07

## 2023-09-19 NOTE — PROGRESS NOTES
Well Adult Note  Name: Jeraline Runner Date: 2023   MRN: [id number] Sex: Female   Age: 24 y.o. Ethnicity: Non- / Non    : 2000 Race: White (non-)      Milka Hawkins is here for well adult exam.  History:    She reports a new diagnosis of POTS and orthostatic hypotension with Dr. Raymond Ramos  Orthostatic v/s were done. No plan to do tilt table testing. She will be increasing salt intake. She will be getting a pump sent to her. Diet: overall healthy   Increased fruits this summer   Primary beverage is water   No binge eating patterns  Exercise: not much, limited by pain of the legs and back   She stopped PT, had been doing 2 years without improvement  Sleep: generally good, no snoring  Mood: will be establishing with a new psychiatrist    She is experiencing a lupus flare. She contacted rheumatology office yesterday and has not heard back. Been on dupixent for 6 months and she is off an allergy pill and decreasing inhaler doses. Review of Systems   All other systems reviewed and are negative. Allergies   Allergen Reactions    Sulfamethoprim [Sulfamethoxazole-Trimethoprim] Other (See Comments)     Developed oral thrush    Macrobid [Nitrofurantoin] Diarrhea    Rizatriptan Other (See Comments)    Sulfa Antibiotics      Other reaction(s): Unknown (comments)    Adhesive Tape Rash     MEDIPORE TAPE    Metronidazole Rash     Worsened acneiform rash with TOPICAL flagyl used to treat rosacea         Prior to Visit Medications    Medication Sig Taking?  Authorizing Provider   SYMBICORT 160-4.5 MCG/ACT AERO  Yes Historical Provider, MD   diclofenac sodium (VOLTAREN) 1 % GEL APPLY A THIN LAYER (2 4 GRAMS) TO THE PAINFUL AREA OF FEET EVERY 6 HOURS Yes Historical Provider, MD   ondansetron (ZOFRAN) 8 MG tablet Take 1 tablet by mouth every 8 hours as needed for Nausea or Vomiting Yes Katlyn Acuna PA-C   metFORMIN (GLUCOPHAGE-XR) 500 MG extended release tablet TAKE 2 TABLETS

## 2023-09-19 NOTE — TELEPHONE ENCOUNTER
Called patient regarding referral to Dr. Lonny Oliver for bariatric surgery. Patient was unable to talk when I called and stated she would contact the office back.

## 2023-09-20 ENCOUNTER — TELEPHONE (OUTPATIENT)
Age: 23
End: 2023-09-20

## 2023-09-20 ENCOUNTER — PATIENT MESSAGE (OUTPATIENT)
Age: 23
End: 2023-09-20

## 2023-09-20 RX ORDER — GABAPENTIN 100 MG/1
100 CAPSULE ORAL 3 TIMES DAILY PRN
Qty: 270 CAPSULE | Refills: 1 | Status: SHIPPED | OUTPATIENT
Start: 2023-09-27 | End: 2024-03-25

## 2023-09-20 NOTE — TELEPHONE ENCOUNTER
Pt called in and asked for doc to doc referral to be faxed to [phone]. Pt is going to call back with fax number for me.

## 2023-09-20 NOTE — TELEPHONE ENCOUNTER
Pt called back in and gave fax number [phone]. Confirmation received # [phone]. Copy placed at  for her to .

## 2023-09-21 ENCOUNTER — TELEPHONE (OUTPATIENT)
Age: 23
End: 2023-09-21

## 2023-09-21 NOTE — TELEPHONE ENCOUNTER
Patient LVM wanting to know if the surgery has be scheduled for her tube placement. Was given the tentative date of 10/4/2023 but was told that you were going to check on a sooner date.

## 2023-09-22 RX ORDER — NORETHINDRONE ACETATE AND ETHINYL ESTRADIOL AND FERROUS FUMARATE 1.5-30(21)
KIT ORAL
Qty: 112 TABLET | Refills: 3 | Status: SHIPPED | OUTPATIENT
Start: 2023-09-22

## 2023-09-22 NOTE — TELEPHONE ENCOUNTER
----- Message from Janey Groves sent at 9/22/2023  4:25 PM EDT -----  Subject: Message to Provider    QUESTIONS  Information for Provider? call nito tapia with [facility] at [phone]   about second drug test and adhd meds being refilled again  ---------------------------------------------------------------------------  --------------  [address] [place] [address] Praveen  [phone]; OK to leave message on voicemail  ---------------------------------------------------------------------------  --------------  SCRIPT ANSWERS  Relationship to Patient? Covered Entity  Covered Entity Type? Health Insurance?   Representative Name? Glen Matos

## 2023-09-28 ENCOUNTER — CLINICAL DOCUMENTATION (OUTPATIENT)
Age: 23
End: 2023-09-28

## 2023-09-28 RX ORDER — LANSOPRAZOLE 30 MG/1
30 TABLET, ORALLY DISINTEGRATING, DELAYED RELEASE ORAL DAILY
COMMUNITY

## 2023-09-28 NOTE — PERIOP NOTE
12 Dr. Mackenzie Arrieta called, made aware of pt's history and made aware pt takes naltrexone with metformin daily in evening for weight loss, he stated it is ok to continue as usual and made aware of hx of tachycardia and has seen cardiologist, Dr. Juliette Martin on 9/5/2023, with most recent ekg done April 2022, stated it is ok to do ekg on admission for procedure scheduled on 10/4/2023.

## 2023-09-29 ENCOUNTER — TELEPHONE (OUTPATIENT)
Age: 23
End: 2023-09-29

## 2023-09-29 NOTE — TELEPHONE ENCOUNTER
[facility] Phone [phone] stated patient was putting in a complaint against provider. Patient was given an urine test that came out positive therefore was not given her medication. She stated she has never taken any recreational drugs. Courtney Calix will be faxing information over. Please give Courtney Calix a call if any questions. Another agent was handling this but is out on leave.

## 2023-10-02 ENCOUNTER — CLINICAL DOCUMENTATION (OUTPATIENT)
Age: 23
End: 2023-10-02

## 2023-10-03 ENCOUNTER — ANESTHESIA EVENT (OUTPATIENT)
Facility: HOSPITAL | Age: 23
End: 2023-10-03
Payer: MEDICAID

## 2023-10-04 ENCOUNTER — ANESTHESIA (OUTPATIENT)
Facility: HOSPITAL | Age: 23
End: 2023-10-04
Payer: MEDICAID

## 2023-10-04 ENCOUNTER — HOSPITAL ENCOUNTER (OUTPATIENT)
Facility: HOSPITAL | Age: 23
Discharge: HOME | End: 2023-10-04
Attending: OTOLARYNGOLOGY | Admitting: OTOLARYNGOLOGY
Payer: MEDICAID

## 2023-10-04 VITALS
SYSTOLIC BLOOD PRESSURE: 141 MMHG | BODY MASS INDEX: 50.02 KG/M2 | DIASTOLIC BLOOD PRESSURE: 85 MMHG | HEIGHT: 64 IN | HEART RATE: 124 BPM | OXYGEN SATURATION: 97 % | TEMPERATURE: 97.5 F | WEIGHT: 293 LBS | RESPIRATION RATE: 20 BRPM

## 2023-10-04 PROBLEM — H69.83 OTHER SPECIFIED DISORDERS OF EUSTACHIAN TUBE, BILATERAL: Status: ACTIVE | Noted: 2023-10-04

## 2023-10-04 LAB
GLUCOSE BLD STRIP.AUTO-MCNC: 83 MG/DL (ref 65–100)
PERFORMED BY:: NORMAL

## 2023-10-04 PROCEDURE — 7100000000 HC PACU RECOVERY - FIRST 15 MIN: Performed by: OTOLARYNGOLOGY

## 2023-10-04 PROCEDURE — 7100000001 HC PACU RECOVERY - ADDTL 15 MIN: Performed by: OTOLARYNGOLOGY

## 2023-10-04 PROCEDURE — 6360000002 HC RX W HCPCS: Performed by: NURSE ANESTHETIST, CERTIFIED REGISTERED

## 2023-10-04 PROCEDURE — 2580000003 HC RX 258: Performed by: OTOLARYNGOLOGY

## 2023-10-04 PROCEDURE — 2500000003 HC RX 250 WO HCPCS: Performed by: NURSE ANESTHETIST, CERTIFIED REGISTERED

## 2023-10-04 PROCEDURE — 69436 CREATE EARDRUM OPENING: CPT | Performed by: OTOLARYNGOLOGY

## 2023-10-04 PROCEDURE — 7100000010 HC PHASE II RECOVERY - FIRST 15 MIN: Performed by: OTOLARYNGOLOGY

## 2023-10-04 PROCEDURE — 7100000011 HC PHASE II RECOVERY - ADDTL 15 MIN: Performed by: OTOLARYNGOLOGY

## 2023-10-04 PROCEDURE — 82962 GLUCOSE BLOOD TEST: CPT

## 2023-10-04 PROCEDURE — 6370000000 HC RX 637 (ALT 250 FOR IP): Performed by: OTOLARYNGOLOGY

## 2023-10-04 PROCEDURE — 3600000012 HC SURGERY LEVEL 2 ADDTL 15MIN: Performed by: OTOLARYNGOLOGY

## 2023-10-04 PROCEDURE — 2500000003 HC RX 250 WO HCPCS: Performed by: ANESTHESIOLOGY

## 2023-10-04 PROCEDURE — 3600000002 HC SURGERY LEVEL 2 BASE: Performed by: OTOLARYNGOLOGY

## 2023-10-04 PROCEDURE — 3700000000 HC ANESTHESIA ATTENDED CARE: Performed by: OTOLARYNGOLOGY

## 2023-10-04 PROCEDURE — 3700000001 HC ADD 15 MINUTES (ANESTHESIA): Performed by: OTOLARYNGOLOGY

## 2023-10-04 PROCEDURE — 2709999900 HC NON-CHARGEABLE SUPPLY: Performed by: OTOLARYNGOLOGY

## 2023-10-04 DEVICE — TUBE VENT BVL 1 1.14 MM ARMSTR GRMMT BLU FLROPLAS 70145761: Type: IMPLANTABLE DEVICE | Site: EAR | Status: FUNCTIONAL

## 2023-10-04 RX ORDER — FENTANYL CITRATE 50 UG/ML
50 INJECTION, SOLUTION INTRAMUSCULAR; INTRAVENOUS EVERY 5 MIN PRN
Status: DISCONTINUED | OUTPATIENT
Start: 2023-10-04 | End: 2023-10-04 | Stop reason: HOSPADM

## 2023-10-04 RX ORDER — OXYCODONE HYDROCHLORIDE 5 MG/1
5 TABLET ORAL PRN
Status: DISCONTINUED | OUTPATIENT
Start: 2023-10-04 | End: 2023-10-04 | Stop reason: HOSPADM

## 2023-10-04 RX ORDER — GLYCOPYRROLATE 0.2 MG/ML
INJECTION INTRAMUSCULAR; INTRAVENOUS PRN
Status: DISCONTINUED | OUTPATIENT
Start: 2023-10-04 | End: 2023-10-04 | Stop reason: SDUPTHER

## 2023-10-04 RX ORDER — HYDRALAZINE HYDROCHLORIDE 20 MG/ML
10 INJECTION INTRAMUSCULAR; INTRAVENOUS
Status: DISCONTINUED | OUTPATIENT
Start: 2023-10-04 | End: 2023-10-04 | Stop reason: HOSPADM

## 2023-10-04 RX ORDER — SODIUM CHLORIDE 0.9 % (FLUSH) 0.9 %
5-40 SYRINGE (ML) INJECTION PRN
Status: DISCONTINUED | OUTPATIENT
Start: 2023-10-04 | End: 2023-10-04 | Stop reason: HOSPADM

## 2023-10-04 RX ORDER — HYDROMORPHONE HYDROCHLORIDE 1 MG/ML
0.5 INJECTION, SOLUTION INTRAMUSCULAR; INTRAVENOUS; SUBCUTANEOUS EVERY 5 MIN PRN
Status: DISCONTINUED | OUTPATIENT
Start: 2023-10-04 | End: 2023-10-04 | Stop reason: HOSPADM

## 2023-10-04 RX ORDER — ONDANSETRON 2 MG/ML
4 INJECTION INTRAMUSCULAR; INTRAVENOUS
Status: DISCONTINUED | OUTPATIENT
Start: 2023-10-04 | End: 2023-10-04 | Stop reason: HOSPADM

## 2023-10-04 RX ORDER — LIDOCAINE 4 G/G
1 PATCH TOPICAL AS NEEDED
Status: CANCELLED | OUTPATIENT
Start: 2023-10-04

## 2023-10-04 RX ORDER — OFLOXACIN 3 MG/ML
SOLUTION/ DROPS OPHTHALMIC PRN
Status: DISCONTINUED | OUTPATIENT
Start: 2023-10-04 | End: 2023-10-04 | Stop reason: HOSPADM

## 2023-10-04 RX ORDER — LORAZEPAM 2 MG/ML
0.5 INJECTION INTRAMUSCULAR
Status: DISCONTINUED | OUTPATIENT
Start: 2023-10-04 | End: 2023-10-04 | Stop reason: HOSPADM

## 2023-10-04 RX ORDER — ONDANSETRON 2 MG/ML
INJECTION INTRAMUSCULAR; INTRAVENOUS PRN
Status: DISCONTINUED | OUTPATIENT
Start: 2023-10-04 | End: 2023-10-04 | Stop reason: SDUPTHER

## 2023-10-04 RX ORDER — LABETALOL HYDROCHLORIDE 5 MG/ML
10 INJECTION, SOLUTION INTRAVENOUS
Status: DISCONTINUED | OUTPATIENT
Start: 2023-10-04 | End: 2023-10-04 | Stop reason: HOSPADM

## 2023-10-04 RX ORDER — SODIUM CHLORIDE, SODIUM LACTATE, POTASSIUM CHLORIDE, CALCIUM CHLORIDE 600; 310; 30; 20 MG/100ML; MG/100ML; MG/100ML; MG/100ML
INJECTION, SOLUTION INTRAVENOUS ONCE
Status: DISCONTINUED | OUTPATIENT
Start: 2023-10-04 | End: 2023-10-04 | Stop reason: HOSPADM

## 2023-10-04 RX ORDER — IPRATROPIUM BROMIDE AND ALBUTEROL SULFATE 2.5; .5 MG/3ML; MG/3ML
1 SOLUTION RESPIRATORY (INHALATION)
Status: DISCONTINUED | OUTPATIENT
Start: 2023-10-04 | End: 2023-10-04 | Stop reason: HOSPADM

## 2023-10-04 RX ORDER — SODIUM CHLORIDE, SODIUM LACTATE, POTASSIUM CHLORIDE, CALCIUM CHLORIDE 600; 310; 30; 20 MG/100ML; MG/100ML; MG/100ML; MG/100ML
INJECTION, SOLUTION INTRAVENOUS CONTINUOUS
Status: DISCONTINUED | OUTPATIENT
Start: 2023-10-04 | End: 2023-10-04 | Stop reason: HOSPADM

## 2023-10-04 RX ORDER — FENTANYL CITRATE 50 UG/ML
INJECTION, SOLUTION INTRAMUSCULAR; INTRAVENOUS PRN
Status: DISCONTINUED | OUTPATIENT
Start: 2023-10-04 | End: 2023-10-04 | Stop reason: SDUPTHER

## 2023-10-04 RX ORDER — MEPERIDINE HYDROCHLORIDE 25 MG/ML
12.5 INJECTION INTRAMUSCULAR; INTRAVENOUS; SUBCUTANEOUS EVERY 5 MIN PRN
Status: DISCONTINUED | OUTPATIENT
Start: 2023-10-04 | End: 2023-10-04 | Stop reason: HOSPADM

## 2023-10-04 RX ORDER — OXYCODONE HYDROCHLORIDE 5 MG/1
10 TABLET ORAL PRN
Status: DISCONTINUED | OUTPATIENT
Start: 2023-10-04 | End: 2023-10-04 | Stop reason: HOSPADM

## 2023-10-04 RX ORDER — DIPHENHYDRAMINE HYDROCHLORIDE 50 MG/ML
12.5 INJECTION INTRAMUSCULAR; INTRAVENOUS
Status: DISCONTINUED | OUTPATIENT
Start: 2023-10-04 | End: 2023-10-04 | Stop reason: HOSPADM

## 2023-10-04 RX ORDER — METOCLOPRAMIDE HYDROCHLORIDE 5 MG/ML
10 INJECTION INTRAMUSCULAR; INTRAVENOUS
Status: DISCONTINUED | OUTPATIENT
Start: 2023-10-04 | End: 2023-10-04 | Stop reason: HOSPADM

## 2023-10-04 RX ORDER — MIDAZOLAM HYDROCHLORIDE 1 MG/ML
INJECTION INTRAMUSCULAR; INTRAVENOUS PRN
Status: DISCONTINUED | OUTPATIENT
Start: 2023-10-04 | End: 2023-10-04 | Stop reason: SDUPTHER

## 2023-10-04 RX ORDER — SODIUM CHLORIDE 0.9 % (FLUSH) 0.9 %
5-40 SYRINGE (ML) INJECTION EVERY 12 HOURS SCHEDULED
Status: DISCONTINUED | OUTPATIENT
Start: 2023-10-04 | End: 2023-10-04 | Stop reason: HOSPADM

## 2023-10-04 RX ORDER — SODIUM CHLORIDE 9 MG/ML
INJECTION, SOLUTION INTRAVENOUS PRN
Status: DISCONTINUED | OUTPATIENT
Start: 2023-10-04 | End: 2023-10-04 | Stop reason: HOSPADM

## 2023-10-04 RX ADMIN — HYDROMORPHONE HYDROCHLORIDE 0.5 MG: 1 INJECTION, SOLUTION INTRAMUSCULAR; INTRAVENOUS; SUBCUTANEOUS at 10:44

## 2023-10-04 RX ADMIN — GLYCOPYRROLATE 0.2 MG: 0.2 INJECTION INTRAMUSCULAR; INTRAVENOUS at 09:39

## 2023-10-04 RX ADMIN — SODIUM CHLORIDE, POTASSIUM CHLORIDE, SODIUM LACTATE AND CALCIUM CHLORIDE: 600; 310; 30; 20 INJECTION, SOLUTION INTRAVENOUS at 07:53

## 2023-10-04 RX ADMIN — FENTANYL CITRATE 25 MCG: 50 INJECTION, SOLUTION INTRAMUSCULAR; INTRAVENOUS at 09:45

## 2023-10-04 RX ADMIN — FENTANYL CITRATE 75 MCG: 50 INJECTION, SOLUTION INTRAMUSCULAR; INTRAVENOUS at 10:10

## 2023-10-04 RX ADMIN — MIDAZOLAM HYDROCHLORIDE 2 MG: 2 INJECTION, SOLUTION INTRAMUSCULAR; INTRAVENOUS at 09:39

## 2023-10-04 RX ADMIN — ONDANSETRON 4 MG: 2 INJECTION INTRAMUSCULAR; INTRAVENOUS at 09:41

## 2023-10-04 RX ADMIN — HYDROMORPHONE HYDROCHLORIDE 0.5 MG: 1 INJECTION, SOLUTION INTRAMUSCULAR; INTRAVENOUS; SUBCUTANEOUS at 10:28

## 2023-10-04 ASSESSMENT — PAIN - FUNCTIONAL ASSESSMENT: PAIN_FUNCTIONAL_ASSESSMENT: 0-10

## 2023-10-04 ASSESSMENT — PAIN SCALES - GENERAL
PAINLEVEL_OUTOF10: 0
PAINLEVEL_OUTOF10: 0

## 2023-10-04 NOTE — PROGRESS NOTES
No drainage noted from ears , IV dc'd site intact no swelling noted , discharge instructions given to pt and pt's mother Edwin Motley , both verbalized understanding , no distress noted

## 2023-10-04 NOTE — ANESTHESIA PRE PROCEDURE
Department of Anesthesiology  Preprocedure Note       Name:  Yenni Nuno   Age:  24 y. o.  :  2000                                          MRN:  [id number]         Date:  10/4/2023      Surgeon: Erwin Ponce):  Pedro Azar MD    Procedure: Procedure(s):  BILATERAL MYRINGOTOMY WITH EAR TUBE PLACEMENT    Medications prior to admission:   Prior to Admission medications    Medication Sig Start Date End Date Taking? Authorizing Provider   lansoprazole (PREVACID SOLUTAB) 30 MG disintegrating tablet Take 1 tablet by mouth daily   Yes Job Anders MD   JUNEL FE 1.5/30 1.5-30 MG-MCG tablet TAKE 1 TABLET BY MOUTH DAILY  CONTINUOUS TABLET ONLY 23   Donna Leach MD   gabapentin (NEURONTIN) 100 MG capsule Take 1 capsule by mouth 3 times daily as needed (pain) for up to 180 days.  Max Daily Amount: 300 mg 9/27/23 3/25/24  Donna Leach MD   SYMBICORT 160-4.5 MCG/ACT AERO Inhale 2 puffs into the lungs 2 times daily 23   Job Anders MD   diclofenac sodium (VOLTAREN) 1 % GEL APPLY A THIN LAYER (2 4 GRAMS) TO THE PAINFUL AREA OF FEET EVERY 6 HOURS    Job Anders MD   ondansetron (ZOFRAN) 8 MG tablet Take 1 tablet by mouth every 8 hours as needed for Nausea or Vomiting 23   Katlyn Acuna PA-C   metFORMIN (GLUCOPHAGE-XR) 500 MG extended release tablet TAKE 2 TABLETS BY MOUTH IN THE  EVENING 23   Patt Gonzales MD   Rimegepant Sulfate (NURTEC) 75 MG TBDP Take by mouth As needed    Job Anders MD   cyclobenzaprine (FLEXERIL) 10 MG tablet Take 1 tablet by mouth 3 times daily as needed for Muscle spasms    Job Anders MD   eletriptan (RELPAX) 40 MG tablet Take 1 tab at onset of severe headache; may repeat another in 2 hours if headaches persist 8/3/23   Chepe Reyes MD   naltrexone (DEPADE) 50 MG tablet TAKE ONE-HALF TABLET BY MOUTH  DAILY 23   Patt Gonzales MD   DULoxetine (CYMBALTA) 60 MG extended release capsule Take 1 capsule by

## 2023-10-04 NOTE — OP NOTE
Operative Note      Patient: Carole Lyn  YOB: 2000  MRN: [id number]    Date of Procedure: 10/4/2023    Pre-Op Diagnosis Codes:     * Eustachian tube dysfunction, bilateral [H69.93]    Post-Op Diagnosis: Same       Procedure(s):  BILATERAL MYRINGOTOMY WITH EAR TUBE PLACEMENT    Surgeon(s):  Matt Modi MD    Assistant:   * No surgical staff found *    Anesthesia: General    Estimated Blood Loss (mL): Minimal    Complications: None    Specimens:   * No specimens in log *    Implants:  Implant Name Type Inv. Item Serial No.  Lot No. LRB No. Used Action   TUBE VENT BVL 1 1.14 MM ARMSTR GRMMT JONO FLROPLAS [id number] - [id number]  TUBE VENT BVL 1 1.14 MM ARMSTR GRMMT JONO FLROPLAS [id number]  [employer] [id number] Right 1 Implanted   TUBE VENT BVL 1 1.14 MM ARMSTR GRMMT JONO FLROPLAS [id number] - [id number]  TUBE VENT BVL 1 1.14 MM ARMSTR GRMMT JONO FLROPLAS [id number]  [employer] [id number] Left 1 Implanted         Drains: * No LDAs found *    Findings:  Clear middle ear space     Detailed Description of Procedure:   Dara Gibson and her mom were met in the preoperative holding area and consent was verified. She was positioned supine on the operating room table and underwent induction of anesthesia via mask inhalation. A preoperative timeout was performed and all those present were in agreement. The operating microscope was brought into the field. Under microscopy, the right ear was examined and noted to have a clear middle ear space. The ear was debrided of cerumen with a wax loop. A myringotomy knife was used to create a myringotomy in the anterior inferior quadrant. An Lake ventilation tube was placed into the myringotomy and positioned via a Watters pick. Floxin drops were then applied. This was repeated on the left. She was then turned back to anesthesia for emergence and transferred to the PACU in stable condition.           Electronically signed by Matt Modi MD on

## 2023-10-05 ENCOUNTER — PATIENT MESSAGE (OUTPATIENT)
Age: 23
End: 2023-10-05

## 2023-10-07 NOTE — ANESTHESIA POSTPROCEDURE EVALUATION
Department of Anesthesiology  Postprocedure Note    Patient: Yung Vargas  MRN: [id number]  YOB: 2000    Procedure Summary     Date: 10/04/23 Room / Location: [facility] MAIN OR 02 / [facility] MAIN OR    Anesthesia Start: 3629 Anesthesia Stop: 4731    Procedure: BILATERAL MYRINGOTOMY WITH EAR TUBE PLACEMENT (Bilateral: Ear) Diagnosis:       Eustachian tube dysfunction, bilateral      (Eustachian tube dysfunction, bilateral [H69.93])    Surgeons: Christel Jansen MD Responsible Provider: Paloma Goidnez MD    Anesthesia Type: General ASA Status: 4          Anesthesia Type: General    Kvng Phase I: Kvng Score: 10    Kvng Phase II: Kvng Score: 10      Anesthesia Post Evaluation    Patient location during evaluation: PACU  Patient participation: complete - patient cannot participate  Level of consciousness: awake  Pain score: 0  Airway patency: patent  Nausea & Vomiting: no nausea and no vomiting  Complications: no  Cardiovascular status: hemodynamically stable  Respiratory status: acceptable  Hydration status: stable  Multimodal analgesia pain management approach

## 2023-10-12 ENCOUNTER — TELEPHONE (OUTPATIENT)
Age: 23
End: 2023-10-12

## 2023-10-12 ENCOUNTER — PATIENT MESSAGE (OUTPATIENT)
Age: 23
End: 2023-10-12

## 2023-10-12 NOTE — TELEPHONE ENCOUNTER
Pt is calling about the lymphedema pump that was order by the doctor. It went to the wrong doctor and they were suppose to be sending them over. Pt is checking to see if the doctor has received the paper work yet for the lymphedema pump.     [phone]

## 2023-10-12 NOTE — TELEPHONE ENCOUNTER
Patient states that since she had surgery, since has been using the ear drops. She has now completed the 4 days of the ear drops. Has an appt on Monday. States that her ear is clogged and all the sounds are muffled. Wants to know if this is normal?  Does she need to be seen prior to Mondays appt. Please advise. ..

## 2023-10-13 ENCOUNTER — TELEPHONE (OUTPATIENT)
Age: 23
End: 2023-10-13

## 2023-10-13 NOTE — TELEPHONE ENCOUNTER
Pt called requesting to have her records faxed to [facility] at [phone]. Records faxed to the requested number.

## 2023-10-13 NOTE — TELEPHONE ENCOUNTER
From: Briana Reynaga  Sent: 10/13/2023 10:54 AM EDT  To: [facility] Clinical Staff  Subject: Naltrexone     [facility] has Naltrexone 50mg. Their phone number is [phone] and their address is [address] [place] [address]. Could you send in the Naltrexone to them?

## 2023-10-13 NOTE — TELEPHONE ENCOUNTER
Patient stated she is going to the OB/GYN so is requesting a referral. Also, she would like to discuss the vaccines that she may need. Also, requesting medical records to be faxed over-[phone]- [facility]. Patient's phone: [phone]      Patient is calling back today 10.16.2023 to discuss these issues with nurse. Her appointment is this coming Friday.

## 2023-10-14 RX ORDER — NALTREXONE HYDROCHLORIDE 50 MG/1
25 TABLET, FILM COATED ORAL DAILY
Qty: 90 TABLET | Refills: 0 | Status: SHIPPED | OUTPATIENT
Start: 2023-10-14

## 2023-10-16 ENCOUNTER — OFFICE VISIT (OUTPATIENT)
Age: 23
End: 2023-10-16

## 2023-10-16 VITALS
BODY MASS INDEX: 50.02 KG/M2 | OXYGEN SATURATION: 98 % | RESPIRATION RATE: 16 BRPM | SYSTOLIC BLOOD PRESSURE: 120 MMHG | DIASTOLIC BLOOD PRESSURE: 66 MMHG | WEIGHT: 293 LBS | HEIGHT: 64 IN | HEART RATE: 105 BPM

## 2023-10-16 DIAGNOSIS — H69.83 ETD (EUSTACHIAN TUBE DYSFUNCTION), BILATERAL: Primary | ICD-10-CM

## 2023-10-16 PROCEDURE — 99024 POSTOP FOLLOW-UP VISIT: CPT | Performed by: OTOLARYNGOLOGY

## 2023-10-17 NOTE — TELEPHONE ENCOUNTER
PT id x 3, notified as per Marisabel Tafoya and verbalized understanding. PT has a VV scheduled for this afternoon as well.

## 2023-10-17 NOTE — TELEPHONE ENCOUNTER
Attempt to reach pt unsuccessful. LVM for patient to [facility] in regards to message.

## 2023-10-24 ENCOUNTER — OFFICE VISIT (OUTPATIENT)
Age: 23
End: 2023-10-24

## 2023-10-24 VITALS
DIASTOLIC BLOOD PRESSURE: 79 MMHG | RESPIRATION RATE: 18 BRPM | HEART RATE: 111 BPM | WEIGHT: 293 LBS | TEMPERATURE: 98.7 F | BODY MASS INDEX: 53.07 KG/M2 | SYSTOLIC BLOOD PRESSURE: 138 MMHG | OXYGEN SATURATION: 98 %

## 2023-10-24 DIAGNOSIS — J01.10 ACUTE NON-RECURRENT FRONTAL SINUSITIS: Primary | ICD-10-CM

## 2023-10-24 RX ORDER — PREDNISONE 20 MG/1
TABLET ORAL
COMMUNITY
Start: 2023-10-22

## 2023-10-24 RX ORDER — SODIUM CHLORIDE 9 MG/ML
5-250 INJECTION, SOLUTION INTRAVENOUS PRN
OUTPATIENT
Start: 2023-10-31

## 2023-10-24 RX ORDER — SODIUM CHLORIDE 0.9 % (FLUSH) 0.9 %
5-40 SYRINGE (ML) INJECTION PRN
OUTPATIENT
Start: 2023-10-31

## 2023-10-24 NOTE — PROGRESS NOTES
bacterial pneumonia or COVID. Do not suspect underlying cardiopulmonary process. I considered, but think unlikely, dangerous causes of this patient's symptoms to include ACS, CHF or COPD exacerbations, pneumonia or pneumothorax. The exam does not reveal orbital/periorbital cellulitis, intracranial abscess or meningitis. Pt does not present with symptoms that are concerning for complicated acute bacterial rhinosinusitis such as high, persistent fevers >102F; periorbital edema, inflammation, or erythema; cranial nerve palsies; abnormal extraocular movements; proptosis; vision changes (double vision or impaired vision); severe headache; altered mental status; or meningeal signs. Patient is nontoxic appearing and not in need of emergent medical intervention.    -Provided educational material and patient instructions  -Discharged patient with instructions to follow up with PCP  -Advised to go immediately to the ED for worsening or persistent symptoms  -Follow up with patient on 10/28 and discuss potential need for abx      I have discussed the results, diagnosis and treatment plan with the patient. The patient also understands that early in the process of an illness, an urgent care workup can be falsely reassuring. Routine discharge counseling and specific return precautions discussed with patient and the patient understands that worsening, changing or persistent symptoms should prompt an immediate return to the urgent care or emergency department. Patient/Guardian expressed understanding and agrees with the discharge plan. No further questions at time of discharge.     Janel Noriega, APRN - CNP

## 2023-10-25 ASSESSMENT — ENCOUNTER SYMPTOMS
SORE THROAT: 0
SINUS PRESSURE: 1
SHORTNESS OF BREATH: 0
COUGH: 0
SINUS PAIN: 1

## 2023-10-25 NOTE — PATIENT INSTRUCTIONS
Please follow up with your primary care provider if your symptoms last more than 10 days or worsen. Please go immediately to the Emergency Department if you develop:  -Fever higher than 102F (38.9C)  -Sudden and severe pain in the face and head  -Trouble seeing or seeing double  -Trouble thinking clearly  -Swelling or redness around one or both eyes  -A stiff neck    Sinusitis Symptomatic Relief  Nasal Congestion:  Flonase (over the counter) nasal spray, once a day  Saline irrigation kits help wash out sinuses 1-2 times a day  Normal saline nasal spray    Congestion: For thick mucus, take Mucinex (with Guafenesin only) to help thin the mucus. Follow instructions on the box. You will need to drink plenty of water with this medication. Sore Throat:  Lozenges, as needed. Cepacol lozenges will help numb the throat  Chloraseptic spray also helps to numb throat pain  Salt water gargles to soothe throat pain    Sinus pain/pressure:  Warm, wet towel on face to help with facial sinus pain/pressure    Headache/Pain Fever/Body Aches:   If you can take NSAIDs, take Ibuprofen 400-800mg every 8 hours as needed  If you cannot take NSAIDs, take Tylenol 325-500mg every 6 hours as needed    Miscellanous:  Zyrtec/Xyzal/Allegra/Claritin during the day or Benadryl at night may help with allergies  Simple foods like chicken noodle soup, smoothies, hot tea with lemon and honey may also help

## 2023-10-27 ENCOUNTER — TELEPHONE (OUTPATIENT)
Age: 23
End: 2023-10-27

## 2023-10-27 NOTE — TELEPHONE ENCOUNTER
Left voicemail for patient to return call for any questions/concerns in regards to visit/medications.   Leno Banegas

## 2023-10-27 NOTE — TELEPHONE ENCOUNTER
----- Message from Shekhar Valderrama sent at 10/24/2023  7:56 PM EDT -----  Regarding: Patient Follow Up Reminder  Please make a follow up call to patient to ensure they have picked up and started taking any prescribed medications. If condition does not improve, please advise patient to follow up with PCP.

## 2023-10-31 ENCOUNTER — HOSPITAL ENCOUNTER (OUTPATIENT)
Facility: HOSPITAL | Age: 23
Setting detail: INFUSION SERIES
End: 2023-10-31
Payer: MEDICAID

## 2023-11-02 ENCOUNTER — OFFICE VISIT (OUTPATIENT)
Age: 23
End: 2023-11-02
Payer: MEDICAID

## 2023-11-02 VITALS
RESPIRATION RATE: 16 BRPM | DIASTOLIC BLOOD PRESSURE: 78 MMHG | SYSTOLIC BLOOD PRESSURE: 116 MMHG | HEART RATE: 104 BPM | BODY MASS INDEX: 50.02 KG/M2 | WEIGHT: 293 LBS | HEIGHT: 64 IN | OXYGEN SATURATION: 99 %

## 2023-11-02 VITALS
RESPIRATION RATE: 18 BRPM | DIASTOLIC BLOOD PRESSURE: 76 MMHG | OXYGEN SATURATION: 96 % | TEMPERATURE: 96.9 F | SYSTOLIC BLOOD PRESSURE: 120 MMHG | HEART RATE: 94 BPM | BODY MASS INDEX: 50.02 KG/M2 | HEIGHT: 64 IN | WEIGHT: 293 LBS

## 2023-11-02 DIAGNOSIS — R00.0 TACHYCARDIA: ICD-10-CM

## 2023-11-02 DIAGNOSIS — E66.01 MORBID OBESITY WITH BMI OF 50.0-59.9, ADULT (HCC): ICD-10-CM

## 2023-11-02 DIAGNOSIS — R25.1 TREMOR: ICD-10-CM

## 2023-11-02 DIAGNOSIS — R42 POSTURAL DIZZINESS: ICD-10-CM

## 2023-11-02 DIAGNOSIS — G43.709 CHRONIC MIGRAINE W/O AURA, NOT INTRACTABLE, W/O STAT MIGR: Primary | ICD-10-CM

## 2023-11-02 PROCEDURE — 99214 OFFICE O/P EST MOD 30 MIN: CPT | Performed by: INTERNAL MEDICINE

## 2023-11-02 PROCEDURE — 3074F SYST BP LT 130 MM HG: CPT | Performed by: INTERNAL MEDICINE

## 2023-11-02 PROCEDURE — 3078F DIAST BP <80 MM HG: CPT | Performed by: INTERNAL MEDICINE

## 2023-11-02 PROCEDURE — 3074F SYST BP LT 130 MM HG: CPT | Performed by: PSYCHIATRY & NEUROLOGY

## 2023-11-02 PROCEDURE — 99214 OFFICE O/P EST MOD 30 MIN: CPT | Performed by: PSYCHIATRY & NEUROLOGY

## 2023-11-02 PROCEDURE — 3078F DIAST BP <80 MM HG: CPT | Performed by: PSYCHIATRY & NEUROLOGY

## 2023-11-02 NOTE — PROGRESS NOTES
NEUROLOGY CLINIC NOTE    Patient ID:  Charisse Lynch  [id number]  62 y.o.  2000    Date of Visit:  November 2, 2023    Reason for Visit:  migraines      Chief Complaint   Patient presents with    Migraine     3 month follow up- patient reports she has had about 2 migraines in the last 30 days.          History of Present Illness:     Patient Active Problem List    Diagnosis Date Noted    Other specified disorders of eustachian tube, bilateral 10/04/2023    Lymphedema 07/25/2023    Insulin resistance 07/21/2023    History of iron deficiency 06/08/2023    Disorder of white blood cells, unspecified 06/08/2023    Chronic right-sided low back pain with right-sided sciatica 03/29/2023    Atopic dermatitis 03/29/2023    Iron deficiency 03/29/2023    Cardiomyopathy, unspecified type ([address]) 01/19/2023    Severe persistent asthma with acute exacerbation 12/21/2022    Type 2 diabetes mellitus ([address]) 12/13/2022    Gene mutation 11/17/2022    Neuropathic pain 11/17/2022    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    Disease of hematopoietic system 09/16/2022    Other eosinophilia 06/07/2022    Elevated sed rate 05/20/2022    Neutrophilia 05/20/2022    Monocytosis 05/20/2022    Other thrombocytosis 04/28/2022    Elevated C-reactive protein (CRP) 04/28/2022    Skin lesions 04/28/2022    SLE (systemic lupus erythematosus) ([facility])     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

## 2023-11-02 NOTE — PROGRESS NOTES
Chief Complaint   Patient presents with    PCOS    Follow-up    Medication Refill     History of Present Illness: Faisal Vaughan is a 20 y.o. female with a past medical hx significant for ADHD [address] related to [address]. 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 Internal BMBx performed 8/25/2022 showing normocellular marrow with active trilineage hematopoiesis. 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 Normal female karyotype. FISH negative. Will monitor closely for development of AML. Of note, FLT3 mutations also increase risk of various other tumor types, thus, will refer to medical genetics for further discussion. Vashti Phan and her Mother are here today to discuss medication options for weight loss. Previously lost 100lbs following cholecystectomy. Currently working as a [employer], snacking throughout the day and then has a mendy with the family in the evening. Endorses cravings for carbohydrates. Currently taking low dose naltrexone (4.5mg) due to pain (rx by Rhe[facility]). Taking vyvansefor ADHD, recently switched from [email or web address]. Also taking cymbalta. 01/26/2023: Not consistently taking 2000 mg a day of metformin, titrated off of prednisone-was up to 20 mg once a day, down to 10 mg. Has essentially been on this continuously for about 8 months. Is down 5 pounds. Not experiencing bad anxiety on current regimen. 06/07/2023: LFTs elevated when taking wellbutrin + naltrexone. Drinks sprite when feels that BG are low, otherwise sweet tea. Noted worsening in tremor when started wellbutrin, not sure if she would like to resume that. Weight is down about 4 lbs. Has continued to require both PO and IV steroids. Can only tolerate 1000mg max daily of metformin. Pain has been better with naltrexone. 11/2/2023: Snacking on goldfish, raisins, pretzels.   Now working 3 days a week

## 2023-11-03 ENCOUNTER — HOSPITAL ENCOUNTER (OUTPATIENT)
Facility: HOSPITAL | Age: 23
Setting detail: INFUSION SERIES
End: 2023-11-03
Payer: MEDICAID

## 2023-11-03 ENCOUNTER — HOSPITAL ENCOUNTER (OUTPATIENT)
Facility: HOSPITAL | Age: 23
Setting detail: INFUSION SERIES
Discharge: HOME | End: 2023-11-03
Payer: MEDICAID

## 2023-11-03 VITALS
HEART RATE: 91 BPM | BODY MASS INDEX: 50.02 KG/M2 | WEIGHT: 293 LBS | DIASTOLIC BLOOD PRESSURE: 86 MMHG | SYSTOLIC BLOOD PRESSURE: 140 MMHG | HEIGHT: 64 IN | RESPIRATION RATE: 18 BRPM | TEMPERATURE: 98 F | OXYGEN SATURATION: 100 %

## 2023-11-03 DIAGNOSIS — G43.919 INTRACTABLE MIGRAINE WITHOUT STATUS MIGRAINOSUS, UNSPECIFIED MIGRAINE TYPE: Primary | ICD-10-CM

## 2023-11-03 PROCEDURE — 96365 THER/PROPH/DIAG IV INF INIT: CPT

## 2023-11-03 PROCEDURE — 6360000002 HC RX W HCPCS: Performed by: PSYCHIATRY & NEUROLOGY

## 2023-11-03 PROCEDURE — 2580000003 HC RX 258: Performed by: PSYCHIATRY & NEUROLOGY

## 2023-11-03 RX ORDER — SODIUM CHLORIDE 0.9 % (FLUSH) 0.9 %
5-40 SYRINGE (ML) INJECTION PRN
OUTPATIENT
Start: 2024-01-26

## 2023-11-03 RX ORDER — SODIUM CHLORIDE 9 MG/ML
5-250 INJECTION, SOLUTION INTRAVENOUS PRN
OUTPATIENT
Start: 2024-01-26

## 2023-11-03 RX ORDER — SODIUM CHLORIDE 0.9 % (FLUSH) 0.9 %
5-40 SYRINGE (ML) INJECTION PRN
Status: DISCONTINUED | OUTPATIENT
Start: 2023-11-03 | End: 2023-11-04 | Stop reason: HOSPADM

## 2023-11-03 RX ADMIN — EPTINEZUMAB-JJMR 300 MG: 100 INJECTION INTRAVENOUS at 13:23

## 2023-11-03 ASSESSMENT — PAIN SCALES - GENERAL: PAINLEVEL_OUTOF10: 0

## 2023-11-06 ENCOUNTER — TELEPHONE (OUTPATIENT)
Age: 23
End: 2023-11-06

## 2023-11-07 RX ORDER — ONDANSETRON 2 MG/ML
4 INJECTION INTRAMUSCULAR; INTRAVENOUS ONCE
Start: 2024-01-26 | End: 2024-01-26

## 2023-11-07 RX ORDER — ONDANSETRON 2 MG/ML
4 INJECTION INTRAMUSCULAR; INTRAVENOUS EVERY 6 HOURS PRN
Start: 2024-01-26

## 2023-11-10 ENCOUNTER — TELEPHONE (OUTPATIENT)
Age: 23
End: 2023-11-10

## 2023-11-10 NOTE — TELEPHONE ENCOUNTER
[facility] insurance is calling because they said that [employer] company needs more information for the lymphedema pump in order for them to get this for the patient.T    They need still need to confirm that the patient needs 4 weeks of conservative medical treatment They need measurements 30 days apart.Demonstrating persistent lymphedema hey need key terms of severed.    Please reach out to patient to let them know the status.    [phone] [employer]

## 2023-11-14 ENCOUNTER — OFFICE VISIT (OUTPATIENT)
Age: 23
End: 2023-11-14
Payer: MEDICAID

## 2023-11-14 VITALS
RESPIRATION RATE: 18 BRPM | TEMPERATURE: 98.7 F | DIASTOLIC BLOOD PRESSURE: 86 MMHG | SYSTOLIC BLOOD PRESSURE: 138 MMHG | OXYGEN SATURATION: 98 % | HEIGHT: 64 IN | BODY MASS INDEX: 50.02 KG/M2 | WEIGHT: 293 LBS | HEART RATE: 116 BPM

## 2023-11-14 DIAGNOSIS — M35.9 UNDIFFERENTIATED CONNECTIVE TISSUE DISEASE (HCC): Primary | ICD-10-CM

## 2023-11-14 DIAGNOSIS — I89.0 LYMPHEDEMA: ICD-10-CM

## 2023-11-14 PROCEDURE — 99213 OFFICE O/P EST LOW 20 MIN: CPT | Performed by: NURSE PRACTITIONER

## 2023-11-14 PROCEDURE — 3079F DIAST BP 80-89 MM HG: CPT | Performed by: NURSE PRACTITIONER

## 2023-11-14 PROCEDURE — 3075F SYST BP GE 130 - 139MM HG: CPT | Performed by: NURSE PRACTITIONER

## 2023-11-14 RX ORDER — GLUCOSAMINE/D3/BOSWELLIA SERRA 1500MG-400
TABLET ORAL
COMMUNITY

## 2023-11-14 RX ORDER — LANOLIN ALCOHOL/MO/W.PET/CERES
1000 CREAM (GRAM) TOPICAL DAILY
COMMUNITY

## 2023-11-14 RX ORDER — UBROGEPANT 100 MG/1
TABLET ORAL
COMMUNITY

## 2023-11-14 RX ORDER — MULTIVITAMIN WITH IRON
TABLET ORAL
COMMUNITY

## 2023-11-14 NOTE — PROGRESS NOTES
Chief Complaint   Patient presents with    Follow-up     Patient presents in office today for 3 month f/u. No concerns. 1. \"Have you been to the ER, urgent care clinic since your last visit? Hospitalized since your last visit? \" No    2. \"Have you seen or consulted any other health care providers outside of the [address] [place] [address] since your last visit? \" No     3. For patients aged 43-73: Has the patient had a colonoscopy / FIT/ Cologuard? NA - based on age      If the patient is female:    4. For patients aged 43-66: Has the patient had a mammogram within the past 2 years? NA - based on age or sex      11. For patients aged 21-65: Has the patient had a pap smear?  No

## 2023-11-15 ENCOUNTER — TELEPHONE (OUTPATIENT)
Age: 23
End: 2023-11-15

## 2023-11-15 NOTE — TELEPHONE ENCOUNTER
Pt's insurance company [employer] is calling to find out if the more information was sent for the patient to get a lymphedema pump faxed over to [facility].Insurance last called into our office to check the status was on 11/10/23    Radha [phone] ext [phone] Voicemail box is confidential a message can be left.

## 2023-11-16 NOTE — TELEPHONE ENCOUNTER
Attempted to contact Radha at [facility]- left message to call back with more information.

## 2023-11-17 ENCOUNTER — TELEPHONE (OUTPATIENT)
Age: 23
End: 2023-11-17

## 2023-11-17 NOTE — TELEPHONE ENCOUNTER
[facility] is returning call back to the nurse.Radha HORNE the nurse navigator said that the nurse stated that she couldn't do measurements just give the company call directly [facility] [phone].    [phone] ext [phone] Radha HORNE

## 2023-11-17 NOTE — TELEPHONE ENCOUNTER
Spoke to Michelle (patient's mother) States that [employer] sent someone out to the house to get the measurements for the lymphapress. Advised that I will contact [employer] and have them sent someone out.

## 2023-11-21 NOTE — TELEPHONE ENCOUNTER
Radha with [facility] is calling back to the nurse. She would like to know if all paperwork was sent back to the company for the pt.     Radha DELANEY With [facility]   ph #[phone] ext [phone]

## 2023-11-21 NOTE — TELEPHONE ENCOUNTER
Left message with Radha at [facility]- awaiting on call from [facility] to have them send someone out to patients home to get the measurements taken.

## 2023-11-22 NOTE — TELEPHONE ENCOUNTER
Spoke to Radha at [facility] waiting for a call from [facility] regarding having someone go out to the patient home for measurements. Radha will follow up regarding the information. Mother (Michelle) advised.

## 2023-11-29 ENCOUNTER — TELEPHONE (OUTPATIENT)
Age: 23
End: 2023-11-29

## 2023-12-01 ENCOUNTER — OFFICE VISIT (OUTPATIENT)
Age: 23
End: 2023-12-01
Payer: MEDICAID

## 2023-12-01 VITALS
HEIGHT: 64 IN | SYSTOLIC BLOOD PRESSURE: 136 MMHG | DIASTOLIC BLOOD PRESSURE: 84 MMHG | OXYGEN SATURATION: 96 % | BODY MASS INDEX: 50.02 KG/M2 | HEART RATE: 126 BPM | WEIGHT: 293 LBS

## 2023-12-01 DIAGNOSIS — H69.83 ETD (EUSTACHIAN TUBE DYSFUNCTION), BILATERAL: Primary | ICD-10-CM

## 2023-12-01 PROCEDURE — 3075F SYST BP GE 130 - 139MM HG: CPT | Performed by: NURSE PRACTITIONER

## 2023-12-01 PROCEDURE — 3079F DIAST BP 80-89 MM HG: CPT | Performed by: NURSE PRACTITIONER

## 2023-12-01 PROCEDURE — 99202 OFFICE O/P NEW SF 15 MIN: CPT | Performed by: NURSE PRACTITIONER

## 2023-12-01 RX ORDER — CIPROFLOXACIN AND DEXAMETHASONE 3; 1 MG/ML; MG/ML
4 SUSPENSION/ DROPS AURICULAR (OTIC) 2 TIMES DAILY
Qty: 7.5 ML | Refills: 0 | Status: SHIPPED | OUTPATIENT
Start: 2023-12-01 | End: 2023-12-08

## 2023-12-04 ENCOUNTER — PROCEDURE VISIT (OUTPATIENT)
Age: 23
End: 2023-12-04
Payer: MEDICAID

## 2023-12-04 DIAGNOSIS — R00.0 TACHYCARDIA: Primary | ICD-10-CM

## 2023-12-04 DIAGNOSIS — R42 DIZZINESS: ICD-10-CM

## 2023-12-04 PROCEDURE — 95924 ANS PARASYMP & SYMP W/TILT: CPT | Performed by: PSYCHIATRY & NEUROLOGY

## 2023-12-04 PROCEDURE — 95923 AUTONOMIC NRV SYST FUNJ TEST: CPT | Performed by: PSYCHIATRY & NEUROLOGY

## 2023-12-04 NOTE — PROGRESS NOTES
[employer] [facility]  [address], [address] [facility]  Marianne Oneal    [id number]  24 y.o.  2000     REFERRED BY: Juan Miguel Coates MD  PCP: Margarette Jaramillo MD    Date of Testin2023       Indication/History:    Episodes of feeling funny and weird, dizziness and tachycardia (+) morbid obesity (+) diabetes    Patient is coming for syncope/autonomic dysfunction evaluation. Medications taken 48 hrs before the test: None     Procedure: This Autonomic Nervous System (ANS) testing is performed by utilizing [facility] [place] [employer] [facility]onomic System, with established protocol. Multiple procedures performed: Heart rate response to deep breathing (HRDB), Valsalva ratio, Heart rate and BP response to head up tilt (HUT), and Quantitative sudomotor axon reflex testing (QSWEAT) . Result:  Heart response to deep  breathing (HRDB): 2 series of 6 cycles were performed and the mean of 6 consecutive cycles was obtained. Average HR difference was 26.24 and E:I ratio was 1.31. Normal response. Heart rate response to Valsalva maneuver:  [id number] BP to Valsalva was measured and BP response in all 4 phases was normal. Heart response was the opposite of BP, a normal response. ( VR = 1.84 )  HUT (head-up tilt) : Zwcc-hl-hofo BP and HR were measured, up to 15 minutes post tilt. There is an exaggerated sustained increase in heart rate from baseline HR of 93 bpm to max HR of 134 bpm at 10 mins post tilt associated with lightheadedness without accompanying hypotension. SUDOMOTOR: QSWEAT response showed relatively preserved sweat production in all 4 localities (forearm, proximal leg, distal leg, foot) of the right upper and lower limbs, comparing patient to age group.      Impression:   ABNORMAL    There is an exaggerated sustained increase in heart rate (greater than 30 beats/min and exceeding 120 beats/min) without accompanying hypotension within the first 10 mins of tilt table testing, which can

## 2023-12-05 ENCOUNTER — OFFICE VISIT (OUTPATIENT)
Age: 23
End: 2023-12-05
Payer: MEDICAID

## 2023-12-05 VITALS
HEIGHT: 64 IN | BODY MASS INDEX: 50.02 KG/M2 | WEIGHT: 293 LBS | SYSTOLIC BLOOD PRESSURE: 122 MMHG | HEART RATE: 103 BPM | OXYGEN SATURATION: 97 % | DIASTOLIC BLOOD PRESSURE: 78 MMHG

## 2023-12-05 DIAGNOSIS — R00.0 TACHYCARDIA, UNSPECIFIED: ICD-10-CM

## 2023-12-05 DIAGNOSIS — I10 ESSENTIAL (PRIMARY) HYPERTENSION: ICD-10-CM

## 2023-12-05 DIAGNOSIS — G90.A POTS (POSTURAL ORTHOSTATIC TACHYCARDIA SYNDROME): Primary | ICD-10-CM

## 2023-12-05 PROCEDURE — 99214 OFFICE O/P EST MOD 30 MIN: CPT | Performed by: SPECIALIST

## 2023-12-05 PROCEDURE — 93010 ELECTROCARDIOGRAM REPORT: CPT | Performed by: SPECIALIST

## 2023-12-05 PROCEDURE — 3078F DIAST BP <80 MM HG: CPT | Performed by: SPECIALIST

## 2023-12-05 PROCEDURE — 3074F SYST BP LT 130 MM HG: CPT | Performed by: SPECIALIST

## 2023-12-05 PROCEDURE — 93005 ELECTROCARDIOGRAM TRACING: CPT | Performed by: SPECIALIST

## 2023-12-05 NOTE — PROGRESS NOTES
Chief Complaint   Patient presents with    Follow-up     3 month     lymphedema    Hypertension    Cardiomyopathy     Vitals:    12/05/23 1127   Weight: (!) 138.9 kg (306 lb 3.2 oz)   Height: 1.626 m (5' 4\")     Chest pain denied   SOB denied   Palpitations denied   Swelling in bilateral legs - [employer] coming out on 12/6/23 to do measurements  Dizziness denied   Recent hospital stays denied   Refills denied
bystolic (felt funny in head)    - She felt cardizem makes her shaky    - She also had stopped bystolic and ivabradine due to concerns of shaking (which it seems was from Wellbutrin)    - Holter 2/10/23 - 1 day - NSR, Avg  bpm, (), Tachy 52% of the time. - Possibly inappropriate sinus tachycardia - - she has been intolerant of some BB / CCB   - Possible POTS - Tilt Table Test 12/4/23 - There is an exaggerated sustained increase in heart rate (greater than 30 beats/min and exceeding 120 beats/min) without accompanying hypotension within the first 10 mins of tilt table testing, which can be seen in postural orthostatic tachycardia syndrome (POTS). Baseline high blood pressure ( to 160)  - Will refer to PT for exercise training for possible POTS. Use spandax if possible (has not tolerated hose before)       2) LVEF depressed on prior echo 5/22 at [facility]   - Echo 5/26/22 ([facility]) - LVEF 45%   - Echo 1/24/23 - TDS. LVEF 56%, normal study        3) Morbid Obesity    - needs weight loss   - discussed diet / exercise   - she was seen in Weight loss clinic in past ---> will have her see Dr. Fernando Gómez        4) SLE / connective tissue disorder        5) Chronic LE edema /  lymphedema    - her edema is non-pitting and likely related to her obesity   - She used support hose in 2022 without success  - saw PT as well   - On 7/25/23 - She has stage 2 Lymphedema. Will have her try leg elevation, regular exercise, and class 2 compression stockings. Will reassess for Lymphapress in 1 month   - On 9/5/23 - Despite > 30 days of conservative treatment, she still has class 2 Lymphedema. Lymphapress approved --> is to give it a   - On 12/5/23 --> she is in process of getting Lymphapress      6) See me in 3 months        Is a . Likes dogs. Adopted a foster dog 7/23.        Freddie Benitez MD, [facility] [facility]  [address]

## 2023-12-08 ENCOUNTER — APPOINTMENT (OUTPATIENT)
Facility: HOSPITAL | Age: 23
End: 2023-12-08
Payer: MEDICAID

## 2023-12-08 ENCOUNTER — HOSPITAL ENCOUNTER (EMERGENCY)
Facility: HOSPITAL | Age: 23
Discharge: HOME | End: 2023-12-08
Attending: STUDENT IN AN ORGANIZED HEALTH CARE EDUCATION/TRAINING PROGRAM
Payer: MEDICAID

## 2023-12-08 VITALS
TEMPERATURE: 98.5 F | WEIGHT: 293 LBS | RESPIRATION RATE: 20 BRPM | SYSTOLIC BLOOD PRESSURE: 156 MMHG | OXYGEN SATURATION: 100 % | BODY MASS INDEX: 50.02 KG/M2 | HEIGHT: 64 IN | HEART RATE: 122 BPM | DIASTOLIC BLOOD PRESSURE: 112 MMHG

## 2023-12-08 DIAGNOSIS — M79.672 LEFT FOOT PAIN: Primary | ICD-10-CM

## 2023-12-08 PROCEDURE — 99283 EMERGENCY DEPT VISIT LOW MDM: CPT

## 2023-12-08 PROCEDURE — 73630 X-RAY EXAM OF FOOT: CPT

## 2023-12-08 PROCEDURE — 6370000000 HC RX 637 (ALT 250 FOR IP)

## 2023-12-08 RX ORDER — ACETAMINOPHEN 325 MG/1
650 TABLET ORAL
Status: COMPLETED | OUTPATIENT
Start: 2023-12-08 | End: 2023-12-08

## 2023-12-08 RX ADMIN — ACETAMINOPHEN 650 MG: 325 TABLET ORAL at 12:00

## 2023-12-08 ASSESSMENT — PAIN DESCRIPTION - DESCRIPTORS: DESCRIPTORS: ACHING

## 2023-12-08 ASSESSMENT — PAIN DESCRIPTION - ORIENTATION: ORIENTATION: LEFT

## 2023-12-08 ASSESSMENT — PAIN - FUNCTIONAL ASSESSMENT: PAIN_FUNCTIONAL_ASSESSMENT: 0-10

## 2023-12-08 ASSESSMENT — PAIN DESCRIPTION - LOCATION: LOCATION: FOOT

## 2023-12-08 ASSESSMENT — PAIN SCALES - GENERAL: PAINLEVEL_OUTOF10: 8

## 2023-12-08 NOTE — ED TRIAGE NOTES
Pt states she woke up Wednesday morning with LEFT foot pain. States its progressively getting worse over the last couple of days.  States pain and swelling are on the bottom of her foot   Denies any trauma  or injury to site    States hx of lupus

## 2023-12-08 NOTE — ED NOTES
Patient does not appear to be in any acute distress/shows no evidence of clinical instability at this time. Provider has reviewed discharge instructions with the patient/family. The patient/family verbalized understanding instructions as well as need for follow up for any further symptoms. Discharge papers given, education provided, and any questions answered.         Ted Harmon RN  12/08/23 [id number]

## 2023-12-20 ENCOUNTER — TELEPHONE (OUTPATIENT)
Age: 23
End: 2023-12-20

## 2023-12-20 NOTE — TELEPHONE ENCOUNTER
Patient went to [facility] ER yesterday & was told she has a mass on liver. She is upset & wants to discuss with Lorelei VASQUEZ. Please call her back asap

## 2023-12-27 ENCOUNTER — TELEMEDICINE (OUTPATIENT)
Age: 23
End: 2023-12-27
Payer: MEDICAID

## 2023-12-27 DIAGNOSIS — M72.2 PLANTAR FASCIITIS: Primary | ICD-10-CM

## 2023-12-27 DIAGNOSIS — G90.A POTS (POSTURAL ORTHOSTATIC TACHYCARDIA SYNDROME): ICD-10-CM

## 2023-12-27 PROCEDURE — 99213 OFFICE O/P EST LOW 20 MIN: CPT | Performed by: NURSE PRACTITIONER

## 2023-12-27 NOTE — PROGRESS NOTES
Isabelle Sport is a 24 y.o. female , id x 2(name and ). Reviewed questionnaires, and  medications. Chief Complaint   Patient presents with    plantar fascitis       1. Have you been to the ER, urgent care clinic since your last visit? Hospitalized since your last visit? No    2. Have you seen or consulted any other health care providers outside of the [address] [place] [address] Avenue since your last visit? Include any pap smears or colon screening.  No

## 2023-12-27 NOTE — PROGRESS NOTES
Reese Gamez, was evaluated through a synchronous (real-time) audio-video encounter. The patient (or guardian if applicable) is aware that this is a billable service, which includes applicable co-pays. This Virtual Visit was conducted with patient's (and/or legal guardian's) consent. Patient identification was verified, and a caregiver was present when appropriate. The patient was located at Home: [address] [address]  Provider was located at Home ([address]): [address] [place] (:  2000) is a Established patient, presenting virtually for evaluation of the following:    Assessment & Plan   Below is the assessment and plan developed based on review of pertinent history, physical exam, labs, studies, and medications. 1. Plantar fasciitis  2. POTS (postural orthostatic tachycardia syndrome)    Reviewed management of both  Referral to ortho if needed if foot pain continues  Follow up prn    No follow-ups on file.        Subjective   HPI  Recent dx of plantar fasciitis  Not sure of trigger  Woke up with heel pain 2 weeks ago  Went to urgent care  Neuro also dx with POTS  Wants guidance on diet/management    Review of Systems   A comprehensive review of system was obtained and negative except findings in the HPI      Objective   Patient-Reported Vitals  No data recorded     Physical Exam  [INSTRUCTIONS:  \"[x]\" Indicates a positive item  \"[]\" Indicates a negative item  -- DELETE ALL ITEMS NOT EXAMINED]    Constitutional: [x] Appears well-developed and well-nourished [x] No apparent distress      [] Abnormal -     Mental status: [x] Alert and awake  [x] Oriented to person/place/time [x] Able to follow commands    [] Abnormal -     Eyes:   EOM    [x]  Normal    [] Abnormal -   Sclera  [x]  Normal    [] Abnormal -          Discharge [x]  None visible   [] Abnormal -     HENT: [x] Normocephalic, atraumatic  [] Abnormal -   [x] Mouth/Throat: Mucous membranes are negative

## 2024-01-02 ENCOUNTER — TELEPHONE (OUTPATIENT)
Age: 24
End: 2024-01-02

## 2024-01-02 ENCOUNTER — CLINICAL DOCUMENTATION (OUTPATIENT)
Age: 24
End: 2024-01-02

## 2024-01-02 NOTE — TELEPHONE ENCOUNTER
Patient called and stated that she contact Dr. Ross's office and they offered an appt in May. Patient stated that Dr. Atkins wanted her seen as soon as possible. Patient is requesting to have Dr. Atkins or nurse call Dr. Ross and see if she can be seen soon

## 2024-01-02 NOTE — TELEPHONE ENCOUNTER
PCP's office called requesting last office note to be faxed. Note has not been signed off, please close note. Fax# [phone]      1/10/24@2116 This patient appt is 1/19/24. (KF)

## 2024-01-02 NOTE — TELEPHONE ENCOUNTER
I called Dr Ross office spoke with Saige. I asked if possible can the patient be scheduled sooner.  Appointment is scheduled for 1/19/24 at 2 PM NPO 4 hrs prior.     Returned call to Ms Lazcano verified patient with 2 patient identifiers. Ms Lazcano made aware of appointment date/time/ instructions.

## 2024-01-05 ENCOUNTER — TELEPHONE (OUTPATIENT)
Age: 24
End: 2024-01-05

## 2024-01-05 NOTE — TELEPHONE ENCOUNTER
Jose Guadalupe with [facility] called because he stated that he has an Order for pt to receive a Compression Pump for pt and would like to speak to Hailee REYES to discuss it further. Jose Guadalupe with [facility] would like a callback to discuss further.     [facility] - Jose Guadalupe ph# [phone]    Fax# [phone]

## 2024-01-10 NOTE — TELEPHONE ENCOUNTER
R/t call to Jose Guadalupe,    Need to addend previous note to state: \"Patient has had >4 weeks of conservative treatment w/ failure.  Treatment has included compression, exercise, elevation.\"    Also needs to have a \"Key term of severity such as hyperpigmentation, skin breakdown, etc\" in order for insurance to approve.    He has taken measurements 30 days apart and has shown persistence of lymphedema.  Need signature from MD; will fax to [facility] today.

## 2024-01-15 ENCOUNTER — APPOINTMENT (OUTPATIENT)
Facility: HOSPITAL | Age: 24
End: 2024-01-15
Payer: MEDICAID

## 2024-01-17 ENCOUNTER — TELEPHONE (OUTPATIENT)
Age: 24
End: 2024-01-17

## 2024-01-17 NOTE — TELEPHONE ENCOUNTER
----- Message from Conchis Lazcano sent at 1/17/2024 10:52 AM EST -----  Regarding: Apt question   Contact: [phone]  That is why I was asking if we could schedule blood work for another day so I could do it in the morning?       1/17/24@1121 Spoke w/patient. No fasting needed for appt on Friday.  will order labs day of your appt since you will be a new patient. Patient verbalize understanding.(KF)

## 2024-01-17 NOTE — TELEPHONE ENCOUNTER
----- Message from Conchsi Lazcano sent at 1/16/2024  6:19 PM EST -----  Regarding: Apt question   Contact: [phone]  Hi! I have a new patient apt with you on Friday at 2. I was told not to drink or eat anything before the apt. I want to let you know that if that is for blood work that I am already a hard stick and that if I if I am dehydrated it makes it worse to get labs on me. Also I am not sure if with my insulin resistance I can go that long without eating and not having a weird shaking episode. Is there a way I could have the labs done at a later date or before my appointment in the morning? Please let me know.

## 2024-01-18 NOTE — PROGRESS NOTES
Okay to proceed with Vyepti infusion      ----- Message -----   From: Ashley Jade, [facility]   Sent: 1/18/2024  10:38 AM EST   To: MD Dr. Karis Hwang Jr.,     Patient has been to ED 12/8 and 12/19 since last Vypeti infusion on 11/3. Ok to proceed with treatment after these ED visits? She has an appt next week in [facility]. Thank you.

## 2024-01-19 ENCOUNTER — OFFICE VISIT (OUTPATIENT)
Age: 24
End: 2024-01-19
Payer: MEDICAID

## 2024-01-19 ENCOUNTER — APPOINTMENT (OUTPATIENT)
Facility: HOSPITAL | Age: 24
End: 2024-01-19
Payer: MEDICAID

## 2024-01-19 VITALS
BODY MASS INDEX: 50.02 KG/M2 | DIASTOLIC BLOOD PRESSURE: 84 MMHG | SYSTOLIC BLOOD PRESSURE: 154 MMHG | OXYGEN SATURATION: 97 % | HEIGHT: 64 IN | WEIGHT: 293 LBS | HEART RATE: 105 BPM | RESPIRATION RATE: 16 BRPM | TEMPERATURE: 98.1 F

## 2024-01-19 DIAGNOSIS — R97.8 OTHER ABNORMAL TUMOR MARKERS: ICD-10-CM

## 2024-01-19 DIAGNOSIS — Z11.59 ENCOUNTER FOR SCREENING FOR OTHER VIRAL DISEASES: ICD-10-CM

## 2024-01-19 DIAGNOSIS — D13.4 HEPATIC ADENOMA: Primary | ICD-10-CM

## 2024-01-19 DIAGNOSIS — Z72.89 OTHER PROBLEMS RELATED TO LIFESTYLE: ICD-10-CM

## 2024-01-19 DIAGNOSIS — C24.0 MALIGNANT NEOPLASM OF EXTRAHEPATIC BILE DUCT (HCC): ICD-10-CM

## 2024-01-19 PROCEDURE — 99204 OFFICE O/P NEW MOD 45 MIN: CPT | Performed by: INTERNAL MEDICINE

## 2024-01-19 RX ORDER — MOLNUPIRAVIR 200 MG/1
CAPSULE ORAL
COMMUNITY
Start: 2023-12-23

## 2024-01-19 RX ORDER — NORETHINDRONE ACETATE AND ETHINYL ESTRADIOL 1.5; 3 MG/1; UG/1
1 TABLET ORAL DAILY
COMMUNITY
Start: 2023-12-08 | End: 2024-01-19 | Stop reason: CLARIF

## 2024-01-19 NOTE — PROGRESS NOTES
Identified pt with two pt identifiers(name and ). Reviewed record in preparation for visit and have obtained necessary documentation.  Vitals:    24 1406   BP: (!) 154/84   Site: Left Upper Arm   Position: Sitting   Cuff Size: Large Adult   Pulse: (!) 105   Resp: 16   Temp: 98.1 °F (36.7 °C)   TempSrc: Temporal   SpO2: 97%   Weight: (!) 138.3 kg (305 lb)   Height: 1.626 m (5' 4\")        Health Maintenance Review: Patient reminded of \"due or due soon\" health maintenance. I have asked the patient to contact his/her primary care provider (PCP) for follow-up on his/her health maintenance.    Coordination of Care Questionnaire:  :   1) Have you been to an emergency room, urgent care, or hospitalized since your last visit?  If yes, where when, and reason for visit? no       2. Have seen or consulted any other health care provider since your last visit?   If yes, where when, and reason for visit?  No      Patient is accompanied by motherand friend I have received verbal consent from Conchis Lazcano to discuss any/all medical information while they are present in the room.    
for headaches, dizziness, vertigo, memory problems not related to HE.  Psychology: Negative for anxiety, depression.       FAMILY HISTORY:  The patient has no knowledge of the father's medical condition.    The mother Has/had the following chronic disease(s): Sarcoidosis, neuropathy.    There is no family history of liver disease.      SOCIAL HISTORY:  The patient has never been .    The patient has no children.     The patient has never used tobacco products.    The patient has never consumed significant amounts of alcohol.    The patient currently works full time as [employer].        PHYSICAL EXAMINATION:  BP (!) 154/84 (Site: Left Upper Arm, Position: Sitting, Cuff Size: Large Adult)   Pulse (!) 105   Temp 98.1 °F (36.7 °C) (Temporal)   Resp 16   Ht 1.626 m (5' 4\")   Wt (!) 138.3 kg (305 lb)   SpO2 97%   BMI 52.35 kg/m²       General: No acute distress.   Eyes: Sclera anicteric.   ENT: No oral lesions.  Thyroid normal.  Nodes: No adenopathy.   Skin: No spider angiomata.  No jaundice.  No palmar erythema.  Respiratory: Lungs clear to auscultation.   Cardiovascular: Regular heart rate.  No murmurs.  No JVD.  Abdomen: Soft non-tender, liver size normal to percussion/palpation.  Spleen not palpable. No obvious ascites.  Extremities: No edema.  No muscle wasting.  No gross arthritic changes.  Neurologic: Alert and oriented.  Cranial nerves grossly intact.  No asterixis.      LABORATORY STUDIES:   Latest Ref [facility] 8/26/2023 12/19/2023   SHONNA - Routine Labs      WBC 3.6 - 11.0 K/uL 14.8 (H)  12.7 (H)    ANC 1.8 - 8.0 K/UL 11.5 (H)  9.2 (H)    HGB 11.5 - 16.0 g/dL 15.8  14.1     - 400 K/uL 495 (H)  483 (H)    INR 0.9 - 1.1      AST 10 - 35 U/L 18  24    ALT 10 - 35 U/L 32  182 (H)    Alk Phos 35 - 104 U/L 73  97    Bili, Total 0.2 - 1.0 MG/DL 0.4  0.6    Albumin 3.5 - 5.2 g/dL 4.7  4.1    BUN 6 - 20 MG/DL 14  10    Creat 0.50 - 0.90 MG/DL 0.84  0.65    Na 136 - 145 mmol/L 138  139    K 3.5 - 5.1

## 2024-01-20 LAB
FERRITIN SERPL-MCNC: 97 NG/ML (ref 15–150)
IRON SATN MFR SERPL: 26 % (ref 15–55)
IRON SERPL-MCNC: 86 UG/DL (ref 27–159)
TIBC SERPL-MCNC: 330 UG/DL (ref 250–450)
UIBC SERPL-MCNC: 244 UG/DL (ref 131–425)

## 2024-01-22 ENCOUNTER — TELEPHONE (OUTPATIENT)
Age: 24
End: 2024-01-22

## 2024-01-22 NOTE — TELEPHONE ENCOUNTER
Patient requests labs and office notes when signed to be faxed to [facility] her appointment is tomorrow and her Provider Swapna Zaki is requesting fax # [phone]      1/24/24@6162 Once office note is completed I will fax [facility].(KF)      2/9/24@0909 Office visit faxed to above number [phone] per patient request.(KF)

## 2024-01-24 LAB
AFP L3 MFR SERPL: NORMAL % (ref 0–9.9)
AFP SERPL-MCNC: 1.3 NG/ML (ref 0–4.7)
HBV CORE AB SERPL QL IA: NEGATIVE
HBV SURFACE AB SER QL: NON REACTIVE
HBV SURFACE AG SERPL QL IA: NEGATIVE
HCV AB SERPL QL IA: NORMAL
HCV IGG SERPL QL IA: NON REACTIVE

## 2024-01-25 ENCOUNTER — HOSPITAL ENCOUNTER (OUTPATIENT)
Facility: HOSPITAL | Age: 24
Setting detail: INFUSION SERIES
Discharge: HOME | End: 2024-01-25
Payer: MEDICAID

## 2024-01-25 VITALS
BODY MASS INDEX: 52.18 KG/M2 | SYSTOLIC BLOOD PRESSURE: 127 MMHG | HEART RATE: 112 BPM | DIASTOLIC BLOOD PRESSURE: 92 MMHG | TEMPERATURE: 97.6 F | WEIGHT: 293 LBS | RESPIRATION RATE: 18 BRPM

## 2024-01-25 DIAGNOSIS — G43.919 INTRACTABLE MIGRAINE WITHOUT STATUS MIGRAINOSUS, UNSPECIFIED MIGRAINE TYPE: Primary | ICD-10-CM

## 2024-01-25 PROCEDURE — 96365 THER/PROPH/DIAG IV INF INIT: CPT

## 2024-01-25 PROCEDURE — 6360000002 HC RX W HCPCS: Performed by: PSYCHIATRY & NEUROLOGY

## 2024-01-25 PROCEDURE — 2580000003 HC RX 258: Performed by: PSYCHIATRY & NEUROLOGY

## 2024-01-25 PROCEDURE — 96375 TX/PRO/DX INJ NEW DRUG ADDON: CPT

## 2024-01-25 RX ORDER — ONDANSETRON 2 MG/ML
4 INJECTION INTRAMUSCULAR; INTRAVENOUS ONCE
Status: COMPLETED | OUTPATIENT
Start: 2024-01-25 | End: 2024-01-25

## 2024-01-25 RX ORDER — ONDANSETRON 2 MG/ML
4 INJECTION INTRAMUSCULAR; INTRAVENOUS ONCE
Start: 2024-04-18 | End: 2024-04-18

## 2024-01-25 RX ORDER — SODIUM CHLORIDE 0.9 % (FLUSH) 0.9 %
5-40 SYRINGE (ML) INJECTION PRN
OUTPATIENT
Start: 2024-04-18

## 2024-01-25 RX ORDER — SODIUM CHLORIDE 9 MG/ML
5-250 INJECTION, SOLUTION INTRAVENOUS PRN
OUTPATIENT
Start: 2024-04-18

## 2024-01-25 RX ORDER — ONDANSETRON 2 MG/ML
4 INJECTION INTRAMUSCULAR; INTRAVENOUS EVERY 6 HOURS PRN
Start: 2024-04-18

## 2024-01-25 RX ADMIN — EPTINEZUMAB-JJMR 300 MG: 100 INJECTION INTRAVENOUS at 14:41

## 2024-01-25 RX ADMIN — ONDANSETRON 4 MG: 2 INJECTION INTRAMUSCULAR; INTRAVENOUS at 14:16

## 2024-01-25 ASSESSMENT — PAIN SCALES - GENERAL: PAINLEVEL_OUTOF10: 4

## 2024-01-25 ASSESSMENT — PAIN DESCRIPTION - LOCATION: LOCATION: HEAD

## 2024-01-25 NOTE — PROGRESS NOTES
[facility] Progress Note    Date: January 25, 2024        1400: Pt arrived ambulatory to [facility] for Vyepti in stable condition.  Assessment completed. PIV placed to right forearm with positive blood return.     Criteria for treatment was met. Nausea experienced with previous infusion.     Patient Vitals for the past 12 hrs:   Temp Pulse Resp BP   01/25/24 1405 97.6 °F (36.4 °C) (!) 112 18 (!) 127/92       Medications Administered         eptinezumab-jjmr (VYEPTI) 300 mg in sodium chloride 0.9 % 113 mL IVPB Admin Date  01/25/2024 Action  New Bag Dose  300 mg Rate  226 mL/hr Route  IntraVENous Administered By  Amita Sanchez RN        ondansetron (ZOFRAN) injection 4 mg Admin Date  01/25/2024 Action  Given Dose  4 mg Rate   Route  IntraVENous Administered By  Amita Sanchez RN            1600:  Tolerated treatment well, no adverse reactions noted. PIV flushed and removed. 2x2 and coban placed. D/Cd from [facility] ambulatory and in no distress.  Patient is aware of next scheduled [facility] appointment on 4/8/24.    Future Appointments   Date Time Provider Department Center   2/1/2024 12:00 PM Wanda Saucedo, PT [facility]   2/13/2024 10:45 AM Lorelei Terry, APRN - NP IFP BS AMB   2/16/2024 12:10 PM Jenny Marcus MD RDE [facility]   3/6/2024 10:40 AM Jan Pemberton MD CAVIR [facility]   4/8/2024 10:00 AM SS INF4 CH4 <1H RCHICS [facility]   4/10/2024  6:00 PM [facility] OPEN MRI SFMRMRI [facility]   4/11/2024  9:00 AM Edinson Ross MD LIVR  AMB   4/11/2024 11:00 AM Lobo Atkins MD [facility]   4/19/2024 11:00 AM Snow Mazariegos MD RES BS [facility]   5/9/2024 10:40 AM Darius Dyson Jr., MD BSNCR [facility]         Amita Sanchez RN  January 25, 2024

## 2024-02-01 ENCOUNTER — HOSPITAL ENCOUNTER (OUTPATIENT)
Facility: HOSPITAL | Age: 24
Setting detail: RECURRING SERIES
Discharge: HOME | End: 2024-02-04
Payer: MEDICAID

## 2024-02-01 PROCEDURE — 97535 SELF CARE MNGMENT TRAINING: CPT | Performed by: PHYSICAL THERAPIST

## 2024-02-01 PROCEDURE — 97162 PT EVAL MOD COMPLEX 30 MIN: CPT | Performed by: PHYSICAL THERAPIST

## 2024-02-01 PROCEDURE — 97112 NEUROMUSCULAR REEDUCATION: CPT | Performed by: PHYSICAL THERAPIST

## 2024-02-01 NOTE — THERAPY EVALUATION
billed concurrently with other procedures   [x] Review HEP    [] Progressed/Changed HEP, detail:    [] Other detail:         Pain Level at end of session (0-10 scale): 0    Plan of Care / Statement of Necessity for Physical Therapy Services     Assessment / key information:  The patient presents with dizziness, shortness of breath, imbalance, and pain at multiple sites contributing to difficulty standing, walking and sleeping through the night.  The patient's condition is complicated by complex PMH noted above. She demonstrates significant BLE weakness, limited lumbar flexion and trunk rotation, poor static balance, dizziness with position changes, and limited horizontal abduction shoulder PROM B.  Her oxygen dropped significantly in supine during exercise but she recovered quickly with diaphragmatic breathing.  It dropped again with return to seated position.  Her oxygen levels will be monitored more closely with exertion in subsequent visits.    Evaluation Complexity:  History:  HIGH Complexity :3+ comorbidities / personal factors will impact the outcome/ POC ; Examination:  HIGH Complexity : 4+ Standardized tests and measures addressing body structure, function, activity limitation and / or participation in recreation  ;Presentation:  HIGH Complexity : Unstable and unpredictable characteristics  ;Clinical Decision Making:  MEDIUM Complexity : FOTO score of 26-74 Overall Complexity Rating: MEDIUM  Problem List: pain affecting function, decrease ROM, decrease strength, edema affection function, impaired gait/balance, decrease ADL/functional abilities, decrease activity tolerance, decrease flexibility/joint mobility, and decrease transfer abilities    Treatment Plan may include any combination of the followin Therapeutic Exercise, 02592 Neuromuscular Re-Education, 28470 Manual Therapy, 84237 Therapeutic Activity, 02164 Self Care/Home Management, 06186 Electrical Stim unattended, and 02633 Vasopneumatic

## 2024-02-04 PROBLEM — D75.9: Status: RESOLVED | Noted: 2022-09-16 | Resolved: 2024-02-04

## 2024-02-04 PROBLEM — E88.819 INSULIN RESISTANCE: Status: RESOLVED | Noted: 2023-07-21 | Resolved: 2024-02-04

## 2024-02-04 PROBLEM — D75.838 OTHER THROMBOCYTOSIS: Status: RESOLVED | Noted: 2022-04-28 | Resolved: 2024-02-04

## 2024-02-04 PROBLEM — Z86.39 HISTORY OF IRON DEFICIENCY: Status: RESOLVED | Noted: 2023-06-08 | Resolved: 2024-02-04

## 2024-02-04 PROBLEM — E61.1 IRON DEFICIENCY: Status: RESOLVED | Noted: 2023-03-29 | Resolved: 2024-02-04

## 2024-02-04 PROBLEM — D72.9 DISORDER OF WHITE BLOOD CELLS, UNSPECIFIED: Status: RESOLVED | Noted: 2023-06-08 | Resolved: 2024-02-04

## 2024-02-04 PROBLEM — R79.82 ELEVATED C-REACTIVE PROTEIN (CRP): Status: RESOLVED | Noted: 2022-04-28 | Resolved: 2024-02-04

## 2024-02-04 PROBLEM — D64.9 ANEMIA: Status: ACTIVE | Noted: 2024-02-04

## 2024-02-04 PROBLEM — R70.0 ELEVATED SED RATE: Status: RESOLVED | Noted: 2022-05-20 | Resolved: 2024-02-04

## 2024-02-04 PROBLEM — D72.19 OTHER EOSINOPHILIA: Status: RESOLVED | Noted: 2022-06-07 | Resolved: 2024-02-04

## 2024-02-04 PROBLEM — R60.9 LIPEDEMA: Status: RESOLVED | Noted: 2021-01-01 | Resolved: 2024-02-04

## 2024-02-04 PROBLEM — Z15.89 GENE MUTATION: Status: RESOLVED | Noted: 2022-11-17 | Resolved: 2024-02-04

## 2024-02-06 ENCOUNTER — OFFICE VISIT (OUTPATIENT)
Age: 24
End: 2024-02-06
Payer: MEDICAID

## 2024-02-06 VITALS
TEMPERATURE: 98.8 F | WEIGHT: 293 LBS | RESPIRATION RATE: 20 BRPM | BODY MASS INDEX: 50.02 KG/M2 | HEART RATE: 101 BPM | OXYGEN SATURATION: 99 % | HEIGHT: 64 IN

## 2024-02-06 DIAGNOSIS — B37.0 ORAL THRUSH: Primary | ICD-10-CM

## 2024-02-06 PROCEDURE — 99213 OFFICE O/P EST LOW 20 MIN: CPT | Performed by: NURSE PRACTITIONER

## 2024-02-06 RX ORDER — FLUCONAZOLE 200 MG/1
200 TABLET ORAL DAILY
COMMUNITY
End: 2024-02-06

## 2024-02-06 RX ORDER — DEXTROAMPHETAMINE SACCHARATE, AMPHETAMINE ASPARTATE MONOHYDRATE, DEXTROAMPHETAMINE SULFATE AND AMPHETAMINE SULFATE 6.25; 6.25; 6.25; 6.25 MG/1; MG/1; MG/1; MG/1
25 CAPSULE, EXTENDED RELEASE ORAL EVERY MORNING
COMMUNITY

## 2024-02-06 RX ORDER — SERTRALINE HYDROCHLORIDE 25 MG/1
25 TABLET, FILM COATED ORAL DAILY
COMMUNITY

## 2024-02-06 RX ORDER — FLUCONAZOLE 100 MG/1
100 TABLET ORAL DAILY
Qty: 7 TABLET | Refills: 0 | Status: SHIPPED | OUTPATIENT
Start: 2024-02-06 | End: 2024-02-13

## 2024-02-06 ASSESSMENT — PATIENT HEALTH QUESTIONNAIRE - PHQ9
SUM OF ALL RESPONSES TO PHQ QUESTIONS 1-9: 0
4. FEELING TIRED OR HAVING LITTLE ENERGY: 0
SUM OF ALL RESPONSES TO PHQ QUESTIONS 1-9: 0
3. TROUBLE FALLING OR STAYING ASLEEP: 0
SUM OF ALL RESPONSES TO PHQ QUESTIONS 1-9: 0
1. LITTLE INTEREST OR PLEASURE IN DOING THINGS: 0
SUM OF ALL RESPONSES TO PHQ9 QUESTIONS 1 & 2: 0
2. FEELING DOWN, DEPRESSED OR HOPELESS: 0
7. TROUBLE CONCENTRATING ON THINGS, SUCH AS READING THE NEWSPAPER OR WATCHING TELEVISION: 0
6. FEELING BAD ABOUT YOURSELF - OR THAT YOU ARE A FAILURE OR HAVE LET YOURSELF OR YOUR FAMILY DOWN: 0
SUM OF ALL RESPONSES TO PHQ QUESTIONS 1-9: 0
9. THOUGHTS THAT YOU WOULD BE BETTER OFF DEAD, OR OF HURTING YOURSELF: 0
8. MOVING OR SPEAKING SO SLOWLY THAT OTHER PEOPLE COULD HAVE NOTICED. OR THE OPPOSITE, BEING SO FIGETY OR RESTLESS THAT YOU HAVE BEEN MOVING AROUND A LOT MORE THAN USUAL: 0
5. POOR APPETITE OR OVEREATING: 0
10. IF YOU CHECKED OFF ANY PROBLEMS, HOW DIFFICULT HAVE THESE PROBLEMS MADE IT FOR YOU TO DO YOUR WORK, TAKE CARE OF THINGS AT HOME, OR GET ALONG WITH OTHER PEOPLE: 0

## 2024-02-06 NOTE — PROGRESS NOTES
Subjective:      Patient ID: Conchis Lazcano is a 23 y.o. female.    HPI  Patient is having recurrent thrush  Believes due to her asthma inhalers  Rinses with mouthwash every time but not helping  Has nystatin rinse but still going on    Review of Systems  A comprehensive review of system was obtained and negative except findings in the HPI    Pulse (!) 101   Temp 98.8 °F (37.1 °C) (Oral)   Resp 20   Ht 1.626 m (5' 4\")   Wt (!) 137.5 kg (303 lb 3.2 oz)   SpO2 99%   BMI 52.04 kg/m²   Objective:   Physical Exam  Vitals and nursing note reviewed.   Constitutional:       General: She is not in acute distress.     Appearance: Normal appearance.   Cardiovascular:      Rate and Rhythm: Normal rate and regular rhythm.   Pulmonary:      Effort: Pulmonary effort is normal. No respiratory distress.      Breath sounds: Normal breath sounds. No wheezing or rhonchi.   Musculoskeletal:         General: No swelling.   Neurological:      General: No focal deficit present.      Mental Status: She is alert and oriented to person, place, and time.   Psychiatric:         Mood and Affect: Mood normal.         Assessment / Plan:   Conchis was seen today for thrush.    Diagnoses and all orders for this visit:    Oral thrush    Other orders  -     fluconazole (DIFLUCAN) 100 MG tablet; Take 1 tablet by mouth daily for 7 days      Given diflucan 100mg x 7 days  Keep the nystatin soln  Follow up next week as planned    I have discussed the diagnosis with the patient and the intended plan as seen in the above orders. The patient has received an after-visit summary and questions were answered concerning future plans. Patient conveyed understanding of the plan at the time of the visit.    Lorelei Terry, MSN, ANP  2/6/2024

## 2024-02-06 NOTE — PROGRESS NOTES
Chief Complaint   Patient presents with    Thrush    Patient is in the office today for thrush in the mouth.   Patient states she's also thinks its in her throat.    No other concerns.    1. Have you been to the ER, urgent care clinic since your last visit?  Hospitalized since your last visit?No    2. Have you seen or consulted any other health care providers outside of the [facility] since your last visit?  Include any pap smears or colon screening. No

## 2024-02-09 ENCOUNTER — TELEPHONE (OUTPATIENT)
Age: 24
End: 2024-02-09

## 2024-02-13 ENCOUNTER — HOSPITAL ENCOUNTER (OUTPATIENT)
Facility: HOSPITAL | Age: 24
Setting detail: RECURRING SERIES
Discharge: HOME | End: 2024-02-16
Payer: MEDICAID

## 2024-02-13 ENCOUNTER — OFFICE VISIT (OUTPATIENT)
Age: 24
End: 2024-02-13
Payer: MEDICAID

## 2024-02-13 VITALS
BODY MASS INDEX: 50.02 KG/M2 | RESPIRATION RATE: 18 BRPM | HEIGHT: 64 IN | OXYGEN SATURATION: 99 % | TEMPERATURE: 98.3 F | HEART RATE: 100 BPM | WEIGHT: 293 LBS

## 2024-02-13 DIAGNOSIS — M54.41 CHRONIC RIGHT-SIDED LOW BACK PAIN WITH RIGHT-SIDED SCIATICA: ICD-10-CM

## 2024-02-13 DIAGNOSIS — G89.29 CHRONIC RIGHT-SIDED LOW BACK PAIN WITH RIGHT-SIDED SCIATICA: ICD-10-CM

## 2024-02-13 PROCEDURE — 99213 OFFICE O/P EST LOW 20 MIN: CPT | Performed by: NURSE PRACTITIONER

## 2024-02-13 PROCEDURE — 97112 NEUROMUSCULAR REEDUCATION: CPT

## 2024-02-13 RX ORDER — GABAPENTIN 100 MG/1
100 CAPSULE ORAL DAILY
Qty: 90 CAPSULE | Refills: 3 | Status: SHIPPED | COMMUNITY
Start: 2024-02-13

## 2024-02-13 NOTE — PROGRESS NOTES
Chief Complaint   Patient presents with    Medication Check    No other concerns.    \"Have you been to the ER, urgent care clinic since your last visit?  Hospitalized since your last visit?\"    NO    “Have you seen or consulted any other health care providers outside of [facility] since your last visit?”    NO

## 2024-02-13 NOTE — PROGRESS NOTES
Subjective:      Patient ID: Conchis Lazcano is a 23 y.o. female.    HPI  Patient is here for follow up gabapentin  Taking just once a day in the morning and getting good pain control  Needs Rx adjusted to reflect this, getting too many pills on refills  Also wants to know if it is making her sleepy    Review of Systems  A comprehensive review of system was obtained and negative except findings in the HPI    Pulse 100   Temp 98.3 °F (36.8 °C) (Oral)   Resp 18   Ht 1.626 m (5' 4\")   Wt (!) 138 kg (304 lb 3.2 oz)   SpO2 99%   BMI 52.22 kg/m²   Objective:   Physical Exam  Vitals and nursing note reviewed.   Constitutional:       General: She is not in acute distress.     Appearance: Normal appearance.   Cardiovascular:      Rate and Rhythm: Normal rate and regular rhythm.   Pulmonary:      Effort: Pulmonary effort is normal. No respiratory distress.      Breath sounds: Normal breath sounds. No wheezing or rhonchi.   Musculoskeletal:         General: No swelling.   Neurological:      General: No focal deficit present.      Mental Status: She is alert and oriented to person, place, and time.   Psychiatric:         Mood and Affect: Mood normal.         Assessment / Plan:   Conchis was seen today for medication check.    Diagnoses and all orders for this visit:    Chronic right-sided low back pain with right-sided sciatica      Rx for gabapentin changed on med list to reflect how she actually takes the med for future refills  Follow up prn    I have discussed the diagnosis with the patient and the intended plan as seen in the above orders. The patient has received an after-visit summary and questions were answered concerning future plans. Patient conveyed understanding of the plan at the time of the visit.    Lorelei Terry, MSN, ANP  2/13/2024

## 2024-02-13 NOTE — PROGRESS NOTES
PHYSICAL THERAPY - MEDICARE DAILY TREATMENT NOTE (updated 3/23)      Date: 2024          Patient Name:  Conchis Lazcano :  2000   Medical   Diagnosis:  Other low back pain [M54.59] Treatment Diagnosis:  M54.59  OTHER LOWER BACK PAIN    Referral Source:  Jan Pemberton MD Insurance:   Payor: [employer] / Plan: [facility] PLAN MEDICARE / Product Type: *No Product type* /                     Patient  verified YES    Visit #   Current  / Total 2 12   Time   In / Out 430p 500p   Total Treatment Time 30   Total Timed Codes 30   1:1 Treatment Time 30      [facility] Totals Reminder:  bill using total billable   min of TIMED therapeutic procedures and modalities.   8-22 min = 1 unit; 23-37 min = 2 units; 38-52 min = 3 units; 53-67 min = 4 units; 68-82 min = 5 units            SUBJECTIVE    Pain Level (0-10 scale): \"hurt all over\"    Any medication changes, allergies to medications, adverse drug reactions, diagnosis change, or new procedure performed?: [x] No    [] Yes (see summary sheet for update)  Medications: Verified on Patient Summary List    Subjective functional status/changes:     Patient stated that she has pain all over and some dizziness today    OBJECTIVE      Therapeutic Procedures:  Tx Min Billable or 1:1 Min (if diff from Tx Min) Procedure, Rationale, Specifics   30  47171 Neuromuscular Re-Education (timed):  improve balance, coordination, kinesthetic sense, posture, core stability and proprioception to improve patient's ability to develop conscious control of individual muscles and awareness of position of extremities in order to progress to PLOF and address remaining functional goals. (see flow sheet as applicable)     Details if applicable:     30     Total Total       [x]  Patient Education billed concurrently with other procedures   [x] Review HEP    [] Progressed/Changed HEP, detail:    [] Other detail:         Other Objective/Functional Measures  Adjusted PPT with ball

## 2024-02-15 ENCOUNTER — HOSPITAL ENCOUNTER (OUTPATIENT)
Facility: HOSPITAL | Age: 24
Setting detail: RECURRING SERIES
Discharge: HOME | End: 2024-02-18
Payer: MEDICAID

## 2024-02-15 PROCEDURE — 97112 NEUROMUSCULAR REEDUCATION: CPT

## 2024-02-15 NOTE — PROGRESS NOTES
PHYSICAL THERAPY - MEDICARE DAILY TREATMENT NOTE (updated 3/23)      Date: 2/15/2024          Patient Name:  Conchis Lazcano :  2000   Medical   Diagnosis:  Other low back pain [M54.59] Treatment Diagnosis:  M54.59  OTHER LOWER BACK PAIN    Referral Source:  Jan Pemberton MD Insurance:   Payor: [employer] MED[employer]ARE COMM[employer] PL / Plan: [facility] PLAN MEDICARE / Product Type: *No Product type* /                     Patient  verified YES    Visit #   Current  / Total 3 12   Time   In / Out 1130a 1200p   Total Treatment Time 30   Total Timed Codes 30   1:1 Treatment Time 30      [facility] Totals Reminder:  bill using total billable   min of TIMED therapeutic procedures and modalities.   8-22 min = 1 unit; 23-37 min = 2 units; 38-52 min = 3 units; 53-67 min = 4 units; 68-82 min = 5 units            SUBJECTIVE    Pain Level (0-10 scale): 4/10    Any medication changes, allergies to medications, adverse drug reactions, diagnosis change, or new procedure performed?: [x] No    [] Yes (see summary sheet for update)  Medications: Verified on Patient Summary List    Subjective functional status/changes:     Patient stated that she is just really sore, most in her legs and R ankle/foot. No dizziness present today.    OBJECTIVE      Therapeutic Procedures:  Tx Min Billable or 1:1 Min (if diff from Tx Min) Procedure, Rationale, Specifics   30  07802 Neuromuscular Re-Education (timed):  improve balance, coordination, kinesthetic sense, posture, core stability and proprioception to improve patient's ability to develop conscious control of individual muscles and awareness of position of extremities in order to progress to PLOF and address remaining functional goals. (see flow sheet as applicable)     Details if applicable:     30     Total Total       [x]  Patient Education billed concurrently with other procedures   [x] Review HEP    [] Progressed/Changed HEP, detail:    [] Other detail:         Other Objective/Functional

## 2024-02-16 ENCOUNTER — OFFICE VISIT (OUTPATIENT)
Age: 24
End: 2024-02-16

## 2024-02-16 VITALS
HEIGHT: 65 IN | HEART RATE: 93 BPM | OXYGEN SATURATION: 99 % | BODY MASS INDEX: 48.82 KG/M2 | SYSTOLIC BLOOD PRESSURE: 128 MMHG | DIASTOLIC BLOOD PRESSURE: 74 MMHG | RESPIRATION RATE: 20 BRPM | WEIGHT: 293 LBS

## 2024-02-16 RX ORDER — METFORMIN HYDROCHLORIDE 500 MG/1
TABLET, EXTENDED RELEASE ORAL
Qty: 270 TABLET | Refills: 3 | Status: SHIPPED | OUTPATIENT
Start: 2024-02-16

## 2024-02-16 NOTE — PROGRESS NOTES
Chief Complaint   Patient presents with    Weight Management         History of Present Illness: Conchis Lazcano is a 23 y.o.. female with a past medical hx significant for ADHD seen in referral from Conchis Kirk PA-C for discussion related to weight gain.    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 Internal BMBx performed 8/25/2022 showing normocellular marrow with active trilineage hematopoiesis. 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 Normal female karyotype. FISH negative. Will monitor closely for development of AML. Of note, FLT3 mutations also increase risk of various other tumor types, thus, will refer to medical genetics for further discussion.    Conchis and her Mother are here today to discuss medication options for weight loss. Previously lost 100lbs following cholecystectomy. Currently working as a [employer], snacking throughout the day and then has a mendy with the family in the evening. Endorses cravings for carbohydrates. Currently taking low dose naltrexone (4.5mg) due to pain (rx by Rheum). Taking vyvansefor ADHD, recently switched from straterra. Also taking cymbalta.      01/26/2023: Not consistently taking 2000 mg a day of metformin, titrated off of prednisone-was up to 20 mg once a day, down to 10 mg.  Has essentially been on this continuously for about 8 months.  Is down 5 pounds.  Not experiencing bad anxiety on current regimen.    06/07/2023: LFTs elevated when taking wellbutrin + naltrexone. Drinks sprite when feels that BG are low, otherwise sweet tea. Noted worsening in tremor when started wellbutrin, not sure if she would like to resume that. Weight is down about 4 lbs. Has continued to require both PO and IV steroids. Can only tolerate 1000mg max daily of metformin. Pain has been better with naltrexone.    11/2/2023: Snacking on goldfish, raisins, pretzels.  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

## 2024-02-19 ENCOUNTER — APPOINTMENT (OUTPATIENT)
Facility: HOSPITAL | Age: 24
End: 2024-02-19
Payer: MEDICAID

## 2024-02-19 ENCOUNTER — TELEPHONE (OUTPATIENT)
Age: 24
End: 2024-02-19

## 2024-02-19 NOTE — TELEPHONE ENCOUNTER
Odalis Vanegas NP with [facility] called in and states she went out and saw pt for a sinus infection, she had it for a week. They wanted to let you know they have started pt on Doxycycline 100 mg twice a day for 5 days.

## 2024-02-22 ENCOUNTER — HOSPITAL ENCOUNTER (OUTPATIENT)
Facility: HOSPITAL | Age: 24
Setting detail: RECURRING SERIES
End: 2024-02-22
Payer: MEDICAID

## 2024-02-23 RX ORDER — NALTREXONE HYDROCHLORIDE 50 MG/1
25 TABLET, FILM COATED ORAL DAILY
Qty: 90 TABLET | Refills: 0 | Status: SHIPPED | OUTPATIENT
Start: 2024-02-23

## 2024-02-26 ENCOUNTER — HOSPITAL ENCOUNTER (OUTPATIENT)
Facility: HOSPITAL | Age: 24
Setting detail: RECURRING SERIES
Discharge: HOME | End: 2024-02-29
Payer: MEDICAID

## 2024-02-26 PROCEDURE — 97112 NEUROMUSCULAR REEDUCATION: CPT | Performed by: PHYSICAL THERAPIST

## 2024-02-26 PROCEDURE — 97110 THERAPEUTIC EXERCISES: CPT | Performed by: PHYSICAL THERAPIST

## 2024-02-26 NOTE — PROGRESS NOTES
PHYSICAL THERAPY - MEDICARE DAILY TREATMENT NOTE (updated 3/23)      Date: 2024          Patient Name:  Conchis Lazcano :  2000   Medical   Diagnosis:  Other low back pain [M54.59] Treatment Diagnosis:  M54.59  OTHER LOWER BACK PAIN    Referral Source:  Jan Pemberton MD Insurance:   Payor: [facility] / Plan: [facility] OF [facility] / Product Type: *No Product type* /                     Patient  verified YES    Visit #   Current  / Total 4 12   Time   In / Out 325  PM   Total Treatment Time 40   Total Timed Codes 40   1:1 Treatment Time 40      [facility] Totals Reminder:  bill using total billable   min of TIMED therapeutic procedures and modalities.   8-22 min = 1 unit; 23-37 min = 2 units; 38-52 min = 3 units; 53-67 min = 4 units; 68-82 min = 5 units            SUBJECTIVE    Pain Level (0-10 scale): 5-6/10    Any medication changes, allergies to medications, adverse drug reactions, diagnosis change, or new procedure performed?: [x] No    [] Yes (see summary sheet for update)  Medications: Verified on Patient Summary List    Subjective functional status/changes:     Patient reports she had a slight set back due to illness    OBJECTIVE      Therapeutic Procedures:  Tx Min Billable or 1:1 Min (if diff from Tx Min) Procedure, Rationale, Specifics   40  69640 Neuromuscular Re-Education (timed):  improve balance, coordination, kinesthetic sense, posture, core stability and proprioception to improve patient's ability to develop conscious control of individual muscles and awareness of position of extremities in order to progress to PLOF and address remaining functional goals. (see flow sheet as applicable)     Details if applicable:     40     Total Total       [x]  Patient Education billed concurrently with other procedures   [x] Review HEP    [] Progressed/Changed HEP, detail:    [] Other detail:         Other Objective/Functional Measures  Some dizziness with PPT in 90/90,

## 2024-02-27 ENCOUNTER — CLINICAL DOCUMENTATION (OUTPATIENT)
Age: 24
End: 2024-02-27

## 2024-02-28 ENCOUNTER — HOSPITAL ENCOUNTER (OUTPATIENT)
Facility: HOSPITAL | Age: 24
Setting detail: RECURRING SERIES
End: 2024-02-28
Payer: MEDICAID

## 2024-02-29 ENCOUNTER — TELEPHONE (OUTPATIENT)
Age: 24
End: 2024-02-29

## 2024-02-29 NOTE — TELEPHONE ENCOUNTER
Rep from [employer] brought in LE measurements, requesting signature & office notes.  Provided.

## 2024-03-05 ENCOUNTER — HOSPITAL ENCOUNTER (OUTPATIENT)
Facility: HOSPITAL | Age: 24
Setting detail: RECURRING SERIES
Discharge: HOME | End: 2024-03-08
Payer: MEDICARE

## 2024-03-05 PROCEDURE — 97110 THERAPEUTIC EXERCISES: CPT | Performed by: PHYSICAL THERAPIST

## 2024-03-05 PROCEDURE — G0283 ELEC STIM OTHER THAN WOUND: HCPCS | Performed by: PHYSICAL THERAPIST

## 2024-03-05 NOTE — PROGRESS NOTES
pre-treatment girth :    post-treatment girth :    measured at (landmark       location) :   If using vaso (only need to measure limb vaso being performed on)        min []  Other:        Skin assessment post-treatment (if applicable):    [x]  intact    []  redness- no adverse reaction                 []redness - adverse reaction:             Therapeutic Procedures:  Tx Min Billable or 1:1 Min (if diff from Tx Min) Procedure, Rationale, Specifics   30  59403 Therapeutic Exercise (timed):  increase ROM, strength, coordination, balance, and proprioception to improve patient's ability to progress to PLOF and address remaining functional goals. (see flow sheet as applicable)     Details if applicable:     30     Total Total       [x]  Patient Education billed concurrently with other procedures   [x] Review HEP    [] Progressed/Changed HEP, detail:    [] Other detail:         Other Objective/Functional Measures  No increase in pain with today's interventions  Pain Level at end of session (0-10 scale): \"leg soreness\"      Assessment   Good tolerance of supine exercise but pain did not reduce with PT, pain relief with heat/ e-stim  Patient will continue to benefit from skilled PT / OT services to modify and progress therapeutic interventions, analyze and address functional mobility deficits, analyze and address ROM deficits, analyze and address strength deficits, analyze and address soft tissue restrictions, analyze and cue for proper movement patterns, analyze and modify for postural abnormalities, and analyze and address imbalance/dizziness to address functional deficits and attain remaining goals.    Progress toward goals / Updated goals:  []  See Progress Note/Recertification  Set back due to pain flare  Short Term Goals: To be accomplished in 8 treatments.  1) The patient will be independent with introductory HEP  2) The patient will improve lumbar flexion AROM to reaching 12 inches from floor to improve ease with

## 2024-03-06 ENCOUNTER — OFFICE VISIT (OUTPATIENT)
Age: 24
End: 2024-03-06
Payer: MEDICAID

## 2024-03-06 VITALS
RESPIRATION RATE: 18 BRPM | HEIGHT: 65 IN | DIASTOLIC BLOOD PRESSURE: 82 MMHG | SYSTOLIC BLOOD PRESSURE: 122 MMHG | HEART RATE: 102 BPM | BODY MASS INDEX: 48.82 KG/M2 | OXYGEN SATURATION: 98 % | WEIGHT: 293 LBS

## 2024-03-06 DIAGNOSIS — G90.A POTS (POSTURAL ORTHOSTATIC TACHYCARDIA SYNDROME): Primary | ICD-10-CM

## 2024-03-06 DIAGNOSIS — I89.0 LYMPHEDEMA: ICD-10-CM

## 2024-03-06 PROCEDURE — 99213 OFFICE O/P EST LOW 20 MIN: CPT | Performed by: SPECIALIST

## 2024-03-06 PROCEDURE — 3079F DIAST BP 80-89 MM HG: CPT | Performed by: SPECIALIST

## 2024-03-06 PROCEDURE — 3074F SYST BP LT 130 MM HG: CPT | Performed by: SPECIALIST

## 2024-03-06 NOTE — PROGRESS NOTES
Chief Complaint   Patient presents with    Follow-up     3 month    Other     POTS    Hypertension     tachycardia     Vitals:    03/06/24 1052   BP: 122/82   Site: Left Wrist   Position: Sitting   Cuff Size: Medium Adult   Pulse: (!) 102   Resp: 18   SpO2: 98%   Weight: (!) 136.9 kg (301 lb 12.8 oz)   Height: 1.651 m (5' 5\")   No active chest pain/SOB  Np recent hospitalizations/urgent care visits  No refills needed    Patient is still waiting on the [facility] to come. Was measured again on 2/29/24.  
BILITOT 0.6 12/19/2023    ALKPHOS 97 12/19/2023    AST 24 12/19/2023     (H) 12/19/2023    LABGLOM >60 12/19/2023    GFRAA >60 04/05/2022    AGRATIO 1.2 12/19/2023    GLOB 3.4 12/19/2023         Lab Results   Component Value Date    WBC 12.7 (H) 12/19/2023    HGB 14.1 12/19/2023    HCT 42.1 12/19/2023    MCV 91.5 12/19/2023     (H) 12/19/2023     Lab Results   Component Value Date    CHOL 225 (H) 08/04/2023    TRIG 188 (H) 08/04/2023    HDL 52 08/04/2023    LDLCALC 135.4 (H) 08/04/2023    VLDL 19 09/03/2021    CHOLHDLRATIO 4.3 08/04/2023             CARDIAC DIAGNOSTICS:     Cardiac Evaluation Includes:  I reviewed the test results below.     Echo 5/26/22 (VCS) - LVEF 45%   Echo 1/24/23 - TDS.  LVEF 56%, normal study         Holter 2/10/23 - 1 day - NSR, Avg  bpm, (), Tachy 52% of the time.    ---> HR still fast most of the time-        Tilt Table Test 12/4/23 - There is an exaggerated sustained increase in heart rate (greater than 30 beats/min and exceeding 120 beats/min) without accompanying hypotension within the first 10 mins of tilt table testing, which can be seen in postural orthostatic tachycardia syndrome (POTS).    Baseline high blood pressure ( to 160)      EKG 4/13/22 (VCS) - sinus tach,  bpm, normal study       ASSESSMENT AND PLAN:     Assessment and Plan:    1) Possible POTS vs inappropriate sinus tach    - she has long history of sinus tachycardia and has seen Dr. Gupta    - TSH and serum metanaphrines normal    - She did not tolerate metoprolol or bystolic (felt funny in head)    - She felt cardizem makes her shaky    - She also had stopped bystolic and ivabradine due to concerns of shaking (which it seems was from Wellbutrin)    - Holter 2/10/23 - 1 day - NSR, Avg  bpm, (), Tachy 52% of the time.    - Possibly inappropriate sinus tachycardia - - she has been intolerant of some BB / CCB   - Possible POTS - Tilt Table Test 12/4/23 - There is an

## 2024-03-07 ENCOUNTER — OFFICE VISIT (OUTPATIENT)
Age: 24
End: 2024-03-07
Payer: MEDICAID

## 2024-03-07 ENCOUNTER — HOSPITAL ENCOUNTER (OUTPATIENT)
Facility: HOSPITAL | Age: 24
Setting detail: RECURRING SERIES
Discharge: HOME | End: 2024-03-10
Payer: MEDICAID

## 2024-03-07 VITALS
TEMPERATURE: 96.9 F | OXYGEN SATURATION: 99 % | HEIGHT: 65 IN | BODY MASS INDEX: 48.82 KG/M2 | DIASTOLIC BLOOD PRESSURE: 78 MMHG | WEIGHT: 293 LBS | RESPIRATION RATE: 18 BRPM | HEART RATE: 89 BPM | SYSTOLIC BLOOD PRESSURE: 124 MMHG

## 2024-03-07 DIAGNOSIS — G90.A POTS (POSTURAL ORTHOSTATIC TACHYCARDIA SYNDROME): ICD-10-CM

## 2024-03-07 DIAGNOSIS — E66.01 MORBID OBESITY WITH BMI OF 45.0-49.9, ADULT (HCC): ICD-10-CM

## 2024-03-07 DIAGNOSIS — G43.709 CHRONIC MIGRAINE W/O AURA, NOT INTRACTABLE, W/O STAT MIGR: Primary | ICD-10-CM

## 2024-03-07 PROCEDURE — 3074F SYST BP LT 130 MM HG: CPT | Performed by: PSYCHIATRY & NEUROLOGY

## 2024-03-07 PROCEDURE — 3078F DIAST BP <80 MM HG: CPT | Performed by: PSYCHIATRY & NEUROLOGY

## 2024-03-07 PROCEDURE — 99214 OFFICE O/P EST MOD 30 MIN: CPT | Performed by: PSYCHIATRY & NEUROLOGY

## 2024-03-07 PROCEDURE — G0283 ELEC STIM OTHER THAN WOUND: HCPCS

## 2024-03-07 PROCEDURE — 97110 THERAPEUTIC EXERCISES: CPT

## 2024-03-07 RX ORDER — RIMEGEPANT SULFATE 75 MG/75MG
75 TABLET, ORALLY DISINTEGRATING ORAL EVERY OTHER DAY
Qty: 6 TABLET | Refills: 0 | COMMUNITY
Start: 2024-03-07

## 2024-03-07 RX ORDER — DEXTROAMPHETAMINE SACCHARATE, AMPHETAMINE ASPARTATE MONOHYDRATE, DEXTROAMPHETAMINE SULFATE AND AMPHETAMINE SULFATE 6.25; 6.25; 6.25; 6.25 MG/1; MG/1; MG/1; MG/1
25 CAPSULE, EXTENDED RELEASE ORAL EVERY MORNING
COMMUNITY
Start: 2024-02-26

## 2024-03-07 RX ORDER — RIMEGEPANT SULFATE 75 MG/75MG
75 TABLET, ORALLY DISINTEGRATING ORAL EVERY OTHER DAY
Qty: 16 TABLET | Refills: 5 | Status: SHIPPED | OUTPATIENT
Start: 2024-03-07

## 2024-03-07 RX ORDER — NARATRIPTAN 2.5 MG/1
TABLET ORAL
Qty: 9 TABLET | Refills: 3 | Status: SHIPPED | OUTPATIENT
Start: 2024-03-07

## 2024-03-07 NOTE — PROGRESS NOTES
inches from floor to improve ease with picking up items off the floor  3) The patient will transfer sit <> stand without an increase in dizziness  Long Term Goals: To be accomplished in 24 treatments.  1) The patient will demonstrate 5/5 BLE strength to improve ease with household chores  2) The patient will improve FOTO survey to a 58% or greater to indicate a significant improvement in function  3) The patient will ambulate 20 minutes without a significant increase in pain to improve ease running errands       PLAN  YES Continue plan of care  Re-Cert Due: NA  [x]  Upgrade activities as tolerated  []  Discharge due to :  []  Other:      Ajay John, ELENA       3/7/2024       11:41 AM

## 2024-03-07 NOTE — PROGRESS NOTES
NEUROLOGY CLINIC NOTE    Patient ID:  Conchis Lazcano  [id number]  23 y.o.  2000    Date of Visit:  March 7, 2024    Reason for Visit:  migraines      Chief Complaint   Patient presents with    Migraine     Patient reports she has had 3-4 migraines a week for about 2 months since she came off the birth control pills.         History of Present Illness:     Patient Active Problem List    Diagnosis Date Noted    Anemia 02/04/2024    Other specified disorders of eustachian tube, bilateral 10/04/2023    Lymphedema 07/25/2023    Chronic right-sided low back pain with right-sided sciatica 03/29/2023    Atopic dermatitis 03/29/2023    Cardiomyopathy, unspecified type ([facility]) 01/19/2023    Severe persistent asthma with acute exacerbation 12/21/2022    Type 2 diabetes mellitus ([facility]) 12/13/2022    Neuropathic pain 11/17/2022    Undifferentiated connective tissue disease ([facility]) 11/17/2022    Neutrophilia 05/20/2022    Monocytosis 05/20/2022    Skin lesions 04/28/2022    SLE (systemic lupus erythematosus) ([facility])     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[facility])     FIORDALIZA (generalized anxiety disorder)     Sucrose intolerance     Migraine headache     Fibromyalgia     Autism     ADHD (attention deficit hyperactivity disorder)     Tachycardia     Tenosynovitis of right hand 01/01/2021    Obesity, morbid ([facility]) 12/14/2020    Chronic gastritis without bleeding 06/20/2019    Chronic nausea 05/16/2019    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 David Rodriguez.  vs Sucrose Intolerance.    Acid reflux     ADHD (attention deficit hyperactivity disorder)     Allergy to chocolate 9/13/2017    pt denies    Allergy to yeast 9/13/2017    pt denies    Asthma     Autism     High Functioning.

## 2024-03-12 ENCOUNTER — TELEPHONE (OUTPATIENT)
Age: 24
End: 2024-03-12

## 2024-03-12 NOTE — TELEPHONE ENCOUNTER
PSR received message from Dr. Ross and would like Dr. Dyson to give him a call back at [phone] regarding patient.

## 2024-03-14 ENCOUNTER — APPOINTMENT (OUTPATIENT)
Facility: HOSPITAL | Age: 24
End: 2024-03-14
Payer: MEDICARE

## 2024-03-18 ENCOUNTER — HOSPITAL ENCOUNTER (OUTPATIENT)
Facility: HOSPITAL | Age: 24
Setting detail: RECURRING SERIES
Discharge: HOME | End: 2024-03-21
Payer: MEDICAID

## 2024-03-18 PROCEDURE — 97110 THERAPEUTIC EXERCISES: CPT

## 2024-03-18 PROCEDURE — G0283 ELEC STIM OTHER THAN WOUND: HCPCS

## 2024-03-18 NOTE — PROGRESS NOTES
press/temp:   pre-treatment girth :    post-treatment girth :    measured at (landmark       location) :   If using vaso (only need to measure limb vaso being performed on)        min []  Other:        Skin assessment post-treatment (if applicable):    [x]  intact    []  redness- no adverse reaction                 []redness - adverse reaction:             Therapeutic Procedures:  Tx Min Billable or 1:1 Min (if diff from Tx Min) Procedure, Rationale, Specifics   40  77871 Therapeutic Exercise (timed):  increase ROM, strength, coordination, balance, and proprioception to improve patient's ability to progress to PLOF and address remaining functional goals. (see flow sheet as applicable)     Details if applicable:     40     Total Total       [x]  Patient Education billed concurrently with other procedures   [x] Review HEP    [] Progressed/Changed HEP, detail:    [] Other detail:         Other Objective/Functional Measures  Mod fatigue present but overall good tolerance.    Pain Level at end of session (0-10 scale): \"leg soreness\"      Assessment     Patient will continue to benefit from skilled PT / OT services to modify and progress therapeutic interventions, analyze and address functional mobility deficits, analyze and address ROM deficits, analyze and address strength deficits, analyze and address soft tissue restrictions, analyze and cue for proper movement patterns, analyze and modify for postural abnormalities, and analyze and address imbalance/dizziness to address functional deficits and attain remaining goals.    Progress toward goals / Updated goals:  []  See Progress Note/Recertification  Will continue to progress as tolerated.    Short Term Goals: To be accomplished in 8 treatments.  1) The patient will be independent with introductory HEP  2) The patient will improve lumbar flexion AROM to reaching 12 inches from floor to improve ease with picking up items off the floor  3) The patient will transfer sit <>

## 2024-03-20 ENCOUNTER — APPOINTMENT (OUTPATIENT)
Facility: HOSPITAL | Age: 24
End: 2024-03-20
Payer: MEDICARE

## 2024-03-25 ENCOUNTER — APPOINTMENT (OUTPATIENT)
Facility: HOSPITAL | Age: 24
End: 2024-03-25
Payer: MEDICARE

## 2024-03-26 ENCOUNTER — TELEPHONE (OUTPATIENT)
Age: 24
End: 2024-03-26

## 2024-03-27 ENCOUNTER — TELEMEDICINE (OUTPATIENT)
Age: 24
End: 2024-03-27
Payer: MEDICARE

## 2024-03-27 ENCOUNTER — HOSPITAL ENCOUNTER (OUTPATIENT)
Facility: HOSPITAL | Age: 24
Setting detail: RECURRING SERIES
Discharge: HOME | End: 2024-03-30
Payer: MEDICARE

## 2024-03-27 ENCOUNTER — PATIENT MESSAGE (OUTPATIENT)
Age: 24
End: 2024-03-27

## 2024-03-27 ENCOUNTER — TELEPHONE (OUTPATIENT)
Age: 24
End: 2024-03-27

## 2024-03-27 DIAGNOSIS — S20.162A: ICD-10-CM

## 2024-03-27 DIAGNOSIS — G90.A POTS (POSTURAL ORTHOSTATIC TACHYCARDIA SYNDROME): Primary | ICD-10-CM
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
## 2024-03-27 DIAGNOSIS — L08.9: ICD-10-CM

## 2024-03-27 DIAGNOSIS — D18.03 LIVER HEMANGIOMA: Primary | ICD-10-CM

## 2024-03-27 PROCEDURE — G8417 CALC BMI ABV UP PARAM F/U: HCPCS | Performed by: NURSE PRACTITIONER

## 2024-03-27 PROCEDURE — 97110 THERAPEUTIC EXERCISES: CPT

## 2024-03-27 PROCEDURE — 99213 OFFICE O/P EST LOW 20 MIN: CPT | Performed by: NURSE PRACTITIONER

## 2024-03-27 PROCEDURE — G0283 ELEC STIM OTHER THAN WOUND: HCPCS

## 2024-03-27 PROCEDURE — 1036F TOBACCO NON-USER: CPT | Performed by: NURSE PRACTITIONER

## 2024-03-27 PROCEDURE — G8484 FLU IMMUNIZE NO ADMIN: HCPCS | Performed by: NURSE PRACTITIONER

## 2024-03-27 PROCEDURE — G8427 DOCREV CUR MEDS BY ELIG CLIN: HCPCS | Performed by: NURSE PRACTITIONER

## 2024-03-27 RX ORDER — CEPHALEXIN 250 MG/1
250 CAPSULE ORAL 3 TIMES DAILY
Qty: 21 CAPSULE | Refills: 0 | Status: SHIPPED | OUTPATIENT
Start: 2024-03-27 | End: 2024-04-03

## 2024-03-27 NOTE — PROGRESS NOTES
Conchis Lazcano, was evaluated through a synchronous (real-time) audio-video encounter. The patient (or guardian if applicable) is aware that this is a billable service, which includes applicable co-pays. This Virtual Visit was conducted with patient's (and/or legal guardian's) consent. Patient identification was verified, and a caregiver was present when appropriate.   The patient was located at Home: [address] [place] [id number]  Provider was located at Home (Appt Dept State): RADHA Lazcano (:  2000) is a Established patient, presenting virtually for evaluation of the following:    Assessment & Plan   Below is the assessment and plan developed based on review of pertinent history, physical exam, labs, studies, and medications.  1. Liver hemangioma  -     AFL - Neal Kong MD, [facility] ([facility])  2. Infected insect bite of breast, left, initial encounter  -     cephALEXin (KEFLEX) 250 MG capsule; Take 1 capsule by mouth 3 times daily for 7 days, Disp-21 capsule, R-0Normal  -     mupirocin (BACTROBAN) 2 % ointment; Apply topically 3 times daily., Disp-30 g, R-1, Normal    No follow-ups on file.     Referral back to GI for second opinion conversation regarding risk/benefit of restarting ocp for migraine management  Given bactroban refill and keflex for infected bite of the chest/breast area  Reviewed wound care    Subjective   HPI  She has 2 areas of the chest that are red and scabbed/tender  Believes insect bite related from new dog   Using old tube of bactroban    She wants a second opinion regarding not being on ocp  Dr. Ross told her due to the liver hemangioma she was never allowed to take ocp again   She is struggling to function without it. It was the primary controller of her migraines. Ever since off med, daily headaches have been bad  Saw gyn who stated she needs to restart the ocp  Neuro consult said there is nothing else they can do if

## 2024-03-27 NOTE — PROGRESS NOTES
Chief Complaint   Patient presents with    Follow-up      Patient is on virtual for a f/u on her care that she has been receiving from other specialist.  Patient would like 2nd opinions on diagnosis.  Patient states she also has spider bite.      \"Have you been to the ER, urgent care clinic since your last visit?  Hospitalized since your last visit?\"    NO    “Have you seen or consulted any other health care providers outside of [facility] since your last visit?”    NO            Click Here for Release of Records Request

## 2024-03-27 NOTE — PROGRESS NOTES
Check with Dr Pemberton if he is ok referring patient to Dr Miller for POTS.    Referral and Last office note faxed to Dr Miller per patient request.    Faxed to [phone].

## 2024-03-27 NOTE — TELEPHONE ENCOUNTER
----- Message from Snow Mazariegos MD sent at 3/25/2024  9:04 AM EDT -----  Regarding: MEME: Hi!  Contact: [phone]  We should have somewhere that we can see her sooner in [place] if she prefers that.  Otherwise this coming Friday at [facility] is overbooked already but if we have cancellations then we can see if we can fit her in.  Can you call her and see what she would like to do      ----- Message -----  From: Tamie Byrd LPN  Sent: 3/25/2024   8:42 AM EDT  To: Snow Mazariegos MD  Subject: FW: Hi!                                            ----- Message -----  From: Conchis Lazcano  Sent: 3/22/2024   9:48 PM EDT  To: #  Subject: Hi!                                              I was wondering if I should move up my appointment? I was doing great and not hearing that woshing noise but over the past few days it is back. Could the tubs have fallen out? Or could I have wax like I did in December?

## 2024-03-28 DIAGNOSIS — F41.9 ANXIETY: Primary | ICD-10-CM

## 2024-03-28 RX ORDER — DIAZEPAM 5 MG/1
5 TABLET ORAL ONCE
Qty: 3 TABLET | Refills: 0 | Status: SHIPPED | OUTPATIENT
Start: 2024-03-28 | End: 2024-03-28

## 2024-03-28 NOTE — PROGRESS NOTES
Physical Therapy at [place],   a part of [place] [id number] [facility], [address]  [place], [place] [address]  Phone: [phone]  Fax: [phone]  PHYSICAL THERAPY PROGRESS NOTE  Patient Name:  Conchis Lazcano :  2000   Treatment/Medical Diagnosis: Other low back pain [M54.59]   Referral Source:  Jan Pemberton MD     Date of Initial Visit:  24 Attended Visits:  8 Missed Visits:  3     SUMMARY OF TREATMENT/ASSESSMENT:  Patient is progressing very well towards goals. Patient is demonstrating improvement in overall exercise tolerance in therapy.  Other health issues are limiting progress.  Patient recently has experienced an increase in dizziness that is unchanged with position. Will be following up with ENT on this issue. Will continue 1 x week for 4 weeks to reach goals and improve ADLs tolerance.     Short Term Goals: To be accomplished in 8 treatments.  1) The patient will be independent with introductory HEP MET  2) The patient will improve lumbar flexion AROM to reaching 12 inches from floor to improve ease with picking up items off the floor NT due to dizziness  3) The patient will transfer sit <> stand without an increase in dizziness MET previously  Long Term Goals: To be accomplished in 24 treatments.  1) The patient will demonstrate 5/5 BLE strength to improve ease with household chores  2) The patient will improve FOTO survey to a 58% or greater to indicate a significant improvement in function  3) The patient will ambulate 20 minutes without a significant increase in pain to improve ease running errands         RECOMMENDATIONS FOR SKILLED THERAPY  Continue PT 1x/week for up to 4 additional weeks        Wanda Saucedo, PT       3/28/2024       11:20 AM    If you have any questions/comments please contact us directly at [phone].   Thank you for allowing us to assist in the care of your patient.    
patient. Patient recently has experienced an increase in dizziness that is unchanged with position. Will be following up with ENT on this issue. Will continue 1 x week for 4 weeks to reach goals and improve ADLs tolerance.     Short Term Goals: To be accomplished in 8 treatments.  1) The patient will be independent with introductory HEP MET  2) The patient will improve lumbar flexion AROM to reaching 12 inches from floor to improve ease with picking up items off the floor NT due to dizziness  3) The patient will transfer sit <> stand without an increase in dizziness MET previously  Long Term Goals: To be accomplished in 24 treatments.  1) The patient will demonstrate 5/5 BLE strength to improve ease with household chores  2) The patient will improve FOTO survey to a 58% or greater to indicate a significant improvement in function  3) The patient will ambulate 20 minutes without a significant increase in pain to improve ease running errands       PLAN  YES Continue plan of care  Re-Cert Due: NA  [x]  Upgrade activities as tolerated  []  Discharge due to :  []  Other:      Ajay John PTA       3/27/2024       1:54 PM

## 2024-03-31 ENCOUNTER — TELEPHONE (OUTPATIENT)
Age: 24
End: 2024-03-31

## 2024-03-31 DIAGNOSIS — G90.A POTS (POSTURAL ORTHOSTATIC TACHYCARDIA SYNDROME): Primary | ICD-10-CM

## 2024-03-31 PROBLEM — E11.9 TYPE 2 DIABETES MELLITUS (HCC): Status: RESOLVED | Noted: 2022-12-13 | Resolved: 2024-03-31

## 2024-04-01 ENCOUNTER — CLINICAL DOCUMENTATION (OUTPATIENT)
Age: 24
End: 2024-04-01

## 2024-04-02 ENCOUNTER — TELEPHONE (OUTPATIENT)
Age: 24
End: 2024-04-02

## 2024-04-02 NOTE — TELEPHONE ENCOUNTER
----- Message from Snow Mazariegos MD sent at 3/25/2024  9:04 AM EDT -----  Regarding: MEME: Hi!  Contact: [phone]  We should have somewhere that we can see her sooner in [place] if she prefers that.  Otherwise this coming Friday at [facility] is overbooked already but if we have cancellations then we can see if we can fit her in.  Can you call her and see what she would like to do      ----- Message -----  From: Tamie Byrd LPN  Sent: 3/25/2024   8:42 AM EDT  To: Snow Mazariegos MD  Subject: FW: Hi!                                            ----- Message -----  From: Conchis Lazcano  Sent: 3/22/2024   9:48 PM EDT  To: #  Subject: Hi!                                              I was wondering if I should move up my appointment? I was doing great and not hearing that woshing noise but over the past few days it is back. Could the tubs have fallen out? Or could I have wax like I did in December?

## 2024-04-03 ENCOUNTER — HOSPITAL ENCOUNTER (OUTPATIENT)
Facility: HOSPITAL | Age: 24
Setting detail: RECURRING SERIES
End: 2024-04-03
Payer: MEDICARE

## 2024-04-03 ENCOUNTER — TELEPHONE (OUTPATIENT)
Age: 24
End: 2024-04-03

## 2024-04-03 DIAGNOSIS — F40.240 CLAUSTROPHOBIA: Primary | ICD-10-CM

## 2024-04-03 RX ORDER — LORAZEPAM 1 MG/1
1 TABLET ORAL ONCE
Qty: 1 TABLET | Refills: 0 | Status: SHIPPED | OUTPATIENT
Start: 2024-04-03 | End: 2024-04-03

## 2024-04-03 NOTE — TELEPHONE ENCOUNTER
Patient would like to discuss a MRI that she is having this coming Wednesday. Patient's phone: [phone]

## 2024-04-03 NOTE — TELEPHONE ENCOUNTER
Patient called stating she was advised from Dr White's office to contact the surgeon in regards to the MRI Furthermore she would like to discuss other things that are going on as well

## 2024-04-03 NOTE — TELEPHONE ENCOUNTER
Returned call to patient.  Two patient identifiers used.  Pt was calling to get an rx for Lorazapam.  She stated Dr. Ross's office sent the wrong rx. Advised pt to call Dr. Ross's office and see if they can resend the rx she can also reach out to her PCP.  Pt stated she will reach out to PCP

## 2024-04-03 NOTE — TELEPHONE ENCOUNTER
Called and spoke to patient, patient is requesting lorazepam to be ordered before the MRI on next Wednesday due to high anxiety. She states that lorazepam is the only thing she can take.

## 2024-04-03 NOTE — TELEPHONE ENCOUNTER
[facility]. Lorelei

## 2024-04-05 DIAGNOSIS — F41.9 ANXIETY: Primary | ICD-10-CM

## 2024-04-05 RX ORDER — LORAZEPAM 1 MG/1
2 TABLET ORAL EVERY 8 HOURS PRN
Qty: 2 TABLET | Refills: 0 | Status: SHIPPED | OUTPATIENT
Start: 2024-04-05 | End: 2024-05-05

## 2024-04-08 ENCOUNTER — APPOINTMENT (OUTPATIENT)
Facility: HOSPITAL | Age: 24
End: 2024-04-08
Payer: MEDICARE

## 2024-04-08 ENCOUNTER — TELEPHONE (OUTPATIENT)
Age: 24
End: 2024-04-08

## 2024-04-08 NOTE — TELEPHONE ENCOUNTER
----- Message from Edinson Ross MD sent at 4/5/2024  8:33 PM EDT -----  Regarding: RE: MRI   Contact: [phone]  I sent this to the [facility].  MLS        4/8/24@1139 PCP called medication into patient local pharmacy. I have canceled  script order.(KF)        ----- Message -----  From: Tamanna Gray RN  Sent: 3/28/2024   5:26 PM EDT  To: Edinson Ross MD  Subject: FW: MRI                                            ----- Message -----  From: Edinson Ross MD  Sent: 3/28/2024   4:46 PM EDT  To: Tamanna Gray RN  Subject: RE: MRI                                          I usually prescribe 3 valium pills a 5mg.  Take one when leave house or arrive at hospital depending upon how long trip to hospital is.  Take pill 2 when next fo MRI.  Take pill 3 if needed when get into MRI.  [facility]    ----- Message -----  From: Tamanna Gray RN  Sent: 3/26/2024  11:44 AM EDT  To: Edinson Ross MD  Subject: FW: MRI                                            ----- Message -----  From: Conchis Lazcano  Sent: 3/26/2024  10:39 AM EDT  To: #  Subject: MRI                                              Hi! I was wondering if you got my message about needing meds for the MRI?

## 2024-04-08 NOTE — TELEPHONE ENCOUNTER
----- Message from Edinson Ross MD sent at 4/5/2024  8:33 PM EDT -----  Regarding: RE: MRI   Contact: [phone]  I sent this to the [facility].  MLS    ----- Message -----  From: Tamanna Gray RN  Sent: 3/28/2024   5:26 PM EDT  To: Edinson Ross MD  Subject: FW: MRI                                            ----- Message -----  From: Edinson Ross MD  Sent: 3/28/2024   4:46 PM EDT  To: Tamanna Gray RN  Subject: RE: MRI                                          I usually prescribe 3 valium pills a 5mg.  Take one when leave house or arrive at hospital depending upon how long trip to hospital is.  Take pill 2 when next fo MRI.  Take pill 3 if needed when get into MRI.  MLS    ----- Message -----  From: Tamanna Gray RN  Sent: 3/26/2024  11:44 AM EDT  To: Edinson Ross MD  Subject: FW: MRI                                            ----- Message -----  From: Conchis Lazcano  Sent: 3/26/2024  10:39 AM EDT  To: #  Subject: MRI                                              Hi! I was wondering if you got my message about needing meds for the MRI?

## 2024-04-10 ENCOUNTER — HOSPITAL ENCOUNTER (OUTPATIENT)
Facility: HOSPITAL | Age: 24
Discharge: HOME | End: 2024-04-13
Attending: INTERNAL MEDICINE
Payer: MEDICARE

## 2024-04-10 VITALS — WEIGHT: 293 LBS | BODY MASS INDEX: 49.92 KG/M2

## 2024-04-10 DIAGNOSIS — D13.4 HEPATIC ADENOMA: ICD-10-CM

## 2024-04-10 PROCEDURE — 74183 MRI ABD W/O CNTR FLWD CNTR: CPT

## 2024-04-10 PROCEDURE — 6360000004 HC RX CONTRAST MEDICATION: Performed by: RADIOLOGY

## 2024-04-10 PROCEDURE — A9575 INJ GADOTERATE MEGLUMI 0.1ML: HCPCS | Performed by: RADIOLOGY

## 2024-04-10 RX ORDER — GADOTERATE MEGLUMINE 376.9 MG/ML
20 INJECTION INTRAVENOUS
Status: COMPLETED | OUTPATIENT
Start: 2024-04-10 | End: 2024-04-10

## 2024-04-10 RX ADMIN — GADOTERATE MEGLUMINE 20 ML: 376.9 INJECTION, SOLUTION INTRAVENOUS at 18:31

## 2024-04-11 ENCOUNTER — OFFICE VISIT (OUTPATIENT)
Age: 24
End: 2024-04-11
Payer: MEDICARE

## 2024-04-11 VITALS
WEIGHT: 293 LBS | TEMPERATURE: 98.1 F | HEIGHT: 65 IN | RESPIRATION RATE: 15 BRPM | OXYGEN SATURATION: 96 % | DIASTOLIC BLOOD PRESSURE: 80 MMHG | SYSTOLIC BLOOD PRESSURE: 140 MMHG | BODY MASS INDEX: 48.82 KG/M2 | HEART RATE: 110 BPM

## 2024-04-11 VITALS
RESPIRATION RATE: 16 BRPM | SYSTOLIC BLOOD PRESSURE: 121 MMHG | HEART RATE: 114 BPM | WEIGHT: 293 LBS | DIASTOLIC BLOOD PRESSURE: 95 MMHG | HEIGHT: 65 IN | OXYGEN SATURATION: 98 % | BODY MASS INDEX: 48.82 KG/M2 | TEMPERATURE: 97.6 F

## 2024-04-11 DIAGNOSIS — D13.4 HEPATIC ADENOMA: Primary | ICD-10-CM

## 2024-04-11 DIAGNOSIS — K76.89 FOCAL NODULAR HYPERPLASIA OF LIVER: ICD-10-CM

## 2024-04-11 PROBLEM — M79.2 NEUROPATHIC PAIN: Status: RESOLVED | Noted: 2022-11-17 | Resolved: 2024-04-11

## 2024-04-11 PROBLEM — E11.9 TYPE 2 DIABETES MELLITUS (HCC): Status: RESOLVED | Noted: 2024-04-11 | Resolved: 2024-04-11

## 2024-04-11 PROBLEM — L98.9 SKIN LESIONS: Status: RESOLVED | Noted: 2022-04-28 | Resolved: 2024-04-11

## 2024-04-11 PROBLEM — D72.828 NEUTROPHILIA: Status: RESOLVED | Noted: 2022-05-20 | Resolved: 2024-04-11

## 2024-04-11 PROBLEM — I89.0 LYMPHEDEMA: Status: RESOLVED | Noted: 2023-07-25 | Resolved: 2024-04-11

## 2024-04-11 PROBLEM — D64.9 ANEMIA: Status: RESOLVED | Noted: 2024-02-04 | Resolved: 2024-04-11

## 2024-04-11 PROBLEM — K29.50 CHRONIC GASTRITIS WITHOUT BLEEDING: Status: RESOLVED | Noted: 2019-06-20 | Resolved: 2024-04-11

## 2024-04-11 PROBLEM — D72.821 MONOCYTOSIS: Status: RESOLVED | Noted: 2022-05-20 | Resolved: 2024-04-11

## 2024-04-11 PROBLEM — E78.89 LIPIDS ABNORMAL: Status: RESOLVED | Noted: 2018-02-20 | Resolved: 2024-04-11

## 2024-04-11 PROBLEM — E11.9 TYPE 2 DIABETES MELLITUS (HCC): Status: ACTIVE | Noted: 2024-04-11

## 2024-04-11 PROBLEM — I10 HYPERTENSION: Status: RESOLVED | Noted: 2018-02-20 | Resolved: 2024-04-11

## 2024-04-11 PROBLEM — D72.9 NEUTROPHILIA: Status: RESOLVED | Noted: 2022-05-20 | Resolved: 2024-04-11

## 2024-04-11 PROBLEM — R11.0 CHRONIC NAUSEA: Status: RESOLVED | Noted: 2019-05-16 | Resolved: 2024-04-11

## 2024-04-11 PROCEDURE — 99214 OFFICE O/P EST MOD 30 MIN: CPT | Performed by: INTERNAL MEDICINE

## 2024-04-11 PROCEDURE — 99213 OFFICE O/P EST LOW 20 MIN: CPT | Performed by: SURGERY

## 2024-04-11 PROCEDURE — 1036F TOBACCO NON-USER: CPT | Performed by: INTERNAL MEDICINE

## 2024-04-11 PROCEDURE — G8417 CALC BMI ABV UP PARAM F/U: HCPCS | Performed by: SURGERY

## 2024-04-11 PROCEDURE — 1036F TOBACCO NON-USER: CPT | Performed by: SURGERY

## 2024-04-11 PROCEDURE — G8427 DOCREV CUR MEDS BY ELIG CLIN: HCPCS | Performed by: SURGERY

## 2024-04-11 PROCEDURE — G8427 DOCREV CUR MEDS BY ELIG CLIN: HCPCS | Performed by: INTERNAL MEDICINE

## 2024-04-11 PROCEDURE — G8417 CALC BMI ABV UP PARAM F/U: HCPCS | Performed by: INTERNAL MEDICINE

## 2024-04-11 RX ORDER — LORAZEPAM 1 MG/1
2 TABLET ORAL ONCE
Qty: 2 TABLET | Refills: 0 | Status: SHIPPED | OUTPATIENT
Start: 2024-04-11 | End: 2024-04-11

## 2024-04-11 RX ORDER — MONTELUKAST SODIUM 10 MG/1
TABLET ORAL
COMMUNITY
Start: 2024-02-09

## 2024-04-11 RX ORDER — SERTRALINE HYDROCHLORIDE 100 MG/1
150 TABLET, FILM COATED ORAL DAILY
COMMUNITY
Start: 2024-03-18

## 2024-04-11 ASSESSMENT — PATIENT HEALTH QUESTIONNAIRE - PHQ9
SUM OF ALL RESPONSES TO PHQ QUESTIONS 1-9: 0
2. FEELING DOWN, DEPRESSED OR HOPELESS: NOT AT ALL
2. FEELING DOWN, DEPRESSED OR HOPELESS: NOT AT ALL
1. LITTLE INTEREST OR PLEASURE IN DOING THINGS: NOT AT ALL
SUM OF ALL RESPONSES TO PHQ QUESTIONS 1-9: 0
1. LITTLE INTEREST OR PLEASURE IN DOING THINGS: NOT AT ALL
SUM OF ALL RESPONSES TO PHQ QUESTIONS 1-9: 0
SUM OF ALL RESPONSES TO PHQ9 QUESTIONS 1 & 2: 0
SUM OF ALL RESPONSES TO PHQ QUESTIONS 1-9: 0
SUM OF ALL RESPONSES TO PHQ QUESTIONS 1-9: 0
SUM OF ALL RESPONSES TO PHQ9 QUESTIONS 1 & 2: 0
SUM OF ALL RESPONSES TO PHQ QUESTIONS 1-9: 0

## 2024-04-11 NOTE — PROGRESS NOTES
[facility]      Edinson Ross MD, FACP, FACG, FAASLD      SUMIT SaldivarC    Inez Rosas, [facility]   Janette Jenkinssandrabrady, [facility]   Maegan Aaron, FNP-C  Otis De Guzman, [facility]-   Velvet Paula, [facility]   Bri Fryon, [facility]      Liver [facility]   [address], [address]   [place]  [address] [phone]   FAX: [phone]  [facility]   [address], [address]   [place]  [address] [phone]   FAX: [phone]       Patient Care Team:  Lorelei Terry APRN - NP as PCP - General (Family Medicine)  Lorelei Terry APRN - NP as PCP - [facility]  Conchis Kirk PA-C as Referring Physician  Jenny Marcus MD as Physician (Endocrinology)  Swapna Haines MD (Obstetrics & Gynecology)      Patient Active Problem List   Diagnosis    FIORDALIZA (generalized anxiety disorder)    Gastroesophageal reflux disease without esophagitis    Edema, peripheral    Sucrose intolerance    Atypical depression    Migraine headache    Autism    Gastroparesis    Tenosynovitis of right hand    ADHD (attention deficit hyperactivity disorder)    Obesity, morbid (HCC)    Undifferentiated connective tissue disease (HCC)    Severe persistent asthma with acute exacerbation    Cardiomyopathy, unspecified type (HCC)    Chronic right-sided low back pain with right-sided sciatica    Atopic dermatitis    Hepatic adenoma    Focal nodular hyperplasia of liver       The clinicians listed above have asked me to see Conchis Lazcano in consultation regarding a liver mass.      All medical records sent by the referring physicians were reviewed including imaging studies     The patient is a 23 y.o. female without any history of previous liver disease.      The patient underwent a CT scan and then an MRI when she developed RUQ

## 2024-04-11 NOTE — PROGRESS NOTES
[facility]      Clinic Note - Follow up    Subjective     Conchis Lazcano returns for scheduled follow up today.  She is known to me for history of several hepatic adenomas, the largest of which measured 7.5 cm in segment 5 with some intralesional hemorrhage.  The patient was previously on long-term OCPs.  The patient has had issues with migraines since stopping OCPs.  The migraines are worse during her menstrual cycle and leading up to the cycle.  Otherwise she denies any new issues.  No complaints of abdominal pain.  She had an MRI yesterday for interval follow-up of the adenomas and saw Hepatology as well this morning.    The patient denies any changes to the PMHX, PSHx, SHx or FHx since their last visit except as mentioned above.      ROS is negative except as mentioned in the HPI.       Objective     BP (!) 140/80 (Site: Right Upper Arm, Position: Sitting, Cuff Size: Large Adult)   Pulse (!) 110   Temp 98.1 °F (36.7 °C) (Oral)   Resp 15   Ht 1.651 m (5' 5\")   Wt 134.4 kg (296 lb 6.4 oz)   SpO2 96%   BMI 49.32 kg/m²       PE  GEN - Awake, alert, communicating appropriately.  NAD  Pulm - CTAB  CV - RRR  Abd - soft, NT, ND.  Obese.  No palpable masses or organomegaly palpable.  Ext - warm, well perfused, no edema.   All other systems negative unless indicated above.      Labs  None    Imaging  4/10/24 MRI Abd: Final read pending.  On my review, the lesions appear stable or smaller with resolution of the intra vaginal hemorrhage.    Assessment     Conchis Lazcano is a 23 y.o.yr old female known to me for history of several hepatic adenomas, the largest of which measured 7.5 cm in segment 5 with some intralesional hemorrhage.  The patient was previously on long-term OCPs but is currently off any hormonal therapy.     Plan     I will follow-up on the final radiology review but she states Dr. Ross reviewed the imaging with radiology this morning and felt that the lesions were stable.  I

## 2024-04-11 NOTE — PROGRESS NOTES
Identified patient with two patient identifiers (name and ). Reviewed chart in preparation for visit and have obtained necessary documentation.    Conchis Lazcano is a 23 y.o. female  Chief Complaint   Patient presents with    Follow-up     BP (!) 140/80 (Site: Right Upper Arm, Position: Sitting, Cuff Size: Large Adult)   Pulse (!) 110   Temp 98.1 °F (36.7 °C) (Oral)   Resp 15   Ht 1.651 m (5' 5\")   Wt 134.4 kg (296 lb 6.4 oz)   SpO2 96%   BMI 49.32 kg/m²     1. Have you been to the ER, urgent care clinic since your last visit?  Hospitalized since your last visit?no    2. Have you seen or consulted any other health care providers outside of the  since your last visit?  Include any pap smears or colon screening. Yes [facility] and Psychiatrist     Per patient decline 2nd BP check

## 2024-04-11 NOTE — PROGRESS NOTES
Identified pt with two pt identifiers(name and ). Reviewed record in preparation for visit and have obtained necessary documentation.  Vitals:    24 0913   BP: (!) 121/95   Site: Right Lower Arm   Position: Sitting   Cuff Size: Medium Adult   Pulse: (!) 114   Resp: 16   Temp: 97.6 °F (36.4 °C)   TempSrc: Temporal   SpO2: 98%   Weight: 134.7 kg (297 lb)   Height: 1.651 m (5' 5\")        Health Maintenance Review: Patient reminded of \"due or due soon\" health maintenance. I have asked the patient to contact his/her primary care provider (PCP) for follow-up on his/her health maintenance.    Coordination of Care Questionnaire:  :   1) Have you been to an emergency room, urgent care, or hospitalized since your last visit?  If yes, where when, and reason for visit? no       2. Have seen or consulted any other health care provider since your last visit?   If yes, where when, and reason for visit?  Yes, regular f/u with care team and therapist      Patient is accompanied by mother and  friend I have received verbal consent from Conchis Lazcano to discuss any/all medical information while they are present in the room.

## 2024-04-12 ENCOUNTER — HOSPITAL ENCOUNTER (OUTPATIENT)
Facility: HOSPITAL | Age: 24
Setting detail: RECURRING SERIES
Discharge: HOME | End: 2024-04-15
Payer: MEDICARE

## 2024-04-12 ENCOUNTER — TRANSCRIBE ORDERS (OUTPATIENT)
Facility: HOSPITAL | Age: 24
End: 2024-04-12

## 2024-04-12 ENCOUNTER — TELEPHONE (OUTPATIENT)
Age: 24
End: 2024-04-12

## 2024-04-12 DIAGNOSIS — D13.4 HEPATIC ADENOMA: Primary | ICD-10-CM

## 2024-04-12 PROCEDURE — 97110 THERAPEUTIC EXERCISES: CPT | Performed by: PHYSICAL THERAPIST

## 2024-04-12 PROCEDURE — G0283 ELEC STIM OTHER THAN WOUND: HCPCS | Performed by: PHYSICAL THERAPIST

## 2024-04-12 PROCEDURE — 97112 NEUROMUSCULAR REEDUCATION: CPT | Performed by: PHYSICAL THERAPIST

## 2024-04-12 NOTE — PROGRESS NOTES
min  unbilled []  Ice     []  Heat            location/position:         min []  Vasopneumatic Device,      press/temp:   pre-treatment girth :    post-treatment girth :    measured at (landmark       location) :   If using vaso (only need to measure limb vaso being performed on)        min []  Other:        Skin assessment post-treatment (if applicable):    [x]  intact    []  redness- no adverse reaction                 []redness - adverse reaction:             Therapeutic Procedures:  Tx Min Billable or 1:1 Min (if diff from Tx Min) Procedure, Rationale, Specifics   30  38215 Therapeutic Exercise (timed):  increase ROM, strength, coordination, balance, and proprioception to improve patient's ability to progress to PLOF and address remaining functional goals. (see flow sheet as applicable)     Details if applicable:     15  47769 Neuromuscular Re-Education (timed):  improve balance, coordination, kinesthetic sense, posture, core stability and proprioception to improve patient's ability to develop conscious control of individual muscles and awareness of position of extremities in order to progress to PLOF and address remaining functional goals. (see flow sheet as applicable)    Details: TMJ activities     45     Total Total       [x]  Patient Education billed concurrently with other procedures   [x] Review HEP    [] Progressed/Changed HEP, detail:    [] Other detail:         Other Objective/Functional Measures  Pain with mandibular protrusion and L and R mandibular deviation  Difficulty sensing tongue on L side of mouth    Pain Level at end of session (0-10 scale): \"good\"      Assessment   Good tolerance of new activities, adjusted HEP  Patient will continue to benefit from skilled PT / OT services to modify and progress therapeutic interventions, analyze and address functional mobility deficits, analyze and address ROM deficits, analyze and address strength deficits, analyze and address soft tissue restrictions,

## 2024-04-12 NOTE — TELEPHONE ENCOUNTER
4/12/24@0911 Central Scheduling called to change order date. Patient will have MRI on 10/11/24 and order start date is 10/11/24 so change to 10/1/24 per scheduling dept.(KF)

## 2024-04-15 ENCOUNTER — HOSPITAL ENCOUNTER (OUTPATIENT)
Facility: HOSPITAL | Age: 24
Setting detail: INFUSION SERIES
Discharge: HOME | End: 2024-04-15
Payer: MEDICARE

## 2024-04-15 VITALS
HEART RATE: 80 BPM | TEMPERATURE: 97.7 F | HEIGHT: 65 IN | RESPIRATION RATE: 19 BRPM | OXYGEN SATURATION: 98 % | BODY MASS INDEX: 48.82 KG/M2 | WEIGHT: 293 LBS | SYSTOLIC BLOOD PRESSURE: 136 MMHG | DIASTOLIC BLOOD PRESSURE: 98 MMHG

## 2024-04-15 DIAGNOSIS — R97.8 OTHER ABNORMAL TUMOR MARKERS: ICD-10-CM

## 2024-04-15 DIAGNOSIS — C24.0 MALIGNANT NEOPLASM OF EXTRAHEPATIC BILE DUCT (HCC): ICD-10-CM

## 2024-04-15 DIAGNOSIS — G43.919 INTRACTABLE MIGRAINE WITHOUT STATUS MIGRAINOSUS, UNSPECIFIED MIGRAINE TYPE: Primary | ICD-10-CM

## 2024-04-15 DIAGNOSIS — Z11.59 ENCOUNTER FOR SCREENING FOR OTHER VIRAL DISEASES: ICD-10-CM

## 2024-04-15 DIAGNOSIS — D13.4 HEPATIC ADENOMA: ICD-10-CM

## 2024-04-15 DIAGNOSIS — Z72.89 OTHER PROBLEMS RELATED TO LIFESTYLE: ICD-10-CM

## 2024-04-15 LAB
CEA SERPL-MCNC: 1 NG/ML
FERRITIN SERPL-MCNC: 29 NG/ML (ref 26–388)
HBV SURFACE AB SER QL: REACTIVE
HBV SURFACE AB SER-ACNC: 12.28 MIU/ML
HBV SURFACE AG SER QL: 0.11 INDEX
HBV SURFACE AG SER QL: NEGATIVE
IRON SATN MFR SERPL: 17 % (ref 20–50)
IRON SERPL-MCNC: 64 UG/DL (ref 35–150)
TIBC SERPL-MCNC: 387 UG/DL (ref 250–450)

## 2024-04-15 PROCEDURE — 87340 HEPATITIS B SURFACE AG IA: CPT

## 2024-04-15 PROCEDURE — 6360000002 HC RX W HCPCS: Performed by: PSYCHIATRY & NEUROLOGY

## 2024-04-15 PROCEDURE — 82107 ALPHA-FETOPROTEIN L3: CPT

## 2024-04-15 PROCEDURE — 96375 TX/PRO/DX INJ NEW DRUG ADDON: CPT

## 2024-04-15 PROCEDURE — 82728 ASSAY OF FERRITIN: CPT

## 2024-04-15 PROCEDURE — 86704 HEP B CORE ANTIBODY TOTAL: CPT

## 2024-04-15 PROCEDURE — 86301 IMMUNOASSAY TUMOR CA 19-9: CPT

## 2024-04-15 PROCEDURE — 82103 ALPHA-1-ANTITRYPSIN TOTAL: CPT

## 2024-04-15 PROCEDURE — 83550 IRON BINDING TEST: CPT

## 2024-04-15 PROCEDURE — 2580000003 HC RX 258: Performed by: PSYCHIATRY & NEUROLOGY

## 2024-04-15 PROCEDURE — 82378 CARCINOEMBRYONIC ANTIGEN: CPT

## 2024-04-15 PROCEDURE — 36415 COLL VENOUS BLD VENIPUNCTURE: CPT

## 2024-04-15 PROCEDURE — 86803 HEPATITIS C AB TEST: CPT

## 2024-04-15 PROCEDURE — 86706 HEP B SURFACE ANTIBODY: CPT

## 2024-04-15 PROCEDURE — 96413 CHEMO IV INFUSION 1 HR: CPT

## 2024-04-15 PROCEDURE — 83540 ASSAY OF IRON: CPT

## 2024-04-15 RX ORDER — ONDANSETRON 2 MG/ML
4 INJECTION INTRAMUSCULAR; INTRAVENOUS ONCE
Status: COMPLETED | OUTPATIENT
Start: 2024-04-15 | End: 2024-04-15

## 2024-04-15 RX ORDER — SODIUM CHLORIDE 9 MG/ML
5-250 INJECTION, SOLUTION INTRAVENOUS PRN
OUTPATIENT
Start: 2024-07-08

## 2024-04-15 RX ORDER — ONDANSETRON 2 MG/ML
4 INJECTION INTRAMUSCULAR; INTRAVENOUS EVERY 6 HOURS PRN
Start: 2024-07-08

## 2024-04-15 RX ORDER — ONDANSETRON 2 MG/ML
4 INJECTION INTRAMUSCULAR; INTRAVENOUS ONCE
Start: 2024-07-08 | End: 2024-07-08

## 2024-04-15 RX ORDER — SODIUM CHLORIDE 0.9 % (FLUSH) 0.9 %
5-40 SYRINGE (ML) INJECTION PRN
OUTPATIENT
Start: 2024-07-08

## 2024-04-15 RX ADMIN — EPTINEZUMAB-JJMR 300 MG: 100 INJECTION INTRAVENOUS at 17:09

## 2024-04-15 RX ADMIN — ONDANSETRON 4 MG: 2 INJECTION INTRAMUSCULAR; INTRAVENOUS at 16:40

## 2024-04-15 ASSESSMENT — PAIN SCALES - GENERAL
PAINLEVEL_OUTOF10: 6
PAINLEVEL_OUTOF10: 6

## 2024-04-15 ASSESSMENT — PAIN DESCRIPTION - LOCATION: LOCATION: HEAD

## 2024-04-15 NOTE — PROGRESS NOTES
[facility]fusion [facility] Progress Note        Date: 04/15/24    Name: Conchis Lazcano    MRN: [id number]         : 2000      Ms. Lazcano admitted to [facility] for Routine Vyepti q3 months ambulatory in stable condition. Assessment completed. No new concerns voiced.  24 gauge peripheral IV obtained in the left forearm without difficulty, line flushed and capped Labs drawn and sent for processing. Of note- patient had asked to have labs drawn that were placed by another MD as she is a hard stick and [facility] could not obtain blood specimen.      ** Patient has received this medication in the past. Patient reports no previous reactions to the medication(s). Patient reports the following side effects: nausea- zofran given.        Vitals:    04/15/24 1617 04/15/24 1744   BP: (!) 156/96 (!) 136/98   Pulse: 86 80   Resp: 18 19   Temp: 97.7 °F (36.5 °C) 97.7 °F (36.5 °C)   TempSrc: Temporal Temporal   SpO2: 96% 98%   Weight: 134.7 kg (297 lb)    Height: 1.651 m (5' 5\")            Medications:  MEDICATIONS GIVEN:  Medications Administered         eptinezumab-jjmr (VYEPTI) 300 mg in sodium chloride 0.9 % 113 mL IVPB Admin Date  04/15/2024 Action  New Bag Dose  300 mg Rate  226 mL/hr Route  IntraVENous Administered By  Jonathan Doty RN        ondansetron (ZOFRAN) injection 4 mg Admin Date  04/15/2024 Action  Given Dose  4 mg Rate   Route  IntraVENous Administered By  Jonathan Doty RN                Post-Infusion Vitals:  Vitals:    04/15/24 1744   BP: (!) 136/98   Pulse: 80   Resp: 19   Temp: 97.7 °F (36.5 °C)   SpO2: 98%         Pt tolerated treatment well. PIV maintained positive blood return throughout treatment, flushed with positive blood return at conclusion and removed and wrapped in coban per protocol. D/c home ambulatory in no distress. Patient is to follow up to reschedule their next appointment.         Future Appointments:  Future Appointments   Date Time Provider Department Center   2024  2:45 PM [facility]

## 2024-04-17 ENCOUNTER — HOSPITAL ENCOUNTER (OUTPATIENT)
Facility: HOSPITAL | Age: 24
Discharge: HOME | End: 2024-04-20
Payer: MEDICARE

## 2024-04-17 ENCOUNTER — TRANSCRIBE ORDERS (OUTPATIENT)
Facility: HOSPITAL | Age: 24
End: 2024-04-17

## 2024-04-17 ENCOUNTER — HOSPITAL ENCOUNTER (OUTPATIENT)
Facility: HOSPITAL | Age: 24
Setting detail: RECURRING SERIES
End: 2024-04-17
Payer: MEDICARE

## 2024-04-17 DIAGNOSIS — J45.51 SEVERE PERSISTENT ASTHMA WITH EXACERBATION: Primary | ICD-10-CM

## 2024-04-17 DIAGNOSIS — J45.51 SEVERE PERSISTENT ASTHMA WITH EXACERBATION: ICD-10-CM

## 2024-04-17 LAB
AFP L3 MFR SERPL: NORMAL % (ref 0–9.9)
AFP SERPL-MCNC: 1.2 NG/ML (ref 0–4.7)
HBV CORE AB SERPL QL IA: NEGATIVE
HCV AB SERPL QL IA: NORMAL
HCV IGG SERPL QL IA: NON REACTIVE S/CO RATIO

## 2024-04-17 PROCEDURE — 71046 X-RAY EXAM CHEST 2 VIEWS: CPT

## 2024-04-18 LAB
A1AT SERPL-MCNC: 134 MG/DL (ref 100–188)
CANCER AG19-9 SERPL-ACNC: <2 U/ML (ref 0–35)

## 2024-04-19 ENCOUNTER — OFFICE VISIT (OUTPATIENT)
Age: 24
End: 2024-04-19
Payer: MEDICARE

## 2024-04-19 ENCOUNTER — PATIENT MESSAGE (OUTPATIENT)
Age: 24
End: 2024-04-19

## 2024-04-19 VITALS
SYSTOLIC BLOOD PRESSURE: 126 MMHG | OXYGEN SATURATION: 98 % | WEIGHT: 293 LBS | HEIGHT: 65 IN | BODY MASS INDEX: 48.82 KG/M2 | DIASTOLIC BLOOD PRESSURE: 70 MMHG | RESPIRATION RATE: 16 BRPM | HEART RATE: 84 BPM

## 2024-04-19 DIAGNOSIS — H69.93 ETD (EUSTACHIAN TUBE DYSFUNCTION), BILATERAL: Primary | ICD-10-CM

## 2024-04-19 DIAGNOSIS — J34.89 NASAL VESTIBULITIS: ICD-10-CM

## 2024-04-19 DIAGNOSIS — Z45.89 TYMPANOSTOMY TUBE CHECK: ICD-10-CM

## 2024-04-19 DIAGNOSIS — H93.8X1 PLUGGED FEELING IN EAR, RIGHT: ICD-10-CM

## 2024-04-19 PROCEDURE — G8427 DOCREV CUR MEDS BY ELIG CLIN: HCPCS | Performed by: OTOLARYNGOLOGY

## 2024-04-19 PROCEDURE — 99213 OFFICE O/P EST LOW 20 MIN: CPT | Performed by: OTOLARYNGOLOGY

## 2024-04-19 PROCEDURE — 1036F TOBACCO NON-USER: CPT | Performed by: OTOLARYNGOLOGY

## 2024-04-19 PROCEDURE — G8417 CALC BMI ABV UP PARAM F/U: HCPCS | Performed by: OTOLARYNGOLOGY

## 2024-04-19 NOTE — PROGRESS NOTES
Otolaryngology-Head and Neck Surgery  Follow Up Patient Visit     Patient: Conchis Lazcano  YOB: 2000  MRN: [id number]  Date of Service: 4/19/2024      Chief Complaint:   Chief Complaint   Patient presents with    Ear Pain    Sinus Infection     Interval hx 4/19/2024  S/p BMTT 10/2023 (in OR)    Initially did very well after the ear tubes with resolution of ear pain and plugging  In the last month has developed right ear plugging again  Wonders if the tube has extruded    Has also noticed intermittent epistaxis, left-sided  No ER requirement    Interval hx   1 mo s/p R myringotomy in the office  Found this really helped her ear symptoms - resolution of plugging and pulsing   Last week right ear plugging recurred   Presents for possible tube placement today     Interval hx  On dupixent now for 4-5 months  Allergies may be somewhat improved  Did have asthma exacerbation requiring significant prednisone Rx  R > L ear plugging remains very bothersome    History of Present Illness: Conchis Lazcano is a 22 y.o. year old female who presents today for discussion of frequent ear infections and sinus pain    Over the last couple of years has had nasal congestion, sinus pain, concern for infections  Also has frequent otalgia and told has fluid in her ears    Has seen a few different providers, including allergy at [facility] as well as [facility] - allergy testing at [facility] did show some allergies and she may have started immunotherapy, but did not continue due to accessibility. Allergy testing at [facility] was apparently unremarkable    Has also seen Otology at [facility], unremarkable     Past Medical History:  Past Medical History:   Diagnosis Date    Abdominal migraine     Dr. David Rodriguez.  vs Sucrose Intolerance.    Acid reflux     ADHD (attention deficit hyperactivity disorder)     Asthma     Autism     High Functioning.  Dr. Ndiaye.    Chronic rhinitis 2021    Dr. Alonso Ward, allergist.     Connective tissue disease

## 2024-04-19 NOTE — TELEPHONE ENCOUNTER
From: Conchis Lazcano  To: Dr. Jan Pemberton  Sent: 4/19/2024 10:47 AM EDT  Subject: Leg pump     Hi!  I got the leg pump about 2 weeks ago! I have started using it and already have noticed a difference. Just wanted to update you and let you and your nurse know I got it. I do have a question when I was getting the manual drainage with the therapist they would not touch me when I had a asthma flare is that the same when I use the pump to not do it when I am sick or have flare with asthma?

## 2024-04-23 ENCOUNTER — TELEPHONE (OUTPATIENT)
Age: 24
End: 2024-04-23

## 2024-04-23 NOTE — TELEPHONE ENCOUNTER
[facility] needs the order to be resent with the correct name Conchis Lazcano because on the fax that was sent has Michelle Lazcano on it.

## 2024-04-26 ENCOUNTER — HOSPITAL ENCOUNTER (OUTPATIENT)
Facility: HOSPITAL | Age: 24
Setting detail: RECURRING SERIES
Discharge: HOME | End: 2024-04-29
Payer: MEDICARE

## 2024-04-26 PROCEDURE — G0283 ELEC STIM OTHER THAN WOUND: HCPCS | Performed by: PHYSICAL THERAPIST

## 2024-04-26 PROCEDURE — 97112 NEUROMUSCULAR REEDUCATION: CPT | Performed by: PHYSICAL THERAPIST

## 2024-04-26 PROCEDURE — 97110 THERAPEUTIC EXERCISES: CPT | Performed by: PHYSICAL THERAPIST

## 2024-04-26 NOTE — PROGRESS NOTES
analyze and cue for proper movement patterns, analyze and modify for postural abnormalities, and analyze and address imbalance/dizziness to address functional deficits and attain remaining goals.    Progress toward goals / Updated goals:  []  See Progress Note/Recertification  Added in vestibular exercises    Short Term Goals: To be accomplished in 8 treatments.  1) The patient will be independent with introductory HEP MET  2) The patient will improve lumbar flexion AROM to reaching 12 inches from floor to improve ease with picking up items off the floor NT due to dizziness  3) The patient will transfer sit <> stand without an increase in dizziness MET previously  Long Term Goals: To be accomplished in 24 treatments.  1) The patient will demonstrate 5/5 BLE strength to improve ease with household chores  2) The patient will improve FOTO survey to a 58% or greater to indicate a significant improvement in function  3) The patient will ambulate 20 minutes without a significant increase in pain to improve ease running errands     PLAN  YES Continue plan of care  Re-Cert Due: NA  [x]  Upgrade activities as tolerated  []  Discharge due to :  []  Other:      Wanda Saucedo, PT       4/26/2024       11:15 AM

## 2024-04-28 PROBLEM — K76.89 FOCAL NODULAR HYPERPLASIA OF LIVER: Status: ACTIVE | Noted: 2024-04-28

## 2024-04-28 PROBLEM — H69.83 OTHER SPECIFIED DISORDERS OF EUSTACHIAN TUBE, BILATERAL: Status: RESOLVED | Noted: 2023-10-04 | Resolved: 2024-04-28

## 2024-04-29 ENCOUNTER — TELEPHONE (OUTPATIENT)
Age: 24
End: 2024-04-29

## 2024-04-29 NOTE — TELEPHONE ENCOUNTER
----- Message from Tamanna Gray RN sent at 4/29/2024 10:21 AM EDT -----  Regarding: FW: Hepatitis shot  Contact: [phone]    ----- Message -----  From: Conchis Lazcano  Sent: 4/29/2024  10:20 AM EDT  To: #  Subject: Hepatitis shot                                   It is blood work u ordered. That I had done. Yes I am getting iud soon. Just waiting to get everything set with insurance. Hep shot you said to get and the primary did not have it so she sent me to pharmacy and they wanted to know. The pharmacy should of called you.

## 2024-05-03 ENCOUNTER — APPOINTMENT (OUTPATIENT)
Facility: HOSPITAL | Age: 24
End: 2024-05-03
Payer: MEDICARE

## 2024-05-03 ENCOUNTER — OFFICE VISIT (OUTPATIENT)
Age: 24
End: 2024-05-03
Payer: MEDICARE

## 2024-05-03 VITALS — BODY MASS INDEX: 49.09 KG/M2 | SYSTOLIC BLOOD PRESSURE: 130 MMHG | DIASTOLIC BLOOD PRESSURE: 92 MMHG | WEIGHT: 293 LBS

## 2024-05-03 DIAGNOSIS — Z12.4 CERVICAL CANCER SCREENING: ICD-10-CM

## 2024-05-03 DIAGNOSIS — Z01.419 ENCOUNTER FOR GYNECOLOGICAL EXAMINATION WITHOUT ABNORMAL FINDING: Primary | ICD-10-CM

## 2024-05-03 PROCEDURE — 99385 PREV VISIT NEW AGE 18-39: CPT | Performed by: STUDENT IN AN ORGANIZED HEALTH CARE EDUCATION/TRAINING PROGRAM

## 2024-05-03 RX ORDER — TRIAMCINOLONE ACETONIDE 1 MG/G
OINTMENT TOPICAL 2 TIMES DAILY
Qty: 30 G | Refills: 0 | Status: SHIPPED | OUTPATIENT
Start: 2024-05-03 | End: 2024-05-10

## 2024-05-03 NOTE — PROGRESS NOTES
Conchis Lazcano is a 23 y.o. female returns for an annual exam     Chief Complaint   Patient presents with    Annual Exam       Patient's last menstrual period was 04/18/2024 (exact date).  Her periods are heavy in flow and usually regular with a 26-32 day interval with 3-7 day duration.  She has dysmenorrhea as well as migraines.  Problems:  wants IUD to help with cycles. She states she's unable to take OCPs d/t liver issues. She was on them previously but it gave her migraines while taking the sugar pills.   Birth Control: abstinence  Last Pap: never had one  She does not have a history of EHSAN 2, 3 or cervical cancer.   With regard to the Gardisil vaccine, she has received all 3 injections.          Examination chaperoned by Mary Lou Benítez MA.  
[sulfamethoxazole-trimethoprim], Adhesive tape, Macrobid [nitrofurantoin], Metronidazole, Rizatriptan, and Sulfa antibiotics     Tobacco History:  reports that she has never smoked. She has never been exposed to tobacco smoke. She has never used smokeless tobacco.  Alcohol Abuse:  reports no history of alcohol use.  Drug Abuse:  reports no history of drug use.    Family Medical/Cancer History:   Family History   Problem Relation Age of Onset    Endometriosis Mother     Delayed Awakening Mother     Post-op Nausea/Vomiting Mother     Migraines Mother         d/t accident - neck and brain injury    Atrial Fibrillation Mother     Hypertension Mother     Obesity Mother     No Known Problems Father         unknown    Mult Sclerosis Maternal Aunt     Cancer Maternal Uncle     Hypertension Maternal Grandmother     Heart Disease Maternal Grandmother     Atrial Fibrillation Maternal Grandmother     Heart Disease Maternal Grandfather     Hypertension Maternal Grandfather     Other Maternal Grandfather         diverticulitis    No Known Problems Paternal Grandmother     No Known Problems Paternal Grandfather         Review of Systems - History obtained from the patient  Constitutional: negative for weight loss, fever, night sweats  HEENT: negative for hearing loss, earache, congestion, snoring, sorethroat  CV: negative for chest pain, palpitations, edema  Resp: negative for cough, shortness of breath, wheezing  GI: negative for change in bowel habits, abdominal pain, black or bloody stools  : negative for frequency, dysuria, hematuria, vaginal discharge  MSK: negative for back pain, joint pain, muscle pain  Breast: negative for breast lumps, nipple discharge, galactorrhea  Skin :negative for itching, rash, hives  Neuro: negative for dizziness, headache, confusion, weakness  Psych: negative for anxiety, depression, change in mood  Heme/lymph: negative for bleeding, bruising, pallor    Physical Exam    BP (!) 130/92   Wt 133.8

## 2024-05-07 ENCOUNTER — TELEPHONE (OUTPATIENT)
Age: 24
End: 2024-05-07

## 2024-05-07 NOTE — TELEPHONE ENCOUNTER
PT name and  verified    22 yo last ov 5/3/24    PT calling in reference to [facility] messages sent 24, stating she has verification insurance was approved for her IUD placement in the OR and is wondering if she needs to bring that documentation by and if she can be rescheduled?    Please advise    Thank you

## 2024-05-08 LAB
., LABCORP: NORMAL
CYTOLOGIST CVX/VAG CYTO: NORMAL
CYTOLOGY CVX/VAG DOC CYTO: NORMAL
CYTOLOGY CVX/VAG DOC THIN PREP: NORMAL
DX ICD CODE: NORMAL
Lab: NORMAL
OTHER STN SPEC: NORMAL
STAT OF ADQ CVX/VAG CYTO-IMP: NORMAL

## 2024-05-09 ENCOUNTER — OFFICE VISIT (OUTPATIENT)
Age: 24
End: 2024-05-09
Payer: MEDICARE

## 2024-05-09 VITALS
BODY MASS INDEX: 48.82 KG/M2 | HEIGHT: 65 IN | OXYGEN SATURATION: 98 % | WEIGHT: 293 LBS | DIASTOLIC BLOOD PRESSURE: 80 MMHG | SYSTOLIC BLOOD PRESSURE: 134 MMHG | HEART RATE: 90 BPM | RESPIRATION RATE: 16 BRPM | TEMPERATURE: 97.5 F

## 2024-05-09 DIAGNOSIS — Z23 NEED FOR HEPATITIS B BOOSTER VACCINATION: Primary | ICD-10-CM

## 2024-05-09 DIAGNOSIS — E66.01 MORBID OBESITY WITH BMI OF 45.0-49.9, ADULT (HCC): ICD-10-CM

## 2024-05-09 DIAGNOSIS — G43.709 CHRONIC MIGRAINE W/O AURA, NOT INTRACTABLE, W/O STAT MIGR: Primary | ICD-10-CM

## 2024-05-09 DIAGNOSIS — G90.A POTS (POSTURAL ORTHOSTATIC TACHYCARDIA SYNDROME): ICD-10-CM

## 2024-05-09 PROCEDURE — G8417 CALC BMI ABV UP PARAM F/U: HCPCS | Performed by: PSYCHIATRY & NEUROLOGY

## 2024-05-09 PROCEDURE — 99214 OFFICE O/P EST MOD 30 MIN: CPT | Performed by: PSYCHIATRY & NEUROLOGY

## 2024-05-09 PROCEDURE — G8428 CUR MEDS NOT DOCUMENT: HCPCS | Performed by: PSYCHIATRY & NEUROLOGY

## 2024-05-09 PROCEDURE — 1036F TOBACCO NON-USER: CPT | Performed by: PSYCHIATRY & NEUROLOGY

## 2024-05-09 RX ORDER — EPTINEZUMAB-JJMR 100 MG/ML
300 INJECTION INTRAVENOUS
COMMUNITY

## 2024-05-09 NOTE — PROGRESS NOTES
NEUROLOGY CLINIC NOTE    Patient ID:  Conchis Lazcano  [id number]  23 y.o.  2000    Date of Visit:  May 9, 2024    Reason for Visit:  migraines      Chief Complaint   Patient presents with    Migraine     Follow up- patient reports she has 1-2 migraines a week in the last 30 days.         History of Present Illness:     Patient Active Problem List    Diagnosis Date Noted    Focal nodular hyperplasia of liver 04/28/2024    Hepatic adenoma 04/11/2024    Chronic right-sided low back pain with right-sided sciatica 03/29/2023    Atopic dermatitis 03/29/2023    Cardiomyopathy, unspecified type (HCC) 01/19/2023    Severe persistent asthma with acute exacerbation 12/21/2022    Undifferentiated connective tissue disease (HCC) 11/17/2022    FIORDALIZA (generalized anxiety disorder)     Sucrose intolerance     Migraine headache     Autism     ADHD (attention deficit hyperactivity disorder)     Tenosynovitis of right hand 01/01/2021    Obesity, morbid ([facility]) 12/14/2020    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 David Rodriguez.  vs Sucrose Intolerance.    Acid reflux     ADHD (attention deficit hyperactivity disorder)     Allergy to chocolate 9/13/2017    pt denies    Allergy to yeast 9/13/2017    pt denies    Asthma     Autism     High Functioning.  Dr. Ndiaye.    Biliary dyskinesia 6/4/2019    Chronic cholecystitis 6/4/2019    Chronic rhinitis 2021    Dr. Alonso Ward, allergist.     Connective tissue disease (HCC)     Developmental delay     Fibromyalgia     Dr. Byrd    FIORDALIZA (generalized anxiety disorder)     Dr. Spring Marion    Hard of hearing     pt states due to fluid in ears x 2 years    HPV vaccine counseling     completed series    Insulin resistance 09/08/2021    Dr. Michelle Piper, endo    Insulin resistance     Irritable bowel

## 2024-05-10 ENCOUNTER — PREP FOR PROCEDURE (OUTPATIENT)
Facility: HOSPITAL | Age: 24
End: 2024-05-10

## 2024-05-10 ENCOUNTER — HOSPITAL ENCOUNTER (OUTPATIENT)
Facility: HOSPITAL | Age: 24
Setting detail: RECURRING SERIES
Discharge: HOME | End: 2024-05-13
Payer: MEDICARE

## 2024-05-10 PROBLEM — N92.0 MENORRHAGIA: Status: ACTIVE | Noted: 2024-05-10

## 2024-05-10 PROCEDURE — 97112 NEUROMUSCULAR REEDUCATION: CPT | Performed by: PHYSICAL THERAPIST

## 2024-05-10 PROCEDURE — 97110 THERAPEUTIC EXERCISES: CPT | Performed by: PHYSICAL THERAPIST

## 2024-05-10 NOTE — PROGRESS NOTES
Adult Mental Health Inpatient Program  Interdisciplinary Treatment Plan    Marito Arteaga  MRN:[id number]   :2019    Diagnosis: Psychosis    Plan Type: Initial Plan    Your patient, Marito Arteaga was seen in the Adult Mental Health Inpatient Program.  Please see below for the patient agreed upon plan, including Goal(s), Focus Indicators, Outcomes and Interventions.      Goals:  Patient Reported Goal #1: to get help    Goal Type:  Pt Reported Goal #1 Type: Short Term Goal       Therapy objectives:  Patient will attend at least 3-4 groups each day by 18.    Therapy Interventions:  Educate patient about behavior activation.      Nando Rodriguez, LPC    goals:  []  See Progress Note/Recertification  Emphasized importance of repetition of vestibular exercise at home    Short Term Goals: To be accomplished in 8 treatments.  1) The patient will be independent with introductory HEP MET  2) The patient will improve lumbar flexion AROM to reaching 12 inches from floor to improve ease with picking up items off the floor NT due to dizziness  3) The patient will transfer sit <> stand without an increase in dizziness MET previously  Long Term Goals: To be accomplished in 24 treatments.  1) The patient will demonstrate 5/5 BLE strength to improve ease with household chores  2) The patient will improve FOTO survey to a 58% or greater to indicate a significant improvement in function  3) The patient will ambulate 20 minutes without a significant increase in pain to improve ease running errands     PLAN  YES Continue plan of care  Re-Cert Due: NA  [x]  Upgrade activities as tolerated  []  Discharge due to :  []  Other:      Wanda Saucedo, PT       5/10/2024       12:40 PM

## 2024-05-15 ENCOUNTER — HOSPITAL ENCOUNTER (OUTPATIENT)
Facility: HOSPITAL | Age: 24
Setting detail: RECURRING SERIES
Discharge: HOME | End: 2024-05-18
Payer: MEDICARE

## 2024-05-15 PROCEDURE — G0283 ELEC STIM OTHER THAN WOUND: HCPCS

## 2024-05-15 PROCEDURE — 97112 NEUROMUSCULAR REEDUCATION: CPT

## 2024-05-15 PROCEDURE — 97110 THERAPEUTIC EXERCISES: CPT

## 2024-05-15 NOTE — PROGRESS NOTES
PHYSICAL THERAPY - MEDICARE DAILY TREATMENT NOTE (updated 3/23)      Date: 5/15/2024          Patient Name:  Conchis Lazcano :  2000   Medical   Diagnosis:  Other low back pain [M54.59] Treatment Diagnosis:  M54.59  OTHER LOWER BACK PAIN    Referral Source:  Jan Pemberton MD Insurance:   Payor: [facility] MEDICARE / Plan: [employer] DUAL COMPLETE / Product Type: *No Product type* /                     Patient  verified YES    Visit #   Current  / Total 12 24 per POC   Time   In / Out 1215  PM   Total Treatment Time 55   Total Timed Codes 40         SUBJECTIVE    Pain Level (0-10 scale): 5 \"my normal pain\"    Any medication changes, allergies to medications, adverse drug reactions, diagnosis change, or new procedure performed?: [x] No    [] Yes (see summary sheet for update)  Medications: Verified on Patient Summary List    Subjective functional status/changes:     Patient reports she doesn't like the new exercises.     OBJECTIVE    Modalities Rationale:     decrease pain and increase tissue extensibility to improve patient's ability to progress to PLOF and address remaining functional goals.    15   min [x] Estim Unattended,             type/location:       []  w/ice    [x]  w/heat  Lumbar spine      min [] Estim Attended,             type/location:       []  w/ice   []  w/heat         []  w/US   []  TENS insruct            min []  Mechanical Traction,        type/lbs:        []  pro      []  sup           []  int       []  cont            []  before manual           []  after manual     min []  Ultrasound,         settings/location:      min  unbilled []  Ice     []  Heat            location/position:         min []  Vasopneumatic Device,      press/temp:   pre-treatment girth :    post-treatment girth :    measured at (landmark       location) :   If using vaso (only need to measure limb vaso being performed on)        min []  Other:        Skin assessment post-treatment (if applicable):    [x]

## 2024-05-20 ENCOUNTER — TELEPHONE (OUTPATIENT)
Age: 24
End: 2024-05-20

## 2024-05-20 NOTE — TELEPHONE ENCOUNTER
Received triage call from pt stating that she has been having the following sx's x 1 month: lightheadedness, dizziness, and exhaustion. PT refused to go to  for eval as we do not have any available upcoming appt's. Pt requests for Lorelei to tell her what she can do, because they don't do anything and just tell her to go back to her PCP.

## 2024-05-20 NOTE — TELEPHONE ENCOUNTER
Pt id x 3, notified as per Lorelei and verbalized understanding.  Advised that there are not any cancellations available. Pt states that she will call back to check for cancellations.

## 2024-05-20 NOTE — TELEPHONE ENCOUNTER
I have reviewed all of her labs from Dr. Ross and can't find a reason this could be happening other than its a side effect of one of the meds she is taking. She can make an appointment to be seen here for further eval but nothing stands out looking through her labs. Lorelei   platelet is [phone]

## 2024-05-21 ENCOUNTER — APPOINTMENT (OUTPATIENT)
Facility: HOSPITAL | Age: 24
End: 2024-05-21
Payer: MEDICARE

## 2024-05-22 ENCOUNTER — TELEPHONE (OUTPATIENT)
Age: 24
End: 2024-05-22

## 2024-05-22 NOTE — TELEPHONE ENCOUNTER
Diana VASQUEZ from [facility] wanted to let you know that she had vv appt with patient today. She had a reoccurring rash & was treated with steroid cream & dose pack. She will be faxing notes to office. Any questions Diana VASQUEZ can be reached @[phone]

## 2024-05-23 ENCOUNTER — TELEPHONE (OUTPATIENT)
Age: 24
End: 2024-05-23

## 2024-05-23 NOTE — TELEPHONE ENCOUNTER
Patient states she would like a call back from nurse to try and get a sooner appointment. Patient states that she is having some issues, she did not want to disclose her issue she would rather speak with nurse.

## 2024-05-24 ENCOUNTER — TELEPHONE (OUTPATIENT)
Age: 24
End: 2024-05-24

## 2024-05-24 NOTE — TELEPHONE ENCOUNTER
Returned call to patient-     Patient advised to try to take the Vitassium tablet.     Monitor BP and Hear rate over the weekend and if BP is consistently less than 100 (systolic) to contact on call physician for further instruction.    Appointment scheduled for 5/30/24 at 840 am at the [facility] office.    Patient voiced understanding.

## 2024-05-24 NOTE — TELEPHONE ENCOUNTER
Spoke to patient    Feeling more lightheaded and dizzy than normal. Seen at urgent care and BP/HR lower than normal.    Has been staying well hydrated and increasing her salt intake. Has been drinking electrolytes to help.     Would like to know if there is something else that she can do to help.?

## 2024-05-24 NOTE — TELEPHONE ENCOUNTER
[facility] is calling because the patient had a nurse come to her for a rash and the patient wanted  to have her vital signs from the visit.  /70 P 83.Patient was put on prednisone for the rash.    [phone] office Kaci

## 2024-05-28 ENCOUNTER — PREP FOR PROCEDURE (OUTPATIENT)
Facility: HOSPITAL | Age: 24
End: 2024-05-28

## 2024-05-28 DIAGNOSIS — N92.0 MENORRHAGIA WITH REGULAR CYCLE: Primary | ICD-10-CM

## 2024-05-29 NOTE — TELEPHONE ENCOUNTER
Patient would like a call back from nurse. Patient stated its in regards to previus message.     Patient phone # [phone]

## 2024-05-30 ENCOUNTER — HOSPITAL ENCOUNTER (EMERGENCY)
Facility: HOSPITAL | Age: 24
Discharge: HOME | End: 2024-05-30
Attending: EMERGENCY MEDICINE
Payer: MEDICARE

## 2024-05-30 ENCOUNTER — TELEPHONE (OUTPATIENT)
Age: 24
End: 2024-05-30

## 2024-05-30 VITALS
SYSTOLIC BLOOD PRESSURE: 138 MMHG | BODY MASS INDEX: 47.8 KG/M2 | WEIGHT: 280 LBS | OXYGEN SATURATION: 99 % | TEMPERATURE: 97.9 F | HEIGHT: 64 IN | HEART RATE: 76 BPM | RESPIRATION RATE: 18 BRPM | DIASTOLIC BLOOD PRESSURE: 88 MMHG

## 2024-05-30 DIAGNOSIS — E86.0 DEHYDRATION: Primary | ICD-10-CM

## 2024-05-30 DIAGNOSIS — R11.0 NAUSEA: ICD-10-CM

## 2024-05-30 LAB
ALBUMIN SERPL-MCNC: 4.5 G/DL (ref 3.5–5.2)
ALBUMIN/GLOB SERPL: 1.6 (ref 1.1–2.2)
ALP SERPL-CCNC: 84 U/L (ref 35–104)
ALT SERPL-CCNC: 71 U/L (ref 10–35)
ANION GAP SERPL CALC-SCNC: 10 MMOL/L (ref 5–15)
AST SERPL-CCNC: 40 U/L (ref 10–35)
BASOPHILS # BLD: 0.1 K/UL (ref 0–1)
BASOPHILS NFR BLD: 1 % (ref 0–1)
BILIRUB SERPL-MCNC: 1 MG/DL (ref 0.2–1)
BUN SERPL-MCNC: 11 MG/DL (ref 6–20)
BUN/CREAT SERPL: 14 (ref 12–20)
CALCIUM SERPL-MCNC: 9.4 MG/DL (ref 8.6–10)
CHLORIDE SERPL-SCNC: 99 MMOL/L (ref 98–107)
CO2 SERPL-SCNC: 31 MMOL/L (ref 22–29)
CREAT SERPL-MCNC: 0.77 MG/DL (ref 0.5–0.9)
DIFFERENTIAL METHOD BLD: ABNORMAL
EOSINOPHIL # BLD: 0.1 K/UL (ref 0–0.4)
EOSINOPHIL NFR BLD: 1 %
ERYTHROCYTE [DISTWIDTH] IN BLOOD BY AUTOMATED COUNT: 12.7 % (ref 11.5–14.5)
GLOBULIN SER CALC-MCNC: 2.9 G/DL (ref 2–4)
GLUCOSE SERPL-MCNC: 100 MG/DL (ref 65–100)
HCT VFR BLD AUTO: 48.2 % (ref 35–47)
HGB BLD-MCNC: 16.1 G/DL (ref 11.5–16)
IMM GRANULOCYTES # BLD AUTO: 0.1 K/UL (ref 0–0.04)
IMM GRANULOCYTES NFR BLD AUTO: 1 % (ref 0–0.5)
LYMPHOCYTES # BLD: 1.8 K/UL (ref 0.8–3.5)
LYMPHOCYTES NFR BLD: 17 % (ref 12–49)
MCH RBC QN AUTO: 30.3 PG (ref 26–34)
MCHC RBC AUTO-ENTMCNC: 33.4 G/DL (ref 30–36.5)
MCV RBC AUTO: 90.6 FL (ref 80–99)
MONOCYTES # BLD: 1 K/UL (ref 0–1)
MONOCYTES NFR BLD: 10 % (ref 5–13)
NEUTS SEG # BLD: 7.8 K/UL (ref 1.8–8)
NEUTS SEG NFR BLD: 70 % (ref 32–75)
NRBC # BLD: 0 K/UL (ref 0–0.01)
NRBC BLD-RTO: 0 PER 100 WBC
PLATELET # BLD AUTO: 387 K/UL (ref 150–400)
PMV BLD AUTO: 8.6 FL (ref 8.9–12.9)
POTASSIUM SERPL-SCNC: 4 MMOL/L (ref 3.5–5.1)
PROT SERPL-MCNC: 7.4 G/DL (ref 6.4–8.3)
RBC # BLD AUTO: 5.32 M/UL (ref 3.8–5.2)
SODIUM SERPL-SCNC: 140 MMOL/L (ref 136–145)
WBC # BLD AUTO: 10.9 K/UL (ref 3.6–11)

## 2024-05-30 PROCEDURE — 96374 THER/PROPH/DIAG INJ IV PUSH: CPT

## 2024-05-30 PROCEDURE — 36415 COLL VENOUS BLD VENIPUNCTURE: CPT

## 2024-05-30 PROCEDURE — 99284 EMERGENCY DEPT VISIT MOD MDM: CPT

## 2024-05-30 PROCEDURE — 85025 COMPLETE CBC W/AUTO DIFF WBC: CPT

## 2024-05-30 PROCEDURE — 6360000002 HC RX W HCPCS: Performed by: EMERGENCY MEDICINE

## 2024-05-30 PROCEDURE — 96361 HYDRATE IV INFUSION ADD-ON: CPT

## 2024-05-30 PROCEDURE — 2580000003 HC RX 258: Performed by: EMERGENCY MEDICINE

## 2024-05-30 PROCEDURE — 80053 COMPREHEN METABOLIC PANEL: CPT

## 2024-05-30 RX ORDER — ONDANSETRON 2 MG/ML
4 INJECTION INTRAMUSCULAR; INTRAVENOUS ONCE
Status: COMPLETED | OUTPATIENT
Start: 2024-05-30 | End: 2024-05-30

## 2024-05-30 RX ORDER — ONDANSETRON HYDROCHLORIDE 8 MG/1
8 TABLET, FILM COATED ORAL EVERY 8 HOURS PRN
Qty: 60 TABLET | Refills: 0 | Status: SHIPPED | OUTPATIENT
Start: 2024-05-30

## 2024-05-30 RX ORDER — 0.9 % SODIUM CHLORIDE 0.9 %
1000 INTRAVENOUS SOLUTION INTRAVENOUS ONCE
Status: COMPLETED | OUTPATIENT
Start: 2024-05-30 | End: 2024-05-30

## 2024-05-30 RX ADMIN — ONDANSETRON 4 MG: 2 INJECTION INTRAMUSCULAR; INTRAVENOUS at 20:38

## 2024-05-30 RX ADMIN — SODIUM CHLORIDE 1000 ML: 9 INJECTION, SOLUTION INTRAVENOUS at 20:38

## 2024-05-30 ASSESSMENT — LIFESTYLE VARIABLES
HOW OFTEN DO YOU HAVE A DRINK CONTAINING ALCOHOL: NEVER
HOW MANY STANDARD DRINKS CONTAINING ALCOHOL DO YOU HAVE ON A TYPICAL DAY: PATIENT DOES NOT DRINK

## 2024-05-30 ASSESSMENT — PAIN - FUNCTIONAL ASSESSMENT: PAIN_FUNCTIONAL_ASSESSMENT: NONE - DENIES PAIN

## 2024-05-30 NOTE — TELEPHONE ENCOUNTER
R/t call to pt,  Dizzy, not feeling right the last month; has just getting worse.  Has been using lymphapress about x1 month.  Home BP has been lower 100/73; taken about 2 hrs ago.    \"Doesn't even feel like getting out of bed.\"    Pulse fluctuating 101 this am, then 85.  Hasn't been getting super high.      Pt stated she has been keeping up with salt intake and staying well hydrated.  Confirmed taking Vitassium supplements about x3 per day.      Just started keeping BP log past couple of days.      She stated she has been feeling nausea, can't eat much.  May be dehydrated.  Hasn't been able to drink but one 64 oz.    Pt requesting nausea medicine to be called into [facility].    Denies V/D but lots of nausea.    Asked her to reach out to [facility] for possible dehydration/IV hydration needed.    Per Dr. Pemberton's VO, OK to fill Ondansetron 8 mg Q8H PRN sent to confirmed pharmacy.    [facility] will come out today from 4-8 pm.  I asked her to have them fax the note to us once completed.    Made OV for next week with NP.  Future Appointments   Date Time Provider Department Center   6/4/2024 11:00 AM Camryn Aly APRN - NP CAVSF BS [facility]   6/6/2024  1:15 PM Ajay John, [facility]   6/11/2024 10:15 AM Ajay John, [facility]   6/19/2024  9:45 AM Wnada Saucedo, PT [facility]   6/24/2024 10:30 AM Wanda Saucedo, [facility]   6/24/2024  4:00 PM Lorelei Terry APRN - NP [id number] [facility]   6/26/2024  8:40 AM Lana Greene MD BSROBG [facility]   7/16/2024  1:30 PM Jenny Marcus MD RDE [facility]   7/19/2024  1:00 PM SS FASTTRACK 2 [facility]   8/22/2024  2:40 PM Darius Dyson Jr., MD BSNCR [facility]   9/10/2024 11:20 AM Jan Pemberton MD CAVIR [facility]   9/19/2024 11:15 AM Lorelei Terry, APRN - NP [id number] [facility]   10/1/2024 11:30 AM [facility]   10/10/2024  9:00 AM Edinson Ross MD LIVR [facility]   10/10/2024 11:20

## 2024-05-30 NOTE — ED TRIAGE NOTES
Pt ambulated to ED with family member.  Pt reports having POTS and states feeling dehydrated and dizziness.  Pt had on episode of emesis in triage.  Pt waiting for home health to come and hydrate pt.

## 2024-05-30 NOTE — TELEPHONE ENCOUNTER
Patient states she has POTS and is really dizzy and would like nurse to call her back asap.    Phone [phone]

## 2024-05-31 NOTE — ED PROVIDER NOTES
[facility]GENC[facility] DEPT  EMERGENCY DEPARTMENT ENCOUNTER      Pt Name: Conchis Lazcano  MRN: [id number]  Birthdate 2000  Date of evaluation: 5/30/2024  Provider: John Castillo MD      HISTORY OF PRESENT ILLNESS      23-year-old female with history of POTS, ADHD, asthma, autism, anxiety, fibromyalgia, irritable bowel disease presents to the emergency department with her mother with concern for episodes of dizziness/lightheadedness with nausea.  She reports this is consistent with her recently diagnosed POTS.  No fevers.  She reports decreased p.o. intake.    The history is provided by the patient, medical records and a parent.           Nursing Notes were reviewed.    REVIEW OF SYSTEMS         Review of Systems        PAST MEDICAL HISTORY     Past Medical History:   Diagnosis Date    Abdominal migraine     Dr. David Rodriguez.  vs Sucrose Intolerance.    Acid reflux     ADHD (attention deficit hyperactivity disorder)     Allergy to chocolate 9/13/2017    pt denies    Allergy to yeast 9/13/2017    pt denies    Asthma     Autism     High Functioning.  Dr. Ndiaye.    Biliary dyskinesia 6/4/2019    Chronic cholecystitis 6/4/2019    Chronic rhinitis 2021    Dr. Alonso Ward, allergist.     Connective tissue disease ([facility])     Developmental delay     Fibromyalgia     Dr. Byrd    FIORDALIZA (generalized anxiety disorder)     Dr. Spring Marion    Hard of hearing     pt states due to fluid in ears x 2 years    HPV vaccine counseling     completed series    Insulin resistance 09/08/2021    Dr. Michelle Piper, endo    Insulin resistance     Irritable bowel syndrome with diarrhea 04/13/2017    Lupus ([facility])     Lymphedema 2021    BL legs.  Dr. Sukhwinder Ochoa.  [facility]    Medication overuse headache 9/21/2015    Migraine     Migraine headache     Dr. Martines    Obesity, morbid ([facility]) 12/12/2017    OCD (obsessive compulsive disorder)     Dr. Spring Marion    Sucrose intolerance     Dr. Hernandez

## 2024-06-03 ENCOUNTER — TELEPHONE (OUTPATIENT)
Age: 24
End: 2024-06-03

## 2024-06-03 NOTE — TELEPHONE ENCOUNTER
Megan from [facility] called to give a quick update of the patient's visit in the office today, in home, patient was seen for dizziness and nausea, which patient felt it was from her POTS.  Patient has positive nystagmus and descriptions of her dizziness including laying in bed and rolling over are concerning for crystal origins. Patient will call chiropractor to be seen and also has PT on 6/6/24.        Future Appointments   Date Time Provider Department Center   6/4/2024 11:00 AM Camryn Aly APRN - NP CAVSF [facility]   6/6/2024  1:15 PM Ajay John, [facility]   6/11/2024 10:15 AM Ajay John, ELENA [facility]   6/19/2024  9:45 AM Wanda Saucedo, PT [facility]   6/24/2024 10:30 AM Wanda Saucedo, PT [facility]   6/24/2024  4:00 PM Lorelei Terry APRN - NP [id number] [facility]   7/16/2024  1:30 PM Jenny Marcus MD E [facility]   7/17/2024  8:20 AM Lana Greene MD BSROBG [facility]   7/19/2024  1:00 PM [facility]   8/22/2024  2:40 PM Darius Dyson Jr., MD [id number] [facility]   9/10/2024 11:20 AM Jan Pemberton MD CAVIR [facility]   9/19/2024 11:15 AM Lorelei Terry APRN - NP [id number] [facility]   10/1/2024 11:30 AM [facility] OPEN MRI [facility]   10/10/2024  9:00 AM Edinson Ross MD LIVR [facility]   10/10/2024 11:20 AM Lobo Atkins MD [facility] AMB

## 2024-06-04 ENCOUNTER — OFFICE VISIT (OUTPATIENT)
Age: 24
End: 2024-06-04
Payer: MEDICARE

## 2024-06-04 VITALS
SYSTOLIC BLOOD PRESSURE: 108 MMHG | OXYGEN SATURATION: 98 % | HEART RATE: 91 BPM | HEIGHT: 64 IN | BODY MASS INDEX: 49.85 KG/M2 | WEIGHT: 292 LBS | DIASTOLIC BLOOD PRESSURE: 60 MMHG

## 2024-06-04 DIAGNOSIS — G90.A POTS (POSTURAL ORTHOSTATIC TACHYCARDIA SYNDROME): Primary | ICD-10-CM

## 2024-06-04 DIAGNOSIS — R00.0 TACHYCARDIA, UNSPECIFIED: ICD-10-CM

## 2024-06-04 DIAGNOSIS — I89.0 LYMPHEDEMA: ICD-10-CM

## 2024-06-04 DIAGNOSIS — R11.0 NAUSEA: ICD-10-CM

## 2024-06-04 DIAGNOSIS — I10 ESSENTIAL (PRIMARY) HYPERTENSION: ICD-10-CM

## 2024-06-04 PROBLEM — E11.9 TYPE 2 DIABETES MELLITUS (HCC): Status: ACTIVE | Noted: 2024-06-04

## 2024-06-04 PROCEDURE — 3074F SYST BP LT 130 MM HG: CPT

## 2024-06-04 PROCEDURE — G8417 CALC BMI ABV UP PARAM F/U: HCPCS

## 2024-06-04 PROCEDURE — 3078F DIAST BP <80 MM HG: CPT

## 2024-06-04 PROCEDURE — 99214 OFFICE O/P EST MOD 30 MIN: CPT

## 2024-06-04 PROCEDURE — G8427 DOCREV CUR MEDS BY ELIG CLIN: HCPCS

## 2024-06-04 PROCEDURE — 1036F TOBACCO NON-USER: CPT

## 2024-06-04 RX ORDER — BLOOD PRESSURE TEST KIT
1 KIT MISCELLANEOUS DAILY
Qty: 1 KIT | Refills: 0 | Status: SHIPPED | OUTPATIENT
Start: 2024-06-04

## 2024-06-04 NOTE — PROGRESS NOTES
Chief Complaint   Patient presents with    Follow-up     3 months; lightheadedness, POTS     Vitals:    06/04/24 1054   BP: 108/60   Site: Right Lower Arm   Position: Sitting   Cuff Size: Medium Adult   Pulse: 91   SpO2: 98%   Weight: 132.5 kg (292 lb)   Height: 1.626 m (5' 4\")       Chest pain NO     ER, urgent care, or hospitalized outside of [facility] since your last visit?  NO     Refills NO     Zofran change to dissolvable tablet.    5/30/24 Hydration at [facility].  Checked BP for a while.  Felt better Fri-Sun.  Yesterday dizzy again.  Lacey came out, unable to provide IV fluids.    Asking for BP cuff prescription.  Asking about upper forearm for automatic cuff?    POTS vs vertigo per Lacey.    Home wrist BP cuff reading:  Unable to obtain reading even with changing batteries.

## 2024-06-06 ENCOUNTER — HOSPITAL ENCOUNTER (OUTPATIENT)
Facility: HOSPITAL | Age: 24
Setting detail: RECURRING SERIES
Discharge: HOME | End: 2024-06-09
Payer: MEDICARE

## 2024-06-06 PROCEDURE — 97112 NEUROMUSCULAR REEDUCATION: CPT

## 2024-06-06 PROCEDURE — 97535 SELF CARE MNGMENT TRAINING: CPT

## 2024-06-06 PROCEDURE — G0283 ELEC STIM OTHER THAN WOUND: HCPCS

## 2024-06-06 PROCEDURE — 97110 THERAPEUTIC EXERCISES: CPT

## 2024-06-06 NOTE — PROGRESS NOTES
Standing   [x] Sitting   [] Walking   Background: [x] Blank   [] Curtain   [] Pink Dots   []   Recovery Time: 15s for vertical, 105 for horizontal    Pain Level at end of session (0-10 scale): \"good\"      Assessment     Patient will continue to benefit from skilled PT / OT services to modify and progress therapeutic interventions, analyze and address functional mobility deficits, analyze and address ROM deficits, analyze and address strength deficits, analyze and address soft tissue restrictions, analyze and cue for proper movement patterns, analyze and modify for postural abnormalities, and analyze and address imbalance/dizziness to address functional deficits and attain remaining goals.    Progress toward goals / Updated goals:  []  See Progress Note/Recertification  Educated on hydration during summer months of hotter weather.     Short Term Goals: To be accomplished in 8 treatments.  1) The patient will be independent with introductory HEP MET  2) The patient will improve lumbar flexion AROM to reaching 12 inches from floor to improve ease with picking up items off the floor NT due to dizziness  3) The patient will transfer sit <> stand without an increase in dizziness MET previously  Long Term Goals: To be accomplished in 24 treatments.  1) The patient will demonstrate 5/5 BLE strength to improve ease with household chores  2) The patient will improve FOTO survey to a 58% or greater to indicate a significant improvement in function  3) The patient will ambulate 20 minutes without a significant increase in pain to improve ease running errands     PLAN  YES Continue plan of care  Re-Cert Due: NA  [x]  Upgrade activities as tolerated  []  Discharge due to :  []  Other:      MACKENZIE CANDELARIO, PTA       6/6/2024       2:40 PM

## 2024-06-08 ENCOUNTER — PATIENT MESSAGE (OUTPATIENT)
Age: 24
End: 2024-06-08

## 2024-06-11 ENCOUNTER — APPOINTMENT (OUTPATIENT)
Facility: HOSPITAL | Age: 24
End: 2024-06-11
Payer: MEDICARE

## 2024-06-14 ENCOUNTER — PATIENT MESSAGE (OUTPATIENT)
Age: 24
End: 2024-06-14

## 2024-06-14 DIAGNOSIS — I10 ESSENTIAL (PRIMARY) HYPERTENSION: ICD-10-CM

## 2024-06-14 DIAGNOSIS — G90.A POTS (POSTURAL ORTHOSTATIC TACHYCARDIA SYNDROME): ICD-10-CM

## 2024-06-14 RX ORDER — NEBULIZER AND COMPRESSOR
1 EACH MISCELLANEOUS DAILY
Qty: 1 KIT | Refills: 0 | Status: SHIPPED | OUTPATIENT
Start: 2024-06-14

## 2024-06-19 ENCOUNTER — HOSPITAL ENCOUNTER (OUTPATIENT)
Facility: HOSPITAL | Age: 24
Setting detail: RECURRING SERIES
Discharge: HOME | End: 2024-06-22
Payer: MEDICARE

## 2024-06-19 PROCEDURE — G0283 ELEC STIM OTHER THAN WOUND: HCPCS | Performed by: PHYSICAL THERAPIST

## 2024-06-19 PROCEDURE — 97112 NEUROMUSCULAR REEDUCATION: CPT | Performed by: PHYSICAL THERAPIST

## 2024-06-19 PROCEDURE — 97110 THERAPEUTIC EXERCISES: CPT | Performed by: PHYSICAL THERAPIST

## 2024-06-19 NOTE — PROGRESS NOTES
PHYSICAL THERAPY - MEDICARE DAILY TREATMENT NOTE (updated 3/23)      Date: 2024          Patient Name:  Conchis Lazcano :  2000   Medical   Diagnosis:  Other low back pain [M54.59] Treatment Diagnosis:  M54.59  OTHER LOWER BACK PAIN    Referral Source:  Jan Pemberton MD Insurance:   Payor: [facility] MEDICARE / Plan: [employer] DUAL COMPLETE / Product Type: *No Product type* /                     Patient  verified YES    Visit #   Current  / Total 14 24 per POC   Time   In / Out 953 AM 1053 PM   Total Treatment Time 60   Total Timed Codes 45         SUBJECTIVE    Pain Level (0-10 scale): 2    Any medication changes, allergies to medications, adverse drug reactions, diagnosis change, or new procedure performed?: [x] No    [] Yes (see summary sheet for update)  Medications: Verified on Patient Summary List    Subjective functional status/changes:     Patient reports compliance with salt pills and LMNT but the LMNT gave her a headache so she stopped taking that after 4 visits    OBJECTIVE    Modalities Rationale:     decrease pain and increase tissue extensibility to improve patient's ability to progress to PLOF and address remaining functional goals.    15   min [x] Estim Unattended,             type/location:       []  w/ice    [x]  w/heat  Lumbar spine      min [] Estim Attended,             type/location:       []  w/ice   []  w/heat         []  w/US   []  TENS insruct            min []  Mechanical Traction,        type/lbs:        []  pro      []  sup           []  int       []  cont            []  before manual           []  after manual     min []  Ultrasound,         settings/location:      min  unbilled []  Ice     []  Heat            location/position:         min []  Vasopneumatic Device,      press/temp:   pre-treatment girth :    post-treatment girth :    measured at (landmark       location) :   If using vaso (only need to measure limb vaso being performed on)        min []  Other:

## 2024-06-24 ENCOUNTER — APPOINTMENT (OUTPATIENT)
Facility: HOSPITAL | Age: 24
End: 2024-06-24
Payer: MEDICARE

## 2024-06-24 ENCOUNTER — OFFICE VISIT (OUTPATIENT)
Age: 24
End: 2024-06-24
Payer: MEDICARE

## 2024-06-24 VITALS
BODY MASS INDEX: 50.02 KG/M2 | WEIGHT: 293 LBS | HEART RATE: 110 BPM | HEIGHT: 64 IN | TEMPERATURE: 98.7 F | OXYGEN SATURATION: 99 % | RESPIRATION RATE: 19 BRPM

## 2024-06-24 DIAGNOSIS — M54.41 CHRONIC RIGHT-SIDED LOW BACK PAIN WITH RIGHT-SIDED SCIATICA: ICD-10-CM

## 2024-06-24 DIAGNOSIS — G90.A POTS (POSTURAL ORTHOSTATIC TACHYCARDIA SYNDROME): Primary | ICD-10-CM

## 2024-06-24 DIAGNOSIS — S90.852D FOREIGN BODY IN LEFT FOOT, SUBSEQUENT ENCOUNTER: ICD-10-CM

## 2024-06-24 DIAGNOSIS — G89.29 CHRONIC RIGHT-SIDED LOW BACK PAIN WITH RIGHT-SIDED SCIATICA: ICD-10-CM

## 2024-06-24 PROCEDURE — G8427 DOCREV CUR MEDS BY ELIG CLIN: HCPCS | Performed by: NURSE PRACTITIONER

## 2024-06-24 PROCEDURE — 99213 OFFICE O/P EST LOW 20 MIN: CPT | Performed by: NURSE PRACTITIONER

## 2024-06-24 PROCEDURE — G8417 CALC BMI ABV UP PARAM F/U: HCPCS | Performed by: NURSE PRACTITIONER

## 2024-06-24 PROCEDURE — 1036F TOBACCO NON-USER: CPT | Performed by: NURSE PRACTITIONER

## 2024-06-24 RX ORDER — DOXYCYCLINE HYCLATE 100 MG
100 TABLET ORAL 2 TIMES DAILY
COMMUNITY

## 2024-06-24 RX ORDER — GABAPENTIN 100 MG/1
100 CAPSULE ORAL DAILY
Qty: 90 CAPSULE | Refills: 3 | Status: SHIPPED | OUTPATIENT
Start: 2024-06-24 | End: 2024-09-22

## 2024-06-24 NOTE — PROGRESS NOTES
Chief Complaint   Patient presents with    Fatigue     Patient is in the office today for a f/u from urgent care and not feeling right.  Patient stated that urgent care wanted her to f/u with pcp due to stepping on a nail.  No other concerns.              \"Have you been to the ER, urgent care clinic since your last visit?  Hospitalized since your last visit?\"    YES - When: approximately 6/22 ago.  Where and Why: [facility] stepped on nail.    “Have you seen or consulted any other health care providers outside of [facility] since your last visit?”    NO            Click Here for Release of Records Request

## 2024-06-25 NOTE — PROGRESS NOTES
Conchis Lazcano (:  2000) is a 23 y.o. female,Established patient, here for evaluation of the following chief complaint(s):  Fatigue      Assessment & Plan   1. POTS (postural orthostatic tachycardia syndrome)  2. Chronic right-sided low back pain with right-sided sciatica  -     gabapentin (NEURONTIN) 100 MG capsule; Take 1 capsule by mouth daily for 90 days. Max Daily Amount: 100 mg, Disp-90 capsule, R-3Normal  3. Foreign body in left foot, subsequent encounter    Cont plan to see PT for POTS as ordered by specialist  No additional tx needed for heel of foot  Refills of gabapentin given    No follow-ups on file.       Subjective   Fatigue  Associated symptoms include fatigue.     Patient is in the office today for a f/u from urgent care and not feeling right.  Patient stated that urgent care wanted her to f/u with pcp due to stepping on a nail.  Tdap is already up to date  Followed by rheum and cardio, actively in PT for POTS  Major issue is the dizzy feeling she gets    Review of Systems   Constitutional:  Positive for fatigue.      A comprehensive review of system was obtained and negative except findings in the HPI    Pulse (!) 110   Temp 98.7 °F (37.1 °C) (Oral)   Resp 19   Ht 1.626 m (5' 4\")   Wt 132.9 kg (293 lb)   SpO2 99%   BMI 50.29 kg/m²     Objective   Physical Exam  Vitals and nursing note reviewed.   Constitutional:       General: She is not in acute distress.     Appearance: Normal appearance.   Cardiovascular:      Rate and Rhythm: Normal rate and regular rhythm.   Pulmonary:      Effort: Pulmonary effort is normal. No respiratory distress.      Breath sounds: Normal breath sounds. No wheezing or rhonchi.   Musculoskeletal:         General: No swelling.   Skin:     Comments: Left heel with small laceration, no redness or swelling   Neurological:      General: No focal deficit present.      Mental Status: She is alert and oriented to person, place, and time.   Psychiatric:

## 2024-06-27 ENCOUNTER — APPOINTMENT (OUTPATIENT)
Facility: HOSPITAL | Age: 24
End: 2024-06-27
Payer: MEDICARE

## 2024-07-15 ENCOUNTER — HOSPITAL ENCOUNTER (OUTPATIENT)
Facility: HOSPITAL | Age: 24
Setting detail: RECURRING SERIES
Discharge: HOME | End: 2024-07-18
Payer: MEDICARE

## 2024-07-15 PROCEDURE — G0283 ELEC STIM OTHER THAN WOUND: HCPCS

## 2024-07-15 PROCEDURE — 97110 THERAPEUTIC EXERCISES: CPT

## 2024-07-15 NOTE — PROGRESS NOTES
PHYSICAL THERAPY - MEDICARE DAILY TREATMENT NOTE (updated 3/23)      Date: 7/15/2024          Patient Name:  Conchis Lazcano :  2000   Medical   Diagnosis:  Other low back pain [M54.59] Treatment Diagnosis:  M54.59  OTHER LOWER BACK PAIN    Referral Source:  Jan Pemberton MD Insurance:   Payor: [facility] MEDICARE / Plan: [employer] DUAL COMPLETE / Product Type: *No Product type* /                     Patient  verified YES    Visit #   Current  / Total 15 24 per POC   Time   In / Out 115p 210p   Total Treatment Time 55   Total Timed Codes 40         SUBJECTIVE    Pain Level (0-10 scale): 2    Any medication changes, allergies to medications, adverse drug reactions, diagnosis change, or new procedure performed?: [x] No    [] Yes (see summary sheet for update)  Medications: Verified on Patient Summary List    Subjective functional status/changes:     Patient reports the heat has been very rough for her as her symptoms are worse. Ankle is bothersome today due to twisting it yesterday.    OBJECTIVE    Modalities Rationale:     decrease pain and increase tissue extensibility to improve patient's ability to progress to PLOF and address remaining functional goals.    15   min [x] Estim Unattended,             type/location: IFC, low back       []  w/ice    []  w/heat  Lumbar spine      min [] Estim Attended,             type/location:       []  w/ice   []  w/heat         []  w/US   []  TENS insruct            min []  Mechanical Traction,        type/lbs:        []  pro      []  sup           []  int       []  cont            []  before manual           []  after manual     min []  Ultrasound,         settings/location:      min  unbilled []  Ice     []  Heat            location/position:         min []  Vasopneumatic Device,      press/temp:   pre-treatment girth :    post-treatment girth :    measured at (landmark       location) :   If using vaso (only need to measure limb vaso being performed on)        min

## 2024-07-16 ENCOUNTER — ANESTHESIA EVENT (OUTPATIENT)
Facility: HOSPITAL | Age: 24
End: 2024-07-16
Payer: MEDICARE

## 2024-07-16 ENCOUNTER — OFFICE VISIT (OUTPATIENT)
Age: 24
End: 2024-07-16

## 2024-07-16 VITALS
BODY MASS INDEX: 49.29 KG/M2 | DIASTOLIC BLOOD PRESSURE: 78 MMHG | SYSTOLIC BLOOD PRESSURE: 118 MMHG | HEART RATE: 99 BPM | OXYGEN SATURATION: 99 % | HEIGHT: 64 IN | WEIGHT: 288.7 LBS

## 2024-07-16 DIAGNOSIS — R73.01 IMPAIRED FASTING BLOOD SUGAR: ICD-10-CM

## 2024-07-16 PROBLEM — E11.9 TYPE 2 DIABETES MELLITUS (HCC): Status: RESOLVED | Noted: 2024-06-04 | Resolved: 2024-07-16

## 2024-07-16 LAB — HBA1C MFR BLD: 5.1 %

## 2024-07-16 RX ORDER — SEMAGLUTIDE 0.25 MG/.5ML
0.25 INJECTION, SOLUTION SUBCUTANEOUS
Qty: 2 ML | Refills: 0 | Status: SHIPPED | OUTPATIENT
Start: 2024-07-16

## 2024-07-16 NOTE — PROGRESS NOTES
Chief Complaint   Patient presents with    Weight Management         History of Present Illness: Conchis Lazcano is a 23 y.o.. female with a past medical hx significant for ADHD seen in referral from Conchis Kirk PA-C for discussion related to weight gain.    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 Internal BMBx performed 8/25/2022 showing normocellular marrow with active trilineage hematopoiesis. 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 Normal female karyotype. FISH negative. Will monitor closely for development of AML. Of note, FLT3 mutations also increase risk of various other tumor types, thus, will refer to medical genetics for further discussion.    Conchis and her Mother are here today to discuss medication options for weight loss. Previously lost 100lbs following cholecystectomy. Currently working as a [employer], snacking throughout the day and then has a mendy with the family in the evening. Endorses cravings for carbohydrates. Currently taking low dose naltrexone (4.5mg) due to pain (rx by Rheum). Taking vyvansefor ADHD, recently switched from straterra. Also taking cymbalta.      01/26/2023: Not consistently taking 2000 mg a day of metformin, titrated off of prednisone-was up to 20 mg once a day, down to 10 mg.  Has essentially been on this continuously for about 8 months.  Is down 5 pounds.  Not experiencing bad anxiety on current regimen.    06/07/2023: LFTs elevated when taking wellbutrin + naltrexone. Drinks sprite when feels that BG are low, otherwise sweet tea. Noted worsening in tremor when started wellbutrin, not sure if she would like to resume that. Weight is down about 4 lbs. Has continued to require both PO and IV steroids. Can only tolerate 1000mg max daily of metformin. Pain has been better with naltrexone.    11/2/2023: Snacking on goldfish, raisins, pretzels.  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

## 2024-07-17 ENCOUNTER — HOSPITAL ENCOUNTER (OUTPATIENT)
Facility: HOSPITAL | Age: 24
Setting detail: OUTPATIENT SURGERY
Discharge: HOME | End: 2024-07-17
Attending: STUDENT IN AN ORGANIZED HEALTH CARE EDUCATION/TRAINING PROGRAM | Admitting: STUDENT IN AN ORGANIZED HEALTH CARE EDUCATION/TRAINING PROGRAM
Payer: MEDICARE

## 2024-07-17 ENCOUNTER — ANESTHESIA (OUTPATIENT)
Facility: HOSPITAL | Age: 24
End: 2024-07-17
Payer: MEDICARE

## 2024-07-17 VITALS
RESPIRATION RATE: 11 BRPM | DIASTOLIC BLOOD PRESSURE: 91 MMHG | TEMPERATURE: 97.9 F | WEIGHT: 288 LBS | BODY MASS INDEX: 49.44 KG/M2 | SYSTOLIC BLOOD PRESSURE: 133 MMHG | HEART RATE: 77 BPM | OXYGEN SATURATION: 100 %

## 2024-07-17 DIAGNOSIS — N92.0 MENORRHAGIA WITH REGULAR CYCLE: ICD-10-CM

## 2024-07-17 LAB — HCG UR QL: NEGATIVE

## 2024-07-17 PROCEDURE — 6360000002 HC RX W HCPCS: Performed by: ANESTHESIOLOGY

## 2024-07-17 PROCEDURE — 7100000000 HC PACU RECOVERY - FIRST 15 MIN: Performed by: STUDENT IN AN ORGANIZED HEALTH CARE EDUCATION/TRAINING PROGRAM

## 2024-07-17 PROCEDURE — 3700000001 HC ADD 15 MINUTES (ANESTHESIA): Performed by: STUDENT IN AN ORGANIZED HEALTH CARE EDUCATION/TRAINING PROGRAM

## 2024-07-17 PROCEDURE — 2500000003 HC RX 250 WO HCPCS: Performed by: ANESTHESIOLOGY

## 2024-07-17 PROCEDURE — 81025 URINE PREGNANCY TEST: CPT

## 2024-07-17 PROCEDURE — 3600000002 HC SURGERY LEVEL 2 BASE: Performed by: STUDENT IN AN ORGANIZED HEALTH CARE EDUCATION/TRAINING PROGRAM

## 2024-07-17 PROCEDURE — 7100000010 HC PHASE II RECOVERY - FIRST 15 MIN: Performed by: STUDENT IN AN ORGANIZED HEALTH CARE EDUCATION/TRAINING PROGRAM

## 2024-07-17 PROCEDURE — 3700000000 HC ANESTHESIA ATTENDED CARE: Performed by: STUDENT IN AN ORGANIZED HEALTH CARE EDUCATION/TRAINING PROGRAM

## 2024-07-17 PROCEDURE — 2580000003 HC RX 258: Performed by: ANESTHESIOLOGY

## 2024-07-17 PROCEDURE — 7100000011 HC PHASE II RECOVERY - ADDTL 15 MIN: Performed by: STUDENT IN AN ORGANIZED HEALTH CARE EDUCATION/TRAINING PROGRAM

## 2024-07-17 PROCEDURE — 88305 TISSUE EXAM BY PATHOLOGIST: CPT

## 2024-07-17 PROCEDURE — 7100000001 HC PACU RECOVERY - ADDTL 15 MIN: Performed by: STUDENT IN AN ORGANIZED HEALTH CARE EDUCATION/TRAINING PROGRAM

## 2024-07-17 PROCEDURE — 2709999900 HC NON-CHARGEABLE SUPPLY: Performed by: STUDENT IN AN ORGANIZED HEALTH CARE EDUCATION/TRAINING PROGRAM

## 2024-07-17 PROCEDURE — 3600000012 HC SURGERY LEVEL 2 ADDTL 15MIN: Performed by: STUDENT IN AN ORGANIZED HEALTH CARE EDUCATION/TRAINING PROGRAM

## 2024-07-17 RX ORDER — MIDAZOLAM HYDROCHLORIDE 1 MG/ML
INJECTION INTRAMUSCULAR; INTRAVENOUS PRN
Status: DISCONTINUED | OUTPATIENT
Start: 2024-07-17 | End: 2024-07-17 | Stop reason: SDUPTHER

## 2024-07-17 RX ORDER — MIDAZOLAM HYDROCHLORIDE 2 MG/2ML
2 INJECTION, SOLUTION INTRAMUSCULAR; INTRAVENOUS
Status: DISCONTINUED | OUTPATIENT
Start: 2024-07-17 | End: 2024-07-17 | Stop reason: HOSPADM

## 2024-07-17 RX ORDER — FENTANYL CITRATE 50 UG/ML
INJECTION, SOLUTION INTRAMUSCULAR; INTRAVENOUS PRN
Status: DISCONTINUED | OUTPATIENT
Start: 2024-07-17 | End: 2024-07-17 | Stop reason: SDUPTHER

## 2024-07-17 RX ORDER — DEXMEDETOMIDINE HYDROCHLORIDE 100 UG/ML
INJECTION, SOLUTION INTRAVENOUS PRN
Status: DISCONTINUED | OUTPATIENT
Start: 2024-07-17 | End: 2024-07-17 | Stop reason: SDUPTHER

## 2024-07-17 RX ORDER — DIPHENHYDRAMINE HYDROCHLORIDE 50 MG/ML
12.5 INJECTION INTRAMUSCULAR; INTRAVENOUS
Status: DISCONTINUED | OUTPATIENT
Start: 2024-07-17 | End: 2024-07-17 | Stop reason: HOSPADM

## 2024-07-17 RX ORDER — PROPOFOL 10 MG/ML
INJECTION, EMULSION INTRAVENOUS PRN
Status: DISCONTINUED | OUTPATIENT
Start: 2024-07-17 | End: 2024-07-17 | Stop reason: SDUPTHER

## 2024-07-17 RX ORDER — SODIUM CHLORIDE, SODIUM LACTATE, POTASSIUM CHLORIDE, CALCIUM CHLORIDE 600; 310; 30; 20 MG/100ML; MG/100ML; MG/100ML; MG/100ML
INJECTION, SOLUTION INTRAVENOUS CONTINUOUS
Status: DISCONTINUED | OUTPATIENT
Start: 2024-07-17 | End: 2024-07-17 | Stop reason: HOSPADM

## 2024-07-17 RX ORDER — ONDANSETRON 2 MG/ML
4 INJECTION INTRAMUSCULAR; INTRAVENOUS
Status: COMPLETED | OUTPATIENT
Start: 2024-07-17 | End: 2024-07-17

## 2024-07-17 RX ORDER — ONDANSETRON 2 MG/ML
INJECTION INTRAMUSCULAR; INTRAVENOUS PRN
Status: DISCONTINUED | OUTPATIENT
Start: 2024-07-17 | End: 2024-07-17 | Stop reason: SDUPTHER

## 2024-07-17 RX ORDER — LIDOCAINE HYDROCHLORIDE 10 MG/ML
1 INJECTION, SOLUTION EPIDURAL; INFILTRATION; INTRACAUDAL; PERINEURAL
Status: DISCONTINUED | OUTPATIENT
Start: 2024-07-17 | End: 2024-07-17 | Stop reason: HOSPADM

## 2024-07-17 RX ORDER — FENTANYL CITRATE 50 UG/ML
100 INJECTION, SOLUTION INTRAMUSCULAR; INTRAVENOUS
Status: DISCONTINUED | OUTPATIENT
Start: 2024-07-17 | End: 2024-07-17 | Stop reason: HOSPADM

## 2024-07-17 RX ORDER — KETOROLAC TROMETHAMINE 30 MG/ML
30 INJECTION, SOLUTION INTRAMUSCULAR; INTRAVENOUS ONCE
Status: COMPLETED | OUTPATIENT
Start: 2024-07-17 | End: 2024-07-17

## 2024-07-17 RX ORDER — NALOXONE HYDROCHLORIDE 0.4 MG/ML
INJECTION, SOLUTION INTRAMUSCULAR; INTRAVENOUS; SUBCUTANEOUS PRN
Status: DISCONTINUED | OUTPATIENT
Start: 2024-07-17 | End: 2024-07-17 | Stop reason: HOSPADM

## 2024-07-17 RX ORDER — DEXAMETHASONE SODIUM PHOSPHATE 4 MG/ML
INJECTION, SOLUTION INTRA-ARTICULAR; INTRALESIONAL; INTRAMUSCULAR; INTRAVENOUS; SOFT TISSUE PRN
Status: DISCONTINUED | OUTPATIENT
Start: 2024-07-17 | End: 2024-07-17 | Stop reason: SDUPTHER

## 2024-07-17 RX ORDER — SUCCINYLCHOLINE/SOD CL,ISO/PF 100 MG/5ML
SYRINGE (ML) INTRAVENOUS PRN
Status: DISCONTINUED | OUTPATIENT
Start: 2024-07-17 | End: 2024-07-17 | Stop reason: SDUPTHER

## 2024-07-17 RX ADMIN — PROPOFOL 225 MCG/KG/MIN: 10 INJECTION, EMULSION INTRAVENOUS at 08:43

## 2024-07-17 RX ADMIN — PROPOFOL 50 MG: 10 INJECTION, EMULSION INTRAVENOUS at 08:49

## 2024-07-17 RX ADMIN — DEXMEDETOMIDINE 10 MCG: 100 INJECTION, SOLUTION INTRAVENOUS at 08:53

## 2024-07-17 RX ADMIN — ONDANSETRON HYDROCHLORIDE 4 MG: 2 SOLUTION INTRAMUSCULAR; INTRAVENOUS at 08:45

## 2024-07-17 RX ADMIN — FENTANYL CITRATE 50 MCG: 50 INJECTION, SOLUTION INTRAMUSCULAR; INTRAVENOUS at 08:42

## 2024-07-17 RX ADMIN — HYDROMORPHONE HYDROCHLORIDE 0.5 MG: 1 INJECTION, SOLUTION INTRAMUSCULAR; INTRAVENOUS; SUBCUTANEOUS at 09:41

## 2024-07-17 RX ADMIN — DEXAMETHASONE SODIUM PHOSPHATE 4 MG: 4 INJECTION, SOLUTION INTRAMUSCULAR; INTRAVENOUS at 08:45

## 2024-07-17 RX ADMIN — SODIUM CHLORIDE, POTASSIUM CHLORIDE, SODIUM LACTATE AND CALCIUM CHLORIDE: 600; 310; 30; 20 INJECTION, SOLUTION INTRAVENOUS at 08:06

## 2024-07-17 RX ADMIN — KETOROLAC TROMETHAMINE 30 MG: 30 INJECTION, SOLUTION INTRAMUSCULAR at 09:24

## 2024-07-17 RX ADMIN — FENTANYL CITRATE 50 MCG: 50 INJECTION, SOLUTION INTRAMUSCULAR; INTRAVENOUS at 08:33

## 2024-07-17 RX ADMIN — MIDAZOLAM HYDROCHLORIDE 2 MG: 1 INJECTION, SOLUTION INTRAMUSCULAR; INTRAVENOUS at 08:33

## 2024-07-17 RX ADMIN — HYDROMORPHONE HYDROCHLORIDE 0.5 MG: 1 INJECTION, SOLUTION INTRAMUSCULAR; INTRAVENOUS; SUBCUTANEOUS at 09:35

## 2024-07-17 RX ADMIN — PROPOFOL 200 MG: 10 INJECTION, EMULSION INTRAVENOUS at 08:42

## 2024-07-17 RX ADMIN — Medication 100 MG: at 08:42

## 2024-07-17 RX ADMIN — ONDANSETRON 4 MG: 2 INJECTION INTRAMUSCULAR; INTRAVENOUS at 10:02

## 2024-07-17 ASSESSMENT — PAIN DESCRIPTION - LOCATION
LOCATION: VAGINA
LOCATION: VAGINA

## 2024-07-17 ASSESSMENT — PAIN SCALES - GENERAL
PAINLEVEL_OUTOF10: 6
PAINLEVEL_OUTOF10: 6
PAINLEVEL_OUTOF10: 5

## 2024-07-17 ASSESSMENT — PAIN DESCRIPTION - PAIN TYPE
TYPE: SURGICAL PAIN
TYPE: SURGICAL PAIN

## 2024-07-17 ASSESSMENT — PAIN - FUNCTIONAL ASSESSMENT
PAIN_FUNCTIONAL_ASSESSMENT: NONE - DENIES PAIN
PAIN_FUNCTIONAL_ASSESSMENT: PREVENTS OR INTERFERES SOME ACTIVE ACTIVITIES AND ADLS
PAIN_FUNCTIONAL_ASSESSMENT: PREVENTS OR INTERFERES SOME ACTIVE ACTIVITIES AND ADLS

## 2024-07-17 ASSESSMENT — PAIN DESCRIPTION - DESCRIPTORS
DESCRIPTORS: CRAMPING
DESCRIPTORS: CRAMPING

## 2024-07-17 ASSESSMENT — PAIN DESCRIPTION - ONSET
ONSET: AWAKENED FROM SLEEP
ONSET: AWAKENED FROM SLEEP

## 2024-07-17 ASSESSMENT — PAIN DESCRIPTION - FREQUENCY
FREQUENCY: CONTINUOUS
FREQUENCY: CONTINUOUS

## 2024-07-17 NOTE — H&P
Gynecology History and Physical    Name: Conchis Lazcano MRN: [id number] SSN: [id number]    YOB: 2000  Age: 23 y.o.  Sex: female       Subjective:      Chief complaint:  Abnormal uterine bleeding    Conchis is a 23 y.o.  female with a history of heavy menstrual bleeding and difficulty tolerating pelvic exam here for EUA and mirena IUD placement with endometrial biopsy.     She is admitted for Procedure(s) (LRB):  EXAMINATION UNDER ANESTHESIA/ENDOMETRIAL BIOPSY, UTERUS INTRAUTERINE DEVICE INSERTION (N/A).    OB History          0    Para   0    Term   0       0    AB   0    Living   0         SAB   0    IAB   0    Ectopic   0    Molar   0    Multiple   0    Live Births   0              Past Medical History:   Diagnosis Date    Abdominal migraine     Dr. David Rodriguez.  vs Sucrose Intolerance.    Acid reflux     ADHD (attention deficit hyperactivity disorder)     Asthma     Autism     High Functioning.  Dr. Ndiaye.    Biliary dyskinesia 2019    Chronic cholecystitis 2019    Chronic rhinitis     Dr. Alonso Ward, allergist.     Connective tissue disease ([facility])     Developmental delay     Fibromyalgia     Dr. Byrd    FIORDALIZA (generalized anxiety disorder)     Dr. Spring Marion    Hard of hearing     pt states due to fluid in ears x 2 years    HPV vaccine counseling     completed series    Insulin resistance 2021    Dr. Michelle Piper, endo    Irritable bowel syndrome with diarrhea 2017    Lupus ([facility])     Lymphedema     BL legs.  Dr. Sukhwinder Ochoa.  [facility]    Medication overuse headache 2015    Migraine headache     Dr. Martines    Obesity, morbid ([facility]) 2017    OCD (obsessive compulsive disorder)     Dr. Spring Marion    Sucrose intolerance     Dr. David Rodriguez, GI.    Tachycardia 2021    Richard Howe DO, [facility] Peds cardio    Tenosynovitis of right hand 2021    Dr. Haris Horner    Undifferentiated

## 2024-07-17 NOTE — ANESTHESIA PRE PROCEDURE
Department of Anesthesiology  Preprocedure Note       Name:  Conchis Lazcano   Age:  23 y.o.  :  2000                                          MRN:  [id number]         Date:  2024      Surgeon: Surgeon(s):  Lana Greene MD    Procedure: Procedure(s):  EXAMINATION UNDER ANESTHESIA/ENDOMETRIAL BIOPSY, UTERUS INTRAUTERINE DEVICE INSERTION    Medications prior to admission:   Prior to Admission medications    Medication Sig Start Date End Date Taking? Authorizing Provider   LANSOPRAZOLE PO Take 15 mg by mouth every morning    Job Anders MD   Semaglutide-Weight Management (WEGOVY) 0.25 MG/0.5ML SOAJ SC injection Inject 0.25 mg into the skin every 7 days 24   Jenny Marcus MD   gabapentin (NEURONTIN) 100 MG capsule Take 1 capsule by mouth daily for 90 days. Max Daily Amount: 100 mg 24  Lorelei Terry, JOSEFA - NP   Blood Pressure Monitoring (ADULT BLOOD PRESSURE CUFF LG) KIT 1 each by Does not apply route daily 24   Camryn Aly APRN - NP   Blood Pressure KIT 1 Units by Does not apply route daily 24   Camryn Aly, APRN - NP   eptinezumab-jjmr (VYEPTI) 100 MG/ML SOLN Infuse 3 mLs intravenously every 3 months    Job Anders MD   montelukast (SINGULAIR) 10 MG tablet  24   Job Anders MD   sertraline (ZOLOFT) 100 MG tablet Take 1.5 tablets by mouth daily 3/18/24   Job Anders MD   amphetamine-dextroamphetamine (ADDERALL XR) 25 MG extended release capsule Take 1 capsule by mouth every morning. 24   Job Anders MD   naratriptan (AMERGE) 2.5 MG tablet 2.5 mg at onset of headache, may repeat in 4 hours if needed 3/7/24   Darius Dyson Jr., MD   Rimegepant Sulfate (NURTEC) 75 MG TBDP Take 75 mg by mouth every other day 3/7/24   Darius Dyson Jr., MD   naltrexone (DEPADE) 50 MG tablet Take 0.5 tablets by mouth daily 24   Jenny Marcus MD   SYMBICORT 160-4.5 MCG/ACT AERO Inhale 2 puffs into the lungs 2

## 2024-07-17 NOTE — DISCHARGE INSTRUCTIONS
Incorporated disclaims any warranty or liability for your use of this information.    The discharge information has been reviewed with the  mother .    Any questions and concerns from the  mother  have been addressed.  The  mother  verbalized understanding.        CONTENTS FOUND IN YOUR DISCHARGE ENVELOPE:  [x]     Surgeon and Hospital Discharge Instructions  [x]     [facility] Care Provider Card  []     Medication & Side Effect Guide            (your newly prescribed medications have been marked/highlighted showing the most common side effects from   the classes of drugs on your prescriptions)  []     Medication Prescription(s) x 0 ( [] These have been sent electronically to your pharmacy by your surgeon,   - OR -       your surgeon has already provided these to you during a previous/pre-op office visit)  []     [facility] Education Information  []     Physical Therapy Prescription  []     Follow-up Appointment Cards  []     Surgery-related Pictures/Media  []     Pain block and/or block with On-Q Catheter from Anesthesia Service (information included in your instructions above)  []     Medical device information sheets/pamphlets from their    []     School/work excuse note.  []     /parent work excuse note.      The following personal items collected during your admission are returned to you:   Dental Appliance:    Vision:    Hearing Aid:    Jewelry:    Clothing:    Other Valuables:    Valuables sent to safe:

## 2024-07-17 NOTE — PERIOP NOTE
Patient arrived to PACU. Patient is combative and uncooperative. First initial set of vitals retrieved. BP cuff agitating patient. BP cuff removed per Dr. Saucedo. Will recheck BP once patient is more alert and cooperative.

## 2024-07-17 NOTE — OP NOTE
Operative Note      Patient: Conchis Lazcano  YOB: 2000  MRN: [id number]    Date of Procedure: 7/17/2024    Pre-Op Diagnosis Codes:     * Menorrhagia [N92.0]    Post-Op Diagnosis: Same       Procedure(s):  EXAMINATION UNDER ANESTHESIA/ENDOMETRIAL BIOPSY, UTERUS INTRAUTERINE DEVICE INSERTION    Surgeon(s):  Lana Greene MD    Assistant:   * No surgical staff found *    Anesthesia: Monitor Anesthesia Care    Estimated Blood Loss (mL): Minimal    Complications: None    Specimens:   ID Type Source Tests Collected by Time Destination   1 : Endometrial biopsy Tissue Endometrium SURGICAL PATHOLOGY Lana Greene MD 7/17/2024 0854        Implants:  * No implants in log *      Drains: * No LDAs found *    Findings:  Infection Present At Time Of Surgery (PATOS) (choose all levels that have infection present):  No infection present  Other Findings: normal appearing vagina, cervix. Bimanual exam with approximately 8w uterus, exam limited due to habitus, no appreciable uterine or adnexal masses    Detailed Description of Procedure:   Patient transported to OR and transferred to table.  Patient underwent satisfactory induction of anesthesia.  Patient placed in dorsal lithotomy and position confirmed for safety.  Patient draped in the normal sterile fashion.  Timeout was performed.  Exam under anesthesia revealed the above findings. Using bivalve speculum cervix was visualized and anterior lip was grasped using toothed tenaculum.  Cervix was prepped with betadine swab. Endometrial biopsy was collected using the usual procedure and uterus sounded to 7cm. Next, mirena IUD was placed in the uterine fundus in the usual fashion and strings trimmed to 3cm length.   Tenaculum removed from the cervix with hemostasis observed.  Superficial hymenal tear appreciated at 8 oclock, figure of eight 3-0 vicryl suture performed and hemostasis confirmed. Anesthesia was reversed.  Specimens to pathology.  All instrument and sponge counts

## 2024-07-18 NOTE — ANESTHESIA POSTPROCEDURE EVALUATION
Department of Anesthesiology  Postprocedure Note    Patient: Conchis Lazcano  MRN: [id number]  YOB: 2000  Date of evaluation: 7/18/2024    Procedure Summary       Date: 07/17/24 Room / Location: [facility] MAIN OR F7 / [facility] MAIN OR    Anesthesia Start: 0833 Anesthesia Stop: 0919    Procedure: EXAMINATION UNDER ANESTHESIA/ENDOMETRIAL BIOPSY, UTERUS INTRAUTERINE DEVICE INSERTION (Vagina ) Diagnosis:       Menorrhagia      (Menorrhagia [N92.0])    Surgeons: Lana Greene MD Responsible Provider: Chucky Saucedo MD    Anesthesia Type: general ASA Status: 3            Anesthesia Type: No value filed.    Kvng Phase I: Kvng Score: 9    Kvng Phase II: Kvng Score: 10    Anesthesia Post Evaluation    Patient location during evaluation: PACU  Patient participation: complete - patient participated  Level of consciousness: awake  Airway patency: patent  Nausea & Vomiting: no vomiting and no nausea  Cardiovascular status: hemodynamically stable  Respiratory status: acceptable  Hydration status: stable  Multimodal analgesia pain management approach  Pain management: adequate    No notable events documented.

## 2024-07-25 ENCOUNTER — HOSPITAL ENCOUNTER (OUTPATIENT)
Facility: HOSPITAL | Age: 24
Setting detail: RECURRING SERIES
Discharge: HOME | End: 2024-07-28
Payer: MEDICARE

## 2024-07-25 PROCEDURE — 97110 THERAPEUTIC EXERCISES: CPT | Performed by: PHYSICAL THERAPIST

## 2024-07-25 PROCEDURE — 97112 NEUROMUSCULAR REEDUCATION: CPT | Performed by: PHYSICAL THERAPIST

## 2024-07-25 PROCEDURE — G0283 ELEC STIM OTHER THAN WOUND: HCPCS | Performed by: PHYSICAL THERAPIST

## 2024-07-25 NOTE — PROGRESS NOTES
PHYSICAL THERAPY - MEDICARE DAILY TREATMENT NOTE (updated 3/23)      Date: 2024          Patient Name:  Conchis Lazcano :  2000   Medical   Diagnosis:  Other low back pain [M54.59] Treatment Diagnosis:  M54.59  OTHER LOWER BACK PAIN    Referral Source:  Jan Pemberton MD Insurance:   Payor: [facility] MEDICARE / Plan: [employer] DUAL COMPLETE / Product Type: *No Product type* /                     Patient  verified YES    Visit #   Current  / Total 16 24 per POC   Time   In / Out 1055 AM 1150 AM   Total Treatment Time 55   Total Timed Codes 40         SUBJECTIVE    Pain Level (0-10 scale): 7    Any medication changes, allergies to medications, adverse drug reactions, diagnosis change, or new procedure performed?: [x] No    [] Yes (see summary sheet for update)  Medications: Verified on Patient Summary List    Subjective functional status/changes:     Patient reports she had her IUD implanted last week \"I have been sore since then\"    OBJECTIVE    Modalities Rationale:     decrease pain and increase tissue extensibility to improve patient's ability to progress to PLOF and address remaining functional goals.    15   min [x] Estim Unattended,             type/location: IFC, low back       []  w/ice    []  w/heat  Lumbar spine      min [] Estim Attended,             type/location:       []  w/ice   []  w/heat         []  w/US   []  TENS insruct            min []  Mechanical Traction,        type/lbs:        []  pro      []  sup           []  int       []  cont            []  before manual           []  after manual     min []  Ultrasound,         settings/location:      min  unbilled []  Ice     []  Heat            location/position:         min []  Vasopneumatic Device,      press/temp:   pre-treatment girth :    post-treatment girth :    measured at (landmark       location) :   If using vaso (only need to measure limb vaso being performed on)        min []  Other:        Skin assessment post-treatment

## 2024-08-01 ENCOUNTER — OFFICE VISIT (OUTPATIENT)
Age: 24
End: 2024-08-01
Payer: MEDICARE

## 2024-08-01 VITALS
SYSTOLIC BLOOD PRESSURE: 133 MMHG | BODY MASS INDEX: 49.17 KG/M2 | RESPIRATION RATE: 20 BRPM | DIASTOLIC BLOOD PRESSURE: 83 MMHG | OXYGEN SATURATION: 100 % | TEMPERATURE: 98 F | WEIGHT: 288 LBS | HEIGHT: 64 IN | HEART RATE: 97 BPM

## 2024-08-01 DIAGNOSIS — J45.51 SEVERE PERSISTENT ASTHMA WITH (ACUTE) EXACERBATION: ICD-10-CM

## 2024-08-01 DIAGNOSIS — M35.9 UNDIFFERENTIATED CONNECTIVE TISSUE DISEASE (HCC): ICD-10-CM

## 2024-08-01 DIAGNOSIS — I42.9 CARDIOMYOPATHY, UNSPECIFIED TYPE (HCC): ICD-10-CM

## 2024-08-01 DIAGNOSIS — R05.1 ACUTE COUGH: Primary | ICD-10-CM

## 2024-08-01 DIAGNOSIS — J02.9 SORE THROAT: ICD-10-CM

## 2024-08-01 PROBLEM — E11.9 TYPE 2 DIABETES MELLITUS (HCC): Status: ACTIVE | Noted: 2024-08-01

## 2024-08-01 LAB
EXP DATE SOLUTION: NORMAL
EXP DATE SWAB: NORMAL
EXPIRATION DATE: NORMAL
INFLUENZA A ANTIGEN, POC: NEGATIVE
INFLUENZA B ANTIGEN, POC: NEGATIVE
LOT NUMBER POC: NORMAL
LOT NUMBER SOLUTION: NORMAL
LOT NUMBER SWAB: NORMAL
SARS-COV-2 RNA, POC: NEGATIVE
STREP PYOGENES DNA, POC: NEGATIVE
VALID INTERNAL CONTROL, POC: NORMAL
VALID INTERNAL CONTROL, POC: NORMAL

## 2024-08-01 PROCEDURE — 99214 OFFICE O/P EST MOD 30 MIN: CPT | Performed by: PHYSICIAN ASSISTANT

## 2024-08-01 PROCEDURE — 1036F TOBACCO NON-USER: CPT | Performed by: PHYSICIAN ASSISTANT

## 2024-08-01 PROCEDURE — 87635 SARS-COV-2 COVID-19 AMP PRB: CPT | Performed by: PHYSICIAN ASSISTANT

## 2024-08-01 PROCEDURE — PBSHW AMB POC INFLUENZA A  AND B REAL-TIME RT-PCR: Performed by: PHYSICIAN ASSISTANT

## 2024-08-01 PROCEDURE — 87502 INFLUENZA DNA AMP PROBE: CPT | Performed by: PHYSICIAN ASSISTANT

## 2024-08-01 PROCEDURE — 87651 STREP A DNA AMP PROBE: CPT | Performed by: PHYSICIAN ASSISTANT

## 2024-08-01 PROCEDURE — PBSHW AMB POC STREP GO A DIRECT, DNA PROBE: Performed by: PHYSICIAN ASSISTANT

## 2024-08-01 PROCEDURE — G8417 CALC BMI ABV UP PARAM F/U: HCPCS | Performed by: PHYSICIAN ASSISTANT

## 2024-08-01 PROCEDURE — PBSHW AMB POC COVID-19 COV: Performed by: PHYSICIAN ASSISTANT

## 2024-08-01 PROCEDURE — G8427 DOCREV CUR MEDS BY ELIG CLIN: HCPCS | Performed by: PHYSICIAN ASSISTANT

## 2024-08-01 RX ORDER — PREDNISONE 10 MG/1
TABLET ORAL
Qty: 21 EACH | Refills: 0 | Status: SHIPPED | OUTPATIENT
Start: 2024-08-01

## 2024-08-01 RX ORDER — MECOBALAMIN 5000 MCG
15 TABLET,DISINTEGRATING ORAL DAILY
COMMUNITY
Start: 2024-06-18

## 2024-08-01 RX ORDER — DEXTROAMPHETAMINE SACCHARATE, AMPHETAMINE ASPARTATE MONOHYDRATE, DEXTROAMPHETAMINE SULFATE AND AMPHETAMINE SULFATE 7.5; 7.5; 7.5; 7.5 MG/1; MG/1; MG/1; MG/1
30 CAPSULE, EXTENDED RELEASE ORAL EVERY MORNING
COMMUNITY
Start: 2024-07-09

## 2024-08-01 SDOH — ECONOMIC STABILITY: FOOD INSECURITY: WITHIN THE PAST 12 MONTHS, THE FOOD YOU BOUGHT JUST DIDN'T LAST AND YOU DIDN'T HAVE MONEY TO GET MORE.: NEVER TRUE

## 2024-08-01 SDOH — ECONOMIC STABILITY: FOOD INSECURITY: WITHIN THE PAST 12 MONTHS, YOU WORRIED THAT YOUR FOOD WOULD RUN OUT BEFORE YOU GOT MONEY TO BUY MORE.: NEVER TRUE

## 2024-08-01 SDOH — ECONOMIC STABILITY: INCOME INSECURITY: HOW HARD IS IT FOR YOU TO PAY FOR THE VERY BASICS LIKE FOOD, HOUSING, MEDICAL CARE, AND HEATING?: NOT HARD AT ALL

## 2024-08-01 ASSESSMENT — PATIENT HEALTH QUESTIONNAIRE - PHQ9
SUM OF ALL RESPONSES TO PHQ QUESTIONS 1-9: 0
SUM OF ALL RESPONSES TO PHQ9 QUESTIONS 1 & 2: 0
4. FEELING TIRED OR HAVING LITTLE ENERGY: NOT AT ALL
7. TROUBLE CONCENTRATING ON THINGS, SUCH AS READING THE NEWSPAPER OR WATCHING TELEVISION: NOT AT ALL
SUM OF ALL RESPONSES TO PHQ QUESTIONS 1-9: 0
SUM OF ALL RESPONSES TO PHQ QUESTIONS 1-9: 0
3. TROUBLE FALLING OR STAYING ASLEEP: NOT AT ALL
6. FEELING BAD ABOUT YOURSELF - OR THAT YOU ARE A FAILURE OR HAVE LET YOURSELF OR YOUR FAMILY DOWN: NOT AT ALL
8. MOVING OR SPEAKING SO SLOWLY THAT OTHER PEOPLE COULD HAVE NOTICED. OR THE OPPOSITE, BEING SO FIGETY OR RESTLESS THAT YOU HAVE BEEN MOVING AROUND A LOT MORE THAN USUAL: NOT AT ALL
9. THOUGHTS THAT YOU WOULD BE BETTER OFF DEAD, OR OF HURTING YOURSELF: NOT AT ALL
SUM OF ALL RESPONSES TO PHQ QUESTIONS 1-9: 0
2. FEELING DOWN, DEPRESSED OR HOPELESS: NOT AT ALL
10. IF YOU CHECKED OFF ANY PROBLEMS, HOW DIFFICULT HAVE THESE PROBLEMS MADE IT FOR YOU TO DO YOUR WORK, TAKE CARE OF THINGS AT HOME, OR GET ALONG WITH OTHER PEOPLE: NOT DIFFICULT AT ALL
5. POOR APPETITE OR OVEREATING: NOT AT ALL
1. LITTLE INTEREST OR PLEASURE IN DOING THINGS: NOT AT ALL

## 2024-08-01 NOTE — PROGRESS NOTES
Conchis Lzacano is a 23 y.o. female , id x 2(name and ). Reviewed record, history, and  medications.      Chief Complaint   Patient presents with    URI     Patient c/o cough, sob, sneezing, patient has asthma, inhalers not helping. Exposure to other people sick, no dx. No covid test.          Vitals:    24 1101   Weight: 130.6 kg (288 lb)   Height: 1.626 m (5' 4\")             2024    11:06 AM   PHQ-9    Little interest or pleasure in doing things 0   Feeling down, depressed, or hopeless 0   Trouble falling or staying asleep, or sleeping too much 0   Feeling tired or having little energy 0   Poor appetite or overeating 0   Feeling bad about yourself - or that you are a failure or have let yourself or your family down 0   Trouble concentrating on things, such as reading the newspaper or watching television 0   Moving or speaking so slowly that other people could have noticed. Or the opposite - being so fidgety or restless that you have been moving around a lot more than usual 0   Thoughts that you would be better off dead, or of hurting yourself in some way 0   PHQ-2 Score 0   PHQ-9 Total Score 0   If you checked off any problems, how difficult have these problems made it for you to do your work, take care of things at home, or get along with other people? 0           2023     3:08 PM   FIORDALIZA-7 SCREENING   Feeling nervous, anxious, or on edge Not at all   Not being able to stop or control worrying Not at all   Worrying too much about different things Not at all   Trouble relaxing Not at all   Being so restless that it is hard to sit still Not at all   Becoming easily annoyed or irritable Not at all   Feeling afraid as if something awful might happen Not at all   FIORDALIZA-7 Total Score 0   How difficult have these problems made it for you to do your work, take care of things at home, or get along with other people? Not difficult at all       Social Determinants of Health     Tobacco Use: Low Risk  (2024) 
work, take care of things at home, or get along with other people? Not difficult at all       Social Determinants of Health     Tobacco Use: Low Risk  (8/1/2024)    Patient History     Smoking Tobacco Use: Never     Smokeless Tobacco Use: Never     Passive Exposure: Never   Alcohol Use: Not At Risk (5/30/2024)    AUDIT-C     Frequency of Alcohol Consumption: Never     Average Number of Drinks: Patient does not drink     Frequency of Binge Drinking: Never   Financial Resource Strain: Low Risk  (8/1/2024)    Overall Financial Resource Strain (CARDIA)     Difficulty of Paying Living Expenses: Not hard at all   Food Insecurity: No Food Insecurity (8/1/2024)    Hunger Vital Sign     Worried About Running Out of Food in the Last Year: Never true     Ran Out of Food in the Last Year: Never true   Transportation Needs: Unknown (8/1/2024)    PRAPARE - Transportation     Lack of Transportation (Medical): Not on file     Lack of Transportation (Non-Medical): No   Physical Activity: Inactive (8/3/2022)    Exercise Vital Sign     Days of Exercise per Week: 0 days     Minutes of Exercise per Session: 0 min   Stress: Not on file   Social Connections: Not on file   Intimate Partner Violence: Unknown (8/3/2022)    Humiliation, Afraid, Rape, and Kick questionnaire     Fear of Current or Ex-Partner: Patient declined     Emotionally Abused: Patient declined     Physically Abused: Patient declined     Sexually Abused: Patient declined   Depression: Not at risk (8/1/2024)    PHQ-2     PHQ-2 Score: 0   Housing Stability: Unknown (8/1/2024)    Housing Stability Vital Sign     Unable to Pay for Housing in the Last Year: Not on file     Number of Places Lived in the Last Year: Not on file     Unstable Housing in the Last Year: No   Interpersonal Safety: Not At Risk (7/17/2024)    Interpersonal Safety Domain Source: IP Abuse Screening     Physical abuse: Denies     Verbal abuse: Denies     Emotional abuse: Denies     Financial abuse: Denies

## 2024-08-02 ENCOUNTER — APPOINTMENT (OUTPATIENT)
Facility: HOSPITAL | Age: 24
End: 2024-08-02
Payer: MEDICARE

## 2024-08-02 NOTE — PROGRESS NOTES
Conchis Lazcano is a 23 y.o. female presents for a problem visit.    Chief Complaint   Patient presents with    Follow-up   7/17/2024 procedure    No LMP recorded.  Birth Control: none.  Last Pap: normal obtained 5/3/2024.    Examination chaperoned by Ashley Jones MA.

## 2024-08-03 LAB
BACTERIA SPEC CULT: NORMAL
SERVICE CMNT-IMP: NORMAL

## 2024-08-05 ENCOUNTER — OFFICE VISIT (OUTPATIENT)
Age: 24
End: 2024-08-05

## 2024-08-05 VITALS — BODY MASS INDEX: 49.26 KG/M2 | WEIGHT: 287 LBS | DIASTOLIC BLOOD PRESSURE: 85 MMHG | SYSTOLIC BLOOD PRESSURE: 136 MMHG

## 2024-08-05 DIAGNOSIS — Z30.431 IUD CHECK UP: Primary | ICD-10-CM

## 2024-08-05 PROCEDURE — 99024 POSTOP FOLLOW-UP VISIT: CPT | Performed by: STUDENT IN AN ORGANIZED HEALTH CARE EDUCATION/TRAINING PROGRAM

## 2024-08-05 RX ORDER — ONDANSETRON HYDROCHLORIDE 8 MG/1
8 TABLET, FILM COATED ORAL EVERY 8 HOURS PRN
COMMUNITY

## 2024-08-05 NOTE — PROGRESS NOTES
OB/GYN Problem VIsit    HPI  Conchis Lazcano is a ,  23 y.o. female who presents for a follow up.     2w s/p EMB and mirena placement. Doing well, denies pain or bleeding.     Past Medical History:   Diagnosis Date    Abdominal migraine     Dr. David Rodriguez.  vs Sucrose Intolerance.    Acid reflux     ADHD (attention deficit hyperactivity disorder)     Asthma     Autism     High Functioning.  Dr. Ndiaye.    Autoimmune disorder ([facility])     Biliary dyskinesia 2019    Chronic back pain     Chronic cholecystitis 2019    Chronic rhinitis     Dr. Alonso Ward, allergist.     Connective tissue disease (HCC)     Developmental delay     Fibromyalgia     Dr. Byrd    FIORDALIZA (generalized anxiety disorder)     Dr. Spring Marion    Hard of hearing     pt states due to fluid in ears x 2 years    HPV vaccine counseling     completed series    Insulin resistance 2021    Dr. Michelle Piper, endo    Irritable bowel syndrome with diarrhea 2017    Liver disease 2023    Lupus (HCC)     Lymphedema     BL legs.  Dr. Sukhwinder Ochoa.  Heri Gonzalez, [facility]    Medication overuse headache 2015    Migraine     Migraine headache     Dr. Martines    Obesity, morbid (HCC) 2017    OCD (obsessive compulsive disorder)     Dr. Spring Marion    Sucrose intolerance     Dr. David Rodriguez, GI.    Tachycardia 2021    Richard Howe DO, [facility] Peds cardio    Tenosynovitis of right hand 2021    Dr. Haris Horner    Undifferentiated connective tissue disease (HCC)     (lupus like).  Dr. Ochoa.     Past Surgical History:   Procedure Laterality Date    CHOLECYSTECTOMY  2019    CLP BRAVO  2020    COLONOSCOPY N/A 2016    COLONOSCOPY performed by David Rodriguez MD at [facility] ENDOSCOPY    CT BONE MARROW BIOPSY  2022    CT BONE MARROW BIOPSY 2022 [facility] RAD CT    CT BONE MARROW BIOPSY  2022    CT BONE MARROW BIOPSY 2022    EGD TRANSORAL BIOPSY

## 2024-08-07 ENCOUNTER — OFFICE VISIT (OUTPATIENT)
Age: 24
End: 2024-08-07
Payer: MEDICARE

## 2024-08-07 VITALS
DIASTOLIC BLOOD PRESSURE: 72 MMHG | SYSTOLIC BLOOD PRESSURE: 118 MMHG | OXYGEN SATURATION: 98 % | HEIGHT: 64 IN | HEART RATE: 97 BPM | WEIGHT: 285.8 LBS | BODY MASS INDEX: 48.79 KG/M2

## 2024-08-07 DIAGNOSIS — R00.0 TACHYCARDIA, UNSPECIFIED: ICD-10-CM

## 2024-08-07 DIAGNOSIS — I51.9 LEFT VENTRICULAR DYSFUNCTION: ICD-10-CM

## 2024-08-07 DIAGNOSIS — R93.1 ABNORMAL ECHOCARDIOGRAM: ICD-10-CM

## 2024-08-07 DIAGNOSIS — I10 ESSENTIAL (PRIMARY) HYPERTENSION: ICD-10-CM

## 2024-08-07 DIAGNOSIS — G90.A POTS (POSTURAL ORTHOSTATIC TACHYCARDIA SYNDROME): Primary | ICD-10-CM

## 2024-08-07 PROCEDURE — 1036F TOBACCO NON-USER: CPT | Performed by: SPECIALIST

## 2024-08-07 PROCEDURE — 3078F DIAST BP <80 MM HG: CPT | Performed by: SPECIALIST

## 2024-08-07 PROCEDURE — 99214 OFFICE O/P EST MOD 30 MIN: CPT | Performed by: SPECIALIST

## 2024-08-07 PROCEDURE — 93005 ELECTROCARDIOGRAM TRACING: CPT | Performed by: SPECIALIST

## 2024-08-07 PROCEDURE — G8417 CALC BMI ABV UP PARAM F/U: HCPCS | Performed by: SPECIALIST

## 2024-08-07 PROCEDURE — 3074F SYST BP LT 130 MM HG: CPT | Performed by: SPECIALIST

## 2024-08-07 PROCEDURE — 93010 ELECTROCARDIOGRAM REPORT: CPT | Performed by: SPECIALIST

## 2024-08-07 PROCEDURE — G8427 DOCREV CUR MEDS BY ELIG CLIN: HCPCS | Performed by: SPECIALIST

## 2024-08-07 NOTE — PROGRESS NOTES
Chief Complaint   Patient presents with    Hypertension    Tachycardia    POTS     Vitals:    08/07/24 1052   BP: 118/72   Site: Left Upper Arm   Position: Sitting   Pulse: 97   SpO2: 98%   Weight: 129.6 kg (285 lb 12.8 oz)   Height: 1.626 m (5' 4\")         Chest pain: DENIED     Recent hospital stays: DENIED     Refills: DENIED   
 8/25/2022    CT BONE MARROW BIOPSY 8/25/2022    EGD TRANSORAL BIOPSY SINGLE/MULTIPLE  6/14/2019         EGD TRANSORAL BIOPSY SINGLE/MULTIPLE  11/30/2020    GERD TST W/ MUCOS PH ELECTROD 48 HR (BRAVO)  12/3/2020         INTRAUTERINE DEVICE INSERTION N/A 7/17/2024    EXAMINATION UNDER ANESTHESIA/ENDOMETRIAL BIOPSY, UTERUS INTRAUTERINE DEVICE INSERTION performed by Lana Greene MD at [facility] MAIN OR    MYRINGOTOMY Bilateral 10/4/2023    BILATERAL MYRINGOTOMY WITH EAR TUBE PLACEMENT performed by Snow Mazariegos MD at [facility] MAIN OR    TONSILLECTOMY      AGE 1    TONSILLECTOMY AND ADENOIDECTOMY      UPPER GASTROINTESTINAL ENDOSCOPY  11/2020    dx'd with sucrose intolerance    UPPER GI ENDOSCOPY,BIOPSY  12/20/2016         WISDOM TOOTH EXTRACTION               CURRENT MEDICATIONS:    .  Current Outpatient Medications   Medication Sig Dispense Refill    ondansetron (ZOFRAN) 8 MG tablet Take 1 tablet by mouth every 8 hours as needed for Nausea or Vomiting      amphetamine-dextroamphetamine (ADDERALL XR) 30 MG extended release capsule Take 1 capsule by mouth every morning.      lansoprazole (PREVACID) 15 MG delayed release capsule Take 1 capsule by mouth daily      gabapentin (NEURONTIN) 100 MG capsule Take 1 capsule by mouth daily for 90 days. Max Daily Amount: 100 mg 90 capsule 3    eptinezumab-jjmr (VYEPTI) 100 MG/ML SOLN Infuse 3 mLs intravenously every 3 months      montelukast (SINGULAIR) 10 MG tablet       sertraline (ZOLOFT) 100 MG tablet Take 1.5 tablets by mouth daily      naratriptan (AMERGE) 2.5 MG tablet 2.5 mg at onset of headache, may repeat in 4 hours if needed 9 tablet 3    Rimegepant Sulfate (NURTEC) 75 MG TBDP Take 75 mg by mouth every other day 16 tablet 5    naltrexone (DEPADE) 50 MG tablet Take 0.5 tablets by mouth daily 90 tablet 0    SYMBICORT 160-4.5 MCG/ACT AERO Inhale 2 puffs into the lungs 2 times daily      albuterol sulfate HFA (PROVENTIL;VENTOLIN;PROAIR) 108 (90 Base) MCG/ACT inhaler Inhale 1 puff

## 2024-08-08 ENCOUNTER — HOSPITAL ENCOUNTER (OUTPATIENT)
Facility: HOSPITAL | Age: 24
Setting detail: RECURRING SERIES
End: 2024-08-08
Payer: MEDICARE

## 2024-08-15 ENCOUNTER — APPOINTMENT (OUTPATIENT)
Facility: HOSPITAL | Age: 24
End: 2024-08-15
Payer: MEDICARE

## 2024-08-22 ENCOUNTER — OFFICE VISIT (OUTPATIENT)
Age: 24
End: 2024-08-22
Payer: MEDICARE

## 2024-08-22 VITALS
HEIGHT: 64 IN | WEIGHT: 285 LBS | BODY MASS INDEX: 48.65 KG/M2 | HEART RATE: 94 BPM | DIASTOLIC BLOOD PRESSURE: 78 MMHG | TEMPERATURE: 98 F | SYSTOLIC BLOOD PRESSURE: 126 MMHG | OXYGEN SATURATION: 97 % | RESPIRATION RATE: 18 BRPM

## 2024-08-22 DIAGNOSIS — R20.0 RIGHT ARM NUMBNESS: ICD-10-CM

## 2024-08-22 DIAGNOSIS — G90.A POTS (POSTURAL ORTHOSTATIC TACHYCARDIA SYNDROME): ICD-10-CM

## 2024-08-22 DIAGNOSIS — E66.01 MORBID OBESITY WITH BMI OF 45.0-49.9, ADULT (HCC): ICD-10-CM

## 2024-08-22 DIAGNOSIS — G43.709 CHRONIC MIGRAINE W/O AURA, NOT INTRACTABLE, W/O STAT MIGR: Primary | ICD-10-CM

## 2024-08-22 DIAGNOSIS — R41.3 MEMORY LOSS: ICD-10-CM

## 2024-08-22 PROCEDURE — 1036F TOBACCO NON-USER: CPT | Performed by: PSYCHIATRY & NEUROLOGY

## 2024-08-22 PROCEDURE — G8417 CALC BMI ABV UP PARAM F/U: HCPCS | Performed by: PSYCHIATRY & NEUROLOGY

## 2024-08-22 PROCEDURE — 99214 OFFICE O/P EST MOD 30 MIN: CPT | Performed by: PSYCHIATRY & NEUROLOGY

## 2024-08-22 PROCEDURE — G8427 DOCREV CUR MEDS BY ELIG CLIN: HCPCS | Performed by: PSYCHIATRY & NEUROLOGY

## 2024-08-22 RX ORDER — BUPROPION HYDROCHLORIDE 150 MG/1
150 TABLET ORAL NIGHTLY
COMMUNITY

## 2024-08-22 RX ORDER — RIMEGEPANT SULFATE 75 MG/75MG
75 TABLET, ORALLY DISINTEGRATING ORAL EVERY OTHER DAY
Qty: 16 TABLET | Refills: 5 | Status: SHIPPED | OUTPATIENT
Start: 2024-08-22

## 2024-08-22 NOTE — PROGRESS NOTES
NEUROLOGY CLINIC NOTE    Patient ID:  Conchis Lazcano  [id number]  23 y.o.  2000    Date of Visit:  August 22, 2024    Reason for Visit:  migraines      Chief Complaint   Patient presents with    Migraine     3 month follow up- patien reports she has had 1-2 migraines in the last 30 days.         History of Present Illness:     Patient Active Problem List    Diagnosis Date Noted    Type 2 diabetes mellitus 08/01/2024    Menorrhagia 05/10/2024    Focal nodular hyperplasia of liver 04/28/2024    Hepatic adenoma 04/11/2024    Chronic right-sided low back pain with right-sided sciatica 03/29/2023    Atopic dermatitis 03/29/2023    Cardiomyopathy, unspecified type ([facility]) 01/19/2023    Severe persistent asthma with acute exacerbation 12/21/2022    Undifferentiated connective tissue disease ([facility]) 11/17/2022    FIORDALIZA (generalized anxiety disorder)     Sucrose intolerance     Migraine headache     Autism     ADHD (attention deficit hyperactivity disorder)     Tenosynovitis of right hand 01/01/2021    Obesity, morbid ([facility]) 12/14/2020    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 David Rodriguez.  vs Sucrose Intolerance.    Acid reflux     ADHD (attention deficit hyperactivity disorder)     Asthma     Autism     High Functioning.  Dr. Ndiaye.    Autoimmune disorder ([facility]) 2020    Biliary dyskinesia 6/4/2019    Chronic back pain     Chronic cholecystitis 6/4/2019    Chronic rhinitis 2021    Dr. Alonso Ward, allergist.     Connective tissue disease ([facility])     Developmental delay     Fibromyalgia     Dr. Byrd    FIORDALIZA (generalized anxiety disorder)     Dr. Spring Marion    Hard of hearing     pt states due to fluid in ears x 2 years    HPV vaccine counseling     completed series    Insulin resistance 09/08/2021    Dr. Michelle Piper, endo

## 2024-08-27 ENCOUNTER — OFFICE VISIT (OUTPATIENT)
Age: 24
End: 2024-08-27
Payer: MEDICARE

## 2024-08-27 VITALS
SYSTOLIC BLOOD PRESSURE: 124 MMHG | OXYGEN SATURATION: 97 % | HEART RATE: 83 BPM | RESPIRATION RATE: 20 BRPM | DIASTOLIC BLOOD PRESSURE: 86 MMHG | BODY MASS INDEX: 49.34 KG/M2 | TEMPERATURE: 98.8 F | WEIGHT: 289 LBS | HEIGHT: 64 IN

## 2024-08-27 DIAGNOSIS — L20.9 ATOPIC DERMATITIS, UNSPECIFIED TYPE: Primary | ICD-10-CM

## 2024-08-27 DIAGNOSIS — E11.9 TYPE 2 DIABETES MELLITUS WITHOUT COMPLICATION, WITHOUT LONG-TERM CURRENT USE OF INSULIN (HCC): ICD-10-CM

## 2024-08-27 PROCEDURE — 2022F DILAT RTA XM EVC RTNOPTHY: CPT | Performed by: PHYSICIAN ASSISTANT

## 2024-08-27 PROCEDURE — G8417 CALC BMI ABV UP PARAM F/U: HCPCS | Performed by: PHYSICIAN ASSISTANT

## 2024-08-27 PROCEDURE — 99213 OFFICE O/P EST LOW 20 MIN: CPT | Performed by: PHYSICIAN ASSISTANT

## 2024-08-27 PROCEDURE — G8427 DOCREV CUR MEDS BY ELIG CLIN: HCPCS | Performed by: PHYSICIAN ASSISTANT

## 2024-08-27 PROCEDURE — 1036F TOBACCO NON-USER: CPT | Performed by: PHYSICIAN ASSISTANT

## 2024-08-27 PROCEDURE — 3046F HEMOGLOBIN A1C LEVEL >9.0%: CPT | Performed by: PHYSICIAN ASSISTANT

## 2024-08-27 RX ORDER — PREDNISONE 10 MG/1
TABLET ORAL
Qty: 21 EACH | Refills: 0 | Status: SHIPPED | OUTPATIENT
Start: 2024-08-27

## 2024-08-27 ASSESSMENT — PATIENT HEALTH QUESTIONNAIRE - PHQ9
1. LITTLE INTEREST OR PLEASURE IN DOING THINGS: NOT AT ALL
2. FEELING DOWN, DEPRESSED OR HOPELESS: NOT AT ALL
7. TROUBLE CONCENTRATING ON THINGS, SUCH AS READING THE NEWSPAPER OR WATCHING TELEVISION: NOT AT ALL
10. IF YOU CHECKED OFF ANY PROBLEMS, HOW DIFFICULT HAVE THESE PROBLEMS MADE IT FOR YOU TO DO YOUR WORK, TAKE CARE OF THINGS AT HOME, OR GET ALONG WITH OTHER PEOPLE: NOT DIFFICULT AT ALL
SUM OF ALL RESPONSES TO PHQ9 QUESTIONS 1 & 2: 0
SUM OF ALL RESPONSES TO PHQ QUESTIONS 1-9: 0
4. FEELING TIRED OR HAVING LITTLE ENERGY: NOT AT ALL
SUM OF ALL RESPONSES TO PHQ QUESTIONS 1-9: 0
8. MOVING OR SPEAKING SO SLOWLY THAT OTHER PEOPLE COULD HAVE NOTICED. OR THE OPPOSITE, BEING SO FIGETY OR RESTLESS THAT YOU HAVE BEEN MOVING AROUND A LOT MORE THAN USUAL: NOT AT ALL
3. TROUBLE FALLING OR STAYING ASLEEP: NOT AT ALL
5. POOR APPETITE OR OVEREATING: NOT AT ALL
6. FEELING BAD ABOUT YOURSELF - OR THAT YOU ARE A FAILURE OR HAVE LET YOURSELF OR YOUR FAMILY DOWN: NOT AT ALL
9. THOUGHTS THAT YOU WOULD BE BETTER OFF DEAD, OR OF HURTING YOURSELF: NOT AT ALL

## 2024-08-27 NOTE — PROGRESS NOTES
Conchis Lazcano is a 23 y.o. female , id x 2(name and ). Reviewed record, history, and  medications.      Chief Complaint   Patient presents with    Eye Problem     Patient c/o left eye pain, watery eyes, since yesterday.          Vitals:    24 1444   BP: 124/86   Site: Right Lower Arm   Position: Sitting   Cuff Size: Large Adult   Pulse: 83   Resp: 20   Temp: 98.8 °F (37.1 °C)   TempSrc: Oral   SpO2: 97%   Weight: 131.1 kg (289 lb)   Height: 1.626 m (5' 4\")             2024     2:51 PM   PHQ-9    Little interest or pleasure in doing things 0   Feeling down, depressed, or hopeless 0   Trouble falling or staying asleep, or sleeping too much 0   Feeling tired or having little energy 0   Poor appetite or overeating 0   Feeling bad about yourself - or that you are a failure or have let yourself or your family down 0   Trouble concentrating on things, such as reading the newspaper or watching television 0   Moving or speaking so slowly that other people could have noticed. Or the opposite - being so fidgety or restless that you have been moving around a lot more than usual 0   Thoughts that you would be better off dead, or of hurting yourself in some way 0   PHQ-2 Score 0   PHQ-9 Total Score 0   If you checked off any problems, how difficult have these problems made it for you to do your work, take care of things at home, or get along with other people? 0           2023     3:08 PM   FIORDALIZA-7 SCREENING   Feeling nervous, anxious, or on edge Not at all   Not being able to stop or control worrying Not at all   Worrying too much about different things Not at all   Trouble relaxing Not at all   Being so restless that it is hard to sit still Not at all   Becoming easily annoyed or irritable Not at all   Feeling afraid as if something awful might happen Not at all   FIORDALIZA-7 Total Score 0   How difficult have these problems made it for you to do your work, take care of things at home, or get along with other  stop scratching the area.  If she feels her eye is itchy she may try applying a.  Type 2 diabetes mellitus without complication, without long-term current use of insulin (HCC)  Well-controlled     Time was used for level of billing: no     No follow-up provider specified.    I have discussed the diagnosis with the patient and the intended plan as seen in the above orders.  The patient has received an after-visit summary and questions were answered concerning future plans.     Medication Side Effects and Warnings were discussed with patient: Yes  Patient Labs were reviewed and or requested: Yes  Patient Past Records were reviewed and or requested  Yes    Katlyn Acuna PA-C                        Click Here for Release of Records Request

## 2024-08-27 NOTE — PROGRESS NOTES
Conchis Lazcano is a 23 y.o. female , id x 2(name and ). Reviewed record, history, and  medications.      Chief Complaint   Patient presents with    Eye Problem     Patient c/o left eye pain, watery eyes, since yesterday.          Vitals:    24 1444   Weight: 131.1 kg (289 lb)   Height: 1.626 m (5' 4\")             2024     2:51 PM   PHQ-9    Little interest or pleasure in doing things 0   Feeling down, depressed, or hopeless 0   Trouble falling or staying asleep, or sleeping too much 0   Feeling tired or having little energy 0   Poor appetite or overeating 0   Feeling bad about yourself - or that you are a failure or have let yourself or your family down 0   Trouble concentrating on things, such as reading the newspaper or watching television 0   Moving or speaking so slowly that other people could have noticed. Or the opposite - being so fidgety or restless that you have been moving around a lot more than usual 0   Thoughts that you would be better off dead, or of hurting yourself in some way 0   PHQ-2 Score 0   PHQ-9 Total Score 0   If you checked off any problems, how difficult have these problems made it for you to do your work, take care of things at home, or get along with other people? 0           2023     3:08 PM   FIORDALIZA-7 SCREENING   Feeling nervous, anxious, or on edge Not at all   Not being able to stop or control worrying Not at all   Worrying too much about different things Not at all   Trouble relaxing Not at all   Being so restless that it is hard to sit still Not at all   Becoming easily annoyed or irritable Not at all   Feeling afraid as if something awful might happen Not at all   FIORDALIZA-7 Total Score 0   How difficult have these problems made it for you to do your work, take care of things at home, or get along with other people? Not difficult at all       Social Determinants of Health     Tobacco Use: Low Risk  (2024)    Patient History     Smoking Tobacco Use: Never      Smokeless Tobacco Use: Never     Passive Exposure: Never   Alcohol Use: Not At Risk (5/30/2024)    AUDIT-C     Frequency of Alcohol Consumption: Never     Average Number of Drinks: Patient does not drink     Frequency of Binge Drinking: Never   Financial Resource Strain: Low Risk  (8/1/2024)    Overall Financial Resource Strain (CARDIA)     Difficulty of Paying Living Expenses: Not hard at all   Food Insecurity: No Food Insecurity (8/1/2024)    Hunger Vital Sign     Worried About Running Out of Food in the Last Year: Never true     Ran Out of Food in the Last Year: Never true   Transportation Needs: Unknown (8/1/2024)    PRAPARE - Transportation     Lack of Transportation (Medical): Not on file     Lack of Transportation (Non-Medical): No   Physical Activity: Inactive (8/3/2022)    Exercise Vital Sign     Days of Exercise per Week: 0 days     Minutes of Exercise per Session: 0 min   Stress: Not on file   Social Connections: Not on file   Intimate Partner Violence: Unknown (8/3/2022)    Humiliation, Afraid, Rape, and Kick questionnaire     Fear of Current or Ex-Partner: Patient declined     Emotionally Abused: Patient declined     Physically Abused: Patient declined     Sexually Abused: Patient declined   Depression: Not at risk (8/1/2024)    PHQ-2     PHQ-2 Score: 0   Housing Stability: Unknown (8/1/2024)    Housing Stability Vital Sign     Unable to Pay for Housing in the Last Year: Not on file     Number of Places Lived in the Last Year: Not on file     Unstable Housing in the Last Year: No   Interpersonal Safety: Not At Risk (7/17/2024)    Interpersonal Safety Domain Source: IP Abuse Screening     Physical abuse: Denies     Verbal abuse: Denies     Emotional abuse: Denies     Financial abuse: Denies     Sexual abuse: Denies   Utilities: Not on file       \"Have you been to the ER, urgent care clinic since your last visit?  Hospitalized since your last visit?\"    NO    “Have you seen or consulted any other health

## 2024-08-28 RX ORDER — NALTREXONE HYDROCHLORIDE 50 MG/1
25 TABLET, FILM COATED ORAL DAILY
Qty: 45 TABLET | Refills: 0 | Status: SHIPPED | OUTPATIENT
Start: 2024-08-28

## 2024-09-03 ENCOUNTER — HOSPITAL ENCOUNTER (OUTPATIENT)
Facility: HOSPITAL | Age: 24
Setting detail: INFUSION SERIES
Discharge: HOME | End: 2024-09-03
Payer: MEDICARE

## 2024-09-03 ENCOUNTER — HOSPITAL ENCOUNTER (OUTPATIENT)
Facility: HOSPITAL | Age: 24
Discharge: HOME | End: 2024-09-06
Attending: PSYCHIATRY & NEUROLOGY
Payer: MEDICARE

## 2024-09-03 VITALS
RESPIRATION RATE: 20 BRPM | HEART RATE: 96 BPM | SYSTOLIC BLOOD PRESSURE: 136 MMHG | TEMPERATURE: 98.7 F | DIASTOLIC BLOOD PRESSURE: 93 MMHG

## 2024-09-03 DIAGNOSIS — R41.3 MEMORY LOSS: ICD-10-CM

## 2024-09-03 DIAGNOSIS — G90.A POTS (POSTURAL ORTHOSTATIC TACHYCARDIA SYNDROME): ICD-10-CM

## 2024-09-03 DIAGNOSIS — G43.919 INTRACTABLE MIGRAINE WITHOUT STATUS MIGRAINOSUS, UNSPECIFIED MIGRAINE TYPE: Primary | ICD-10-CM

## 2024-09-03 DIAGNOSIS — R20.0 RIGHT ARM NUMBNESS: ICD-10-CM

## 2024-09-03 DIAGNOSIS — G43.709 CHRONIC MIGRAINE W/O AURA, NOT INTRACTABLE, W/O STAT MIGR: ICD-10-CM

## 2024-09-03 PROCEDURE — 2580000003 HC RX 258: Performed by: PSYCHIATRY & NEUROLOGY

## 2024-09-03 PROCEDURE — 70551 MRI BRAIN STEM W/O DYE: CPT

## 2024-09-03 PROCEDURE — 96413 CHEMO IV INFUSION 1 HR: CPT

## 2024-09-03 PROCEDURE — 6360000002 HC RX W HCPCS: Performed by: PSYCHIATRY & NEUROLOGY

## 2024-09-03 PROCEDURE — 86618 LYME DISEASE ANTIBODY: CPT

## 2024-09-03 RX ORDER — SODIUM CHLORIDE 9 MG/ML
5-250 INJECTION, SOLUTION INTRAVENOUS PRN
OUTPATIENT
Start: 2024-11-26

## 2024-09-03 RX ORDER — ONDANSETRON 2 MG/ML
4 INJECTION INTRAMUSCULAR; INTRAVENOUS EVERY 6 HOURS PRN
Start: 2024-11-26

## 2024-09-03 RX ORDER — SODIUM CHLORIDE 0.9 % (FLUSH) 0.9 %
5-40 SYRINGE (ML) INJECTION PRN
OUTPATIENT
Start: 2024-11-26

## 2024-09-03 RX ORDER — ONDANSETRON 2 MG/ML
4 INJECTION INTRAMUSCULAR; INTRAVENOUS ONCE
Start: 2024-11-26 | End: 2024-11-26

## 2024-09-03 RX ORDER — SODIUM CHLORIDE 9 MG/ML
5-250 INJECTION, SOLUTION INTRAVENOUS PRN
Status: DISCONTINUED | OUTPATIENT
Start: 2024-09-03 | End: 2024-09-04 | Stop reason: HOSPADM

## 2024-09-03 RX ORDER — ONDANSETRON 2 MG/ML
4 INJECTION INTRAMUSCULAR; INTRAVENOUS ONCE
Status: COMPLETED | OUTPATIENT
Start: 2024-09-03 | End: 2024-09-03

## 2024-09-03 RX ADMIN — ONDANSETRON 4 MG: 2 INJECTION INTRAMUSCULAR; INTRAVENOUS at 14:32

## 2024-09-03 RX ADMIN — EPTINEZUMAB-JJMR 300 MG: 100 INJECTION INTRAVENOUS at 15:08

## 2024-09-03 RX ADMIN — SODIUM CHLORIDE 100 ML/HR: 9 INJECTION, SOLUTION INTRAVENOUS at 14:33

## 2024-09-03 ASSESSMENT — PAIN DESCRIPTION - LOCATION: LOCATION: ARM

## 2024-09-03 ASSESSMENT — PAIN DESCRIPTION - ORIENTATION: ORIENTATION: LEFT

## 2024-09-03 ASSESSMENT — PAIN SCALES - GENERAL: PAINLEVEL_OUTOF10: 5

## 2024-09-03 NOTE — PROGRESS NOTES
Outpatient Infusion Center Short Visit Progress Note    Ms. Lazcano admitted to [facility] for Vyepti ambulatory in stable condition. Assessment completed. No new concerns voiced.     Vital Signs:  BP (!) 136/93   Pulse 96   Temp 98.7 °F (37.1 °C) (Temporal)   Resp 20   LMP  (LMP Unknown)       24G PIV placed with positive blood return.   Lab Results:  No results found for this or any previous visit (from the past 12 hour(s)).        Medications:  Medications Administered         0.9 % sodium chloride infusion Admin Date  09/03/2024 Action  New Bag Dose  100 mL/hr Rate  100 mL/hr Route  IntraVENous Documented By  Jace Hobbs, ANA PAULA        eptinezumab-jjmr (VYEPTI) 300 mg in sodium chloride 0.9 % 113 mL IVPB Admin Date  09/03/2024 Action  New Bag Dose  300 mg Rate  226 mL/hr Route  IntraVENous Documented By  Lucille Martin RN        ondansetron (ZOFRAN) injection 4 mg Admin Date  09/03/2024 Action  Given Dose  4 mg Rate   Route  IntraVENous Documented By  Jace Hobbs, ANA PAULA                  Patient tolerated treatment well. PIV removed. Patient discharged from Outpatient Infusion Center ambulatory in no distress. Patient aware of next appointment.    Jace Hobbs RN  September 3, 2024    Future Appointments   Date Time Provider Department Center   9/9/2024  2:45 PM Ajay John PTA [facility]   9/16/2024  9:15 AM Ajay John PTA [facility]   9/19/2024 11:15 AM Lorelei Terry APRN - NP [id number] [facility] DEP   9/24/2024 11:00 AM Haris Ng AuD RAPCH [facility]   9/24/2024 11:30 AM Snow Mazariegos MD REP BS [facility]   9/27/2024 11:45 AM Wanda Saucedo, PT [facility]   10/1/2024 11:30 AM [facility] OPEN MRI MRMRI [facility]   10/2/2024 10:45 AM Ajay John PTA [facility]   10/2/2024 12:30 PM [facility] ECHO 1 CAVSF BS [facility]   10/8/2024 10:15 AM Wadna Saucedo, PT [facility]   10/10/2024  9:00 AM Edinson Ross MD LIVR BS [facility]   10/10/2024 11:20 AM

## 2024-09-04 LAB — LYME ANTIBODY: NEGATIVE

## 2024-09-09 ENCOUNTER — HOSPITAL ENCOUNTER (OUTPATIENT)
Facility: HOSPITAL | Age: 24
Setting detail: RECURRING SERIES
Discharge: HOME | End: 2024-09-12
Payer: MEDICARE

## 2024-09-09 PROCEDURE — 97110 THERAPEUTIC EXERCISES: CPT

## 2024-09-09 PROCEDURE — 97112 NEUROMUSCULAR REEDUCATION: CPT

## 2024-09-10 ENCOUNTER — APPOINTMENT (OUTPATIENT)
Facility: HOSPITAL | Age: 24
End: 2024-09-10
Payer: MEDICARE

## 2024-09-16 ENCOUNTER — APPOINTMENT (OUTPATIENT)
Facility: HOSPITAL | Age: 24
End: 2024-09-16
Payer: MEDICARE

## 2024-09-16 SDOH — HEALTH STABILITY: PHYSICAL HEALTH
ON AVERAGE, HOW MANY DAYS PER WEEK DO YOU ENGAGE IN MODERATE TO STRENUOUS EXERCISE (LIKE A BRISK WALK)?: PATIENT DECLINED

## 2024-09-16 ASSESSMENT — PATIENT HEALTH QUESTIONNAIRE - PHQ9
SUM OF ALL RESPONSES TO PHQ QUESTIONS 1-9: 1
1. LITTLE INTEREST OR PLEASURE IN DOING THINGS: SEVERAL DAYS
2. FEELING DOWN, DEPRESSED OR HOPELESS: NOT AT ALL
SUM OF ALL RESPONSES TO PHQ QUESTIONS 1-9: 1
SUM OF ALL RESPONSES TO PHQ QUESTIONS 1-9: 1
SUM OF ALL RESPONSES TO PHQ9 QUESTIONS 1 & 2: 1
SUM OF ALL RESPONSES TO PHQ QUESTIONS 1-9: 1

## 2024-09-16 ASSESSMENT — LIFESTYLE VARIABLES
HOW MANY STANDARD DRINKS CONTAINING ALCOHOL DO YOU HAVE ON A TYPICAL DAY: 0
HOW MANY STANDARD DRINKS CONTAINING ALCOHOL DO YOU HAVE ON A TYPICAL DAY: PATIENT DOES NOT DRINK
HOW OFTEN DO YOU HAVE SIX OR MORE DRINKS ON ONE OCCASION: 1
HOW OFTEN DO YOU HAVE A DRINK CONTAINING ALCOHOL: NEVER
HOW OFTEN DO YOU HAVE A DRINK CONTAINING ALCOHOL: 1

## 2024-09-19 ENCOUNTER — OFFICE VISIT (OUTPATIENT)
Age: 24
End: 2024-09-19
Payer: MEDICARE

## 2024-09-19 VITALS
TEMPERATURE: 98.1 F | OXYGEN SATURATION: 97 % | RESPIRATION RATE: 19 BRPM | BODY MASS INDEX: 49.17 KG/M2 | HEART RATE: 87 BPM | HEIGHT: 64 IN | WEIGHT: 288 LBS

## 2024-09-19 DIAGNOSIS — G43.919 INTRACTABLE MIGRAINE WITHOUT STATUS MIGRAINOSUS, UNSPECIFIED MIGRAINE TYPE: ICD-10-CM

## 2024-09-19 DIAGNOSIS — E55.9 VITAMIN D DEFICIENCY: ICD-10-CM

## 2024-09-19 DIAGNOSIS — F41.1 GAD (GENERALIZED ANXIETY DISORDER): ICD-10-CM

## 2024-09-19 DIAGNOSIS — E66.01 CLASS 3 SEVERE OBESITY DUE TO EXCESS CALORIES WITHOUT SERIOUS COMORBIDITY WITH BODY MASS INDEX (BMI) OF 45.0 TO 49.9 IN ADULT: ICD-10-CM

## 2024-09-19 DIAGNOSIS — Z00.00 WELL EXAM WITHOUT ABNORMAL FINDINGS OF PATIENT 18 YEARS OF AGE OR OLDER: Primary | ICD-10-CM

## 2024-09-19 PROCEDURE — 99214 OFFICE O/P EST MOD 30 MIN: CPT | Performed by: NURSE PRACTITIONER

## 2024-09-19 PROCEDURE — G8427 DOCREV CUR MEDS BY ELIG CLIN: HCPCS | Performed by: NURSE PRACTITIONER

## 2024-09-19 PROCEDURE — G8417 CALC BMI ABV UP PARAM F/U: HCPCS | Performed by: NURSE PRACTITIONER

## 2024-09-19 PROCEDURE — G0439 PPPS, SUBSEQ VISIT: HCPCS | Performed by: NURSE PRACTITIONER

## 2024-09-19 PROCEDURE — 1036F TOBACCO NON-USER: CPT | Performed by: NURSE PRACTITIONER

## 2024-09-21 DIAGNOSIS — G43.709 CHRONIC MIGRAINE W/O AURA, NOT INTRACTABLE, W/O STAT MIGR: ICD-10-CM

## 2024-09-21 LAB
25(OH)D3 SERPL-MCNC: 70.4 NG/ML (ref 30–100)
ALBUMIN SERPL-MCNC: 4.1 G/DL (ref 3.5–5)
ALBUMIN/GLOB SERPL: 1.5 (ref 1.1–2.2)
ALP SERPL-CCNC: 84 U/L (ref 45–117)
ALT SERPL-CCNC: 40 U/L (ref 12–78)
ANION GAP SERPL CALC-SCNC: 6 MMOL/L (ref 2–12)
AST SERPL-CCNC: 21 U/L (ref 15–37)
BASOPHILS # BLD: 0.1 K/UL (ref 0–0.1)
BASOPHILS NFR BLD: 1 % (ref 0–1)
BILIRUB SERPL-MCNC: 0.7 MG/DL (ref 0.2–1)
BUN SERPL-MCNC: 11 MG/DL (ref 6–20)
BUN/CREAT SERPL: 14 (ref 12–20)
CALCIUM SERPL-MCNC: 9.2 MG/DL (ref 8.5–10.1)
CHLORIDE SERPL-SCNC: 105 MMOL/L (ref 97–108)
CHOLEST SERPL-MCNC: 196 MG/DL
CO2 SERPL-SCNC: 28 MMOL/L (ref 21–32)
CREAT SERPL-MCNC: 0.79 MG/DL (ref 0.55–1.02)
DIFFERENTIAL METHOD BLD: NORMAL
EOSINOPHIL # BLD: 0.1 K/UL (ref 0–0.4)
EOSINOPHIL NFR BLD: 1 % (ref 0–7)
ERYTHROCYTE [DISTWIDTH] IN BLOOD BY AUTOMATED COUNT: 13 % (ref 11.5–14.5)
EST. AVERAGE GLUCOSE BLD GHB EST-MCNC: 91 MG/DL
GLOBULIN SER CALC-MCNC: 2.8 G/DL (ref 2–4)
GLUCOSE SERPL-MCNC: 94 MG/DL (ref 65–100)
HBA1C MFR BLD: 4.8 % (ref 4–5.6)
HCT VFR BLD AUTO: 45.7 % (ref 35–47)
HDLC SERPL-MCNC: 45 MG/DL
HDLC SERPL: 4.4 (ref 0–5)
HGB BLD-MCNC: 14.8 G/DL (ref 11.5–16)
IMM GRANULOCYTES # BLD AUTO: 0 K/UL (ref 0–0.04)
IMM GRANULOCYTES NFR BLD AUTO: 0 % (ref 0–0.5)
LDLC SERPL CALC-MCNC: 125 MG/DL (ref 0–100)
LYMPHOCYTES # BLD: 2.3 K/UL (ref 0.8–3.5)
LYMPHOCYTES NFR BLD: 24 % (ref 12–49)
MCH RBC QN AUTO: 30.1 PG (ref 26–34)
MCHC RBC AUTO-ENTMCNC: 32.4 G/DL (ref 30–36.5)
MCV RBC AUTO: 92.9 FL (ref 80–99)
MONOCYTES # BLD: 0.7 K/UL (ref 0–1)
MONOCYTES NFR BLD: 7 % (ref 5–13)
NEUTS SEG # BLD: 6.3 K/UL (ref 1.8–8)
NEUTS SEG NFR BLD: 67 % (ref 32–75)
NRBC # BLD: 0 K/UL (ref 0–0.01)
NRBC BLD-RTO: 0 PER 100 WBC
PLATELET # BLD AUTO: 355 K/UL (ref 150–400)
PMV BLD AUTO: 9.8 FL (ref 8.9–12.9)
POTASSIUM SERPL-SCNC: 4 MMOL/L (ref 3.5–5.1)
PROT SERPL-MCNC: 6.9 G/DL (ref 6.4–8.2)
RBC # BLD AUTO: 4.92 M/UL (ref 3.8–5.2)
SODIUM SERPL-SCNC: 139 MMOL/L (ref 136–145)
TRIGL SERPL-MCNC: 130 MG/DL
VLDLC SERPL CALC-MCNC: 26 MG/DL
WBC # BLD AUTO: 9.5 K/UL (ref 3.6–11)

## 2024-09-24 ENCOUNTER — PROCEDURE VISIT (OUTPATIENT)
Age: 24
End: 2024-09-24

## 2024-09-24 ENCOUNTER — OFFICE VISIT (OUTPATIENT)
Age: 24
End: 2024-09-24
Payer: MEDICARE

## 2024-09-24 VITALS
HEART RATE: 100 BPM | BODY MASS INDEX: 50.02 KG/M2 | WEIGHT: 293 LBS | DIASTOLIC BLOOD PRESSURE: 76 MMHG | SYSTOLIC BLOOD PRESSURE: 108 MMHG | RESPIRATION RATE: 18 BRPM | HEIGHT: 64 IN | OXYGEN SATURATION: 99 %

## 2024-09-24 DIAGNOSIS — Z01.10 ENCOUNTER FOR EXAM OF EARS AND HEARING W/O ABNORMAL FINDINGS: Primary | ICD-10-CM

## 2024-09-24 DIAGNOSIS — Z45.89 TYMPANOSTOMY TUBE CHECK: ICD-10-CM

## 2024-09-24 DIAGNOSIS — H69.93 ETD (EUSTACHIAN TUBE DYSFUNCTION), BILATERAL: Primary | ICD-10-CM

## 2024-09-24 PROCEDURE — G8427 DOCREV CUR MEDS BY ELIG CLIN: HCPCS | Performed by: OTOLARYNGOLOGY

## 2024-09-24 PROCEDURE — 99213 OFFICE O/P EST LOW 20 MIN: CPT | Performed by: OTOLARYNGOLOGY

## 2024-09-24 PROCEDURE — 1036F TOBACCO NON-USER: CPT | Performed by: OTOLARYNGOLOGY

## 2024-09-24 PROCEDURE — G8417 CALC BMI ABV UP PARAM F/U: HCPCS | Performed by: OTOLARYNGOLOGY

## 2024-09-24 RX ORDER — RIMEGEPANT SULFATE 75 MG/75MG
75 TABLET, ORALLY DISINTEGRATING ORAL EVERY OTHER DAY
Qty: 48 TABLET | Refills: 3 | Status: SHIPPED | OUTPATIENT
Start: 2024-09-24

## 2024-09-27 ENCOUNTER — HOSPITAL ENCOUNTER (OUTPATIENT)
Facility: HOSPITAL | Age: 24
Setting detail: RECURRING SERIES
Discharge: HOME | End: 2024-09-30
Payer: MEDICARE

## 2024-09-27 PROCEDURE — 97140 MANUAL THERAPY 1/> REGIONS: CPT | Performed by: PHYSICAL THERAPIST

## 2024-09-27 PROCEDURE — 97110 THERAPEUTIC EXERCISES: CPT | Performed by: PHYSICAL THERAPIST

## 2024-09-27 NOTE — PROGRESS NOTES
exercises, primarily focused on foot and ankle mobility for pain relief of plantar fasciitis, and given exercises for HEP for pain management.   Patient will continue to benefit from skilled PT / OT services to modify and progress therapeutic interventions, analyze and address functional mobility deficits, analyze and address ROM deficits, analyze and address strength deficits, analyze and address soft tissue restrictions, analyze and cue for proper movement patterns, analyze and modify for postural abnormalities, analyze and address imbalance/dizziness, and instruct in home and community integration to address functional deficits and attain remaining goals.    Progress toward goals / Updated goals:  []  See Progress Note/Recertification    Short Term Goals: To be accomplished in 8 treatments.  1) The patient will be independent with introductory HEP MET  2) The patient will improve lumbar flexion AROM to reaching 12 inches from floor to improve ease with picking up items off the floor MET  3) The patient will transfer sit <> stand without an increase in dizziness MET  Long Term Goals: To be accomplished in 24 treatments.  1) The patient will demonstrate 5/5 BLE strength to improve ease with household chores  2) The patient will improve FOTO survey to a 58% or greater to indicate a significant improvement in function  3) The patient will ambulate 20 minutes without a significant increase in pain to improve ease running errands      PLAN  Yes  Continue plan of care  Re-Cert Due: NA  [x]  Upgrade activities as tolerated  []  Discharge due to:  []  Other:      Nevaeh Ellis, SPT       9/27/2024       9:22 AM

## 2024-10-01 ENCOUNTER — HOSPITAL ENCOUNTER (OUTPATIENT)
Facility: HOSPITAL | Age: 24
Discharge: HOME | End: 2024-10-04
Attending: INTERNAL MEDICINE
Payer: MEDICARE

## 2024-10-01 VITALS — BODY MASS INDEX: 49.44 KG/M2 | WEIGHT: 288 LBS

## 2024-10-01 DIAGNOSIS — D13.4 HEPATIC ADENOMA: ICD-10-CM

## 2024-10-01 PROCEDURE — 74183 MRI ABD W/O CNTR FLWD CNTR: CPT

## 2024-10-01 PROCEDURE — A9579 GAD-BASE MR CONTRAST NOS,1ML: HCPCS | Performed by: RADIOLOGY

## 2024-10-01 PROCEDURE — 6360000004 HC RX CONTRAST MEDICATION: Performed by: RADIOLOGY

## 2024-10-01 RX ADMIN — GADOTERIDOL 20 ML: 279.3 INJECTION, SOLUTION INTRAVENOUS at 11:58

## 2024-10-02 ENCOUNTER — HOSPITAL ENCOUNTER (OUTPATIENT)
Facility: HOSPITAL | Age: 24
Setting detail: RECURRING SERIES
End: 2024-10-02
Payer: MEDICARE

## 2024-10-08 ENCOUNTER — HOSPITAL ENCOUNTER (OUTPATIENT)
Facility: HOSPITAL | Age: 24
Setting detail: RECURRING SERIES
Discharge: HOME | End: 2024-10-11
Payer: MEDICARE

## 2024-10-08 PROCEDURE — 97035 APP MDLTY 1+ULTRASOUND EA 15: CPT | Performed by: PHYSICAL THERAPIST

## 2024-10-08 PROCEDURE — 97110 THERAPEUTIC EXERCISES: CPT | Performed by: PHYSICAL THERAPIST

## 2024-10-08 PROCEDURE — 97112 NEUROMUSCULAR REEDUCATION: CPT | Performed by: PHYSICAL THERAPIST

## 2024-10-08 NOTE — PROGRESS NOTES
PHYSICAL THERAPY - MEDICARE DAILY TREATMENT NOTE (updated 3/23)      Date: 10/8/2024          Patient Name:  Conchis Lazcano :  2000   Medical   Diagnosis:  Other low back pain [M54.59] Treatment Diagnosis:  M54.59 OTHER LOWER BACK PAIN    Referral Source:  Jan Pemberton MD Insurance:   Payor: [facility] MEDICARE / Plan: [employer] DUAL COMPLETE / Product Type: *No Product type* /                     Patient  verified yes     Visit #   Current  / Total 19 24 per POC   Time   In / Out 10:00 AM  11:02 AM   Total Treatment Time 62   Total Timed Codes ***   1:1 Treatment Time ***      [facility] Totals Reminder:  bill using total billable   min of TIMED therapeutic procedures and modalities.   8-22 min = 1 unit; 23-37 min = 2 units; 38-52 min = 3 units; 53-67 min = 4 units; 68-82 min = 5 units            SUBJECTIVE    Pain Level (0-10 scale): 7/10 dizziness     Any medication changes, allergies to medications, adverse drug reactions, diagnosis change, or new procedure performed?: [x] No    [] Yes (see summary sheet for update)  Medications: Verified on Patient Summary List    Subjective functional status/changes:     Patient reports dizziness and room spinning that started this morning, and got worse riding in the elevator.     Patient reports that the foot exercises are hard but she feels like she is getting the form correct, patient still reports the right foot is brother her    OBJECTIVE      Therapeutic Procedures:  Tx Min Billable or 1:1 Min (if diff from Tx Min) Procedure, Rationale, Specifics     26114 Neuromuscular Re-Education (timed):  improve balance, coordination, kinesthetic sense, posture, core stability and proprioception to improve patient's ability to develop conscious control of individual muscles and awareness of position of extremities in order to progress to PLOF and address remaining functional goals. (see flow sheet as applicable)     Details if applicable:       07219 Therapeutic Exercise 
-  Patient reported dizziness with all positions      VOR 30s did increase patients dizziness and was \"hard\"         Pain Level at end of session (0-10 scale): 6/10 dizziness         Assessment   All positional test were negative but caused the patient discomfort and dizziness. Vestibular and balance exercises were added back into program to address deficits. Patient continues to complain of right plantar fascitis pain, and reported some relief from US therapy. Continue to progress exercises as tolerated.   Patient will continue to benefit from skilled PT / OT services to modify and progress therapeutic interventions, analyze and address functional mobility deficits, analyze and address ROM deficits, analyze and address strength deficits, analyze and address soft tissue restrictions, analyze and cue for proper movement patterns, analyze and modify for postural abnormalities, analyze and address imbalance/dizziness, and instruct in home and community integration to address functional deficits and attain remaining goals.     Progress toward goals / Updated goals:  []  See Progress Note/Recertification       Short Term Goals: To be accomplished in 8 treatments.  1) The patient will be independent with introductory HEP MET  2) The patient will improve lumbar flexion AROM to reaching 12 inches from floor to improve ease with picking up items off the floor MET  3) The patient will transfer sit <> stand without an increase in dizziness MET  Long Term Goals: To be accomplished in 24 treatments.  1) The patient will demonstrate 5/5 BLE strength to improve ease with household chores  2) The patient will improve FOTO survey to a 58% or greater to indicate a significant improvement in function  3) The patient will ambulate 20 minutes without a significant increase in pain to improve ease running errands        PLAN  Yes  Continue plan of care  Re-Cert Due: NA  [x]  Upgrade activities as tolerated  []  Discharge due to:  []

## 2024-10-10 ENCOUNTER — OFFICE VISIT (OUTPATIENT)
Age: 24
End: 2024-10-10
Payer: MEDICARE

## 2024-10-10 VITALS
TEMPERATURE: 97.9 F | SYSTOLIC BLOOD PRESSURE: 142 MMHG | DIASTOLIC BLOOD PRESSURE: 95 MMHG | OXYGEN SATURATION: 98 % | HEIGHT: 64 IN | HEART RATE: 98 BPM | WEIGHT: 290.8 LBS | BODY MASS INDEX: 49.64 KG/M2

## 2024-10-10 DIAGNOSIS — Z90.49 HISTORY OF CHOLECYSTECTOMY: Primary | ICD-10-CM

## 2024-10-10 PROCEDURE — G8417 CALC BMI ABV UP PARAM F/U: HCPCS | Performed by: INTERNAL MEDICINE

## 2024-10-10 PROCEDURE — 99214 OFFICE O/P EST MOD 30 MIN: CPT | Performed by: INTERNAL MEDICINE

## 2024-10-10 PROCEDURE — G8484 FLU IMMUNIZE NO ADMIN: HCPCS | Performed by: INTERNAL MEDICINE

## 2024-10-10 PROCEDURE — G8427 DOCREV CUR MEDS BY ELIG CLIN: HCPCS | Performed by: INTERNAL MEDICINE

## 2024-10-10 PROCEDURE — 1036F TOBACCO NON-USER: CPT | Performed by: INTERNAL MEDICINE

## 2024-10-10 ASSESSMENT — PATIENT HEALTH QUESTIONNAIRE - PHQ9
6. FEELING BAD ABOUT YOURSELF - OR THAT YOU ARE A FAILURE OR HAVE LET YOURSELF OR YOUR FAMILY DOWN: NOT AT ALL
4. FEELING TIRED OR HAVING LITTLE ENERGY: NOT AT ALL
7. TROUBLE CONCENTRATING ON THINGS, SUCH AS READING THE NEWSPAPER OR WATCHING TELEVISION: NOT AT ALL
DEPRESSION UNABLE TO ASSESS: FUNCTIONAL CAPACITY MOTIVATION LIMITS ACCURACY
5. POOR APPETITE OR OVEREATING: NOT AT ALL
SUM OF ALL RESPONSES TO PHQ9 QUESTIONS 1 & 2: 0
SUM OF ALL RESPONSES TO PHQ QUESTIONS 1-9: 0
1. LITTLE INTEREST OR PLEASURE IN DOING THINGS: NOT AT ALL
9. THOUGHTS THAT YOU WOULD BE BETTER OFF DEAD, OR OF HURTING YOURSELF: NOT AT ALL
SUM OF ALL RESPONSES TO PHQ QUESTIONS 1-9: 0
2. FEELING DOWN, DEPRESSED OR HOPELESS: NOT AT ALL
3. TROUBLE FALLING OR STAYING ASLEEP: NOT AT ALL
8. MOVING OR SPEAKING SO SLOWLY THAT OTHER PEOPLE COULD HAVE NOTICED. OR THE OPPOSITE, BEING SO FIGETY OR RESTLESS THAT YOU HAVE BEEN MOVING AROUND A LOT MORE THAN USUAL: NOT AT ALL
SUM OF ALL RESPONSES TO PHQ QUESTIONS 1-9: 0
10. IF YOU CHECKED OFF ANY PROBLEMS, HOW DIFFICULT HAVE THESE PROBLEMS MADE IT FOR YOU TO DO YOUR WORK, TAKE CARE OF THINGS AT HOME, OR GET ALONG WITH OTHER PEOPLE: NOT DIFFICULT AT ALL
SUM OF ALL RESPONSES TO PHQ QUESTIONS 1-9: 0

## 2024-10-10 ASSESSMENT — ANXIETY QUESTIONNAIRES
GAD7 TOTAL SCORE: 0
5. BEING SO RESTLESS THAT IT IS HARD TO SIT STILL: NOT AT ALL
6. BECOMING EASILY ANNOYED OR IRRITABLE: NOT AT ALL
3. WORRYING TOO MUCH ABOUT DIFFERENT THINGS: NOT AT ALL
1. FEELING NERVOUS, ANXIOUS, OR ON EDGE: NOT AT ALL
2. NOT BEING ABLE TO STOP OR CONTROL WORRYING: NOT AT ALL
7. FEELING AFRAID AS IF SOMETHING AWFUL MIGHT HAPPEN: NOT AT ALL
IF YOU CHECKED OFF ANY PROBLEMS ON THIS QUESTIONNAIRE, HOW DIFFICULT HAVE THESE PROBLEMS MADE IT FOR YOU TO DO YOUR WORK, TAKE CARE OF THINGS AT HOME, OR GET ALONG WITH OTHER PEOPLE: NOT DIFFICULT AT ALL
4. TROUBLE RELAXING: NOT AT ALL

## 2024-10-10 NOTE — PROGRESS NOTES
Chief Complaint   Patient presents with    Follow-up     N/a     Vitals:    10/10/24 0910   Pulse: 98   Temp: 97.9 °F (36.6 °C)   TempSrc: Temporal   SpO2: 98%   Weight: 131.9 kg (290 lb 12.8 oz)   Height: 1.626 m (5' 4\")     .  \"Have you been to the ER, urgent care clinic since your last visit?  Hospitalized since your last visit?\"    NO    “Have you seen or consulted any other health care providers outside of [facility] since your last visit?”    NO            Click Here for Release of Records Request    
Nurtec 75 mg, Oral, EVERY OTHER DAY    ondansetron (ZOFRAN) 8 mg, Oral, EVERY 8 HOURS PRN    predniSONE (DELTASONE) 10 mg    Sacrosidase (SUCRAID) 8500 UNIT/ML SOLN 2 mLs, Oral, 3 TIMES DAILY BEFORE MEALS, \"With every meal; including snacks.\"    sertraline (ZOLOFT) 100 MG tablet Take 1 tablet by mouth daily    SYMBICORT 160-4.5 MCG/ACT AERO 2 puffs, Inhalation, 2 TIMES DAILY    traZODone (DESYREL) 50 mg, Oral, NIGHTLY    Vyepti 300 mg, IntraVENous, EVERY 3 MONTHS         SYSTEM REVIEW NOT RELATED TO LIVER DISEASE OR REVIEWED ABOVE:  Constitution systems: Negative for fever, chills, weight gain, weight loss.   Eyes: Negative for visual changes.  ENT: Negative for sore throat, painful swallowing.   Respiratory: Negative for cough, hemoptysis, SOB.   Cardiology: Negative for chest pain, palpitations.  GI:  Negative for constipation or diarrhea.  : Negative for urinary frequency, dysuria, hematuria, nocturia.   Skin: Negative for rash.  Hematology: Negative for easy bruising, blood clots.    Musculo-skelatal: Negative for back pain, muscle pain, weakness.  Neurologic: Negative for headaches, dizziness, vertigo, memory problems not related to HE.  Psychology: Negative for anxiety, depression.       FAMILY HISTORY:  The patient has no knowledge of the father's medical condition.    The mother Has/had the following chronic disease(s): Sarcoidosis, neuropathy.    There is no family history of liver disease.      SOCIAL HISTORY:  The patient has never been .    The patient has no children.     The patient has never used tobacco products.    The patient has never consumed significant amounts of alcohol.    The patient currently works full time as [employer].        PHYSICAL EXAMINATION:  BP (!) 142/95 (Site: Left Lower Arm, Position: Sitting)   Pulse 98   Temp 97.9 °F (36.6 °C) (Temporal)   Ht 1.626 m (5' 4\")   Wt 131.9 kg (290 lb 12.8 oz)   SpO2 98%   BMI 49.92 kg/m²       General: No acute distress.   Eyes:

## 2024-10-18 ENCOUNTER — OFFICE VISIT (OUTPATIENT)
Age: 24
End: 2024-10-18
Payer: MEDICARE

## 2024-10-18 VITALS
WEIGHT: 288 LBS | HEIGHT: 64 IN | HEART RATE: 103 BPM | DIASTOLIC BLOOD PRESSURE: 88 MMHG | SYSTOLIC BLOOD PRESSURE: 138 MMHG | TEMPERATURE: 98.8 F | BODY MASS INDEX: 49.17 KG/M2 | RESPIRATION RATE: 20 BRPM | OXYGEN SATURATION: 97 %

## 2024-10-18 DIAGNOSIS — J45.41 MODERATE PERSISTENT ASTHMA WITH EXACERBATION: Primary | ICD-10-CM

## 2024-10-18 DIAGNOSIS — R05.1 ACUTE COUGH: ICD-10-CM

## 2024-10-18 LAB
EXP DATE SOLUTION: NORMAL
EXP DATE SWAB: NORMAL
EXPIRATION DATE: NORMAL
LOT NUMBER POC: NORMAL
LOT NUMBER SOLUTION: NORMAL
LOT NUMBER SWAB: NORMAL
SARS-COV-2 RNA, POC: NEGATIVE

## 2024-10-18 PROCEDURE — 99213 OFFICE O/P EST LOW 20 MIN: CPT | Performed by: PHYSICIAN ASSISTANT

## 2024-10-18 PROCEDURE — 1036F TOBACCO NON-USER: CPT | Performed by: PHYSICIAN ASSISTANT

## 2024-10-18 PROCEDURE — PBSHW AMB POC COVID-19 COV: Performed by: PHYSICIAN ASSISTANT

## 2024-10-18 PROCEDURE — 87635 SARS-COV-2 COVID-19 AMP PRB: CPT | Performed by: PHYSICIAN ASSISTANT

## 2024-10-18 PROCEDURE — G8484 FLU IMMUNIZE NO ADMIN: HCPCS | Performed by: PHYSICIAN ASSISTANT

## 2024-10-18 PROCEDURE — G8417 CALC BMI ABV UP PARAM F/U: HCPCS | Performed by: PHYSICIAN ASSISTANT

## 2024-10-18 PROCEDURE — G8427 DOCREV CUR MEDS BY ELIG CLIN: HCPCS | Performed by: PHYSICIAN ASSISTANT

## 2024-10-18 RX ORDER — PREDNISONE 10 MG/1
10 TABLET ORAL
COMMUNITY
Start: 2024-10-17

## 2024-10-18 ASSESSMENT — PATIENT HEALTH QUESTIONNAIRE - PHQ9
7. TROUBLE CONCENTRATING ON THINGS, SUCH AS READING THE NEWSPAPER OR WATCHING TELEVISION: NOT AT ALL
SUM OF ALL RESPONSES TO PHQ QUESTIONS 1-9: 0
9. THOUGHTS THAT YOU WOULD BE BETTER OFF DEAD, OR OF HURTING YOURSELF: NOT AT ALL
2. FEELING DOWN, DEPRESSED OR HOPELESS: NOT AT ALL
SUM OF ALL RESPONSES TO PHQ9 QUESTIONS 1 & 2: 0
1. LITTLE INTEREST OR PLEASURE IN DOING THINGS: NOT AT ALL
6. FEELING BAD ABOUT YOURSELF - OR THAT YOU ARE A FAILURE OR HAVE LET YOURSELF OR YOUR FAMILY DOWN: NOT AT ALL
4. FEELING TIRED OR HAVING LITTLE ENERGY: NOT AT ALL
3. TROUBLE FALLING OR STAYING ASLEEP: NOT AT ALL
8. MOVING OR SPEAKING SO SLOWLY THAT OTHER PEOPLE COULD HAVE NOTICED. OR THE OPPOSITE, BEING SO FIGETY OR RESTLESS THAT YOU HAVE BEEN MOVING AROUND A LOT MORE THAN USUAL: NOT AT ALL
10. IF YOU CHECKED OFF ANY PROBLEMS, HOW DIFFICULT HAVE THESE PROBLEMS MADE IT FOR YOU TO DO YOUR WORK, TAKE CARE OF THINGS AT HOME, OR GET ALONG WITH OTHER PEOPLE: NOT DIFFICULT AT ALL
SUM OF ALL RESPONSES TO PHQ QUESTIONS 1-9: 0
5. POOR APPETITE OR OVEREATING: NOT AT ALL
SUM OF ALL RESPONSES TO PHQ QUESTIONS 1-9: 0
SUM OF ALL RESPONSES TO PHQ QUESTIONS 1-9: 0

## 2024-10-18 NOTE — PROGRESS NOTES
Conchis Lazcano is a 24 y.o. female , id x 2(name and ). Reviewed record, history, and  medications.      Chief Complaint   Patient presents with    URI     Patient c/o chest congestion, cough, sore throat, since yesterday worse, been fighting something all week. No exposure, no covid test.          Vitals:    10/18/24 1006   Weight: 130.6 kg (288 lb)   Height: 1.626 m (5' 4\")             10/18/2024    10:13 AM   PHQ-9    Little interest or pleasure in doing things 0   Feeling down, depressed, or hopeless 0   Trouble falling or staying asleep, or sleeping too much 0   Feeling tired or having little energy 0   Poor appetite or overeating 0   Feeling bad about yourself - or that you are a failure or have let yourself or your family down 0   Trouble concentrating on things, such as reading the newspaper or watching television 0   Moving or speaking so slowly that other people could have noticed. Or the opposite - being so fidgety or restless that you have been moving around a lot more than usual 0   Thoughts that you would be better off dead, or of hurting yourself in some way 0   PHQ-2 Score 0   PHQ-9 Total Score 0   If you checked off any problems, how difficult have these problems made it for you to do your work, take care of things at home, or get along with other people? 0           10/10/2024     9:13 AM 2023     3:08 PM   FIORDALIZA-7 SCREENING   Feeling nervous, anxious, or on edge Not at all Not at all   Not being able to stop or control worrying Not at all Not at all   Worrying too much about different things Not at all Not at all   Trouble relaxing Not at all Not at all   Being so restless that it is hard to sit still Not at all Not at all   Becoming easily annoyed or irritable Not at all Not at all   Feeling afraid as if something awful might happen Not at all Not at all   FIORDALIZA-7 Total Score 0 0   How difficult have these problems made it for you to do your work, take care of things at home, or get along 
Associated Diagnoses:  Chronic right-sided low back pain with right-sided sciatica     lansoprazole (PREVACID) 15 MG delayed release capsule  06/18/24  --     Associated Diagnoses:  --     Magnesium 500 MG CAPS  --  --     Associated Diagnoses:  --     metFORMIN (GLUCOPHAGE) 500 MG tablet  --  --     Associated Diagnoses:  --     montelukast (SINGULAIR) 10 MG tablet  02/09/24  --     Associated Diagnoses:  --     naltrexone (DEPADE) 50 MG tablet  08/28/24  --     TAKE 1/2 TABLET BY MOUTH EVERY DAY     Associated Diagnoses:  --     naratriptan (AMERGE) 2.5 MG tablet  03/07/24  --     2.5 mg at onset of headache, may repeat in 4 hours if needed     Associated Diagnoses:  Chronic migraine w/o aura, not intractable, w/o stat migr     ondansetron (ZOFRAN) 8 MG tablet  --  --     Associated Diagnoses:  --     predniSONE (DELTASONE) 10 MG tablet  10/17/24  --     Associated Diagnoses:  --     rimegepant sulfate (NURTEC) 75 MG TBDP  09/24/24  --     Take 75 mg by mouth every other day     Associated Diagnoses:  Chronic migraine w/o aura, not intractable, w/o stat migr     Sacrosidase (SUCRAID) 8500 UNIT/ML SOLN  --  --     Associated Diagnoses:  --     sertraline (ZOLOFT) 100 MG tablet  03/18/24  --     Associated Diagnoses:  --     SYMBICORT 160-4.5 MCG/ACT AERO  09/07/23  --     Associated Diagnoses:  --     traZODone (DESYREL) 50 MG tablet  12/06/21  --     Associated Diagnoses:  --             Patient Active Problem List   Diagnosis Code    FIORDALIZA (generalized anxiety disorder) F41.1    Gastroesophageal reflux disease without esophagitis K21.9    Edema, peripheral R60.0    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

## 2024-10-22 ENCOUNTER — OFFICE VISIT (OUTPATIENT)
Age: 24
End: 2024-10-22
Payer: MEDICARE

## 2024-10-22 VITALS
SYSTOLIC BLOOD PRESSURE: 130 MMHG | BODY MASS INDEX: 49.17 KG/M2 | WEIGHT: 288 LBS | RESPIRATION RATE: 20 BRPM | DIASTOLIC BLOOD PRESSURE: 84 MMHG | TEMPERATURE: 98.1 F | HEIGHT: 64 IN | HEART RATE: 94 BPM | OXYGEN SATURATION: 98 %

## 2024-10-22 DIAGNOSIS — J02.9 SORE THROAT: ICD-10-CM

## 2024-10-22 DIAGNOSIS — M79.10 MYALGIA: ICD-10-CM

## 2024-10-22 DIAGNOSIS — R05.1 ACUTE COUGH: Primary | ICD-10-CM

## 2024-10-22 LAB
INFLUENZA A ANTIGEN, POC: NEGATIVE
INFLUENZA B ANTIGEN, POC: NEGATIVE
STREP PYOGENES DNA, POC: NEGATIVE
VALID INTERNAL CONTROL, POC: NORMAL
VALID INTERNAL CONTROL, POC: NORMAL

## 2024-10-22 PROCEDURE — G8427 DOCREV CUR MEDS BY ELIG CLIN: HCPCS | Performed by: PHYSICIAN ASSISTANT

## 2024-10-22 PROCEDURE — PBSHW AMB POC INFLUENZA A  AND B REAL-TIME RT-PCR: Performed by: PHYSICIAN ASSISTANT

## 2024-10-22 PROCEDURE — 99213 OFFICE O/P EST LOW 20 MIN: CPT | Performed by: PHYSICIAN ASSISTANT

## 2024-10-22 PROCEDURE — 87651 STREP A DNA AMP PROBE: CPT | Performed by: PHYSICIAN ASSISTANT

## 2024-10-22 PROCEDURE — PBSHW AMB POC STREP GO A DIRECT, DNA PROBE: Performed by: PHYSICIAN ASSISTANT

## 2024-10-22 PROCEDURE — 87502 INFLUENZA DNA AMP PROBE: CPT | Performed by: PHYSICIAN ASSISTANT

## 2024-10-22 PROCEDURE — G8484 FLU IMMUNIZE NO ADMIN: HCPCS | Performed by: PHYSICIAN ASSISTANT

## 2024-10-22 PROCEDURE — G8417 CALC BMI ABV UP PARAM F/U: HCPCS | Performed by: PHYSICIAN ASSISTANT

## 2024-10-22 PROCEDURE — 1036F TOBACCO NON-USER: CPT | Performed by: PHYSICIAN ASSISTANT

## 2024-10-22 RX ORDER — GABAPENTIN 100 MG/1
100 CAPSULE ORAL
COMMUNITY
Start: 2024-06-24 | End: 2024-10-22

## 2024-10-22 RX ORDER — AZITHROMYCIN 250 MG/1
TABLET, FILM COATED ORAL
Qty: 6 TABLET | Refills: 0 | Status: SHIPPED | OUTPATIENT
Start: 2024-10-22 | End: 2024-11-01

## 2024-10-22 ASSESSMENT — PATIENT HEALTH QUESTIONNAIRE - PHQ9
SUM OF ALL RESPONSES TO PHQ QUESTIONS 1-9: 0
SUM OF ALL RESPONSES TO PHQ QUESTIONS 1-9: 0
10. IF YOU CHECKED OFF ANY PROBLEMS, HOW DIFFICULT HAVE THESE PROBLEMS MADE IT FOR YOU TO DO YOUR WORK, TAKE CARE OF THINGS AT HOME, OR GET ALONG WITH OTHER PEOPLE: NOT DIFFICULT AT ALL
5. POOR APPETITE OR OVEREATING: NOT AT ALL
1. LITTLE INTEREST OR PLEASURE IN DOING THINGS: NOT AT ALL
4. FEELING TIRED OR HAVING LITTLE ENERGY: NOT AT ALL
8. MOVING OR SPEAKING SO SLOWLY THAT OTHER PEOPLE COULD HAVE NOTICED. OR THE OPPOSITE, BEING SO FIGETY OR RESTLESS THAT YOU HAVE BEEN MOVING AROUND A LOT MORE THAN USUAL: NOT AT ALL
9. THOUGHTS THAT YOU WOULD BE BETTER OFF DEAD, OR OF HURTING YOURSELF: NOT AT ALL
SUM OF ALL RESPONSES TO PHQ QUESTIONS 1-9: 0
7. TROUBLE CONCENTRATING ON THINGS, SUCH AS READING THE NEWSPAPER OR WATCHING TELEVISION: NOT AT ALL
6. FEELING BAD ABOUT YOURSELF - OR THAT YOU ARE A FAILURE OR HAVE LET YOURSELF OR YOUR FAMILY DOWN: NOT AT ALL
2. FEELING DOWN, DEPRESSED OR HOPELESS: NOT AT ALL
3. TROUBLE FALLING OR STAYING ASLEEP: NOT AT ALL
SUM OF ALL RESPONSES TO PHQ9 QUESTIONS 1 & 2: 0
SUM OF ALL RESPONSES TO PHQ QUESTIONS 1-9: 0

## 2024-10-22 NOTE — PROGRESS NOTES
Conchis Lazcano is a 24 y.o. female , id x 2(name and ). Reviewed record, history, and  medications.      Chief Complaint   Patient presents with    URI     Patient body aches, sore throat, cough, over 1 week.          Vitals:    10/22/24 1056   Weight: 130.6 kg (288 lb)   Height: 1.626 m (5' 4\")             10/22/2024    10:59 AM   PHQ-9    Little interest or pleasure in doing things 0   Feeling down, depressed, or hopeless 0   Trouble falling or staying asleep, or sleeping too much 0   Feeling tired or having little energy 0   Poor appetite or overeating 0   Feeling bad about yourself - or that you are a failure or have let yourself or your family down 0   Trouble concentrating on things, such as reading the newspaper or watching television 0   Moving or speaking so slowly that other people could have noticed. Or the opposite - being so fidgety or restless that you have been moving around a lot more than usual 0   Thoughts that you would be better off dead, or of hurting yourself in some way 0   PHQ-2 Score 0   PHQ-9 Total Score 0   If you checked off any problems, how difficult have these problems made it for you to do your work, take care of things at home, or get along with other people? 0           10/10/2024     9:13 AM 2023     3:08 PM   FIORDALIZA-7 SCREENING   Feeling nervous, anxious, or on edge Not at all Not at all   Not being able to stop or control worrying Not at all Not at all   Worrying too much about different things Not at all Not at all   Trouble relaxing Not at all Not at all   Being so restless that it is hard to sit still Not at all Not at all   Becoming easily annoyed or irritable Not at all Not at all   Feeling afraid as if something awful might happen Not at all Not at all   FIORDALIZA-7 Total Score 0 0   How difficult have these problems made it for you to do your work, take care of things at home, or get along with other people? Not difficult at all Not difficult at all       Social

## 2024-10-22 NOTE — PROGRESS NOTES
Conchis Lazcano (:  2000) is a 24 y.o. female, here for evaluation of the following chief complaint(s):  URI (Patient body aches, sore throat, cough, over 1 week. )         Assessment & Plan  1. Throat pain.  The rapid strep test was negative, and the flu test was also negative. A throat culture will be sent for further analysis, which should be available by Thursday. Despite the negative results, a viral infection could still be the cause of her symptoms. An antibiotic, Z-Rick, will be prescribed to cover potential throat and ear infections. She is advised to maintain adequate hydration and use inhalers as needed. The prescribed medication will also address potential sinus and chest infections.     2. Fever.  She reports intermittent fever with a peak of 100°F. The fever could be associated with a viral or bacterial infection. She is advised to monitor her temperature and take antipyretics as needed.    3. Ear pain.  Her ears are painful, but the tympanic membranes are intact with no erythema. The prescribed Z-Rick will also cover potential ear infections.      Results  Laboratory Studies  Rapid strep test was negative. Flu test was negative.  1. Acute cough  -     AMB POC INFLUENZA A  AND B REAL-TIME RT-PCR  -     azithromycin (ZITHROMAX) 250 MG tablet; 500mg on day 1 followed by 250mg on days 2 - 5, Disp-6 tablet, R-0Normal  2. Sore throat  -     AMB POC INFLUENZA A  AND B REAL-TIME RT-PCR  -     AMB POC STREP GO A DIRECT, DNA PROBE  -     Culture, Throat; Future  -     azithromycin (ZITHROMAX) 250 MG tablet; 500mg on day 1 followed by 250mg on days 2 - 5, Disp-6 tablet, R-0Normal  3. Myalgia  -     AMB POC INFLUENZA A  AND B REAL-TIME RT-PCR  -     AMB POC STREP GO A DIRECT, DNA PROBE  -     Culture, Throat; Future    No follow-ups on file.       Subjective   History of Present Illness  The patient presents for evaluation of throat pain.    She is experiencing severe throat pain, which is exacerbated by

## 2024-10-24 ENCOUNTER — TELEPHONE (OUTPATIENT)
Age: 24
End: 2024-10-24

## 2024-10-24 ENCOUNTER — PATIENT MESSAGE (OUTPATIENT)
Age: 24
End: 2024-10-24

## 2024-10-24 ENCOUNTER — APPOINTMENT (OUTPATIENT)
Facility: HOSPITAL | Age: 24
End: 2024-10-24
Payer: MEDICARE

## 2024-10-24 LAB
BACTERIA SPEC CULT: NORMAL
SERVICE CMNT-IMP: NORMAL

## 2024-10-24 NOTE — TELEPHONE ENCOUNTER
Please let the patient know that azithromycin is not known to cause nosebleeds.  If she is using a nasal spray such as Flonase or Astelin she should hold the sprays.  Sometimes they can cause drying of the nasal mucosa which makes them more friable.

## 2024-10-24 NOTE — TELEPHONE ENCOUNTER
Pt has had 2 nose bleeds yesterday and 3 nose bleeds last night. She also said they were a little heavy . She is wondering if it is the medication?

## 2024-10-24 NOTE — TELEPHONE ENCOUNTER
I called and spoke to the patient.  I let her know that I did receive her message this morning but the nurse had not called her back as of yet.  I explained to the patient that azithromycin should not be causing nosebleeds.  The patient states she is currently not using any nose sprays other than nasal saline.  I explained the patient that the nasal saline should not be contributing to her nosebleeds either.  If however she feels that the nosebleed is severe I have advised her to please go to the emergency room to make sure that she does not have a posterior bleed.  The patient does have a ear nose and throat doctor and I have asked her to reach out to them so that she can get further evaluated.

## 2024-10-25 RX ORDER — MUPIROCIN 20 MG/G
OINTMENT TOPICAL
Qty: 1 EACH | Refills: 0 | Status: SHIPPED | OUTPATIENT
Start: 2024-10-25 | End: 2024-10-25

## 2024-10-25 RX ORDER — ONDANSETRON 2 MG/ML
4 INJECTION INTRAMUSCULAR; INTRAVENOUS ONCE
Start: 2024-11-24 | End: 2024-11-24

## 2024-10-25 RX ORDER — MUPIROCIN 20 MG/G
OINTMENT TOPICAL
Qty: 1 EACH | Refills: 0 | Status: SHIPPED | OUTPATIENT
Start: 2024-10-25 | End: 2024-11-01

## 2024-10-29 PROBLEM — Z90.49 HISTORY OF CHOLECYSTECTOMY: Status: ACTIVE | Noted: 2024-10-29

## 2024-10-30 ENCOUNTER — APPOINTMENT (OUTPATIENT)
Facility: HOSPITAL | Age: 24
End: 2024-10-30
Payer: MEDICARE

## 2024-10-30 ENCOUNTER — TELEPHONE (OUTPATIENT)
Dept: PHARMACY | Facility: CLINIC | Age: 24
End: 2024-10-30

## 2024-10-30 NOTE — TELEPHONE ENCOUNTER
[facility]: ADHERENCE REVIEW  Identified care gap per [employer] fills with [facility]: Diabetes adherence    Medicare and residential Dual Special Needs Plan - MRDSNP  MA-PCPi  Per insurer report, LIS-3 - co-pays are $0.00 through all phases of the benefit, regardless of the days' supply dispensed.    ASSESSMENT    DIABETES ADHERENCE    Insurance Records claims through  10.20.24  (Prior Year PDC = 98% - PASSED ; YTD PDC = 77%; Potential Fail Date: 11.05.24):   METFORMIN  MG [facility] last filled on 06.12.24 for 100 day supply. Next refill due: 09.20.24    Prescribed sig:  Take 1 in the morning and 2 in the evening.    Per Reconcile Dispense History and Insurer Portal: last filled on 06.12.24 for 100 day supply.     Per patient message on 07.16.24, , yes metformin definitely reduces insulin resistance which is inevitably occurring right now. It doesn't lend itself towards a significant # of pounds lost typically so for that reason, to attempt to simplify your regimen, it would be okay to discontinue.    Lab Results   Component Value Date    LABA1C 4.8 09/20/2024    LABA1C 4.7 08/04/2023    LABA1C 4.8 09/03/2021     PLAN  The following are interventions that have been identified:   Pt no longer taking, METFORMIN  MG ER, Per patient message on 07.16.24, , yes metformin definitely reduces insulin resistance which is inevitably occurring right now. It doesn't lend itself towards a significant # of pounds lost typically so for that reason, to attempt to simplify your regimen, it would be okay to discontinue.    Outreach:  Routing to Pharmacist:  Please remove METFORMIN  MG ER from active med list.    Last Visit: 09.19.24  Next Visit: 01.09.25    Zara Osborne CPhT  Population Health    [facility]   [phone] option 1     For Pharmacy Admin Tracking Only    Program: [facility] [employer]  CPA in place:  No  Gap Closed?: Yes   Time Spent

## 2024-10-31 NOTE — TELEPHONE ENCOUNTER
Medications Discontinued During This Encounter   Medication Reason    metFORMIN (GLUCOPHAGE) 500 MG tablet Therapy completed

## 2024-11-01 ENCOUNTER — TELEPHONE (OUTPATIENT)
Age: 24
End: 2024-11-01

## 2024-11-01 ENCOUNTER — HOSPITAL ENCOUNTER (OUTPATIENT)
Facility: HOSPITAL | Age: 24
Setting detail: INFUSION SERIES
End: 2024-11-01

## 2024-11-01 NOTE — TELEPHONE ENCOUNTER
Spoke with patient. She stated she was just able to  the prescription yesterday. She also said she hasn't had a nose bleed since Tuesday.

## 2024-11-04 ENCOUNTER — HOSPITAL ENCOUNTER (OUTPATIENT)
Facility: HOSPITAL | Age: 24
Discharge: HOME | End: 2024-11-07
Attending: ORTHOPAEDIC SURGERY
Payer: MEDICARE

## 2024-11-04 ENCOUNTER — HOSPITAL ENCOUNTER (OUTPATIENT)
Facility: HOSPITAL | Age: 24
Setting detail: RECURRING SERIES
Discharge: HOME | End: 2024-11-07
Payer: MEDICARE

## 2024-11-04 DIAGNOSIS — M79.671 CHRONIC HEEL PAIN, RIGHT: ICD-10-CM

## 2024-11-04 DIAGNOSIS — M79.672 BILATERAL FOOT PAIN: ICD-10-CM

## 2024-11-04 DIAGNOSIS — M72.2 PLANTAR FASCIITIS, RIGHT: ICD-10-CM

## 2024-11-04 DIAGNOSIS — G89.29 CHRONIC HEEL PAIN, RIGHT: ICD-10-CM

## 2024-11-04 DIAGNOSIS — M79.671 BILATERAL FOOT PAIN: ICD-10-CM

## 2024-11-04 PROCEDURE — 97112 NEUROMUSCULAR REEDUCATION: CPT

## 2024-11-04 PROCEDURE — 97110 THERAPEUTIC EXERCISES: CPT

## 2024-11-04 PROCEDURE — 73718 MRI LOWER EXTREMITY W/O DYE: CPT

## 2024-11-07 ENCOUNTER — APPOINTMENT (OUTPATIENT)
Facility: HOSPITAL | Age: 24
End: 2024-11-07
Payer: MEDICARE

## 2024-11-11 ENCOUNTER — APPOINTMENT (OUTPATIENT)
Facility: HOSPITAL | Age: 24
End: 2024-11-11
Payer: MEDICARE

## 2024-11-18 ENCOUNTER — HOSPITAL ENCOUNTER (OUTPATIENT)
Facility: HOSPITAL | Age: 24
Setting detail: RECURRING SERIES
Discharge: HOME | End: 2024-11-21
Payer: MEDICARE

## 2024-11-18 PROCEDURE — 97110 THERAPEUTIC EXERCISES: CPT | Performed by: PHYSICAL THERAPIST

## 2024-11-18 NOTE — PROGRESS NOTES
PHYSICAL THERAPY - MEDICARE DAILY TREATMENT NOTE (updated 3/23)        Date: 10/8/2024            Patient Name:  Conchis Lazcano :  2000   Medical   Diagnosis:  Other low back pain [M54.59] Treatment Diagnosis:  M54.59 OTHER LOWER BACK PAIN    Referral Source:  Jan Pemberton MD Insurance:   Payor: [facility] MEDICARE / Plan: [employer] DUAL COMPLETE / Product Type: *No Product type* /                           Patient  verified yes     Visit #   Current  / Total 21 24 per POC   Time   In / Out 320 PM  400 PM   Total Treatment Time 40   Total Timed Codes 40   1:1 Treatment Time 40      [facility] Totals Reminder:  bill using total billable   min of TIMED therapeutic procedures and modalities.   8-22 min = 1 unit; 23-37 min = 2 units; 38-52 min = 3 units; 53-67 min = 4 units; 68-82 min = 5 units              SUBJECTIVE     Pain Level (0-10 scale): 6/10 foot pain      Any medication changes, allergies to medications, adverse drug reactions, diagnosis change, or new procedure performed?: [x] No    [] Yes (see summary sheet for update)  Medications: Verified on Patient Summary List     Subjective functional status/changes:     Patient reports she had an MRI of her foot  \"I'm still having a lot of pain in my foot when I stand and walk\"  Pt reports having to switch her ADD medication due to a shortage of the extended release ADD medication    OBJECTIVE        Therapeutic Procedures:  Tx Min Billable or 1:1 Min (if diff from Tx Min) Procedure, Rationale, Specifics       55587 Neuromuscular Re-Education (timed):  improve balance, coordination, kinesthetic sense, posture, core stability and proprioception to improve patient's ability to develop conscious control of individual muscles and awareness of position of extremities in order to progress to PLOF and address remaining functional goals. (see flow sheet as applicable)      Details if applicable:      40   04311 Therapeutic Exercise (timed):  increase

## 2024-11-19 ENCOUNTER — TELEPHONE (OUTPATIENT)
Age: 24
End: 2024-11-19

## 2024-11-19 NOTE — TELEPHONE ENCOUNTER
SHILPA Saucedo, PT \"Hello! Conchis Lazcano is in the office today with high blood pressure (148/102). BP has been taken several times during today's visit and has not dropped below 138/100.  She reports having been switched from an extended release ADD medication to a new ADD medication due to medication shortage and wonders if that could be related to high BP readings.  She reports her dizziness is slightly worse today.  No other symptoms.  Please advise.  Thank you! \"    Per Dr. Jan Pemberton: \"Hailee - can you please call patient.  Have her follow BP at home.   If she is taking in extra salt, let's have her cut back salt intake.   Want BP < 140/90.   High BP may be due to some of the meds she is taking (none of them are cardiac).  If BP remains so high, may need to revisit some of her meds (such as ADD med).\"    Called ptMEGAN, sent mychart msg

## 2024-11-25 ENCOUNTER — HOSPITAL ENCOUNTER (OUTPATIENT)
Facility: HOSPITAL | Age: 24
Setting detail: RECURRING SERIES
Discharge: HOME | End: 2024-11-28
Payer: MEDICARE

## 2024-11-25 PROCEDURE — 97110 THERAPEUTIC EXERCISES: CPT | Performed by: PHYSICAL THERAPIST

## 2024-11-25 NOTE — PROGRESS NOTES
PHYSICAL THERAPY - MEDICARE DAILY TREATMENT NOTE (updated 3/23)        Date: 10/8/2024            Patient Name:  Conchis Lazcano :  2000   Medical   Diagnosis:  Other low back pain [M54.59] Treatment Diagnosis:  M54.59 OTHER LOWER BACK PAIN    Referral Source:  Jan Pemberton MD Insurance:   Payor: [facility] MEDICARE / Plan: [employer] DUAL COMPLETE / Product Type: *No Product type* /                           Patient  verified yes     Visit #   Current  / Total 22 24 per POC   Time   In / Out 130 PM  215 PM   Total Treatment Time 45   Total Timed Codes 45   1:1 Treatment Time 45      [facility] Totals Reminder:  bill using total billable   min of TIMED therapeutic procedures and modalities.   8-22 min = 1 unit; 23-37 min = 2 units; 38-52 min = 3 units; 53-67 min = 4 units; 68-82 min = 5 units              SUBJECTIVE     Pain Level (0-10 scale): 6/10 foot pain      Any medication changes, allergies to medications, adverse drug reactions, diagnosis change, or new procedure performed?: [x] No    [] Yes (see summary sheet for update)  Medications: Verified on Patient Summary List     Subjective functional status/changes:     Patient reports she saw her MD who said the issues in her foot were auto-immune related and there was nothing to be done    OBJECTIVE        Therapeutic Procedures:  Tx Min Billable or 1:1 Min (if diff from Tx Min) Procedure, Rationale, Specifics       67798 Neuromuscular Re-Education (timed):  improve balance, coordination, kinesthetic sense, posture, core stability and proprioception to improve patient's ability to develop conscious control of individual muscles and awareness of position of extremities in order to progress to PLOF and address remaining functional goals. (see flow sheet as applicable)      Details if applicable:      45   94504 Therapeutic Exercise (timed):  increase ROM, strength, coordination, balance, and proprioception to improve patient's ability to

## 2024-11-26 ENCOUNTER — OFFICE VISIT (OUTPATIENT)
Age: 24
End: 2024-11-26
Payer: MEDICARE

## 2024-11-26 VITALS
HEART RATE: 105 BPM | WEIGHT: 290.5 LBS | HEIGHT: 64 IN | DIASTOLIC BLOOD PRESSURE: 72 MMHG | BODY MASS INDEX: 49.6 KG/M2 | OXYGEN SATURATION: 99 % | SYSTOLIC BLOOD PRESSURE: 114 MMHG | RESPIRATION RATE: 16 BRPM

## 2024-11-26 PROCEDURE — G8484 FLU IMMUNIZE NO ADMIN: HCPCS | Performed by: INTERNAL MEDICINE

## 2024-11-26 PROCEDURE — G8417 CALC BMI ABV UP PARAM F/U: HCPCS | Performed by: INTERNAL MEDICINE

## 2024-11-26 PROCEDURE — 99214 OFFICE O/P EST MOD 30 MIN: CPT | Performed by: INTERNAL MEDICINE

## 2024-11-26 PROCEDURE — G8428 CUR MEDS NOT DOCUMENT: HCPCS | Performed by: INTERNAL MEDICINE

## 2024-11-26 PROCEDURE — 1036F TOBACCO NON-USER: CPT | Performed by: INTERNAL MEDICINE

## 2024-11-26 PROCEDURE — G2211 COMPLEX E/M VISIT ADD ON: HCPCS | Performed by: INTERNAL MEDICINE

## 2024-11-26 RX ORDER — METFORMIN HYDROCHLORIDE 500 MG/1
1000 TABLET, EXTENDED RELEASE ORAL
Qty: 360 TABLET | Refills: 3 | Status: SHIPPED | OUTPATIENT
Start: 2024-11-26

## 2024-11-26 RX ORDER — DEXTROAMPHETAMINE SACCHARATE, AMPHETAMINE ASPARTATE, DEXTROAMPHETAMINE SULFATE AND AMPHETAMINE SULFATE 3.75; 3.75; 3.75; 3.75 MG/1; MG/1; MG/1; MG/1
15 TABLET ORAL 2 TIMES DAILY
COMMUNITY
Start: 2024-11-13

## 2024-11-26 RX ORDER — ONDANSETRON 2 MG/ML
8 INJECTION INTRAMUSCULAR; INTRAVENOUS ONCE
Start: 2024-12-18 | End: 2024-11-26

## 2024-11-26 NOTE — PROGRESS NOTES
Chief Complaint   Patient presents with    Weight Management         History of Present Illness: Conchis Lazcano is a 24 y.o.. female with a past medical hx significant for ADHD seen in referral from Conchis Kirk PA-C for discussion related to weight gain.    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 Internal BMBx performed 8/25/2022 showing normocellular marrow with active trilineage hematopoiesis. 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 Normal female karyotype. FISH negative. Will monitor closely for development of AML. Of note, FLT3 mutations also increase risk of various other tumor types, thus, will refer to medical genetics for further discussion.    Conchis and her Mother are here today to discuss medication options for weight loss. Previously lost 100lbs following cholecystectomy. Currently working as a [employer], snacking throughout the day and then has a mendy with the family in the evening. Endorses cravings for carbohydrates. Currently taking low dose naltrexone (4.5mg) due to pain (rx by Rhe[facility]). Taking vyvansefor ADHD, recently switched from straterra. Also taking cymbalta.      01/26/2023: Not consistently taking 2000 mg a day of metformin, titrated off of prednisone-was up to 20 mg once a day, down to 10 mg.  Has essentially been on this continuously for about 8 months.  Is down 5 pounds.  Not experiencing bad anxiety on current regimen.    06/07/2023: LFTs elevated when taking wellbutrin + naltrexone. Drinks sprite when feels that BG are low, otherwise sweet tea. Noted worsening in tremor when started wellbutrin, not sure if she would like to resume that. Weight is down about 4 lbs. Has continued to require both PO and IV steroids. Can only tolerate 1000mg max daily of metformin. Pain has been better with naltrexone.    11/2/2023: Snacking on goldfish, raisins, pretzels.  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

## 2024-11-30 DIAGNOSIS — G43.709 CHRONIC MIGRAINE W/O AURA, NOT INTRACTABLE, W/O STAT MIGR: ICD-10-CM

## 2024-12-01 RX ORDER — RIMEGEPANT SULFATE 75 MG/75MG
75 TABLET, ORALLY DISINTEGRATING ORAL EVERY OTHER DAY
Qty: 48 TABLET | Refills: 3 | Status: SHIPPED | OUTPATIENT
Start: 2024-12-01

## 2024-12-01 NOTE — PROGRESS NOTES
PHYSICAL THERAPY - MEDICARE DAILY TREATMENT NOTE (updated 3/23)        Date: 10/8/2024            Patient Name:  Conchis Lazcano :  2000   Medical   Diagnosis:  Other low back pain [M54.59] Treatment Diagnosis:  M54.59 OTHER LOWER BACK PAIN    Referral Source:  Jan Pemberton MD Insurance:   Payor: [facility] MEDICARE / Plan: [employer] DUAL COMPLETE / Product Type: *No Product type* /                           Patient  verified yes     Visit #   Current  / Total 20 24 per POC   Time   In / Out 510 PM  540 PM   Total Treatment Time 30   Total Timed Codes 30   1:1 Treatment Time 30      [facility] Totals Reminder:  bill using total billable   min of TIMED therapeutic procedures and modalities.   8-22 min = 1 unit; 23-37 min = 2 units; 38-52 min = 3 units; 53-67 min = 4 units; 68-82 min = 5 units              SUBJECTIVE     Pain Level (0-10 scale): 7/10 foot pain      Any medication changes, allergies to medications, adverse drug reactions, diagnosis change, or new procedure performed?: [x] No    [] Yes (see summary sheet for update)  Medications: Verified on Patient Summary List     Subjective functional status/changes:     Patient reports continued dizziness especially with bending to  items. Continued foot pain present.    OBJECTIVE        Therapeutic Procedures:  Tx Min Billable or 1:1 Min (if diff from Tx Min) Procedure, Rationale, Specifics    10   76914 Neuromuscular Re-Education (timed):  improve balance, coordination, kinesthetic sense, posture, core stability and proprioception to improve patient's ability to develop conscious control of individual muscles and awareness of position of extremities in order to progress to PLOF and address remaining functional goals. (see flow sheet as applicable)      Details if applicable:      20   57704 Therapeutic Exercise (timed):  increase ROM, strength, coordination, balance, and proprioception to improve patient's ability to progress to PLOF 
General

## 2024-12-02 ENCOUNTER — HOSPITAL ENCOUNTER (OUTPATIENT)
Facility: HOSPITAL | Age: 24
Setting detail: RECURRING SERIES
Discharge: HOME | End: 2024-12-05
Payer: MEDICARE

## 2024-12-02 PROCEDURE — 97110 THERAPEUTIC EXERCISES: CPT

## 2024-12-02 NOTE — PROGRESS NOTES
PHYSICAL THERAPY - MEDICARE DAILY TREATMENT NOTE (updated 3/23)        Date: 10/8/2024            Patient Name:  Conchis Lazcano :  2000   Medical   Diagnosis:  Other low back pain [M54.59] Treatment Diagnosis:  M54.59 OTHER LOWER BACK PAIN    Referral Source:  Jan Pemberton MD Insurance:   Payor: [facility] MEDICARE / Plan: [employer] DUAL COMPLETE / Product Type: *No Product type* /                           Patient  verified yes     Visit #   Current  / Total 23 24 per POC   Time   In / Out 115 PM  205 PM   Total Treatment Time 50   Total Timed Codes 40   1:1 Treatment Time 40      [facility] Totals Reminder:  bill using total billable   min of TIMED therapeutic procedures and modalities.   8-22 min = 1 unit; 23-37 min = 2 units; 38-52 min = 3 units; 53-67 min = 4 units; 68-82 min = 5 units              SUBJECTIVE     Pain Level (0-10 scale): 6/10 foot pain      Any medication changes, allergies to medications, adverse drug reactions, diagnosis change, or new procedure performed?: [x] No    [] Yes (see summary sheet for update)  Medications: Verified on Patient Summary List     Subjective functional status/changes:     Patient reports she is schedule to see ortho in January    OBJECTIVE        Therapeutic Procedures:  Tx Min Billable or 1:1 Min (if diff from Tx Min) Procedure, Rationale, Specifics       36749 Neuromuscular Re-Education (timed):  improve balance, coordination, kinesthetic sense, posture, core stability and proprioception to improve patient's ability to develop conscious control of individual muscles and awareness of position of extremities in order to progress to PLOF and address remaining functional goals. (see flow sheet as applicable)      Details if applicable:      40   75838 Therapeutic Exercise (timed):  increase ROM, strength, coordination, balance, and proprioception to improve patient's ability to progress to PLOF and address remaining functional goals. (see flow sheet

## 2024-12-03 ENCOUNTER — ANCILLARY PROCEDURE (OUTPATIENT)
Age: 24
End: 2024-12-03
Payer: MEDICARE

## 2024-12-03 VITALS
SYSTOLIC BLOOD PRESSURE: 128 MMHG | BODY MASS INDEX: 49.17 KG/M2 | HEART RATE: 88 BPM | HEIGHT: 64 IN | DIASTOLIC BLOOD PRESSURE: 82 MMHG | WEIGHT: 288 LBS

## 2024-12-03 DIAGNOSIS — G90.A POTS (POSTURAL ORTHOSTATIC TACHYCARDIA SYNDROME): ICD-10-CM

## 2024-12-03 DIAGNOSIS — I10 ESSENTIAL (PRIMARY) HYPERTENSION: ICD-10-CM

## 2024-12-03 DIAGNOSIS — R00.0 TACHYCARDIA, UNSPECIFIED: ICD-10-CM

## 2024-12-03 DIAGNOSIS — I51.9 LEFT VENTRICULAR DYSFUNCTION: ICD-10-CM

## 2024-12-03 DIAGNOSIS — R93.1 ABNORMAL ECHOCARDIOGRAM: ICD-10-CM

## 2024-12-03 LAB
ECHO AO ASC DIAM: 2.8 CM
ECHO AO ASCENDING AORTA INDEX: 1.23 CM/M2
ECHO AO ROOT DIAM: 3.1 CM
ECHO AO ROOT INDEX: 1.36 CM/M2
ECHO AV MEAN GRADIENT: 3 MMHG
ECHO AV MEAN VELOCITY: 0.9 M/S
ECHO AV PEAK GRADIENT: 6 MMHG
ECHO AV PEAK VELOCITY: 1.2 M/S
ECHO AV VELOCITY RATIO: 0.75
ECHO AV VTI: 21.7 CM
ECHO BSA: 2.43 M2
ECHO LA DIAMETER INDEX: 1.58 CM/M2
ECHO LA DIAMETER: 3.6 CM
ECHO LA TO AORTIC ROOT RATIO: 1.16
ECHO LA VOL A-L A2C: 48 ML (ref 22–52)
ECHO LA VOL A-L A4C: 44 ML (ref 22–52)
ECHO LA VOL BP: 44 ML (ref 22–52)
ECHO LA VOL MOD A2C: 47 ML (ref 22–52)
ECHO LA VOL MOD A4C: 40 ML (ref 22–52)
ECHO LA VOL/BSA BIPLANE: 19 ML/M2 (ref 16–34)
ECHO LA VOLUME AREA LENGTH: 46 ML
ECHO LA VOLUME INDEX A-L A2C: 21 ML/M2 (ref 16–34)
ECHO LA VOLUME INDEX A-L A4C: 19 ML/M2 (ref 16–34)
ECHO LA VOLUME INDEX AREA LENGTH: 20 ML/M2 (ref 16–34)
ECHO LA VOLUME INDEX MOD A2C: 21 ML/M2 (ref 16–34)
ECHO LA VOLUME INDEX MOD A4C: 18 ML/M2 (ref 16–34)
ECHO LV E' LATERAL VELOCITY: 10.98 CM/S
ECHO LV E' SEPTAL VELOCITY: 9.37 CM/S
ECHO LV EDV A2C: 141 ML
ECHO LV EDV A4C: 130 ML
ECHO LV EDV BP: 136 ML (ref 56–104)
ECHO LV EDV INDEX A4C: 57 ML/M2
ECHO LV EDV INDEX BP: 60 ML/M2
ECHO LV EDV NDEX A2C: 62 ML/M2
ECHO LV EF PHYSICIAN: 60 %
ECHO LV EJECTION FRACTION A2C: 55 %
ECHO LV EJECTION FRACTION A4C: 63 %
ECHO LV ESV A2C: 64 ML
ECHO LV ESV A4C: 48 ML
ECHO LV ESV BP: 57 ML (ref 19–49)
ECHO LV ESV INDEX A2C: 28 ML/M2
ECHO LV ESV INDEX A4C: 21 ML/M2
ECHO LV ESV INDEX BP: 25 ML/M2
ECHO LV FRACTIONAL SHORTENING: 33 % (ref 28–44)
ECHO LV INTERNAL DIMENSION DIASTOLE INDEX: 2.24 CM/M2
ECHO LV INTERNAL DIMENSION DIASTOLIC: 5.1 CM (ref 3.9–5.3)
ECHO LV INTERNAL DIMENSION SYSTOLIC INDEX: 1.49 CM/M2
ECHO LV INTERNAL DIMENSION SYSTOLIC: 3.4 CM
ECHO LV IVSD: 0.8 CM (ref 0.6–0.9)
ECHO LV MASS 2D: 140.5 G (ref 67–162)
ECHO LV MASS INDEX 2D: 61.6 G/M2 (ref 43–95)
ECHO LV POSTERIOR WALL DIASTOLIC: 0.8 CM (ref 0.6–0.9)
ECHO LV RELATIVE WALL THICKNESS RATIO: 0.31
ECHO LVOT AV VTI INDEX: 0.74
ECHO LVOT MEAN GRADIENT: 2 MMHG
ECHO LVOT PEAK GRADIENT: 3 MMHG
ECHO LVOT PEAK VELOCITY: 0.9 M/S
ECHO LVOT VTI: 16 CM
ECHO MV A VELOCITY: 0.73 M/S
ECHO MV AREA PHT: 5.5 CM2
ECHO MV E DECELERATION TIME (DT): 137.9 MS
ECHO MV E VELOCITY: 1.03 M/S
ECHO MV E/A RATIO: 1.41
ECHO MV E/E' LATERAL: 9.38
ECHO MV E/E' RATIO (AVERAGED): 10.19
ECHO MV E/E' SEPTAL: 10.99
ECHO MV PRESSURE HALF TIME (PHT): 40 MS
ECHO RV FREE WALL PEAK S': 16.4 CM/S
ECHO RV INTERNAL DIMENSION: 3.4 CM
ECHO RV TAPSE: 2.6 CM (ref 1.7–?)

## 2024-12-03 PROCEDURE — PBSHW PBB SHADOW CHARGE: Performed by: SPECIALIST

## 2024-12-03 PROCEDURE — C8929 TTE W OR WO FOL WCON,DOPPLER: HCPCS | Performed by: SPECIALIST

## 2024-12-03 PROCEDURE — 93306 TTE W/DOPPLER COMPLETE: CPT | Performed by: SPECIALIST

## 2024-12-03 RX ADMIN — PERFLUTREN 10 ML: 6.52 INJECTION, SUSPENSION INTRAVENOUS at 15:54

## 2024-12-05 ENCOUNTER — HOSPITAL ENCOUNTER (OUTPATIENT)
Facility: HOSPITAL | Age: 24
Setting detail: INFUSION SERIES
End: 2024-12-05

## 2024-12-09 ENCOUNTER — APPOINTMENT (OUTPATIENT)
Facility: HOSPITAL | Age: 24
End: 2024-12-09
Payer: MEDICARE

## 2024-12-16 ENCOUNTER — TELEPHONE (OUTPATIENT)
Facility: CLINIC | Age: 24
End: 2024-12-16

## 2024-12-16 ENCOUNTER — TELEPHONE (OUTPATIENT)
Age: 24
End: 2024-12-16

## 2024-12-16 ENCOUNTER — APPOINTMENT (OUTPATIENT)
Facility: HOSPITAL | Age: 24
End: 2024-12-16
Payer: MEDICARE

## 2024-12-16 NOTE — TELEPHONE ENCOUNTER
----- Message from Hannah MENDOZA sent at 12/16/2024 10:41 AM EST -----  Regarding: ECC Escalation To Practice  ECC Escalation To Practice      Type of Escalation: Acute Care Symptom  --------------------------------------------------------------------------------------------------------------------------    Information for Provider: JOANA Ambriz  Patient is looking for appointment for: Symptom   Reasons for Message: Unable to reach practice       Additional Information       Patient experiencing symptoms of fever, bad sore throat, cough and cold for couple of days   --------------------------------------------------------------------------------------------------------------------------    Relationship to Patient: Self     Call Back Info: OK to leave message on voicemail  Preferred Call Back Number: Phone   [phone]

## 2024-12-16 NOTE — TELEPHONE ENCOUNTER
Patient states that she would like a call back from the nurse, she says she has a few questions.      Phone [phone]

## 2024-12-17 ENCOUNTER — OFFICE VISIT (OUTPATIENT)
Age: 24
End: 2024-12-17

## 2024-12-17 VITALS
DIASTOLIC BLOOD PRESSURE: 85 MMHG | RESPIRATION RATE: 16 BRPM | SYSTOLIC BLOOD PRESSURE: 118 MMHG | HEIGHT: 63 IN | HEART RATE: 103 BPM | BODY MASS INDEX: 51.56 KG/M2 | TEMPERATURE: 98.4 F | WEIGHT: 291 LBS | OXYGEN SATURATION: 97 %

## 2024-12-17 DIAGNOSIS — R05.1 ACUTE COUGH: ICD-10-CM

## 2024-12-17 DIAGNOSIS — J45.31 MILD PERSISTENT ASTHMA WITH EXACERBATION: ICD-10-CM

## 2024-12-17 DIAGNOSIS — J06.9 VIRAL UPPER RESPIRATORY TRACT INFECTION: Primary | ICD-10-CM

## 2024-12-17 RX ORDER — IVERMECTIN 10 MG/G
CREAM TOPICAL
COMMUNITY
Start: 2024-11-26

## 2024-12-17 RX ORDER — GUAIFENESIN 200 MG/10ML
200 LIQUID ORAL 3 TIMES DAILY PRN
Qty: 210 ML | Refills: 0 | Status: SHIPPED | OUTPATIENT
Start: 2024-12-17 | End: 2024-12-24

## 2024-12-17 RX ORDER — KETOCONAZOLE 20 MG/G
CREAM TOPICAL
COMMUNITY

## 2024-12-17 RX ORDER — DICLOFENAC SODIUM 75 MG/1
75 TABLET, DELAYED RELEASE ORAL 2 TIMES DAILY
COMMUNITY
Start: 2024-11-06

## 2024-12-17 RX ORDER — LAMOTRIGINE 25 MG/1
TABLET ORAL
COMMUNITY
Start: 2024-12-06

## 2024-12-17 RX ORDER — PREDNISONE 5 MG/1
TABLET ORAL
Qty: 1 EACH | Refills: 0 | Status: SHIPPED | OUTPATIENT
Start: 2024-12-17

## 2024-12-17 RX ORDER — TRIAMCINOLONE ACETONIDE 1 MG/G
CREAM TOPICAL
COMMUNITY
Start: 2024-11-06

## 2024-12-17 RX ORDER — BETAMETHASONE DIPROPIONATE 0.5 MG/G
CREAM TOPICAL
COMMUNITY
Start: 2024-11-26

## 2024-12-17 NOTE — PROGRESS NOTES
Conchis Lazcano (:  2000) is a 24 y.o. female,Established patient, here for evaluation of the following chief complaint(s):  Cold Symptoms (Pt c/o sore throat, cough, congestion, sinus drainage, headache, body aches ongoing for three days. She's taken tylenol with little relief. Hx of asthma)      ASSESSMENT/PLAN:  Visit Diagnoses and Associated Orders       Mild persistent asthma with exacerbation    -  Primary    guaiFENesin (ROBITUSSIN) 100 MG/5ML liquid [65122]      predniSONE 5 MG (21) TBPK [089148]           Viral upper respiratory tract infection        guaiFENesin (ROBITUSSIN) 100 MG/5ML liquid [40738]           Acute cough        guaiFENesin (ROBITUSSIN) 100 MG/5ML liquid [32829]      predniSONE 5 MG (21) TBPK [409417]           ORDERS WITHOUT AN ASSOCIATED DIAGNOSIS    betamethasone dipropionate 0.05 % cream [1027]      diclofenac (VOLTAREN) 75 MG EC tablet [02536]      diclofenac sodium (VOLTAREN) 1 % GEL [072941]      Ivermectin 1 % CREA [697534]      ketoconazole (NIZORAL) 2 % cream [54636]      lamoTRIgine (LAMICTAL) 25 MG tablet [06562]      triamcinolone (KENALOG) 0.1 % cream [8113]               Continue using inhaler as needed. If SOB gets worse, please seek emergency services as discussed.   Take oral steroid as prescribed.  Take cough medication as prescribed.   Follow up in 3-5 days if symptoms persist or if symptoms worsen with your PCP.    SUBJECTIVE/OBJECTIVE:    History provided by:  Patient  Cold Symptoms          24 y.o. female presents with symptoms of   URI with cough, asthma exacerbation. She is using her inhaler routinely every 4-6 hours. Does  not have abnormal lung sounds at this time, but is taking shallow breaths. We discussed taking deep breaths in and out every hour at least 10 times to move air in and out of the lungs. Incentive spirometry would be helpful.  She is afebrile, mildly tachycardic most likely  due to frequent albuterol use, she is in no acute distress, and

## 2024-12-18 ENCOUNTER — HOSPITAL ENCOUNTER (OUTPATIENT)
Facility: HOSPITAL | Age: 24
Setting detail: INFUSION SERIES
End: 2024-12-18

## 2024-12-19 NOTE — TELEPHONE ENCOUNTER
R/t call to pt,    She stated that she may have figured it out on her own.  She thinks that it may be her O2 levels making her dizzy d/t asthma attack.    Monday she stated she felt like was going to fall down.      BP cuff--asked her to change batteries, bring to next OV so we can check it.  Monday 130/90 sitting 137/94  Supine 114/82 after deep breathing    H/a bt gets migraine infusion.  PT noticed bp wasn't staying stable also, sent MD bender msg.    Lymphapress working well.    Made sooner apt, Confirmed NOV (r/s).  Future Appointments   Date Time Provider Department [place]   12/23/2024  1:30 PM Wanda Saucedo, PT [facility]   12/30/2024  2:00 PM Ajay John, ELENA [facility]   1/9/2025 11:00 AM Lorelei Terry, APRN - [id number]   1/14/2025  3:40 PM Jan Pemberton MD [facility] AMB   2/27/2025  1:20 PM Darius Dyson Jr., MD [id number] AMB   3/21/2025  1:00 PM Snow Mazariegos MD RES [facility] AMB   9/4/2025 12:10 PM Jenny Marcus MD RDE [facility] BS AMB   10/10/2025 11:00 AM Maegan Walker, APRN - NP [facility] BS AMB

## 2024-12-23 ENCOUNTER — APPOINTMENT (OUTPATIENT)
Facility: HOSPITAL | Age: 24
End: 2024-12-23
Payer: MEDICARE

## 2024-12-30 ENCOUNTER — HOSPITAL ENCOUNTER (OUTPATIENT)
Facility: HOSPITAL | Age: 24
Setting detail: RECURRING SERIES
End: 2024-12-30
Payer: MEDICARE

## 2025-01-14 ENCOUNTER — OFFICE VISIT (OUTPATIENT)
Age: 25
End: 2025-01-14
Payer: MEDICARE

## 2025-01-14 VITALS
OXYGEN SATURATION: 99 % | WEIGHT: 292 LBS | HEIGHT: 63 IN | HEART RATE: 96 BPM | SYSTOLIC BLOOD PRESSURE: 128 MMHG | BODY MASS INDEX: 51.74 KG/M2 | DIASTOLIC BLOOD PRESSURE: 82 MMHG

## 2025-01-14 DIAGNOSIS — R00.2 PALPITATIONS: Primary | ICD-10-CM

## 2025-01-14 DIAGNOSIS — R93.1 ABNORMAL ECHOCARDIOGRAM: ICD-10-CM

## 2025-01-14 DIAGNOSIS — I51.9 LEFT VENTRICULAR DYSFUNCTION: ICD-10-CM

## 2025-01-14 DIAGNOSIS — M32.9 SYSTEMIC LUPUS ERYTHEMATOSUS, UNSPECIFIED SLE TYPE, UNSPECIFIED ORGAN INVOLVEMENT STATUS (HCC): ICD-10-CM

## 2025-01-14 DIAGNOSIS — I42.9 CARDIOMYOPATHY, UNSPECIFIED TYPE (HCC): ICD-10-CM

## 2025-01-14 DIAGNOSIS — G90.A POTS (POSTURAL ORTHOSTATIC TACHYCARDIA SYNDROME): ICD-10-CM

## 2025-01-14 DIAGNOSIS — I89.0 LYMPHEDEMA: ICD-10-CM

## 2025-01-14 PROCEDURE — G8417 CALC BMI ABV UP PARAM F/U: HCPCS | Performed by: SPECIALIST

## 2025-01-14 PROCEDURE — G8427 DOCREV CUR MEDS BY ELIG CLIN: HCPCS | Performed by: SPECIALIST

## 2025-01-14 PROCEDURE — 1036F TOBACCO NON-USER: CPT | Performed by: SPECIALIST

## 2025-01-14 PROCEDURE — 99214 OFFICE O/P EST MOD 30 MIN: CPT | Performed by: SPECIALIST

## 2025-01-14 RX ORDER — GABAPENTIN 100 MG/1
100 CAPSULE ORAL 2 TIMES DAILY
COMMUNITY

## 2025-01-14 NOTE — PROGRESS NOTES
Chief Complaint   Patient presents with    POTS    Hypertension    Tachycardia     Vitals:    01/14/25 1536   BP: 128/82   Site: Left Lower Arm   Position: Sitting   Pulse: 96   SpO2: 99%   Weight: 132.5 kg (292 lb)   Height: 1.6 m (5' 3\")         Chest pain: DENIED     Recent hospital stays: DENIED     Refills: DENIED   
the first 10 mins of tilt table testing, which can be seen in postural orthostatic tachycardia syndrome (POTS).    Baseline high blood pressure ( to 160)    Echo 12/3/24 - LVEF 60%      EKG 4/13/22 ([facility]) - sinus tach,  bpm, normal study   EKG 12/5/23 - sinus tach, normal   EKG 8/7/24 - sinus, non-specific ST abn       ASSESSMENT AND PLAN:     Assessment and Plan:    1) Possible POTS    - she has long history of sinus tachycardia and has seen Dr. Gupta    - TSH and serum metanaphrines normal    - She did not tolerate metoprolol or bystolic (felt funny in head)    - She felt cardizem makes her shaky    - She also had stopped bystolic and ivabradine due to concerns of shaking (which it seems was from Wellbutrin)    - Holter 2/10/23 - 1 day - NSR, Avg  bpm, (), Tachy 52% of the time.   - Possible POTS - Tilt Table Test 12/4/23 - There is an exaggerated sustained increase in heart rate (greater than 30 beats/min and exceeding 120 beats/min) without accompanying hypotension within the first 10 mins of tilt table testing, which can be seen in postural orthostatic tachycardia syndrome (POTS).    Baseline high blood pressure ( to 160)  - She is seeing PT for exercise training for possible POTS. Use spandax if possible (has not tolerated hose before)   - on 6/4/24 - feeling ok today - she had a recent trip to ER with vomiting and required IVF. We discussed increasing hydration and addition of electrolytes. Continue with conservative measures. She asked about weekly IVF if no improvement   - On 8/7/24 - she is doing better past few weeks -- continue plans above.   Follow BP at home.   - ON 1/14/25 - takes in 8-12 tabs Vitassium tablets per day.  PT is on hold for now but to resume.   Will continue plans as above.  Her BP is mildly high at baseline so will hold off on adding florinef.   --> Refer her to Dr. Miller at [facility] (she is on the wait loss)     2) LVEF depressed on prior echo 5/22 at 
[FreeTextEntry1] : 3/8/23\par Plan - culture obtained\par santyl renewed to pharmacy\par tramadol dose adjusted to pharmacy for pain\par shower wound, santyl/gauze/foam/john, offload area\par supplies ordered\par follow up 1 week\par follow up vascular surgery regarding LLE\par Patient scheduled for vascular testing today\par patient not wearing offloading shoe as per family\par Offloading recommended\par patient scheduled for vascular testing.\par \par 4/12/23\par -Educated about benefits of HBO- pt states he will hold off for now\par -TWO boot ordered for patient \par -Plan: Dress wound with Santyl, wet to dry with vasjohn euceda\par -Off-loading\par -RTO in 1 week

## 2025-01-14 NOTE — PATIENT INSTRUCTIONS
Patient Education        Learning About the Mediterranean Diet  What is the Mediterranean diet?     The Mediterranean diet is a style of eating rather than a diet plan. It features foods eaten in [place], [place], [place] and [place], and other countries along the [place] Sea. It emphasizes eating foods like fish, fruits, vegetables, beans, high-fiber breads and whole grains, nuts, and olive oil. This style of eating includes limited red meat, cheese, and sweets.  Why choose the Mediterranean diet?  A Mediterranean-style diet may improve heart health. It contains more fat than other heart-healthy diets. But the fats are mainly from nuts, unsaturated oils (such as fish oils and olive oil), and certain nut or seed oils (such as canola, soybean, or flaxseed oil). These fats may help protect the heart and blood vessels.  How can you get started on the Mediterranean diet?  Here are some things you can do to switch to a more Mediterranean way of eating.  What to eat  Eat a variety of fruits and vegetables each day, such as grapes, blueberries, tomatoes, broccoli, peppers, figs, olives, spinach, eggplant, beans, lentils, and chickpeas.  Eat a variety of whole-grain foods each day, such as oats, brown rice, and whole wheat bread, pasta, and couscous.  Eat fish at least 2 times a week. Try tuna, salmon, mackerel, lake trout, herring, or sardines.  Eat moderate amounts of low-fat dairy products, such as milk, cheese, or yogurt.  Eat moderate amounts of poultry and eggs.  Choose healthy (unsaturated) fats, such as nuts, olive oil, and certain nut or seed oils like canola, soybean, and flaxseed.  Limit unhealthy (saturated) fats, such as butter, palm oil, and coconut oil. And limit fats found in animal products, such as meat and dairy products made with whole milk. Try to eat red meat only a few times a month in very small amounts.  Limit sweets and desserts to only a few times a week. This includes sugar-sweetened

## 2025-01-27 ENCOUNTER — HOSPITAL ENCOUNTER (OUTPATIENT)
Facility: HOSPITAL | Age: 25
Setting detail: RECURRING SERIES
Discharge: HOME | End: 2025-01-30
Payer: MEDICARE

## 2025-01-27 PROCEDURE — 97110 THERAPEUTIC EXERCISES: CPT | Performed by: PHYSICAL THERAPIST

## 2025-01-27 PROCEDURE — 97535 SELF CARE MNGMENT TRAINING: CPT | Performed by: PHYSICAL THERAPIST

## 2025-01-27 NOTE — PROGRESS NOTES
PHYSICAL THERAPY - MEDICARE DAILY TREATMENT NOTE (updated 3/23)        Date: 10/8/2024            Patient Name:  Conchis Lazcano :  2000   Medical   Diagnosis:  Other low back pain [M54.59] Treatment Diagnosis:  M54.59 OTHER LOWER BACK PAIN    Referral Source:  Jan Pemberton MD Insurance:   Payor: [facility] MEDICARE / Plan: [employer] DUAL COMPLETE / Product Type: *No Product type* /                           Patient  verified yes     Visit #   Current  / Total 24 36 per updated POC   Time   In / Out 320 PM  400 PM   Total Treatment Time 40   Total Timed Codes 40   1:1 Treatment Time 40      [facility] Totals Reminder:  bill using total billable   min of TIMED therapeutic procedures and modalities.   8-22 min = 1 unit; 23-37 min = 2 units; 38-52 min = 3 units; 53-67 min = 4 units; 68-82 min = 5 units              SUBJECTIVE     Pain Level (0-10 scale): 6/10 foot pain      Any medication changes, allergies to medications, adverse drug reactions, diagnosis change, or new procedure performed?: [x] No    [] Yes (see summary sheet for update)  Medications: Verified on Patient Summary List     Subjective functional status/changes:     Patient reports she got sick in December and is feeling very week   Her MD has prescribed PT for the foot/ ankle  Pt got a cortisone injection which helped some with the pain    OBJECTIVE        Therapeutic Procedures:  Tx Min Billable or 1:1 Min (if diff from Tx Min) Procedure, Rationale, Specifics   10    26343 Self Care/Home Management (timed):  improve patient knowledge and understanding of home injury/symptom/pain management, positioning, posture/ergonomics, home safety, activity modification, transfer techniques, and joint protection strategies  to improve patient's ability to progress to PLOF and address remaining functional goals.  (see flow sheet as applicable)    Details:        Details if applicable:      40   66672 Therapeutic Exercise (timed):  increase ROM,

## 2025-01-27 NOTE — PROGRESS NOTES
Physical Therapy at [place],   a part of [place] [address], [address]  [place] [address]  Phone: [phone]  Fax: [phone]  PHYSICAL THERAPY PROGRESS NOTE AND UPDATED POC  Patient Name:  Conchis Lazcano :  2000   Treatment/Medical Diagnosis: Other low back pain [M54.59]   Referral Source:  Jan Pemberton MD     Date of Initial Visit:  24 Attended Visits:  24 Missed Visits:  3     SUMMARY OF TREATMENT/ASSESSMENT:  Patient returns after a 2 month gap in care due to respiratory illness.  She reports she has been severely deconditioned due to her sickness and her balance has worsened.  She continues to be limited by R ankle instability and pain.  She would benefit from continued PT to build strength, maintain flexibility, improve activity tolerance, and restore balance to reduce risk of falls and improve QOL.    Short Term Goals: To be accomplished in 8 treatments.  1) The patient will be independent with introductory HEP MET  2) The patient will improve lumbar flexion AROM to reaching 12 inches from floor to improve ease with picking up items off the floor MET  3) The patient will transfer sit <> stand without an increase in dizziness MET  Long Term Goals: To be accomplished in 24 treatments.  UPDATE TO 48 VISITS:  1) The patient will demonstrate 5/5 BLE strength to improve ease with household chores  2) The patient will improve FOTO survey to a 58% or greater to indicate a significant improvement in function  3) The patient will ambulate 20 minutes without a significant increase in pain to improve ease running errands         RECOMMENDATIONS FOR SKILLED THERAPY  Continue PT 1-2x/week for up to 24 additional treatments         Wanda Saucedo, PT       2025       3:41 PM    If you have any questions/comments please contact us directly at [phone].   Thank you for allowing us to assist in the care of your patient.

## 2025-01-29 ENCOUNTER — APPOINTMENT (OUTPATIENT)
Facility: HOSPITAL | Age: 25
End: 2025-01-29
Payer: MEDICARE

## 2025-01-29 ENCOUNTER — HOSPITAL ENCOUNTER (EMERGENCY)
Facility: HOSPITAL | Age: 25
Discharge: HOME | End: 2025-01-29
Attending: EMERGENCY MEDICINE
Payer: MEDICARE

## 2025-01-29 ENCOUNTER — PATIENT MESSAGE (OUTPATIENT)
Age: 25
End: 2025-01-29

## 2025-01-29 VITALS
DIASTOLIC BLOOD PRESSURE: 98 MMHG | BODY MASS INDEX: 51.91 KG/M2 | TEMPERATURE: 98.6 F | HEIGHT: 63 IN | OXYGEN SATURATION: 97 % | HEART RATE: 95 BPM | WEIGHT: 293 LBS | RESPIRATION RATE: 18 BRPM | SYSTOLIC BLOOD PRESSURE: 144 MMHG

## 2025-01-29 DIAGNOSIS — R05.1 ACUTE COUGH: Primary | ICD-10-CM

## 2025-01-29 LAB
FLUAV RNA SPEC QL NAA+PROBE: NOT DETECTED
FLUBV RNA SPEC QL NAA+PROBE: NOT DETECTED
SARS-COV-2 RNA RESP QL NAA+PROBE: NOT DETECTED
SOURCE: NORMAL

## 2025-01-29 PROCEDURE — 87636 SARSCOV2 & INF A&B AMP PRB: CPT

## 2025-01-29 PROCEDURE — 6360000002 HC RX W HCPCS

## 2025-01-29 PROCEDURE — 99284 EMERGENCY DEPT VISIT MOD MDM: CPT

## 2025-01-29 PROCEDURE — 71046 X-RAY EXAM CHEST 2 VIEWS: CPT

## 2025-01-29 RX ORDER — DEXAMETHASONE SODIUM PHOSPHATE 10 MG/ML
10 INJECTION, SOLUTION INTRAMUSCULAR; INTRAVENOUS ONCE
Status: COMPLETED | OUTPATIENT
Start: 2025-01-29 | End: 2025-01-29

## 2025-01-29 RX ORDER — ONDANSETRON 4 MG/1
4 TABLET, ORALLY DISINTEGRATING ORAL 3 TIMES DAILY PRN
Qty: 21 TABLET | Refills: 0 | Status: SHIPPED | OUTPATIENT
Start: 2025-01-29

## 2025-01-29 RX ADMIN — DEXAMETHASONE SODIUM PHOSPHATE 10 MG: 10 INJECTION, SOLUTION INTRAMUSCULAR; INTRAVENOUS at 19:09

## 2025-01-29 NOTE — ED PROVIDER NOTES
[place] EMERGENCY DEPARTMENT  EMERGENCY DEPARTMENT ENCOUNTER      Pt Name: Conchis Lazcano  MRN: [id number]  Birthdate 2000  Date of evaluation: 1/29/2025  Provider: Duncan Saucedo PA-C    CHIEF COMPLAINT       Chief Complaint   Patient presents with    flu like symptoms    asthma exacerbation         HISTORY OF PRESENT ILLNESS   (Location/Symptom, Timing/Onset, Context/Setting, Quality, Duration, Modifying Factors, Severity)  Note limiting factors.   Conchis Lazcano is a 24 y.o. female with a past medical history of FIORDALIZA, asthma, POTS and type 2 diabetes who presents to the emergency department cough, congestion and bodyaches for the past 3 days.  Denies any sick contacts.  Patient reports subjective fevers but has not taken her temperature.  Denies any nausea or vomiting however she does note a decreased appetite and feels that she is dehydrated.  She was able to drink a full bottle of Gatorade here in the emergency department.  She notes of shortness of breath however it improved after giving herself an albuterol treatment prior to arrival.  Denies any chest pain, abdominal pain, dysuria or hematuria.    The history is provided by the patient.         Review of External Medical Records:     Nursing Notes were reviewed.    REVIEW OF SYSTEMS    (2-9 systems for level 4, 10 or more for level 5)     Review of Systems    Except as noted above the remainder of the review of systems was reviewed and negative.       PAST MEDICAL HISTORY     Past Medical History:   Diagnosis Date    Abdominal migraine     Dr. David Rodriguez.  vs Sucrose Intolerance.    Acid reflux     ADHD (attention deficit hyperactivity disorder)     Asthma     Autism     High Functioning.  Dr. Ndiaye.    Autoimmune disorder (HCC) 2020    Biliary dyskinesia 6/4/2019    Chronic back pain     Chronic cholecystitis 6/4/2019    Chronic rhinitis 2021    Dr. Alonso Wrad, allergist.     Connective tissue disease (HCC)     Developmental delay

## 2025-01-29 NOTE — ED TRIAGE NOTES
Pt arrives with cc flu like symptoms that began Monday and asthma attack. Reports cough, congestion, fever. Pt had neb tx and inhlaer PTA, endorses relief.   Last dose of tylenol at 1500.     Patient arrives to ED ambulatory w/o difficulty. No acute distress noted in triage. A&O x 4. Skin is warm, dry & intact on obs.

## 2025-01-30 NOTE — TELEPHONE ENCOUNTER
Per Dr. Jan Pemberton: \"  If she feels bad, she can call the on-call home ED service for possible IV infusion by them (not sure they do that) or head to ED where they can give IVF on emergency basis.    Encourage oral electrolyte replacement solutions / pills.   \"

## 2025-01-30 NOTE — DISCHARGE INSTRUCTIONS
You were seen today for upper respiratory virus symptoms. Your symptoms appear to be due to a viral illness.  If imaging and lab work were done, these are reassuring enough to go home at this time. Viral illnesses are treated with supportive care, including increasing your fluid intake to 8-10 large glasses of water a day, over the counter fever and pain reducers, and rest. Take all other medications as prescribed and directed. If you smoke/ vape, please cut back on usage and try to stop as these can lengthen the time of your symptoms and possibly worsen them as well.     To limit the spread of your symptoms to others you should wash your hands frequently and keep surfaces in your home clean.  You condition should improve over the next 5 days with the care discussed. You should return to the ER- if you experience increased fevers that do not improve with use of Tylenol and Motrin (as directed),  increased shortness of breath, chest pain, changes in vision such as blurry vision, neck stiffness, confusion, or other worsening or worrisome concerns. You should follow up with your primary care physician this week for further evaluation. Call for an appointment today.

## 2025-02-03 ENCOUNTER — HOSPITAL ENCOUNTER (EMERGENCY)
Facility: HOSPITAL | Age: 25
Discharge: HOME | End: 2025-02-03
Attending: EMERGENCY MEDICINE
Payer: MEDICARE

## 2025-02-03 ENCOUNTER — APPOINTMENT (OUTPATIENT)
Facility: HOSPITAL | Age: 25
End: 2025-02-03
Payer: MEDICARE

## 2025-02-03 VITALS
OXYGEN SATURATION: 100 % | DIASTOLIC BLOOD PRESSURE: 84 MMHG | HEART RATE: 87 BPM | SYSTOLIC BLOOD PRESSURE: 127 MMHG | TEMPERATURE: 98.1 F | RESPIRATION RATE: 18 BRPM

## 2025-02-03 DIAGNOSIS — R06.02 SHORTNESS OF BREATH: Primary | ICD-10-CM

## 2025-02-03 DIAGNOSIS — G90.A POTS (POSTURAL ORTHOSTATIC TACHYCARDIA SYNDROME): ICD-10-CM

## 2025-02-03 LAB
ALBUMIN SERPL-MCNC: 3.7 G/DL (ref 3.5–5)
ALBUMIN/GLOB SERPL: 0.9 (ref 1.1–2.2)
ALP SERPL-CCNC: 74 U/L (ref 45–117)
ALT SERPL-CCNC: 25 U/L (ref 12–78)
ANION GAP SERPL CALC-SCNC: 6 MMOL/L (ref 2–12)
AST SERPL-CCNC: 33 U/L (ref 15–37)
BASOPHILS # BLD: 0.06 K/UL (ref 0–0.1)
BASOPHILS NFR BLD: 0.6 % (ref 0–1)
BILIRUB SERPL-MCNC: 1 MG/DL (ref 0.2–1)
BUN SERPL-MCNC: 13 MG/DL (ref 6–20)
BUN/CREAT SERPL: 13 (ref 12–20)
CALCIUM SERPL-MCNC: 9.8 MG/DL (ref 8.5–10.1)
CHLORIDE SERPL-SCNC: 107 MMOL/L (ref 97–108)
CO2 SERPL-SCNC: 25 MMOL/L (ref 21–32)
COMMENT:: NORMAL
CREAT SERPL-MCNC: 0.98 MG/DL (ref 0.55–1.02)
DIFFERENTIAL METHOD BLD: ABNORMAL
EKG ATRIAL RATE: 121 BPM
EKG DIAGNOSIS: NORMAL
EKG P AXIS: 61 DEGREES
EKG P-R INTERVAL: 132 MS
EKG Q-T INTERVAL: 300 MS
EKG QRS DURATION: 84 MS
EKG QTC CALCULATION (BAZETT): 426 MS
EKG R AXIS: 45 DEGREES
EKG T AXIS: 76 DEGREES
EKG VENTRICULAR RATE: 121 BPM
EOSINOPHIL # BLD: 0.09 K/UL (ref 0–0.4)
EOSINOPHIL NFR BLD: 0.9 % (ref 0–7)
ERYTHROCYTE [DISTWIDTH] IN BLOOD BY AUTOMATED COUNT: 12.5 % (ref 11.5–14.5)
GLOBULIN SER CALC-MCNC: 4 G/DL (ref 2–4)
GLUCOSE SERPL-MCNC: 101 MG/DL (ref 65–100)
HCT VFR BLD AUTO: 45 % (ref 35–47)
HGB BLD-MCNC: 15.1 G/DL (ref 11.5–16)
IMM GRANULOCYTES # BLD AUTO: 0.06 K/UL (ref 0–0.04)
IMM GRANULOCYTES NFR BLD AUTO: 0.6 % (ref 0–0.5)
LYMPHOCYTES # BLD: 1.95 K/UL (ref 0.8–3.5)
LYMPHOCYTES NFR BLD: 19.4 % (ref 12–49)
MCH RBC QN AUTO: 29.9 PG (ref 26–34)
MCHC RBC AUTO-ENTMCNC: 33.6 G/DL (ref 30–36.5)
MCV RBC AUTO: 89.1 FL (ref 80–99)
MONOCYTES # BLD: 0.87 K/UL (ref 0–1)
MONOCYTES NFR BLD: 8.7 % (ref 5–13)
NEUTS SEG # BLD: 7 K/UL (ref 1.8–8)
NEUTS SEG NFR BLD: 69.8 % (ref 32–75)
NRBC # BLD: 0 K/UL (ref 0–0.01)
NRBC BLD-RTO: 0 PER 100 WBC
NT PRO BNP: 16 PG/ML
PLATELET # BLD AUTO: 360 K/UL (ref 150–400)
PMV BLD AUTO: 8.9 FL (ref 8.9–12.9)
POTASSIUM SERPL-SCNC: 4 MMOL/L (ref 3.5–5.1)
PROT SERPL-MCNC: 7.7 G/DL (ref 6.4–8.2)
RBC # BLD AUTO: 5.05 M/UL (ref 3.8–5.2)
SODIUM SERPL-SCNC: 138 MMOL/L (ref 136–145)
SPECIMEN HOLD: NORMAL
TROPONIN I SERPL HS-MCNC: <4 NG/L (ref 0–51)
WBC # BLD AUTO: 10 K/UL (ref 3.6–11)

## 2025-02-03 PROCEDURE — 80053 COMPREHEN METABOLIC PANEL: CPT

## 2025-02-03 PROCEDURE — 2580000003 HC RX 258: Performed by: STUDENT IN AN ORGANIZED HEALTH CARE EDUCATION/TRAINING PROGRAM

## 2025-02-03 PROCEDURE — 71275 CT ANGIOGRAPHY CHEST: CPT

## 2025-02-03 PROCEDURE — 6360000004 HC RX CONTRAST MEDICATION: Performed by: STUDENT IN AN ORGANIZED HEALTH CARE EDUCATION/TRAINING PROGRAM

## 2025-02-03 PROCEDURE — 93005 ELECTROCARDIOGRAM TRACING: CPT | Performed by: EMERGENCY MEDICINE

## 2025-02-03 PROCEDURE — 85025 COMPLETE CBC W/AUTO DIFF WBC: CPT

## 2025-02-03 PROCEDURE — 83880 ASSAY OF NATRIURETIC PEPTIDE: CPT

## 2025-02-03 PROCEDURE — 36415 COLL VENOUS BLD VENIPUNCTURE: CPT

## 2025-02-03 PROCEDURE — 99285 EMERGENCY DEPT VISIT HI MDM: CPT

## 2025-02-03 PROCEDURE — 93010 ELECTROCARDIOGRAM REPORT: CPT | Performed by: INTERNAL MEDICINE

## 2025-02-03 PROCEDURE — 84484 ASSAY OF TROPONIN QUANT: CPT

## 2025-02-03 RX ORDER — IOPAMIDOL 755 MG/ML
100 INJECTION, SOLUTION INTRAVASCULAR
Status: COMPLETED | OUTPATIENT
Start: 2025-02-03 | End: 2025-02-03

## 2025-02-03 RX ORDER — 0.9 % SODIUM CHLORIDE 0.9 %
1000 INTRAVENOUS SOLUTION INTRAVENOUS ONCE
Status: COMPLETED | OUTPATIENT
Start: 2025-02-03 | End: 2025-02-03

## 2025-02-03 RX ADMIN — SODIUM CHLORIDE 1000 ML: 9 INJECTION, SOLUTION INTRAVENOUS at 15:36

## 2025-02-03 RX ADMIN — IOPAMIDOL 100 ML: 755 INJECTION, SOLUTION INTRAVENOUS at 16:53

## 2025-02-03 ASSESSMENT — ENCOUNTER SYMPTOMS
CHEST TIGHTNESS: 1
SHORTNESS OF BREATH: 1

## 2025-02-03 ASSESSMENT — PAIN SCALES - GENERAL: PAINLEVEL_OUTOF10: 7

## 2025-02-03 ASSESSMENT — PAIN DESCRIPTION - LOCATION: LOCATION: CHEST

## 2025-02-03 ASSESSMENT — PAIN DESCRIPTION - DESCRIPTORS: DESCRIPTORS: HEAVINESS

## 2025-02-03 ASSESSMENT — PAIN - FUNCTIONAL ASSESSMENT: PAIN_FUNCTIONAL_ASSESSMENT: 0-10

## 2025-02-03 ASSESSMENT — PAIN DESCRIPTION - ORIENTATION: ORIENTATION: MID

## 2025-02-03 NOTE — ED PROVIDER NOTES
[facility] EMERGENCY DEPARTMENT  EMERGENCY DEPARTMENT ENCOUNTER      Pt Name: Conchis Lazcano  MRN: [id number]  Birthdate 2000  Date of evaluation: 2/3/2025  Provider: Dana Byrd PA-C    CHIEF COMPLAINT       Chief Complaint   Patient presents with    Shortness of Breath         HISTORY OF PRESENT ILLNESS    HPI  24-year-old female with history of anxiety, GERD, autism, POTS, obesity, asthma who presents today with for complaints of shortness of breath, progressively worsening for the last 4 months  She was sent in by pulmonologist  today.  Mother states she has been on and off steroids for 4 months.  Mother states child is not able to walk up steps or shortness of breath is so severe.  Dyspnea is unresponsive to steroids and albuterol.    Nursing Notes were reviewed.      REVIEW OF SYSTEMS       Review of Systems   Respiratory:  Positive for chest tightness and shortness of breath.        Except as noted above the remainder of the review of systems was reviewed and negative.       PAST MEDICAL HISTORY     Past Medical History:   Diagnosis Date    Abdominal migraine     Dr. David Rodriguez.  vs Sucrose Intolerance.    Acid reflux     ADHD (attention deficit hyperactivity disorder)     Asthma     Autism     High Functioning.  Dr. Ndiaye.    Autoimmune disorder (HCC) 2020    Biliary dyskinesia 6/4/2019    Chronic back pain     Chronic cholecystitis 6/4/2019    Chronic rhinitis 2021    Dr. Alonso Ward, allergist.     Connective tissue disease (HCC)     Developmental delay     Fibromyalgia     Dr. Byrd    FIORDALIZA (generalized anxiety disorder)     Dr. Spring Marion    Hard of hearing     pt states due to fluid in ears x 2 years    HPV vaccine counseling     completed series    Insulin resistance 09/08/2021    Dr. Michelle Piper, endo    Irritable bowel syndrome with diarrhea 04/13/2017    Liver disease 12/2023    Lupus     Lymphedema 2021    BL legs.  Dr. Sukhwinder Ochoa.  Heri Gonzalez, Lymphedema

## 2025-02-03 NOTE — ED PROVIDER NOTES
ED SIGN OUT NOTE  Care assumed at [facility] 6:08 PM EST    Patient was signed out to me by Dana Byrd PA-C.    Patient is awaiting labs and CTA chest w/wo contrast to r/o PE..    /84   Pulse 87   Temp 98.1 °F (36.7 °C) (Oral)   Resp 18   SpO2 100%     Labs Reviewed   COMPREHENSIVE METABOLIC PANEL - Abnormal; Notable for the following components:       Result Value    Glucose 101 (*)     Albumin/Globulin Ratio 0.9 (*)     All other components within normal limits   CBC WITH AUTO DIFFERENTIAL - Abnormal; Notable for the following components:    Immature Granulocytes % 0.6 (*)     Immature Granulocytes Absolute 0.06 (*)     All other components within normal limits   TROPONIN   BRAIN NATRIURETIC PEPTIDE   EXTRA TUBES HOLD     CTA CHEST W WO CONTRAST   Final Result   No evidence of pulmonary embolism within limits of examination as detailed   above.            Electronically signed by Satya Soriano               Diagnosis:   1. Shortness of breath    2. POTS (postural orthostatic tachycardia syndrome)        Disposition:   Decision To Discharge 02/03/2025 06:09:44 PM    Plan:   Patient's workup is unremarkable.  Labs do not show any acute abnormalities and CTA chest is negative for PE.  Patient's heart rate improved while in the ED.  Discussed results with patient and mother as well as plan for follow-up with cardiology given her symptoms are likely due to POTS. Attempted to discharge patient given reassuring workup but mother said she was told that patient warrants admission for IV antibiotics and high-dose steroids.  I consulted [facility] and spoke with Dr. Espinoza regarding patient case.  He reviewed patient's chart and her workup today and feels that she is appropriate for discharge home and she can follow-up with her provider in 2 weeks for reevaluation.  He also recommended follow-up with cardiology.  Discussed plan for discharge home with patient and mother.  Strict return

## 2025-02-03 NOTE — ED TRIAGE NOTES
Pt arrives to ED via POV ambulatory stating went to Pulmonology due to being sick x 1 week. Pt reports Pulmonology sent her to ER for further evaluation and because they were unable to obtain a BP. Hx asthma and POTS. Cardiologist is Dr. Pemberton.

## 2025-02-03 NOTE — DISCHARGE INSTRUCTIONS
Your workup today was reassuring.  Recommend close follow-up with your cardiologist as well as your primary care physician.  If you develop new or worsening symptoms return to ER.

## 2025-02-04 ENCOUNTER — TELEPHONE (OUTPATIENT)
Age: 25
End: 2025-02-04

## 2025-02-05 ENCOUNTER — HOSPITAL ENCOUNTER (OUTPATIENT)
Facility: HOSPITAL | Age: 25
Setting detail: RECURRING SERIES
End: 2025-02-05
Payer: MEDICARE

## 2025-02-07 ENCOUNTER — OFFICE VISIT (OUTPATIENT)
Age: 25
End: 2025-02-07
Payer: MEDICARE

## 2025-02-07 VITALS
SYSTOLIC BLOOD PRESSURE: 117 MMHG | HEIGHT: 63 IN | OXYGEN SATURATION: 98 % | HEART RATE: 105 BPM | BODY MASS INDEX: 50.68 KG/M2 | WEIGHT: 286 LBS | DIASTOLIC BLOOD PRESSURE: 85 MMHG

## 2025-02-07 DIAGNOSIS — R60.0 EDEMA, PERIPHERAL: ICD-10-CM

## 2025-02-07 DIAGNOSIS — G90.A POTS (POSTURAL ORTHOSTATIC TACHYCARDIA SYNDROME): ICD-10-CM

## 2025-02-07 DIAGNOSIS — R00.2 PALPITATIONS: Primary | ICD-10-CM

## 2025-02-07 PROCEDURE — 1036F TOBACCO NON-USER: CPT | Performed by: SPECIALIST

## 2025-02-07 PROCEDURE — G8417 CALC BMI ABV UP PARAM F/U: HCPCS | Performed by: SPECIALIST

## 2025-02-07 PROCEDURE — G8427 DOCREV CUR MEDS BY ELIG CLIN: HCPCS | Performed by: SPECIALIST

## 2025-02-07 PROCEDURE — 99214 OFFICE O/P EST MOD 30 MIN: CPT | Performed by: SPECIALIST

## 2025-02-07 RX ORDER — FLUDROCORTISONE ACETATE 0.1 MG/1
0.05 TABLET ORAL
Qty: 15 TABLET | Refills: 3 | Status: SHIPPED | OUTPATIENT
Start: 2025-02-07

## 2025-02-07 RX ORDER — POTASSIUM CHLORIDE 750 MG/1
10 TABLET, EXTENDED RELEASE ORAL DAILY
Qty: 90 TABLET | Refills: 3 | Status: SHIPPED | OUTPATIENT
Start: 2025-02-07

## 2025-02-07 NOTE — PROGRESS NOTES
Chief Complaint   Patient presents with    Follow-Up from Hospital     POTS     Vitals:    02/07/25 1305   Height: 1.6 m (5' 3\")   SOB increased over past couple of weeks    Chest pain Yes, feels a heavyness    ER, urgent care, or hospitalized outside of [facility] since your last visit?  NO     Refills NO   
02/03/2025    AST 33 02/03/2025    ALT 25 02/03/2025    LABGLOM 82 02/03/2025    GFRAA >60 04/05/2022    AGRATIO 1.8 05/30/2023    GLOB 4.0 02/03/2025         Lab Results   Component Value Date    WBC 10.0 02/03/2025    HGB 15.1 02/03/2025    HCT 45.0 02/03/2025    MCV 89.1 02/03/2025     02/03/2025     Lab Results   Component Value Date    CHOL 196 09/20/2024    TRIG 130 09/20/2024    HDL 45 09/20/2024    VLDL 26 09/20/2024    CHOLHDLRATIO 4.4 09/20/2024             CARDIAC DIAGNOSTICS:     Cardiac Evaluation Includes:  I reviewed the test results below.     Echo 5/26/22 ([facility]) - LVEF 45%   Echo 1/24/23 - TDS.  LVEF 56%, normal study         Holter 2/10/23 - 1 day - NSR, Avg  bpm, (), Tachy 52% of the time.    ---> HR still fast most of the time-        Tilt Table Test 12/4/23 - There is an exaggerated sustained increase in heart rate (greater than 30 beats/min and exceeding 120 beats/min) without accompanying hypotension within the first 10 mins of tilt table testing, which can be seen in postural orthostatic tachycardia syndrome (POTS).    Baseline high blood pressure ( to 160)    Echo 12/3/24 - LVEF 60%    CXR 1/29/25 - normal   Chest CTA 2/3/25 - no PE       EKG 4/13/22 ([facility]) - sinus tach,  bpm, normal study   EKG 12/5/23 - sinus tach, normal   EKG 8/7/24 - sinus, non-specific ST abn   EKG 2/3/25 - sinus tach,  bpm       ASSESSMENT AND PLAN:     Assessment and Plan:    1) Possible POTS    - she has long history of sinus tachycardia and has seen Dr. Gupta    - TSH and serum metanaphrines normal    - She did not tolerate metoprolol or bystolic (felt funny in head)    - She felt cardizem makes her shaky    - She also had stopped bystolic and ivabradine due to concerns of shaking (which it seems was from Wellbutrin)    - Holter 2/10/23 - 1 day - NSR, Avg  bpm, (), Tachy 52% of the time.  - Tilt Table Test 12/4/23 was suggestive of POTS.  Baseline high blood pressure

## 2025-02-10 ENCOUNTER — HOSPITAL ENCOUNTER (OUTPATIENT)
Facility: HOSPITAL | Age: 25
Setting detail: RECURRING SERIES
Discharge: HOME | End: 2025-02-13
Payer: MEDICARE

## 2025-02-10 PROCEDURE — 97110 THERAPEUTIC EXERCISES: CPT

## 2025-02-10 PROCEDURE — 97535 SELF CARE MNGMENT TRAINING: CPT

## 2025-02-10 NOTE — PROGRESS NOTES
PHYSICAL THERAPY - MEDICARE DAILY TREATMENT NOTE (updated 3/23)        Date: 10/8/2024            Patient Name:  Conchis Lazcano :  2000   Medical   Diagnosis:  Other low back pain [M54.59] Treatment Diagnosis:  M54.59 OTHER LOWER BACK PAIN    Referral Source:  Jan Pemberton MD Insurance:   Payor: [facility] MEDICARE / Plan: [employer] DUAL COMPLETE / Product Type: *No Product type* /                           Patient  verified yes     Visit #   Current  / Total 25 36 per updated POC   Time   In / Out 210 PM  245 PM   Total Treatment Time 35   Total Timed Codes 35   1:1 Treatment Time 35       BC Totals Reminder:  bill using total billable   min of TIMED therapeutic procedures and modalities.   8-22 min = 1 unit; 23-37 min = 2 units; 38-52 min = 3 units; 53-67 min = 4 units; 68-82 min = 5 units              SUBJECTIVE     Pain Level (0-10 scale): 5/10 foot pain      Any medication changes, allergies to medications, adverse drug reactions, diagnosis change, or new procedure performed?: [x] No    [] Yes (see summary sheet for update)  Medications: Verified on Patient Summary List     Subjective functional status/changes:     Patient reports she went to the ED twice since last visit.     OBJECTIVE        Therapeutic Procedures:  Tx Min Billable or 1:1 Min (if diff from Tx Min) Procedure, Rationale, Specifics   10    32990 Self Care/Home Management (timed):  improve patient knowledge and understanding of home injury/symptom/pain management, positioning, posture/ergonomics, home safety, activity modification, transfer techniques, and joint protection strategies  to improve patient's ability to progress to PLOF and address remaining functional goals.  (see flow sheet as applicable)    Details:        Details if applicable:      25   97039 Therapeutic Exercise (timed):  increase ROM, strength, coordination, balance, and proprioception to improve patient's ability to progress to PLOF and address

## 2025-02-16 NOTE — TELEPHONE ENCOUNTER
[address] at [facility]  [phone]    07/07/22 2:26 PM - Called and spoke with the patient. Patient's ID verified x 2. She states she is okay with proceeding with the referral.  Advised Dr. Carrie Pearson is currently out of the office but this nurse will let him know upon his return. The patient voiced understanding and gratitude for the call. She states that her PCP has actually left and she is currently waiting to get established with another, so her PCP will not be able to place the referral.  No further questions or concerns. [address] at [facility]  [phone]    07/12/22 2:46 PM - Called and spoke with the patient. Patient's ID verified x 2. Advised Dr. Carrie Pearson is able to place a referral to [facility]. The patient inquired if she could be referred to [facility]. Advised, per Dr. Carrie Pearson, that is okay. She inquired about a dermatology referral, as she does not have a PCP currently and will not have an appointment until 8/10. Advised, per Dr. Carrie Pearson, he is unable to do the dermatology referral but she could ask Dr. María Leary. The patient voiced understanding and gratitude for the call. No further questions or concerns.
Patient called and stated that when she talked with Dr. Richard Knapp that he wanted her to go and see [place] and she said that was fine if we can put a referral in.   JACKIE#[phone]
stretcher

## 2025-02-17 ENCOUNTER — APPOINTMENT (OUTPATIENT)
Facility: HOSPITAL | Age: 25
End: 2025-02-17
Payer: MEDICARE

## 2025-02-19 ENCOUNTER — APPOINTMENT (OUTPATIENT)
Facility: HOSPITAL | Age: 25
End: 2025-02-19
Payer: MEDICARE

## 2025-02-25 ENCOUNTER — HOSPITAL ENCOUNTER (OUTPATIENT)
Facility: HOSPITAL | Age: 25
Setting detail: RECURRING SERIES
Discharge: HOME | End: 2025-02-28
Payer: MEDICARE

## 2025-02-25 PROCEDURE — 97110 THERAPEUTIC EXERCISES: CPT

## 2025-02-25 NOTE — PROGRESS NOTES
PLAN  Yes  Continue plan of care  Re-Cert Due: NA  [x]  Upgrade activities as tolerated  []  Discharge due to:  []  Other      MACKENZIE CANDELARIO, ELENA       2/25/2025       3:43 PM

## 2025-02-26 ENCOUNTER — TELEPHONE (OUTPATIENT)
Age: 25
End: 2025-02-26

## 2025-02-26 NOTE — TELEPHONE ENCOUNTER
Second wave - Left v/m to schedule patient w/ another NP     Last appt note \" Return in about 1 year (around 10/10/2025) for NP. \"

## 2025-02-27 ENCOUNTER — OFFICE VISIT (OUTPATIENT)
Age: 25
End: 2025-02-27
Payer: MEDICARE

## 2025-02-27 VITALS
OXYGEN SATURATION: 97 % | HEART RATE: 92 BPM | TEMPERATURE: 98.1 F | WEIGHT: 293 LBS | RESPIRATION RATE: 18 BRPM | BODY MASS INDEX: 51.91 KG/M2 | HEIGHT: 63 IN | SYSTOLIC BLOOD PRESSURE: 120 MMHG | DIASTOLIC BLOOD PRESSURE: 76 MMHG

## 2025-02-27 DIAGNOSIS — G90.A POTS (POSTURAL ORTHOSTATIC TACHYCARDIA SYNDROME): ICD-10-CM

## 2025-02-27 DIAGNOSIS — G43.709 CHRONIC MIGRAINE W/O AURA, NOT INTRACTABLE, W/O STAT MIGR: Primary | ICD-10-CM

## 2025-02-27 DIAGNOSIS — E66.01 MORBID OBESITY WITH BMI OF 50.0-59.9, ADULT: ICD-10-CM

## 2025-02-27 PROCEDURE — G8427 DOCREV CUR MEDS BY ELIG CLIN: HCPCS | Performed by: PSYCHIATRY & NEUROLOGY

## 2025-02-27 PROCEDURE — 1036F TOBACCO NON-USER: CPT | Performed by: PSYCHIATRY & NEUROLOGY

## 2025-02-27 PROCEDURE — G8417 CALC BMI ABV UP PARAM F/U: HCPCS | Performed by: PSYCHIATRY & NEUROLOGY

## 2025-02-27 PROCEDURE — 99214 OFFICE O/P EST MOD 30 MIN: CPT | Performed by: PSYCHIATRY & NEUROLOGY

## 2025-02-27 RX ORDER — NARATRIPTAN 2.5 MG/1
TABLET ORAL
Qty: 9 TABLET | Refills: 3 | Status: SHIPPED | OUTPATIENT
Start: 2025-02-27

## 2025-02-27 ASSESSMENT — PATIENT HEALTH QUESTIONNAIRE - PHQ9
SUM OF ALL RESPONSES TO PHQ QUESTIONS 1-9: 0
2. FEELING DOWN, DEPRESSED OR HOPELESS: NOT AT ALL
SUM OF ALL RESPONSES TO PHQ9 QUESTIONS 1 & 2: 0
1. LITTLE INTEREST OR PLEASURE IN DOING THINGS: NOT AT ALL

## 2025-02-27 NOTE — PROGRESS NOTES
NEUROLOGY CLINIC NOTE    Patient ID:  Conchis Lazcano  [id number]  24 y.o.  2000    Date of Visit:  February 27, 2025    Reason for Visit:  migraines    Chief Complaint   Patient presents with    Migraine     6 month follow up- patient reports she has had a daily headache for the last 30 days. Patient states she has had POTS episodes for little over a month, which she has had nausea, headache, and dizziness.         History of Present Illness:     Patient Active Problem List    Diagnosis Date Noted    History of cholecystectomy 10/29/2024    Type 2 diabetes mellitus 08/01/2024    Menorrhagia 05/10/2024    Focal nodular hyperplasia of liver 04/28/2024    Hepatic adenoma 04/11/2024    Chronic right-sided low back pain with right-sided sciatica 03/29/2023    Atopic dermatitis 03/29/2023    Cardiomyopathy, unspecified type (HCC) 01/19/2023    Severe persistent asthma with acute exacerbation (HCC) 12/21/2022    Undifferentiated connective tissue disease 11/17/2022    FIORDALIZA (generalized anxiety disorder)     Sucrose intolerance     Migraine headache     Autism     ADHD (attention deficit hyperactivity disorder)     Tenosynovitis of right hand 01/01/2021    Obesity, morbid 12/14/2020    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 David Rodriguez.  vs Sucrose Intolerance.    Acid reflux     ADHD (attention deficit hyperactivity disorder)     Asthma     Autism     High Functioning.  Dr. Ndiaye.    Autoimmune disorder 2020    Biliary dyskinesia 6/4/2019    Chronic back pain     Chronic cholecystitis 6/4/2019    Chronic rhinitis 2021    Dr. Alonso Ward, allergist.     Connective tissue disease     Developmental delay     Fibromyalgia     Dr. Byrd    FIORDALIZA (generalized anxiety disorder)     Dr. Spring Marion    Hard of hearing     pt states due

## 2025-02-28 ENCOUNTER — HOSPITAL ENCOUNTER (OUTPATIENT)
Facility: HOSPITAL | Age: 25
Setting detail: RECURRING SERIES
End: 2025-02-28
Payer: MEDICARE

## 2025-02-28 PROCEDURE — 97110 THERAPEUTIC EXERCISES: CPT | Performed by: PHYSICAL THERAPIST

## 2025-02-28 NOTE — PROGRESS NOTES
PHYSICAL THERAPY - MEDICARE DAILY TREATMENT NOTE (updated 3/23)      Date: 2025          Patient Name:  Conchis Lazcano :  2000   Medical   Diagnosis:  Other low back pain [M54.59] Treatment Diagnosis:  M54.59  OTHER LOWER BACK PAIN    Referral Source:  Jan Pemberton MD Insurance:   Payor: [facility] MEDICARE / Plan: UNITEDHEALTHCARE DUAL COMPLETE / Product Type: *No Product type* /                     Patient  verified YES    Visit #   Current  / Total 27 36 per updated POC   Time   In / Out 1055 AM 1140 AM   Total Treatment Time 45   Total Timed Codes 45   1:1 Treatment Time 45      [facility] Totals Reminder:  bill using total billable   min of TIMED therapeutic procedures and modalities.   8-22 min = 1 unit; 23-37 min = 2 units; 38-52 min = 3 units; 53-67 min = 4 units; 68-82 min = 5 units            SUBJECTIVE     Pain Level (0-10 scale): \"just sore\"     Any medication changes, allergies to medications, adverse drug reactions, diagnosis change, or new procedure performed?: [x] No    [] Yes (see summary sheet for update)  Medications: Verified on Patient Summary List     Subjective functional status/changes:     Patient reports she is in a new medication fludrocortisone and potassium \" I think it is helping\"  \"My back is sore\"    OBJECTIVE        Therapeutic Procedures:  Tx Min Billable or 1:1 Min (if diff from Tx Min) Procedure, Rationale, Specifics      10465 Self Care/Home Management (timed):  improve patient knowledge and understanding of home injury/symptom/pain management, positioning, posture/ergonomics, home safety, activity modification, transfer techniques, and joint protection strategies  to improve patient's ability to progress to PLOF and address remaining functional goals.  (see flow sheet as applicable)    Details:        Details if applicable:     45   12159 Therapeutic Exercise (timed):  increase ROM, strength, coordination, balance, and proprioception to improve patient's ability to  improvement in function NOT MET  3) The patient will ambulate 20 minutes without a significant increase in pain to improve ease running errands NOT MET         PLAN  Yes  Continue plan of care  Re-Cert Due: NA  [x]  Upgrade activities as tolerated  []  Discharge due to:  []  Other      Wanda Saucedo, PT       2/28/2025       10:57 AM

## 2025-03-03 ENCOUNTER — HOSPITAL ENCOUNTER (OUTPATIENT)
Facility: HOSPITAL | Age: 25
Setting detail: RECURRING SERIES
Discharge: HOME | End: 2025-03-06
Payer: MEDICARE

## 2025-03-03 PROCEDURE — 97110 THERAPEUTIC EXERCISES: CPT | Performed by: PHYSICAL THERAPIST

## 2025-03-03 NOTE — PROGRESS NOTES
PHYSICAL THERAPY - MEDICARE DAILY TREATMENT NOTE (updated 3/23)      Date: 3/3/2025          Patient Name:  Conchis Lazcano :  2000   Medical   Diagnosis:  Other low back pain [M54.59] Treatment Diagnosis:  M54.59  OTHER LOWER BACK PAIN    Referral Source:  Jan Pemberton MD Insurance:   Payor: [facility] MEDICARE / Plan: [employer] DUAL COMPLETE / Product Type: *No Product type* /                     Patient  verified YES    Visit #   Current  / Total 28 36 per updated POC   Time   In / Out 950 AM 1035 AM   Total Treatment Time 40   Total Timed Codes 40   1:1 Treatment Time 40      [facility] Totals Reminder:  bill using total billable   min of TIMED therapeutic procedures and modalities.   8-22 min = 1 unit; 23-37 min = 2 units; 38-52 min = 3 units; 53-67 min = 4 units; 68-82 min = 5 units            SUBJECTIVE     Pain Level (0-10 scale): \"just sore\"     Any medication changes, allergies to medications, adverse drug reactions, diagnosis change, or new procedure performed?: [x] No    [] Yes (see summary sheet for update)  Medications: Verified on Patient Summary List     Subjective functional status/changes:     Patient reports her legs are sore from last visit    OBJECTIVE        Therapeutic Procedures:  Tx Min Billable or 1:1 Min (if diff from Tx Min) Procedure, Rationale, Specifics      27228 Self Care/Home Management (timed):  improve patient knowledge and understanding of home injury/symptom/pain management, positioning, posture/ergonomics, home safety, activity modification, transfer techniques, and joint protection strategies  to improve patient's ability to progress to PLOF and address remaining functional goals.  (see flow sheet as applicable)    Details:        Details if applicable:     40   20062 Therapeutic Exercise (timed):  increase ROM, strength, coordination, balance, and proprioception to improve patient's ability to progress to PLOF and address remaining functional goals. (see flow sheet as  On Synthroid 100mcg daily. TSH WNL on admission  -c/w home synthroid PO 100mcg daily

## 2025-03-05 ENCOUNTER — HOSPITAL ENCOUNTER (OUTPATIENT)
Facility: HOSPITAL | Age: 25
Setting detail: RECURRING SERIES
End: 2025-03-05
Payer: MEDICARE

## 2025-03-10 ENCOUNTER — HOSPITAL ENCOUNTER (OUTPATIENT)
Facility: HOSPITAL | Age: 25
Setting detail: RECURRING SERIES
Discharge: HOME | End: 2025-03-13
Payer: MEDICARE

## 2025-03-10 PROCEDURE — 97035 APP MDLTY 1+ULTRASOUND EA 15: CPT

## 2025-03-10 PROCEDURE — 97110 THERAPEUTIC EXERCISES: CPT

## 2025-03-10 NOTE — PROGRESS NOTES
ability to progress to PLOF and address remaining functional goals. (see flow sheet as applicable)      Details if applicable:     30       Total Total           Modalities Rationale:     decrease inflammation, decrease pain, and increase tissue extensibility to improve patient's ability to progress to PLOF and address remaining functional goals.       min [] Estim Unattended,             type/location:       []  w/ice    []  w/heat        min [] Estim Attended,             type/location:       []  w/ice   []  w/heat         []  w/US   []  TENS insruct            min []  Mechanical Traction,        type/lbs:        []  pro      []  sup           []  int       []  cont            []  before manual           []  after manual    8+2 min [x]  Ultrasound,         settings/location: 20% 1.3, R ball of foot     min  unbilled []  Ice     []  Heat            location/position:         min []  Vasopneumatic Device,      press/temp:   pre-treatment girth :    post-treatment girth :    measured at (landmark       location) :   If using vaso (only need to measure limb vaso being performed on)        min []  Other:        Skin assessment post-treatment (if applicable):    [x]  intact    []  redness- no adverse reaction                 []redness - adverse reaction:           [x]  Patient Education billed concurrently with other procedures   [x] Review HEP    [] Progressed/Changed HEP, detail:    [] Other detail:           Other Objective/Functional Measures  Improved tolerance with bike and standing today.    Pain Level at end of session (0-10 scale): \"sore\"        Assessment     Patient will continue to benefit from skilled PT / OT services to modify and progress therapeutic interventions, analyze and address functional mobility deficits, analyze and address ROM deficits, analyze and address strength deficits, analyze and address soft tissue restrictions, analyze and cue for proper movement patterns, analyze and modify for

## 2025-03-18 ENCOUNTER — APPOINTMENT (OUTPATIENT)
Facility: HOSPITAL | Age: 25
End: 2025-03-18
Payer: MEDICARE

## 2025-03-20 ENCOUNTER — APPOINTMENT (OUTPATIENT)
Facility: HOSPITAL | Age: 25
End: 2025-03-20
Payer: MEDICARE

## 2025-03-21 ENCOUNTER — OFFICE VISIT (OUTPATIENT)
Age: 25
End: 2025-03-21

## 2025-03-21 VITALS
SYSTOLIC BLOOD PRESSURE: 120 MMHG | RESPIRATION RATE: 16 BRPM | BODY MASS INDEX: 51.91 KG/M2 | WEIGHT: 293 LBS | HEART RATE: 108 BPM | OXYGEN SATURATION: 98 % | HEIGHT: 63 IN | DIASTOLIC BLOOD PRESSURE: 64 MMHG

## 2025-03-21 DIAGNOSIS — Z45.89 TYMPANOSTOMY TUBE CHECK: ICD-10-CM

## 2025-03-21 DIAGNOSIS — R42 DIZZINESS: ICD-10-CM

## 2025-03-21 DIAGNOSIS — R04.0 EPISTAXIS: Primary | ICD-10-CM

## 2025-03-21 DIAGNOSIS — J30.9 ALLERGIC RHINITIS, UNSPECIFIED SEASONALITY, UNSPECIFIED TRIGGER: ICD-10-CM

## 2025-03-21 DIAGNOSIS — H69.93 ETD (EUSTACHIAN TUBE DYSFUNCTION), BILATERAL: ICD-10-CM

## 2025-03-21 NOTE — PROGRESS NOTES
Otolaryngology-Head and Neck Surgery  Follow Up Patient Visit     Patient: Conchis Lazcano  YOB: 2000  MRN: [id number]  Date of Service: 3/21/2025      Chief Complaint:   Chief Complaint   Patient presents with    Follow-up     ETD (Eustachian tube dysfunction), bilateral       Interval hx 3/21/2025  Cont to have L > R epistaxis    Dx with POTS  Also Dx with BPPV, but feels symptoms are related to POTS    Interval hx 9/2024  Developed intermittent ear plugging and allergy type of symptoms  Persisted so was ultimately seen in urgent care  Was told she had significant fluid in her ears  Was given a prescription for p.o. antibiotics  She had been using her Floxin eardrops but they told her to stop this    She did have intermittent left ear drainage    Interval hx 4/2024  S/p BMTT 10/2023 (in OR)    Initially did very well after the ear tubes with resolution of ear pain and plugging  In the last month has developed right ear plugging again  Wonders if the tube has extruded    Has also noticed intermittent epistaxis, left-sided  No ER requirement    Interval hx   1 mo s/p R myringotomy in the office  Found this really helped her ear symptoms - resolution of plugging and pulsing   Last week right ear plugging recurred   Presents for possible tube placement today     Interval hx  On dupixent now for 4-5 months  Allergies may be somewhat improved  Did have asthma exacerbation requiring significant prednisone Rx  R > L ear plugging remains very bothersome    History of Present Illness: Conchis Lazcano is a 22 y.o. year old female who presents today for discussion of frequent ear infections and sinus pain    Over the last couple of years has had nasal congestion, sinus pain, concern for infections  Also has frequent otalgia and told has fluid in her ears    Has seen a few different providers, including allergy at [facility] as well as [facility] - allergy testing at [facility] did show some allergies and she may have

## 2025-03-24 ENCOUNTER — HOSPITAL ENCOUNTER (OUTPATIENT)
Facility: HOSPITAL | Age: 25
Setting detail: RECURRING SERIES
Discharge: HOME | End: 2025-03-27
Payer: MEDICARE

## 2025-03-24 PROCEDURE — 97110 THERAPEUTIC EXERCISES: CPT

## 2025-03-24 NOTE — PROGRESS NOTES
PHYSICAL THERAPY - MEDICARE DAILY TREATMENT NOTE (updated 3/23)      Date: 3/24/2025          Patient Name:  Conchis Lazcano :  2000   Medical   Diagnosis:  Other low back pain [M54.59] Treatment Diagnosis:  M54.59  OTHER LOWER BACK PAIN    Referral Source:  Jan Pemberton MD Insurance:   Payor: [facility] MEDICARE / Plan: [employer] DUAL COMPLETE / Product Type: *No Product type* /                     Patient  verified YES    Visit #   Current  / Total 30 36 per updated POC   Time   In / Out 1130 AM 1210 PM   Total Treatment Time 40   Total Timed Codes 40   1:1 Treatment Time 40      [facility] Totals Reminder:  bill using total billable   min of TIMED therapeutic procedures and modalities.   8-22 min = 1 unit; 23-37 min = 2 units; 38-52 min = 3 units; 53-67 min = 4 units; 68-82 min = 5 units            SUBJECTIVE     Pain Level (0-10 scale): 5/10     Any medication changes, allergies to medications, adverse drug reactions, diagnosis change, or new procedure performed?: [x] No    [] Yes (see summary sheet for update)  Medications: Verified on Patient Summary List     Subjective functional status/changes:     Patient reports she is a little sore today. Last week was not a good week but today she is doing pretty well.    OBJECTIVE        Therapeutic Procedures:  Tx Min Billable or 1:1 Min (if diff from Tx Min) Procedure, Rationale, Specifics      53639 Self Care/Home Management (timed):  improve patient knowledge and understanding of home injury/symptom/pain management, positioning, posture/ergonomics, home safety, activity modification, transfer techniques, and joint protection strategies  to improve patient's ability to progress to PLOF and address remaining functional goals.  (see flow sheet as applicable)    Details:        Details if applicable:     40   40356 Therapeutic Exercise (timed):  increase ROM, strength, coordination, balance, and proprioception to improve patient's ability to progress to PLOF and

## 2025-03-27 ENCOUNTER — APPOINTMENT (OUTPATIENT)
Facility: HOSPITAL | Age: 25
End: 2025-03-27
Payer: MEDICARE

## 2025-03-31 ENCOUNTER — HOSPITAL ENCOUNTER (OUTPATIENT)
Facility: HOSPITAL | Age: 25
Setting detail: RECURRING SERIES
Discharge: HOME | End: 2025-04-03
Payer: MEDICARE

## 2025-03-31 PROCEDURE — 97140 MANUAL THERAPY 1/> REGIONS: CPT | Performed by: PHYSICAL THERAPIST

## 2025-03-31 PROCEDURE — 97110 THERAPEUTIC EXERCISES: CPT | Performed by: PHYSICAL THERAPIST

## 2025-03-31 NOTE — PROGRESS NOTES
PHYSICAL THERAPY - MEDICARE DAILY TREATMENT NOTE (updated 3/23)      Date: 3/31/2025          Patient Name:  Conchis Lazcano :  2000   Medical   Diagnosis:  Other low back pain [M54.59] Treatment Diagnosis:  M54.59  OTHER LOWER BACK PAIN    Referral Source:  Jan Pemberton MD Insurance:   Payor: [facility] MEDICARE / Plan: UNITEDHEALTHCARE DUAL COMPLETE / Product Type: *No Product type* /                     Patient  verified YES    Visit #   Current  / Total 31 36 per updated POC   Time   In / Out 1215  PM   Total Treatment Time 30   Total Timed Codes 30   1:1 Treatment Time 30      [facility] Totals Reminder:  bill using total billable   min of TIMED therapeutic procedures and modalities.   8-22 min = 1 unit; 23-37 min = 2 units; 38-52 min = 3 units; 53-67 min = 4 units; 68-82 min = 5 units            SUBJECTIVE     Pain Level (0-10 scale): 5/10     Any medication changes, allergies to medications, adverse drug reactions, diagnosis change, or new procedure performed?: [x] No    [] Yes (see summary sheet for update)  Medications: Verified on Patient Summary List     Subjective functional status/changes:     Patient reports she had a fall last week and has been feeling poorly since then    OBJECTIVE        Therapeutic Procedures:  Tx Min Billable or 1:1 Min (if diff from Tx Min) Procedure, Rationale, Specifics   15   12586 Manual Therapy (timed):  decrease pain, increase ROM, increase tissue extensibility, decrease edema, correct positional vertigo, decrease trigger points, and increase postural awareness to improve patient's ability to progress to PLOF and address remaining functional goals.  The manual therapy interventions were performed at a separate and distinct time from the therapeutic activities interventions . (see flow sheet as applicable)     Details if applicable:  Ankle PROM to tolerance, MFR to gastroc/ soleus complex, metatarsal mobs, plantar fascia stretching   15   86903 Therapeutic Exercise

## 2025-04-02 ENCOUNTER — APPOINTMENT (OUTPATIENT)
Facility: HOSPITAL | Age: 25
End: 2025-04-02
Payer: MEDICARE

## 2025-04-07 ENCOUNTER — HOSPITAL ENCOUNTER (OUTPATIENT)
Facility: HOSPITAL | Age: 25
Setting detail: RECURRING SERIES
End: 2025-04-07
Payer: MEDICARE

## 2025-04-14 ENCOUNTER — APPOINTMENT (OUTPATIENT)
Facility: HOSPITAL | Age: 25
End: 2025-04-14
Payer: MEDICARE

## 2025-04-17 ENCOUNTER — HOSPITAL ENCOUNTER (OUTPATIENT)
Facility: HOSPITAL | Age: 25
Setting detail: RECURRING SERIES
Discharge: HOME | End: 2025-04-20
Payer: MEDICARE

## 2025-04-17 PROCEDURE — 97110 THERAPEUTIC EXERCISES: CPT

## 2025-04-17 NOTE — PROGRESS NOTES
PHYSICAL THERAPY - MEDICARE DAILY TREATMENT NOTE (updated 3/23)      Date: 2025          Patient Name:  Conchis Lazcano :  2000   Medical   Diagnosis:  Other low back pain [M54.59] Treatment Diagnosis:  M54.59  OTHER LOWER BACK PAIN    Referral Source:  Jan Pemberton MD Insurance:   Payor: [facility] MEDICARE / Plan: [employer] DUAL COMPLETE / Product Type: *No Product type* /                     Patient  verified YES    Visit #   Current  / Total 32 36 per updated POC   Time   In / Out 120  PM   Total Treatment Time 40   Total Timed Codes 40   1:1 Treatment Time 40      [facility] Totals Reminder:  bill using total billable   min of TIMED therapeutic procedures and modalities.   8-22 min = 1 unit; 23-37 min = 2 units; 38-52 min = 3 units; 53-67 min = 4 units; 68-82 min = 5 units            SUBJECTIVE     Pain Level (0-10 scale): 7/10     Any medication changes, allergies to medications, adverse drug reactions, diagnosis change, or new procedure performed?: [x] No    [] Yes (see summary sheet for update)  Medications: Verified on Patient Summary List     Subjective functional status/changes:     Patient reports she is still having lots of pain all over but still most of the pain along the R foot.     OBJECTIVE        Therapeutic Procedures:  Tx Min Billable or 1:1 Min (if diff from Tx Min) Procedure, Rationale, Specifics      20596 Manual Therapy (timed):  decrease pain, increase ROM, increase tissue extensibility, decrease edema, correct positional vertigo, decrease trigger points, and increase postural awareness to improve patient's ability to progress to PLOF and address remaining functional goals.  The manual therapy interventions were performed at a separate and distinct time from the therapeutic activities interventions . (see flow sheet as applicable)     Details if applicable:  Ankle PROM to tolerance, MFR to gastroc/ soleus complex, metatarsal mobs, plantar fascia stretching   40   68235

## 2025-04-18 ENCOUNTER — PATIENT MESSAGE (OUTPATIENT)
Age: 25
End: 2025-04-18

## 2025-04-21 ENCOUNTER — APPOINTMENT (OUTPATIENT)
Facility: HOSPITAL | Age: 25
End: 2025-04-21
Payer: MEDICARE

## 2025-04-23 ENCOUNTER — HOSPITAL ENCOUNTER (OUTPATIENT)
Facility: HOSPITAL | Age: 25
Setting detail: RECURRING SERIES
Discharge: HOME | End: 2025-04-26
Payer: MEDICARE

## 2025-04-23 PROCEDURE — 97110 THERAPEUTIC EXERCISES: CPT

## 2025-04-23 NOTE — PROGRESS NOTES
PHYSICAL THERAPY - MEDICARE DAILY TREATMENT NOTE (updated 3/23)      Date: 2025          Patient Name:  Conchis Lazcano :  2000   Medical   Diagnosis:  Other low back pain [M54.59] Treatment Diagnosis:  M54.59  OTHER LOWER BACK PAIN    Referral Source:  Jan Pemberton MD Insurance:   Payor: [facility] MEDICARE / Plan: [employer] DUAL COMPLETE / Product Type: *No Product type* /                     Patient  verified YES    Visit #   Current  / Total 33 36 per updated POC   Time   In / Out 1140 PM 1220 PM   Total Treatment Time 40   Total Timed Codes 40   1:1 Treatment Time 40      [facility] Totals Reminder:  bill using total billable   min of TIMED therapeutic procedures and modalities.   8-22 min = 1 unit; 23-37 min = 2 units; 38-52 min = 3 units; 53-67 min = 4 units; 68-82 min = 5 units            SUBJECTIVE     Pain Level (0-10 scale): 5/10     Any medication changes, allergies to medications, adverse drug reactions, diagnosis change, or new procedure performed?: [x] No    [] Yes (see summary sheet for update)  Medications: Verified on Patient Summary List     Subjective functional status/changes:     Patient reports her MD visit went really well.     OBJECTIVE        Therapeutic Procedures:  Tx Min Billable or 1:1 Min (if diff from Tx Min) Procedure, Rationale, Specifics      22046 Manual Therapy (timed):  decrease pain, increase ROM, increase tissue extensibility, decrease edema, correct positional vertigo, decrease trigger points, and increase postural awareness to improve patient's ability to progress to PLOF and address remaining functional goals.  The manual therapy interventions were performed at a separate and distinct time from the therapeutic activities interventions . (see flow sheet as applicable)     Details if applicable:  Ankle PROM to tolerance, MFR to gastroc/ soleus complex, metatarsal mobs, plantar fascia stretching   40   25533 Therapeutic Exercise (timed):  increase ROM, strength,

## 2025-04-28 ENCOUNTER — APPOINTMENT (OUTPATIENT)
Facility: HOSPITAL | Age: 25
End: 2025-04-28
Payer: MEDICARE

## 2025-04-30 ENCOUNTER — HOSPITAL ENCOUNTER (OUTPATIENT)
Facility: HOSPITAL | Age: 25
Setting detail: RECURRING SERIES
Discharge: HOME | End: 2025-05-03
Payer: MEDICARE

## 2025-04-30 PROCEDURE — 97110 THERAPEUTIC EXERCISES: CPT

## 2025-04-30 NOTE — PROGRESS NOTES
PHYSICAL THERAPY - MEDICARE DAILY TREATMENT NOTE (updated 3/23)      Date: 2025          Patient Name:  Conchis Lazcano :  2000   Medical   Diagnosis:  Other low back pain [M54.59] Treatment Diagnosis:  M54.59  OTHER LOWER BACK PAIN    Referral Source:  Jan Pemberton MD Insurance:   Payor: [facility] MEDICARE / Plan: [employer] DUAL COMPLETE / Product Type: *No Product type* /                     Patient  verified YES    Visit #   Current  / Total 34 36 per updated POC   Time   In / Out 1145 PM 1215 PM   Total Treatment Time 30   Total Timed Codes 30   1:1 Treatment Time 30      [facility] Totals Reminder:  bill using total billable   min of TIMED therapeutic procedures and modalities.   8-22 min = 1 unit; 23-37 min = 2 units; 38-52 min = 3 units; 53-67 min = 4 units; 68-82 min = 5 units            SUBJECTIVE     Pain Level (0-10 scale): 5/10     Any medication changes, allergies to medications, adverse drug reactions, diagnosis change, or new procedure performed?: [x] No    [] Yes (see summary sheet for update)  Medications: Verified on Patient Summary List     Subjective functional status/changes:     Patient reports she tried to stop her Gabapentin and had a lot of pain all over the body. Is not taking 1 pill instead of 2 and is feeling very sore all over. Advised to contact her MD on how to proceed.    OBJECTIVE        Therapeutic Procedures:  Tx Min Billable or 1:1 Min (if diff from Tx Min) Procedure, Rationale, Specifics      80595 Manual Therapy (timed):  decrease pain, increase ROM, increase tissue extensibility, decrease edema, correct positional vertigo, decrease trigger points, and increase postural awareness to improve patient's ability to progress to PLOF and address remaining functional goals.  The manual therapy interventions were performed at a separate and distinct time from the therapeutic activities interventions . (see flow sheet as applicable)     Details if applicable:  Ankle PROM to

## 2025-05-07 ENCOUNTER — HOSPITAL ENCOUNTER (OUTPATIENT)
Facility: HOSPITAL | Age: 25
Setting detail: RECURRING SERIES
Discharge: HOME | End: 2025-05-10
Payer: MEDICARE

## 2025-05-07 PROCEDURE — 97110 THERAPEUTIC EXERCISES: CPT

## 2025-05-07 NOTE — PROGRESS NOTES
PHYSICAL THERAPY - MEDICARE DAILY TREATMENT NOTE (updated 3/23)      Date: 2025          Patient Name:  Conchis Lazcano :  2000   Medical   Diagnosis:  Other low back pain [M54.59] Treatment Diagnosis:  M54.59  OTHER LOWER BACK PAIN    Referral Source:  Jan Pemberton MD Insurance:   Payor: [facility] MEDICARE / Plan: [employer] DUAL COMPLETE / Product Type: *No Product type* /                     Patient  verified YES    Visit #   Current  / Total 35 36 per updated POC   Time   In / Out 1135 PM 1215 PM   Total Treatment Time 40   Total Timed Codes 40   1:1 Treatment Time 40      [facility] Totals Reminder:  bill using total billable   min of TIMED therapeutic procedures and modalities.   8-22 min = 1 unit; 23-37 min = 2 units; 38-52 min = 3 units; 53-67 min = 4 units; 68-82 min = 5 units            SUBJECTIVE     Pain Level (0-10 scale): 3/10     Any medication changes, allergies to medications, adverse drug reactions, diagnosis change, or new procedure performed?: [x] No    [] Yes (see summary sheet for update)  Medications: Verified on Patient Summary List     Subjective functional status/changes:     Patient reports she is still taking 1 pill of gabapentin currently. Patient stated on the way into PT she could feel her HR increase for some odd reason.    OBJECTIVE        Therapeutic Procedures:  Tx Min Billable or 1:1 Min (if diff from Tx Min) Procedure, Rationale, Specifics      26080 Manual Therapy (timed):  decrease pain, increase ROM, increase tissue extensibility, decrease edema, correct positional vertigo, decrease trigger points, and increase postural awareness to improve patient's ability to progress to PLOF and address remaining functional goals.  The manual therapy interventions were performed at a separate and distinct time from the therapeutic activities interventions . (see flow sheet as applicable)     Details if applicable:  Ankle PROM to tolerance, MFR to gastroc/ soleus complex,

## 2025-05-08 NOTE — PROGRESS NOTES
Conchis Lazcano is a 24 y.o. female returns for an annual exam     Chief Complaint   Patient presents with    Annual Exam       No LMP recorded. (Menstrual status: IUD).    Problems: problems - bleeding with IUD, this is new.   Birth Control: IUD.  Last Pap: normal obtained 1 year(s) ago.  She does not have a history of EHSAN 2, 3 or cervical cancer.   With regard to the Gardisil vaccine, she has received all 3 injections      1. Have you been to the ER, urgent care clinic, or hospitalized since your last visit? No    2. Have you seen or consulted any other health care providers outside of the [facility] since your last visit? No    Examination chaperoned by Ashley Jones MA.

## 2025-05-09 ENCOUNTER — OFFICE VISIT (OUTPATIENT)
Age: 25
End: 2025-05-09
Payer: MEDICARE

## 2025-05-09 VITALS
HEIGHT: 63 IN | SYSTOLIC BLOOD PRESSURE: 98 MMHG | DIASTOLIC BLOOD PRESSURE: 62 MMHG | BODY MASS INDEX: 51.03 KG/M2 | WEIGHT: 288 LBS

## 2025-05-09 DIAGNOSIS — N94.89 MENSTRUAL SUPPRESSION: Primary | ICD-10-CM

## 2025-05-09 PROCEDURE — 99395 PREV VISIT EST AGE 18-39: CPT | Performed by: STUDENT IN AN ORGANIZED HEALTH CARE EDUCATION/TRAINING PROGRAM

## 2025-05-09 RX ORDER — MEDROXYPROGESTERONE ACETATE 10 MG
10 TABLET ORAL DAILY
Qty: 30 TABLET | Refills: 3 | Status: SHIPPED | OUTPATIENT
Start: 2025-05-09

## 2025-05-09 SDOH — ECONOMIC STABILITY: FOOD INSECURITY: WITHIN THE PAST 12 MONTHS, THE FOOD YOU BOUGHT JUST DIDN'T LAST AND YOU DIDN'T HAVE MONEY TO GET MORE.: NEVER TRUE

## 2025-05-09 SDOH — ECONOMIC STABILITY: FOOD INSECURITY: WITHIN THE PAST 12 MONTHS, YOU WORRIED THAT YOUR FOOD WOULD RUN OUT BEFORE YOU GOT MONEY TO BUY MORE.: NEVER TRUE

## 2025-05-09 NOTE — PROGRESS NOTES
Annual exam ages 18-39    Conchis Lazcano is a ,  24 y.o. female   No LMP recorded. (Menstrual status: IUD).    She presents for her annual checkup.     She is having problems - mirena placed 2024, bleeding stopped until menses 2 months in a row, bleeding about 3-4 pads/day and lasting 3-4d. Concerned because it causes a lot of fatigue and difficulty with her POTS sxs.        Menstrual status:    Her periods are light in flow. She is using three to five pads or tampons per day, usually regular and last 26-30 days.    She does not have dysmenorrhea.    She reports no premenstrual symptoms.    Contraception:    The current method of family planning is IUD.    Sexual history:    She  reports never being sexually active.    Medical conditions:    Since her last annual GYN exam about 1 year ago, she has not the following changes in her health history: none.     Surgical history confirmed with patient.  has a past surgical history that includes CT BIOPSY BONE MARROW (2022); Cholecystectomy (2019); Tonsillectomy and adenoidectomy; egd transoral biopsy single/multiple (2019); Upper gastrointestinal endoscopy (2020); upper gi endoscopy,biopsy (2016); [place] tooth extraction; gerd tst w/ mucos ph electrod 48 hr (bravo) (12/3/2020); Colonoscopy (N/A, 2016); egd transoral biopsy single/multiple (2020); clp bravo (2020); Tonsillectomy; myringotomy (Bilateral, 10/4/2023); CT BIOPSY BONE MARROW (2022); and intrauterine device insertion (N/A, 2024).    Pap and Mammogram History:    Her most recent Pap smear was was normal, obtained 1 year(s) ago.    The patient has never had a mammogram.    Breast Cancer History/Substance Abuse: negative    Past Medical History:   Diagnosis Date    Abdominal migraine     Dr. David Rodriguez.  vs Sucrose Intolerance.    Acid reflux     ADHD (attention deficit hyperactivity disorder)     Asthma     Autism     High Functioning.  Dr. Ndiaye.

## 2025-05-12 ENCOUNTER — APPOINTMENT (OUTPATIENT)
Facility: HOSPITAL | Age: 25
End: 2025-05-12
Payer: MEDICARE

## 2025-05-14 ENCOUNTER — HOSPITAL ENCOUNTER (OUTPATIENT)
Facility: HOSPITAL | Age: 25
Setting detail: RECURRING SERIES
Discharge: HOME | End: 2025-05-17
Payer: MEDICARE

## 2025-05-14 PROCEDURE — 97110 THERAPEUTIC EXERCISES: CPT

## 2025-05-14 PROCEDURE — 97535 SELF CARE MNGMENT TRAINING: CPT

## 2025-05-14 NOTE — PROGRESS NOTES
progress to PLOF and address remaining functional goals. (see flow sheet as applicable)      Details if applicable:     40       Total Total           [x]  Patient Education billed concurrently with other procedures   [x] Review HEP    [] Progressed/Changed HEP, detail:    [] Other detail:           Other Objective/Functional Measures  Mod to max fatigue with standing hip abd today    Pain Level at end of session (0-10 scale): 3/10        Assessment     Patient will continue to benefit from skilled PT / OT services to modify and progress therapeutic interventions, analyze and address functional mobility deficits, analyze and address ROM deficits, analyze and address strength deficits, analyze and address soft tissue restrictions, analyze and cue for proper movement patterns, analyze and modify for postural abnormalities, analyze and address imbalance/dizziness, and instruct in home and community integration to address functional deficits and attain remaining goals.     Progress toward goals / Updated goals:  []  See Progress Note/Recertification      Short Term Goals: To be accomplished in 8 treatments.  1) The patient will be independent with introductory HEP MET  2) The patient will improve lumbar flexion AROM to reaching 12 inches from floor to improve ease with picking up items off the floor MET  3) The patient will transfer sit <> stand without an increase in dizziness MET  Long Term Goals: To be accomplished in 24 treatments.  UPDATE TO 48 VISITS:  1) The patient will demonstrate 5/5 BLE strength to improve ease with household chores  2) The patient will improve FOTO survey to a 58% or greater to indicate a significant improvement in function  3) The patient will ambulate 20 minutes without a significant increase in pain to improve ease running errands         PLAN  Yes  Continue plan of care  Re-Cert Due: NA  [x]  Upgrade activities as tolerated  []  Discharge due to:  []  Other      MACKENZIE CANDELARIO PTA

## 2025-05-19 ENCOUNTER — HOSPITAL ENCOUNTER (OUTPATIENT)
Facility: HOSPITAL | Age: 25
Setting detail: RECURRING SERIES
Discharge: HOME | End: 2025-05-22
Payer: MEDICARE

## 2025-05-19 PROCEDURE — 97110 THERAPEUTIC EXERCISES: CPT

## 2025-05-19 PROCEDURE — 97535 SELF CARE MNGMENT TRAINING: CPT

## 2025-05-19 NOTE — PROGRESS NOTES
PHYSICAL THERAPY - MEDICARE DAILY TREATMENT NOTE (updated 3/23)      Date: 2025          Patient Name:  Conchis Lazcano :  2000   Medical   Diagnosis:  Other low back pain [M54.59] Treatment Diagnosis:  M54.59  OTHER LOWER BACK PAIN    Referral Source:  Jan Pemberton MD Insurance:   Payor: [facility] MEDICARE / Plan: [employer] DUAL COMPLETE / Product Type: *No Product type* /                     Patient  verified YES    Visit #   Current  / Total 37 48 per updated POC   Time   In / Out 1020 AM 1130 AM   Total Treatment Time 40   Total Timed Codes 40   1:1 Treatment Time 40      [facility] Totals Reminder:  bill using total billable   min of TIMED therapeutic procedures and modalities.   8-22 min = 1 unit; 23-37 min = 2 units; 38-52 min = 3 units; 53-67 min = 4 units; 68-82 min = 5 units            SUBJECTIVE     Pain Level (0-10 scale): 3/10     Any medication changes, allergies to medications, adverse drug reactions, diagnosis change, or new procedure performed?: [x] No    [] Yes (see summary sheet for update)  Medications: Verified on Patient Summary List     Subjective functional status/changes:     Patient reports she started back on the Gabapentin as requested by Dr. Wiseman. Patient reported calf cramping at night. Improvement with the addition of magnesium. Patient reported a rough day yesterday () so she just rested.    OBJECTIVE        Therapeutic Procedures:  Tx Min Billable or 1:1 Min (if diff from Tx Min) Procedure, Rationale, Specifics   10   05077 Self Care/Home Management (timed):  improve patient knowledge and understanding of home injury/symptom/pain management, positioning, and activity modification  to improve patient's ability to progress to PLOF and address remaining functional goals.  (see flow sheet as applicable)     Details if applicable:  Reviewed Fitbit toni to understand HRV and HR tracking   30   61545 Therapeutic Exercise (timed):  increase ROM, strength, coordination,

## 2025-05-20 ENCOUNTER — OFFICE VISIT (OUTPATIENT)
Age: 25
End: 2025-05-20
Payer: MEDICARE

## 2025-05-20 VITALS
SYSTOLIC BLOOD PRESSURE: 116 MMHG | HEIGHT: 63 IN | BODY MASS INDEX: 51.21 KG/M2 | DIASTOLIC BLOOD PRESSURE: 72 MMHG | OXYGEN SATURATION: 98 % | HEART RATE: 110 BPM | WEIGHT: 289 LBS

## 2025-05-20 DIAGNOSIS — G90.A POTS (POSTURAL ORTHOSTATIC TACHYCARDIA SYNDROME): Primary | ICD-10-CM

## 2025-05-20 PROCEDURE — 99213 OFFICE O/P EST LOW 20 MIN: CPT | Performed by: SPECIALIST

## 2025-05-20 RX ORDER — TOPIRAMATE 25 MG/1
25 TABLET, FILM COATED ORAL DAILY
COMMUNITY
Start: 2025-04-24

## 2025-05-20 NOTE — PROGRESS NOTES
Chief Complaint   Patient presents with    Palpitations    POTS    Edema     Vitals:    05/20/25 1450   BP: 116/72   BP Site: Left Lower Arm   Patient Position: Sitting   Pulse: (!) 110   SpO2: 98%   Weight: 131.1 kg (289 lb)   Height: 1.6 m (5' 2.99\")         Chest pain: DENIED     Recent hospital stays: DENIED     Refills: DENIED   
rate and rhythm with no murmur or rub  Abdomen: Soft, non-tender, obese   Extremities: Moves all ext well. + LE lymphedema - class 2   MSKTL: Overall good ROM ext  Skin: No significant rashes  Psych: Appropriate affect  Neuro - Grossly intact        LABS / OTHER STUDIES:       Lab Results   Component Value Date     02/03/2025    K 4.0 02/03/2025     02/03/2025    CO2 25 02/03/2025    BUN 13 02/03/2025    CREATININE 0.98 02/03/2025    GLUCOSE 101 (H) 02/03/2025    CALCIUM 9.8 02/03/2025    BILITOT 1.0 02/03/2025    ALKPHOS 74 02/03/2025    AST 33 02/03/2025    ALT 25 02/03/2025    LABGLOM 82 02/03/2025    GFRAA >60 04/05/2022    AGRATIO 1.8 05/30/2023    GLOB 4.0 02/03/2025         Lab Results   Component Value Date    WBC 10.0 02/03/2025    HGB 15.1 02/03/2025    HCT 45.0 02/03/2025    MCV 89.1 02/03/2025     02/03/2025     Lab Results   Component Value Date    CHOL 196 09/20/2024    TRIG 130 09/20/2024    HDL 45 09/20/2024    VLDL 26 09/20/2024    CHOLHDLRATIO 4.4 09/20/2024             CARDIAC DIAGNOSTICS:     Cardiac Evaluation Includes:  I reviewed the test results below.     Echo 5/26/22 (VCS) - LVEF 45%   Echo 1/24/23 - TDS.  LVEF 56%, normal study         Holter 2/10/23 - 1 day - NSR, Avg  bpm, (), Tachy 52% of the time.    ---> HR still fast most of the time-        Tilt Table Test 12/4/23 - There is an exaggerated sustained increase in heart rate (greater than 30 beats/min and exceeding 120 beats/min) without accompanying hypotension within the first 10 mins of tilt table testing, which can be seen in postural orthostatic tachycardia syndrome (POTS).    Baseline high blood pressure ( to 160)    Echo 12/3/24 - LVEF 60%    CXR 1/29/25 - normal   Chest CTA 2/3/25 - no PE       EKG 4/13/22 (VCS) - sinus tach,  bpm, normal study   EKG 12/5/23 - sinus tach, normal   EKG 8/7/24 - sinus, non-specific ST abn   EKG 2/3/25 - sinus tach,  bpm       ASSESSMENT AND PLAN:

## 2025-05-21 ENCOUNTER — OFFICE VISIT (OUTPATIENT)
Age: 25
End: 2025-05-21

## 2025-05-21 VITALS
BODY MASS INDEX: 49.34 KG/M2 | HEART RATE: 94 BPM | DIASTOLIC BLOOD PRESSURE: 70 MMHG | RESPIRATION RATE: 18 BRPM | WEIGHT: 289 LBS | HEIGHT: 64 IN | TEMPERATURE: 98.3 F | OXYGEN SATURATION: 98 % | SYSTOLIC BLOOD PRESSURE: 126 MMHG

## 2025-05-21 DIAGNOSIS — H66.012 NON-RECURRENT ACUTE SUPPURATIVE OTITIS MEDIA OF LEFT EAR WITH SPONTANEOUS RUPTURE OF TYMPANIC MEMBRANE: Primary | ICD-10-CM

## 2025-05-21 DIAGNOSIS — L03.311 CELLULITIS OF ABDOMINAL WALL: ICD-10-CM

## 2025-05-21 RX ORDER — CIPROFLOXACIN AND DEXAMETHASONE 3; 1 MG/ML; MG/ML
4 SUSPENSION/ DROPS AURICULAR (OTIC) 2 TIMES DAILY
Qty: 10 ML | Refills: 0 | Status: SHIPPED | OUTPATIENT
Start: 2025-05-21 | End: 2025-05-28

## 2025-05-21 RX ORDER — MUPIROCIN 20 MG/G
OINTMENT TOPICAL
Qty: 30 G | Refills: 0 | Status: SHIPPED | OUTPATIENT
Start: 2025-05-21

## 2025-05-21 ASSESSMENT — ENCOUNTER SYMPTOMS
SORE THROAT: 0
NAUSEA: 0
FACIAL SWELLING: 0
VOMITING: 0
SINUS PRESSURE: 0
SINUS PAIN: 0

## 2025-05-21 NOTE — PROGRESS NOTES
Distress       Current Outpatient Medications   Medication Sig Dispense Refill    topiramate (TOPAMAX) 25 MG tablet Take 1 tablet by mouth daily      medroxyPROGESTERone (PROVERA) 10 MG tablet Take 1 tablet by mouth daily 30 tablet 3    naratriptan (AMERGE) 2.5 MG tablet Take 1 tab at onset of severe headache; may repeat another in 4 hours if headaches persist 9 tablet 3    fludrocortisone (FLORINEF) 0.1 MG tablet Take 0.5 tablets by mouth three times a week (Patient taking differently: Take 1 tablet by mouth daily) 15 tablet 3    potassium chloride (KLOR-CON M) 10 MEQ extended release tablet Take 1 tablet by mouth daily 90 tablet 3    ondansetron (ZOFRAN-ODT) 4 MG disintegrating tablet Take 1 tablet by mouth 3 times daily as needed for Nausea or Vomiting (Patient not taking: Reported on 5/20/2025) 21 tablet 0    gabapentin (NEURONTIN) 100 MG capsule Take 1 capsule by mouth in the morning and at bedtime.      rimegepant sulfate (NURTEC) 75 MG TBDP Take 75 mg by mouth every other day 48 tablet 3    metFORMIN (GLUCOPHAGE) 500 MG tablet Take 1 tablet by mouth Daily with supper 2 tabs with dinner (Patient not taking: Reported on 5/20/2025)      amphetamine-dextroamphetamine (ADDERALL) 15 MG tablet Take 1 tablet by mouth 2 times daily.      naltrexone (DEPADE) 50 MG tablet TAKE 1/2 TABLET BY MOUTH EVERY DAY 45 tablet 0    lansoprazole (PREVACID) 15 MG delayed release capsule Take 1 capsule by mouth 2 times daily      Blood Pressure KIT 1 Units by Does not apply route daily 1 kit 0    eptinezumab-jjmr (VYEPTI) 100 MG/ML SOLN Infuse 3 mLs intravenously every 3 months (Patient not taking: Reported on 5/20/2025)      montelukast (SINGULAIR) 10 MG tablet       sertraline (ZOLOFT) 100 MG tablet Take 1 tablet by mouth daily      SYMBICORT 160-4.5 MCG/ACT AERO Inhale 2 puffs into the lungs 2 times daily      albuterol sulfate HFA (PROVENTIL;VENTOLIN;PROAIR) 108 (90 Base) MCG/ACT inhaler Inhale 1 puff into the lungs every 4 hours

## 2025-05-22 ENCOUNTER — APPOINTMENT (OUTPATIENT)
Facility: HOSPITAL | Age: 25
End: 2025-05-22
Payer: MEDICARE

## 2025-05-23 ENCOUNTER — OFFICE VISIT (OUTPATIENT)
Age: 25
End: 2025-05-23
Payer: MEDICARE

## 2025-05-23 VITALS
OXYGEN SATURATION: 98 % | DIASTOLIC BLOOD PRESSURE: 84 MMHG | SYSTOLIC BLOOD PRESSURE: 122 MMHG | BODY MASS INDEX: 49.34 KG/M2 | HEART RATE: 102 BPM | HEIGHT: 64 IN | WEIGHT: 289 LBS

## 2025-05-23 DIAGNOSIS — J30.9 ALLERGIC RHINITIS, UNSPECIFIED SEASONALITY, UNSPECIFIED TRIGGER: ICD-10-CM

## 2025-05-23 DIAGNOSIS — H91.92 HEARING LOSS OF LEFT EAR, UNSPECIFIED HEARING LOSS TYPE: ICD-10-CM

## 2025-05-23 DIAGNOSIS — H66.015 RECURRENT ACUTE SUPPURATIVE OTITIS MEDIA WITH SPONTANEOUS RUPTURE OF LEFT TYMPANIC MEMBRANE: Primary | ICD-10-CM

## 2025-05-23 DIAGNOSIS — H69.93 ETD (EUSTACHIAN TUBE DYSFUNCTION), BILATERAL: ICD-10-CM

## 2025-05-23 PROCEDURE — G8427 DOCREV CUR MEDS BY ELIG CLIN: HCPCS

## 2025-05-23 PROCEDURE — 1036F TOBACCO NON-USER: CPT

## 2025-05-23 PROCEDURE — 99214 OFFICE O/P EST MOD 30 MIN: CPT

## 2025-05-23 PROCEDURE — G8417 CALC BMI ABV UP PARAM F/U: HCPCS

## 2025-05-23 RX ORDER — PREDNISONE 10 MG/1
10 TABLET ORAL DAILY
Qty: 10 TABLET | Refills: 0 | Status: SHIPPED | OUTPATIENT
Start: 2025-05-23 | End: 2025-06-02

## 2025-05-23 RX ORDER — AZELASTINE 1 MG/ML
1 SPRAY, METERED NASAL 2 TIMES DAILY
Qty: 60 ML | Refills: 1 | Status: SHIPPED | OUTPATIENT
Start: 2025-05-23

## 2025-05-23 RX ORDER — CIPROFLOXACIN AND DEXAMETHASONE 3; 1 MG/ML; MG/ML
4 SUSPENSION/ DROPS AURICULAR (OTIC) 2 TIMES DAILY
Qty: 7.5 ML | Refills: 0 | Status: SHIPPED | OUTPATIENT
Start: 2025-05-23 | End: 2025-05-30

## 2025-05-23 RX ORDER — CETIRIZINE HYDROCHLORIDE 10 MG/1
10 TABLET ORAL DAILY
Qty: 30 TABLET | Refills: 1 | Status: SHIPPED | OUTPATIENT
Start: 2025-05-23

## 2025-05-23 RX ORDER — CIPROFLOXACIN AND DEXAMETHASONE 3; 1 MG/ML; MG/ML
4 SUSPENSION/ DROPS AURICULAR (OTIC) 2 TIMES DAILY
Qty: 7.5 ML | Refills: 0 | Status: SHIPPED | OUTPATIENT
Start: 2025-05-23 | End: 2025-05-23

## 2025-05-23 NOTE — PROGRESS NOTES
MD Job   amphetamine-dextroamphetamine (ADDERALL) 15 MG tablet Take 1 tablet by mouth 2 times daily. 11/13/24   Job Anders MD   naltrexone (DEPADE) 50 MG tablet TAKE 1/2 TABLET BY MOUTH EVERY DAY 8/28/24   Jenny Marcus MD   lansoprazole (PREVACID) 15 MG delayed release capsule Take 1 capsule by mouth 2 times daily 6/18/24   Job Anders MD   Blood Pressure KIT 1 Units by Does not apply route daily 6/4/24   Camryn Aly, APRN - NP   eptinezumab-jjmr (VYEPTI) 100 MG/ML SOLN Infuse 3 mLs intravenously every 3 months  Patient not taking: Reported on 5/20/2025    Job Anders MD   montelukast (SINGULAIR) 10 MG tablet  2/9/24   Job Anders MD   sertraline (ZOLOFT) 100 MG tablet Take 1 tablet by mouth daily 3/18/24   Job Anders MD   SYMBICORT 160-4.5 MCG/ACT AERO Inhale 2 puffs into the lungs 2 times daily 9/7/23   Job Anders MD   albuterol sulfate HFA (PROVENTIL;VENTOLIN;PROAIR) 108 (90 Base) MCG/ACT inhaler Inhale 1 puff into the lungs every 4 hours as needed    Automatic Reconciliation, Ar   Magnesium 500 MG CAPS Take 1 capsule by mouth Daily  Patient taking differently: Take 1 capsule by mouth in the morning and at bedtime    Automatic Reconciliation, Ar   DUPIXENT 300 MG/2ML SOPN injection 2 mLs every 14 days 4/15/23   Job Anders MD   traZODone (DESYREL) 50 MG tablet Take 1 tablet by mouth nightly 12/6/21   Job Anders MD   Sacrosidase (SUCRAID) 8500 UNIT/ML SOLN Take 2 mLs by mouth 3 times daily (before meals) \"With every meal; including snacks.\"    Job Anders MD        Allergies:  Allergies   Allergen Reactions    Sulfamethoprim [Sulfamethoxazole-Trimethoprim] Other (See Comments)     Developed oral thrush    Adhesive Tape Rash     MEDIPORE TAPE    Macrobid [Nitrofurantoin] Diarrhea    Metronidazole Rash     Worsened acneiform rash with TOPICAL flagyl used to treat rosacea    Rizatriptan Other (See Comments)

## 2025-05-27 ENCOUNTER — HOSPITAL ENCOUNTER (OUTPATIENT)
Facility: HOSPITAL | Age: 25
Setting detail: RECURRING SERIES
Discharge: HOME | End: 2025-05-30
Payer: MEDICARE

## 2025-05-27 PROCEDURE — 97110 THERAPEUTIC EXERCISES: CPT

## 2025-05-27 NOTE — PROGRESS NOTES
PHYSICAL THERAPY - MEDICARE DAILY TREATMENT NOTE (updated 3/23)      Date: 2025          Patient Name:  Conchis Lazcano :  2000   Medical   Diagnosis:  Other low back pain [M54.59] Treatment Diagnosis:  M54.59  OTHER LOWER BACK PAIN    Referral Source:  Jan Pemberton MD Insurance:   Payor: [facility] MEDICARE / Plan: [employer] DUAL COMPLETE / Product Type: *No Product type* /                     Patient  verified YES    Visit #   Current  / Total 38 48 per updated POC   Time   In / Out 145  PM   Total Treatment Time 30   Total Timed Codes 30   1:1 Treatment Time 30      [facility] Totals Reminder:  bill using total billable   min of TIMED therapeutic procedures and modalities.   8-22 min = 1 unit; 23-37 min = 2 units; 38-52 min = 3 units; 53-67 min = 4 units; 68-82 min = 5 units            SUBJECTIVE     Pain Level (0-10 scale): 3/10     Any medication changes, allergies to medications, adverse drug reactions, diagnosis change, or new procedure performed?: [x] No    [] Yes (see summary sheet for update)  Medications: Verified on Patient Summary List     Subjective functional status/changes:     Patient reports last week her L ear drum ruptured so she is a little more dizzy today.    OBJECTIVE        Therapeutic Procedures:  Tx Min Billable or 1:1 Min (if diff from Tx Min) Procedure, Rationale, Specifics      20707 Self Care/Home Management (timed):  improve patient knowledge and understanding of home injury/symptom/pain management, positioning, and activity modification  to improve patient's ability to progress to PLOF and address remaining functional goals.  (see flow sheet as applicable)     Details if applicable:  Reviewed Fitbit toni to understand HRV and HR tracking   30   91607 Therapeutic Exercise (timed):  increase ROM, strength, coordination, balance, and proprioception to improve patient's ability to progress to PLOF and address remaining functional goals. (see flow sheet as applicable)

## 2025-05-29 ENCOUNTER — APPOINTMENT (OUTPATIENT)
Facility: HOSPITAL | Age: 25
End: 2025-05-29
Payer: MEDICARE

## 2025-06-06 ENCOUNTER — OFFICE VISIT (OUTPATIENT)
Age: 25
End: 2025-06-06
Payer: MEDICARE

## 2025-06-06 VITALS
DIASTOLIC BLOOD PRESSURE: 74 MMHG | SYSTOLIC BLOOD PRESSURE: 108 MMHG | WEIGHT: 289 LBS | HEIGHT: 64 IN | HEART RATE: 98 BPM | BODY MASS INDEX: 49.34 KG/M2 | RESPIRATION RATE: 20 BRPM | OXYGEN SATURATION: 97 %

## 2025-06-06 DIAGNOSIS — H69.93 ETD (EUSTACHIAN TUBE DYSFUNCTION), BILATERAL: ICD-10-CM

## 2025-06-06 DIAGNOSIS — R04.0 EPISTAXIS: ICD-10-CM

## 2025-06-06 DIAGNOSIS — R42 DIZZINESS: ICD-10-CM

## 2025-06-06 DIAGNOSIS — H72.92 PERFORATION OF LEFT TYMPANIC MEMBRANE: Primary | ICD-10-CM

## 2025-06-06 PROCEDURE — G8427 DOCREV CUR MEDS BY ELIG CLIN: HCPCS | Performed by: OTOLARYNGOLOGY

## 2025-06-06 PROCEDURE — 99214 OFFICE O/P EST MOD 30 MIN: CPT | Performed by: OTOLARYNGOLOGY

## 2025-06-06 PROCEDURE — G8417 CALC BMI ABV UP PARAM F/U: HCPCS | Performed by: OTOLARYNGOLOGY

## 2025-06-06 PROCEDURE — 1036F TOBACCO NON-USER: CPT | Performed by: OTOLARYNGOLOGY

## 2025-06-06 RX ORDER — LAMOTRIGINE 25 MG/1
TABLET ORAL
COMMUNITY
Start: 2025-06-02

## 2025-06-06 RX ORDER — POTASSIUM CHLORIDE 750 MG/1
10 TABLET, EXTENDED RELEASE ORAL DAILY
COMMUNITY
Start: 2025-04-16

## 2025-06-06 RX ORDER — HYDROXYZINE HYDROCHLORIDE 25 MG/1
TABLET, FILM COATED ORAL
COMMUNITY
Start: 2025-04-03

## 2025-06-06 RX ORDER — PYRIDOSTIGMINE BROMIDE 60 MG/1
30 TABLET ORAL 3 TIMES DAILY
COMMUNITY
Start: 2025-04-22 | End: 2026-04-22

## 2025-06-06 RX ORDER — EPINEPHRINE 0.3 MG/.3ML
INJECTION SUBCUTANEOUS
COMMUNITY
Start: 2025-03-06

## 2025-06-06 NOTE — PROGRESS NOTES
position  -I would not change any medication further.  Discussed there is no active infection currently  -Dry ear precautions  -Follow-up in 6 to 8 weeks in [place], with an audiogram and see me after  -If the perforation does not heal on its own, we can talk about role of repair  -Nosebleeds have been better, continue as needed use    The patient was instructed to return to clinic if no improvement or progression of symptoms. Signs to watch out for reviewed.      Snow Mazariegos MD   [facility] [place] [facility]  [address]  [place] [address]  Office Phone: [phone]

## 2025-06-13 ENCOUNTER — APPOINTMENT (OUTPATIENT)
Facility: HOSPITAL | Age: 25
End: 2025-06-13
Payer: MEDICARE

## 2025-06-16 ENCOUNTER — APPOINTMENT (OUTPATIENT)
Facility: HOSPITAL | Age: 25
End: 2025-06-16
Payer: MEDICARE

## 2025-06-19 ENCOUNTER — HOSPITAL ENCOUNTER (OUTPATIENT)
Facility: HOSPITAL | Age: 25
Setting detail: RECURRING SERIES
Discharge: HOME | End: 2025-06-22
Payer: MEDICARE

## 2025-06-19 PROCEDURE — 97110 THERAPEUTIC EXERCISES: CPT | Performed by: PHYSICAL THERAPIST

## 2025-06-25 ENCOUNTER — HOSPITAL ENCOUNTER (OUTPATIENT)
Facility: HOSPITAL | Age: 25
Setting detail: RECURRING SERIES
End: 2025-06-25
Payer: MEDICARE

## 2025-06-27 ENCOUNTER — APPOINTMENT (OUTPATIENT)
Facility: HOSPITAL | Age: 25
End: 2025-06-27
Payer: MEDICARE

## 2025-07-01 ENCOUNTER — PATIENT MESSAGE (OUTPATIENT)
Age: 25
End: 2025-07-01

## 2025-07-03 RX ORDER — OFLOXACIN 3 MG/ML
5 SOLUTION AURICULAR (OTIC) 2 TIMES DAILY
Qty: 10 ML | Refills: 0 | Status: SHIPPED | OUTPATIENT
Start: 2025-07-03 | End: 2025-07-10

## 2025-07-07 ENCOUNTER — HOSPITAL ENCOUNTER (OUTPATIENT)
Facility: HOSPITAL | Age: 25
Setting detail: RECURRING SERIES
Discharge: HOME | End: 2025-07-10
Payer: MEDICARE

## 2025-07-07 PROCEDURE — 97110 THERAPEUTIC EXERCISES: CPT

## 2025-07-07 NOTE — PROGRESS NOTES
activities as tolerated  []  Discharge due to:  []  Other      MACKENZIE CANDELARIO, ELENA       7/7/2025       10:59 AM

## 2025-07-15 NOTE — PROGRESS NOTES
Conchis Lazcano is a 24 y.o. female presents for a problem visit.    No chief complaint on file.  Last ov 5/9/2025 Menstrual suppression    No LMP recorded. (Menstrual status: IUD).  Birth Control: IUD.  Last Pap: see report obtained 1 year(s) ago.    The patient is reporting having: {Gyn Problem List:37632} for {numbers:6} {days/wks/mos/yrs:791605}.      1. Have you been to the ER, urgent care clinic, or hospitalized since your last visit? No    2. Have you seen or consulted any other health care providers outside of the [facility] since your last visit? No    Examination chaperoned by Ashley Jones MA.

## 2025-07-16 ENCOUNTER — OFFICE VISIT (OUTPATIENT)
Age: 25
End: 2025-07-16
Payer: MEDICARE

## 2025-07-16 VITALS
BODY MASS INDEX: 49.85 KG/M2 | HEIGHT: 64 IN | WEIGHT: 292 LBS | DIASTOLIC BLOOD PRESSURE: 60 MMHG | SYSTOLIC BLOOD PRESSURE: 115 MMHG

## 2025-07-16 DIAGNOSIS — N94.89 MENSTRUAL SUPPRESSION: Primary | ICD-10-CM

## 2025-07-16 PROCEDURE — 99213 OFFICE O/P EST LOW 20 MIN: CPT | Performed by: STUDENT IN AN ORGANIZED HEALTH CARE EDUCATION/TRAINING PROGRAM

## 2025-07-16 PROCEDURE — 1036F TOBACCO NON-USER: CPT | Performed by: STUDENT IN AN ORGANIZED HEALTH CARE EDUCATION/TRAINING PROGRAM

## 2025-07-16 PROCEDURE — G8427 DOCREV CUR MEDS BY ELIG CLIN: HCPCS | Performed by: STUDENT IN AN ORGANIZED HEALTH CARE EDUCATION/TRAINING PROGRAM

## 2025-07-16 PROCEDURE — G8417 CALC BMI ABV UP PARAM F/U: HCPCS | Performed by: STUDENT IN AN ORGANIZED HEALTH CARE EDUCATION/TRAINING PROGRAM

## 2025-07-16 RX ORDER — DIAPER,BRIEF,ADULT, DISPOSABLE
500 EACH MISCELLANEOUS 2 TIMES DAILY
COMMUNITY
Start: 2025-05-16 | End: 2026-05-16

## 2025-07-16 RX ORDER — ASCORBIC ACID 500 MG
500 TABLET ORAL 2 TIMES DAILY
COMMUNITY

## 2025-07-16 RX ORDER — RIBOFLAVIN (VITAMIN B2) 100 MG
100 TABLET ORAL 3 TIMES DAILY
COMMUNITY

## 2025-07-16 RX ORDER — MEDROXYPROGESTERONE ACETATE 10 MG
10 TABLET ORAL DAILY
Qty: 30 TABLET | Refills: 6 | Status: SHIPPED | OUTPATIENT
Start: 2025-07-16

## 2025-07-16 RX ORDER — MODIFIED LANOLIN 100 %
CREAM (GRAM) TOPICAL
COMMUNITY

## 2025-07-16 RX ORDER — GINSENG 100 MG
50 CAPSULE ORAL DAILY
COMMUNITY

## 2025-07-16 NOTE — PROGRESS NOTES
Conchis Lazcano is a 24 y.o. female presents for a problem visit.    No chief complaint on file.  Last ov 5/9/2025 Menstrual suppression    No LMP recorded. (Menstrual status: IUD).  Birth Control: IUD.  Last Pap: see report obtained 1 year(s) ago.    The patient presents to clinic for iud follow up obtaining bleeding.    1. Have you been to the ER, urgent care clinic, or hospitalized since your last visit? No    2. Have you seen or consulted any other health care providers outside of the [facility] since your last visit? No    Examination chaperoned by VERONICA DELGADO CMA.

## 2025-07-16 NOTE — PROGRESS NOTES
OB/GYN Problem VIsit    HPI  Conchis Lazcano is a ,  24 y.o. female who presents for a follow up.    IUD placed 1yr ago for menstrual suppression. Patient with significant difficulty with lightheadedness syncope attributed to POTs during cycle. Had menstrual suppression initially, but for a couple months had return to bleeding. Bleeding was minimal, but interested in total suppression. Provera rxd, and patient has had supression of bleeding since.     Past Medical History:   Diagnosis Date    Abdominal migraine     Dr. David Rodriguez.  vs Sucrose Intolerance.    Acid reflux     ADHD (attention deficit hyperactivity disorder)     Asthma     Autism     High Functioning.  Dr. Ndiaye.    Autoimmune disorder 2020    Biliary dyskinesia 2019    Chronic back pain     Chronic cholecystitis 2019    Chronic rhinitis     Dr. Alonso Ward, allergist.     Connective tissue disease     Developmental delay     Dizziness     Fibromyalgia     Dr. Byrd    FIORDALIZA (generalized anxiety disorder)     Dr. Spring Marion    Hard of hearing     pt states due to fluid in ears x 2 years    HPV vaccine counseling     completed series    Insulin resistance 2021    Dr. Michelle Piper, endo    Irritable bowel syndrome with diarrhea 2017    Liver disease 2023    Lupus     Lymphedema     BL legs.  Dr. Sukhwinder Ochoa.  [facility]    Medication overuse headache 2015    Migraine     Migraine headache     Dr. Martines    Obesity, morbid (HCC) 2017    OCD (obsessive compulsive disorder)     Dr. Spring Marion    Rash     Sucrose intolerance     Dr. David Rodriguez, GI.    Tachycardia 2021    Richard Howe DO, [facility]    Tenosynovitis of right hand 2021    Dr. Haris Horner    TMJ dysfunction     Undifferentiated connective tissue disease     (lupus like).  Dr. Ochoa.     Past Surgical History:   Procedure Laterality Date    CHOLECYSTECTOMY  2019    CLP

## 2025-07-18 ENCOUNTER — APPOINTMENT (OUTPATIENT)
Facility: HOSPITAL | Age: 25
End: 2025-07-18
Payer: MEDICARE

## 2025-07-18 ENCOUNTER — OFFICE VISIT (OUTPATIENT)
Age: 25
End: 2025-07-18
Payer: MEDICARE

## 2025-07-18 VITALS
HEIGHT: 64 IN | HEART RATE: 102 BPM | RESPIRATION RATE: 18 BRPM | BODY MASS INDEX: 49.85 KG/M2 | SYSTOLIC BLOOD PRESSURE: 104 MMHG | DIASTOLIC BLOOD PRESSURE: 68 MMHG | OXYGEN SATURATION: 98 % | WEIGHT: 292 LBS

## 2025-07-18 DIAGNOSIS — H69.93 ETD (EUSTACHIAN TUBE DYSFUNCTION), BILATERAL: ICD-10-CM

## 2025-07-18 DIAGNOSIS — R04.0 EPISTAXIS: ICD-10-CM

## 2025-07-18 DIAGNOSIS — H72.92 PERFORATION OF LEFT TYMPANIC MEMBRANE: Primary | ICD-10-CM

## 2025-07-18 PROCEDURE — 1036F TOBACCO NON-USER: CPT | Performed by: OTOLARYNGOLOGY

## 2025-07-18 PROCEDURE — G8417 CALC BMI ABV UP PARAM F/U: HCPCS | Performed by: OTOLARYNGOLOGY

## 2025-07-18 PROCEDURE — 30901 CONTROL OF NOSEBLEED: CPT | Performed by: OTOLARYNGOLOGY

## 2025-07-18 PROCEDURE — 99214 OFFICE O/P EST MOD 30 MIN: CPT | Performed by: OTOLARYNGOLOGY

## 2025-07-18 PROCEDURE — G8427 DOCREV CUR MEDS BY ELIG CLIN: HCPCS | Performed by: OTOLARYNGOLOGY

## 2025-07-22 NOTE — PROGRESS NOTES
Otolaryngology-Head and Neck Surgery  Follow Up Patient Visit     Patient: Conchis Lazcano  YOB: 2000  MRN: [id number]  Date of Service: 7/18/2025      Chief Complaint:   Chief Complaint   Patient presents with    Follow-up    Epistaxis       Interval hx 7/18/2025  Ears doing ok but now with left epistaxis again    6/6/2025  No further nosebleed issues  Left otorrhea, seen in urgent care and then by our PA     Interval hx 3/2025  Cont to have L > R epistaxis    Dx with POTS  Also Dx with BPPV, but feels symptoms are related to POTS    Interval hx 9/2024  Developed intermittent ear plugging and allergy type of symptoms  Persisted so was ultimately seen in urgent care  Was told she had significant fluid in her ears  Was given a prescription for p.o. antibiotics  She had been using her Floxin eardrops but they told her to stop this    She did have intermittent left ear drainage    Interval hx 4/2024  S/p BMTT 10/2023 (in OR)    Initially did very well after the ear tubes with resolution of ear pain and plugging  In the last month has developed right ear plugging again  Wonders if the tube has extruded    Has also noticed intermittent epistaxis, left-sided  No ER requirement    Interval hx   1 mo s/p R myringotomy in the office  Found this really helped her ear symptoms - resolution of plugging and pulsing   Last week right ear plugging recurred   Presents for possible tube placement today     Interval hx  On dupixent now for 4-5 months  Allergies may be somewhat improved  Did have asthma exacerbation requiring significant prednisone Rx  R > L ear plugging remains very bothersome    History of Present Illness: Conchis Lazcano is a 22 y.o. year old female who presents today for discussion of frequent ear infections and sinus pain    Over the last couple of years has had nasal congestion, sinus pain, concern for infections  Also has frequent otalgia and told has fluid in her ears    Has seen a few different

## 2025-07-23 ENCOUNTER — HOSPITAL ENCOUNTER (OUTPATIENT)
Facility: HOSPITAL | Age: 25
Setting detail: RECURRING SERIES
Discharge: HOME | End: 2025-07-26
Payer: MEDICARE

## 2025-07-23 PROCEDURE — 97112 NEUROMUSCULAR REEDUCATION: CPT | Performed by: PHYSICAL THERAPIST

## 2025-07-23 PROCEDURE — 97110 THERAPEUTIC EXERCISES: CPT | Performed by: PHYSICAL THERAPIST

## 2025-07-23 NOTE — PROGRESS NOTES
PLOF and address remaining functional goals. (see flow sheet as applicable)      Details if applicable:     45       Total Total           [x]  Patient Education billed concurrently with other procedures   [x] Review HEP    [] Progressed/Changed HEP, detail:    [] Other detail:           Other Objective/Functional Measures  Dizziness with c-spine rotation    Pain Level at end of session (0-10 scale): 3/10        Assessment   Good exercise tolerance of sitting and standing therapeutic exercise  Patient will continue to benefit from skilled PT / OT services to modify and progress therapeutic interventions, analyze and address functional mobility deficits, analyze and address ROM deficits, analyze and address strength deficits, analyze and address soft tissue restrictions, analyze and cue for proper movement patterns, analyze and modify for postural abnormalities, analyze and address imbalance/dizziness, and instruct in home and community integration to address functional deficits and attain remaining goals.     Progress toward goals / Updated goals:  []  See Progress Note/Recertification    Short Term Goals: To be accomplished in 8 treatments.  1) The patient will be independent with introductory HEP MET  2) The patient will improve lumbar flexion AROM to reaching 12 inches from floor to improve ease with picking up items off the floor MET  3) The patient will transfer sit <> stand without an increase in dizziness MET  Long Term Goals: To be accomplished in 24 treatments.  UPDATE TO 48 VISITS:  1) The patient will demonstrate 5/5 BLE strength to improve ease with household chores  2) The patient will improve FOTO survey to a 58% or greater to indicate a significant improvement in function  3) The patient will ambulate 20 minutes without a significant increase in pain to improve ease running errands      PLAN  Yes  Continue plan of care  Re-Cert Due: NA  [x]  Upgrade activities as tolerated  []  Discharge due to:  []

## 2025-07-29 ENCOUNTER — TELEPHONE (OUTPATIENT)
Age: 25
End: 2025-07-29

## 2025-07-29 NOTE — TELEPHONE ENCOUNTER
PT was called back, name and  verified    RN relayed  message/recommendation:  Lana Greene MD to Me      25 12:43 PM  I would recommend she call back if she's having pain or irregular bleeding, she doesn't need to palpate the strings herself      PT verbalizes understanding.

## 2025-07-29 NOTE — TELEPHONE ENCOUNTER
PT name and  verified    25 yo last ov 25, next ov 26    PT calling in regards to her IUD and strings, states that she thinks \"something feels weird\", she can feel them, \"but they feel lower than normal\". PT states she may be over reacting but did not know if it is normal, and did not know if she needed to be seen to evaluate.  PT denies any vag bleeding/spotting and or pain.    Please advise  Ov?    Thank you

## 2025-07-31 ENCOUNTER — APPOINTMENT (OUTPATIENT)
Facility: HOSPITAL | Age: 25
End: 2025-07-31
Payer: MEDICARE

## 2025-08-04 ENCOUNTER — APPOINTMENT (OUTPATIENT)
Facility: HOSPITAL | Age: 25
End: 2025-08-04
Payer: MEDICARE

## 2025-08-05 ENCOUNTER — OFFICE VISIT (OUTPATIENT)
Age: 25
End: 2025-08-05
Payer: MEDICARE

## 2025-08-05 ENCOUNTER — PROCEDURE VISIT (OUTPATIENT)
Age: 25
End: 2025-08-05
Payer: MEDICARE

## 2025-08-05 DIAGNOSIS — H72.92 PERFORATION OF LEFT TYMPANIC MEMBRANE: Primary | ICD-10-CM

## 2025-08-05 DIAGNOSIS — H90.12 CONDUCTIVE HEARING LOSS OF LEFT EAR WITH UNRESTRICTED HEARING OF RIGHT EAR: ICD-10-CM

## 2025-08-05 DIAGNOSIS — H90.12 CONDUCTIVE HEARING LOSS OF LEFT EAR WITH UNRESTRICTED HEARING OF RIGHT EAR: Primary | ICD-10-CM

## 2025-08-05 DIAGNOSIS — R04.0 EPISTAXIS: ICD-10-CM

## 2025-08-05 DIAGNOSIS — H69.93 ETD (EUSTACHIAN TUBE DYSFUNCTION), BILATERAL: ICD-10-CM

## 2025-08-05 PROCEDURE — G8417 CALC BMI ABV UP PARAM F/U: HCPCS | Performed by: OTOLARYNGOLOGY

## 2025-08-05 PROCEDURE — 1036F TOBACCO NON-USER: CPT | Performed by: OTOLARYNGOLOGY

## 2025-08-05 PROCEDURE — 92557 COMPREHENSIVE HEARING TEST: CPT

## 2025-08-05 PROCEDURE — G8427 DOCREV CUR MEDS BY ELIG CLIN: HCPCS | Performed by: OTOLARYNGOLOGY

## 2025-08-05 PROCEDURE — 99214 OFFICE O/P EST MOD 30 MIN: CPT | Performed by: OTOLARYNGOLOGY

## 2025-08-11 ENCOUNTER — HOSPITAL ENCOUNTER (OUTPATIENT)
Facility: HOSPITAL | Age: 25
Setting detail: RECURRING SERIES
Discharge: HOME | End: 2025-08-14
Payer: MEDICARE

## 2025-08-11 PROCEDURE — 97110 THERAPEUTIC EXERCISES: CPT

## 2025-08-11 PROCEDURE — 97112 NEUROMUSCULAR REEDUCATION: CPT

## 2025-08-13 PROBLEM — H72.92 PERFORATION OF LEFT TYMPANIC MEMBRANE: Status: ACTIVE | Noted: 2025-08-13

## 2025-08-13 PROBLEM — H69.93 ETD (EUSTACHIAN TUBE DYSFUNCTION), BILATERAL: Status: ACTIVE | Noted: 2025-08-13

## 2025-08-13 PROBLEM — R04.0 EPISTAXIS: Status: ACTIVE | Noted: 2025-08-13

## 2025-08-13 PROBLEM — H90.12 CONDUCTIVE HEARING LOSS OF LEFT EAR WITH UNRESTRICTED HEARING OF RIGHT EAR: Status: ACTIVE | Noted: 2025-08-13

## 2025-08-19 ENCOUNTER — APPOINTMENT (OUTPATIENT)
Facility: HOSPITAL | Age: 25
End: 2025-08-19
Payer: MEDICARE

## 2025-08-25 ENCOUNTER — APPOINTMENT (OUTPATIENT)
Facility: HOSPITAL | Age: 25
End: 2025-08-25
Payer: MEDICARE

## (undated) DEVICE — MEDI-VAC NON-CONDUCTIVE SUCTION TUBING: Brand: CARDINAL HEALTH

## (undated) DEVICE — INTEGRA® KNIFE 1411000 10PK MYRINGOTOMY SPEAR: Brand: INTEGRA®

## (undated) DEVICE — GLOVE SURG SZ 6 THK91MIL LTX FREE SYN POLYISOPRENE ANTI

## (undated) DEVICE — SOL ANTI-FOG 6ML MEDC -- MEDICHOICE - CONVERT TO 358427

## (undated) DEVICE — FORCEPS BX L240CM JAW DIA2.8MM L CAP W/ NDL MIC MESH TOOTH

## (undated) DEVICE — DEVICE INTRAUTERNE CONTRACEPTIVE MIRENA

## (undated) DEVICE — DERMABOND SKIN ADH 0.7ML -- DERMABOND ADVANCED 12/BX

## (undated) DEVICE — TUBING HYDR IRR --

## (undated) DEVICE — Device

## (undated) DEVICE — PREP CHLORAPREP 10.5 ML ORG --

## (undated) DEVICE — DRAPE,UTILTY,TAPE,15X26, 4EA/PK: Brand: MEDLINE

## (undated) DEVICE — Z DISCONTINUED NO SUB IDED CAPSULE PH GASTROENTEROLOGY ES MON FOR REFLX DEL SYS BRAVO

## (undated) DEVICE — 1200 GUARD II KIT W/5MM TUBE W/O VAC TUBE: Brand: GUARDIAN

## (undated) DEVICE — INFECTION CONTROL KIT SYS

## (undated) DEVICE — SCISSORS ENDOSCP DIA5MM CRV MPLR CAUT W/ RATCH HNDL

## (undated) DEVICE — 3000CC GUARDIAN II: Brand: GUARDIAN

## (undated) DEVICE — FILTER SMK EVAC FLO CLR MEGADYNE

## (undated) DEVICE — SYR 10ML LUER LOK 1/5ML GRAD --

## (undated) DEVICE — STERILE COTTON BALLS LARGE 5/P: Brand: MEDLINE

## (undated) DEVICE — REM POLYHESIVE ADULT PATIENT RETURN ELECTRODE: Brand: VALLEYLAB

## (undated) DEVICE — DILATOR AND CANNULA: Brand: MINI STEP

## (undated) DEVICE — TUBING INSUFLTN 10FT LUER -- CONVERT TO ITEM 368568

## (undated) DEVICE — SOUTHSIDE TURNOVER: Brand: MEDLINE INDUSTRIES, INC.

## (undated) DEVICE — CLIP APPLIER WITH CLIP LOGIC TECHNOLOGY: Brand: ENDO CLIP III

## (undated) DEVICE — SUTURE MCRYL SZ 4-0 L18IN ABSRB UD P-3 L13MM 3/8 CIR PRIM Y494G

## (undated) DEVICE — SYRINGE MED 10ML LUERLOCK TIP W/O SFTY DISP

## (undated) DEVICE — UNIVERSAL FIXATION CANNULA: Brand: VERSAONE

## (undated) DEVICE — STANDARD SURGICAL GOWN, L: Brand: CONVERTORS

## (undated) DEVICE — ENDO CARRY-ON PROCEDURE KIT INCLUDES ENZYMATIC SPONGE, GAUZE, BIOHAZARD LABEL, TRAY, LUBRICANT, DIRTY SCOPE LABEL, WATER LABEL, TRAY, DRAWSTRING PAD, AND DEFENDO 4-PIECE KIT.: Brand: ENDO CARRY-ON PROCEDURE KIT

## (undated) DEVICE — INSUFFLATION NEEDLE: Brand: STEP

## (undated) DEVICE — COVER,MAYO STAND,STERILE: Brand: MEDLINE

## (undated) DEVICE — INTENDED FOR TISSUE SEPARATION, AND OTHER PROCEDURES THAT REQUIRE A SHARP SURGICAL BLADE TO PUNCTURE OR CUT.: Brand: BARD-PARKER ® CARBON RIB-BACK BLADES

## (undated) DEVICE — STRAP,POSITIONING,KNEE/BODY,FOAM,4X60": Brand: MEDLINE

## (undated) DEVICE — E-Z CLEAN, PTFE COATED, ELECTROSURGICAL LAPAROSCOPIC ELECTRODE, L-HOOK, 33 CM., SINGLE-USE, FOR USE WITH HAND CONTROL PENCIL: Brand: MEGADYNE

## (undated) DEVICE — SUTURE SZ 0 27IN 5/8 CIR UR-6  TAPER PT VIOLET ABSRB VICRYL J603H

## (undated) DEVICE — TUBING, SUCTION, 1/4" X 12', STRAIGHT: Brand: MEDLINE

## (undated) DEVICE — SUTURE VCRL SZ 3-0 L27IN ABSRB UD L17MM RB-1 1/2 CIR J215H

## (undated) DEVICE — SOLUTION IRRIG 1000ML H2O STRL BLT

## (undated) DEVICE — TIBURON LAPAROSCOPIC CHOLECYSTECTOMY DRAPE: Brand: CONVERTORS

## (undated) DEVICE — TOWEL SURG W17XL27IN STD BLU COT NONFENESTRATED PREWASHED